# Patient Record
Sex: FEMALE | Race: WHITE | Employment: FULL TIME | URBAN - METROPOLITAN AREA
[De-identification: names, ages, dates, MRNs, and addresses within clinical notes are randomized per-mention and may not be internally consistent; named-entity substitution may affect disease eponyms.]

---

## 2019-06-30 ENCOUNTER — APPOINTMENT (OUTPATIENT)
Dept: GENERAL RADIOLOGY | Age: 39
End: 2019-06-30
Payer: COMMERCIAL

## 2019-06-30 ENCOUNTER — HOSPITAL ENCOUNTER (EMERGENCY)
Age: 39
Discharge: HOME | End: 2019-06-30
Attending: EMERGENCY MEDICINE
Payer: COMMERCIAL

## 2019-06-30 VITALS
BODY MASS INDEX: 34.23 KG/M2 | SYSTOLIC BLOOD PRESSURE: 144 MMHG | OXYGEN SATURATION: 100 % | HEIGHT: 66 IN | RESPIRATION RATE: 19 BRPM | DIASTOLIC BLOOD PRESSURE: 71 MMHG | TEMPERATURE: 98.1 F | WEIGHT: 213 LBS | HEART RATE: 87 BPM

## 2019-06-30 DIAGNOSIS — R07.9 CHEST PAIN, UNSPECIFIED TYPE: Primary | ICD-10-CM

## 2019-06-30 DIAGNOSIS — R42 LIGHTHEADED: ICD-10-CM

## 2019-06-30 LAB
ANION GAP SERPL CALCULATED.3IONS-SCNC: 11 MMOL/L (ref 3–16)
BASOPHILS ABSOLUTE: 0.1 K/UL (ref 0–0.2)
BASOPHILS RELATIVE PERCENT: 0.9 %
BUN BLDV-MCNC: 12 MG/DL (ref 7–20)
CALCIUM SERPL-MCNC: 9.1 MG/DL (ref 8.3–10.6)
CHLORIDE BLD-SCNC: 102 MMOL/L (ref 99–110)
CO2: 23 MMOL/L (ref 21–32)
CREAT SERPL-MCNC: 0.6 MG/DL (ref 0.6–1.1)
EOSINOPHILS ABSOLUTE: 0.1 K/UL (ref 0–0.6)
EOSINOPHILS RELATIVE PERCENT: 0.7 %
GFR AFRICAN AMERICAN: >60
GFR NON-AFRICAN AMERICAN: >60
GLUCOSE BLD-MCNC: 95 MG/DL (ref 70–99)
HCT VFR BLD CALC: 41.1 % (ref 36–48)
HEMOGLOBIN: 13.8 G/DL (ref 12–16)
LYMPHOCYTES ABSOLUTE: 3.8 K/UL (ref 1–5.1)
LYMPHOCYTES RELATIVE PERCENT: 41.1 %
MCH RBC QN AUTO: 30.6 PG (ref 26–34)
MCHC RBC AUTO-ENTMCNC: 33.4 G/DL (ref 31–36)
MCV RBC AUTO: 91.6 FL (ref 80–100)
MONOCYTES ABSOLUTE: 0.7 K/UL (ref 0–1.3)
MONOCYTES RELATIVE PERCENT: 7.9 %
NEUTROPHILS ABSOLUTE: 4.6 K/UL (ref 1.7–7.7)
NEUTROPHILS RELATIVE PERCENT: 49.4 %
PDW BLD-RTO: 13.7 % (ref 12.4–15.4)
PLATELET # BLD: 322 K/UL (ref 135–450)
PMV BLD AUTO: 7.8 FL (ref 5–10.5)
POTASSIUM SERPL-SCNC: 4.1 MMOL/L (ref 3.5–5.1)
RBC # BLD: 4.49 M/UL (ref 4–5.2)
SODIUM BLD-SCNC: 136 MMOL/L (ref 136–145)
TROPONIN: <0.01 NG/ML
TROPONIN: <0.01 NG/ML
VALPROIC ACID LEVEL: <2.8 UG/ML (ref 50–100)
WBC # BLD: 9.3 K/UL (ref 4–11)

## 2019-06-30 PROCEDURE — 85025 COMPLETE CBC W/AUTO DIFF WBC: CPT

## 2019-06-30 PROCEDURE — 99285 EMERGENCY DEPT VISIT HI MDM: CPT

## 2019-06-30 PROCEDURE — 96374 THER/PROPH/DIAG INJ IV PUSH: CPT

## 2019-06-30 PROCEDURE — 6370000000 HC RX 637 (ALT 250 FOR IP): Performed by: EMERGENCY MEDICINE

## 2019-06-30 PROCEDURE — 6360000002 HC RX W HCPCS: Performed by: EMERGENCY MEDICINE

## 2019-06-30 PROCEDURE — 80164 ASSAY DIPROPYLACETIC ACD TOT: CPT

## 2019-06-30 PROCEDURE — 84484 ASSAY OF TROPONIN QUANT: CPT

## 2019-06-30 PROCEDURE — 80048 BASIC METABOLIC PNL TOTAL CA: CPT

## 2019-06-30 PROCEDURE — 93005 ELECTROCARDIOGRAM TRACING: CPT | Performed by: EMERGENCY MEDICINE

## 2019-06-30 PROCEDURE — 71046 X-RAY EXAM CHEST 2 VIEWS: CPT

## 2019-06-30 RX ORDER — PREGABALIN 75 MG/1
75 CAPSULE ORAL 2 TIMES DAILY
COMMUNITY
End: 2019-08-23 | Stop reason: SDUPTHER

## 2019-06-30 RX ORDER — ROSUVASTATIN CALCIUM 10 MG/1
10 TABLET, COATED ORAL DAILY
COMMUNITY
End: 2019-08-23 | Stop reason: SDUPTHER

## 2019-06-30 RX ORDER — CYCLOBENZAPRINE HCL 10 MG
10 TABLET ORAL 3 TIMES DAILY PRN
COMMUNITY
End: 2019-08-23

## 2019-06-30 RX ORDER — KETOROLAC TROMETHAMINE 30 MG/ML
15 INJECTION, SOLUTION INTRAMUSCULAR; INTRAVENOUS ONCE
Status: COMPLETED | OUTPATIENT
Start: 2019-06-30 | End: 2019-06-30

## 2019-06-30 RX ORDER — ESCITALOPRAM OXALATE 10 MG/1
10 TABLET ORAL DAILY
COMMUNITY
End: 2019-12-23

## 2019-06-30 RX ORDER — HYDROXYZINE HYDROCHLORIDE 25 MG/1
25 TABLET, FILM COATED ORAL 3 TIMES DAILY PRN
COMMUNITY

## 2019-06-30 RX ORDER — METFORMIN HYDROCHLORIDE 500 MG/1
500 TABLET, FILM COATED, EXTENDED RELEASE ORAL 2 TIMES DAILY WITH MEALS
COMMUNITY
End: 2019-08-23 | Stop reason: SDUPTHER

## 2019-06-30 RX ORDER — ASPIRIN 81 MG/1
324 TABLET, CHEWABLE ORAL ONCE
Status: COMPLETED | OUTPATIENT
Start: 2019-06-30 | End: 2019-06-30

## 2019-06-30 RX ORDER — IBUPROFEN 200 MG
400 TABLET ORAL ONCE
Status: DISCONTINUED | OUTPATIENT
Start: 2019-06-30 | End: 2019-06-30

## 2019-06-30 RX ORDER — DIVALPROEX SODIUM 500 MG/1
500 TABLET, EXTENDED RELEASE ORAL DAILY
COMMUNITY

## 2019-06-30 RX ORDER — OMEPRAZOLE 20 MG/1
20 CAPSULE, DELAYED RELEASE ORAL DAILY
COMMUNITY
End: 2019-08-23 | Stop reason: SDUPTHER

## 2019-06-30 RX ADMIN — KETOROLAC TROMETHAMINE 15 MG: 30 INJECTION, SOLUTION INTRAMUSCULAR at 16:36

## 2019-06-30 RX ADMIN — ASPIRIN 81 MG 324 MG: 81 TABLET ORAL at 15:45

## 2019-06-30 ASSESSMENT — PAIN SCALES - GENERAL
PAINLEVEL_OUTOF10: 8
PAINLEVEL_OUTOF10: 7

## 2019-06-30 ASSESSMENT — PAIN DESCRIPTION - LOCATION: LOCATION: CHEST

## 2019-06-30 ASSESSMENT — HEART SCORE: ECG: 0

## 2019-06-30 ASSESSMENT — PAIN DESCRIPTION - PAIN TYPE: TYPE: ACUTE PAIN

## 2019-06-30 NOTE — ED PROVIDER NOTES
unremarkable. No specific ST-T wave changes appreciated. No evidence of acute ischemia. No previous EKGs available for comparison    Radiology  XR CHEST STANDARD (2 VW)   Final Result   No acute process.              Labs  Results for orders placed or performed during the hospital encounter of 06/30/19   CBC Auto Differential   Result Value Ref Range    WBC 9.3 4.0 - 11.0 K/uL    RBC 4.49 4.00 - 5.20 M/uL    Hemoglobin 13.8 12.0 - 16.0 g/dL    Hematocrit 41.1 36.0 - 48.0 %    MCV 91.6 80.0 - 100.0 fL    MCH 30.6 26.0 - 34.0 pg    MCHC 33.4 31.0 - 36.0 g/dL    RDW 13.7 12.4 - 15.4 %    Platelets 398 366 - 138 K/uL    MPV 7.8 5.0 - 10.5 fL    Neutrophils % 49.4 %    Lymphocytes % 41.1 %    Monocytes % 7.9 %    Eosinophils % 0.7 %    Basophils % 0.9 %    Neutrophils # 4.6 1.7 - 7.7 K/uL    Lymphocytes # 3.8 1.0 - 5.1 K/uL    Monocytes # 0.7 0.0 - 1.3 K/uL    Eosinophils # 0.1 0.0 - 0.6 K/uL    Basophils # 0.1 0.0 - 0.2 K/uL   Basic Metabolic Panel   Result Value Ref Range    Sodium 136 136 - 145 mmol/L    Potassium 4.1 3.5 - 5.1 mmol/L    Chloride 102 99 - 110 mmol/L    CO2 23 21 - 32 mmol/L    Anion Gap 11 3 - 16    Glucose 95 70 - 99 mg/dL    BUN 12 7 - 20 mg/dL    CREATININE 0.6 0.6 - 1.1 mg/dL    GFR Non-African American >60 >60    GFR African American >60 >60    Calcium 9.1 8.3 - 10.6 mg/dL   Troponin   Result Value Ref Range    Troponin <0.01 <0.01 ng/mL   Troponin   Result Value Ref Range    Troponin <0.01 <0.01 ng/mL   Valproic acid level, total   Result Value Ref Range    Valproic Acid Lvl <2.8 (L) 50.0 - 100.0 ug/mL       Screenings     Heart Score for chest pain patients  History: Highly Suspicious  ECG: Normal  Patient Age: < 45 years  *Risk factors for Atherosclerotic disease: Obesity, Hypertension, Hypercholesterolemia, Cigarette smoking  Risk Factors: > 3 Risk factors or history of atherosclerotic disease*  Troponin: < 1X normal limit  Heart Score Total: 4     MDM and ED Course  The patient would contain dictation errors given the limitations of this technology.  ]      Andreas Garcia MD  07/25/19 2948

## 2019-07-01 LAB
EKG ATRIAL RATE: 84 BPM
EKG DIAGNOSIS: NORMAL
EKG P AXIS: 21 DEGREES
EKG P-R INTERVAL: 130 MS
EKG Q-T INTERVAL: 400 MS
EKG QRS DURATION: 90 MS
EKG QTC CALCULATION (BAZETT): 472 MS
EKG R AXIS: -4 DEGREES
EKG T AXIS: 25 DEGREES
EKG VENTRICULAR RATE: 84 BPM

## 2019-07-01 PROCEDURE — 93010 ELECTROCARDIOGRAM REPORT: CPT | Performed by: INTERNAL MEDICINE

## 2019-07-03 ENCOUNTER — HOSPITAL ENCOUNTER (EMERGENCY)
Age: 39
Discharge: HOME | End: 2019-07-03
Payer: COMMERCIAL

## 2019-07-03 ENCOUNTER — HOSPITAL ENCOUNTER (OUTPATIENT)
Age: 39
Discharge: HOME | End: 2019-07-03
Payer: COMMERCIAL

## 2019-07-03 ENCOUNTER — APPOINTMENT (OUTPATIENT)
Dept: GENERAL RADIOLOGY | Age: 39
End: 2019-07-03
Payer: COMMERCIAL

## 2019-07-03 ENCOUNTER — HOSPITAL ENCOUNTER (OUTPATIENT)
Dept: NON INVASIVE DIAGNOSTICS | Age: 39
Discharge: HOME | End: 2019-07-03
Payer: COMMERCIAL

## 2019-07-03 VITALS
HEART RATE: 96 BPM | RESPIRATION RATE: 16 BRPM | SYSTOLIC BLOOD PRESSURE: 123 MMHG | OXYGEN SATURATION: 96 % | DIASTOLIC BLOOD PRESSURE: 86 MMHG | TEMPERATURE: 98.4 F

## 2019-07-03 DIAGNOSIS — S93.602A FOOT SPRAIN, LEFT, INITIAL ENCOUNTER: Primary | ICD-10-CM

## 2019-07-03 DIAGNOSIS — R07.9 CHEST PAIN, UNSPECIFIED TYPE: ICD-10-CM

## 2019-07-03 LAB
A/G RATIO: 1.3 (ref 1.1–2.2)
ALBUMIN SERPL-MCNC: 4.5 G/DL (ref 3.4–5)
ALP BLD-CCNC: 62 U/L (ref 40–129)
ALT SERPL-CCNC: 25 U/L (ref 10–40)
ANION GAP SERPL CALCULATED.3IONS-SCNC: 14 MMOL/L (ref 3–16)
AST SERPL-CCNC: 25 U/L (ref 15–37)
BASOPHILS ABSOLUTE: 0.1 K/UL (ref 0–0.2)
BASOPHILS RELATIVE PERCENT: 0.7 %
BILIRUB SERPL-MCNC: 0.3 MG/DL (ref 0–1)
BUN BLDV-MCNC: 14 MG/DL (ref 7–20)
CALCIUM SERPL-MCNC: 9.9 MG/DL (ref 8.3–10.6)
CHLORIDE BLD-SCNC: 100 MMOL/L (ref 99–110)
CHOLESTEROL, TOTAL: 307 MG/DL (ref 0–199)
CO2: 25 MMOL/L (ref 21–32)
CREAT SERPL-MCNC: 0.7 MG/DL (ref 0.6–1.1)
EOSINOPHILS ABSOLUTE: 0.1 K/UL (ref 0–0.6)
EOSINOPHILS RELATIVE PERCENT: 0.8 %
ESTIMATED AVERAGE GLUCOSE: 142.7 MG/DL
GFR AFRICAN AMERICAN: >60
GFR NON-AFRICAN AMERICAN: >60
GLOBULIN: 3.4 G/DL
GLUCOSE BLD-MCNC: 116 MG/DL (ref 70–99)
HBA1C MFR BLD: 6.6 %
HCT VFR BLD CALC: 43.2 % (ref 36–48)
HDLC SERPL-MCNC: 35 MG/DL (ref 40–60)
HEMOGLOBIN: 14.6 G/DL (ref 12–16)
LDL CHOLESTEROL CALCULATED: 245 MG/DL
LV EF: 59 %
LVEF MODALITY: NORMAL
LYMPHOCYTES ABSOLUTE: 3 K/UL (ref 1–5.1)
LYMPHOCYTES RELATIVE PERCENT: 30.7 %
MCH RBC QN AUTO: 30.9 PG (ref 26–34)
MCHC RBC AUTO-ENTMCNC: 33.7 G/DL (ref 31–36)
MCV RBC AUTO: 91.8 FL (ref 80–100)
MONOCYTES ABSOLUTE: 0.7 K/UL (ref 0–1.3)
MONOCYTES RELATIVE PERCENT: 7.6 %
NEUTROPHILS ABSOLUTE: 5.8 K/UL (ref 1.7–7.7)
NEUTROPHILS RELATIVE PERCENT: 60.2 %
PDW BLD-RTO: 13.7 % (ref 12.4–15.4)
PLATELET # BLD: 344 K/UL (ref 135–450)
PMV BLD AUTO: 7.8 FL (ref 5–10.5)
POTASSIUM SERPL-SCNC: 4.3 MMOL/L (ref 3.5–5.1)
RBC # BLD: 4.7 M/UL (ref 4–5.2)
SODIUM BLD-SCNC: 139 MMOL/L (ref 136–145)
TOTAL PROTEIN: 7.9 G/DL (ref 6.4–8.2)
TOTAL SYPHILLIS IGG/IGM: NORMAL
TRIGL SERPL-MCNC: 134 MG/DL (ref 0–150)
TSH SERPL DL<=0.05 MIU/L-ACNC: 2.79 UIU/ML (ref 0.27–4.2)
VALPROIC ACID LEVEL: <2.8 UG/ML (ref 50–100)
VLDLC SERPL CALC-MCNC: 27 MG/DL
WBC # BLD: 9.7 K/UL (ref 4–11)

## 2019-07-03 PROCEDURE — 6360000002 HC RX W HCPCS: Performed by: EMERGENCY MEDICINE

## 2019-07-03 PROCEDURE — 73620 X-RAY EXAM OF FOOT: CPT

## 2019-07-03 PROCEDURE — 83036 HEMOGLOBIN GLYCOSYLATED A1C: CPT

## 2019-07-03 PROCEDURE — 80053 COMPREHEN METABOLIC PANEL: CPT

## 2019-07-03 PROCEDURE — 3430000000 HC RX DIAGNOSTIC RADIOPHARMACEUTICAL: Performed by: EMERGENCY MEDICINE

## 2019-07-03 PROCEDURE — 80164 ASSAY DIPROPYLACETIC ACD TOT: CPT

## 2019-07-03 PROCEDURE — 99283 EMERGENCY DEPT VISIT LOW MDM: CPT

## 2019-07-03 PROCEDURE — 80061 LIPID PANEL: CPT

## 2019-07-03 PROCEDURE — 85025 COMPLETE CBC W/AUTO DIFF WBC: CPT

## 2019-07-03 PROCEDURE — 78452 HT MUSCLE IMAGE SPECT MULT: CPT

## 2019-07-03 PROCEDURE — A9502 TC99M TETROFOSMIN: HCPCS | Performed by: EMERGENCY MEDICINE

## 2019-07-03 PROCEDURE — 93017 CV STRESS TEST TRACING ONLY: CPT | Performed by: INTERNAL MEDICINE

## 2019-07-03 PROCEDURE — 36415 COLL VENOUS BLD VENIPUNCTURE: CPT

## 2019-07-03 PROCEDURE — 84443 ASSAY THYROID STIM HORMONE: CPT

## 2019-07-03 PROCEDURE — 86780 TREPONEMA PALLIDUM: CPT

## 2019-07-03 PROCEDURE — 73600 X-RAY EXAM OF ANKLE: CPT

## 2019-07-03 RX ADMIN — TETROFOSMIN 10 MILLICURIE: 1.38 INJECTION, POWDER, LYOPHILIZED, FOR SOLUTION INTRAVENOUS at 08:05

## 2019-07-03 RX ADMIN — TETROFOSMIN 30 MILLICURIE: 1.38 INJECTION, POWDER, LYOPHILIZED, FOR SOLUTION INTRAVENOUS at 09:08

## 2019-07-03 RX ADMIN — REGADENOSON 0.4 MG: 0.08 INJECTION, SOLUTION INTRAVENOUS at 09:08

## 2019-07-03 NOTE — PROGRESS NOTES
Instructed on Lexiscan Stress Test Procedure including possible side effects/ adverse reactions. Patient verbalizes  understanding and denies having any questions. See [address] [place] [address] Cardiology.

## 2019-07-04 ASSESSMENT — ENCOUNTER SYMPTOMS
SHORTNESS OF BREATH: 0
ABDOMINAL PAIN: 0
VOMITING: 0
CHEST TIGHTNESS: 0
NAUSEA: 0
DIARRHEA: 0

## 2019-08-22 ASSESSMENT — ENCOUNTER SYMPTOMS
CONSTIPATION: 0
WHEEZING: 0
TROUBLE SWALLOWING: 0
NAUSEA: 0
CHEST TIGHTNESS: 0
VOMITING: 0
SHORTNESS OF BREATH: 0
COUGH: 0
ABDOMINAL PAIN: 0
DIARRHEA: 0

## 2019-08-23 ENCOUNTER — OFFICE VISIT (OUTPATIENT)
Dept: FAMILY MEDICINE CLINIC | Age: 39
End: 2019-08-23
Payer: COMMERCIAL

## 2019-08-23 VITALS
TEMPERATURE: 98.1 F | BODY MASS INDEX: 33.76 KG/M2 | RESPIRATION RATE: 16 BRPM | SYSTOLIC BLOOD PRESSURE: 126 MMHG | DIASTOLIC BLOOD PRESSURE: 80 MMHG | OXYGEN SATURATION: 98 % | WEIGHT: 202.6 LBS | HEIGHT: 65 IN | HEART RATE: 70 BPM

## 2019-08-23 DIAGNOSIS — E55.9 VITAMIN D DEFICIENCY: ICD-10-CM

## 2019-08-23 DIAGNOSIS — E11.9 TYPE 2 DIABETES MELLITUS WITHOUT COMPLICATION, WITHOUT LONG-TERM CURRENT USE OF INSULIN (HCC): ICD-10-CM

## 2019-08-23 DIAGNOSIS — K21.9 GASTROESOPHAGEAL REFLUX DISEASE, ESOPHAGITIS PRESENCE NOT SPECIFIED: ICD-10-CM

## 2019-08-23 DIAGNOSIS — Z72.0 CURRENT TOBACCO USE: ICD-10-CM

## 2019-08-23 DIAGNOSIS — M54.12 CERVICAL RADICULAR PAIN: ICD-10-CM

## 2019-08-23 DIAGNOSIS — J44.9 CHRONIC OBSTRUCTIVE PULMONARY DISEASE, UNSPECIFIED COPD TYPE (HCC): ICD-10-CM

## 2019-08-23 DIAGNOSIS — M79.7 FIBROMYALGIA: ICD-10-CM

## 2019-08-23 DIAGNOSIS — G47.33 OSA (OBSTRUCTIVE SLEEP APNEA): ICD-10-CM

## 2019-08-23 DIAGNOSIS — Z98.1 HX OF ANKLE FUSION: ICD-10-CM

## 2019-08-23 DIAGNOSIS — I10 ESSENTIAL HYPERTENSION: Primary | ICD-10-CM

## 2019-08-23 DIAGNOSIS — G89.29 OTHER CHRONIC PAIN: ICD-10-CM

## 2019-08-23 DIAGNOSIS — E78.2 MIXED HYPERLIPIDEMIA: ICD-10-CM

## 2019-08-23 DIAGNOSIS — R91.1 LUNG NODULE: ICD-10-CM

## 2019-08-23 DIAGNOSIS — R00.0 TACHYCARDIA: ICD-10-CM

## 2019-08-23 PROCEDURE — G8926 SPIRO NO PERF OR DOC: HCPCS | Performed by: CLINICAL NURSE SPECIALIST

## 2019-08-23 PROCEDURE — 3023F SPIROM DOC REV: CPT | Performed by: CLINICAL NURSE SPECIALIST

## 2019-08-23 PROCEDURE — 4004F PT TOBACCO SCREEN RCVD TLK: CPT | Performed by: CLINICAL NURSE SPECIALIST

## 2019-08-23 PROCEDURE — G8417 CALC BMI ABV UP PARAM F/U: HCPCS | Performed by: CLINICAL NURSE SPECIALIST

## 2019-08-23 PROCEDURE — G8427 DOCREV CUR MEDS BY ELIG CLIN: HCPCS | Performed by: CLINICAL NURSE SPECIALIST

## 2019-08-23 PROCEDURE — 3044F HG A1C LEVEL LT 7.0%: CPT | Performed by: CLINICAL NURSE SPECIALIST

## 2019-08-23 PROCEDURE — 2022F DILAT RTA XM EVC RTNOPTHY: CPT | Performed by: CLINICAL NURSE SPECIALIST

## 2019-08-23 PROCEDURE — 99203 OFFICE O/P NEW LOW 30 MIN: CPT | Performed by: CLINICAL NURSE SPECIALIST

## 2019-08-23 RX ORDER — METOPROLOL TARTRATE 50 MG/1
50 TABLET, FILM COATED ORAL 2 TIMES DAILY
Qty: 60 TABLET | Refills: 2 | Status: SHIPPED | OUTPATIENT
Start: 2019-08-23 | End: 2019-12-03

## 2019-08-23 RX ORDER — AMMONIUM LACTATE 12 G/100G
LOTION TOPICAL DAILY
COMMUNITY
End: 2021-02-04 | Stop reason: SDUPTHER

## 2019-08-23 RX ORDER — CITALOPRAM 20 MG/1
20 TABLET ORAL DAILY
COMMUNITY
End: 2022-06-08

## 2019-08-23 RX ORDER — OMEPRAZOLE 20 MG/1
20 CAPSULE, DELAYED RELEASE ORAL DAILY
Qty: 90 CAPSULE | Refills: 0 | Status: SHIPPED | OUTPATIENT
Start: 2019-08-23 | End: 2019-12-23 | Stop reason: SDUPTHER

## 2019-08-23 RX ORDER — ALBUTEROL SULFATE 90 UG/1
AEROSOL, METERED RESPIRATORY (INHALATION) PRN
COMMUNITY
Start: 2017-08-17 | End: 2019-12-23 | Stop reason: SDUPTHER

## 2019-08-23 RX ORDER — METFORMIN HYDROCHLORIDE 500 MG/1
500 TABLET, FILM COATED, EXTENDED RELEASE ORAL 2 TIMES DAILY WITH MEALS
Qty: 60 TABLET | Refills: 2 | Status: SHIPPED | OUTPATIENT
Start: 2019-08-23 | End: 2019-08-26 | Stop reason: CLARIF

## 2019-08-23 RX ORDER — ROSUVASTATIN CALCIUM 20 MG/1
10 TABLET, COATED ORAL DAILY
Qty: 30 TABLET | Refills: 2 | Status: SHIPPED | OUTPATIENT
Start: 2019-08-23 | End: 2019-12-23 | Stop reason: SDUPTHER

## 2019-08-23 RX ORDER — NITROGLYCERIN 0.4 MG/1
0.4 TABLET SUBLINGUAL EVERY 5 MIN PRN
Qty: 25 TABLET | Refills: 0 | Status: SHIPPED | OUTPATIENT
Start: 2019-08-23 | End: 2019-12-03

## 2019-08-23 RX ORDER — NITROGLYCERIN 0.4 MG/1
1 TABLET SUBLINGUAL
COMMUNITY
Start: 2017-06-19 | End: 2019-08-23 | Stop reason: SDUPTHER

## 2019-08-23 RX ORDER — METOPROLOL TARTRATE 50 MG/1
50 TABLET, FILM COATED ORAL
COMMUNITY
Start: 2018-12-26 | End: 2019-08-23 | Stop reason: SDUPTHER

## 2019-08-23 RX ORDER — PREGABALIN 75 MG/1
75 CAPSULE ORAL 2 TIMES DAILY
Qty: 60 CAPSULE | Refills: 2 | Status: SHIPPED | OUTPATIENT
Start: 2019-08-23 | End: 2019-12-03

## 2019-08-23 NOTE — PROGRESS NOTES
controlled. Recent lipid tests were reviewed and are normal. Exacerbating diseases include diabetes and obesity. Pertinent negatives include no chest pain, focal sensory loss, focal weakness, leg pain, myalgias or shortness of breath. Current antihyperlipidemic treatment includes statins. The current treatment provides significant improvement of lipids. Risk factors for coronary artery disease include hypertension, diabetes mellitus, dyslipidemia, a sedentary lifestyle and obesity. Diabetes   She presents for her follow-up diabetic visit. She has type 2 diabetes mellitus. Her disease course has been stable. Pertinent negatives for hypoglycemia include no headaches or nervousness/anxiousness. Pertinent negatives for diabetes include no blurred vision, no chest pain, no polydipsia, no polyphagia and no polyuria. Symptoms are stable. Risk factors for coronary artery disease include diabetes mellitus, dyslipidemia, hypertension, sedentary lifestyle, tobacco exposure and stress. Current diabetic treatment includes oral agent (monotherapy). Her weight is stable. She is following a generally healthy diet. Current Outpatient Medications on File Prior to Visit   Medication Sig Dispense Refill    albuterol sulfate HFA (VENTOLIN HFA) 108 (90 Base) MCG/ACT inhaler Ventolin HFA 90 mcg/actuation aerosol inhaler      Coenzyme Q10 (CO Q-10 PO) Take by mouth      diclofenac (VOLTAREN) 50 MG EC tablet Take 100 mg by mouth daily      divalproex (DEPAKOTE ER) 500 MG extended release tablet Take 500 mg by mouth daily      escitalopram (LEXAPRO) 10 MG tablet Take 10 mg by mouth daily      hydrOXYzine (ATARAX) 25 MG tablet Take 25 mg by mouth 3 times daily as needed for Itching      citalopram (CELEXA) 20 MG tablet citalopram 20 mg tablet      ammonium lactate (LAC-HYDRIN) 12 % lotion Apply topically       No current facility-administered medications on file prior to visit.        Past Medical History:   Diagnosis Date    Depression     Diabetes mellitus ([facility] [address]     Hyperlipidemia     Hypertension      Past Surgical History:   Procedure Laterality Date    ANKLE FUSION      ELBOW SURGERY      HERNIA REPAIR      HIP ARTHROSCOPY      TONSILLECTOMY AND ADENOIDECTOMY      WRIST GANGLION EXCISION       No family history on file. Social History     Socioeconomic History    Marital status:      Spouse name: Not on file    Number of children: Not on file    Years of education: Not on file    Highest education level: Not on file   Occupational History    Not on file   Social Needs    Financial resource strain: Not on file    Food insecurity:     Worry: Not on file     Inability: Not on file    Transportation needs:     Medical: Not on file     Non-medical: Not on file   Tobacco Use    Smoking status: Current Every Day Smoker    Smokeless tobacco: Never Used   Substance and Sexual Activity    Alcohol use: Not on file    Drug use: Not on file    Sexual activity: Not on file   Lifestyle    Physical activity:     Days per week: Not on file     Minutes per session: Not on file    Stress: Not on file   Relationships    Social connections:     Talks on phone: Not on file     Gets together: Not on file     Attends Jew service: Not on file     Active member of club or organization: Not on file     Attends meetings of clubs or organizations: Not on file     Relationship status: Not on file    Intimate partner violence:     Fear of current or ex partner: Not on file     Emotionally abused: Not on file     Physically abused: Not on file     Forced sexual activity: Not on file   Other Topics Concern    Not on file   Social History Narrative    Not on file       Review of Systems   Constitutional: Negative for chills and fever. HENT: Negative for trouble swallowing. Eyes: Negative for blurred vision and visual disturbance. Respiratory: Negative for cough, chest tightness, shortness of breath and wheezing.

## 2019-08-23 NOTE — PATIENT INSTRUCTIONS
Signed appropriate paperwork to have records sent to the office    Discussed DASH and low sodium diet     Discussed low-cholesterol diet, information given    Discussed diabetic diet, information given    Discussed GERD precautions    Encourage smoking cessation     Referral given for cardiology, pulmonology, orthopedic, podiatry       Patient Education        Learning About Diabetes Food Guidelines  Your Care Instructions    Meal planning is important to manage diabetes. It helps keep your blood sugar at a target level (which you set with your doctor). You don't have to eat special foods. You can eat what your family eats, including sweets once in a while. But you do have to pay attention to how often you eat and how much you eat of certain foods. You may want to work with a dietitian or a certified diabetes educator (CDE) to help you plan meals and snacks. A dietitian or CDE can also help you lose weight if that is one of your goals. What should you know about eating carbs? Managing the amount of carbohydrate (carbs) you eat is an important part of healthy meals when you have diabetes. Carbohydrate is found in many foods. · Learn which foods have carbs. And learn the amounts of carbs in different foods. ? Bread, cereal, pasta, and rice have about 15 grams of carbs in a serving. A serving is 1 slice of bread (1 ounce), ½ cup of cooked cereal, or 1/3 cup of cooked pasta or rice. ? Fruits have 15 grams of carbs in a serving. A serving is 1 small fresh fruit, such as an apple or orange; ½ of a banana; ½ cup of cooked or canned fruit; ½ cup of fruit juice; 1 cup of melon or raspberries; or 2 tablespoons of dried fruit. ? Milk and no-sugar-added yogurt have 15 grams of carbs in a serving. A serving is 1 cup of milk or 2/3 cup of no-sugar-added yogurt. ? Starchy vegetables have 15 grams of carbs in a serving.  A serving is ½ cup of mashed potatoes or sweet potato; 1 cup winter squash; ½ of a small baked potato; ½ cup of cooked beans; or ½ cup cooked corn or green peas. · Learn how much carbs to eat each day and at each meal. A dietitian or CDE can teach you how to keep track of the amount of carbs you eat. This is called carbohydrate counting. · If you are not sure how to count carbohydrate grams, use the Plate Method to plan meals. It is a good, quick way to make sure that you have a balanced meal. It also helps you spread carbs throughout the day. ? Divide your plate by types of foods. Put non-starchy vegetables on half the plate, meat or other protein food on one-quarter of the plate, and a grain or starchy vegetable in the final quarter of the plate. To this you can add a small piece of fruit and 1 cup of milk or yogurt, depending on how many carbs you are supposed to eat at a meal.  · Try to eat about the same amount of carbs at each meal. Do not \"save up\" your daily allowance of carbs to eat at one meal.  · Proteins have very little or no carbs per serving. Examples of proteins are beef, chicken, turkey, fish, eggs, tofu, cheese, cottage cheese, and peanut butter. A serving size of meat is 3 ounces, which is about the size of a deck of cards. Examples of meat substitute serving sizes (equal to 1 ounce of meat) are 1/4 cup of cottage cheese, 1 egg, 1 tablespoon of peanut butter, and ½ cup of tofu. How can you eat out and still eat healthy? · Learn to estimate the serving sizes of foods that have carbohydrate. If you measure food at home, it will be easier to estimate the amount in a serving of restaurant food. · If the meal you order has too much carbohydrate (such as potatoes, corn, or baked beans), ask to have a low-carbohydrate food instead. Ask for a salad or green vegetables. · If you use insulin, check your blood sugar before and after eating out to help you plan how much to eat in the future. · If you eat more carbohydrate at a meal than you had planned, take a walk or do other exercise.  This will help such as potatoes and corn, grains such as rice and pasta, and milk and yogurt. Spreading carbohydrate throughout the day helps keep your blood sugar levels within your target range. Your daily amount depends on several things, including your weight, how active you are, which diabetes medicines you take, and what your goals are for your blood sugar levels. A registered dietitian or diabetes educator can help you plan how much carbohydrate to include in each meal and snack. A guideline for your daily amount of carbohydrate is:  · 45 to 60 grams at each meal. That's about the same as 3 to 4 carbohydrate servings. · 15 to 20 grams at each snack. That's about the same as 1 carbohydrate serving. The Nutrition Facts label on packaged foods tells you how much carbohydrate is in a serving of the food. First, look at the serving size on the food label. Is that the amount you eat in a serving? All of the nutrition information on a food label is based on that serving size. So if you eat more or less than that, you'll need to adjust the other numbers. Total carbohydrate is the next thing you need to look for on the label. If you count carbohydrate servings, one serving of carbohydrate is 15 grams. For foods that don't come with labels, such as fresh fruits and vegetables, you'll need a guide that lists carbohydrate in these foods. Ask your doctor, dietitian, or diabetes educator about books or other nutrition guides you can use. If you take insulin, you need to know how many grams of carbohydrate are in a meal. This lets you know how much rapid-acting insulin to take before you eat. If you use an insulin pump, you get a constant rate of insulin during the day. So the pump must be programmed at meals to give you extra insulin to cover the rise in blood sugar after meals. When you know how much carbohydrate you will eat, you can take the right amount of insulin.  Or, if you always use the same amount of insulin, you need to you are taking certain diabetes medicines. Where can you learn more? Go to https://chpepiceweb.PerfectHitch. org and sign in to your LifeGuard Games account. Enter U218 in the Mamaherbhire box to learn more about \"Counting Carbohydrates: Care Instructions. \"     If you do not have an account, please click on the \"Sign Up Now\" link. Current as of: July 25, 2018  Content Version: 12.1  © 1956-4957 [employer]. Care instructions adapted under license by [facility] ([facility]). If you have questions about a medical condition or this instruction, always ask your healthcare professional. [address] any warranty or liability for your use of this information. Patient Education        Learning About Type 2 Diabetes  What is type 2 diabetes? Insulin is a hormone that helps your body use sugar from your food as energy. Type 2 diabetes happens when your body can't use insulin the right way. Over time, the pancreas can't make enough insulin. If you don't have enough insulin, too much sugar stays in your blood. If you are overweight, get little or no exercise, or have type 2 diabetes in your family, you are more likely to have problems with the way insulin works in your body.  Americans, Hispanics, Native Americans,  Americans, and Pacific Islanders have a higher risk for type 2 diabetes. Type 2 diabetes can be prevented or delayed with a healthy lifestyle, which includes staying at a healthy weight, making smart food choices, and getting regular exercise. What can you expect with type 2 diabetes? Jerilyn Minor keep hearing about how important it is to keep your blood sugar within a target range. That's because over time, high blood sugar can lead to serious problems. It can:  · Harm your eyes, nerves, and kidneys. · Damage your blood vessels, leading to heart disease and stroke. · Reduce blood flow and cause nerve damage to parts of your body, especially your feet.  This can have questions about a medical condition or this instruction, always ask your healthcare professional. [address] any warranty or liability for your use of this information. Patient Education        Low Sodium Diet (2,000 Milligram): Care Instructions  Your Care Instructions    Too much sodium causes your body to hold on to extra water. This can raise your blood pressure and force your heart and kidneys to work harder. In very serious cases, this could cause you to be put in the hospital. It might even be life-threatening. By limiting sodium, you will feel better and lower your risk of serious problems. The most common source of sodium is salt. People get most of the salt in their diet from canned, prepared, and packaged foods. Fast food and restaurant meals also are very high in sodium. Your doctor will probably limit your sodium to less than 2,000 milligrams (mg) a day. This limit counts all the sodium in prepared and packaged foods and any salt you add to your food. Follow-up care is a key part of your treatment and safety. Be sure to make and go to all appointments, and call your doctor if you are having problems. It's also a good idea to know your test results and keep a list of the medicines you take. How can you care for yourself at home? Read food labels  · Read labels on cans and food packages. The labels tell you how much sodium is in each serving. Make sure that you look at the serving size. If you eat more than the serving size, you have eaten more sodium. · Food labels also tell you the Percent Daily Value for sodium. Choose products with low Percent Daily Values for sodium. · Be aware that sodium can come in forms other than salt, including monosodium glutamate (MSG), sodium citrate, and sodium bicarbonate (baking soda). MSG is often added to Asian food. When you eat out, you can sometimes ask for food without MSG or added salt.   Buy low-sodium foods  · Buy foods low-sodium. ? Canned and dried soups, broths, and bouillon, unless labeled sodium-free or low-sodium. ? Canned vegetables, unless labeled sodium-free or low-sodium. ? [place] fries, pizza, tacos, and other fast foods. ? Pickles, olives, ketchup, and other condiments, especially soy sauce, unless labeled sodium-free or low-sodium. Where can you learn more? Go to https://CellCap TechnologiespeSecure Command.United Mobile. org and sign in to your Espresso Logic account. Enter G899 in the [facility] box to learn more about \"Low Sodium Diet (2,000 Milligram): Care Instructions. \"     If you do not have an account, please click on the \"Sign Up Now\" link. Current as of: November 7, 2018  Content Version: 12.1  © 9732-7872 [employer]. Care instructions adapted under license by [facility] ([facility]). If you have questions about a medical condition or this instruction, always ask your healthcare professional. [address] any warranty or liability for your use of this information. Patient Education        Learning About High Blood Pressure  What is high blood pressure? Blood pressure is a measure of how hard the blood pushes against the walls of your arteries. It's normal for blood pressure to go up and down throughout the day, but if it stays up, you have high blood pressure. Another name for high blood pressure is hypertension. Two numbers tell you your blood pressure. The first number is the systolic pressure. It shows how hard the blood pushes when your heart is pumping. The second number is the diastolic pressure. It shows how hard the blood pushes between heartbeats, when your heart is relaxed and filling with blood. Your doctor will give you a goal for your blood pressure. Your goal will be based on your health and your age. High blood pressure (hypertension) means that the top number stays high, or the bottom number stays high, or both.   High blood pressure increases the risk of stroke, changes to help your heart. For example, your doctor may ask you to eat healthy foods, quit smoking, lose extra weight, and be more active. · If lifestyle changes don't help enough, your doctor may recommend that you take medicine. · When blood pressure is very high, medicines are needed to lower it. Follow-up care is a key part of your treatment and safety. Be sure to make and go to all appointments, and call your doctor if you are having problems. It's also a good idea to know your test results and keep a list of the medicines you take. Where can you learn more? Go to https://chpepiceweb.PlayCafe. org and sign in to your Lightbox account. Enter P501 in the NuScriptRx box to learn more about \"Learning About High Blood Pressure. \"     If you do not have an account, please click on the \"Sign Up Now\" link. Current as of: July 22, 2018  Content Version: 12.1  © 4003-4946 [employer]. Care instructions adapted under license by [facility] ([facility]). If you have questions about a medical condition or this instruction, always ask your healthcare professional. [place] [address] any warranty or liability for your use of this information. Patient Education        High Cholesterol: Care Instructions  Your Care Instructions    Cholesterol is a type of fat in your blood. It is needed for many body functions, such as making new cells. Cholesterol is made by your body. It also comes from food you eat. High cholesterol means that you have too much of the fat in your blood. This raises your risk of a heart attack and stroke. LDL and HDL are part of your total cholesterol. LDL is the \"bad\" cholesterol. High LDL can raise your risk for heart disease, heart attack, and stroke. HDL is the \"good\" cholesterol. It helps clear bad cholesterol from the body. High HDL is linked with a lower risk of heart disease, heart attack, and stroke.   Your cholesterol levels help your doctor find without trans fat is fine.)  ? Replace red meat with fish, poultry, and soy protein (like tofu). ? Limit processed and packaged foods like chips, crackers, and cookies. · Be active. Exercise can improve your cholesterol level. Get at least 30 minutes of exercise on most days of the week. Walking is a good choice. You also may want to do other activities, such as running, swimming, cycling, or playing tennis or team sports. · Stay at a healthy weight. Lose weight if you need to. · Don't smoke. If you need help quitting, talk to your doctor about stop-smoking programs and medicines. These can increase your chances of quitting for good. How is high cholesterol treated? The goal of treatment is to reduce your chances of having a heart attack or stroke. The goal is not to lower your cholesterol numbers only. · You may make lifestyle changes, such as eating healthy foods, not smoking, losing weight, and being more active. · You may have to take medicine. Follow-up care is a key part of your treatment and safety. Be sure to make and go to all appointments, and call your doctor if you are having problems. It's also a good idea to know your test results and keep a list of the medicines you take. Where can you learn more? Go to https://Explore.To Yellow Pages.Iamba Networks. org and sign in to your [email or web address] account. Enter M387 in the [facility] box to learn more about \"Learning About High Cholesterol. \"     If you do not have an account, please click on the \"Sign Up Now\" link. Current as of: July 22, 2018  Content Version: 12.1  © 6240-7814 [employer]wise[employer]orporated. Care instructions adapted under license by [facility] ([facility]). If you have questions about a medical condition or this instruction, always ask your healthcare professional. [place] [address] any warranty or liability for your use of this information.          Patient Education        Gastroesophageal Reflux Disease (GERD): Care

## 2019-08-26 ENCOUNTER — TELEPHONE (OUTPATIENT)
Dept: FAMILY MEDICINE CLINIC | Age: 39
End: 2019-08-26

## 2019-08-26 RX ORDER — METFORMIN HYDROCHLORIDE 500 MG/1
1000 TABLET, EXTENDED RELEASE ORAL
Qty: 60 TABLET | Refills: 2 | Status: SHIPPED | OUTPATIENT
Start: 2019-08-26 | End: 2019-12-23 | Stop reason: SDUPTHER

## 2019-08-26 RX ORDER — METFORMIN HYDROCHLORIDE 500 MG/1
1000 TABLET, EXTENDED RELEASE ORAL
Qty: 60 TABLET | Refills: 5 | Status: SHIPPED | OUTPATIENT
Start: 2019-08-26 | End: 2019-08-26 | Stop reason: DRUGHIGH

## 2019-08-26 NOTE — TELEPHONE ENCOUNTER
[facility] pharmacy sent over a drug change request for metformin 500mg 1 tab 2 times daily with meals. Ins plan does not cover  Medication prescribed. Hailee Ritter does approve metformin tab 500mg er.

## 2019-08-28 ASSESSMENT — ENCOUNTER SYMPTOMS
BLURRED VISION: 0
ORTHOPNEA: 0

## 2019-09-08 ENCOUNTER — HOSPITAL ENCOUNTER (EMERGENCY)
Age: 39
Discharge: HOME | End: 2019-09-08
Attending: EMERGENCY MEDICINE
Payer: COMMERCIAL

## 2019-09-08 ENCOUNTER — APPOINTMENT (OUTPATIENT)
Dept: GENERAL RADIOLOGY | Age: 39
End: 2019-09-08
Payer: COMMERCIAL

## 2019-09-08 VITALS
TEMPERATURE: 98.1 F | HEART RATE: 91 BPM | OXYGEN SATURATION: 97 % | WEIGHT: 202.6 LBS | DIASTOLIC BLOOD PRESSURE: 65 MMHG | SYSTOLIC BLOOD PRESSURE: 119 MMHG | RESPIRATION RATE: 13 BRPM | HEIGHT: 65 IN | BODY MASS INDEX: 33.76 KG/M2

## 2019-09-08 DIAGNOSIS — R07.9 CHEST PAIN, UNSPECIFIED TYPE: Primary | ICD-10-CM

## 2019-09-08 LAB
A/G RATIO: 1.2 (ref 1.1–2.2)
ALBUMIN SERPL-MCNC: 4 G/DL (ref 3.4–5)
ALP BLD-CCNC: 71 U/L (ref 40–129)
ALT SERPL-CCNC: 12 U/L (ref 10–40)
ANION GAP SERPL CALCULATED.3IONS-SCNC: 8 MMOL/L (ref 3–16)
AST SERPL-CCNC: 16 U/L (ref 15–37)
BASOPHILS ABSOLUTE: 0.1 K/UL (ref 0–0.2)
BASOPHILS RELATIVE PERCENT: 1.1 %
BILIRUB SERPL-MCNC: <0.2 MG/DL (ref 0–1)
BUN BLDV-MCNC: 13 MG/DL (ref 7–20)
CALCIUM SERPL-MCNC: 9 MG/DL (ref 8.3–10.6)
CHLORIDE BLD-SCNC: 103 MMOL/L (ref 99–110)
CO2: 25 MMOL/L (ref 21–32)
CREAT SERPL-MCNC: 0.8 MG/DL (ref 0.6–1.1)
D DIMER: <200 NG/ML DDU (ref 0–229)
EOSINOPHILS ABSOLUTE: 0.1 K/UL (ref 0–0.6)
EOSINOPHILS RELATIVE PERCENT: 0.9 %
GFR AFRICAN AMERICAN: >60
GFR NON-AFRICAN AMERICAN: >60
GLOBULIN: 3.3 G/DL
GLUCOSE BLD-MCNC: 129 MG/DL (ref 70–99)
GONADOTROPIN, CHORIONIC (HCG) QUANT: <5 MIU/ML
HCT VFR BLD CALC: 34.7 % (ref 36–48)
HEMOGLOBIN: 11.7 G/DL (ref 12–16)
INR BLD: 1.09 (ref 0.86–1.14)
LIPASE: 19 U/L (ref 13–60)
LYMPHOCYTES ABSOLUTE: 3.7 K/UL (ref 1–5.1)
LYMPHOCYTES RELATIVE PERCENT: 32.1 %
MCH RBC QN AUTO: 29.3 PG (ref 26–34)
MCHC RBC AUTO-ENTMCNC: 33.6 G/DL (ref 31–36)
MCV RBC AUTO: 87 FL (ref 80–100)
MONOCYTES ABSOLUTE: 0.7 K/UL (ref 0–1.3)
MONOCYTES RELATIVE PERCENT: 6.3 %
NEUTROPHILS ABSOLUTE: 6.8 K/UL (ref 1.7–7.7)
NEUTROPHILS RELATIVE PERCENT: 59.6 %
PDW BLD-RTO: 13.7 % (ref 12.4–15.4)
PLATELET # BLD: 372 K/UL (ref 135–450)
PMV BLD AUTO: 6.9 FL (ref 5–10.5)
POTASSIUM SERPL-SCNC: 3.9 MMOL/L (ref 3.5–5.1)
PRO-BNP: <5 PG/ML (ref 0–124)
PROTHROMBIN TIME: 12.4 SEC (ref 9.8–13)
RBC # BLD: 3.99 M/UL (ref 4–5.2)
SODIUM BLD-SCNC: 136 MMOL/L (ref 136–145)
TOTAL PROTEIN: 7.3 G/DL (ref 6.4–8.2)
TROPONIN: <0.01 NG/ML
TROPONIN: <0.01 NG/ML
WBC # BLD: 11.4 K/UL (ref 4–11)

## 2019-09-08 PROCEDURE — 71046 X-RAY EXAM CHEST 2 VIEWS: CPT

## 2019-09-08 PROCEDURE — 6370000000 HC RX 637 (ALT 250 FOR IP): Performed by: EMERGENCY MEDICINE

## 2019-09-08 PROCEDURE — 99285 EMERGENCY DEPT VISIT HI MDM: CPT

## 2019-09-08 PROCEDURE — 85610 PROTHROMBIN TIME: CPT

## 2019-09-08 PROCEDURE — 6360000002 HC RX W HCPCS: Performed by: PHYSICIAN ASSISTANT

## 2019-09-08 PROCEDURE — 85379 FIBRIN DEGRADATION QUANT: CPT

## 2019-09-08 PROCEDURE — 84484 ASSAY OF TROPONIN QUANT: CPT

## 2019-09-08 PROCEDURE — 80053 COMPREHEN METABOLIC PANEL: CPT

## 2019-09-08 PROCEDURE — 84702 CHORIONIC GONADOTROPIN TEST: CPT

## 2019-09-08 PROCEDURE — 83690 ASSAY OF LIPASE: CPT

## 2019-09-08 PROCEDURE — 83880 ASSAY OF NATRIURETIC PEPTIDE: CPT

## 2019-09-08 PROCEDURE — 96375 TX/PRO/DX INJ NEW DRUG ADDON: CPT

## 2019-09-08 PROCEDURE — 6370000000 HC RX 637 (ALT 250 FOR IP): Performed by: PHYSICIAN ASSISTANT

## 2019-09-08 PROCEDURE — 96374 THER/PROPH/DIAG INJ IV PUSH: CPT

## 2019-09-08 PROCEDURE — 93005 ELECTROCARDIOGRAM TRACING: CPT | Performed by: EMERGENCY MEDICINE

## 2019-09-08 PROCEDURE — 85025 COMPLETE CBC W/AUTO DIFF WBC: CPT

## 2019-09-08 RX ORDER — SUCRALFATE 1 G/1
1 TABLET ORAL 4 TIMES DAILY
Qty: 60 TABLET | Refills: 0 | Status: SHIPPED | OUTPATIENT
Start: 2019-09-08 | End: 2019-12-03

## 2019-09-08 RX ORDER — ONDANSETRON 2 MG/ML
4 INJECTION INTRAMUSCULAR; INTRAVENOUS ONCE
Status: COMPLETED | OUTPATIENT
Start: 2019-09-08 | End: 2019-09-08

## 2019-09-08 RX ORDER — MORPHINE SULFATE 4 MG/ML
4 INJECTION, SOLUTION INTRAMUSCULAR; INTRAVENOUS ONCE
Status: COMPLETED | OUTPATIENT
Start: 2019-09-08 | End: 2019-09-08

## 2019-09-08 RX ORDER — SUCRALFATE 1 G/1
1 TABLET ORAL 4 TIMES DAILY
Qty: 60 TABLET | Refills: 0 | Status: SHIPPED | OUTPATIENT
Start: 2019-09-08 | End: 2019-09-08

## 2019-09-08 RX ORDER — SUCRALFATE 1 G/1
1 TABLET ORAL ONCE
Status: COMPLETED | OUTPATIENT
Start: 2019-09-08 | End: 2019-09-08

## 2019-09-08 RX ADMIN — SUCRALFATE 1 G: 1 TABLET ORAL at 22:50

## 2019-09-08 RX ADMIN — ONDANSETRON 4 MG: 2 INJECTION INTRAMUSCULAR; INTRAVENOUS at 20:50

## 2019-09-08 RX ADMIN — MORPHINE SULFATE 4 MG: 4 INJECTION INTRAVENOUS at 20:50

## 2019-09-08 RX ADMIN — LIDOCAINE HYDROCHLORIDE: 20 SOLUTION ORAL; TOPICAL at 20:18

## 2019-09-08 ASSESSMENT — PAIN SCALES - GENERAL
PAINLEVEL_OUTOF10: 9
PAINLEVEL_OUTOF10: 8
PAINLEVEL_OUTOF10: 9

## 2019-09-08 ASSESSMENT — ENCOUNTER SYMPTOMS
COUGH: 0
CONSTIPATION: 0
COLOR CHANGE: 0
DIARRHEA: 0
NAUSEA: 0
CHEST TIGHTNESS: 0
VOMITING: 0
SHORTNESS OF BREATH: 1
ABDOMINAL PAIN: 0

## 2019-09-08 ASSESSMENT — PAIN DESCRIPTION - LOCATION: LOCATION: CHEST

## 2019-09-08 ASSESSMENT — PAIN DESCRIPTION - PAIN TYPE: TYPE: ACUTE PAIN

## 2019-09-08 NOTE — ED PROVIDER NOTES
[address] [facility]        Pt Name: Elizabeth Anders  MRN: [id number]  [place] 1980  Date of evaluation: 9/8/2019  Provider: DENNISE Gonzalez  PCP: JUAN RAMON Felix CNP    This patient was seen and evaluated by the attending physician Solis Bernstein, *. CHIEF COMPLAINT       Chief Complaint   Patient presents with    Chest Pain     chest pain for a couple of day and taking nitro at home with not much relief, sharp stabbing pain around right side of chest       HISTORY OF PRESENT ILLNESS   (Location/Symptom, Timing/Onset, Context/Setting, Quality, Duration, Modifying Factors, Severity)  Note limiting factors. Elizabeth Anders is a 45 y.o. female with past medical history depression, diabetes, hyperlipidemia and hypertension who presents to the ED with complaint of chest pain. Patient states she has had intermittent chest pain for the past couple days. States gradually increasing in intensity and duration. Patient states pain is been present since dinner this afternoon. Patient states sharp pain rated 9/10 to her right sided chest.  States radiates into her back. Patient states she has taken sublingual nitro at home which has helped with her pain but not completely take it away. Patient states she has a nitro that was prescribed by her previous cardiologist in [facility]. Patient states she has had numerous stress test in the past with most recent back in July. Patient states they were unremarkable. Patient states she did have cardiac catheterization she believes about a year ago in [facility] which was unremarkable. Patient states she was given the nitroglycerin to take for her pain. Patient states she never had any stents or bypass. States she does have a history of heart disease in family. Patient states she has history of hypertension, hyperlipidemia and prediabetes. Patient states she is a smoker.   Patient

## 2019-09-09 LAB
EKG ATRIAL RATE: 101 BPM
EKG DIAGNOSIS: NORMAL
EKG P AXIS: 29 DEGREES
EKG P-R INTERVAL: 136 MS
EKG Q-T INTERVAL: 374 MS
EKG QRS DURATION: 94 MS
EKG QTC CALCULATION (BAZETT): 484 MS
EKG R AXIS: 6 DEGREES
EKG T AXIS: 36 DEGREES
EKG VENTRICULAR RATE: 101 BPM

## 2019-09-09 PROCEDURE — 93010 ELECTROCARDIOGRAM REPORT: CPT | Performed by: INTERNAL MEDICINE

## 2019-09-09 ASSESSMENT — HEART SCORE: ECG: 0

## 2019-09-09 NOTE — ED PROVIDER NOTES
[facility]      Pt Name: Delia Dubon  MRN: [id number]  [place] 1980  Date of evaluation: 9/8/2019  Provider: Petra Smith MD  I independently performed a history and physical on Delia Dubon. All diagnostic, treatment, and disposition decisions were made by myself in conjunction with the advanced practice provider. HPI: Delia Dubon presented with   Chief Complaint   Patient presents with    Chest Pain     chest pain for a couple of day and taking nitro at home with not much relief, sharp stabbing pain around right side of chest     Delia Dubon has a past medical history of Depression, Diabetes mellitus ([address] Hyperlipidemia, and Hypertension. She has a past surgical history that includes Ankle Fusion; Hip arthroscopy; hernia repair; Tonsillectomy and adenoidectomy; Elbow surgery; and Wrist ganglion excision. No current facility-administered medications on file prior to encounter. Current Outpatient Medications on File Prior to Encounter   Medication Sig Dispense Refill    metFORMIN (GLUCOPHAGE-XR) 500 MG extended release tablet Take 2 tablets by mouth daily (with breakfast) 60 tablet 2    albuterol sulfate HFA (VENTOLIN HFA) 108 (90 Base) MCG/ACT inhaler Ventolin HFA 90 mcg/actuation aerosol inhaler      citalopram (CELEXA) 20 MG tablet citalopram 20 mg tablet      ammonium lactate (LAC-HYDRIN) 12 % lotion Apply topically      omeprazole (PRILOSEC) 20 MG delayed release capsule Take 1 capsule by mouth daily 90 capsule 0    vitamin D (CHOLECALCIFEROL) 1000 UNIT TABS tablet Take 1 tablet by mouth daily 30 tablet 2    pregabalin (LYRICA) 75 MG capsule Take 1 capsule by mouth 2 times daily for 90 days.  60 capsule 2    rosuvastatin (CRESTOR) 20 MG tablet Take 0.5 tablets by mouth daily 30 tablet 2    metoprolol tartrate (LOPRESSOR) 50 MG tablet Take 1 tablet by mouth 2 times daily 60 tablet 2    nitroGLYCERIN (NITROSTAT) 0.4 MG SL Result Value    WBC 11.4 (*)     RBC 3.99 (*)     Hemoglobin 11.7 (*)     Hematocrit 34.7 (*)     All other components within normal limits    Narrative:     Performed at:  [facility] [address] [facility], [address]   Phone [phone]   COMPREHENSIVE METABOLIC PANEL - Abnormal; Notable for the following components:    Glucose 129 (*)     All other components within normal limits    Narrative:     Performed at:  [facility] [address], [address]   Phone [phone]    Narrative:     Performed at:  [facility] [address]  [place], [address]   Phone [phone]   HCG, QUANTITATIVE, PREGNANCY    Narrative:     Performed at:  [facility] [address], [address]   Phone [phone]   TROPONIN    Narrative:     Performed at:  [facility] [address], [address] [facility]   Phone [phone]   PROTIME-INR    Narrative:     Performed at:  [facility]  [address], [address]   Phone [phone]   D-DIMER, QUANTITATIVE    Narrative:     Performed at:  [facility] [address], [address]   Phone [phone]   LIPASE    Narrative:     Performed at:  [facility] [address], [email or web address]   Phone [phone]   TROPONIN    Narrative:     Performed at:  [facility] [address]   Phone [phone]     RADIOLOGY:     Plain x-rays were viewed by me:   XR CHEST STANDARD (2 VW)   Final Result   No acute cardiopulmonary disease.            EKG:  Read by me in the absence of a cardiologist shows:  ST, rate 101, no ectopy, intervals normal, axis normal, no st elevation, poor R wave progression, no major change from prior study Jun 2019    Medications administered:  Medications   aluminum & magnesium hydroxide-simethicone (MAALOX) 30 mL, lidocaine viscous hcl (XYLOCAINE) 5 mL (GI COCKTAIL) ( Oral Given 9/8/19 2018)   morphine injection 4 mg (4 mg Intravenous Given 9/8/19 2050)   ondansetron (ZOFRAN) injection 4 mg (4 mg Intravenous Given 9/8/19 2050)   sucralfate (CARAFATE) tablet 1 g (1 g Oral Given 9/8/19 2250)     Discharge Medication List as of 9/8/2019 10:50 PM      START taking these medications    Details   sucralfate (CARAFATE) 1 GM tablet Take 1 tablet by mouth 4 times daily for 15 days, Disp-60 tablet, R-0Print           FOLLOW UP:    JUAN RAMON Benitez - CNP  [address] [id number] [id number] [facility] [address]  [phone]    Schedule an appointment as soon as possible for a visit       [facility] Emergency Department  [facility]  [address]  Go to   If symptoms worsen    FINAL IMPRESSION:    1.  Chest pain, unspecified type          Christopher Wang MD  09/09/19 0222

## 2019-09-12 ENCOUNTER — OFFICE VISIT (OUTPATIENT)
Dept: ORTHOPEDIC SURGERY | Age: 39
End: 2019-09-12
Payer: COMMERCIAL

## 2019-09-12 VITALS
HEART RATE: 78 BPM | SYSTOLIC BLOOD PRESSURE: 122 MMHG | DIASTOLIC BLOOD PRESSURE: 74 MMHG | BODY MASS INDEX: 33.76 KG/M2 | HEIGHT: 65 IN | WEIGHT: 202.6 LBS

## 2019-09-12 DIAGNOSIS — M65.872 OTHER SYNOVITIS AND TENOSYNOVITIS, LEFT ANKLE AND FOOT: ICD-10-CM

## 2019-09-12 DIAGNOSIS — M25.571 ACUTE RIGHT ANKLE PAIN: Primary | ICD-10-CM

## 2019-09-12 DIAGNOSIS — M19.072 OSTEOARTHRITIS OF LEFT FOOT, UNSPECIFIED OSTEOARTHRITIS TYPE: ICD-10-CM

## 2019-09-12 PROCEDURE — L1902 AFO ANKLE GAUNTLET PRE OTS: HCPCS | Performed by: PODIATRIST

## 2019-09-12 PROCEDURE — G8417 CALC BMI ABV UP PARAM F/U: HCPCS | Performed by: PODIATRIST

## 2019-09-12 PROCEDURE — G8427 DOCREV CUR MEDS BY ELIG CLIN: HCPCS | Performed by: PODIATRIST

## 2019-09-12 PROCEDURE — 99203 OFFICE O/P NEW LOW 30 MIN: CPT | Performed by: PODIATRIST

## 2019-09-12 PROCEDURE — 4004F PT TOBACCO SCREEN RCVD TLK: CPT | Performed by: PODIATRIST

## 2019-09-20 ENCOUNTER — OFFICE VISIT (OUTPATIENT)
Dept: PULMONOLOGY | Age: 39
End: 2019-09-20
Payer: COMMERCIAL

## 2019-09-20 VITALS
SYSTOLIC BLOOD PRESSURE: 124 MMHG | WEIGHT: 196 LBS | OXYGEN SATURATION: 98 % | DIASTOLIC BLOOD PRESSURE: 86 MMHG | RESPIRATION RATE: 18 BRPM | HEART RATE: 100 BPM | BODY MASS INDEX: 32.62 KG/M2

## 2019-09-20 DIAGNOSIS — J44.9 COPD WITH ASTHMA (HCC): Primary | ICD-10-CM

## 2019-09-20 DIAGNOSIS — Z23 NEED FOR IMMUNIZATION AGAINST INFLUENZA: ICD-10-CM

## 2019-09-20 DIAGNOSIS — R91.8 LUNG NODULE, MULTIPLE: ICD-10-CM

## 2019-09-20 PROCEDURE — G8926 SPIRO NO PERF OR DOC: HCPCS | Performed by: INTERNAL MEDICINE

## 2019-09-20 PROCEDURE — 90686 IIV4 VACC NO PRSV 0.5 ML IM: CPT | Performed by: INTERNAL MEDICINE

## 2019-09-20 PROCEDURE — G8427 DOCREV CUR MEDS BY ELIG CLIN: HCPCS | Performed by: INTERNAL MEDICINE

## 2019-09-20 PROCEDURE — G8417 CALC BMI ABV UP PARAM F/U: HCPCS | Performed by: INTERNAL MEDICINE

## 2019-09-20 PROCEDURE — 3023F SPIROM DOC REV: CPT | Performed by: INTERNAL MEDICINE

## 2019-09-20 PROCEDURE — 90471 IMMUNIZATION ADMIN: CPT | Performed by: INTERNAL MEDICINE

## 2019-09-20 PROCEDURE — 99244 OFF/OP CNSLTJ NEW/EST MOD 40: CPT | Performed by: INTERNAL MEDICINE

## 2019-09-20 ASSESSMENT — ENCOUNTER SYMPTOMS
DIARRHEA: 0
CONSTIPATION: 0
SORE THROAT: 0
CHOKING: 0
VOICE CHANGE: 0
STRIDOR: 0
APNEA: 0
ABDOMINAL DISTENTION: 0
BLOOD IN STOOL: 0
COUGH: 1
BACK PAIN: 0
SHORTNESS OF BREATH: 1
RHINORRHEA: 0
SINUS PRESSURE: 0
CHEST TIGHTNESS: 0
ANAL BLEEDING: 0
WHEEZING: 0
ABDOMINAL PAIN: 0

## 2019-09-20 NOTE — PROGRESS NOTES
Chuy Code    YOB: 1980     Date of Service:  9/20/2019     Chief Complaint   Patient presents with    COPD     NPV referred by Aj Rao NP    Shortness of Breath     Referred for consultation by Aj Rao for evaluation of COPD and lung nodules    HPI patient gives a long history of dyspnea with exertion, recently even with short distances-for example walking from the car park to our office. This is associated with cough with clear to brown phlegm. She has a history of childhood asthma since age 15 and has been a smoker since then. Only uses albuterol inhaler as needed. Also noted to have 9 mm left upper lobe lung nodule-previously PET negative in 2017, recent CT chest from March 2019 showed stability. Patient was being followed up at [facility] seeing Dr. Johnny Phelps, as now moved to [facility] and therefore wants to establish with a new pulmonary physician. Smoker since age 15, up to 1-1/2 packs/day-now cut down to 3 to 4 cigarettes/day. History of tachycardia and hyperlipidemia. Has a strong family history of lung cancer.     Allergies   Allergen Reactions    Cinnamon Itching    Codeine Hives and Itching     Outpatient Medications Marked as Taking for the 9/20/19 encounter (Office Visit) with Vania Palacio MD   Medication Sig Dispense Refill    mometasone-formoterol (DULERA) 100-5 MCG/ACT inhaler Inhale 2 puffs into the lungs 2 times daily 1 Inhaler 3       Immunization History   Administered Date(s) Administered    Pneumococcal Polysaccharide ([id number]) 06/21/2015    Td, unspecified formulation 06/21/2010       Past Medical History:   Diagnosis Date    Depression     Diabetes mellitus ([address]     Hyperlipidemia     Hypertension      Past Surgical History:   Procedure Laterality Date    ANKLE FUSION      ELBOW SURGERY      HERNIA REPAIR      HIP ARTHROSCOPY      TONSILLECTOMY AND ADENOIDECTOMY      WRIST GANGLION EXCISION in about 6 weeks (around 11/1/2019).

## 2019-09-20 NOTE — PATIENT INSTRUCTIONS
Vaccine Information Sheet, \"Influenza - Inactivated\"  given to Aleksandar Portal, or parent/legal guardian of  Aleksandar Portal and verbalized understanding. Patient responses:    Have you ever had a reaction to a flu vaccine? No  Are you able to eat eggs without adverse effects? Yes  Do you have any current illness? No  Have you ever had Guillian [place] Syndrome? No    Flu vaccine given per order. Please see immunization tab.

## 2019-09-24 ENCOUNTER — HOSPITAL ENCOUNTER (OUTPATIENT)
Dept: CT IMAGING | Age: 39
Discharge: HOME | End: 2019-09-24
Payer: COMMERCIAL

## 2019-09-24 PROCEDURE — 73700 CT LOWER EXTREMITY W/O DYE: CPT

## 2019-10-03 ENCOUNTER — HOSPITAL ENCOUNTER (OUTPATIENT)
Dept: PULMONOLOGY | Age: 39
Discharge: HOME | End: 2019-10-03
Payer: COMMERCIAL

## 2019-10-03 ENCOUNTER — HOSPITAL ENCOUNTER (OUTPATIENT)
Dept: CT IMAGING | Age: 39
Discharge: HOME | End: 2019-10-03
Payer: COMMERCIAL

## 2019-10-03 ENCOUNTER — OFFICE VISIT (OUTPATIENT)
Dept: CARDIOLOGY CLINIC | Age: 39
End: 2019-10-03
Payer: COMMERCIAL

## 2019-10-03 VITALS — OXYGEN SATURATION: 98 % | HEART RATE: 72 BPM | RESPIRATION RATE: 16 BRPM

## 2019-10-03 VITALS
HEIGHT: 65 IN | SYSTOLIC BLOOD PRESSURE: 99 MMHG | WEIGHT: 199 LBS | DIASTOLIC BLOOD PRESSURE: 66 MMHG | BODY MASS INDEX: 33.15 KG/M2 | HEART RATE: 88 BPM

## 2019-10-03 DIAGNOSIS — R00.0 TACHYCARDIA: Primary | ICD-10-CM

## 2019-10-03 DIAGNOSIS — E66.9 OBESITY (BMI 30-39.9): ICD-10-CM

## 2019-10-03 DIAGNOSIS — E78.2 MIXED HYPERLIPIDEMIA: ICD-10-CM

## 2019-10-03 DIAGNOSIS — J44.9 CHRONIC OBSTRUCTIVE PULMONARY DISEASE, UNSPECIFIED COPD TYPE (HCC): ICD-10-CM

## 2019-10-03 DIAGNOSIS — Z72.0 TOBACCO ABUSE: ICD-10-CM

## 2019-10-03 DIAGNOSIS — I10 BENIGN ESSENTIAL HTN: ICD-10-CM

## 2019-10-03 DIAGNOSIS — J44.9 COPD WITH ASTHMA (HCC): ICD-10-CM

## 2019-10-03 DIAGNOSIS — R91.1 LUNG NODULE: ICD-10-CM

## 2019-10-03 LAB
DLCO %PRED: 60 %
DLCO PRED: NORMAL ML/MIN/MMHG
DLCO/VA %PRED: NORMAL %
DLCO/VA PRED: NORMAL ML/MIN/MMHG
DLCO/VA: NORMAL ML/MIN/MMHG
DLCO: NORMAL ML/MIN/MMHG
EXPIRATORY TIME-POST: NORMAL SEC
EXPIRATORY TIME: NORMAL SEC
FEF 25-75% %CHNG: NORMAL
FEF 25-75% %PRED-POST: NORMAL %
FEF 25-75% %PRED-PRE: NORMAL L/SEC
FEF 25-75% PRED: NORMAL L/SEC
FEF 25-75%-POST: NORMAL L/SEC
FEF 25-75%-PRE: NORMAL L/SEC
FEV1 %PRED-POST: NORMAL %
FEV1 %PRED-PRE: 96 %
FEV1 PRED: NORMAL L
FEV1-POST: NORMAL L
FEV1-PRE: NORMAL L
FEV1/FVC %PRED-POST: NORMAL %
FEV1/FVC %PRED-PRE: NORMAL %
FEV1/FVC PRED: NORMAL %
FEV1/FVC-POST: NORMAL %
FEV1/FVC-PRE: 94 %
FVC %PRED-POST: NORMAL L
FVC %PRED-PRE: NORMAL %
FVC PRED: NORMAL L
FVC-POST: NORMAL L
FVC-PRE: NORMAL L
GAW %PRED: NORMAL %
GAW PRED: NORMAL L/S/CMH2O
GAW: NORMAL L/S/CMH2O
IC %PRED: NORMAL %
IC PRED: NORMAL L
IC: NORMAL L
MEP: NORMAL
MIP: NORMAL
MVV %PRED-PRE: NORMAL %
MVV PRED: NORMAL L/MIN
MVV-PRE: NORMAL L/MIN
PEF %PRED-POST: NORMAL %
PEF %PRED-PRE: NORMAL L/SEC
PEF PRED: NORMAL L/SEC
PEF%CHNG: NORMAL
PEF-POST: NORMAL L/SEC
PEF-PRE: NORMAL L/SEC
RAW %PRED: NORMAL %
RAW PRED: NORMAL CMH2O/L/S
RAW: NORMAL CMH2O/L/S
RV %PRED: NORMAL %
RV PRED: NORMAL L
RV: NORMAL L
SVC %PRED: NORMAL %
SVC PRED: NORMAL L
SVC: NORMAL L
TLC %PRED: 91 %
TLC PRED: NORMAL L
TLC: NORMAL L
VA %PRED: NORMAL %
VA PRED: NORMAL L
VA: NORMAL L
VTG %PRED: NORMAL %
VTG PRED: NORMAL L
VTG: NORMAL L

## 2019-10-03 PROCEDURE — G8926 SPIRO NO PERF OR DOC: HCPCS | Performed by: INTERNAL MEDICINE

## 2019-10-03 PROCEDURE — 4004F PT TOBACCO SCREEN RCVD TLK: CPT | Performed by: INTERNAL MEDICINE

## 2019-10-03 PROCEDURE — 94726 PLETHYSMOGRAPHY LUNG VOLUMES: CPT

## 2019-10-03 PROCEDURE — 94761 N-INVAS EAR/PLS OXIMETRY MLT: CPT

## 2019-10-03 PROCEDURE — 3023F SPIROM DOC REV: CPT | Performed by: INTERNAL MEDICINE

## 2019-10-03 PROCEDURE — 93000 ELECTROCARDIOGRAM COMPLETE: CPT | Performed by: INTERNAL MEDICINE

## 2019-10-03 PROCEDURE — G8427 DOCREV CUR MEDS BY ELIG CLIN: HCPCS | Performed by: INTERNAL MEDICINE

## 2019-10-03 PROCEDURE — 99204 OFFICE O/P NEW MOD 45 MIN: CPT | Performed by: INTERNAL MEDICINE

## 2019-10-03 PROCEDURE — G8417 CALC BMI ABV UP PARAM F/U: HCPCS | Performed by: INTERNAL MEDICINE

## 2019-10-03 PROCEDURE — 94728 AIRWY RESIST BY OSCILLOMETRY: CPT

## 2019-10-03 PROCEDURE — 71250 CT THORAX DX C-: CPT

## 2019-10-03 PROCEDURE — 94729 DIFFUSING CAPACITY: CPT

## 2019-10-03 PROCEDURE — 94200 LUNG FUNCTION TEST (MBC/MVV): CPT

## 2019-10-03 PROCEDURE — G8482 FLU IMMUNIZE ORDER/ADMIN: HCPCS | Performed by: INTERNAL MEDICINE

## 2019-10-03 RX ORDER — ALBUTEROL SULFATE 90 UG/1
4 AEROSOL, METERED RESPIRATORY (INHALATION) ONCE
Status: CANCELLED | OUTPATIENT
Start: 2019-10-03

## 2019-10-03 SDOH — HEALTH STABILITY: MENTAL HEALTH: HOW OFTEN DO YOU HAVE A DRINK CONTAINING ALCOHOL?: NEVER

## 2019-10-03 ASSESSMENT — PULMONARY FUNCTION TESTS
FEV1_PERCENT_PREDICTED_PRE: 96
FEV1/FVC_PRE: 94

## 2019-10-07 LAB
BASOPHILS ABSOLUTE: 0 K/UL (ref 0–0.2)
BASOPHILS RELATIVE PERCENT: 0.5 %
EOSINOPHILS ABSOLUTE: 0.1 K/UL (ref 0–0.6)
EOSINOPHILS RELATIVE PERCENT: 0.7 %
HCT VFR BLD CALC: 38.1 % (ref 36–48)
HEMOGLOBIN: 12.5 G/DL (ref 12–16)
LYMPHOCYTES ABSOLUTE: 3 K/UL (ref 1–5.1)
LYMPHOCYTES RELATIVE PERCENT: 32.2 %
MCH RBC QN AUTO: 29 PG (ref 26–34)
MCHC RBC AUTO-ENTMCNC: 32.8 G/DL (ref 31–36)
MCV RBC AUTO: 88.3 FL (ref 80–100)
MONOCYTES ABSOLUTE: 0.5 K/UL (ref 0–1.3)
MONOCYTES RELATIVE PERCENT: 5.8 %
NEUTROPHILS ABSOLUTE: 5.6 K/UL (ref 1.7–7.7)
NEUTROPHILS RELATIVE PERCENT: 60.8 %
PDW BLD-RTO: 13.8 % (ref 12.4–15.4)
PLATELET # BLD: 358 K/UL (ref 135–450)
PMV BLD AUTO: 7.5 FL (ref 5–10.5)
RBC # BLD: 4.31 M/UL (ref 4–5.2)
TSH SERPL DL<=0.05 MIU/L-ACNC: 1.24 UIU/ML (ref 0.27–4.2)
WBC # BLD: 9.3 K/UL (ref 4–11)

## 2019-10-09 LAB
HPV COMMENT: NORMAL
HPV TYPE 16: NOT DETECTED
HPV TYPE 18: NOT DETECTED
HPVOH (OTHER TYPES): NOT DETECTED

## 2019-10-16 ENCOUNTER — HOSPITAL ENCOUNTER (EMERGENCY)
Age: 39
Discharge: HOME | End: 2019-10-16
Payer: COMMERCIAL

## 2019-10-16 VITALS
DIASTOLIC BLOOD PRESSURE: 70 MMHG | SYSTOLIC BLOOD PRESSURE: 111 MMHG | WEIGHT: 156 LBS | BODY MASS INDEX: 25.99 KG/M2 | OXYGEN SATURATION: 99 % | RESPIRATION RATE: 16 BRPM | TEMPERATURE: 98 F | HEIGHT: 65 IN | HEART RATE: 87 BPM

## 2019-10-16 DIAGNOSIS — S61.210A LACERATION OF RIGHT INDEX FINGER WITHOUT FOREIGN BODY WITHOUT DAMAGE TO NAIL, INITIAL ENCOUNTER: Primary | ICD-10-CM

## 2019-10-16 DIAGNOSIS — Z23 NEED FOR TETANUS BOOSTER: ICD-10-CM

## 2019-10-16 PROCEDURE — 6360000002 HC RX W HCPCS: Performed by: PHYSICIAN ASSISTANT

## 2019-10-16 PROCEDURE — 90715 TDAP VACCINE 7 YRS/> IM: CPT | Performed by: PHYSICIAN ASSISTANT

## 2019-10-16 PROCEDURE — 4500000022 HC ED LEVEL 2 PROCEDURE

## 2019-10-16 PROCEDURE — 90471 IMMUNIZATION ADMIN: CPT | Performed by: PHYSICIAN ASSISTANT

## 2019-10-16 PROCEDURE — 99282 EMERGENCY DEPT VISIT SF MDM: CPT

## 2019-10-16 RX ADMIN — TETANUS TOXOID, REDUCED DIPHTHERIA TOXOID AND ACELLULAR PERTUSSIS VACCINE, ADSORBED 0.5 ML: 5; 2.5; 8; 8; 2.5 SUSPENSION INTRAMUSCULAR at 21:21

## 2019-10-16 ASSESSMENT — ENCOUNTER SYMPTOMS
ABDOMINAL PAIN: 0
SHORTNESS OF BREATH: 0
DIARRHEA: 0
VOMITING: 0
WHEEZING: 0
NAUSEA: 0

## 2019-10-16 ASSESSMENT — PAIN SCALES - GENERAL: PAINLEVEL_OUTOF10: 8

## 2019-10-16 ASSESSMENT — PAIN DESCRIPTION - PAIN TYPE: TYPE: ACUTE PAIN

## 2019-10-22 ENCOUNTER — OFFICE VISIT (OUTPATIENT)
Dept: ORTHOPEDIC SURGERY | Age: 39
End: 2019-10-22
Payer: COMMERCIAL

## 2019-10-22 DIAGNOSIS — M48.02 SPINAL STENOSIS, CERVICAL REGION: ICD-10-CM

## 2019-10-22 DIAGNOSIS — M54.2 NECK PAIN: Primary | ICD-10-CM

## 2019-10-22 DIAGNOSIS — Z86.69 HISTORY OF CARPAL TUNNEL SYNDROME: ICD-10-CM

## 2019-10-22 DIAGNOSIS — R20.2 PARESTHESIA OF RIGHT UPPER EXTREMITY: ICD-10-CM

## 2019-10-22 DIAGNOSIS — R20.2 PARESTHESIA OF LEFT UPPER EXTREMITY: ICD-10-CM

## 2019-10-22 DIAGNOSIS — S16.1XXS CERVICAL STRAIN, SEQUELA: ICD-10-CM

## 2019-10-22 DIAGNOSIS — G56.20 ULNAR NERVE ENTRAPMENT AT ELBOW, UNSPECIFIED LATERALITY: ICD-10-CM

## 2019-10-22 PROCEDURE — 4004F PT TOBACCO SCREEN RCVD TLK: CPT | Performed by: INTERNAL MEDICINE

## 2019-10-22 PROCEDURE — 99204 OFFICE O/P NEW MOD 45 MIN: CPT | Performed by: INTERNAL MEDICINE

## 2019-10-22 PROCEDURE — G8417 CALC BMI ABV UP PARAM F/U: HCPCS | Performed by: INTERNAL MEDICINE

## 2019-10-22 PROCEDURE — G8427 DOCREV CUR MEDS BY ELIG CLIN: HCPCS | Performed by: INTERNAL MEDICINE

## 2019-10-22 PROCEDURE — G8482 FLU IMMUNIZE ORDER/ADMIN: HCPCS | Performed by: INTERNAL MEDICINE

## 2019-10-31 ENCOUNTER — OFFICE VISIT (OUTPATIENT)
Dept: ORTHOPEDIC SURGERY | Age: 39
End: 2019-10-31
Payer: COMMERCIAL

## 2019-10-31 VITALS
HEIGHT: 65 IN | HEART RATE: 78 BPM | BODY MASS INDEX: 26.01 KG/M2 | DIASTOLIC BLOOD PRESSURE: 74 MMHG | WEIGHT: 156.09 LBS | SYSTOLIC BLOOD PRESSURE: 124 MMHG

## 2019-10-31 DIAGNOSIS — M19.072 OSTEOARTHRITIS OF LEFT FOOT, UNSPECIFIED OSTEOARTHRITIS TYPE: Primary | ICD-10-CM

## 2019-10-31 PROCEDURE — G8427 DOCREV CUR MEDS BY ELIG CLIN: HCPCS | Performed by: PODIATRIST

## 2019-10-31 PROCEDURE — G8417 CALC BMI ABV UP PARAM F/U: HCPCS | Performed by: PODIATRIST

## 2019-10-31 PROCEDURE — G8482 FLU IMMUNIZE ORDER/ADMIN: HCPCS | Performed by: PODIATRIST

## 2019-10-31 PROCEDURE — 4004F PT TOBACCO SCREEN RCVD TLK: CPT | Performed by: PODIATRIST

## 2019-10-31 PROCEDURE — 99213 OFFICE O/P EST LOW 20 MIN: CPT | Performed by: PODIATRIST

## 2019-11-01 ENCOUNTER — OFFICE VISIT (OUTPATIENT)
Dept: PULMONOLOGY | Age: 39
End: 2019-11-01
Payer: COMMERCIAL

## 2019-11-01 VITALS
SYSTOLIC BLOOD PRESSURE: 102 MMHG | DIASTOLIC BLOOD PRESSURE: 78 MMHG | OXYGEN SATURATION: 99 % | HEIGHT: 65 IN | WEIGHT: 197 LBS | BODY MASS INDEX: 32.82 KG/M2 | HEART RATE: 101 BPM | RESPIRATION RATE: 18 BRPM

## 2019-11-01 DIAGNOSIS — R91.1 LUNG NODULE: Primary | ICD-10-CM

## 2019-11-01 PROCEDURE — 99213 OFFICE O/P EST LOW 20 MIN: CPT | Performed by: INTERNAL MEDICINE

## 2019-11-01 PROCEDURE — G8417 CALC BMI ABV UP PARAM F/U: HCPCS | Performed by: INTERNAL MEDICINE

## 2019-11-01 PROCEDURE — G8482 FLU IMMUNIZE ORDER/ADMIN: HCPCS | Performed by: INTERNAL MEDICINE

## 2019-11-01 PROCEDURE — 4004F PT TOBACCO SCREEN RCVD TLK: CPT | Performed by: INTERNAL MEDICINE

## 2019-11-01 PROCEDURE — G8428 CUR MEDS NOT DOCUMENT: HCPCS | Performed by: INTERNAL MEDICINE

## 2019-11-01 RX ORDER — VARENICLINE TARTRATE 1 MG/1
1 TABLET, FILM COATED ORAL 2 TIMES DAILY
Qty: 60 TABLET | Refills: 2 | Status: SHIPPED | OUTPATIENT
Start: 2019-11-01 | End: 2020-02-11

## 2019-11-01 ASSESSMENT — ENCOUNTER SYMPTOMS
WHEEZING: 0
SHORTNESS OF BREATH: 1
DIARRHEA: 0
APNEA: 0
STRIDOR: 0
SORE THROAT: 0
VOICE CHANGE: 0
SINUS PRESSURE: 0
CHOKING: 0
CHEST TIGHTNESS: 0
BACK PAIN: 1
RHINORRHEA: 0
BLOOD IN STOOL: 0
ABDOMINAL PAIN: 0
ABDOMINAL DISTENTION: 0
CONSTIPATION: 0
COUGH: 0
ANAL BLEEDING: 0

## 2019-11-05 ENCOUNTER — TELEPHONE (OUTPATIENT)
Dept: ORTHOPEDIC SURGERY | Age: 39
End: 2019-11-05

## 2019-11-11 DIAGNOSIS — Z72.0 CURRENT TOBACCO USE: Primary | ICD-10-CM

## 2019-11-11 RX ORDER — VARENICLINE TARTRATE 25 MG
KIT ORAL
Qty: 1 BOX | Refills: 0 | Status: SHIPPED | OUTPATIENT
Start: 2019-11-11 | End: 2020-06-02

## 2019-11-14 ENCOUNTER — OFFICE VISIT (OUTPATIENT)
Dept: ORTHOPEDIC SURGERY | Age: 39
End: 2019-11-14
Payer: COMMERCIAL

## 2019-11-14 DIAGNOSIS — M79.601 PAIN IN BOTH UPPER EXTREMITIES: Primary | ICD-10-CM

## 2019-11-14 DIAGNOSIS — M79.602 PAIN IN BOTH UPPER EXTREMITIES: Primary | ICD-10-CM

## 2019-11-14 PROCEDURE — 95886 MUSC TEST DONE W/N TEST COMP: CPT | Performed by: PHYSICAL MEDICINE & REHABILITATION

## 2019-11-14 PROCEDURE — 95911 NRV CNDJ TEST 9-10 STUDIES: CPT | Performed by: PHYSICAL MEDICINE & REHABILITATION

## 2019-11-15 ENCOUNTER — TELEPHONE (OUTPATIENT)
Dept: CARDIOLOGY CLINIC | Age: 39
End: 2019-11-15

## 2019-11-15 ENCOUNTER — OFFICE VISIT (OUTPATIENT)
Dept: FAMILY MEDICINE CLINIC | Age: 39
End: 2019-11-15
Payer: COMMERCIAL

## 2019-11-15 DIAGNOSIS — G89.29 CHRONIC PAIN OF LEFT ANKLE: ICD-10-CM

## 2019-11-15 DIAGNOSIS — Z01.818 PREOP EXAMINATION: Primary | ICD-10-CM

## 2019-11-15 DIAGNOSIS — M25.572 CHRONIC PAIN OF LEFT ANKLE: ICD-10-CM

## 2019-11-15 DIAGNOSIS — R00.0 TACHYCARDIA, UNSPECIFIED: ICD-10-CM

## 2019-11-15 DIAGNOSIS — E11.9 TYPE 2 DIABETES MELLITUS WITHOUT COMPLICATION, WITHOUT LONG-TERM CURRENT USE OF INSULIN (HCC): ICD-10-CM

## 2019-11-15 DIAGNOSIS — G47.33 OSA (OBSTRUCTIVE SLEEP APNEA): ICD-10-CM

## 2019-11-15 DIAGNOSIS — E78.5 HYPERLIPIDEMIA, UNSPECIFIED HYPERLIPIDEMIA TYPE: ICD-10-CM

## 2019-11-15 DIAGNOSIS — I10 ESSENTIAL HYPERTENSION: ICD-10-CM

## 2019-11-15 PROCEDURE — G8427 DOCREV CUR MEDS BY ELIG CLIN: HCPCS | Performed by: FAMILY MEDICINE

## 2019-11-15 PROCEDURE — 2022F DILAT RTA XM EVC RTNOPTHY: CPT | Performed by: FAMILY MEDICINE

## 2019-11-15 PROCEDURE — 99244 OFF/OP CNSLTJ NEW/EST MOD 40: CPT | Performed by: FAMILY MEDICINE

## 2019-11-15 PROCEDURE — 93000 ELECTROCARDIOGRAM COMPLETE: CPT | Performed by: FAMILY MEDICINE

## 2019-11-15 PROCEDURE — G8417 CALC BMI ABV UP PARAM F/U: HCPCS | Performed by: FAMILY MEDICINE

## 2019-11-15 PROCEDURE — G8482 FLU IMMUNIZE ORDER/ADMIN: HCPCS | Performed by: FAMILY MEDICINE

## 2019-11-16 PROBLEM — I10 ESSENTIAL HYPERTENSION: Status: ACTIVE | Noted: 2019-11-16

## 2019-11-16 PROBLEM — G47.33 OSA (OBSTRUCTIVE SLEEP APNEA): Status: ACTIVE | Noted: 2019-11-16

## 2019-11-16 PROBLEM — E11.9 TYPE 2 DIABETES MELLITUS WITHOUT COMPLICATION, WITHOUT LONG-TERM CURRENT USE OF INSULIN (HCC): Status: ACTIVE | Noted: 2019-11-16

## 2019-11-16 PROBLEM — E78.5 HYPERLIPIDEMIA: Status: ACTIVE | Noted: 2019-11-16

## 2019-11-19 ENCOUNTER — OFFICE VISIT (OUTPATIENT)
Dept: ORTHOPEDIC SURGERY | Age: 39
End: 2019-11-19
Payer: COMMERCIAL

## 2019-11-19 ENCOUNTER — TELEPHONE (OUTPATIENT)
Dept: ORTHOPEDIC SURGERY | Age: 39
End: 2019-11-19

## 2019-11-19 VITALS — WEIGHT: 197.09 LBS | HEIGHT: 65 IN | BODY MASS INDEX: 32.84 KG/M2

## 2019-11-19 DIAGNOSIS — R20.2 PARESTHESIA OF RIGHT UPPER EXTREMITY: ICD-10-CM

## 2019-11-19 DIAGNOSIS — S16.1XXS CERVICAL STRAIN, SEQUELA: Primary | ICD-10-CM

## 2019-11-19 DIAGNOSIS — G56.21 NEURITIS OF RIGHT ULNAR NERVE: ICD-10-CM

## 2019-11-19 DIAGNOSIS — R20.2 PARESTHESIA OF LEFT UPPER EXTREMITY: ICD-10-CM

## 2019-11-19 DIAGNOSIS — R51.9 HEADACHE DISORDER: ICD-10-CM

## 2019-11-19 DIAGNOSIS — G56.22 NEURITIS OF LEFT ULNAR NERVE: ICD-10-CM

## 2019-11-19 DIAGNOSIS — G56.11 NEURITIS OF RIGHT MEDIAN NERVE: ICD-10-CM

## 2019-11-19 DIAGNOSIS — G56.12 MEDIAN NERVE NEURITIS, LEFT: ICD-10-CM

## 2019-11-19 PROCEDURE — G8417 CALC BMI ABV UP PARAM F/U: HCPCS | Performed by: INTERNAL MEDICINE

## 2019-11-19 PROCEDURE — G8482 FLU IMMUNIZE ORDER/ADMIN: HCPCS | Performed by: INTERNAL MEDICINE

## 2019-11-19 PROCEDURE — 4004F PT TOBACCO SCREEN RCVD TLK: CPT | Performed by: INTERNAL MEDICINE

## 2019-11-19 PROCEDURE — G8427 DOCREV CUR MEDS BY ELIG CLIN: HCPCS | Performed by: INTERNAL MEDICINE

## 2019-11-19 PROCEDURE — 99214 OFFICE O/P EST MOD 30 MIN: CPT | Performed by: INTERNAL MEDICINE

## 2019-11-20 ENCOUNTER — OFFICE VISIT (OUTPATIENT)
Dept: ORTHOPEDIC SURGERY | Age: 39
End: 2019-11-20
Payer: COMMERCIAL

## 2019-11-20 VITALS — BODY MASS INDEX: 32.84 KG/M2 | HEIGHT: 65 IN | RESPIRATION RATE: 17 BRPM | WEIGHT: 197.09 LBS

## 2019-11-20 DIAGNOSIS — R20.2 PARESTHESIA OF RIGHT UPPER EXTREMITY: ICD-10-CM

## 2019-11-20 DIAGNOSIS — G56.11 NEURITIS OF RIGHT MEDIAN NERVE: Primary | ICD-10-CM

## 2019-11-20 PROCEDURE — 4004F PT TOBACCO SCREEN RCVD TLK: CPT | Performed by: INTERNAL MEDICINE

## 2019-11-20 PROCEDURE — G8427 DOCREV CUR MEDS BY ELIG CLIN: HCPCS | Performed by: INTERNAL MEDICINE

## 2019-11-20 PROCEDURE — G8482 FLU IMMUNIZE ORDER/ADMIN: HCPCS | Performed by: INTERNAL MEDICINE

## 2019-11-20 PROCEDURE — 99212 OFFICE O/P EST SF 10 MIN: CPT | Performed by: INTERNAL MEDICINE

## 2019-11-20 PROCEDURE — G8417 CALC BMI ABV UP PARAM F/U: HCPCS | Performed by: INTERNAL MEDICINE

## 2019-11-27 DIAGNOSIS — E11.9 TYPE 2 DIABETES MELLITUS WITHOUT COMPLICATION, WITHOUT LONG-TERM CURRENT USE OF INSULIN (HCC): ICD-10-CM

## 2019-11-27 DIAGNOSIS — I10 ESSENTIAL HYPERTENSION: ICD-10-CM

## 2019-11-27 DIAGNOSIS — E55.9 VITAMIN D DEFICIENCY: ICD-10-CM

## 2019-11-27 DIAGNOSIS — E78.2 MIXED HYPERLIPIDEMIA: ICD-10-CM

## 2019-11-27 LAB
A/G RATIO: 1.6 (ref 1.1–2.2)
ALBUMIN SERPL-MCNC: 3.5 G/DL (ref 3.4–5)
ALP BLD-CCNC: 50 U/L (ref 40–129)
ALT SERPL-CCNC: 9 U/L (ref 10–40)
ANION GAP SERPL CALCULATED.3IONS-SCNC: 13 MMOL/L (ref 3–16)
AST SERPL-CCNC: 14 U/L (ref 15–37)
BASOPHILS ABSOLUTE: 0 K/UL (ref 0–0.2)
BASOPHILS RELATIVE PERCENT: 0.3 %
BILIRUB SERPL-MCNC: <0.2 MG/DL (ref 0–1)
BUN BLDV-MCNC: 9 MG/DL (ref 7–20)
CALCIUM SERPL-MCNC: 8.6 MG/DL (ref 8.3–10.6)
CHLORIDE BLD-SCNC: 98 MMOL/L (ref 99–110)
CHOLESTEROL, TOTAL: 163 MG/DL (ref 0–199)
CO2: 25 MMOL/L (ref 21–32)
CREAT SERPL-MCNC: 0.6 MG/DL (ref 0.6–1.1)
EOSINOPHILS ABSOLUTE: 0.1 K/UL (ref 0–0.6)
EOSINOPHILS RELATIVE PERCENT: 0.9 %
GFR AFRICAN AMERICAN: >60
GFR NON-AFRICAN AMERICAN: >60
GLOBULIN: 2.2 G/DL
GLUCOSE BLD-MCNC: 96 MG/DL (ref 70–99)
HCT VFR BLD CALC: 41.3 % (ref 36–48)
HDLC SERPL-MCNC: 35 MG/DL (ref 40–60)
HEMOGLOBIN: 13.4 G/DL (ref 12–16)
LDL CHOLESTEROL CALCULATED: 109 MG/DL
LYMPHOCYTES ABSOLUTE: 3.5 K/UL (ref 1–5.1)
LYMPHOCYTES RELATIVE PERCENT: 29 %
MCH RBC QN AUTO: 28.6 PG (ref 26–34)
MCHC RBC AUTO-ENTMCNC: 32.4 G/DL (ref 31–36)
MCV RBC AUTO: 88.1 FL (ref 80–100)
MONOCYTES ABSOLUTE: 0.8 K/UL (ref 0–1.3)
MONOCYTES RELATIVE PERCENT: 6.9 %
NEUTROPHILS ABSOLUTE: 7.7 K/UL (ref 1.7–7.7)
NEUTROPHILS RELATIVE PERCENT: 62.9 %
PDW BLD-RTO: 14 % (ref 12.4–15.4)
PLATELET # BLD: 342 K/UL (ref 135–450)
PMV BLD AUTO: 7.6 FL (ref 5–10.5)
POTASSIUM SERPL-SCNC: 4.5 MMOL/L (ref 3.5–5.1)
RBC # BLD: 4.69 M/UL (ref 4–5.2)
SODIUM BLD-SCNC: 136 MMOL/L (ref 136–145)
TOTAL CK: 113 U/L (ref 26–192)
TOTAL PROTEIN: 5.7 G/DL (ref 6.4–8.2)
TRIGL SERPL-MCNC: 96 MG/DL (ref 0–150)
TSH REFLEX: 1.76 UIU/ML (ref 0.27–4.2)
VITAMIN D 25-HYDROXY: 16 NG/ML
VLDLC SERPL CALC-MCNC: 19 MG/DL
WBC # BLD: 12.2 K/UL (ref 4–11)

## 2019-11-28 LAB
ESTIMATED AVERAGE GLUCOSE: 116.9 MG/DL
HBA1C MFR BLD: 5.7 %

## 2019-12-02 ENCOUNTER — TELEPHONE (OUTPATIENT)
Dept: ORTHOPEDIC SURGERY | Age: 39
End: 2019-12-02

## 2019-12-03 DIAGNOSIS — D72.829 LEUKOCYTOSIS, UNSPECIFIED TYPE: Primary | ICD-10-CM

## 2019-12-03 RX ORDER — PREGABALIN 75 MG/1
75 CAPSULE ORAL 2 TIMES DAILY
COMMUNITY
End: 2020-01-24

## 2019-12-03 RX ORDER — NITROGLYCERIN 0.4 MG/1
0.4 TABLET SUBLINGUAL EVERY 5 MIN PRN
COMMUNITY
End: 2021-04-05 | Stop reason: SDUPTHER

## 2019-12-03 RX ORDER — METOPROLOL TARTRATE 50 MG/1
50 TABLET, FILM COATED ORAL 2 TIMES DAILY
COMMUNITY
End: 2019-12-23 | Stop reason: SDUPTHER

## 2019-12-04 ENCOUNTER — OFFICE VISIT (OUTPATIENT)
Dept: ORTHOPEDIC SURGERY | Age: 39
End: 2019-12-04
Payer: COMMERCIAL

## 2019-12-04 VITALS — BODY MASS INDEX: 32.84 KG/M2 | HEIGHT: 65 IN | WEIGHT: 197.09 LBS

## 2019-12-04 DIAGNOSIS — G56.12 MEDIAN NERVE NEURITIS, LEFT: Primary | ICD-10-CM

## 2019-12-04 DIAGNOSIS — G56.11 NEURITIS OF RIGHT MEDIAN NERVE: ICD-10-CM

## 2019-12-04 PROCEDURE — G8417 CALC BMI ABV UP PARAM F/U: HCPCS | Performed by: INTERNAL MEDICINE

## 2019-12-04 PROCEDURE — G8482 FLU IMMUNIZE ORDER/ADMIN: HCPCS | Performed by: INTERNAL MEDICINE

## 2019-12-04 PROCEDURE — 64450 NJX AA&/STRD OTHER PN/BRANCH: CPT | Performed by: INTERNAL MEDICINE

## 2019-12-04 PROCEDURE — L3908 WHO COCK-UP NONMOLDE PRE OTS: HCPCS | Performed by: INTERNAL MEDICINE

## 2019-12-04 PROCEDURE — G8427 DOCREV CUR MEDS BY ELIG CLIN: HCPCS | Performed by: INTERNAL MEDICINE

## 2019-12-04 PROCEDURE — 99212 OFFICE O/P EST SF 10 MIN: CPT | Performed by: INTERNAL MEDICINE

## 2019-12-05 ENCOUNTER — ANESTHESIA EVENT (OUTPATIENT)
Dept: OPERATING ROOM | Age: 39
End: 2019-12-05
Payer: COMMERCIAL

## 2019-12-06 ENCOUNTER — HOSPITAL ENCOUNTER (OUTPATIENT)
Age: 39
Setting detail: OUTPATIENT SURGERY
Discharge: HOME | End: 2019-12-06
Attending: PODIATRIST | Admitting: PODIATRIST
Payer: COMMERCIAL

## 2019-12-06 ENCOUNTER — ANESTHESIA (OUTPATIENT)
Dept: OPERATING ROOM | Age: 39
End: 2019-12-06
Payer: COMMERCIAL

## 2019-12-06 VITALS
HEART RATE: 84 BPM | DIASTOLIC BLOOD PRESSURE: 67 MMHG | RESPIRATION RATE: 16 BRPM | WEIGHT: 197 LBS | BODY MASS INDEX: 32.82 KG/M2 | OXYGEN SATURATION: 95 % | HEIGHT: 65 IN | TEMPERATURE: 97.2 F | SYSTOLIC BLOOD PRESSURE: 120 MMHG

## 2019-12-06 VITALS
RESPIRATION RATE: 25 BRPM | OXYGEN SATURATION: 93 % | DIASTOLIC BLOOD PRESSURE: 52 MMHG | SYSTOLIC BLOOD PRESSURE: 87 MMHG

## 2019-12-06 DIAGNOSIS — M19.272 OTHER SECONDARY OSTEOARTHRITIS OF LEFT FOOT: Primary | ICD-10-CM

## 2019-12-06 LAB
GLUCOSE BLD-MCNC: 108 MG/DL (ref 70–99)
GLUCOSE BLD-MCNC: 108 MG/DL (ref 70–99)
PERFORMED ON: ABNORMAL
PERFORMED ON: ABNORMAL

## 2019-12-06 PROCEDURE — 3700000000 HC ANESTHESIA ATTENDED CARE: Performed by: PODIATRIST

## 2019-12-06 PROCEDURE — 2580000003 HC RX 258: Performed by: ANESTHESIOLOGY

## 2019-12-06 PROCEDURE — 7100000001 HC PACU RECOVERY - ADDTL 15 MIN: Performed by: PODIATRIST

## 2019-12-06 PROCEDURE — 7100000011 HC PHASE II RECOVERY - ADDTL 15 MIN: Performed by: PODIATRIST

## 2019-12-06 PROCEDURE — 2720000010 HC SURG SUPPLY STERILE: Performed by: PODIATRIST

## 2019-12-06 PROCEDURE — 3700000001 HC ADD 15 MINUTES (ANESTHESIA): Performed by: PODIATRIST

## 2019-12-06 PROCEDURE — 6360000002 HC RX W HCPCS: Performed by: NURSE ANESTHETIST, CERTIFIED REGISTERED

## 2019-12-06 PROCEDURE — 6360000002 HC RX W HCPCS: Performed by: PODIATRIST

## 2019-12-06 PROCEDURE — 64445 NJX AA&/STRD SCIATIC NRV IMG: CPT | Performed by: ANESTHESIOLOGY

## 2019-12-06 PROCEDURE — 2500000003 HC RX 250 WO HCPCS: Performed by: PODIATRIST

## 2019-12-06 PROCEDURE — 2500000003 HC RX 250 WO HCPCS: Performed by: NURSE ANESTHETIST, CERTIFIED REGISTERED

## 2019-12-06 PROCEDURE — 7100000010 HC PHASE II RECOVERY - FIRST 15 MIN: Performed by: PODIATRIST

## 2019-12-06 PROCEDURE — 3600000013 HC SURGERY LEVEL 3 ADDTL 15MIN: Performed by: PODIATRIST

## 2019-12-06 PROCEDURE — 2709999900 HC NON-CHARGEABLE SUPPLY: Performed by: PODIATRIST

## 2019-12-06 PROCEDURE — 7100000000 HC PACU RECOVERY - FIRST 15 MIN: Performed by: PODIATRIST

## 2019-12-06 PROCEDURE — 3600000003 HC SURGERY LEVEL 3 BASE: Performed by: PODIATRIST

## 2019-12-06 PROCEDURE — C1713 ANCHOR/SCREW BN/BN,TIS/BN: HCPCS | Performed by: PODIATRIST

## 2019-12-06 DEVICE — 3.5 X 14 MM R3CON NON-LOCKING PLATE SCREW
Type: IMPLANTABLE DEVICE | Site: FOOT | Status: FUNCTIONAL
Brand: GORILLA PLATING SYSTEM

## 2019-12-06 DEVICE — 20 MM DOGBONE PLATE W/ COMPRESSION
Type: IMPLANTABLE DEVICE | Site: FOOT | Status: FUNCTIONAL
Brand: GORILLA PLATING SYSTEM

## 2019-12-06 DEVICE — 3.5 X 20 MM R3CON LOCKING PLATE SCREW
Type: IMPLANTABLE DEVICE | Site: FOOT | Status: FUNCTIONAL
Brand: GORILLA PLATING SYSTEM

## 2019-12-06 DEVICE — 3.5 X 14 MM R3CON LOCKING PLATE SCREW
Type: IMPLANTABLE DEVICE | Site: FOOT | Status: FUNCTIONAL
Brand: GORILLA PLATING SYSTEM

## 2019-12-06 DEVICE — SLANTED STRAIGHT PLATE: 3-HOLE SLANT, LEFT
Type: IMPLANTABLE DEVICE | Site: FOOT | Status: FUNCTIONAL
Brand: GORILLA PLATING SYSTEM

## 2019-12-06 DEVICE — GRAFT BONE TIB INJ 1.5CC ALLOGRFT AUGMENT: Type: IMPLANTABLE DEVICE | Site: FOOT | Status: FUNCTIONAL

## 2019-12-06 RX ORDER — MEPERIDINE HYDROCHLORIDE 50 MG/ML
12.5 INJECTION INTRAMUSCULAR; INTRAVENOUS; SUBCUTANEOUS EVERY 5 MIN PRN
Status: DISCONTINUED | OUTPATIENT
Start: 2019-12-06 | End: 2019-12-06 | Stop reason: HOSPADM

## 2019-12-06 RX ORDER — SODIUM CHLORIDE, SODIUM LACTATE, POTASSIUM CHLORIDE, CALCIUM CHLORIDE 600; 310; 30; 20 MG/100ML; MG/100ML; MG/100ML; MG/100ML
INJECTION, SOLUTION INTRAVENOUS CONTINUOUS
Status: DISCONTINUED | OUTPATIENT
Start: 2019-12-06 | End: 2019-12-06 | Stop reason: HOSPADM

## 2019-12-06 RX ORDER — FENTANYL CITRATE 50 UG/ML
INJECTION, SOLUTION INTRAMUSCULAR; INTRAVENOUS PRN
Status: DISCONTINUED | OUTPATIENT
Start: 2019-12-06 | End: 2019-12-06 | Stop reason: SDUPTHER

## 2019-12-06 RX ORDER — ONDANSETRON 2 MG/ML
INJECTION INTRAMUSCULAR; INTRAVENOUS PRN
Status: DISCONTINUED | OUTPATIENT
Start: 2019-12-06 | End: 2019-12-06 | Stop reason: SDUPTHER

## 2019-12-06 RX ORDER — PROMETHAZINE HYDROCHLORIDE 25 MG/1
25 TABLET ORAL EVERY 6 HOURS PRN
Qty: 30 TABLET | Refills: 0 | Status: SHIPPED | OUTPATIENT
Start: 2019-12-06 | End: 2019-12-13

## 2019-12-06 RX ORDER — DIPHENHYDRAMINE HYDROCHLORIDE 50 MG/ML
6.25 INJECTION INTRAMUSCULAR; INTRAVENOUS
Status: DISCONTINUED | OUTPATIENT
Start: 2019-12-06 | End: 2019-12-06 | Stop reason: HOSPADM

## 2019-12-06 RX ORDER — ROCURONIUM BROMIDE 10 MG/ML
INJECTION, SOLUTION INTRAVENOUS PRN
Status: DISCONTINUED | OUTPATIENT
Start: 2019-12-06 | End: 2019-12-06 | Stop reason: SDUPTHER

## 2019-12-06 RX ORDER — LABETALOL HYDROCHLORIDE 5 MG/ML
5 INJECTION, SOLUTION INTRAVENOUS
Status: DISCONTINUED | OUTPATIENT
Start: 2019-12-06 | End: 2019-12-06 | Stop reason: HOSPADM

## 2019-12-06 RX ORDER — OXYCODONE HYDROCHLORIDE AND ACETAMINOPHEN 5; 325 MG/1; MG/1
1 TABLET ORAL EVERY 6 HOURS PRN
Qty: 28 TABLET | Refills: 0 | Status: SHIPPED | OUTPATIENT
Start: 2019-12-06 | End: 2019-12-13

## 2019-12-06 RX ORDER — DEXAMETHASONE SODIUM PHOSPHATE 4 MG/ML
INJECTION, SOLUTION INTRA-ARTICULAR; INTRALESIONAL; INTRAMUSCULAR; INTRAVENOUS; SOFT TISSUE PRN
Status: DISCONTINUED | OUTPATIENT
Start: 2019-12-06 | End: 2019-12-06 | Stop reason: SDUPTHER

## 2019-12-06 RX ORDER — BUPIVACAINE HYDROCHLORIDE 5 MG/ML
INJECTION, SOLUTION EPIDURAL; INTRACAUDAL PRN
Status: DISCONTINUED | OUTPATIENT
Start: 2019-12-06 | End: 2019-12-06 | Stop reason: ALTCHOICE

## 2019-12-06 RX ORDER — PROPOFOL 10 MG/ML
INJECTION, EMULSION INTRAVENOUS PRN
Status: DISCONTINUED | OUTPATIENT
Start: 2019-12-06 | End: 2019-12-06 | Stop reason: SDUPTHER

## 2019-12-06 RX ORDER — SODIUM CHLORIDE 0.9 % (FLUSH) 0.9 %
10 SYRINGE (ML) INJECTION PRN
Status: DISCONTINUED | OUTPATIENT
Start: 2019-12-06 | End: 2019-12-06 | Stop reason: HOSPADM

## 2019-12-06 RX ORDER — MIDAZOLAM HYDROCHLORIDE 1 MG/ML
INJECTION INTRAMUSCULAR; INTRAVENOUS PRN
Status: DISCONTINUED | OUTPATIENT
Start: 2019-12-06 | End: 2019-12-06 | Stop reason: SDUPTHER

## 2019-12-06 RX ORDER — OXYCODONE HYDROCHLORIDE AND ACETAMINOPHEN 5; 325 MG/1; MG/1
1 TABLET ORAL PRN
Status: DISCONTINUED | OUTPATIENT
Start: 2019-12-06 | End: 2019-12-06 | Stop reason: HOSPADM

## 2019-12-06 RX ORDER — OXYCODONE HYDROCHLORIDE AND ACETAMINOPHEN 5; 325 MG/1; MG/1
2 TABLET ORAL PRN
Status: DISCONTINUED | OUTPATIENT
Start: 2019-12-06 | End: 2019-12-06 | Stop reason: HOSPADM

## 2019-12-06 RX ORDER — ONDANSETRON 2 MG/ML
4 INJECTION INTRAMUSCULAR; INTRAVENOUS EVERY 30 MIN PRN
Status: DISCONTINUED | OUTPATIENT
Start: 2019-12-06 | End: 2019-12-06 | Stop reason: HOSPADM

## 2019-12-06 RX ORDER — HYDRALAZINE HYDROCHLORIDE 20 MG/ML
5 INJECTION INTRAMUSCULAR; INTRAVENOUS EVERY 30 MIN PRN
Status: DISCONTINUED | OUTPATIENT
Start: 2019-12-06 | End: 2019-12-06 | Stop reason: HOSPADM

## 2019-12-06 RX ORDER — SODIUM CHLORIDE 0.9 % (FLUSH) 0.9 %
10 SYRINGE (ML) INJECTION EVERY 12 HOURS SCHEDULED
Status: DISCONTINUED | OUTPATIENT
Start: 2019-12-06 | End: 2019-12-06 | Stop reason: HOSPADM

## 2019-12-06 RX ORDER — LIDOCAINE HYDROCHLORIDE 20 MG/ML
INJECTION, SOLUTION INFILTRATION; PERINEURAL PRN
Status: DISCONTINUED | OUTPATIENT
Start: 2019-12-06 | End: 2019-12-06 | Stop reason: SDUPTHER

## 2019-12-06 RX ORDER — LIDOCAINE HYDROCHLORIDE 10 MG/ML
1 INJECTION, SOLUTION EPIDURAL; INFILTRATION; INTRACAUDAL; PERINEURAL
Status: DISCONTINUED | OUTPATIENT
Start: 2019-12-06 | End: 2019-12-06 | Stop reason: HOSPADM

## 2019-12-06 RX ADMIN — SODIUM CHLORIDE, POTASSIUM CHLORIDE, SODIUM LACTATE AND CALCIUM CHLORIDE: 600; 310; 30; 20 INJECTION, SOLUTION INTRAVENOUS at 09:50

## 2019-12-06 RX ADMIN — SUGAMMADEX 200 MG: 100 INJECTION, SOLUTION INTRAVENOUS at 11:14

## 2019-12-06 RX ADMIN — MIDAZOLAM HYDROCHLORIDE 2 MG: 2 INJECTION, SOLUTION INTRAMUSCULAR; INTRAVENOUS at 09:32

## 2019-12-06 RX ADMIN — PROPOFOL 200 MG: 10 INJECTION, EMULSION INTRAVENOUS at 09:35

## 2019-12-06 RX ADMIN — Medication 2 G: at 09:32

## 2019-12-06 RX ADMIN — DEXAMETHASONE SODIUM PHOSPHATE 6 MG: 4 INJECTION, SOLUTION INTRAMUSCULAR; INTRAVENOUS at 09:45

## 2019-12-06 RX ADMIN — PROPOFOL 100 MG: 10 INJECTION, EMULSION INTRAVENOUS at 11:17

## 2019-12-06 RX ADMIN — FENTANYL CITRATE 50 MCG: 50 INJECTION INTRAMUSCULAR; INTRAVENOUS at 09:35

## 2019-12-06 RX ADMIN — ROCURONIUM BROMIDE 50 MG: 10 SOLUTION INTRAVENOUS at 09:35

## 2019-12-06 RX ADMIN — SODIUM CHLORIDE, POTASSIUM CHLORIDE, SODIUM LACTATE AND CALCIUM CHLORIDE: 600; 310; 30; 20 INJECTION, SOLUTION INTRAVENOUS at 08:05

## 2019-12-06 RX ADMIN — LIDOCAINE HYDROCHLORIDE 60 MG: 20 INJECTION, SOLUTION INFILTRATION; PERINEURAL at 09:35

## 2019-12-06 RX ADMIN — ONDANSETRON 4 MG: 2 INJECTION INTRAMUSCULAR; INTRAVENOUS at 09:45

## 2019-12-06 ASSESSMENT — PULMONARY FUNCTION TESTS
PIF_VALUE: 21
PIF_VALUE: 22
PIF_VALUE: 23
PIF_VALUE: 24
PIF_VALUE: 22
PIF_VALUE: 20
PIF_VALUE: 28
PIF_VALUE: 2
PIF_VALUE: 24
PIF_VALUE: 22
PIF_VALUE: 23
PIF_VALUE: 24
PIF_VALUE: 9
PIF_VALUE: 23
PIF_VALUE: 23
PIF_VALUE: 21
PIF_VALUE: 19
PIF_VALUE: 21
PIF_VALUE: 23
PIF_VALUE: 2
PIF_VALUE: 23
PIF_VALUE: 24
PIF_VALUE: 22
PIF_VALUE: 21
PIF_VALUE: 23
PIF_VALUE: 21
PIF_VALUE: 22
PIF_VALUE: 22
PIF_VALUE: 24
PIF_VALUE: 13
PIF_VALUE: 22
PIF_VALUE: 22
PIF_VALUE: 24
PIF_VALUE: 23
PIF_VALUE: 20
PIF_VALUE: 24
PIF_VALUE: 0
PIF_VALUE: 22
PIF_VALUE: 42
PIF_VALUE: 14
PIF_VALUE: 24
PIF_VALUE: 22
PIF_VALUE: 23
PIF_VALUE: 22
PIF_VALUE: 12
PIF_VALUE: 22
PIF_VALUE: 23
PIF_VALUE: 23
PIF_VALUE: 39
PIF_VALUE: 21
PIF_VALUE: 0
PIF_VALUE: 24
PIF_VALUE: 22
PIF_VALUE: 23
PIF_VALUE: 1
PIF_VALUE: 0
PIF_VALUE: 27
PIF_VALUE: 24
PIF_VALUE: 1
PIF_VALUE: 22
PIF_VALUE: 22
PIF_VALUE: 11
PIF_VALUE: 23
PIF_VALUE: 24
PIF_VALUE: 24
PIF_VALUE: 22
PIF_VALUE: 22
PIF_VALUE: 24
PIF_VALUE: 20
PIF_VALUE: 23
PIF_VALUE: 22
PIF_VALUE: 23
PIF_VALUE: 23
PIF_VALUE: 22
PIF_VALUE: 23
PIF_VALUE: 22
PIF_VALUE: 22
PIF_VALUE: 20
PIF_VALUE: 23
PIF_VALUE: 23
PIF_VALUE: 21
PIF_VALUE: 11
PIF_VALUE: 23
PIF_VALUE: 22
PIF_VALUE: 23
PIF_VALUE: 14
PIF_VALUE: 21
PIF_VALUE: 4
PIF_VALUE: 23
PIF_VALUE: 21
PIF_VALUE: 24
PIF_VALUE: 22
PIF_VALUE: 23
PIF_VALUE: 22
PIF_VALUE: 23
PIF_VALUE: 24
PIF_VALUE: 23
PIF_VALUE: 23
PIF_VALUE: 24
PIF_VALUE: 21
PIF_VALUE: 23
PIF_VALUE: 11
PIF_VALUE: 22
PIF_VALUE: 21
PIF_VALUE: 22
PIF_VALUE: 24
PIF_VALUE: 22
PIF_VALUE: 23
PIF_VALUE: 21

## 2019-12-06 ASSESSMENT — PAIN - FUNCTIONAL ASSESSMENT: PAIN_FUNCTIONAL_ASSESSMENT: 0-10

## 2019-12-12 ENCOUNTER — OFFICE VISIT (OUTPATIENT)
Dept: ORTHOPEDIC SURGERY | Age: 39
End: 2019-12-12
Payer: COMMERCIAL

## 2019-12-12 ENCOUNTER — TELEPHONE (OUTPATIENT)
Dept: ORTHOPEDIC SURGERY | Age: 39
End: 2019-12-12

## 2019-12-12 VITALS — BODY MASS INDEX: 30.32 KG/M2 | WEIGHT: 182 LBS | HEIGHT: 65 IN

## 2019-12-12 DIAGNOSIS — M19.272 OTHER SECONDARY OSTEOARTHRITIS OF LEFT FOOT: Primary | ICD-10-CM

## 2019-12-12 DIAGNOSIS — Z09 POSTOP CHECK: ICD-10-CM

## 2019-12-12 DIAGNOSIS — M19.072 OSTEOARTHRITIS OF LEFT FOOT, UNSPECIFIED OSTEOARTHRITIS TYPE: ICD-10-CM

## 2019-12-12 PROCEDURE — L4360 PNEUMAT WALKING BOOT PRE CST: HCPCS | Performed by: PODIATRIST

## 2019-12-12 PROCEDURE — 99024 POSTOP FOLLOW-UP VISIT: CPT | Performed by: PODIATRIST

## 2019-12-12 RX ORDER — OXYCODONE HYDROCHLORIDE AND ACETAMINOPHEN 5; 325 MG/1; MG/1
1 TABLET ORAL EVERY 6 HOURS PRN
Qty: 28 TABLET | Refills: 0 | Status: SHIPPED | OUTPATIENT
Start: 2019-12-12 | End: 2019-12-19

## 2019-12-17 DIAGNOSIS — D72.829 LEUKOCYTOSIS, UNSPECIFIED TYPE: Primary | ICD-10-CM

## 2019-12-17 LAB
BASOPHILS ABSOLUTE: 0.1 K/UL (ref 0–0.2)
BASOPHILS RELATIVE PERCENT: 0.5 %
EOSINOPHILS ABSOLUTE: 0.1 K/UL (ref 0–0.6)
EOSINOPHILS RELATIVE PERCENT: 1 %
HCT VFR BLD CALC: 40 % (ref 36–48)
HEMOGLOBIN: 13.1 G/DL (ref 12–16)
LYMPHOCYTES ABSOLUTE: 3.5 K/UL (ref 1–5.1)
LYMPHOCYTES RELATIVE PERCENT: 37.4 %
MCH RBC QN AUTO: 28.6 PG (ref 26–34)
MCHC RBC AUTO-ENTMCNC: 32.8 G/DL (ref 31–36)
MCV RBC AUTO: 87.3 FL (ref 80–100)
MONOCYTES ABSOLUTE: 0.8 K/UL (ref 0–1.3)
MONOCYTES RELATIVE PERCENT: 8.4 %
NEUTROPHILS ABSOLUTE: 5 K/UL (ref 1.7–7.7)
NEUTROPHILS RELATIVE PERCENT: 52.7 %
PDW BLD-RTO: 13.9 % (ref 12.4–15.4)
PLATELET # BLD: 361 K/UL (ref 135–450)
PMV BLD AUTO: 7.7 FL (ref 5–10.5)
RBC # BLD: 4.58 M/UL (ref 4–5.2)
WBC # BLD: 9.5 K/UL (ref 4–11)

## 2019-12-18 ENCOUNTER — OFFICE VISIT (OUTPATIENT)
Dept: ORTHOPEDIC SURGERY | Age: 39
End: 2019-12-18
Payer: COMMERCIAL

## 2019-12-18 VITALS — BODY MASS INDEX: 30.34 KG/M2 | WEIGHT: 182.1 LBS | HEIGHT: 65 IN

## 2019-12-18 DIAGNOSIS — G56.22 CUBITAL TUNNEL SYNDROME, LEFT: ICD-10-CM

## 2019-12-18 DIAGNOSIS — G56.22 NEURITIS OF LEFT ULNAR NERVE: Primary | ICD-10-CM

## 2019-12-18 PROCEDURE — 1036F TOBACCO NON-USER: CPT | Performed by: INTERNAL MEDICINE

## 2019-12-18 PROCEDURE — 64450 NJX AA&/STRD OTHER PN/BRANCH: CPT | Performed by: INTERNAL MEDICINE

## 2019-12-18 PROCEDURE — G8417 CALC BMI ABV UP PARAM F/U: HCPCS | Performed by: INTERNAL MEDICINE

## 2019-12-18 PROCEDURE — 99212 OFFICE O/P EST SF 10 MIN: CPT | Performed by: INTERNAL MEDICINE

## 2019-12-18 PROCEDURE — G8482 FLU IMMUNIZE ORDER/ADMIN: HCPCS | Performed by: INTERNAL MEDICINE

## 2019-12-18 PROCEDURE — G8427 DOCREV CUR MEDS BY ELIG CLIN: HCPCS | Performed by: INTERNAL MEDICINE

## 2019-12-19 ENCOUNTER — OFFICE VISIT (OUTPATIENT)
Dept: ORTHOPEDIC SURGERY | Age: 39
End: 2019-12-19
Payer: COMMERCIAL

## 2019-12-19 VITALS — HEIGHT: 65 IN | BODY MASS INDEX: 29.66 KG/M2 | WEIGHT: 178 LBS

## 2019-12-19 DIAGNOSIS — Z09 POSTOP CHECK: ICD-10-CM

## 2019-12-19 DIAGNOSIS — M19.272 OTHER SECONDARY OSTEOARTHRITIS OF LEFT FOOT: Primary | ICD-10-CM

## 2019-12-19 PROCEDURE — 99024 POSTOP FOLLOW-UP VISIT: CPT | Performed by: PODIATRIST

## 2019-12-19 PROCEDURE — 29405 APPL SHORT LEG CAST: CPT | Performed by: PODIATRIST

## 2019-12-22 ASSESSMENT — ENCOUNTER SYMPTOMS
NAUSEA: 0
CONSTIPATION: 0
BLURRED VISION: 0
CHEST TIGHTNESS: 0
COUGH: 0
ABDOMINAL PAIN: 0
VOMITING: 0
ORTHOPNEA: 0
SHORTNESS OF BREATH: 0
DIARRHEA: 0
WHEEZING: 0

## 2019-12-23 ENCOUNTER — OFFICE VISIT (OUTPATIENT)
Dept: FAMILY MEDICINE CLINIC | Age: 39
End: 2019-12-23
Payer: COMMERCIAL

## 2019-12-23 VITALS
OXYGEN SATURATION: 96 % | HEIGHT: 66 IN | WEIGHT: 204.4 LBS | DIASTOLIC BLOOD PRESSURE: 70 MMHG | BODY MASS INDEX: 32.85 KG/M2 | HEART RATE: 69 BPM | SYSTOLIC BLOOD PRESSURE: 104 MMHG | TEMPERATURE: 97.7 F

## 2019-12-23 DIAGNOSIS — Z72.0 CURRENT TOBACCO USE: ICD-10-CM

## 2019-12-23 DIAGNOSIS — E78.2 MIXED HYPERLIPIDEMIA: ICD-10-CM

## 2019-12-23 DIAGNOSIS — I10 ESSENTIAL HYPERTENSION: Primary | ICD-10-CM

## 2019-12-23 DIAGNOSIS — E55.9 VITAMIN D DEFICIENCY: ICD-10-CM

## 2019-12-23 DIAGNOSIS — K21.9 GASTROESOPHAGEAL REFLUX DISEASE, ESOPHAGITIS PRESENCE NOT SPECIFIED: ICD-10-CM

## 2019-12-23 DIAGNOSIS — M79.7 FIBROMYALGIA: ICD-10-CM

## 2019-12-23 DIAGNOSIS — G47.33 OSA (OBSTRUCTIVE SLEEP APNEA): ICD-10-CM

## 2019-12-23 DIAGNOSIS — E11.9 TYPE 2 DIABETES MELLITUS WITHOUT COMPLICATION, WITHOUT LONG-TERM CURRENT USE OF INSULIN (HCC): ICD-10-CM

## 2019-12-23 DIAGNOSIS — J44.9 CHRONIC OBSTRUCTIVE PULMONARY DISEASE, UNSPECIFIED COPD TYPE (HCC): ICD-10-CM

## 2019-12-23 DIAGNOSIS — G89.29 OTHER CHRONIC PAIN: ICD-10-CM

## 2019-12-23 DIAGNOSIS — Z98.1 HX OF ANKLE FUSION: ICD-10-CM

## 2019-12-23 PROCEDURE — G8482 FLU IMMUNIZE ORDER/ADMIN: HCPCS | Performed by: CLINICAL NURSE SPECIALIST

## 2019-12-23 PROCEDURE — 2022F DILAT RTA XM EVC RTNOPTHY: CPT | Performed by: CLINICAL NURSE SPECIALIST

## 2019-12-23 PROCEDURE — G8417 CALC BMI ABV UP PARAM F/U: HCPCS | Performed by: CLINICAL NURSE SPECIALIST

## 2019-12-23 PROCEDURE — 99214 OFFICE O/P EST MOD 30 MIN: CPT | Performed by: CLINICAL NURSE SPECIALIST

## 2019-12-23 PROCEDURE — G8926 SPIRO NO PERF OR DOC: HCPCS | Performed by: CLINICAL NURSE SPECIALIST

## 2019-12-23 PROCEDURE — G8427 DOCREV CUR MEDS BY ELIG CLIN: HCPCS | Performed by: CLINICAL NURSE SPECIALIST

## 2019-12-23 PROCEDURE — 3023F SPIROM DOC REV: CPT | Performed by: CLINICAL NURSE SPECIALIST

## 2019-12-23 PROCEDURE — 3044F HG A1C LEVEL LT 7.0%: CPT | Performed by: CLINICAL NURSE SPECIALIST

## 2019-12-23 PROCEDURE — 1036F TOBACCO NON-USER: CPT | Performed by: CLINICAL NURSE SPECIALIST

## 2019-12-23 RX ORDER — OMEPRAZOLE 20 MG/1
20 CAPSULE, DELAYED RELEASE ORAL DAILY
Qty: 90 CAPSULE | Refills: 0 | Status: SHIPPED | OUTPATIENT
Start: 2019-12-23 | End: 2020-03-24 | Stop reason: SDUPTHER

## 2019-12-23 RX ORDER — ALBUTEROL SULFATE 90 UG/1
2 AEROSOL, METERED RESPIRATORY (INHALATION) 4 TIMES DAILY
Qty: 1 INHALER | Refills: 2 | Status: SHIPPED | OUTPATIENT
Start: 2019-12-23 | End: 2021-10-29

## 2019-12-23 RX ORDER — ROSUVASTATIN CALCIUM 20 MG/1
10 TABLET, COATED ORAL DAILY
Qty: 30 TABLET | Refills: 2 | Status: SHIPPED | OUTPATIENT
Start: 2019-12-23 | End: 2020-03-24 | Stop reason: SDUPTHER

## 2019-12-23 RX ORDER — CHOLECALCIFEROL (VITAMIN D3) 125 MCG
2000 CAPSULE ORAL DAILY
Qty: 30 TABLET | Refills: 2 | Status: SHIPPED | OUTPATIENT
Start: 2019-12-23 | End: 2020-03-24 | Stop reason: SDUPTHER

## 2019-12-23 RX ORDER — METFORMIN HYDROCHLORIDE 500 MG/1
1000 TABLET, EXTENDED RELEASE ORAL
Qty: 60 TABLET | Refills: 2 | Status: SHIPPED | OUTPATIENT
Start: 2019-12-23 | End: 2020-03-24 | Stop reason: SDUPTHER

## 2019-12-23 RX ORDER — METOPROLOL TARTRATE 50 MG/1
50 TABLET, FILM COATED ORAL 2 TIMES DAILY
Qty: 60 TABLET | Refills: 2 | Status: SHIPPED | OUTPATIENT
Start: 2019-12-23 | End: 2020-03-24 | Stop reason: SDUPTHER

## 2019-12-23 ASSESSMENT — PATIENT HEALTH QUESTIONNAIRE - PHQ9
SUM OF ALL RESPONSES TO PHQ9 QUESTIONS 1 & 2: 2
1. LITTLE INTEREST OR PLEASURE IN DOING THINGS: 1
SUM OF ALL RESPONSES TO PHQ QUESTIONS 1-9: 2
SUM OF ALL RESPONSES TO PHQ QUESTIONS 1-9: 2
2. FEELING DOWN, DEPRESSED OR HOPELESS: 1

## 2019-12-30 ENCOUNTER — HOSPITAL ENCOUNTER (OUTPATIENT)
Dept: PHYSICAL THERAPY | Age: 39
Setting detail: THERAPIES SERIES
Discharge: HOME | End: 2019-12-30
Payer: COMMERCIAL

## 2019-12-30 PROCEDURE — 97530 THERAPEUTIC ACTIVITIES: CPT

## 2019-12-30 PROCEDURE — 97163 PT EVAL HIGH COMPLEX 45 MIN: CPT

## 2020-01-02 ENCOUNTER — OFFICE VISIT (OUTPATIENT)
Dept: ORTHOPEDIC SURGERY | Age: 40
End: 2020-01-02
Payer: COMMERCIAL

## 2020-01-02 VITALS — HEIGHT: 66 IN | BODY MASS INDEX: 32.84 KG/M2 | WEIGHT: 204.37 LBS

## 2020-01-02 PROCEDURE — G8482 FLU IMMUNIZE ORDER/ADMIN: HCPCS | Performed by: INTERNAL MEDICINE

## 2020-01-02 PROCEDURE — 99212 OFFICE O/P EST SF 10 MIN: CPT | Performed by: INTERNAL MEDICINE

## 2020-01-02 PROCEDURE — 1036F TOBACCO NON-USER: CPT | Performed by: INTERNAL MEDICINE

## 2020-01-02 PROCEDURE — 64450 NJX AA&/STRD OTHER PN/BRANCH: CPT | Performed by: INTERNAL MEDICINE

## 2020-01-02 PROCEDURE — G8417 CALC BMI ABV UP PARAM F/U: HCPCS | Performed by: INTERNAL MEDICINE

## 2020-01-02 PROCEDURE — G8427 DOCREV CUR MEDS BY ELIG CLIN: HCPCS | Performed by: INTERNAL MEDICINE

## 2020-01-02 NOTE — PROGRESS NOTES
Chief Complaint:   Chief Complaint   Patient presents with    Elbow Pain     left, pain 8/10, N/T down into hands, sharp pains with bending elbow           History of Present Illness:       Patient is a 44 y.o. female returns follow up for the above complaint. The patient was last seen approximately 2 weeksago. The symptoms are improving since the last visit with respect to the neuritic symptoms involving the ulnar nerve distribution ulnar nerve at the right elbow. . The patient has had no further testing for the problem. She would like to proceed with hydrodissection of the ulnar nerve about the left elbow. No new injuries no new complaints    She denies any constitutional symptoms     Past Medical History:        Past Medical History:   Diagnosis Date    Arthritis     Depression     Diabetes mellitus ([facility] [address] [facility]     Hypertension     Tachycardia         Present Medications:         Current Outpatient Medications   Medication Sig Dispense Refill    metFORMIN (GLUCOPHAGE-XR) 500 MG extended release tablet Take 2 tablets by mouth daily (with breakfast) 60 tablet 2    rosuvastatin (CRESTOR) 20 MG tablet Take 0.5 tablets by mouth daily 30 tablet 2    omeprazole (PRILOSEC) 20 MG delayed release capsule Take 1 capsule by mouth daily 90 capsule 0    metoprolol tartrate (LOPRESSOR) 50 MG tablet Take 1 tablet by mouth 2 times daily 60 tablet 2    albuterol sulfate HFA (VENTOLIN HFA) 108 (90 Base) MCG/ACT inhaler Inhale 2 puffs into the lungs 4 times daily 1 Inhaler 2    Cholecalciferol (VITAMIN D3) 50 MCG (2000 UT) TABS Take 2,000 Units by mouth daily 30 tablet 2    nitroGLYCERIN (NITROSTAT) 0.4 MG SL tablet Place 0.4 mg under the tongue every 5 minutes as needed for Chest pain up to max of 3 total doses. If no relief after 1 dose, call 911.  pregabalin (LYRICA) 75 MG capsule Take 75 mg by mouth 2 times daily.  varenicline (CHANTIX STARTING MONTH PAK) 0.5 MG X 11 & 1 MG X 42 tablet Take by mouth.  1 box 0    varenicline (CHANTIX) 1 MG tablet Take 1 tablet by mouth 2 times daily 60 tablet 2    citalopram (CELEXA) 20 MG tablet Take 20 mg by mouth daily       ammonium lactate (LAC-HYDRIN) 12 % lotion Apply topically daily       Coenzyme Q10 (CO Q-10 PO) Take by mouth daily       diclofenac (VOLTAREN) 50 MG EC tablet Take 100 mg by mouth daily      divalproex (DEPAKOTE ER) 500 MG extended release tablet Take 500 mg by mouth daily      hydrOXYzine (ATARAX) 25 MG tablet Take 25 mg by mouth 3 times daily as needed for Itching       No current facility-administered medications for this visit. Allergies: Allergies   Allergen Reactions    Cinnamon Itching    Codeine Hives and Itching           Review of Systems:    Pertinent items are noted in HPI      Vital Signs: There were no vitals filed for this visit. General Exam:     Constitutional: Patient is adequately groomed with no evidence of malnutrition    Physical Exam: left elbow      Primary Exam:    Inspection: Deformity atrophy appreciable effusion      Palpation: There is tenderness over the cubital tunnel region/surgical incision over the cubital tunnel distribution of the ulnar nerve      Range of Motion: Full range symmetric at the elbow      Strength: Normal ulnar nerve distribution      Special Tests: Tinel's positive at the elbow      Skin: There are no rashes, ulcerations or lesions. Neurovascular - non focal and intact       Additional Comments:        Additional Examinations:                Office Imaging Results/Procedures PerformedToday:      Ultrasound-guided hydrodissection of the ulnar nerve-left elbow  [facility] ultrasound  12 MHz    The patient was positioned supine on examination table with the arms position overhead. A diagnostic ultrasound was performed initially and the ulnar nerve was evaluated extensively using lift technique with the linear transducer position and short axis across the ulnar nerve.     The

## 2020-01-15 ENCOUNTER — HOSPITAL ENCOUNTER (OUTPATIENT)
Dept: PHYSICAL THERAPY | Age: 40
Setting detail: THERAPIES SERIES
Discharge: HOME | End: 2020-01-15
Payer: COMMERCIAL

## 2020-01-15 NOTE — PROGRESS NOTES
Physical Therapy  Cancellation/No-show Note  Patient Name:  Jonathan Pulliam  :  1980   Date:  1/15/2020  Cancelled visits to date: 1  No-shows to date: 0    Patient status for today's appointment patient:  [x]  Cancelled 1/15/20  []  Rescheduled appointment  []  No-show     Reason given by patient:  []  Patient ill  [x]  Conflicting appointment  []  No transportation    []  Conflict with work  []  No reason given  []  Other:     Comments:      Phone call information:   []  Phone call made today to patient at _ time at number provided:      []  Patient answered, conversation as follows:    []  Patient did not answer, message left as follows:  [x]  Phone call not made today    Electronically signed by:  Dedra Chauhan PT

## 2020-01-16 ENCOUNTER — OFFICE VISIT (OUTPATIENT)
Dept: ORTHOPEDIC SURGERY | Age: 40
End: 2020-01-16

## 2020-01-16 VITALS — HEIGHT: 66 IN | WEIGHT: 204.37 LBS | BODY MASS INDEX: 32.84 KG/M2 | RESPIRATION RATE: 17 BRPM

## 2020-01-16 PROCEDURE — 99024 POSTOP FOLLOW-UP VISIT: CPT | Performed by: PODIATRIST

## 2020-01-16 NOTE — PROGRESS NOTES
6-week postop check. She states that she did fall a couple times but seems to avoid full weight on her foot. She states that she is not having any pain. She has minimal edema to the left foot. There are no signs of infection are present. 3 nonweightbearing x-ray views of the left foot were taken. These demonstrate good bone callus formation at the talonavicular site with no evidence of fixation loosening. Status post talonavicular joint arthrodesis revision left x6 weeks    She can stay in the boot but still be nonweightbearing with using the scooter. I instructed her on range of motion exercises. I will see her back in 3 weeks and please take new x-rays.

## 2020-01-20 ENCOUNTER — HOSPITAL ENCOUNTER (OUTPATIENT)
Dept: PHYSICAL THERAPY | Age: 40
Setting detail: THERAPIES SERIES
Discharge: HOME | End: 2020-01-20
Payer: COMMERCIAL

## 2020-01-20 NOTE — PROGRESS NOTES
Physical Therapy  Cancellation/No-show Note  Patient Name:  Corwin Matta  :  1980   Date:  2020  Cancelled visits to date: 2  No-shows to date: 1    Patient status for today's appointment patient:  [x]  Cancelled 1/15/20, 2020  []  Rescheduled appointment  [x]  No-show 2020     Reason given by patient:  []  Patient ill  []  Conflicting appointment  []  No transportation    []  Conflict with work  []  No reason given  [x]  Other:     Comments:  Out of town family emergency    Phone call information:   []  Phone call made today to patient at _ time at number provided:      []  Patient answered, conversation as follows:    []  Patient did not answer, message left as follows:  [x]  Phone call not made today    Electronically signed by:  Susana Schmitt PT DPT

## 2020-01-22 ENCOUNTER — HOSPITAL ENCOUNTER (OUTPATIENT)
Dept: PHYSICAL THERAPY | Age: 40
Setting detail: THERAPIES SERIES
Discharge: HOME | End: 2020-01-22
Payer: COMMERCIAL

## 2020-01-22 NOTE — PROGRESS NOTES
Physical Therapy  Cancellation/No-show Note  Patient Name:  Augustin Hutchinson  :  1980   Date:  2020  Cancelled visits to date: 3  No-shows to date: 1    Patient status for today's appointment patient:  [x]  Cancelled 1/15/20, 2020  []  Rescheduled appointment  [x]  No-show 2020     Reason given by patient:  []  Patient ill  []  Conflicting appointment  []  No transportation    []  Conflict with work  []  No reason given  [x]  Other:     Comments:  Cancelled remaining appointments due to family situation   Phone call information:   []  Phone call made today to patient at _ time at number provided:      []  Patient answered, conversation as follows:    []  Patient did not answer, message left as follows:  [x]  Phone call not made today    Electronically signed by:  Sophie Gonzalez PT DPT

## 2020-01-23 ENCOUNTER — OFFICE VISIT (OUTPATIENT)
Dept: ORTHOPEDIC SURGERY | Age: 40
End: 2020-01-23
Payer: COMMERCIAL

## 2020-01-23 VITALS — RESPIRATION RATE: 16 BRPM | WEIGHT: 204.37 LBS | HEIGHT: 66 IN | BODY MASS INDEX: 32.84 KG/M2

## 2020-01-23 PROCEDURE — G8482 FLU IMMUNIZE ORDER/ADMIN: HCPCS | Performed by: INTERNAL MEDICINE

## 2020-01-23 PROCEDURE — E0191 PROTECTOR HEEL OR ELBOW: HCPCS | Performed by: INTERNAL MEDICINE

## 2020-01-23 PROCEDURE — 1036F TOBACCO NON-USER: CPT | Performed by: INTERNAL MEDICINE

## 2020-01-23 PROCEDURE — G8428 CUR MEDS NOT DOCUMENT: HCPCS | Performed by: INTERNAL MEDICINE

## 2020-01-23 PROCEDURE — G8417 CALC BMI ABV UP PARAM F/U: HCPCS | Performed by: INTERNAL MEDICINE

## 2020-01-23 PROCEDURE — 99213 OFFICE O/P EST LOW 20 MIN: CPT | Performed by: INTERNAL MEDICINE

## 2020-01-23 NOTE — PROGRESS NOTES
release capsule Take 1 capsule by mouth daily 90 capsule 0    metoprolol tartrate (LOPRESSOR) 50 MG tablet Take 1 tablet by mouth 2 times daily 60 tablet 2    albuterol sulfate HFA (VENTOLIN HFA) 108 (90 Base) MCG/ACT inhaler Inhale 2 puffs into the lungs 4 times daily 1 Inhaler 2    Cholecalciferol (VITAMIN D3) 50 MCG (2000 UT) TABS Take 2,000 Units by mouth daily 30 tablet 2    nitroGLYCERIN (NITROSTAT) 0.4 MG SL tablet Place 0.4 mg under the tongue every 5 minutes as needed for Chest pain up to max of 3 total doses. If no relief after 1 dose, call 911.  pregabalin (LYRICA) 75 MG capsule Take 75 mg by mouth 2 times daily.  varenicline (CHANTIX STARTING MONTH PAK) 0.5 MG X 11 & 1 MG X 42 tablet Take by mouth. 1 box 0    varenicline (CHANTIX) 1 MG tablet Take 1 tablet by mouth 2 times daily 60 tablet 2    citalopram (CELEXA) 20 MG tablet Take 20 mg by mouth daily       ammonium lactate (LAC-HYDRIN) 12 % lotion Apply topically daily       Coenzyme Q10 (CO Q-10 PO) Take by mouth daily       diclofenac (VOLTAREN) 50 MG EC tablet Take 100 mg by mouth daily      divalproex (DEPAKOTE ER) 500 MG extended release tablet Take 500 mg by mouth daily      hydrOXYzine (ATARAX) 25 MG tablet Take 25 mg by mouth 3 times daily as needed for Itching       No current facility-administered medications for this visit. Allergies:         Allergies   Allergen Reactions    Cinnamon Itching    Codeine Hives and Itching        Social History:         Social History     Socioeconomic History    Marital status:      Spouse name: Not on file    Number of children: Not on file    Years of education: Not on file    Highest education level: Not on file   Occupational History    Not on file   Social Needs    Financial resource strain: Not on file    Food insecurity:     Worry: Not on file     Inability: Not on file    Transportation needs:     Medical: Not on file     Non-medical: Not on file   Tobacco Use    Smoking status: Former Smoker     Packs/day: 1.00     Years: 27.00     Pack years: 27.00     Start date:      Last attempt to quit: 2019     Years since quittin.1    Smokeless tobacco: Never Used    Tobacco comment: started to smoke at 15 / smoked up to 1 ppd / now smoking 3 to 4 cigarettes a day   Substance and Sexual Activity    Alcohol use: Never     Frequency: Never    Drug use: Never    Sexual activity: Not on file   Lifestyle    Physical activity:     Days per week: Not on file     Minutes per session: Not on file    Stress: Not on file   Relationships    Social connections:     Talks on phone: Not on file     Gets together: Not on file     Attends [employer] service: Not on file     Active member of club or organization: Not on file     Attends meetings of clubs or organizations: Not on file     Relationship status: Not on file    Intimate partner violence:     Fear of current or ex partner: Not on file     Emotionally abused: Not on file     Physically abused: Not on file     Forced sexual activity: Not on file   Other Topics Concern    Not on file   Social History Narrative    Not on file        Review of Symptoms:    Pertinent items are noted in HPI       Vital Signs:      Vitals:    20 1541   Resp: 16        General Exam:     Constitutional: Patient is adequately groomed with no evidence of malnutrition        Physical Exam: bilateral elbow and bilateral wrist      Primary Exam:    Inspection: No deformity atrophy appreciable effusion      Palpation: Tinel's tenderness left elbow Bernal and bilateral carpal tunnel regions      Range of Motion: Full range at the elbows and wrist without pain      Strength: Normal APB bilateral and normal first dorsal interosseous bilateral      Special Tests: Negative      Skin: There are no rashes, ulcerations or lesions.       Gait: Nonantalgic      Neurovascular - non focal and intact       Additional Comments:        Additional Examinations:                  Office Imaging Results/Procedures PerformedToday:            Office Procedures:     Orders Placed This Encounter   Procedures    Bird and Harry Heel and Elbow Protector     Patient was prescribed a Bird and Harry Elbow Protector. The bilateral elbows will require protection from this device to improve their function. The orthosis will assist in protecting the affected area, provide functional support and facilitate healing. The patient was educated and fit by a healthcare professional with expert knowledge and specialization in brace application while under the direct supervision of the treating physician. Verbal and written instructions for the use of and application of this item were provided. They were instructed to contact the office immediately should the brace result in increased pain, decreased sensation, increased swelling or worsening of the condition. Other Outside Imaging and Testing Personally Reviewed:    No results found. Assessment   Impression: . Encounter Diagnoses   Name Primary?  Neuritis of left ulnar nerve Yes    Neuritis of right median nerve     Neuritis of right ulnar nerve     Median nerve neuritis, left               Plan:         The nature of the finding, probable diagnosis and likely treatment was thoroughly discussed with the patient. The options, risks, complications, alternative treatment as well as some of the differential diagnosis was discussed. The patient was thoroughly informed and all questions were answered. the patient indicated understanding and satisfaction with the discussion. Orders:        Orders Placed This Encounter   Procedures    Bird and Harry Heel and Elbow Protector     Patient was prescribed a Bird and Harry Elbow Protector. The bilateral elbows will require protection from this device to improve their function.   The orthosis will assist in protecting the affected area, provide service: Not on file     Active member of club or organization: Not on file     Attends meetings of clubs or organizations: Not on file     Relationship status: Not on file    Intimate partner violence:     Fear of current or ex partner: Not on file     Emotionally abused: Not on file     Physically abused: Not on file     Forced sexual activity: Not on file   Other Topics Concern    Not on file   Social History Narrative    Not on file           Vital Signs:      Vitals:    01/23/20 1541   Resp: 16                     Assessment   Impression: . Encounter Diagnoses   Name Primary?  Neuritis of left ulnar nerve Yes    Neuritis of right median nerve     Neuritis of right ulnar nerve     Median nerve neuritis, left               Plan:     Heelbo bilateral for ulnar neuritis at the elbow  Nocturnal wrist splinting as per previous  Consider repeat nerve hydrodissection at the elbow PRN and at the wrist PRN on follow-up  Consider PRP adjunct treatment to [id number]     The nature of the finding, probable diagnosis and likely treatment was thoroughly discussed with the patient. The options, risks, complications, alternative treatment as well as some of the differential diagnosis was discussed. The patient was thoroughly informed and all questions were answered. the patient indicated understanding and satisfaction with the discussion. Orders:        Orders Placed This Encounter   Procedures    Bird and Harry Heel and Elbow Protector     Patient was prescribed a Bird and Harry Elbow Protector. The bilateral elbows will require protection from this device to improve their function. The orthosis will assist in protecting the affected area, provide functional support and facilitate healing. The patient was educated and fit by a healthcare professional with expert knowledge and specialization in brace application while under the direct supervision of the treating physician.   Verbal and written instructions for the use of and application of this item were provided. They were instructed to contact the office immediately should the brace result in increased pain, decreased sensation, increased swelling or worsening of the condition. Disclaimer: \"This note was dictated with voice recognition software. Though review and correction are routine, we apologize for any errors. \"

## 2020-01-24 RX ORDER — PREGABALIN 75 MG/1
75 CAPSULE ORAL 2 TIMES DAILY
Qty: 60 CAPSULE | Refills: 0 | Status: SHIPPED | OUTPATIENT
Start: 2020-01-24 | End: 2020-02-27 | Stop reason: SDUPTHER

## 2020-02-10 ENCOUNTER — OFFICE VISIT (OUTPATIENT)
Dept: ORTHOPEDIC SURGERY | Age: 40
End: 2020-02-10

## 2020-02-10 VITALS — WEIGHT: 204.37 LBS | HEIGHT: 66 IN | RESPIRATION RATE: 17 BRPM | BODY MASS INDEX: 32.84 KG/M2

## 2020-02-10 PROCEDURE — 99024 POSTOP FOLLOW-UP VISIT: CPT | Performed by: PODIATRIST

## 2020-02-10 NOTE — PROGRESS NOTES
9-week postop check for the talonavicular joint arthrodesis revision. She states that she fell twice within the last 3 weeks. She does not have much lingering pain and denies any other injuries. She has minimal edema to her left foot. No signs of infection are present. She currently has just mild soreness over the area of the plate. 3 nonweightbearing x-ray views of the left foot were taken. These do demonstrate good position of the revision site with plate and screw fixation intact. That does appear to be plenty of cortical bone over the arthrodesis site however it does not appear to be fully consolidated in some areas. Status post talonavicular joint arthrodesis revision x9 weeks    I would like her to be nonweightbearing again for the next couple weeks. I will then see her in 2 weeks and please take new x-rays.

## 2020-02-11 RX ORDER — VARENICLINE TARTRATE 1 MG/1
1 TABLET, FILM COATED ORAL 2 TIMES DAILY
Qty: 60 TABLET | Refills: 2 | Status: SHIPPED | OUTPATIENT
Start: 2020-02-11 | End: 2020-06-02

## 2020-02-19 ENCOUNTER — OFFICE VISIT (OUTPATIENT)
Dept: ORTHOPEDIC SURGERY | Age: 40
End: 2020-02-19
Payer: COMMERCIAL

## 2020-02-19 VITALS — WEIGHT: 204.37 LBS | BODY MASS INDEX: 32.84 KG/M2 | HEIGHT: 66 IN

## 2020-02-19 PROCEDURE — 1036F TOBACCO NON-USER: CPT | Performed by: INTERNAL MEDICINE

## 2020-02-19 PROCEDURE — 64450 NJX AA&/STRD OTHER PN/BRANCH: CPT | Performed by: INTERNAL MEDICINE

## 2020-02-19 PROCEDURE — G8482 FLU IMMUNIZE ORDER/ADMIN: HCPCS | Performed by: INTERNAL MEDICINE

## 2020-02-19 PROCEDURE — G8417 CALC BMI ABV UP PARAM F/U: HCPCS | Performed by: INTERNAL MEDICINE

## 2020-02-19 PROCEDURE — 99212 OFFICE O/P EST SF 10 MIN: CPT | Performed by: INTERNAL MEDICINE

## 2020-02-19 PROCEDURE — G8427 DOCREV CUR MEDS BY ELIG CLIN: HCPCS | Performed by: INTERNAL MEDICINE

## 2020-02-19 NOTE — PROGRESS NOTES
Chief Complaint:   Chief Complaint   Patient presents with    Elbow Pain     right, overall better, pain 8/10 w/ full elbow extension, pain along ulnar n. with lifting groceries          History of Present Illness:       Patient is a 44 y.o. female returns follow up for the above complaint. The patient was last seen approximately 3 weeksago. The symptoms are improving since the last visit. The patient has had no further testing for the problem. Overall the patient has noted definite therapeutic benefit from the peripheral nerve Hydro dissections however only partial benefit from the [facility] dissection performed on the right at the elbow ulnar nerve distribution. This is consistent with the ultrasound findings at the time of her initial Jayess dissection with evidence of soft tissue entrapment of the ulnar nerve. She denies any new onset or progressive weakness in the hand    No new injuries no new events     Past Medical History:        Past Medical History:   Diagnosis Date    Arthritis     Depression     Diabetes mellitus (HCC)     Hypertension     Tachycardia         Present Medications:         Current Outpatient Medications   Medication Sig Dispense Refill    varenicline (CHANTIX CONTINUING MONTH CATARINA) 1 MG tablet Take 1 tablet by mouth 2 times daily 60 tablet 2    pregabalin (LYRICA) 75 MG capsule Take 1 capsule by mouth 2 times daily for 30 days.  60 capsule 0    metFORMIN (GLUCOPHAGE-XR) 500 MG extended release tablet Take 2 tablets by mouth daily (with breakfast) 60 tablet 2    rosuvastatin (CRESTOR) 20 MG tablet Take 0.5 tablets by mouth daily 30 tablet 2    omeprazole (PRILOSEC) 20 MG delayed release capsule Take 1 capsule by mouth daily 90 capsule 0    metoprolol tartrate (LOPRESSOR) 50 MG tablet Take 1 tablet by mouth 2 times daily 60 tablet 2    albuterol sulfate HFA (VENTOLIN HFA) 108 (90 Base) MCG/ACT inhaler Inhale 2 puffs into the lungs 4 times daily 1 Inhaler 2    Cholecalciferol (VITAMIN D3) 50 MCG (2000 UT) TABS Take 2,000 Units by mouth daily 30 tablet 2    nitroGLYCERIN (NITROSTAT) 0.4 MG SL tablet Place 0.4 mg under the tongue every 5 minutes as needed for Chest pain up to max of 3 total doses. If no relief after 1 dose, call 911.  varenicline (CHANTIX STARTING MONTH PAK) 0.5 MG X 11 & 1 MG X 42 tablet Take by mouth. 1 box 0    citalopram (CELEXA) 20 MG tablet Take 20 mg by mouth daily       ammonium lactate (LAC-HYDRIN) 12 % lotion Apply topically daily       Coenzyme Q10 (CO Q-10 PO) Take by mouth daily       diclofenac (VOLTAREN) 50 MG EC tablet Take 100 mg by mouth daily      divalproex (DEPAKOTE ER) 500 MG extended release tablet Take 500 mg by mouth daily      hydrOXYzine (ATARAX) 25 MG tablet Take 25 mg by mouth 3 times daily as needed for Itching       No current facility-administered medications for this visit. Allergies: Allergies   Allergen Reactions    Cinnamon Itching    Codeine Hives and Itching           Review of Systems:    Pertinent items are noted in HPI        Vital Signs: There were no vitals filed for this visit. General Exam:     Constitutional: Patient is adequately groomed with no evidence of malnutrition    Physical Exam: right elbow      Primary Exam:    Inspection: No deformity atrophy appreciable effusion      Palpation: Minimal tenderness over the ulnar nerve distribution over the skin incision      Range of Motion: Full range symmetric      Strength: Normal first dorsal interosseous      Special Tests: Negative      Skin: There are no rashes, ulcerations or lesions.     Neurovascular -light touch sensation symmetric small fingers bilaterally: Non focal and intact       Additional Comments:        Additional Examinations:                    Office Imaging Results/Procedures PerformedToday:     Ultrasound-guided hydrodissection ulnar nerve-left elbow  Logic ultrasound  12 MHz    Patient was positioned supine examination

## 2020-02-26 NOTE — TELEPHONE ENCOUNTER
Pt calling I for a refill of pregabalin 75mg 1 capsule 2 times daily. Uses [facility] on [address] in [address] [place] [address]. Next ov 03/24/20.

## 2020-02-27 ENCOUNTER — OFFICE VISIT (OUTPATIENT)
Dept: ORTHOPEDIC SURGERY | Age: 40
End: 2020-02-27

## 2020-02-27 VITALS — HEIGHT: 65 IN | BODY MASS INDEX: 30.32 KG/M2 | WEIGHT: 182 LBS

## 2020-02-27 PROCEDURE — 99024 POSTOP FOLLOW-UP VISIT: CPT | Performed by: PODIATRIST

## 2020-02-27 RX ORDER — PREGABALIN 75 MG/1
75 CAPSULE ORAL 2 TIMES DAILY
Qty: 60 CAPSULE | Refills: 0 | Status: SHIPPED | OUTPATIENT
Start: 2020-02-27 | End: 2020-03-24 | Stop reason: SDUPTHER

## 2020-02-27 NOTE — PROGRESS NOTES
This is a 12-week postop check for the talonavicular joint revision. She states that she only has pain when she is actually off her foot and that is intermittent. There is minimal edema to the left midfoot. There is no erythema or ecchymosis present. She has no palpable tenderness over the arthrodesis site. 3 nonweightbearing x-ray views of the left foot were taken. These demonstrate good consolidation across the talonavicular joint arthrodesis site. Status post talonavicular joint arthrodesis revision x12 weeks    She can start bearing weight as tolerated in the boot over the next 2 weeks. If that goes well then she can try progressing into a regular shoe. I will see her back in 4 weeks and please take new x-rays.

## 2020-03-04 ENCOUNTER — OFFICE VISIT (OUTPATIENT)
Dept: ORTHOPEDIC SURGERY | Age: 40
End: 2020-03-04
Payer: COMMERCIAL

## 2020-03-04 VITALS — WEIGHT: 182.1 LBS | HEIGHT: 65 IN | BODY MASS INDEX: 30.34 KG/M2

## 2020-03-04 PROCEDURE — 1036F TOBACCO NON-USER: CPT | Performed by: INTERNAL MEDICINE

## 2020-03-04 PROCEDURE — G8427 DOCREV CUR MEDS BY ELIG CLIN: HCPCS | Performed by: INTERNAL MEDICINE

## 2020-03-04 PROCEDURE — 99212 OFFICE O/P EST SF 10 MIN: CPT | Performed by: INTERNAL MEDICINE

## 2020-03-04 PROCEDURE — 64450 NJX AA&/STRD OTHER PN/BRANCH: CPT | Performed by: INTERNAL MEDICINE

## 2020-03-04 PROCEDURE — G8417 CALC BMI ABV UP PARAM F/U: HCPCS | Performed by: INTERNAL MEDICINE

## 2020-03-04 PROCEDURE — G8482 FLU IMMUNIZE ORDER/ADMIN: HCPCS | Performed by: INTERNAL MEDICINE

## 2020-03-04 NOTE — PROGRESS NOTES
varenicline (CHANTIX STARTING MONTH CATARINA) 0.5 MG X 11 & 1 MG X 42 tablet Take by mouth. 1 box 0    citalopram (CELEXA) 20 MG tablet Take 20 mg by mouth daily       ammonium lactate (LAC-HYDRIN) 12 % lotion Apply topically daily       Coenzyme Q10 (CO Q-10 PO) Take by mouth daily       diclofenac (VOLTAREN) 50 MG EC tablet Take 100 mg by mouth daily      divalproex (DEPAKOTE ER) 500 MG extended release tablet Take 500 mg by mouth daily      hydrOXYzine (ATARAX) 25 MG tablet Take 25 mg by mouth 3 times daily as needed for Itching       No current facility-administered medications for this visit. Allergies: Allergies   Allergen Reactions    Cinnamon Itching    Codeine Hives and Itching           Review of Systems:    Pertinent items are noted in HPI         Vital Signs: There were no vitals filed for this visit. General Exam:     Constitutional: Patient is adequately groomed with no evidence of malnutrition    Physical Exam: bilateral elbow/wrist      Primary Exam:    Inspection: No deformity atrophy appreciable effusion      Palpation: There is mild Tinel's at the elbow      Range of Motion: Full range symmetric at the elbow without pain      Strength: Normal first dorsal interosseous and abductor digiti minimi quinti      Special Tests: Mild Tinel's left elbow      Skin: There are no rashes, ulcerations or lesions. Gait: Nonantalgic     Neurovascular - non focal and intact       Additional Comments:        Additional Examinations:                   Office Imaging Results/Procedures PerformedToday:   Limited ultrasound evaluation-left elbow  Ultrasound-guided nerve Hydro dissection-ulnar nerve at the left elbow  Logic E ultrasound  12 MHz    Patient was position supine examination table with the arm positioned overhead. Sterile prep was performed over the medial aspect of the ankle covering a wide surface area.     A limited ultrasound evaluation was performed using lift technique was dictated with voice recognition software. Though review and correction are routine, we apologize for any errors. \"

## 2020-03-23 ASSESSMENT — ENCOUNTER SYMPTOMS
NAUSEA: 0
SHORTNESS OF BREATH: 0
VOMITING: 0
ABDOMINAL PAIN: 0
DIARRHEA: 0
CHEST TIGHTNESS: 0
COUGH: 0
CONSTIPATION: 0
ORTHOPNEA: 0
WHEEZING: 0
BLURRED VISION: 0

## 2020-03-24 ENCOUNTER — OFFICE VISIT (OUTPATIENT)
Dept: FAMILY MEDICINE CLINIC | Age: 40
End: 2020-03-24
Payer: COMMERCIAL

## 2020-03-24 VITALS
SYSTOLIC BLOOD PRESSURE: 118 MMHG | DIASTOLIC BLOOD PRESSURE: 60 MMHG | HEART RATE: 64 BPM | TEMPERATURE: 97.7 F | OXYGEN SATURATION: 96 % | BODY MASS INDEX: 36.34 KG/M2 | WEIGHT: 218.4 LBS

## 2020-03-24 DIAGNOSIS — K21.9 GASTROESOPHAGEAL REFLUX DISEASE, ESOPHAGITIS PRESENCE NOT SPECIFIED: ICD-10-CM

## 2020-03-24 DIAGNOSIS — E11.9 TYPE 2 DIABETES MELLITUS WITHOUT COMPLICATION, WITHOUT LONG-TERM CURRENT USE OF INSULIN (HCC): ICD-10-CM

## 2020-03-24 DIAGNOSIS — E78.2 MIXED HYPERLIPIDEMIA: ICD-10-CM

## 2020-03-24 DIAGNOSIS — I10 ESSENTIAL HYPERTENSION: ICD-10-CM

## 2020-03-24 LAB
A/G RATIO: 1.5 (ref 1.1–2.2)
ALBUMIN SERPL-MCNC: 4.4 G/DL (ref 3.4–5)
ALP BLD-CCNC: 54 U/L (ref 40–129)
ALT SERPL-CCNC: 11 U/L (ref 10–40)
ANION GAP SERPL CALCULATED.3IONS-SCNC: 12 MMOL/L (ref 3–16)
AST SERPL-CCNC: 14 U/L (ref 15–37)
BASOPHILS ABSOLUTE: 0 K/UL (ref 0–0.2)
BASOPHILS RELATIVE PERCENT: 0.6 %
BILIRUB SERPL-MCNC: 0.3 MG/DL (ref 0–1)
BUN BLDV-MCNC: 13 MG/DL (ref 7–20)
CALCIUM SERPL-MCNC: 9.6 MG/DL (ref 8.3–10.6)
CHLORIDE BLD-SCNC: 94 MMOL/L (ref 99–110)
CHOLESTEROL, TOTAL: 326 MG/DL (ref 0–199)
CO2: 27 MMOL/L (ref 21–32)
CREAT SERPL-MCNC: 0.6 MG/DL (ref 0.6–1.1)
EOSINOPHILS ABSOLUTE: 0.1 K/UL (ref 0–0.6)
EOSINOPHILS RELATIVE PERCENT: 1.2 %
GFR AFRICAN AMERICAN: >60
GFR NON-AFRICAN AMERICAN: >60
GLOBULIN: 3 G/DL
GLUCOSE BLD-MCNC: 102 MG/DL (ref 70–99)
HCT VFR BLD CALC: 39.8 % (ref 36–48)
HDLC SERPL-MCNC: 39 MG/DL (ref 40–60)
HEMOGLOBIN: 13 G/DL (ref 12–16)
LDL CHOLESTEROL CALCULATED: 261 MG/DL
LYMPHOCYTES ABSOLUTE: 3.3 K/UL (ref 1–5.1)
LYMPHOCYTES RELATIVE PERCENT: 42.9 %
MCH RBC QN AUTO: 28.6 PG (ref 26–34)
MCHC RBC AUTO-ENTMCNC: 32.7 G/DL (ref 31–36)
MCV RBC AUTO: 87.6 FL (ref 80–100)
MONOCYTES ABSOLUTE: 0.7 K/UL (ref 0–1.3)
MONOCYTES RELATIVE PERCENT: 9.4 %
NEUTROPHILS ABSOLUTE: 3.6 K/UL (ref 1.7–7.7)
NEUTROPHILS RELATIVE PERCENT: 45.9 %
PDW BLD-RTO: 14.3 % (ref 12.4–15.4)
PLATELET # BLD: 311 K/UL (ref 135–450)
PMV BLD AUTO: 7.8 FL (ref 5–10.5)
POTASSIUM SERPL-SCNC: 4.4 MMOL/L (ref 3.5–5.1)
RBC # BLD: 4.55 M/UL (ref 4–5.2)
SODIUM BLD-SCNC: 133 MMOL/L (ref 136–145)
TOTAL CK: 107 U/L (ref 26–192)
TOTAL PROTEIN: 7.4 G/DL (ref 6.4–8.2)
TRIGL SERPL-MCNC: 129 MG/DL (ref 0–150)
VLDLC SERPL CALC-MCNC: 26 MG/DL
WBC # BLD: 7.8 K/UL (ref 4–11)

## 2020-03-24 PROCEDURE — G8926 SPIRO NO PERF OR DOC: HCPCS | Performed by: CLINICAL NURSE SPECIALIST

## 2020-03-24 PROCEDURE — G8427 DOCREV CUR MEDS BY ELIG CLIN: HCPCS | Performed by: CLINICAL NURSE SPECIALIST

## 2020-03-24 PROCEDURE — 3023F SPIROM DOC REV: CPT | Performed by: CLINICAL NURSE SPECIALIST

## 2020-03-24 PROCEDURE — G8417 CALC BMI ABV UP PARAM F/U: HCPCS | Performed by: CLINICAL NURSE SPECIALIST

## 2020-03-24 PROCEDURE — G8482 FLU IMMUNIZE ORDER/ADMIN: HCPCS | Performed by: CLINICAL NURSE SPECIALIST

## 2020-03-24 PROCEDURE — 1036F TOBACCO NON-USER: CPT | Performed by: CLINICAL NURSE SPECIALIST

## 2020-03-24 PROCEDURE — 2022F DILAT RTA XM EVC RTNOPTHY: CPT | Performed by: CLINICAL NURSE SPECIALIST

## 2020-03-24 PROCEDURE — 99214 OFFICE O/P EST MOD 30 MIN: CPT | Performed by: CLINICAL NURSE SPECIALIST

## 2020-03-24 PROCEDURE — 3046F HEMOGLOBIN A1C LEVEL >9.0%: CPT | Performed by: CLINICAL NURSE SPECIALIST

## 2020-03-24 RX ORDER — CHOLECALCIFEROL (VITAMIN D3) 125 MCG
2000 CAPSULE ORAL DAILY
Qty: 90 TABLET | Refills: 0 | Status: SHIPPED | OUTPATIENT
Start: 2020-03-24 | End: 2020-06-25 | Stop reason: SDUPTHER

## 2020-03-24 RX ORDER — ROSUVASTATIN CALCIUM 20 MG/1
10 TABLET, COATED ORAL DAILY
Qty: 90 TABLET | Refills: 0 | Status: SHIPPED | OUTPATIENT
Start: 2020-03-24 | End: 2020-06-25 | Stop reason: SDUPTHER

## 2020-03-24 RX ORDER — OMEPRAZOLE 20 MG/1
20 CAPSULE, DELAYED RELEASE ORAL DAILY
Qty: 90 CAPSULE | Refills: 0 | Status: SHIPPED | OUTPATIENT
Start: 2020-03-24 | End: 2020-06-25 | Stop reason: SDUPTHER

## 2020-03-24 RX ORDER — PREGABALIN 75 MG/1
75 CAPSULE ORAL 2 TIMES DAILY
Qty: 60 CAPSULE | Refills: 2 | Status: SHIPPED | OUTPATIENT
Start: 2020-03-24 | End: 2020-06-25 | Stop reason: SDUPTHER

## 2020-03-24 RX ORDER — METOPROLOL TARTRATE 50 MG/1
50 TABLET, FILM COATED ORAL 2 TIMES DAILY
Qty: 180 TABLET | Refills: 0 | Status: SHIPPED | OUTPATIENT
Start: 2020-03-24 | End: 2020-06-25 | Stop reason: SDUPTHER

## 2020-03-24 RX ORDER — METFORMIN HYDROCHLORIDE 500 MG/1
1000 TABLET, EXTENDED RELEASE ORAL
Qty: 180 TABLET | Refills: 0 | Status: SHIPPED | OUTPATIENT
Start: 2020-03-24 | End: 2020-06-25 | Stop reason: SDUPTHER

## 2020-03-24 NOTE — PROGRESS NOTES
significant improvement. There are no compliance problems. Hyperlipidemia   This is a chronic problem. The current episode started more than 1 year ago. The problem is controlled. Recent lipid tests were reviewed and are low. Exacerbating diseases include diabetes and obesity. Pertinent negatives include no chest pain, focal sensory loss, focal weakness, leg pain, myalgias or shortness of breath. Current antihyperlipidemic treatment includes statins. The current treatment provides significant improvement of lipids. Risk factors for coronary artery disease include dyslipidemia, diabetes mellitus, hypertension and a sedentary lifestyle. Diabetes   She presents for her follow-up diabetic visit. She has type 2 diabetes mellitus. Pertinent negatives for hypoglycemia include no headaches. Pertinent negatives for diabetes include no blurred vision, no chest pain, no polydipsia, no polyphagia and no polyuria. Risk factors for coronary artery disease include dyslipidemia, diabetes mellitus, sedentary lifestyle and hypertension. Her weight is stable. She is following a generally healthy diet. Current Outpatient Medications on File Prior to Visit   Medication Sig Dispense Refill    varenicline (CHANTIX CONTINUING MONTH CATARINA) 1 MG tablet Take 1 tablet by mouth 2 times daily 60 tablet 2    nitroGLYCERIN (NITROSTAT) 0.4 MG SL tablet Place 0.4 mg under the tongue every 5 minutes as needed for Chest pain up to max of 3 total doses. If no relief after 1 dose, call 911.       citalopram (CELEXA) 20 MG tablet Take 20 mg by mouth daily       ammonium lactate (LAC-HYDRIN) 12 % lotion Apply topically daily       Coenzyme Q10 (CO Q-10 PO) Take by mouth daily       diclofenac (VOLTAREN) 50 MG EC tablet Take 100 mg by mouth daily      divalproex (DEPAKOTE ER) 500 MG extended release tablet Take 500 mg by mouth daily      hydrOXYzine (ATARAX) 25 MG tablet Take 25 mg by mouth 3 times daily as needed for Itching      albuterol sulfate HFA (VENTOLIN HFA) 108 (90 Base) MCG/ACT inhaler Inhale 2 puffs into the lungs 4 times daily 1 Inhaler 2    varenicline (CHANTIX STARTING MONTH PAK) 0.5 MG X 11 & 1 MG X 42 tablet Take by mouth. 1 box 0     No current facility-administered medications on file prior to visit. Past Medical History:   Diagnosis Date    Arthritis     Depression     Diabetes mellitus ([facility] [address]     Hypertension     Tachycardia      Past Surgical History:   Procedure Laterality Date    ANKLE FUSION Bilateral     ARTHRODESIS Left 12/6/2019    REVISION OF TALONAVICULAR JOINT ARTHRODESIS LEFT FOOT  -SLEEP APNEA- performed by Mary Teran DPM at [facility] Bilateral     ELBOW SURGERY Bilateral     ulnar nerve release    HERNIA REPAIR      HIP ARTHROSCOPY Bilateral     TONSILLECTOMY AND ADENOIDECTOMY      WRIST GANGLION EXCISION Bilateral      Family History   Problem Relation Age of Onset    High Blood Pressure Mother     Arthritis Mother     Other Father         hypothyroidism    High Blood Pressure Father     Arthritis Father     Asthma Father     Heart Failure Maternal Aunt         22 stents.      Heart Failure Maternal Grandmother     Pacemaker Maternal Grandfather     Heart Surgery Paternal Grandmother     Pacemaker Paternal Grandfather      Social History     Socioeconomic History    Marital status:      Spouse name: Not on file    Number of children: Not on file    Years of education: Not on file    Highest education level: Not on file   Occupational History    Not on file   Social Needs    Financial resource strain: Not on file    Food insecurity     Worry: Not on file     Inability: Not on file   [employer] needs     Medical: Not on file     Non-medical: Not on file   Tobacco Use    Smoking status: Former Smoker     Packs/day: 1.00     Years: 27.00     Pack years: 27.00     Start date: 1992     Last attempt to quit: 11/19/2019     Years since Pupils: Pupils are equal, round, and reactive to light. Neck:      Musculoskeletal: Normal range of motion and neck supple. Trachea: No tracheal deviation. Cardiovascular:      Rate and Rhythm: Normal rate and regular rhythm. Heart sounds: Normal heart sounds. Pulmonary:      Effort: Pulmonary effort is normal. No respiratory distress. Breath sounds: Normal breath sounds. No wheezing or rales. Chest:      Chest wall: No tenderness. Abdominal:      General: Bowel sounds are normal. There is no distension. Palpations: Abdomen is soft. There is no mass. Tenderness: There is no abdominal tenderness. Hernia: No hernia is present. Musculoskeletal: Normal range of motion. Skin:     General: Skin is warm and dry. Findings: No rash. Neurological:      Mental Status: She is alert and oriented to person, place, and time. Psychiatric:         Behavior: Behavior normal.       /60 (Site: Left Upper Arm, Position: Sitting, Cuff Size: Medium Adult)   Pulse 64   Temp 97.7 °F (36.5 °C)   Wt 218 lb 6.4 oz (99.1 kg)   LMP 03/12/2020   SpO2 96%   BMI 36.34 kg/m²    BP Readings from Last 3 Encounters:   03/24/20 118/60   12/23/19 104/70   12/06/19 (!) 87/52      Wt Readings from Last 3 Encounters:   03/24/20 218 lb 6.4 oz (99.1 kg)   03/04/20 182 lb 1.6 oz (82.6 kg)   02/27/20 182 lb (82.6 kg)       ASSESSMENT & PLAN:    1. Essential hypertension  - Stable, continue current regimen  - CBC Auto Differential; Future  - Comprehensive Metabolic Panel; Future  - metoprolol tartrate (LOPRESSOR) 50 MG tablet; Take 1 tablet by mouth 2 times daily  Dispense: 180 tablet; Refill: 0  - Reviewed DASH and low sodium diet    2. Mixed hyperlipidemia  - Stable, continue current regimen  - CBC Auto Differential; Future  - Comprehensive Metabolic Panel; Future  - Lipid Panel; Future  - CK; Future  - rosuvastatin (CRESTOR) 20 MG tablet;  Take 0.5 tablets by mouth daily  Dispense: 90 tablet; Refill: 0  - Reviewed low-cholesterol diet    3. Type 2 diabetes mellitus without complication, without long-term current use of insulin (HCC)  - Stable, continue current regimen  - CBC Auto Differential; Future  - Comprehensive Metabolic Panel; Future  - Hemoglobin A1C; Future  - metFORMIN (GLUCOPHAGE-XR) 500 MG extended release tablet; Take 2 tablets by mouth daily (with breakfast)  Dispense: 180 tablet; Refill: 0  - Reviewed diabetic diet    4. Gastroesophageal reflux disease, esophagitis presence not specified  - Stable, continue current regimen  - CBC Auto Differential; Future  - omeprazole (PRILOSEC) 20 MG delayed release capsule; Take 1 capsule by mouth daily  Dispense: 90 capsule; Refill: 0  - Reviewed GERD precautions    5. AMOS (obstructive sleep apnea)  - Stable, continue nightly CPAP use    6. Chronic obstructive pulmonary disease, unspecified COPD type (HCC)  - Stable, continue nightly CPAP usage    7. Vitamin D deficiency  - Stable, continue vitamin D3 supplementation  - Cholecalciferol (VITAMIN D3) 50 MCG (2000 UT) TABS; Take 2,000 Units by mouth daily  Dispense: 90 tablet; Refill: 0    8. Fibromyalgia  - Stable, continue current regimen  - pregabalin (LYRICA) 75 MG capsule; Take 1 capsule by mouth 2 times daily for 90 days. Dispense: 60 capsule; Refill: 0    9. Other chronic pain  - Stable, continue current regimen  - pregabalin (LYRICA) 75 MG capsule; Take 1 capsule by mouth 2 times daily for 90 days. Dispense: 60 capsule; Refill: 0      Continue current treatment plan. Current Outpatient Medications   Medication Sig Dispense Refill    rosuvastatin (CRESTOR) 20 MG tablet Take 0.5 tablets by mouth daily 90 tablet 0    pregabalin (LYRICA) 75 MG capsule Take 1 capsule by mouth 2 times daily for 90 days.  60 capsule 2    omeprazole (PRILOSEC) 20 MG delayed release capsule Take 1 capsule by mouth daily 90 capsule 0    metoprolol tartrate (LOPRESSOR) 50 MG tablet Take 1 tablet by mouth 2 times daily 180 tablet 0    metFORMIN (GLUCOPHAGE-XR) 500 MG extended release tablet Take 2 tablets by mouth daily (with breakfast) 180 tablet 0    Cholecalciferol (VITAMIN D3) 50 MCG (2000 UT) TABS Take 2,000 Units by mouth daily 90 tablet 0    varenicline (CHANTIX CONTINUING MONTH PAK) 1 MG tablet Take 1 tablet by mouth 2 times daily 60 tablet 2    nitroGLYCERIN (NITROSTAT) 0.4 MG SL tablet Place 0.4 mg under the tongue every 5 minutes as needed for Chest pain up to max of 3 total doses. If no relief after 1 dose, call 911.  citalopram (CELEXA) 20 MG tablet Take 20 mg by mouth daily       ammonium lactate (LAC-HYDRIN) 12 % lotion Apply topically daily       Coenzyme Q10 (CO Q-10 PO) Take by mouth daily       diclofenac (VOLTAREN) 50 MG EC tablet Take 100 mg by mouth daily      divalproex (DEPAKOTE ER) 500 MG extended release tablet Take 500 mg by mouth daily      hydrOXYzine (ATARAX) 25 MG tablet Take 25 mg by mouth 3 times daily as needed for Itching      albuterol sulfate HFA (VENTOLIN HFA) 108 (90 Base) MCG/ACT inhaler Inhale 2 puffs into the lungs 4 times daily 1 Inhaler 2    varenicline (CHANTIX STARTING MONTH CATARINA) 0.5 MG X 11 & 1 MG X 42 tablet Take by mouth. 1 box 0     No current facility-administered medications for this visit. Return in about 3 months (around 6/24/2020), or if symptoms worsen or fail to improve, for HTN, lipidemia, diabetes, GERD, AMOS, fibro, chronic pain. Nithya Gurrola received counseling on the following healthy behaviors: nutrition, exercise and medication adherence    Patient given educational materials on Diabetes, Hyperlipidemia, Nutrition and Hypertension    Discussed use, benefit, and side effects of prescribed medications. Barriers to medication compliance addressed. All patient questions answered. Pt voiced understanding. Call office if experience side effects from medications.       Please note that some or all of this record was generated using voice

## 2020-03-24 NOTE — PATIENT INSTRUCTIONS
Fasting labs ordered     Medications refilled     Reviewed DASH and low sodium diets     Reviewed low-cholesterol diet     Reviewed diabetic diet     Follow-up with orthopedics as directed     Follow-up with podiatry as directed.     Encourage lifestyle modifications (better food choices, portion control and increasing activity)    [email or web address] ([facility])     Patient Education        Asthma in Adults: Care Instructions  Your Care Instructions    During an asthma attack, your airways swell and narrow as a reaction to certain things (triggers). This makes it hard to breathe. You may be able to prevent asthma attacks if you avoid the things that set off your asthma symptoms. Keeping your asthma under control and treating symptoms before they get bad can help you avoid severe attacks. If you can control your asthma, you may be able to do all of your normal daily activities. You may also avoid asthma attacks and trips to the hospital.  Follow-up care is a key part of your treatment and safety. Be sure to make and go to all appointments, and call your doctor if you are having problems. It's also a good idea to know your test results and keep a list of the medicines you take. How can you care for yourself at home? · Follow your asthma action plan so you can manage your symptoms at home. An asthma action plan will help you prevent and control airway reactions and will tell you what to do during an asthma attack. If you do not have an asthma action plan, work with your doctor to build one. · Take your asthma medicine exactly as prescribed. Medicine plays an important role in controlling asthma. Talk to your doctor right away if you have any questions about what to take and how to take it. ? Use your quick-relief medicine when you have symptoms of an attack. Quick-relief medicine often is an albuterol inhaler. Some people need to use quick-relief medicine before they exercise. ?  Take your controller medicine every day, not just when you have symptoms. Controller medicine is usually an inhaled corticosteroid. The goal is to prevent problems before they occur. Do not use your controller medicine to try to treat an attack that has already started. It does not work fast enough to help. ? If your doctor prescribed corticosteroid pills to use during an attack, take them as directed. They may take hours to work, but they may shorten the attack and help you breathe better. ? Keep your quick-relief medicine with you at all times. · Talk to your doctor before using other medicines. Some medicines, such as aspirin, can cause asthma attacks in some people. · Check yourself for asthma symptoms to know which step to follow in your action plan. Watch for things like being short of breath, having chest tightness, coughing, and wheezing. Also notice if symptoms wake you up at night or if you get tired quickly when you exercise. · If you have a peak flow meter, use it to check how well you are breathing. This can help you predict when an asthma attack is going to occur. Then you can take medicine to prevent the asthma attack or make it less severe. · See your doctor regularly. These visits will help you learn more about asthma and what you can do to control it. Your doctor will monitor your treatment to make sure the medicine is helping you. · Keep track of your asthma attacks and your treatment. After you have had an attack, write down what triggered it, what helped end it, and any concerns you have about your asthma action plan. Take your diary when you see your doctor. You can then review your asthma action plan and decide if it is working. · Do not smoke or allow others to smoke around you. Avoid smoky places. Smoking makes asthma worse. If you need help quitting, talk to your doctor about stop-smoking programs and medicines. These can increase your chances of quitting for good.   · Learn what triggers an asthma affect your pain level. Do things that you enjoy to distract yourself when you have pain instead of focusing on the pain. See a movie, read a book, listen to music, or spend time with a friend. · If you think you are depressed, talk to your doctor about treatment. · Keep a daily pain diary. Record how your moods, thoughts, sleep patterns, activities, and medicine affect your pain. You may find that your pain is worse during or after certain activities or when you are feeling a certain emotion. Having a record of your pain can help you and your doctor find the best ways to treat your pain. · Take pain medicines exactly as directed. ? If the doctor gave you a prescription medicine for pain, take it as prescribed. ? If you are not taking a prescription pain medicine, ask your doctor if you can take an over-the-counter medicine. Reducing constipation caused by pain medicine  · Include fruits, vegetables, beans, and whole grains in your diet each day. These foods are high in fiber. · Drink plenty of fluids, enough so that your urine is light yellow or clear like water. If you have kidney, heart, or liver disease and have to limit fluids, talk with your doctor before you increase the amount of fluids you drink. · If your doctor recommends it, get more exercise. Walking is a good choice. Bit by bit, increase the amount you walk every day. Try for at least 30 minutes on most days of the week. · Schedule time each day for a bowel movement. A daily routine may help. Take your time and do not strain when having a bowel movement. When should you call for help? Call your doctor now or seek immediate medical care if:    · Your pain gets worse or is out of control.     · You feel down or blue, or you do not enjoy things like you once did. You may be depressed, which is common in people with chronic pain.  Depression can be treated.     · You have vomiting or cramps for more than 2 hours.    Watch closely for changes in exercise within 1 hour after a meal, your body may need less insulin for that meal than it would if you exercised 3 hours after the meal. Test your blood sugar to find out how exercise affects your need for insulin. If you do or don't take insulin:  · Look at labels on packaged foods. This can tell you how many carbs are in a serving. You can also use guides from the American Diabetes Association. · Be aware of portions, or serving sizes. If a package has two servings and you eat the whole package, you need to double the number of grams of carbohydrate listed for one serving. · Protein, fat, and fiber do not raise blood sugar as much as carbs do. If you eat a lot of these nutrients in a meal, your blood sugar will rise more slowly than it would otherwise. Eat from all food groups  · Eat at least three meals a day. · Plan meals to include food from all the food groups. The food groups include grains, fruits, dairy, proteins, and vegetables. · Talk to your dietitian or diabetes educator about ways to add limited amounts of sweets into your meal plan. · If you drink alcohol, talk to your doctor. It may not be recommended when you are taking certain diabetes medicines. Where can you learn more? Go to https://Mixers.Nuggeta. org and sign in to your Adtrade account. Enter U863 in the [employer] box to learn more about \"Counting Carbohydrates: Care Instructions. \"     If you do not have an account, please click on the \"Sign Up Now\" link. Current as of: December 19, 2019Content Version: 12.4  © 1278-7200 Healthwise, Incorporated. Care instructions adapted under license by [facility] ([facility]). If you have questions about a medical condition or this instruction, always ask your healthcare professional. [place] [address] any warranty or liability for your use of this information.          Patient Education        Learning About Meal Planning for Diabetes  Why plan your meals? Meal planning can be a key part of managing diabetes. Planning meals and snacks with the right balance of carbohydrate, protein, and fat can help you keep your blood sugar at the target level you set with your doctor. You don't have to eat special foods. You can eat what your family eats, including sweets once in a while. But you do have to pay attention to how often you eat and how much you eat of certain foods. You may want to work with a dietitian or a certified diabetes educator. He or she can give you tips and meal ideas and can answer your questions about meal planning. This health professional can also help you reach a healthy weight if that is one of your goals. What plan is right for you? Your dietitian or diabetes educator may suggest that you start with the plate format or carbohydrate counting. The plate format  The plate format is a simple way to help you manage how you eat. You plan meals by learning how much space each food should take on a plate. Using the plate format helps you spread carbohydrate throughout the day. It can make it easier to keep your blood sugar level within your target range. It also helps you see if you're eating healthy portion sizes. To use the plate format, you put non-starchy vegetables on half your plate. Add meat or meat substitutes on one-quarter of the plate. Put a grain or starchy vegetable (such as brown rice or a potato) on the final quarter of the plate. You can add a small piece of fruit and some low-fat or fat-free milk or yogurt, depending on your carbohydrate goal for each meal.  Here are some tips for using the plate format:  · Make sure that you are not using an oversized plate. A 9-inch plate is best. Many restaurants use larger plates. · Get used to using the plate format at home. Then you can use it when you eat out. · Write down your questions about using the plate format.  Talk to your doctor, a dietitian, or a diabetes educator about your concerns. Carbohydrate counting  With carbohydrate counting, you plan meals based on the amount of carbohydrate in each food. Carbohydrate raises blood sugar higher and more quickly than any other nutrient. It is found in desserts, breads and cereals, and fruit. It's also found in starchy vegetables such as potatoes and corn, grains such as rice and pasta, and milk and yogurt. Spreading carbohydrate throughout the day helps keep your blood sugar levels within your target range. Your daily amount depends on several things, including your weight, how active you are, which diabetes medicines you take, and what your goals are for your blood sugar levels. A registered dietitian or diabetes educator can help you plan how much carbohydrate to include in each meal and snack. A guideline for your daily amount of carbohydrate is:  · 45 to 60 grams at each meal. That's about the same as 3 to 4 carbohydrate servings. · 15 to 20 grams at each snack. That's about the same as 1 carbohydrate serving. The Nutrition Facts label on packaged foods tells you how much carbohydrate is in a serving of the food. First, look at the serving size on the food label. Is that the amount you eat in a serving? All of the nutrition information on a food label is based on that serving size. So if you eat more or less than that, you'll need to adjust the other numbers. Total carbohydrate is the next thing you need to look for on the label. If you count carbohydrate servings, one serving of carbohydrate is 15 grams. For foods that don't come with labels, such as fresh fruits and vegetables, you'll need a guide that lists carbohydrate in these foods. Ask your doctor, dietitian, or diabetes educator about books or other nutrition guides you can use. If you take insulin, you need to know how many grams of carbohydrate are in a meal. This lets you know how much rapid-acting insulin to take before you eat.  If you use an insulin pump, you get a constant rate of insulin during the day. So the pump must be programmed at meals to give you extra insulin to cover the rise in blood sugar after meals. When you know how much carbohydrate you will eat, you can take the right amount of insulin. Or, if you always use the same amount of insulin, you need to make sure that you eat the same amount of carbohydrate at meals. If you need more help to understand carbohydrate counting and food labels, ask your doctor, dietitian, or diabetes educator. How do you get started with meal planning? Here are some tips to get started:  · Plan your meals a week at a time. Don't forget to include snacks too. · Use cookbooks or online recipes to plan several main meals. Plan some quick meals for busy nights. You also can double some recipes that freeze well. Then you can save half for other busy nights when you don't have time to cook. · Make sure you have the ingredients you need for your recipes. If you're running low on basic items, put these items on your shopping list too. · List foods that you use to make breakfasts, lunches, and snacks. List plenty of fruits and vegetables. · Post this list on the refrigerator. Add to it as you think of more things you need. · Take the list to the store to do your weekly shopping. Follow-up care is a key part of your treatment and safety. Be sure to make and go to all appointments, and call your doctor if you are having problems. It's also a good idea to know your test results and keep a list of the medicines you take. Where can you learn more? Go to https://taylor.WAFU. org and sign in to your QRuso account. Enter Z339 in the [facility] box to learn more about \"Learning About Meal Planning for Diabetes. \"     If you do not have an account, please click on the \"Sign Up Now\" link. Current as of: December 19, 2019Content Version: 12.4  © 9998-7529 Healthwise, Incorporated.   Care instructions adapted day and at each meal. A dietitian or CDE can teach you how to keep track of the amount of carbs you eat. This is called carbohydrate counting. · If you are not sure how to count carbohydrate grams, use the Plate Method to plan meals. It is a good, quick way to make sure that you have a balanced meal. It also helps you spread carbs throughout the day. ? Divide your plate by types of foods. Put non-starchy vegetables on half the plate, meat or other protein food on one-quarter of the plate, and a grain or starchy vegetable in the final quarter of the plate. To this you can add a small piece of fruit and 1 cup of milk or yogurt, depending on how many carbs you are supposed to eat at a meal.  · Try to eat about the same amount of carbs at each meal. Do not \"save up\" your daily allowance of carbs to eat at one meal.  · Proteins have very little or no carbs per serving. Examples of proteins are beef, chicken, turkey, fish, eggs, tofu, cheese, cottage cheese, and peanut butter. A serving size of meat is 3 ounces, which is about the size of a deck of cards. Examples of meat substitute serving sizes (equal to 1 ounce of meat) are 1/4 cup of cottage cheese, 1 egg, 1 tablespoon of peanut butter, and ½ cup of tofu. How can you eat out and still eat healthy? · Learn to estimate the serving sizes of foods that have carbohydrate. If you measure food at home, it will be easier to estimate the amount in a serving of restaurant food. · If the meal you order has too much carbohydrate (such as potatoes, corn, or baked beans), ask to have a low-carbohydrate food instead. Ask for a salad or green vegetables. · If you use insulin, check your blood sugar before and after eating out to help you plan how much to eat in the future. · If you eat more carbohydrate at a meal than you had planned, take a walk or do other exercise. This will help lower your blood sugar. What else should you know?   · Limit saturated fat, such as the fat from meat and dairy products. This is a healthy choice because people who have diabetes are at higher risk of heart disease. So choose lean cuts of meat and nonfat or low-fat dairy products. Use olive or canola oil instead of butter or shortening when cooking. · Don't skip meals. Your blood sugar may drop too low if you skip meals and take insulin or certain medicines for diabetes. · Check with your doctor before you drink alcohol. Alcohol can cause your blood sugar to drop too low. Alcohol can also cause a bad reaction if you take certain diabetes medicines. Follow-up care is a key part of your treatment and safety. Be sure to make and go to all appointments, and call your doctor if you are having problems. It's also a good idea to know your test results and keep a list of the medicines you take. Where can you learn more? Go to https://iRx Reminderpetashaeweb.[email or web address] Designs. org and sign in to your IDEV Technologies account. Enter S880 in the [employer] box to learn more about \"Learning About Diabetes Food Guidelines. \"     If you do not have an account, please click on the \"Sign Up Now\" link. Current as of: December 19, 2019Content Version: 12.4  © 1502-7384 Healthwise, Incorporated. Care instructions adapted under license by [facility] ([facility]). If you have questions about a medical condition or this instruction, always ask your healthcare professional. [place] [address] any warranty or liability for your use of this information. Patient Education        Gastroesophageal Reflux Disease (GERD): Care Instructions  Your Care Instructions    Gastroesophageal reflux disease (GERD) is the backward flow of stomach acid into the esophagus. The esophagus is the tube that leads from your throat to your stomach. A one-way valve prevents the stomach acid from moving up into this tube. When you have GERD, this valve does not close tightly enough.   If you have mild GERD symptoms including heartburn, you may be Healthwise, Incorporated. Care instructions adapted under license by [facility] ([facility]). If you have questions about a medical condition or this instruction, always ask your healthcare professional. [address] any warranty or liability for your use of this information. Patient Education        Low Sodium Diet (2,000 Milligram): Care Instructions  Your Care Instructions    Too much sodium causes your body to hold on to extra water. This can raise your blood pressure and force your heart and kidneys to work harder. In very serious cases, this could cause you to be put in the hospital. It might even be life-threatening. By limiting sodium, you will feel better and lower your risk of serious problems. The most common source of sodium is salt. People get most of the salt in their diet from canned, prepared, and packaged foods. Fast food and restaurant meals also are very high in sodium. Your doctor will probably limit your sodium to less than 2,000 milligrams (mg) a day. This limit counts all the sodium in prepared and packaged foods and any salt you add to your food. Follow-up care is a key part of your treatment and safety. Be sure to make and go to all appointments, and call your doctor if you are having problems. It's also a good idea to know your test results and keep a list of the medicines you take. How can you care for yourself at home? Read food labels  · Read labels on cans and food packages. The labels tell you how much sodium is in each serving. Make sure that you look at the serving size. If you eat more than the serving size, you have eaten more sodium. · Food labels also tell you the Percent Daily Value for sodium. Choose products with low Percent Daily Values for sodium. · Be aware that sodium can come in forms other than salt, including monosodium glutamate (MSG), sodium citrate, and sodium bicarbonate (baking soda). MSG is often added to Asian food.  When you eat out, you can sometimes ask for food without MSG or added salt. Buy low-sodium foods  · Buy foods that are labeled \"unsalted\" (no salt added), \"sodium-free\" (less than 5 mg of sodium per serving), or \"low-sodium\" (less than 140 mg of sodium per serving). Foods labeled \"reduced-sodium\" and \"light sodium\" may still have too much sodium. Be sure to read the label to see how much sodium you are getting. · Buy fresh vegetables, or frozen vegetables without added sauces. Buy low-sodium versions of canned vegetables, soups, and other canned goods. Prepare low-sodium meals  · Cut back on the amount of salt you use in cooking. This will help you adjust to the taste. Do not add salt after cooking. One teaspoon of salt has about 2,300 mg of sodium. · Take the salt shaker off the table. · Flavor your food with garlic, lemon juice, onion, vinegar, herbs, and spices. Do not use soy sauce, lite soy sauce, steak sauce, onion salt, garlic salt, celery salt, mustard, or ketchup on your food. · Use low-sodium salad dressings, sauces, and ketchup. Or make your own salad dressings and sauces without adding salt. · Use less salt (or none) when recipes call for it. You can often use half the salt a recipe calls for without losing flavor. Other foods such as rice, pasta, and grains do not need added salt. · Rinse canned vegetables, and cook them in fresh water. This removes some--but not all--of the salt. · Avoid water that is naturally high in sodium or that has been treated with water softeners, which add sodium. Call your local water company to find out the sodium content of your water supply. If you buy bottled water, read the label and choose a sodium-free brand. Avoid high-sodium foods  · Avoid eating:  ? Smoked, cured, salted, and canned meat, fish, and poultry. ? Ham, palacios, hot dogs, and luncheon meats. ? Regular, hard, and processed cheese and regular peanut butter.   ? Crackers with salted tops, and other salted snack foods changes to help your heart. For example, your doctor may ask you to eat healthy foods, quit smoking, lose extra weight, and be more active. · If lifestyle changes don't help enough, your doctor may recommend that you take medicine. · When blood pressure is very high, medicines are needed to lower it. Follow-up care is a key part of your treatment and safety. Be sure to make and go to all appointments, and call your doctor if you are having problems. It's also a good idea to know your test results and keep a list of the medicines you take. Where can you learn more? Go to https://chpepiceweb.M2G. org and sign in to your Meizu account. Enter P501 in the [employer] box to learn more about \"Learning About High Blood Pressure. \"     If you do not have an account, please click on the \"Sign Up Now\" link. Current as of: December 15, 2019Content Version: 12.4  © 7222-5659 Voices. Care instructions adapted under license by [facility] ([facility]). If you have questions about a medical condition or this instruction, always ask your healthcare professional. [place] [address] any warranty or liability for your use of this information. Patient Education        High Cholesterol: Care Instructions  Your Care Instructions    Cholesterol is a type of fat in your blood. It is needed for many body functions, such as making new cells. Cholesterol is made by your body. It also comes from food you eat. High cholesterol means that you have too much of the fat in your blood. This raises your risk of a heart attack and stroke. LDL and HDL are part of your total cholesterol. LDL is the \"bad\" cholesterol. High LDL can raise your risk for heart disease, heart attack, and stroke. HDL is the \"good\" cholesterol. It helps clear bad cholesterol from the body. High HDL is linked with a lower risk of heart disease, heart attack, and stroke.   Your cholesterol levels help your doctor find out your risk for having a heart attack or stroke. You and your doctor can talk about whether you need to lower your risk and what treatment is best for you. A heart-healthy lifestyle along with medicines can help lower your cholesterol and your risk. The way you choose to lower your risk will depend on how high your risk is for heart attack and stroke. It will also depend on how you feel about taking medicines. Follow-up care is a key part of your treatment and safety. Be sure to make and go to all appointments, and call your doctor if you are having problems. It's also a good idea to know your test results and keep a list of the medicines you take. How can you care for yourself at home? · Eat a variety of foods every day. Good choices include fruits, vegetables, whole grains (like oatmeal), dried beans and peas, nuts and seeds, soy products (like tofu), and fat-free or low-fat dairy products. · Replace butter, margarine, and hydrogenated or partially hydrogenated oils with olive and canola oils. (Canola oil margarine without trans fat is fine.)  · Replace red meat with fish, poultry, and soy protein (like tofu). · Limit processed and packaged foods like chips, crackers, and cookies. · Bake, broil, or steam foods. Don't fine them. · Be physically active. Get at least 30 minutes of exercise on most days of the week. Walking is a good choice. You also may want to do other activities, such as running, swimming, cycling, or playing tennis or team sports. · Stay at a healthy weight or lose weight by making the changes in eating and physical activity listed above. Losing just a small amount of weight, even 5 to 10 pounds, can reduce your risk for having a heart attack or stroke. · Do not smoke. When should you call for help? Watch closely for changes in your health, and be sure to contact your doctor if:    · You need help making lifestyle changes.     · You have questions about your medicine.    Where can you learn more? Go to https://chpepiceweb.healthNexi. org and sign in to your arGEN-X account. Enter X842 in the [employer] box to learn more about \"High Cholesterol: Care Instructions. \"     If you do not have an account, please click on the \"Sign Up Now\" link. Current as of: December 15, 2019Content Version: 12.4  © 7965-6862 [employer] - Dein Lernprogramm im Leben. Care instructions adapted under license by [facility] ([facility]). If you have questions about a medical condition or this instruction, always ask your healthcare professional. [address] any warranty or liability for your use of this information. Patient Education        Learning About High Cholesterol  What is high cholesterol? High cholesterol means that you have too much cholesterol in your blood. Cholesterol is a type of fat. It's needed for many body functions, such as making new cells. Cholesterol is made by your body. It also comes from food you eat. Having high cholesterol can lead to the buildup of plaque in artery walls. This can increase your risk of heart disease and stroke. When your doctor talks about high cholesterol levels, he or she is talking about your total cholesterol and LDL cholesterol (the \"bad\" cholesterol) levels. Your doctor may also speak about HDL (the \"good\" cholesterol) levels. High HDL is linked with a lower risk for heart disease, heart attack, and stroke. Your cholesterol levels help your doctor find out your risk for having a heart attack or stroke. How can you prevent high cholesterol? A heart-healthy lifestyle can help you prevent high cholesterol. This lifestyle helps lower your risk for a heart attack and stroke. · Eat heart-healthy foods. ? Eat fruits, vegetables, whole grains (like oatmeal), dried beans and peas, nuts and seeds, soy products (like tofu), and fat-free or low-fat dairy products.   ? Replace butter, margarine, and hydrogenated or partially hydrogenated oils with olive and canola oils. (Canola oil margarine without trans fat is fine.)  ? Replace red meat with fish, poultry, and soy protein (like tofu). ? Limit processed and packaged foods like chips, crackers, and cookies. · Be active. Exercise can improve your cholesterol level. Get at least 30 minutes of exercise on most days of the week. Walking is a good choice. You also may want to do other activities, such as running, swimming, cycling, or playing tennis or team sports. · Stay at a healthy weight. Lose weight if you need to. · Don't smoke. If you need help quitting, talk to your doctor about stop-smoking programs and medicines. These can increase your chances of quitting for good. How is high cholesterol treated? The goal of treatment is to reduce your chances of having a heart attack or stroke. The goal is not to lower your cholesterol numbers only. · You may make lifestyle changes, such as eating healthy foods, not smoking, losing weight, and being more active. · You may have to take medicine. Follow-up care is a key part of your treatment and safety. Be sure to make and go to all appointments, and call your doctor if you are having problems. It's also a good idea to know your test results and keep a list of the medicines you take. Where can you learn more? Go to https://Crushpath.iWOPI. org and sign in to your iOmando account. Enter E677 in the [facility] box to learn more about \"Learning About High Cholesterol. \"     If you do not have an account, please click on the \"Sign Up Now\" link. Current as of: December 15, 2019Content Version: 12.4  © 2295-0861 Healthwise, Incorporated. Care instructions adapted under license by [facility] ([facility]). If you have questions about a medical condition or this instruction, always ask your healthcare professional. [address] any warranty or liability for your use of this information.          Patient Education        DASH Diet: of dry cereal, or ½ cup of cooked rice, pasta, or cooked cereal. Try to choose whole-grain products as much as possible. · Limit lean meat, poultry, and fish to 2 servings each day. A serving is 3 ounces, about the size of a deck of cards. · Eat 4 to 5 servings of nuts, seeds, and legumes (cooked dried beans, lentils, and split peas) each week. A serving is 1/3 cup of nuts, 2 tablespoons of seeds, or ½ cup of cooked beans or peas. · Limit fats and oils to 2 to 3 servings each day. A serving is 1 teaspoon of vegetable oil or 2 tablespoons of salad dressing. · Limit sweets and added sugars to 5 servings or less a week. A serving is 1 tablespoon jelly or jam, ½ cup sorbet, or 1 cup of lemonade. · Eat less than 2,300 milligrams (mg) of sodium a day. If you limit your sodium to 1,500 mg a day, you can lower your blood pressure even more. Tips for success  · Start small. Do not try to make dramatic changes to your diet all at once. You might feel that you are missing out on your favorite foods and then be more likely to not follow the plan. Make small changes, and stick with them. Once those changes become habit, add a few more changes. · Try some of the following:  ? Make it a goal to eat a fruit or vegetable at every meal and at snacks. This will make it easy to get the recommended amount of fruits and vegetables each day. ? Try yogurt topped with fruit and nuts for a snack or healthy dessert. ? Add lettuce, tomato, cucumber, and onion to sandwiches. ? Combine a ready-made pizza crust with low-fat mozzarella cheese and lots of vegetable toppings. Try using tomatoes, squash, spinach, broccoli, carrots, cauliflower, and onions. ? Have a variety of cut-up vegetables with a low-fat dip as an appetizer instead of chips and dip. ? Sprinkle sunflower seeds or chopped almonds over salads. Or try adding chopped walnuts or almonds to cooked vegetables. ? Try some vegetarian meals using beans and peas.  Add garbanzo or kidney beans to salads. Make burritos and tacos with mashed schwarz beans or black beans. Where can you learn more? Go to https://Cedip Infrared Systemspepiceweb.Volta. org and sign in to your 123people account. Enter M199 in the [facility] box to learn more about \"DASH Diet: Care Instructions. \"     If you do not have an account, please click on the \"Sign Up Now\" link. Current as of: December 15, 2019Content Version: 12.4  © 7361-2340 Healthwise, Incorporated. Care instructions adapted under license by [facility] ([facility]). If you have questions about a medical condition or this instruction, always ask your healthcare professional. [address] any warranty or liability for your use of this information.

## 2020-03-25 LAB
ESTIMATED AVERAGE GLUCOSE: 116.9 MG/DL
HBA1C MFR BLD: 5.7 %

## 2020-04-01 ENCOUNTER — OFFICE VISIT (OUTPATIENT)
Dept: ORTHOPEDIC SURGERY | Age: 40
End: 2020-04-01
Payer: COMMERCIAL

## 2020-04-01 VITALS — BODY MASS INDEX: 36.4 KG/M2 | HEIGHT: 65 IN | WEIGHT: 218.48 LBS

## 2020-04-01 PROCEDURE — 1036F TOBACCO NON-USER: CPT | Performed by: INTERNAL MEDICINE

## 2020-04-01 PROCEDURE — 64450 NJX AA&/STRD OTHER PN/BRANCH: CPT | Performed by: INTERNAL MEDICINE

## 2020-04-01 PROCEDURE — 99212 OFFICE O/P EST SF 10 MIN: CPT | Performed by: INTERNAL MEDICINE

## 2020-04-01 PROCEDURE — G8427 DOCREV CUR MEDS BY ELIG CLIN: HCPCS | Performed by: INTERNAL MEDICINE

## 2020-04-01 PROCEDURE — G8417 CALC BMI ABV UP PARAM F/U: HCPCS | Performed by: INTERNAL MEDICINE

## 2020-04-01 RX ORDER — BUPIVACAINE HYDROCHLORIDE 2.5 MG/ML
2 INJECTION, SOLUTION INFILTRATION; PERINEURAL ONCE
Status: COMPLETED | OUTPATIENT
Start: 2020-04-01 | End: 2020-04-01

## 2020-04-01 RX ORDER — LIDOCAINE HYDROCHLORIDE 10 MG/ML
3 INJECTION, SOLUTION INFILTRATION; PERINEURAL ONCE
Status: COMPLETED | OUTPATIENT
Start: 2020-04-01 | End: 2020-04-01

## 2020-04-01 RX ADMIN — BUPIVACAINE HYDROCHLORIDE 5 MG: 2.5 INJECTION, SOLUTION INFILTRATION; PERINEURAL at 10:52

## 2020-04-01 RX ADMIN — LIDOCAINE HYDROCHLORIDE 3 ML: 10 INJECTION, SOLUTION INFILTRATION; PERINEURAL at 10:51

## 2020-04-01 NOTE — PROGRESS NOTES
Chief Complaint:   Chief Complaint   Patient presents with    Elbow Pain     right, pain 5/10, pain w/elbow extension, numb with elbow flexion          History of Present Illness:       Patient is a 44 y.o. female returns follow up for the above complaint. The patient was last seen approximately 3 weeksago. The symptoms are improving since the last visit with respect to the right elbow, however the symptoms on the right  are gradually worsening. Most recent nerve hydrodissection on the right was performed on 2/19/2020. The patient has had no further testing for the problem. She tends to note worsening pattern of pain with the elbow in a flexed position and when extended and the symptoms are neuritic in character following a ulnar nerve distribution into the hand she does not perceive any new onset of progressive weakness of the hand    She denies any constitutional symptoms     Past Medical History:        Past Medical History:   Diagnosis Date    Arthritis     Depression     Diabetes mellitus ([facility] [address]     Hypertension     Tachycardia         Present Medications:         Current Outpatient Medications   Medication Sig Dispense Refill    rosuvastatin (CRESTOR) 20 MG tablet Take 0.5 tablets by mouth daily 90 tablet 0    pregabalin (LYRICA) 75 MG capsule Take 1 capsule by mouth 2 times daily for 90 days.  60 capsule 2    omeprazole (PRILOSEC) 20 MG delayed release capsule Take 1 capsule by mouth daily 90 capsule 0    metoprolol tartrate (LOPRESSOR) 50 MG tablet Take 1 tablet by mouth 2 times daily 180 tablet 0    metFORMIN (GLUCOPHAGE-XR) 500 MG extended release tablet Take 2 tablets by mouth daily (with breakfast) 180 tablet 0    Cholecalciferol (VITAMIN D3) 50 MCG (2000 UT) TABS Take 2,000 Units by mouth daily 90 tablet 0    varenicline (CHANTIX CONTINUING MONTH CATARINA) 1 MG tablet Take 1 tablet by mouth 2 times daily 60 tablet 2    albuterol sulfate HFA (VENTOLIN HFA) 108 (90 Base) MCG/ACT inhaler Inhale 2 puffs into the lungs 4 times daily 1 Inhaler 2    nitroGLYCERIN (NITROSTAT) 0.4 MG SL tablet Place 0.4 mg under the tongue every 5 minutes as needed for Chest pain up to max of 3 total doses. If no relief after 1 dose, call 911.  varenicline (CHANTIX STARTING MONTH CATARINA) 0.5 MG X 11 & 1 MG X 42 tablet Take by mouth. 1 box 0    citalopram (CELEXA) 20 MG tablet Take 20 mg by mouth daily       ammonium lactate (LAC-HYDRIN) 12 % lotion Apply topically daily       Coenzyme Q10 (CO Q-10 PO) Take by mouth daily       diclofenac (VOLTAREN) 50 MG EC tablet Take 100 mg by mouth daily      divalproex (DEPAKOTE ER) 500 MG extended release tablet Take 500 mg by mouth daily      hydrOXYzine (ATARAX) 25 MG tablet Take 25 mg by mouth 3 times daily as needed for Itching       No current facility-administered medications for this visit. Allergies: Allergies   Allergen Reactions    Cinnamon Itching    Codeine Hives and Itching           Review of Systems:    Pertinent items are noted in HPI      Vital Signs: There were no vitals filed for this visit. General Exam:     Constitutional: Patient is adequately groomed with no evidence of malnutrition    Physical Exam: left elbow      Primary Exam:    Inspection: No deformity atrophy appreciable curvature      Palpation: There is percussion tenderness over the ulnar nerve with radiation into the small and ring fingers      Range of Motion: Full range and symmetric at the elbow      Strength: Normal first dorsal interossei      Special Tests: Tinel's positive at the elbow      Skin: There are no rashes, ulcerations or lesions.       Gait: Nonantalgic     Neurovascular - non focal and intact       Additional Comments:        Additional Examinations:                  Office Imaging Results/Procedures PerformedToday:          Office Procedures:     Orders Placed This Encounter   Procedures    Ultrasound guided needle placement     Standing Status:

## 2020-05-14 ENCOUNTER — OFFICE VISIT (OUTPATIENT)
Dept: ORTHOPEDIC SURGERY | Age: 40
End: 2020-05-14
Payer: COMMERCIAL

## 2020-05-14 VITALS — BODY MASS INDEX: 36.4 KG/M2 | HEIGHT: 65 IN | WEIGHT: 218.48 LBS | TEMPERATURE: 98.6 F

## 2020-05-14 PROCEDURE — G8417 CALC BMI ABV UP PARAM F/U: HCPCS | Performed by: INTERNAL MEDICINE

## 2020-05-14 PROCEDURE — G8427 DOCREV CUR MEDS BY ELIG CLIN: HCPCS | Performed by: INTERNAL MEDICINE

## 2020-05-14 PROCEDURE — 1036F TOBACCO NON-USER: CPT | Performed by: INTERNAL MEDICINE

## 2020-05-14 PROCEDURE — 99213 OFFICE O/P EST LOW 20 MIN: CPT | Performed by: INTERNAL MEDICINE

## 2020-05-14 RX ORDER — MELOXICAM 15 MG/1
15 TABLET ORAL DAILY
Qty: 30 TABLET | Refills: 2 | Status: SHIPPED | OUTPATIENT
Start: 2020-05-14 | End: 2020-06-25 | Stop reason: SDUPTHER

## 2020-05-14 NOTE — PROGRESS NOTES
Chief Complaint:   Chief Complaint   Patient presents with    Elbow Pain     bilat, doing better since last hydrodissection 4/1/2020, pain 2/10    Shoulder Pain     bilat, may be related to neck pain          History of Present Illness:       Patient is a 44 y.o. female returns follow up for the above complaint. The patient was last seen approximately 6 weeksago. The symptoms are improving since the last visit. The patient has had no further testing for the problem. She has noted significant therapeutic benefit from the nerve Hydro dissections at the bilateral elbows and bilateral wrists-ulnar nerve and median nerves respectively. She estimates at least 70% therapeutic benefit    Her neck has become more of a concern for her as late    She localizes soreness and discomfort and stiffness diffusely about the neck and upper trap regions    She denies any new onset or progressive weakness of the upper extremities    She would like to resume and restart physical therapy for her neck    She has tried multiple muscle relaxant formulations without therapeutic benefit    Discomfort and stiffness most pronounced by morning denies any lower limb symptoms or new onset progressive weakness of the lower extremities     Past Medical History:        Past Medical History:   Diagnosis Date    Arthritis     Depression     Diabetes mellitus ([facility] [address]     Hypertension     Tachycardia         Present Medications:         Current Outpatient Medications   Medication Sig Dispense Refill    meloxicam (MOBIC) 15 MG tablet Take 1 tablet by mouth daily 30 tablet 2    rosuvastatin (CRESTOR) 20 MG tablet Take 0.5 tablets by mouth daily 90 tablet 0    pregabalin (LYRICA) 75 MG capsule Take 1 capsule by mouth 2 times daily for 90 days.  60 capsule 2    omeprazole (PRILOSEC) 20 MG delayed release capsule Take 1 capsule by mouth daily 90 capsule 0    metoprolol tartrate (LOPRESSOR) 50 MG tablet Take 1 tablet by mouth 2 times daily 180 tablet 0    metFORMIN (GLUCOPHAGE-XR) 500 MG extended release tablet Take 2 tablets by mouth daily (with breakfast) 180 tablet 0    Cholecalciferol (VITAMIN D3) 50 MCG (2000 UT) TABS Take 2,000 Units by mouth daily 90 tablet 0    varenicline (CHANTIX CONTINUING MONTH PAK) 1 MG tablet Take 1 tablet by mouth 2 times daily 60 tablet 2    albuterol sulfate HFA (VENTOLIN HFA) 108 (90 Base) MCG/ACT inhaler Inhale 2 puffs into the lungs 4 times daily 1 Inhaler 2    nitroGLYCERIN (NITROSTAT) 0.4 MG SL tablet Place 0.4 mg under the tongue every 5 minutes as needed for Chest pain up to max of 3 total doses. If no relief after 1 dose, call 911.  varenicline (CHANTIX STARTING MONTH CATARINA) 0.5 MG X 11 & 1 MG X 42 tablet Take by mouth. 1 box 0    citalopram (CELEXA) 20 MG tablet Take 20 mg by mouth daily       ammonium lactate (LAC-HYDRIN) 12 % lotion Apply topically daily       Coenzyme Q10 (CO Q-10 PO) Take by mouth daily       diclofenac (VOLTAREN) 50 MG EC tablet Take 100 mg by mouth daily      divalproex (DEPAKOTE ER) 500 MG extended release tablet Take 500 mg by mouth daily      hydrOXYzine (ATARAX) 25 MG tablet Take 25 mg by mouth 3 times daily as needed for Itching       No current facility-administered medications for this visit. Allergies:         Allergies   Allergen Reactions    Cinnamon Itching    Codeine Hives and Itching           Review of Systems:    Pertinent items are noted in HPI         Vital Signs:      Vitals:    05/14/20 1547   Temp: 98.6 °F (37 °C)        General Exam:     Constitutional: Patient is adequately groomed with no evidence of malnutrition    Physical Exam: Cervical spine neck      Primary Exam:    Inspection: No deformity atrophy appreciable curvature      Palpation: Mild diffuse tenderness      Range of Motion: Mild restriction in extension and with rotation, full range with flexion low-grade discomfort in all ranges      Strength: Normal upper extremity: First dorsal interossei and abductor digiti quinti minimi normal, APB normal bilaterally    Special Tests: Negative [place]'s      Skin: There are no rashes, ulcerations or lesions. Gait: Nonantalgic      Reflex intact upper     Additional Comments:        Additional Examinations:         Neurologic -Light touch sensation and manual muscle testing is normal C5-C8 . Biceps and triceps reflexes are symmetric and +2. Tinel's minimally positive left elbow otherwise normal right elbow and bilateral wrists. Office Imaging Results/Procedures PerformedToday:           Office Procedures:     Orders Placed This Encounter   Procedures   [facility]     Referral Priority:   Routine     Referral Type:   Eval and Treat     Referral Reason:   Specialty Services Required     Requested Specialty:   Physical Therapy     Number of Visits Requested:   1           Other Outside Imaging and Testing Personally Reviewed:    No results found. Assessment   Impression: . Encounter Diagnoses   Name Primary?     Cervical strain, sequela Yes    Neuritis of right ulnar nerve     Neuritis of left ulnar nerve     Neuritis of right median nerve     Median nerve neuritis, left               Plan:     Resume/restart physical therapy cervical spine stabilization program and myofascial techniques inclusive of dry needling treatments  MRI evaluation cervical spine if symptoms show no appreciable change or improvement  Consider ultrasound-guided hydrodissection of the median nerves at the wrist and ulnar nerves at the elbow with [id number] PRN as adjunct to previous Hydro dissection treatments  Trial of meloxicam daily with GI precaution       Orders:        Orders Placed This Encounter   Procedures   [address]     Referral Priority:   Routine     Referral Type:   Eval and Treat     Referral Reason:   Specialty Services Required     Requested Specialty:   Physical Therapy Number of Visits Requested:   1         Marysol Chambers MD.      Disclaimer: \"This note was dictated with voice recognition software. Though review and correction are routine, we apologize for any errors. \"

## 2020-05-18 ENCOUNTER — HOSPITAL ENCOUNTER (OUTPATIENT)
Dept: PHYSICAL THERAPY | Age: 40
Setting detail: THERAPIES SERIES
Discharge: HOME | End: 2020-05-18
Payer: COMMERCIAL

## 2020-05-18 PROCEDURE — 97162 PT EVAL MOD COMPLEX 30 MIN: CPT

## 2020-05-18 PROCEDURE — 97110 THERAPEUTIC EXERCISES: CPT

## 2020-05-18 NOTE — PROGRESS NOTES
to hold head up   [x]Decreased RC/scapular/core strength and neuromuscular control    [x]Decreased UE functional strength   []other:      Functional Activity Limitations (from functional questionnaire and intake)   [x]Reduced ability to tolerate prolonged functional positions   [x]Reduced ability or difficulty with changes of positions or transfers between positions   [x]Reduced ability to maintain good posture and demonstrate good body mechanics with sitting, bending, and lifting   [x] Reduced ability or tolerance with driving and/or computer work   [x]Reduced ability to perform lifting, reaching, carrying tasks   []Reduced ability to concentrate   [x]Reduced ability to sleep    []Reduced ability to tolerate any impact through UE or spine   [x]Reduced ability to ambulate prolonged functional periods/distances   []other:    Participation Restrictions   [x]Reduced participation in self care activities   [x]Reduced participation in home management activities   []Reduced participation in work activities   [x]Reduced participation in social activities. []Reduced participation in sport/recreational activities.     Classification/Subgrouping:   []signs/symptoms consistent with neck pain with mobility deficits     [x]signs/symptoms consistent with neck pain with movement coordinated impairments    []signs/symptoms consistent with neck pain with radiating pain    [x]signs/symptoms consistent with neck pain with headaches (cervicogenic)    []Signs/symptoms consistent with nerve root involvement including myotome & dermatome dysfunction   []sign/symptoms consistent with facet dysfunction of cervical and thoracic spine    []signs/symptoms consistent suggesting central cord compression/UMN syndromes   []signs/symptoms consistent with discogenic cervical pain   []signs/symptoms consistent with rib dysfunction   []signs/symptoms consistent with postural dysfunction   [x]signs/symptoms consistent with shoulder pathology    []signs/symptoms consistent with post-surgical status including decreased ROM, strength and function. []signs/symptoms consistent with pathology which may benefit from Dry Needling   []signs/symptoms which may limit the use of advanced manual therapy techniques: (Hypertension, recent trauma, intolerance to end range positions, prior TIA, visual issues, UE myotomes loss )     Prognosis/Rehab Potential:      []Excellent   [x]Good    []Fair   []Poor    Tolerance of evaluation/treatment:    []Excellent   [x]Good    []Fair   []Poor    Physical Therapy Evaluation Complexity Justification  [x] A history of present problem with:  [] no personal factors and/or comorbidities that impact the plan of care;  []1-2 personal factors and/or comorbidities that impact the plan of care  [x]3 personal factors and/or comorbidities that impact the plan of care  [x] An examination of body systems using standardized tests and measures addressing any of the following: body structures and functions (impairments), activity limitations, and/or participation restrictions;:  [] a total of 1-2 or more elements   [x] a total of 3 or more elements   [] a total of 4 or more elements   [x] A clinical presentation with:  [] stable and/or uncomplicated characteristics   [x] evolving clinical presentation with changing characteristics  [] unstable and unpredictable characteristics;   [x] Clinical decision making of [] low, [x] moderate, [] high complexity using standardized patient assessment instrument and/or measurable assessment of functional outcome.     [] EVAL (LOW) 13895 (typically 20 minutes face-to-face)  [x] EVAL (MOD) 99581 (typically 30 minutes face-to-face)  [] EVAL (HIGH) 98630 (typically 45 minutes face-to-face)  [] RE-EVAL     PLAN:   Frequency/Duration:  2 days per week for 6 Weeks:  Interventions:  [x]  Therapeutic exercise including: strength training, ROM, for cervical spine,scapula, core and Upper extremity, including postural re-education. [x]  NMR activation and proprioception for Deep cervical flexors, periscapular and RC muscles and Core, including postural re-education. [x]  Manual therapy as indicated for C/T spine, ribs, Soft tissue to include: Dry Needling/IASTM, STM, PROM, Gr I-IV mobilizations, manipulation. [x] Modalities as needed that may include: thermal agents, E-stim, Biofeedback, US, iontophoresis as indicated  [x] Patient education on joint protection, postural re-education, activity modification, progression of HEP. HEP instruction: Written HEP instructions provided and reviewed   Access Code: [email or web address] Contrerase: [email or web address]   Date: 05/18/2020   Prepared by: Jasson Cassidy     Exercises   Neck Rotation - 5 reps - 2 sets - 2x daily - 7x weekly   Seated Cervical Retraction - 5 reps - 2 sets - 2x daily - 7x weekly   Seated Cervical Retraction and Extension - 5 reps - 2 sets - 2x daily - 7x weekly   Seated Cervical Sidebending Stretch - 5 reps - 2 sets - 2x daily - 7x weekly   Hooklying Shoulder I - 5 reps - 2 sets - 2x daily - 7x weekly     GOALS:  Patient stated goal: less pain, more mobility  [] Progressing: [] Met: [] Not Met: [] Adjusted    Therapist goals for Patient:   Short Term Goals: To be achieved in: 2 weeks  1. Independent in HEP and progression per patient tolerance, in order to prevent re-injury. [] Progressing: [] Met: [] Not Met: [] Adjusted  2. Patient will have a decrease in pain to facilitate improvement in movement, function, and ADLs as indicated by Functional Deficits. [] Progressing: [] Met: [] Not Met: [] Adjusted    Long Term Goals: To be achieved in: 6 weeks  1. Disability index score of 68% or less for the NDI to assist with reaching prior level of function. [] Progressing: [] Met: [] Not Met: [] Adjusted  2.  Patient will demonstrate increased AROM to [facility] of cervical/thoracic spine to allow for proper joint functioning as indicated by patients Functional

## 2020-05-18 NOTE — FLOWSHEET NOTE
Exercises   · Neck Rotation - 5 reps - 2 sets - 2x daily - 7x weekly   · Seated Cervical Retraction - 5 reps - 2 sets - 2x daily - 7x weekly   · Seated Cervical Retraction and Extension - 5 reps - 2 sets - 2x daily - 7x weekly   · Seated Cervical Sidebending Stretch - 5 reps - 2 sets - 2x daily - 7x weekly   · Hooklying Shoulder I - 5 reps - 2 sets - 2x daily - 7x weekly       Therapeutic Exercise and NMR EXR  [x] (69449) Provided verbal/tactile cueing for activities related to strengthening, flexibility, endurance, ROM for improvements in  [] LE / Lumbar: LE, proximal hip, and core control with self care, mobility, lifting, ambulation. [] UE / Cervical: cervical, postural, scapular, scapulothoracic and UE control with self care, reaching, carrying, lifting, house/yardwork, driving, computer work.  [] (35714) Provided verbal/tactile cueing for activities related to improving balance, coordination, kinesthetic sense, posture, motor skill, proprioception to assist with   [] LE / lumbar: LE, proximal hip, and core control in self care, mobility, lifting, ambulation and eccentric single leg control. [] UE / cervical: cervical, scapular, scapulothoracic and UE control with self care, reaching, carrying, lifting, house/yardwork, driving, computer work.   [] (55118) Therapist is in constant attendance of 2 or more patients providing skilled therapy interventions, but not providing any significant amount of measurable one-on-one time to either patient, for improvements in  [] LE / lumbar: LE, proximal hip, and core control in self care, mobility, lifting, ambulation and eccentric single leg control. [] UE / cervical: cervical, scapular, scapulothoracic and UE control with self care, reaching, carrying, lifting, house/yardwork, driving, computer work.      NMR and Therapeutic Activities:    [] (15160 or 55207) Provided verbal/tactile cueing for activities related to improving balance, coordination, kinesthetic sense,

## 2020-06-02 ENCOUNTER — HOSPITAL ENCOUNTER (OUTPATIENT)
Dept: PHYSICAL THERAPY | Age: 40
Setting detail: THERAPIES SERIES
Discharge: HOME | End: 2020-06-02
Payer: COMMERCIAL

## 2020-06-02 ENCOUNTER — TELEPHONE (OUTPATIENT)
Dept: FAMILY MEDICINE CLINIC | Age: 40
End: 2020-06-02

## 2020-06-02 ENCOUNTER — VIRTUAL VISIT (OUTPATIENT)
Dept: PULMONOLOGY | Age: 40
End: 2020-06-02
Payer: COMMERCIAL

## 2020-06-02 VITALS — HEIGHT: 66 IN | WEIGHT: 217 LBS | BODY MASS INDEX: 34.87 KG/M2 | RESPIRATION RATE: 18 BRPM

## 2020-06-02 PROCEDURE — 97110 THERAPEUTIC EXERCISES: CPT

## 2020-06-02 PROCEDURE — 99213 OFFICE O/P EST LOW 20 MIN: CPT | Performed by: INTERNAL MEDICINE

## 2020-06-02 PROCEDURE — 97140 MANUAL THERAPY 1/> REGIONS: CPT

## 2020-06-02 PROCEDURE — G8428 CUR MEDS NOT DOCUMENT: HCPCS | Performed by: INTERNAL MEDICINE

## 2020-06-02 ASSESSMENT — ENCOUNTER SYMPTOMS
SINUS PRESSURE: 0
APNEA: 0
CONSTIPATION: 0
DIARRHEA: 0
WHEEZING: 0
CHOKING: 0
RHINORRHEA: 0
ANAL BLEEDING: 0
STRIDOR: 0
ABDOMINAL DISTENTION: 0
SHORTNESS OF BREATH: 0
SORE THROAT: 0
ABDOMINAL PAIN: 0
BACK PAIN: 0
CHEST TIGHTNESS: 0
COUGH: 0
BLOOD IN STOOL: 0
VOICE CHANGE: 0

## 2020-06-02 NOTE — PROGRESS NOTES
AMOS on CPAP. Patient feels that her CPAP machine requires \"calibrating\". Will refer to Dr. Eva Monroy to establish care. Follow-up in 6 months.

## 2020-06-02 NOTE — FLOWSHEET NOTE
eccentric single leg control. [] UE / cervical: cervical, scapular, scapulothoracic and UE control with self care, reaching, carrying, lifting, house/yardwork, driving, computer work. NMR and Therapeutic Activities:    [x] (49095 or 79992) Provided verbal/tactile cueing for activities related to improving balance, coordination, kinesthetic sense, posture, motor skill, proprioception and motor activation to allow for proper function of   [] LE: / Lumbar core, proximal hip and LE with self care and ADLs  [] UE / Cervical: cervical, postural, scapular, scapulothoracic and UE control with self care, carrying, lifting, driving, computer work.   [] (73960) Gait Re-education- Provided training and instruction to the patient for proper LE, core and proximal hip recruitment and positioning and eccentric body weight control with ambulation re-education including up and down stairs     Home Management Training / Self Care:  [] (65230) Provided self-care/home management training related to activities of daily living and compensatory training, and/or use of adaptive equipment for improvement with: ADLs and compensatory training, meal preparation, safety procedures and instruction in use of adaptive equipment, including bathing, grooming, dressing, personal hygiene, basic household cleaning and chores.      Home Exercise Program:    [x] (20117) Reviewed/Progressed HEP activities related to strengthening, flexibility, endurance, ROM of   [] LE / Lumbar: core, proximal hip and LE for functional self-care, mobility, lifting and ambulation/stair navigation   [] UE / Cervical: cervical, postural, scapular, scapulothoracic and UE control with self care, reaching, carrying, lifting, house/yardwork, driving, computer work  [] (28809)Reviewed/Progressed HEP activities related to improving balance, coordination, kinesthetic sense, posture, motor skill, proprioception of   [] LE: core, proximal hip and LE for self care, mobility, in order to prevent re-injury. []? Progressing: []? Met: []? Not Met: []? Adjusted  2. Patient will have a decrease in pain to facilitate improvement in movement, function, and ADLs as indicated by Functional Deficits. []? Progressing: []? Met: []? Not Met: []? Adjusted     Long Term Goals: To be achieved in: 6 weeks  1. Disability index score of 68% or less for the NDI to assist with reaching prior level of function. []? Progressing: []? Met: []? Not Met: []? Adjusted  2. Patient will demonstrate increased AROM to [facility] of cervical/thoracic spine to allow for proper joint functioning as indicated by patients Functional Deficits. []? Progressing: []? Met: []? Not Met: []? Adjusted  3. Patient will demonstrate an increase in postural awareness and control and activation of  Deep cervical stabilizers to allow for proper functional mobility as indicated by patients Functional Deficits. []? Progressing: []? Met: []? Not Met: []? Adjusted  4. Patient will return to functional activities including walking without increased symptoms or restriction. []? Progressing: []? Met: []? Not Met: []? Adjusted    Overall Progression Towards Functional goals/ Treatment Progress Update:  [] Patient is progressing as expected towards functional goals listed. [] Progression is slowed due to complexities/Impairments listed. [] Progression has been slowed due to co-morbidities.   [x] Plan just implemented, too soon to assess goals progression <30days   [] Goals require adjustment due to lack of progress  [] Patient is not progressing as expected and requires additional follow up with physician  [] Other    Persisting Functional Limitations/Impairments:  [x]Sleeping []Sitting               []Standing []Transfers        []Walking []Kneeling               [x]Stairs []Squatting / bending   [x]ADLs [x]Reaching  []Lifting  [x]Housework  []Driving []Job related tasks  []Sports/Recreation []Other:        ASSESSMENT:  See

## 2020-06-04 ENCOUNTER — HOSPITAL ENCOUNTER (OUTPATIENT)
Dept: PHYSICAL THERAPY | Age: 40
Setting detail: THERAPIES SERIES
Discharge: HOME | End: 2020-06-04
Payer: COMMERCIAL

## 2020-06-04 PROCEDURE — 97140 MANUAL THERAPY 1/> REGIONS: CPT

## 2020-06-04 PROCEDURE — 97110 THERAPEUTIC EXERCISES: CPT

## 2020-06-07 ASSESSMENT — ENCOUNTER SYMPTOMS
CONSTIPATION: 0
NAUSEA: 0
ABDOMINAL PAIN: 0
SHORTNESS OF BREATH: 0
VOMITING: 0
DIARRHEA: 0
COUGH: 0
WHEEZING: 0
CHEST TIGHTNESS: 0

## 2020-06-08 NOTE — PROGRESS NOTES
Attends meetings of clubs or organizations: Not on file     Relationship status: Not on file    Intimate partner violence     Fear of current or ex partner: Not on file     Emotionally abused: Not on file     Physically abused: Not on file     Forced sexual activity: Not on file   Other Topics Concern    Not on file   Social History Narrative    Not on file       Review of Systems   Constitutional: Negative for chills, fever and weight loss. Eyes: Negative for visual disturbance. Respiratory: Negative for cough, chest tightness, shortness of breath and wheezing. Cardiovascular: Negative for chest pain and palpitations. Gastrointestinal: Negative for abdominal pain, bowel incontinence, constipation, diarrhea, nausea and vomiting. Genitourinary: Negative for bladder incontinence, dysuria and pelvic pain. Musculoskeletal: Positive for arthralgias, back pain (chronic) and myalgias. Skin: Negative for rash. Neurological: Positive for tingling (intermittent BUE and BLE) and numbness (intermittent BUE and BLE). Negative for headaches. OBJECTIVE:    Physical Exam  Vitals signs and nursing note reviewed. Constitutional:       General: She is not in acute distress. Appearance: She is well-developed. HENT:      Head: Normocephalic and atraumatic. Right Ear: External ear normal.      Left Ear: External ear normal.      Nose: Nose normal.   Eyes:      Conjunctiva/sclera: Conjunctivae normal.      Pupils: Pupils are equal, round, and reactive to light. Neck:      Musculoskeletal: Normal range of motion and neck supple. Trachea: No tracheal deviation. Cardiovascular:      Rate and Rhythm: Normal rate and regular rhythm. Heart sounds: Normal heart sounds. Pulmonary:      Effort: Pulmonary effort is normal. No respiratory distress. Breath sounds: Normal breath sounds. No wheezing or rales. Chest:      Chest wall: No tenderness.    Abdominal:      General: Bowel sounds are

## 2020-06-09 ENCOUNTER — APPOINTMENT (OUTPATIENT)
Dept: PHYSICAL THERAPY | Age: 40
End: 2020-06-09
Payer: COMMERCIAL

## 2020-06-09 ENCOUNTER — TELEPHONE (OUTPATIENT)
Dept: PAIN MANAGEMENT | Age: 40
End: 2020-06-09

## 2020-06-09 ENCOUNTER — OFFICE VISIT (OUTPATIENT)
Dept: FAMILY MEDICINE CLINIC | Age: 40
End: 2020-06-09
Payer: COMMERCIAL

## 2020-06-09 VITALS
DIASTOLIC BLOOD PRESSURE: 86 MMHG | BODY MASS INDEX: 35.95 KG/M2 | OXYGEN SATURATION: 98 % | WEIGHT: 219.4 LBS | HEART RATE: 94 BPM | SYSTOLIC BLOOD PRESSURE: 128 MMHG | TEMPERATURE: 98.8 F

## 2020-06-09 PROCEDURE — 99214 OFFICE O/P EST MOD 30 MIN: CPT | Performed by: CLINICAL NURSE SPECIALIST

## 2020-06-09 PROCEDURE — 1036F TOBACCO NON-USER: CPT | Performed by: CLINICAL NURSE SPECIALIST

## 2020-06-09 PROCEDURE — G8427 DOCREV CUR MEDS BY ELIG CLIN: HCPCS | Performed by: CLINICAL NURSE SPECIALIST

## 2020-06-09 PROCEDURE — G8417 CALC BMI ABV UP PARAM F/U: HCPCS | Performed by: CLINICAL NURSE SPECIALIST

## 2020-06-09 ASSESSMENT — ENCOUNTER SYMPTOMS
BACK PAIN: 1
BOWEL INCONTINENCE: 0

## 2020-06-09 ASSESSMENT — PATIENT HEALTH QUESTIONNAIRE - PHQ9
SUM OF ALL RESPONSES TO PHQ QUESTIONS 1-9: 0
SUM OF ALL RESPONSES TO PHQ9 QUESTIONS 1 & 2: 0
SUM OF ALL RESPONSES TO PHQ QUESTIONS 1-9: 0
2. FEELING DOWN, DEPRESSED OR HOPELESS: 0
1. LITTLE INTEREST OR PLEASURE IN DOING THINGS: 0

## 2020-06-09 NOTE — PATIENT INSTRUCTIONS
Labs ordered today    Referral to pain management     Referral to sleep clinic     Discussed vaccines

## 2020-06-10 ENCOUNTER — HOSPITAL ENCOUNTER (OUTPATIENT)
Dept: PHYSICAL THERAPY | Age: 40
Setting detail: THERAPIES SERIES
Discharge: HOME | End: 2020-06-10
Payer: COMMERCIAL

## 2020-06-10 PROCEDURE — 97140 MANUAL THERAPY 1/> REGIONS: CPT

## 2020-06-10 PROCEDURE — 97110 THERAPEUTIC EXERCISES: CPT

## 2020-06-10 NOTE — FLOWSHEET NOTE
[address] [facility]  Phone: [phone]   Fax: [phone]    Physical Therapy Daily Treatment Note  Date:  6/10/2020    Patient Name:  Ruth Ramos    :  1980  MRN: [id number]  Medical/Treatment Diagnosis Information:  · Diagnosis: cervical strain  · Treatment Diagnosis: muscle pain, hyperflexibilty, weakness  Insurance/Certification information:  PT Insurance Information: [facility]  Physician Information:  Referring Practitioner: Dr. Noam Menendez of care signed (Y/N): []? Yes   [x]? No        Progress Report: []  Yes  [x]  No     Date Range for reporting period:  Beginnin2020  Ending: *    Progress report due (10 Rx/or 30 days whichever is less): visit #52 or     Recertification due (POC duration/ or 90 days whichever is less):     Visit # Insurance Allowable Auth required? Date Range    30 []  Yes  []  No      Latex Allergy:  [x]NO      []YES  Preferred Language for Healthcare:   [x]English       []other:    Functional Scale:        Date assessed:  LEFS:  dysfunction = 68%  2020    Pain level: R shoulder 9/10 L shoulder 5/10     SUBJECTIVE: .pt is now working at a computer all day and going to school and is really tired.    OBJECTIVE: See eval      RESTRICTIONS/PRECAUTIONS:    Exercises/Interventions:     Therapeutic Exercises (77372) Resistance / level Sets/sec Reps Notes   UBE   4 minutes    Cables LPD/mid/high row 30# 2 X 10    TG  2  X 10 With PN hold   t band sh ext /add orange 2  X 10    Supine bridges  pilates: knee to chest/SLR  2  2  X 10  X 10    Counter push ups  1  X 10                         Therapeutic Activities (43997)                                          Neuromuscular Re-ed (81769)       Ball on wall: chin tuck  Rot B   ER with orange band   X 10  X 10  X 10    [place] and arrow orange band B  2  X 10    Quadriped UE/LE lift    X 10                         Manual Intervention (70520)       In supine R c3-5, in self care, mobility, lifting, ambulation and eccentric single leg control. [] UE / cervical: cervical, scapular, scapulothoracic and UE control with self care, reaching, carrying, lifting, house/yardwork, driving, computer work. NMR and Therapeutic Activities:    [x] (32137 or 76249) Provided verbal/tactile cueing for activities related to improving balance, coordination, kinesthetic sense, posture, motor skill, proprioception and motor activation to allow for proper function of   [] LE: / Lumbar core, proximal hip and LE with self care and ADLs  [] UE / Cervical: cervical, postural, scapular, scapulothoracic and UE control with self care, carrying, lifting, driving, computer work.   [] (03020) Gait Re-education- Provided training and instruction to the patient for proper LE, core and proximal hip recruitment and positioning and eccentric body weight control with ambulation re-education including up and down stairs     Home Management Training / Self Care:  [] (37814) Provided self-care/home management training related to activities of daily living and compensatory training, and/or use of adaptive equipment for improvement with: ADLs and compensatory training, meal preparation, safety procedures and instruction in use of adaptive equipment, including bathing, grooming, dressing, personal hygiene, basic household cleaning and chores.      Home Exercise Program:    [x] (43459) Reviewed/Progressed HEP activities related to strengthening, flexibility, endurance, ROM of   [] LE / Lumbar: core, proximal hip and LE for functional self-care, mobility, lifting and ambulation/stair navigation   [] UE / Cervical: cervical, postural, scapular, scapulothoracic and UE control with self care, reaching, carrying, lifting, house/yardwork, driving, computer work  [] (24860)Reviewed/Progressed HEP activities related to improving balance, coordination, kinesthetic sense, posture, motor skill, proprioception of   [] LE: core,

## 2020-06-11 ENCOUNTER — APPOINTMENT (OUTPATIENT)
Dept: PHYSICAL THERAPY | Age: 40
End: 2020-06-11
Payer: COMMERCIAL

## 2020-06-14 ENCOUNTER — HOSPITAL ENCOUNTER (OUTPATIENT)
Dept: CT IMAGING | Age: 40
Discharge: HOME | End: 2020-06-14
Payer: COMMERCIAL

## 2020-06-14 PROCEDURE — 71250 CT THORAX DX C-: CPT

## 2020-06-15 ENCOUNTER — HOSPITAL ENCOUNTER (OUTPATIENT)
Age: 40
Discharge: HOME | End: 2020-06-15
Payer: COMMERCIAL

## 2020-06-15 LAB
A/G RATIO: 1.4 (ref 1.1–2.2)
ALBUMIN SERPL-MCNC: 4.3 G/DL (ref 3.4–5)
ALP BLD-CCNC: 58 U/L (ref 40–129)
ALT SERPL-CCNC: 11 U/L (ref 10–40)
ANION GAP SERPL CALCULATED.3IONS-SCNC: 10 MMOL/L (ref 3–16)
AST SERPL-CCNC: 14 U/L (ref 15–37)
BASOPHILS ABSOLUTE: 0 K/UL (ref 0–0.2)
BASOPHILS RELATIVE PERCENT: 0.6 %
BILIRUB SERPL-MCNC: 0.3 MG/DL (ref 0–1)
BUN BLDV-MCNC: 9 MG/DL (ref 7–20)
CALCIUM SERPL-MCNC: 9 MG/DL (ref 8.3–10.6)
CHLORIDE BLD-SCNC: 102 MMOL/L (ref 99–110)
CHOLESTEROL, TOTAL: 292 MG/DL (ref 0–199)
CO2: 26 MMOL/L (ref 21–32)
CREAT SERPL-MCNC: 0.6 MG/DL (ref 0.6–1.1)
EOSINOPHILS ABSOLUTE: 0 K/UL (ref 0–0.6)
EOSINOPHILS RELATIVE PERCENT: 0.6 %
ESTIMATED AVERAGE GLUCOSE: 125.5 MG/DL
GFR AFRICAN AMERICAN: >60
GFR NON-AFRICAN AMERICAN: >60
GLOBULIN: 3.1 G/DL
GLUCOSE BLD-MCNC: 112 MG/DL (ref 70–99)
HBA1C MFR BLD: 6 %
HBV SURFACE AB TITR SER: <3.5 MIU/ML
HCT VFR BLD CALC: 39.8 % (ref 36–48)
HDLC SERPL-MCNC: 30 MG/DL (ref 40–60)
HEMOGLOBIN: 13 G/DL (ref 12–16)
LDL CHOLESTEROL CALCULATED: 226 MG/DL
LYMPHOCYTES ABSOLUTE: 2.8 K/UL (ref 1–5.1)
LYMPHOCYTES RELATIVE PERCENT: 43.5 %
MCH RBC QN AUTO: 28.6 PG (ref 26–34)
MCHC RBC AUTO-ENTMCNC: 32.6 G/DL (ref 31–36)
MCV RBC AUTO: 87.9 FL (ref 80–100)
MONOCYTES ABSOLUTE: 0.5 K/UL (ref 0–1.3)
MONOCYTES RELATIVE PERCENT: 8.5 %
NEUTROPHILS ABSOLUTE: 3 K/UL (ref 1.7–7.7)
NEUTROPHILS RELATIVE PERCENT: 46.8 %
PDW BLD-RTO: 13.8 % (ref 12.4–15.4)
PLATELET # BLD: 300 K/UL (ref 135–450)
PMV BLD AUTO: 7.4 FL (ref 5–10.5)
POTASSIUM SERPL-SCNC: 4.2 MMOL/L (ref 3.5–5.1)
RBC # BLD: 4.52 M/UL (ref 4–5.2)
RHEUMATOID FACTOR: <10 IU/ML
SEDIMENTATION RATE, ERYTHROCYTE: 20 MM/HR (ref 0–20)
SODIUM BLD-SCNC: 138 MMOL/L (ref 136–145)
TOTAL CK: 143 U/L (ref 26–192)
TOTAL PROTEIN: 7.4 G/DL (ref 6.4–8.2)
TRIGL SERPL-MCNC: 178 MG/DL (ref 0–150)
VLDLC SERPL CALC-MCNC: 36 MG/DL
WBC # BLD: 6.5 K/UL (ref 4–11)

## 2020-06-15 PROCEDURE — 86431 RHEUMATOID FACTOR QUANT: CPT

## 2020-06-15 PROCEDURE — 83036 HEMOGLOBIN GLYCOSYLATED A1C: CPT

## 2020-06-15 PROCEDURE — 82550 ASSAY OF CK (CPK): CPT

## 2020-06-15 PROCEDURE — 86706 HEP B SURFACE ANTIBODY: CPT

## 2020-06-15 PROCEDURE — 85652 RBC SED RATE AUTOMATED: CPT

## 2020-06-15 PROCEDURE — 80061 LIPID PANEL: CPT

## 2020-06-15 PROCEDURE — 36415 COLL VENOUS BLD VENIPUNCTURE: CPT

## 2020-06-15 PROCEDURE — 86039 ANTINUCLEAR ANTIBODIES (ANA): CPT

## 2020-06-15 PROCEDURE — 80053 COMPREHEN METABOLIC PANEL: CPT

## 2020-06-15 PROCEDURE — 86038 ANTINUCLEAR ANTIBODIES: CPT

## 2020-06-15 PROCEDURE — 85025 COMPLETE CBC W/AUTO DIFF WBC: CPT

## 2020-06-16 ENCOUNTER — HOSPITAL ENCOUNTER (OUTPATIENT)
Dept: PHYSICAL THERAPY | Age: 40
Setting detail: THERAPIES SERIES
Discharge: HOME | End: 2020-06-16
Payer: COMMERCIAL

## 2020-06-16 LAB
ANA INTERPRETATION: ABNORMAL
ANA TITER: ABNORMAL
ANTI-NUCLEAR ANTIBODY (ANA): POSITIVE
CANDIDA SPECIES, DNA PROBE: ABNORMAL
GARDNERELLA VAGINALIS, DNA PROBE: ABNORMAL
TRICHOMONAS VAGINALIS DNA: ABNORMAL

## 2020-06-16 PROCEDURE — 97110 THERAPEUTIC EXERCISES: CPT

## 2020-06-16 PROCEDURE — 97140 MANUAL THERAPY 1/> REGIONS: CPT

## 2020-06-16 NOTE — FLOWSHEET NOTE
([phone])       In supine R c3-5, lat joint mobs, STM, PA   X 11 minutes                                           Pt. Education:  -patient educated on diagnosis, prognosis and expectations for rehab  -all patient questions were answered    Home Exercise Program:  HEP instruction: Written HEP instructions provided and reviewed   Access Code: Staci Bronson: [email or web address]/   Date: 05/18/2020   Prepared by: Dorota Tom     Exercises   · Neck Rotation - 5 reps - 2 sets - 2x daily - 7x weekly   · Seated Cervical Retraction - 5 reps - 2 sets - 2x daily - 7x weekly   · Seated Cervical Retraction and Extension - 5 reps - 2 sets - 2x daily - 7x weekly   · Seated Cervical Sidebending Stretch - 5 reps - 2 sets - 2x daily - 7x weekly   · Hooklying Shoulder I - 5 reps - 2 sets - 2x daily - 7x weekly       Therapeutic Exercise and NMR EXR  [x] (85792) Provided verbal/tactile cueing for activities related to strengthening, flexibility, endurance, ROM for improvements in  [] LE / Lumbar: LE, proximal hip, and core control with self care, mobility, lifting, ambulation. [] UE / Cervical: cervical, postural, scapular, scapulothoracic and UE control with self care, reaching, carrying, lifting, house/yardwork, driving, computer work.  [] (84897) Provided verbal/tactile cueing for activities related to improving balance, coordination, kinesthetic sense, posture, motor skill, proprioception to assist with   [] LE / lumbar: LE, proximal hip, and core control in self care, mobility, lifting, ambulation and eccentric single leg control.    [] UE / cervical: cervical, scapular, scapulothoracic and UE control with self care, reaching, carrying, lifting, house/yardwork, driving, computer work.   [] (09309) Therapist is in constant attendance of 2 or more patients providing skilled therapy interventions, but not providing any significant amount of measurable one-on-one time to either patient, for improvements in  [] LE / lumbar: LE, achieved in: 2 weeks  1. Independent in HEP and progression per patient tolerance, in order to prevent re-injury. []? Progressing: []? Met: []? Not Met: []? Adjusted  2. Patient will have a decrease in pain to facilitate improvement in movement, function, and ADLs as indicated by Functional Deficits. []? Progressing: []? Met: []? Not Met: []? Adjusted     Long Term Goals: To be achieved in: 6 weeks  1. Disability index score of 68% or less for the NDI to assist with reaching prior level of function. []? Progressing: []? Met: []? Not Met: []? Adjusted  2. Patient will demonstrate increased AROM to [facility] of cervical/thoracic spine to allow for proper joint functioning as indicated by patients Functional Deficits. []? Progressing: []? Met: []? Not Met: []? Adjusted  3. Patient will demonstrate an increase in postural awareness and control and activation of  Deep cervical stabilizers to allow for proper functional mobility as indicated by patients Functional Deficits. []? Progressing: []? Met: []? Not Met: []? Adjusted  4. Patient will return to functional activities including walking without increased symptoms or restriction. []? Progressing: []? Met: []? Not Met: []? Adjusted    Overall Progression Towards Functional goals/ Treatment Progress Update:  [] Patient is progressing as expected towards functional goals listed. [] Progression is slowed due to complexities/Impairments listed. [] Progression has been slowed due to co-morbidities.   [x] Plan just implemented, too soon to assess goals progression <30days   [] Goals require adjustment due to lack of progress  [] Patient is not progressing as expected and requires additional follow up with physician  [] Other    Persisting Functional Limitations/Impairments:  [x]Sleeping []Sitting               []Standing []Transfers        []Walking []Kneeling               [x]Stairs []Squatting / bending   [x]ADLs [x]Reaching  []Lifting  [x]Housework  []Driving []Job related tasks  []Sports/Recreation []Other:        ASSESSMENT:  Pt able to tolerate all neck and shoulder exercises this date without any increase in pain. Pt requiring cues for posture and form during exercises. Treatment/Activity Tolerance:  [x] Patient able to complete tx [] Patient limited by fatigue  [] Patient limited by pain  [] Patient limited by other medical complications  [] Other:     Prognosis: [] Good [x] Fair  [] Poor    Patient Requires Follow-up: [x] Yes  [] No    Plan for next treatment session:    PLAN: See eval. PT 2x / week for 6 weeks. [x] Continue per plan of care [] Alter current plan (see comments)  [] Plan of care initiated [] Hold pending MD visit [] Discharge    Electronically signed by: Emily Brock PT, DPT    Note: If patient does not return for scheduled/ recommended follow up visits, this note will serve as a discharge from care along with most recent update on progress.

## 2020-06-18 ENCOUNTER — APPOINTMENT (OUTPATIENT)
Dept: PHYSICAL THERAPY | Age: 40
End: 2020-06-18
Payer: COMMERCIAL

## 2020-06-23 ENCOUNTER — APPOINTMENT (OUTPATIENT)
Dept: PHYSICAL THERAPY | Age: 40
End: 2020-06-23
Payer: COMMERCIAL

## 2020-06-24 ASSESSMENT — ENCOUNTER SYMPTOMS
WHEEZING: 0
ABDOMINAL PAIN: 0
ORTHOPNEA: 0
COUGH: 0
VOMITING: 0
BLURRED VISION: 0
CHEST TIGHTNESS: 0
CONSTIPATION: 0
DIARRHEA: 0
SHORTNESS OF BREATH: 0
NAUSEA: 0

## 2020-06-24 NOTE — PROGRESS NOTES
SUBJECTIVE:    Patient ID:  Danay Alvarez is a 44 y.o. female      Patient is here for medication check for hypertension, hyperlipidemia, diabetes, GERD, AMOS and fibromyalgia. She is doing well on current regimen and has no further concerns. GERD symptoms are managed with omeprazole. Denies indigestion/heartburn, difficulty swallowing, epigastric pain, nausea, vomiting, diarrhea, constipation or blood in stool. She has a history of anxiety, depression, bipolar and PTSD. She is followed a psychiatrist every 3 months and sees a therapist every 2 weeks. She is currently taking Celexa, Depakote, thiothixene and hydroxyzine as needed. Currently denies thoughts of self-harm, suicidal or homicidal ideations. States she has been evaluated by cardiology and will be followed annually. She is also been evaluated by pulmonology and instructed to stop [facility] and continue with albuterol as needed. She is followed by pulmonology annually for COPD and AMOS. She surgery on her left ankle and had new hardware placed. She also has a history or alcoholism for 24 years, she has been sober for 4 years. States at her heaviest she was drinking half a gallon of liquor and a 30 pack of beer most days.       Labs reviewed from 6/15/2020, CBC was unremarkable CMP was essentially normal, fasting glucose 112, A1c 6.0, total cholesterol 292, triglycerides 178, HDL 30, , , rheumatoid factor, less than 10, sedimentation rate 20, positive MATTHEW, hepatitis B surface antigen less than 3.50 (not immune require hepatitis B series). Hypertension   This is a chronic problem. The current episode started more than 1 year ago. The problem is unchanged. The problem is controlled. Pertinent negatives include no anxiety, blurred vision, chest pain, headaches, orthopnea, palpitations, peripheral edema or shortness of breath. Agents associated with hypertension include NSAIDs.  Risk factors for coronary artery disease include diabetes mellitus, dyslipidemia and obesity. Past treatments include beta blockers. The current treatment provides significant improvement. Hyperlipidemia   This is a chronic problem. The current episode started more than 1 year ago. The problem is controlled. Recent lipid tests were reviewed and are low. Exacerbating diseases include obesity. Pertinent negatives include no chest pain, focal sensory loss, focal weakness, leg pain, myalgias or shortness of breath. Current antihyperlipidemic treatment includes statins. The current treatment provides significant improvement of lipids. Risk factors for coronary artery disease include hypertension, diabetes mellitus, dyslipidemia and a sedentary lifestyle. Diabetes   She presents for her follow-up diabetic visit. She has type 2 diabetes mellitus. Pertinent negatives for hypoglycemia include no headaches or nervousness/anxiousness. Pertinent negatives for diabetes include no blurred vision, no chest pain, no foot paresthesias, no polydipsia, no polyphagia and no polyuria. Risk factors for coronary artery disease include dyslipidemia, diabetes mellitus, hypertension, obesity and sedentary lifestyle. Current diabetic treatment includes oral agent (monotherapy). Her weight is stable. She is following a generally healthy diet. Current Outpatient Medications on File Prior to Visit   Medication Sig Dispense Refill    D3 SUPER STRENGTH 50 MCG (2000 UT) CAPS TAKE 1 CAPSULE BY MOUTH EVERY DAY      metroNIDAZOLE (FLAGYL) 500 MG tablet TAKE 1 TABLET BY MOUTH TWICE A DAY FOR 7 DAYS      thiothixene (NAVANE) 2 MG capsule TAKE 1 CAPSULE BY MOUTH EVERY EVENING AT BEDTIME      albuterol sulfate HFA (VENTOLIN HFA) 108 (90 Base) MCG/ACT inhaler Inhale 2 puffs into the lungs 4 times daily 1 Inhaler 2    nitroGLYCERIN (NITROSTAT) 0.4 MG SL tablet Place 0.4 mg under the tongue every 5 minutes as needed for Chest pain up to max of 3 total doses.  If no relief after 1 dose, call on the following healthy behaviors: nutrition, exercise and medication adherence    Patient given educational materials on hepatitis A and B vaccines    Discussed use, benefit, and side effects of prescribed medications. Barriers to medication compliance addressed. All patient questions answered. Pt voiced understanding. Call office if experience side effects from medications. Please note that some or all of this record was generated using voice recognition software. If there are any questions about the content of this document, please contact the author as some errors in transcription may have occurred.

## 2020-06-24 NOTE — PATIENT INSTRUCTIONS
inactivated (killed) vaccine. You will need 2 doses for long-lasting protection. These doses should be given at least 6 months apart. Children are routinely vaccinated between their first and second birthdays (15 through 22 months of age). Older children and adolescents can get the vaccine after 23 months. Adults who have not been vaccinated previously and want to be protected against hepatitis A can also get the vaccine. You should get hepatitis A vaccine if you:  · Are traveling to countries where hepatitis A is common. · Are a man who has sex with other men. · Use illegal drugs. · Have a chronic liver disease such as hepatitis B or hepatitis C.  · Are being treated with clotting-factor concentrates. · Work with hepatitis A-infected animals or in a hepatitis A research laboratory. · Expect to have close personal contact with an international adoptee from a country where hepatitis A is common. Ask your healthcare provider if you want more information about any of these groups. There are no known risks to getting hepatitis A vaccine at the same time as other vaccines. Some people should not get this vaccine  Tell the person who is giving you the vaccine:  · If you have any severe, life-threatening allergies. If you ever had a life-threatening allergic reaction after a dose of hepatitis A vaccine, or have a severe allergy to any part of this vaccine, you may be advised not to get vaccinated. Ask your health care provider if you want information about vaccine components. · If you are not feeling well. If you have a mild illness, such as a cold, you can probably get the vaccine today. If you are moderately or severely ill, you should probably wait until you recover. Your doctor can advise you. Risks of a vaccine reaction  With any medicine, including vaccines, there is a chance of side effects. These are usually mild and go away on their own, but serious reactions are also possible.   Most people who get hepatitis A vaccine do not have any problems with it. Minor problems following hepatitis A vaccine include:  · Soreness or redness where the shot was given  · Low-grade fever  · Headache  · Tiredness  If these problems occur, they usually begin soon after the shot and last 1 or 2 days. Your doctor can tell you more about these reactions. Other problems that could happen after this vaccine:  · People sometimes faint after a medical procedure, including vaccination. Sitting or lying down for about 15 minutes can help prevent fainting, and injuries caused by a fall. Tell your provider if you feel dizzy, or have vision changes or ringing in the ears. · Some people get shoulder pain that can be more severe and longer lasting than the more routine soreness that can follow injections. This happens very rarely. · Any medication can cause a severe allergic reaction. Such reactions from a vaccine are very rare, estimated at about 1 in a million doses, and would happen within a few minutes to a few hours after the vaccination. As with any medicine, there is a very remote chance of a vaccine causing a serious injury or death. The safety of vaccines is always being monitored. For more information, visit: www.cdc.gov/vaccinesafety. What if there is a serious problem? What should I look for? · Look for anything that concerns you, such as signs of a severe allergic reaction, very high fever, or unusual behavior. Signs of a severe allergic reaction can include hives, swelling of the face and throat, difficulty breathing, a fast heartbeat, dizziness, and weakness. These would usually start a few minutes to a few hours after the vaccination. What should I do? · If you think it is a severe allergic reaction or other emergency that can't wait, call call 911 and get to the nearest hospital. Otherwise, call your clinic. · Afterward, the reaction should be reported to the Vaccine Adverse Event Reporting System (VAERS).  Your doctor should file this report, or you can do it yourself through the VAERS web site at www.vaers. [facility].gov, or by calling 4-556.789.7426. [facility] does not give medical advice. The National Vaccine Injury Compensation Program  The National Vaccine Injury Compensation Program (VICP) is a federal program that was created to compensate people who may have been injured by certain vaccines. Persons who believe they may have been injured by a vaccine can learn about the program and about filing a claim by calling 4-213.958.9703 or visiting the [employer] website at www.[facility].gov/vaccinecompensation. There is a time limit to file a claim for compensation. How can I learn more? · Ask your healthcare provider. He or she can give you the vaccine package insert or suggest other sources of information. · Call your local or state health department. · Contact the Centers for Disease Control and Prevention (CDC):  ? Call 7-949.778.3268 (1-800-CDC-INFO). ? Visit CDC's website at www.cdc.gov/vaccines. Vaccine Information Statement  Hepatitis A Vaccine  7/20/2016  42 U. S.C. § 300aa-26  U. S. Department of Health and Human Services  Centers for Disease Control and Prevention  Many Vaccine Information Statements are available in [place] and other languages. See www.immunize.org/vis. Hojas de información sobre vacunas están disponibles en español y en otros idiomas. Visite www.immunize.org/vis. Care instructions adapted under license by [facility] ([facility]). If you have questions about a medical condition or this instruction, always ask your healthcare professional. [place] [address] any warranty or liability for your use of this information. Patient Education        Hepatitis B Vaccine: What You Need to Know  Why get vaccinated? Hepatitis B vaccine can prevent hepatitis B. Hepatitis B is a liver disease that can cause mild illness lasting a few weeks, or it can lead to a serious, lifelong illness.   · Acute hepatitis B

## 2020-06-25 ENCOUNTER — HOSPITAL ENCOUNTER (OUTPATIENT)
Dept: PHYSICAL THERAPY | Age: 40
Setting detail: THERAPIES SERIES
Discharge: HOME | End: 2020-06-25
Payer: COMMERCIAL

## 2020-06-25 ENCOUNTER — OFFICE VISIT (OUTPATIENT)
Dept: FAMILY MEDICINE CLINIC | Age: 40
End: 2020-06-25
Payer: COMMERCIAL

## 2020-06-25 VITALS
WEIGHT: 218.2 LBS | OXYGEN SATURATION: 98 % | DIASTOLIC BLOOD PRESSURE: 76 MMHG | BODY MASS INDEX: 35.76 KG/M2 | HEART RATE: 96 BPM | TEMPERATURE: 98.2 F | SYSTOLIC BLOOD PRESSURE: 118 MMHG

## 2020-06-25 PROCEDURE — 90632 HEPA VACCINE ADULT IM: CPT | Performed by: CLINICAL NURSE SPECIALIST

## 2020-06-25 PROCEDURE — 2022F DILAT RTA XM EVC RTNOPTHY: CPT | Performed by: CLINICAL NURSE SPECIALIST

## 2020-06-25 PROCEDURE — 97110 THERAPEUTIC EXERCISES: CPT

## 2020-06-25 PROCEDURE — 90471 IMMUNIZATION ADMIN: CPT | Performed by: CLINICAL NURSE SPECIALIST

## 2020-06-25 PROCEDURE — G8417 CALC BMI ABV UP PARAM F/U: HCPCS | Performed by: CLINICAL NURSE SPECIALIST

## 2020-06-25 PROCEDURE — 99214 OFFICE O/P EST MOD 30 MIN: CPT | Performed by: CLINICAL NURSE SPECIALIST

## 2020-06-25 PROCEDURE — 97140 MANUAL THERAPY 1/> REGIONS: CPT

## 2020-06-25 PROCEDURE — 3044F HG A1C LEVEL LT 7.0%: CPT | Performed by: CLINICAL NURSE SPECIALIST

## 2020-06-25 PROCEDURE — 1036F TOBACCO NON-USER: CPT | Performed by: CLINICAL NURSE SPECIALIST

## 2020-06-25 PROCEDURE — G8427 DOCREV CUR MEDS BY ELIG CLIN: HCPCS | Performed by: CLINICAL NURSE SPECIALIST

## 2020-06-25 PROCEDURE — 90746 HEPB VACCINE 3 DOSE ADULT IM: CPT | Performed by: CLINICAL NURSE SPECIALIST

## 2020-06-25 PROCEDURE — 90472 IMMUNIZATION ADMIN EACH ADD: CPT | Performed by: CLINICAL NURSE SPECIALIST

## 2020-06-25 RX ORDER — MELOXICAM 15 MG/1
15 TABLET ORAL DAILY
Qty: 90 TABLET | Refills: 0 | Status: SHIPPED | OUTPATIENT
Start: 2020-06-25 | End: 2020-10-06

## 2020-06-25 RX ORDER — THIOTHIXENE 2 MG/1
CAPSULE ORAL
COMMUNITY
Start: 2020-06-11

## 2020-06-25 RX ORDER — METRONIDAZOLE 500 MG/1
TABLET ORAL
COMMUNITY
Start: 2020-06-16 | End: 2021-02-04 | Stop reason: ALTCHOICE

## 2020-06-25 RX ORDER — OMEPRAZOLE 20 MG/1
20 CAPSULE, DELAYED RELEASE ORAL DAILY
Qty: 90 CAPSULE | Refills: 0 | Status: SHIPPED | OUTPATIENT
Start: 2020-06-25 | End: 2020-09-14 | Stop reason: SDUPTHER

## 2020-06-25 RX ORDER — PREGABALIN 75 MG/1
75 CAPSULE ORAL 2 TIMES DAILY
Qty: 60 CAPSULE | Refills: 2 | Status: SHIPPED | OUTPATIENT
Start: 2020-06-25 | End: 2022-07-19

## 2020-06-25 RX ORDER — METFORMIN HYDROCHLORIDE 500 MG/1
1000 TABLET, EXTENDED RELEASE ORAL
Qty: 180 TABLET | Refills: 0 | Status: SHIPPED | OUTPATIENT
Start: 2020-06-25 | End: 2020-10-09 | Stop reason: SDUPTHER

## 2020-06-25 RX ORDER — METOPROLOL TARTRATE 50 MG/1
50 TABLET, FILM COATED ORAL 2 TIMES DAILY
Qty: 180 TABLET | Refills: 0 | Status: SHIPPED | OUTPATIENT
Start: 2020-06-25 | End: 2020-10-09 | Stop reason: SDUPTHER

## 2020-06-25 RX ORDER — CHOLECALCIFEROL (VITAMIN D3) 125 MCG
2000 CAPSULE ORAL DAILY
Qty: 90 TABLET | Refills: 0 | Status: SHIPPED | OUTPATIENT
Start: 2020-06-25 | End: 2021-04-05 | Stop reason: SDUPTHER

## 2020-06-25 RX ORDER — CHOLECALCIFEROL (VITAMIN D3) 50 MCG
CAPSULE ORAL
COMMUNITY
Start: 2020-06-05 | End: 2020-07-13 | Stop reason: SDUPTHER

## 2020-06-25 RX ORDER — ROSUVASTATIN CALCIUM 40 MG/1
40 TABLET, COATED ORAL DAILY
Qty: 90 TABLET | Refills: 0 | Status: SHIPPED | OUTPATIENT
Start: 2020-06-25 | End: 2020-10-09 | Stop reason: SDUPTHER

## 2020-06-25 NOTE — FLOWSHEET NOTE
[facility] Physical Therapy  Phone: [phone]   Fax: [phone]    Physical Therapy Daily Treatment Note  Date:  2020    Patient Name:  Jeremiah Winter    :  1980  MRN: [id number]  Medical/Treatment Diagnosis Information:  · Diagnosis: cervical strain  · Treatment Diagnosis: muscle pain, hyperflexibilty, weakness  Insurance/Certification information:  [facility] [place]  Physician Information:  Referring Practitioner: Dr. Kendrick Rabago [facility] signed (Y/N): []? Yes   [x]? No        Progress Report: []  Yes  [x]  No     Date Range for reporting period:  Beginnin2020  Ending: *    Progress report due (10 Rx/or 30 days whichever is less): visit #05 or     Recertification due (POC duration/ or 90 days whichever is less):     Visit # Insurance Allowable Auth required? Date Range    30 []  Yes  []  No      Latex Allergy:  [x]NO      []YES  Preferred Language for Healthcare:   [x]English       []other:    Functional Scale:        Date assessed:  NDI:  dysfunction = 68%  2020    Pain level: R shoulder 9.5/10 L shoulder 9.5/10     SUBJECTIVE: . Pt states she got shots in both of her arms today (HEp A and B) so her arms are very sore. Was referred to the rheumatoid specialist today. Has an apt with a pain MD the first week of July. Thought about cancelling today but knows she needs to get some exercise in. Would like to keep this session short.         OBJECTIVE:       RESTRICTIONS/PRECAUTIONS:    Exercises/Interventions:     Therapeutic Exercises (44137) Resistance / level Sets/sec Reps Notes   UBE   2 min forward, 2 min retro    Cables LPD/mid/high row     TG  With PN hold   t band sh ext /add, ER, IR, rows  TB B ER  TB horizontal abd      Lime  Lime  1  1 x10  x10    Supine bridges  pilates: knee to chest/SLR     Counter push ups     Cervical iso flex, ext, SB  Standing ball on wall, 5 sec holds   Shoulder shrugs    Banded wall walks    1/2 foam roll on wall with scap retract and GJH raises  1/2 foam roll on wall with scap retract and GHJ abd    1    1 10    10    UT stretch   30 sec x4 B                                       Therapeutic Activities (17555)                                          Neuromuscular Re-ed (71792)       Ball on wall: chin tuck  Rot B   ER with orange band       [place] and arrow orange band B     Quadriped UE/LE lift        Thread the needle   1 x5 B    seated thor ext over bolster  10 sec x10           Manual Intervention (33138)       In supine R c3-5, lat joint mobs, STM, PA       PA mobs through cerv spine grade 2-3, PROM cerv spine all directions, side glides grade 2 B, SOR , manual cerv traction in neutral    X 15 min                                    Pt. Education:  -patient educated on diagnosis, prognosis and expectations for rehab  -all patient questions were answered    Home Exercise Program:  HEP instruction: Written HEP instructions provided and reviewed   Access Code: Caitie Villalobos   URL: [email or web address]   Date: 05/18/2020   Prepared by: Blondie Daquan     Exercises   · Neck Rotation - 5 reps - 2 sets - 2x daily - 7x weekly   · Seated Cervical Retraction - 5 reps - 2 sets - 2x daily - 7x weekly   · Seated Cervical Retraction and Extension - 5 reps - 2 sets - 2x daily - 7x weekly   · Seated Cervical Sidebending Stretch - 5 reps - 2 sets - 2x daily - 7x weekly   · Hooklying Shoulder I - 5 reps - 2 sets - 2x daily - 7x weekly       Therapeutic Exercise and NMR EXR  [x] (93594) Provided verbal/tactile cueing for activities related to strengthening, flexibility, endurance, ROM for improvements in  [x] LE / Lumbar: LE, proximal hip, and core control with self care, mobility, lifting, ambulation.   [x] UE / Cervical: cervical, postural, scapular, scapulothoracic and UE control with self care, reaching, carrying, lifting, house/yardwork, driving, computer work.  [] (76309) Provided verbal/tactile Minutes: 30   Total Treatment Minutes: 30     [] EVAL - LOW (82161)   [] EVAL - MOD (28710)  [] EVAL - HIGH (00282)  [] RE-EVAL (23308)  [x] OT(05503) x  1     [] Ionto  [] NMR (85502) x       [] Vaso  [x] Manual (70652) x  1     [] Ultrasound  [] TA x        [] Mech Traction (27727)  [] Aquatic Therapy x     [] ES (un) (23280):   [] Home Management Training x  [] ES(attended) (11796)   [] Dry Needling 1-2 muscles (00303):  [] Dry Needling 3+ muscles (537763)  [] Group:      [] Other:     GOALS:   Patient stated goal: less pain, more mobility  []? Progressing: []? Met: []? Not Met: []? Adjusted     Therapist goals for Patient:   Short Term Goals: To be achieved in: 2 weeks  1. Independent in HEP and progression per patient tolerance, in order to prevent re-injury. []? Progressing: []? Met: []? Not Met: []? Adjusted  2. Patient will have a decrease in pain to facilitate improvement in movement, function, and ADLs as indicated by Functional Deficits. []? Progressing: []? Met: []? Not Met: []? Adjusted     Long Term Goals: To be achieved in: 6 weeks  1. Disability index score of 68% or less for the NDI to assist with reaching prior level of function. []? Progressing: []? Met: []? Not Met: []? Adjusted  2. Patient will demonstrate increased AROM to [facility] of cervical/thoracic spine to allow for proper joint functioning as indicated by patients Functional Deficits. []? Progressing: []? Met: []? Not Met: []? Adjusted  3. Patient will demonstrate an increase in postural awareness and control and activation of  Deep cervical stabilizers to allow for proper functional mobility as indicated by patients Functional Deficits. []? Progressing: []? Met: []? Not Met: []? Adjusted  4. Patient will return to functional activities including walking without increased symptoms or restriction. []? Progressing: []? Met: []? Not Met: []?  Adjusted    Overall Progression Towards Functional goals/ Treatment Progress Update:  []

## 2020-07-01 ENCOUNTER — OFFICE VISIT (OUTPATIENT)
Dept: ORTHOPEDIC SURGERY | Age: 40
End: 2020-07-01
Payer: COMMERCIAL

## 2020-07-01 VITALS — WEIGHT: 218.26 LBS | BODY MASS INDEX: 35.08 KG/M2 | HEIGHT: 66 IN | TEMPERATURE: 99.7 F

## 2020-07-01 PROCEDURE — 1036F TOBACCO NON-USER: CPT | Performed by: INTERNAL MEDICINE

## 2020-07-01 PROCEDURE — 99213 OFFICE O/P EST LOW 20 MIN: CPT | Performed by: INTERNAL MEDICINE

## 2020-07-01 PROCEDURE — G8417 CALC BMI ABV UP PARAM F/U: HCPCS | Performed by: INTERNAL MEDICINE

## 2020-07-01 PROCEDURE — G8427 DOCREV CUR MEDS BY ELIG CLIN: HCPCS | Performed by: INTERNAL MEDICINE

## 2020-07-01 NOTE — PROGRESS NOTES
Chief Complaint:   Chief Complaint   Patient presents with    Neck Pain     no change, PT not helping, pain 10/10          History of Present Illness:       Patient is a 44 y.o. female returns follow up for the above complaint. The patient was last seen approximately 1 monthsago. The symptoms show no change since the last visit. The patient has had further testing for the problem. Recent serologic work-up was performed results of those serologic tests are not available at the time of this dictation. Unfortunately she continues to have neck pain that has been unresponsive to structured physical therapy and she has now completed 6 weeks of supervised physical therapy. There are no predictable alleviating maneuvers or techniques unfortunately. The majority of her neck pain is posterior and lateral.  She is also experiencing diffuse joint aches of the upper and lower extremities and in the hips and is undergone serologic work-up for inflammatory neuropathy. She was previously valuated by a neurosurgeon in [place] and documentation related to that visit from February 2018 was referenced. The patient was informed that she has some element of \"stenosis\" involving her neck. She denies any new onset gait disturbance she denies any new onset progressive weakness of the upper and/or lower extremities.     The neuritic quality of pain involving her upper extremities has responded significantly to nerve Hydro dissections at the cubital tunnel and carpal tunnel locations and she is very pleased with this    She denies any new onset or progressive weakness of the upper extremities or lower extremities she denies any new onset bowel or bladder dysfunction        Past Medical History:        Past Medical History:   Diagnosis Date    Arthritis     Depression     Diabetes mellitus ([facility] [address]     Hypertension     Tachycardia         Present Medications:         Current Outpatient Medications   Medication Sig Dispense Refill    D3 SUPER STRENGTH 50 MCG (2000 UT) CAPS TAKE 1 CAPSULE BY MOUTH EVERY DAY      metroNIDAZOLE (FLAGYL) 500 MG tablet TAKE 1 TABLET BY MOUTH TWICE A DAY FOR 7 DAYS      thiothixene (NAVANE) 2 MG capsule TAKE 1 CAPSULE BY MOUTH EVERY EVENING AT BEDTIME      meloxicam (MOBIC) 15 MG tablet Take 1 tablet by mouth daily 90 tablet 0    metFORMIN (GLUCOPHAGE-XR) 500 MG extended release tablet Take 2 tablets by mouth daily (with breakfast) 180 tablet 0    metoprolol tartrate (LOPRESSOR) 50 MG tablet Take 1 tablet by mouth 2 times daily 180 tablet 0    omeprazole (PRILOSEC) 20 MG delayed release capsule Take 1 capsule by mouth daily 90 capsule 0    rosuvastatin (CRESTOR) 40 MG tablet Take 1 tablet by mouth daily 90 tablet 0    pregabalin (LYRICA) 75 MG capsule Take 1 capsule by mouth 2 times daily for 90 days. 60 capsule 2    Cholecalciferol (VITAMIN D3) 50 MCG (2000 UT) TABS Take 2,000 Units by mouth daily 90 tablet 0    albuterol sulfate HFA (VENTOLIN HFA) 108 (90 Base) MCG/ACT inhaler Inhale 2 puffs into the lungs 4 times daily 1 Inhaler 2    nitroGLYCERIN (NITROSTAT) 0.4 MG SL tablet Place 0.4 mg under the tongue every 5 minutes as needed for Chest pain up to max of 3 total doses. If no relief after 1 dose, call 911.  citalopram (CELEXA) 20 MG tablet Take 20 mg by mouth daily       ammonium lactate (LAC-HYDRIN) 12 % lotion Apply topically daily       Coenzyme Q10 (CO Q-10 PO) Take by mouth daily       diclofenac (VOLTAREN) 50 MG EC tablet Take 100 mg by mouth daily      divalproex (DEPAKOTE ER) 500 MG extended release tablet Take 500 mg by mouth daily      hydrOXYzine (ATARAX) 25 MG tablet Take 25 mg by mouth 3 times daily as needed for Itching       No current facility-administered medications for this visit. Allergies:         Allergies   Allergen Reactions    Cinnamon Itching    Codeine Hives and Itching           Review of Systems:    Pertinent items are noted in Given the chronicity of her neck pain and lack of response to aggressive conservative management there is clinical need for more objective evaluation MRI to assess for any structural pathology. Proceed as outlined above              Orders:        Orders Placed This Encounter   Procedures    MRI CERVICAL SPINE WO CONTRAST     [address], please call pt at [phone] to schedule     Standing Status:   Future     Standing Expiration Date:   7/1/2021     Order Specific Question:   Reason for exam:     Answer:   r/o stenosis, HNP         Lucille Mckeon MD.      Disclaimer: \"This note was dictated with voice recognition software. Though review and correction are routine, we apologize for any errors. \"

## 2020-07-02 ENCOUNTER — HOSPITAL ENCOUNTER (OUTPATIENT)
Dept: PHYSICAL THERAPY | Age: 40
Setting detail: THERAPIES SERIES
Discharge: HOME | End: 2020-07-02
Payer: COMMERCIAL

## 2020-07-02 NOTE — FLOWSHEET NOTE
Physical Therapy  Cancellation/No-show Note  Patient Name:  Joyce Spann  :  1980   Date:  2020  Cancelled visits to date: 2  No-shows to date: 1    Patient status for today's appointment patient:  [x]  Cancelled ,   []  Rescheduled appointment  []  No-show     Reason given by patient:  []  Patient ill  []  Conflicting appointment  []  No transportation    []  Conflict with work  [x]  No reason given  []  Other:     Comments:  stuck in [employer]    Phone call information:   []  Phone call made today to patient at _9:18 time at number provided: [phone]     []  Patient answered, conversation as follows:    []  Patient did not answer, message left as follows: You missed your last 2 appointments so called to check on you. Next visit is  8:00 . Lease call if can't make it . If you don't come, we will discharge you.    [x]  Phone call not made today - patient left VM    Electronically signed by:  Von Quiroz, PT, DPT

## 2020-07-07 ENCOUNTER — HOSPITAL ENCOUNTER (OUTPATIENT)
Dept: PHYSICAL THERAPY | Age: 40
Setting detail: THERAPIES SERIES
Discharge: HOME | End: 2020-07-07
Payer: COMMERCIAL

## 2020-07-09 ENCOUNTER — OFFICE VISIT (OUTPATIENT)
Dept: PAIN MANAGEMENT | Age: 40
End: 2020-07-09
Payer: COMMERCIAL

## 2020-07-09 VITALS
WEIGHT: 216 LBS | TEMPERATURE: 97.3 F | SYSTOLIC BLOOD PRESSURE: 119 MMHG | HEIGHT: 65 IN | DIASTOLIC BLOOD PRESSURE: 82 MMHG | OXYGEN SATURATION: 98 % | HEART RATE: 77 BPM | BODY MASS INDEX: 35.99 KG/M2

## 2020-07-09 PROCEDURE — G8417 CALC BMI ABV UP PARAM F/U: HCPCS | Performed by: NURSE PRACTITIONER

## 2020-07-09 PROCEDURE — 99244 OFF/OP CNSLTJ NEW/EST MOD 40: CPT | Performed by: NURSE PRACTITIONER

## 2020-07-09 PROCEDURE — G8427 DOCREV CUR MEDS BY ELIG CLIN: HCPCS | Performed by: NURSE PRACTITIONER
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
## 2020-07-09 NOTE — PATIENT INSTRUCTIONS
Patient Education        Neck Arthritis: Exercises  Introduction  Here are some examples of exercises for you to try. The exercises may be suggested for a condition or for rehabilitation. Start each exercise slowly. Ease off the exercises if you start to have pain. You will be told when to start these exercises and which ones will work best for you. How to do the exercises  Neck stretches to the side   1. This stretch works best if you keep your shoulder down as you lean away from it. To help you remember to do this, start by relaxing your shoulders and lightly holding on to your thighs or your chair. 2. Tilt your head toward your shoulder and hold for 15 to 30 seconds. Let the weight of your head stretch your muscles. 3. Repeat 2 to 4 times toward each shoulder. Chin tuck   1. Lie on the floor with a rolled-up towel under your neck. Your head should be touching the floor. 2. Slowly bring your chin toward your chest.  3. Hold for a count of 6, and then relax for up to 10 seconds. 4. Repeat 8 to 12 times. Active cervical rotation   1. Sit in a firm chair, or stand up straight. 2. Keeping your chin level, turn your head to the right, and hold for 15 to 30 seconds. 3. Turn your head to the left and hold for 15 to 30 seconds. 4. Repeat 2 to 4 times to each side. Shoulder blade squeeze   1. While standing, squeeze your shoulder blades together. 2. Do not raise your shoulders up as you are squeezing. 3. Hold for 6 seconds. 4. Repeat 8 to 12 times. Shoulder rolls   1. Sit comfortably with your feet shoulder-width apart. You can also do this exercise standing up. 2. Roll your shoulders up, then back, and then down in a smooth, circular motion. 3. Repeat 2 to 4 times. Follow-up care is a key part of your treatment and safety. Be sure to make and go to all appointments, and call your doctor if you are having problems.  It's also a good idea to know your test results and keep a list of the the floor. 3. Hold for 15 to 30 seconds, then relax. 4. Repeat 2 to 4 times. Relax and rest   1. Lie on your back with a rolled towel under your neck and a pillow under your knees. Extend your arms comfortably to your sides. 2. Relax and breathe normally. 3. Remain in this position for about 10 minutes. 4. If you can, do this 2 or 3 times each day. Follow-up care is a key part of your treatment and safety. Be sure to make and go to all appointments, and call your doctor if you are having problems. It's also a good idea to know your test results and keep a list of the medicines you take. Where can you learn more? Go to https://Myandb.Snapjoy. org and sign in to your [employer] account. Enter G587 in the BusyFlow box to learn more about \"Back Stretches: Exercises. \"     If you do not have an account, please click on the \"Sign Up Now\" link. Current as of: March 2, 2020               Content Version: 12.5  © 6835-1037 Healthwise, Incorporated. Care instructions adapted under license by [facility] ([facility]). If you have questions about a medical condition or this instruction, always ask your healthcare professional. [place] [address] any warranty or liability for your use of this information. Patient Education        Achilles Tendon: Exercises  Introduction  Here are some examples of exercises for you to try. The exercises may be suggested for a condition or for rehabilitation. Start each exercise slowly. Ease off the exercises if you start to have pain. You will be told when to start these exercises and which ones will work best for you. Toe stretch  Toe stretch   1. Sit in a chair, and extend your affected leg so that your heel is on the floor. 2. With your hand, reach down and pull your big toe up and back. Pull toward your ankle and away from the floor. 3. Hold the position for at least 15 to 30 seconds.   4. Repeat 2 to 4 times a session, several times a day.    Calf-plantar fascia stretch   1. Sit with your legs extended and knees straight. 2. Place a towel around your foot just under the toes. 3. Hold each end of the towel in each hand, with your hands above your knees. 4. Pull back with the towel so that your foot stretches toward you. 5. Hold the position for at least 15 to 30 seconds. 6. Repeat 2 to 4 times a session, up to 5 sessions a day. Floor stretch   1. Stand about 2 feet from a wall, and place your hands on the wall at about shoulder height. Or you can stand behind a chair, placing your hands on the back of it for balance. 2. Step back with the leg you want to stretch. Keep the leg straight, and press your heel into the floor with your toe turned slightly in.  3. Lean forward, and bend your other leg slightly. Feel the stretch in the Achilles tendon of your back leg. Hold for at least 15 to 30 seconds. 4. Repeat 2 to 4 times a session, up to 5 sessions a day. Stair stretch   1. Stand with the balls of both feet on the edge of a step or curb (or a medium-sized phone book). With at least one hand, hold onto something solid for balance, such as a banister or handrail. 2. Keeping your affected leg straight, slowly let that heel hang down off of the step or curb until you feel a stretch in the back of your calf and/or Achilles area. Some of your weight should still be on the other leg. 3. Hold this position for at least 15 to 30 seconds. 4. Repeat 2 to 4 times a session, up to 5 times a day or whenever your Achilles tendon starts to feel tight. This stretch can also be done with your knee slightly bent. Strength exercise   1. This exercise will get you started on building strength after an Achilles tendon injury. Your doctor or physical therapist can help you move on to more challenging exercises as you heal and get stronger. 2. Stand on a step with your heel off the edge of the step. Hold on to a handrail or wall for balance.   3. Push up on your toes, then slowly count to 10 as you lower yourself back down until your heel is below the step. If it hurts to push up on your toes, try putting most of your weight on your other foot as you push up, or try using your arms to help you. If you can't do this exercise without causing pain, stop the exercise and talk to your doctor. 4. Repeat the exercise 8 to 12 times, half with the knee straight and half with the knee bent. Follow-up care is a key part of your treatment and safety. Be sure to make and go to all appointments, and call your doctor if you are having problems. It's also a good idea to know your test results and keep a list of the medicines you take. Where can you learn more? Go to https://[employer]. org and sign in to your [employer] account. Enter R503 in the [employer] box to learn more about \"Achilles Tendon: Exercises. \"     If you do not have an account, please click on the \"Sign Up Now\" link. Current as of: March 2, 2020               Content Version: 12.5  © 6783-7583 Healthwise, Incorporated. Care instructions adapted under license by [facility] ([facility]). If you have questions about a medical condition or this instruction, always ask your healthcare professional. [address] any warranty or liability for your use of this information.

## 2020-07-12 NOTE — PROGRESS NOTES
2020  Patient Name: Elieser Michaels  : 1980  Medical Record: [id number]    MEDICATIONS  Current Outpatient Medications   Medication Sig Dispense Refill    metroNIDAZOLE (FLAGYL) 500 MG tablet TAKE 1 TABLET BY MOUTH TWICE A DAY FOR 7 DAYS      thiothixene (NAVANE) 2 MG capsule TAKE 1 CAPSULE BY MOUTH EVERY EVENING AT BEDTIME      meloxicam (MOBIC) 15 MG tablet Take 1 tablet by mouth daily 90 tablet 0    metFORMIN (GLUCOPHAGE-XR) 500 MG extended release tablet Take 2 tablets by mouth daily (with breakfast) 180 tablet 0    metoprolol tartrate (LOPRESSOR) 50 MG tablet Take 1 tablet by mouth 2 times daily 180 tablet 0    omeprazole (PRILOSEC) 20 MG delayed release capsule Take 1 capsule by mouth daily 90 capsule 0    rosuvastatin (CRESTOR) 40 MG tablet Take 1 tablet by mouth daily 90 tablet 0    pregabalin (LYRICA) 75 MG capsule Take 1 capsule by mouth 2 times daily for 90 days. 60 capsule 2    Cholecalciferol (VITAMIN D3) 50 MCG (2000 UT) TABS Take 2,000 Units by mouth daily 90 tablet 0    albuterol sulfate HFA (VENTOLIN HFA) 108 (90 Base) MCG/ACT inhaler Inhale 2 puffs into the lungs 4 times daily 1 Inhaler 2    nitroGLYCERIN (NITROSTAT) 0.4 MG SL tablet Place 0.4 mg under the tongue every 5 minutes as needed for Chest pain up to max of 3 total doses. If no relief after 1 dose, call 911.  citalopram (CELEXA) 20 MG tablet Take 20 mg by mouth daily       ammonium lactate (LAC-HYDRIN) 12 % lotion Apply topically daily       Coenzyme Q10 (CO Q-10 PO) Take by mouth daily       divalproex (DEPAKOTE ER) 500 MG extended release tablet Take 500 mg by mouth daily      hydrOXYzine (ATARAX) 25 MG tablet Take 25 mg by mouth 3 times daily as needed for Itching      Buprenorphine HCl 75 MCG FILM Place 1 Film inside cheek every 12 hours for 28 days. (Patient not taking: Reported on 2020) 56 each 0     No current facility-administered medications for this visit.         ALLERGIES  Allergies Allergen Reactions    Cinnamon Itching    Codeine Hives and Itching         Comments  No specialty comments available. REFERRING PHYSICIAN:Kassandra Wren, JUAN RAMON - KRISTEN      HISTORY OF PRESENT ILLNESS  Kurt Ramírez is a 44 y.o. female with past medical history of fibromyalgia, chronic pain, bipolar disorder, anxiety, depression, diabetes, hypertension, chronic low back pain who is being seen for follow up evaluation of  positive MATTHEW and joint pain. Her symptoms started 6 years ago and are progressively getting worse. She is complaining of pain in all the joints. Symptoms are worse in her shoulders, neck, lower back, ankles. She has pain on daily basis, 10 out of 10, without any significant living or aggravating factors. She has intermittent swelling in knuckles and ankles. She has morning stiffness lasting for 1 to 2 hours. She also has chronic widespread musculoskeletal pain involving upper and lower body on both sides of the midline. She wakes up frequently at night due to pain. Sleep is not refreshing. She has fatigue. She also has short-term memory changes. She has anxiety, depression, headaches. She is on Lyrica 75 mg twice a day and meloxicam 15 mg daily without any significant benefit. She has tried gabapentin, Flexeril, Zanaflex in the past without any improvement. She was recently seen by pain specialist and prescribed lidocaine patches which she could not afford. She was also given buprenorphine, but has not filled it yet. She has tried physical therapy for neck and shoulder pain recently without any improvement  She also has a chronic low back pain. Back pain gets worse with activity/sitting/standing and prolonged resting. Pain travels down the legs. She denies tingling or numbness, bowel or bladder dysfunction or weakness. She has seen a spine surgeon in the past.  She has received epidural spinal injections, physical therapy and chiropractic manipulation without improvement. She is seeing an orthopedic surgeon. MRI of the cervical spine was ordered but has not been obtained yet  She had blood work by PCP which showed low titer positive MATTHEW 1: 80. She denies malar rash, photosensitivity, oral/nasal ulcers, dry eyes/dry mouth, Raynaud's, pleurisy or pericarditis, kidney diseases. She has a history of one miscarriage. She denies psoriasis, inflammatory bowel disease, inflammatory back pain, dactylitis, enthesitis, tenosynovitis or uveitis. HPI  Review of Systems    REVIEW OF SYSTEMS: Positive for fatigue, sleep disturbance, anxiety, depression, memory changes, headaches  Constitutional: No unanticipated weight loss or fevers. Integumentary: No rash, photosensitivity, malar rash, livedo reticularis, alopecia and Raynaud's symptoms, sclerodactyly, skin tightening  Eyes: negative for visual disturbance and persistent redness, discharge from eyes   ENT: - No tinnitus, loss of hearing, vertigo, or recurrent ear infections.  - No history of nasal/oral ulcers. - No history of dry eyes/dry mouth  Cardiovascular: No history of pericarditis, chest pain or murmur  Respiratory: No shortness of breath, cough or history of interstitial lung disease. No history of pleurisy. No history of tuberculosis or atypical infections. Gastrointestinal: No history of heart burn, dysphagia or esophageal dysmotility. No change in bowel habits or any inflammatory bowel disease. Genitourinary: No history renal disease  Hematologic/Lymphatic: No abnormal bruising or bleeding, blood clots or swollen lymph nodes. Neurological: No history of  seizure or focal weakness. No history of neuropathies, paresthesias or hyperesthesias, facial droop, diplopia  Endocrine: Denies any polyuria, polydipsia and osteoporosis  Allergic/Immunologic: No nasal congestion or hives.         I have reviewed patients Past medical History, Social History and Family History as mentioned in her chart and this remains unchanged fromprevious.     Past Medical History:   Diagnosis Date    Arthritis     Depression     Diabetes mellitus ([facility] [address]     Hypertension     Tachycardia      Past Surgical History:   Procedure Laterality Date    ANKLE FUSION Bilateral     ARTHRODESIS Left 2019    REVISION OF TALONAVICULAR JOINT ARTHRODESIS LEFT FOOT  -SLEEP APNEA- performed by Katelynn Bernard DPM at [facility] Bilateral     ELBOW SURGERY Bilateral     ulnar nerve release    HERNIA REPAIR      HIP ARTHROSCOPY Bilateral     TONSILLECTOMY AND ADENOIDECTOMY      WRIST GANGLION EXCISION Bilateral      Social History     Socioeconomic History    Marital status:      Spouse name: Not on file    Number of children: Not on file    Years of education: Not on file    Highest education level: Not on file   Occupational History    Not on file   Social Needs    Financial resource strain: Not on file    Food insecurity     Worry: Not on file     Inability: Not on file    Transportation needs     Medical: Not on file     Non-medical: Not on file   Tobacco Use    Smoking status: Former Smoker     Packs/day: 1.00     Years: 27.00     Pack years: 27.00     Start date:      Last attempt to quit: 2019     Years since quittin.6    Smokeless tobacco: Never Used    Tobacco comment: started to smoke at 15 / smoked up to 1 ppd / now smoking 3 to 4 cigarettes a day   Substance and Sexual Activity    Alcohol use: Never     Frequency: Never    Drug use: Never    Sexual activity: Not on file   Lifestyle    Physical activity     Days per week: Not on file     Minutes per session: Not on file    Stress: Not on file   Relationships    Social connections     Talks on phone: Not on file     Gets together: Not on file     Attends [employer] service: Not on file     Active member of club or organization: Not on file     Attends meetings of clubs or organizations: Not on file     Relationship status: Not on file   Johanna Mckay rebound, no guarding   :  No costovertebral angle tenderness   Integument:  Well hydrated, no rash or telangiectasias  Lymphatic:  No lymphadenopathy noted   Neurologic:   Alert & oriented x 3, CN 2-12 normal, no focal deficits noted. Sensations Intact. Muscle strength 5/5 proximallyand distally in upper and lower extremities.    Psychiatric:  Speech and behavior appropriate           LABS AND IMAGING  Outside data reviewed and in HPI    Lab Results   Component Value Date    WBC 6.5 06/15/2020    RBC 4.52 06/15/2020    HGB 13.0 06/15/2020    HCT 39.8 06/15/2020     06/15/2020    MCV 87.9 06/15/2020    MCH 28.6 06/15/2020    MCHC 32.6 06/15/2020    RDW 13.8 06/15/2020    LYMPHOPCT 43.5 06/15/2020    MONOPCT 8.5 06/15/2020    BASOPCT 0.6 06/15/2020    MONOSABS 0.5 06/15/2020    LYMPHSABS 2.8 06/15/2020    EOSABS 0.0 06/15/2020    BASOSABS 0.0 06/15/2020       Chemistry        Component Value Date/Time     06/15/2020 0844    K 4.2 06/15/2020 0844     06/15/2020 0844    CO2 26 06/15/2020 0844    BUN 9 06/15/2020 0844    CREATININE 0.6 06/15/2020 0844        Component Value Date/Time    CALCIUM 9.0 06/15/2020 0844    ALKPHOS 58 06/15/2020 0844    AST 14 (L) 06/15/2020 0844    ALT 11 06/15/2020 0844    BILITOT 0.3 06/15/2020 0844          Lab Results   Component Value Date    SEDRATE 20 06/15/2020     No results found for: CRP  Lab Results   Component Value Date    MATTHEW POSITIVE 06/15/2020     Lab Results   Component Value Date    RF <10.0 06/15/2020     Lab Results   Component Value Date    MATTHEW POSITIVE 06/15/2020    ANATITER 1:80 06/15/2020    ANAINT see below 06/15/2020     No results found for: DSDNAG, DSDNAIGGIFA  No results found for: SSAROAB, SSALAAB  No results found for: SMAB, RNPAB  No results found for: CENTABIGG  No results found for: C3, C4, ACE  Lab Results   Component Value Date    VITD25 16.0 11/27/2019     No results found for: Nanda Quinteros  No results found for: Jenna Kennedy Results   Component Value Date    TSH 1.24 10/07/2019     Lab Results   Component Value Date    VITD25 16.0 (L) 11/27/2019       ASSESSMENT AND PLAN      Assessment/Plan:      ASSESSMENT:    1. Polyarthralgia    2. Fibromyalgia    3. Chronic bilateral low back pain with bilateral sciatica    4. Positive MATTHEW (antinuclear antibody)        PLAN:     BODØ was seen today for establish care. Diagnoses and all orders for this visit:    Gerardo Ormond do not appreciate any synovitis on joint exam  Joint symptoms most likely secondary to early osteoarthritis/fibromyalgia  RF, ESR normal  I will do work-up to completely rule out rheumatoid arthritis/systemic rheumatic disease  Continue meloxicam 15 mg daily  Advised not to combine meloxicam with diclofenac due to increased risk of gastric ulcer/kidney disease  -     C-Reactive Protein; Future  -     Cyclic Citrul Peptide Antibody, IgG; Future  -     Hepatitis B Core Antibody, IgM; Future  -     Hepatitis B Surface Antigen; Future  -     Hepatitis C Antibody; Future    Fibromyalgia-18/18 tender points, cognitive impairment, fatigue, poor sleep  Most likely primary fibromyalgia  I will do work-up to rule out metabolic, myopathy, autoimmune etiology  Recommend continued follow-up with PCP/pain management for further management of chronic pain  -     CK; Future  -     TSH WITH REFLEX TO FT4; Future  -     Vitamin B12; Future  -     Vitamin D 25 Hydroxy; Future    Chronic bilateral low back pain with bilateral sciatica-etiology multifactorial.  Most likely due to degenerative disc disease/fibromyalgia/chronic pain syndrome  Failed PT, KYLE, chiropractor  Recommend continued follow-up with pain specialist and/or referral to spine surgeon for further management    Positive MATTHEW (antinuclear antibody)-Implications of low titer positive MATTHEW were discussed with patient.  About 15-20% of normal healthy individuals at her age may have low titer positive MATTHEW of unclear clinical significance. Low titer positive any 1: 80  No other signs and symptoms concerning for lupus or systemic rheumatic disease  I will check additional serologies to completely rule it out  -     AntiSheppard Gunderson; Future  -     Anti SSA; Future  -     Anti SSB; Future  -     Anti-DNA Antibody, Double-Stranded; Future  -     RIBONUCLEIC PROTEIN ANTIBODY; Future    Depending on the results I will arrange a follow-up appointment. If work-up is unremarkable I will see her on as-needed basis. The patient indicates understanding of these issues and agrees with the plan. Return if symptoms worsen or fail to improve. The risks and benefits of my recommendations, as well as other treatment options, benefits and side effects werediscussed with the patient. All questions were answered. I reviewed patient's history, referral documents and electronic medical records  Copy of consult note is being routedelectronically/faxed to referring physician         ######################################################################    I thank you for giving me theopportunity to participate in [facility]. If you have any questions or concerns please feel free to contact me. I look forward to following  Nasim Cooney along with you. Electronically signed by: Chadwick Pretty MD, 7/13/2020 9:59 AM    Documentation was done using voice recognition dragon software. Every effort was made to ensure accuracy;however, inadvertent unintentional computerized transcription errors may be present.

## 2020-07-13 ENCOUNTER — OFFICE VISIT (OUTPATIENT)
Dept: RHEUMATOLOGY | Age: 40
End: 2020-07-13
Payer: COMMERCIAL

## 2020-07-13 VITALS
HEART RATE: 74 BPM | DIASTOLIC BLOOD PRESSURE: 68 MMHG | TEMPERATURE: 97.8 F | HEIGHT: 65 IN | BODY MASS INDEX: 35.65 KG/M2 | WEIGHT: 214 LBS | SYSTOLIC BLOOD PRESSURE: 98 MMHG

## 2020-07-13 PROCEDURE — G8417 CALC BMI ABV UP PARAM F/U: HCPCS | Performed by: INTERNAL MEDICINE

## 2020-07-13 PROCEDURE — 99244 OFF/OP CNSLTJ NEW/EST MOD 40: CPT | Performed by: INTERNAL MEDICINE

## 2020-07-13 PROCEDURE — G8428 CUR MEDS NOT DOCUMENT: HCPCS | Performed by: INTERNAL MEDICINE

## 2020-07-13 NOTE — LETTER
[facility]  [address] [address]  Phone: [phone]  Fax: [phone]    Chelsea Bowen MD        July 13, 2020     JUAN RAMON Abbott - [address] [address] [facility]  [address] [facility]. [address]  Romy Mariscal APRN - CNP  [address] [facility] [address] [place] [address]. [address]    Patient: Ilan Wallace  MR Number: [id number]  YOB: 1980  Date of Visit: 7/13/2020    Dear Dr. Romy Mariscal:    Thank you for your referral. Progress note attached in visit summary. If you have questions, please do not hesitate to call me. I look forward to following Jen Johnson along with you.     Sincerely,        Chelsea Bowen MD

## 2020-07-24 ENCOUNTER — NURSE ONLY (OUTPATIENT)
Dept: FAMILY MEDICINE CLINIC | Age: 40
End: 2020-07-24
Payer: COMMERCIAL

## 2020-07-24 PROCEDURE — 90746 HEPB VACCINE 3 DOSE ADULT IM: CPT | Performed by: FAMILY MEDICINE

## 2020-07-24 PROCEDURE — 90471 IMMUNIZATION ADMIN: CPT | Performed by: FAMILY MEDICINE

## 2020-08-04 ENCOUNTER — OFFICE VISIT (OUTPATIENT)
Dept: ORTHOPEDIC SURGERY | Age: 40
End: 2020-08-04
Payer: COMMERCIAL

## 2020-08-04 VITALS — TEMPERATURE: 98.1 F | HEIGHT: 64 IN | WEIGHT: 214.07 LBS | BODY MASS INDEX: 36.55 KG/M2

## 2020-08-04 PROBLEM — R76.8 POSITIVE ANA (ANTINUCLEAR ANTIBODY): Status: ACTIVE | Noted: 2020-08-04

## 2020-08-04 PROBLEM — M50.20 HERNIATED CERVICAL DISC WITHOUT MYELOPATHY: Status: ACTIVE | Noted: 2020-08-04

## 2020-08-04 PROBLEM — Z87.39 HISTORY OF FIBROMYALGIA: Status: ACTIVE | Noted: 2020-08-04

## 2020-08-04 PROBLEM — M47.812 CS (CERVICAL SPONDYLOSIS): Status: ACTIVE | Noted: 2020-08-04

## 2020-08-04 PROBLEM — M50.30 DDD (DEGENERATIVE DISC DISEASE), CERVICAL: Status: ACTIVE | Noted: 2020-08-04

## 2020-08-04 PROBLEM — M50.20 CERVICAL DISCOGENIC PAIN SYNDROME: Status: ACTIVE | Noted: 2020-08-04

## 2020-08-04 PROBLEM — M50.30 CERVICAL DISCOGENIC PAIN SYNDROME: Status: ACTIVE | Noted: 2020-08-04

## 2020-08-04 PROCEDURE — 99214 OFFICE O/P EST MOD 30 MIN: CPT | Performed by: INTERNAL MEDICINE

## 2020-08-04 PROCEDURE — 1036F TOBACCO NON-USER: CPT | Performed by: INTERNAL MEDICINE

## 2020-08-04 PROCEDURE — G8427 DOCREV CUR MEDS BY ELIG CLIN: HCPCS | Performed by: INTERNAL MEDICINE

## 2020-08-04 PROCEDURE — G8417 CALC BMI ABV UP PARAM F/U: HCPCS | Performed by: INTERNAL MEDICINE

## 2020-08-04 NOTE — PROGRESS NOTES
Myesha Petesron    Chief Complaint:   Chief Complaint   Patient presents with    Neck Pain     pain about the same, mostly L sided UT, 9/10          History of Present Illness:       Patient is a 44 y.o. female returns follow up for the above complaint. The patient was last seen approximately 1 monthsago. The symptoms show no change since the last visit. The patient has had further testing for the problem. In the interim MRI scan was completed which is outlined below    Neck pain remains problematic      Results of MRI cervical spine as referenced from medical record February 2018-official report is not available for review    \"Pt had recent MRI cervical at the [facility], which was reviewed by Dr. Marquez Plata, imaging was read as, \"reversal of cervical lordosis suggesting some muscle spasm. Multilevel disc disease with anterior cord effacement. Mild bilateral foraminal stenosis. \"      In the interim she was evaluated by pain management and that note was referenced. She was advised to start buprenorphine and topical lidocaine which were apparently both denied by her insurance provider. She continues on meloxicam as per previous    Recent trial of massage therapy was cause for increased pain especially in the left upper trapezius region. No new injuries no new events    Previous work-up is included EMG bilateral upper extremities no evidence of radiculopathy. Pain levels 9/10 severity pain more pronounced at the cervical thoracic region burning stabbing aching and pins-and-needles quality.     She denies any new onset of progressive weakness of the upper or lower extremities she denies any myelopathic symptoms of discoordination or new onset gait disturbance     Past Medical History:        Past Medical History:   Diagnosis Date    Arthritis     Depression     Diabetes mellitus ([facility] [address]     Hypertension     Tachycardia         Present Medications:         Current Outpatient Medications   Medication Sig Dispense Refill    Buprenorphine HCl 75 MCG FILM Place 1 Film inside cheek every 12 hours for 28 days. (Patient not taking: Reported on 7/13/2020) 56 each 0    metroNIDAZOLE (FLAGYL) 500 MG tablet TAKE 1 TABLET BY MOUTH TWICE A DAY FOR 7 DAYS      thiothixene (NAVANE) 2 MG capsule TAKE 1 CAPSULE BY MOUTH EVERY EVENING AT BEDTIME      meloxicam (MOBIC) 15 MG tablet Take 1 tablet by mouth daily 90 tablet 0    metFORMIN (GLUCOPHAGE-XR) 500 MG extended release tablet Take 2 tablets by mouth daily (with breakfast) 180 tablet 0    metoprolol tartrate (LOPRESSOR) 50 MG tablet Take 1 tablet by mouth 2 times daily 180 tablet 0    omeprazole (PRILOSEC) 20 MG delayed release capsule Take 1 capsule by mouth daily 90 capsule 0    rosuvastatin (CRESTOR) 40 MG tablet Take 1 tablet by mouth daily 90 tablet 0    pregabalin (LYRICA) 75 MG capsule Take 1 capsule by mouth 2 times daily for 90 days. 60 capsule 2    Cholecalciferol (VITAMIN D3) 50 MCG (2000 UT) TABS Take 2,000 Units by mouth daily 90 tablet 0    albuterol sulfate HFA (VENTOLIN HFA) 108 (90 Base) MCG/ACT inhaler Inhale 2 puffs into the lungs 4 times daily 1 Inhaler 2    nitroGLYCERIN (NITROSTAT) 0.4 MG SL tablet Place 0.4 mg under the tongue every 5 minutes as needed for Chest pain up to max of 3 total doses. If no relief after 1 dose, call 911.  citalopram (CELEXA) 20 MG tablet Take 20 mg by mouth daily       ammonium lactate (LAC-HYDRIN) 12 % lotion Apply topically daily       Coenzyme Q10 (CO Q-10 PO) Take by mouth daily       divalproex (DEPAKOTE ER) 500 MG extended release tablet Take 500 mg by mouth daily      hydrOXYzine (ATARAX) 25 MG tablet Take 25 mg by mouth 3 times daily as needed for Itching       No current facility-administered medications for this visit. Allergies:         Allergies   Allergen Reactions    Cinnamon Itching    Codeine Hives and Itching           Review of Systems:    Pertinent items are noted in HPI       Vital Signs:      Vitals:    20 1530   Temp: 98.1 °F (36.7 °C)        General Exam:     Constitutional: Patient is adequately groomed with no evidence of malnutrition    Physical Exam: Cervical neck      Primary Exam:    Inspection: No deformity atrophy appreciable curvature      Palpation: No focal trigger point      Range of Motion: Mild global decreased pain in all ranges      Strength: Normal upper extremity      Special Tests: Spurling sign negative bilaterally      Skin: There are no rashes, ulcerations or lesions. Gait: Nonantalgic      Neurologic -Light touch sensation and manual muscle testing is normal C5-C8 . Biceps and triceps reflexes are symmetric and +2. Spurlling sign is negative        Additional Comments:        Additional Examinations:                    Office Imaging Results/Procedures PerformedToday:           Office Procedures:   No orders of the defined types were placed in this encounter. Other Outside Imaging and Testing Personally Reviewed:        Site: [facility] #: [id number] #: [id number] Procedure: MR Cervical Spine w/o Contrast ; Reason for Exam: Dx: cerical strain, spinal stenosis, r/o stenosis, HNP per script    This document is confidential medical information.  Unauthorized disclosure or use of this information is prohibited by law. If you are not the intended recipient of this document, please advise us by calling immediately [phone].         [facility], [address] [place] [address]              Patient Name: Carissa Spain    Case ID: [id number]    Patient : 1980    Referring Physician: Anton Park MD    Exam Date: 2020    Exam Description: MR Cervical Spine w/o Contrast              HISTORY:  Neck pain since  with pain worse with time and activity. Berenda Hemet goes into both    arms with associated numbness.         TECHNICAL FACTORS:  Long- and short-axis fat- and water-weighted images were performed.         COMPARISON:  None.         FINDINGS:  The vertebral body heights are well maintained without dominant anterior wedging or    compression.  Craniocervical junction is unremarkable.  C1-2 articulation is normal in    appearance.  No altered bone marrow signal is appreciated.         C2-3: Desiccation of the disc, a central protrusion and facet hypertrophy without compressive    discopathy.         C3-4: Desiccation of the disc, retrolisthesis, a central/left paracentral protrusion resulting    in flattening of the ventral cord.  There is also an underlying shallow disc displacement and    facet hypertrophy contributing to mild to moderate right and moderate left-sided exiting neural     foraminal stenosis with abutment of bilateral exiting C4 nerves with possible compression on    the left.         C4-5: Desiccation of the disc, a central protrusion contribute to contouring of the ventral    cord.  There is also facet hypertrophy contributing to mild left and mild to moderate    right-sided exiting neural foraminal stenosis and abutment of the exiting right C5 nerve.         C5-6: Desiccation of the disc, a left paracentral protrusion results in flattening of the    ventral cord.  There is also facet hypertrophy contributing to mild right-sided exiting neural    foraminal stenosis.         C6-7: Desiccation of the disc, a central protrusion with contouring of the ventral cord.  There     is also facet hypertrophy at this level.         C7-T1: There is facet hypertrophy contributing to mild exiting neural foraminal stenosis.         T1-2: A shallow bilobed broad-based disc displacement and associated facet hypertrophy    contribute to mild exiting neural foraminal stenosis.                          CONCLUSION:    1. A central/left paracentral protrusion at the C3-4 level as well as a left paracentral    protrusion at the C5-6 level resulting in flattening of the cord at each of these levels.  There  is also facet hypertrophy at these levels with abutment of bilateral exiting C4 nerves with    possible compression on the left. 2. Central protrusions at the C4-5 and C6-7 levels contribute to contouring of the cord at each     of these levels.         Thank you for the opportunity to provide your interpretation.                   eHidi Mccallum MD         A: AF/ct 07/19/2020 6:17 PM    T: CT 07/18/2020 6:45 PM           11/14/2019 EMG bilateral upper extremities-Dr. Clara Tracey     Impression:   1. There is no electrodiagnostic evidence of a left median or ulnar neuropathy, peripheral neuropathy or cervical motor radiculopathy in the left upper extremity.    2. There is no electrodiagnostic evidence of a right ulnar or median neuropathy, peripheral neuropathy or cervical motor radiculopathy in the right upper extremity.      Results for Nella Silva (MRN [id number]) as of 8/4/2020 16:40   Ref. Range 6/15/2020 08:44   MATTHEW Latest Ref Range: Negative  POSITIVE (A)   MATTHEW Interpretation Unknown see below   MATTHEW TITER Unknown 1:80   Rheumatoid Factor Latest Ref Range: <14 IU/mL <10.0       MATTHEW Interpretation  see below   Final  06/15/2020  8:44 AM   - [facility]    This patient has a borderline positive MATTHEW test at a titer of   1:80. It should be noted that this is a low titer and that   this result does not prove the presence of a connective tissue   disease or rheumatic disease. In addition to autoimmune   diseases, ANAs are also associated with other conditions   (see table). Furthermore, up to 12% of healthy individuals,   especially in older populations may have an MATTHEW titer of 1:80       Assessment   Impression: . Encounter Diagnoses   Name Primary?  Herniated cervical disc without myelopathy Yes    DDD (degenerative disc disease), cervical     Cervical discogenic pain syndrome     CS (cervical spondylosis)     Positive MATTHEW (antinuclear antibody)               Plan:        Active

## 2020-08-05 ENCOUNTER — HOSPITAL ENCOUNTER (OUTPATIENT)
Age: 40
Discharge: HOME | End: 2020-08-05
Payer: COMMERCIAL

## 2020-08-05 LAB
C-REACTIVE PROTEIN: 0.9 MG/L (ref 0–5.1)
HEPATITIS B CORE IGM ANTIBODY: NORMAL
HEPATITIS B SURFACE ANTIGEN INTERPRETATION: NORMAL
HEPATITIS C ANTIBODY INTERPRETATION: NORMAL
TOTAL CK: 108 U/L (ref 26–192)
TSH REFLEX FT4: 1.73 UIU/ML (ref 0.27–4.2)
VITAMIN B-12: 469 PG/ML (ref 211–911)
VITAMIN D 25-HYDROXY: 17.5 NG/ML

## 2020-08-05 PROCEDURE — 86705 HEP B CORE ANTIBODY IGM: CPT

## 2020-08-05 PROCEDURE — 86235 NUCLEAR ANTIGEN ANTIBODY: CPT

## 2020-08-05 PROCEDURE — 36415 COLL VENOUS BLD VENIPUNCTURE: CPT

## 2020-08-05 PROCEDURE — 84443 ASSAY THYROID STIM HORMONE: CPT

## 2020-08-05 PROCEDURE — 82306 VITAMIN D 25 HYDROXY: CPT

## 2020-08-05 PROCEDURE — 82607 VITAMIN B-12: CPT

## 2020-08-05 PROCEDURE — 86225 DNA ANTIBODY NATIVE: CPT

## 2020-08-05 PROCEDURE — 86803 HEPATITIS C AB TEST: CPT

## 2020-08-05 PROCEDURE — 86200 CCP ANTIBODY: CPT

## 2020-08-05 PROCEDURE — 82550 ASSAY OF CK (CPK): CPT

## 2020-08-05 PROCEDURE — 86140 C-REACTIVE PROTEIN: CPT

## 2020-08-05 PROCEDURE — 87340 HEPATITIS B SURFACE AG IA: CPT

## 2020-08-06 ENCOUNTER — TELEPHONE (OUTPATIENT)
Dept: PAIN MANAGEMENT | Age: 40
End: 2020-08-06

## 2020-08-06 ENCOUNTER — VIRTUAL VISIT (OUTPATIENT)
Dept: PAIN MANAGEMENT | Age: 40
End: 2020-08-06
Payer: COMMERCIAL

## 2020-08-06 ENCOUNTER — HOSPITAL ENCOUNTER (OUTPATIENT)
Age: 40
Discharge: HOME | End: 2020-08-06
Payer: COMMERCIAL

## 2020-08-06 ENCOUNTER — HOSPITAL ENCOUNTER (OUTPATIENT)
Dept: GENERAL RADIOLOGY | Age: 40
Discharge: HOME | End: 2020-08-06
Payer: COMMERCIAL

## 2020-08-06 LAB
ANTI-DSDNA IGG: <1 IU/ML (ref 0–9)
ANTI-RNP IGG: 1.4 AI (ref 0–0.9)
ANTI-SMITH IGG: <0.2 AI (ref 0–0.9)
ANTI-SS-A IGG: 3.1 AI (ref 0–0.9)
ANTI-SS-B IGG: <0.2 AI (ref 0–0.9)
C3 COMPLEMENT: 140.8 MG/DL (ref 90–180)
C4 COMPLEMENT: 25.7 MG/DL (ref 10–40)
CYCLIC CITRULLINATED PEPTIDE ANTIBODY IGG: <0.5 U/ML (ref 0–2.9)

## 2020-08-06 PROCEDURE — 99213 OFFICE O/P EST LOW 20 MIN: CPT | Performed by: NURSE PRACTITIONER

## 2020-08-06 PROCEDURE — 86235 NUCLEAR ANTIGEN ANTIBODY: CPT

## 2020-08-06 PROCEDURE — 85730 THROMBOPLASTIN TIME PARTIAL: CPT

## 2020-08-06 PROCEDURE — 85610 PROTHROMBIN TIME: CPT

## 2020-08-06 PROCEDURE — 86146 BETA-2 GLYCOPROTEIN ANTIBODY: CPT

## 2020-08-06 PROCEDURE — G8427 DOCREV CUR MEDS BY ELIG CLIN: HCPCS | Performed by: NURSE PRACTITIONER

## 2020-08-06 PROCEDURE — 86160 COMPLEMENT ANTIGEN: CPT

## 2020-08-06 PROCEDURE — 36415 COLL VENOUS BLD VENIPUNCTURE: CPT

## 2020-08-06 PROCEDURE — 86147 CARDIOLIPIN ANTIBODY EA IG: CPT

## 2020-08-06 PROCEDURE — 85613 RUSSELL VIPER VENOM DILUTED: CPT

## 2020-08-06 PROCEDURE — 72100 X-RAY EXAM L-S SPINE 2/3 VWS: CPT

## 2020-08-06 RX ORDER — CHOLECALCIFEROL (VITAMIN D3) 50 MCG
2000 TABLET ORAL DAILY
Qty: 30 TABLET | Refills: 11 | Status: SHIPPED | OUTPATIENT
Start: 2020-08-06 | End: 2020-10-09 | Stop reason: SDUPTHER

## 2020-08-06 RX ORDER — TRAMADOL HYDROCHLORIDE 50 MG/1
50 TABLET ORAL 2 TIMES DAILY
Qty: 56 TABLET | Refills: 0 | Status: SHIPPED | OUTPATIENT
Start: 2020-08-06 | End: 2020-08-06 | Stop reason: CLARIF

## 2020-08-06 RX ORDER — TRAMADOL HYDROCHLORIDE 50 MG/1
50 TABLET ORAL 2 TIMES DAILY
Qty: 56 TABLET | Refills: 0 | Status: SHIPPED | OUTPATIENT
Start: 2020-08-06 | End: 2020-08-17 | Stop reason: SDUPTHER

## 2020-08-06 RX ORDER — ERGOCALCIFEROL 1.25 MG/1
50000 CAPSULE ORAL WEEKLY
Qty: 4 CAPSULE | Refills: 2 | Status: SHIPPED | OUTPATIENT
Start: 2020-08-06 | End: 2021-02-04

## 2020-08-06 NOTE — PATIENT INSTRUCTIONS
Patient Education        Neck Arthritis: Exercises  Introduction  Here are some examples of exercises for you to try. The exercises may be suggested for a condition or for rehabilitation. Start each exercise slowly. Ease off the exercises if you start to have pain. You will be told when to start these exercises and which ones will work best for you. How to do the exercises  Neck stretches to the side   1. This stretch works best if you keep your shoulder down as you lean away from it. To help you remember to do this, start by relaxing your shoulders and lightly holding on to your thighs or your chair. 2. Tilt your head toward your shoulder and hold for 15 to 30 seconds. Let the weight of your head stretch your muscles. 3. Repeat 2 to 4 times toward each shoulder. Chin tuck   1. Lie on the floor with a rolled-up towel under your neck. Your head should be touching the floor. 2. Slowly bring your chin toward your chest.  3. Hold for a count of 6, and then relax for up to 10 seconds. 4. Repeat 8 to 12 times. Active cervical rotation   1. Sit in a firm chair, or stand up straight. 2. Keeping your chin level, turn your head to the right, and hold for 15 to 30 seconds. 3. Turn your head to the left and hold for 15 to 30 seconds. 4. Repeat 2 to 4 times to each side. Shoulder blade squeeze   1. While standing, squeeze your shoulder blades together. 2. Do not raise your shoulders up as you are squeezing. 3. Hold for 6 seconds. 4. Repeat 8 to 12 times. Shoulder rolls   1. Sit comfortably with your feet shoulder-width apart. You can also do this exercise standing up. 2. Roll your shoulders up, then back, and then down in a smooth, circular motion. 3. Repeat 2 to 4 times. Follow-up care is a key part of your treatment and safety. Be sure to make and go to all appointments, and call your doctor if you are having problems.  It's also a good idea to know your test results and keep a list of the the floor. 3. Hold for 15 to 30 seconds, then relax. 4. Repeat 2 to 4 times. Relax and rest   1. Lie on your back with a rolled towel under your neck and a pillow under your knees. Extend your arms comfortably to your sides. 2. Relax and breathe normally. 3. Remain in this position for about 10 minutes. 4. If you can, do this 2 or 3 times each day. Follow-up care is a key part of your treatment and safety. Be sure to make and go to all appointments, and call your doctor if you are having problems. It's also a good idea to know your test results and keep a list of the medicines you take. Where can you learn more? Go to https://POS on CLOUD.Spockly. org and sign in to your [employer] account. Enter V285 in the AMGas box to learn more about \"Back Stretches: Exercises. \"     If you do not have an account, please click on the \"Sign Up Now\" link. Current as of: March 2, 2020               Content Version: 12.5  © 0988-0064 Healthwise, Incorporated. Care instructions adapted under license by [facility] ([facility]). If you have questions about a medical condition or this instruction, always ask your healthcare professional. [address] any warranty or liability for your use of this information.

## 2020-08-06 NOTE — RESULT ENCOUNTER NOTE
Please call the patient and let her know that she has vitamin D deficiency. I will send prescription to the pharmacy for vitamin D replacement. Her blood work also showed positive RNP and SSA antibody that is seen in patients with 1 of the connective tissue diseases. I am going to repeat these antibodies just to be sure that these are not false positive.   I we will also check some additional blood work to evaluate further

## 2020-08-06 NOTE — PROGRESS NOTES
adult    15. AMOS (obstructive sleep apnea)    14. Chronic pain of both ankles    15. H/O alcohol abuse      On the Patients Pain Assessment form reviewed with the Medical Assistant:  She complains of pain in the all joints  with radiation to the neck, upper back, mid back and lower back . She rates the pain 10/10 and describes it as sharp, aching, stabbing. Current treatment regimen has helped relieve about 0% of the pain. She denies any side effects from the current pain regimen. Patient reports that since the last follow up visit the physical functioning is unchanged, family/social relationships are unchanged, mood is unchanged sleep patterns are worse, and that the overall functioning is unchanged. Patient denies misusing/abusing her narcotic pain medications or using any illegal drugs. Upon obtaining medical history from Ms. Ashley Pacheco states that pain is not manageable on current pain therapy. She was not able to receive her pain medications Belbuca or Lidocaine due to lack of coverage. She was evaluated by Dr. Cloyce Schaumann who per note plans for \[facility] [place] [facility]\"" & HE[facility] SERVICES of the cervical spine. Patient was also evalauted by rheumatology under the care of Dr. Morgan López for polyarthralgia who ordered blood work. Mood is stable, sleep is fair with an average of 5-6 hours. Denies to having issues of constipation. Tolerating activities/house chores with moderate tenderness to the lower back/neck. ALLERGIES: Patients list of allergies were reviewed     MEDICATIONS: Ms. Ashley Pacheco list of medications were reviewed. Her current medications are   Outpatient Medications Prior to Visit   Medication Sig Dispense Refill    metroNIDAZOLE (FLAGYL) 500 MG tablet TAKE 1 TABLET BY MOUTH TWICE A DAY FOR 7 DAYS      thiothixene (NAVANE) 2 MG capsule TAKE 1 CAPSULE BY MOUTH EVERY EVENING AT BEDTIME      meloxicam (MOBIC) 15 MG tablet Take 1 tablet by mouth daily 90 tablet 0    metFORMIN (GLUCOPHAGE-XR) 500 MG extended release tablet Take 2 tablets by mouth daily (with breakfast) 180 tablet 0    metoprolol tartrate (LOPRESSOR) 50 MG tablet Take 1 tablet by mouth 2 times daily 180 tablet 0    omeprazole (PRILOSEC) 20 MG delayed release capsule Take 1 capsule by mouth daily 90 capsule 0    rosuvastatin (CRESTOR) 40 MG tablet Take 1 tablet by mouth daily 90 tablet 0    pregabalin (LYRICA) 75 MG capsule Take 1 capsule by mouth 2 times daily for 90 days. 60 capsule 2    Cholecalciferol (VITAMIN D3) 50 MCG (2000 UT) TABS Take 2,000 Units by mouth daily 90 tablet 0    nitroGLYCERIN (NITROSTAT) 0.4 MG SL tablet Place 0.4 mg under the tongue every 5 minutes as needed for Chest pain up to max of 3 total doses. If no relief after 1 dose, call 911.  citalopram (CELEXA) 20 MG tablet Take 20 mg by mouth daily       ammonium lactate (LAC-HYDRIN) 12 % lotion Apply topically daily       Coenzyme Q10 (CO Q-10 PO) Take by mouth daily       divalproex (DEPAKOTE ER) 500 MG extended release tablet Take 500 mg by mouth daily      hydrOXYzine (ATARAX) 25 MG tablet Take 25 mg by mouth 3 times daily as needed for Itching      Buprenorphine HCl 75 MCG FILM Place 1 Film inside cheek every 12 hours for 28 days. 56 each 0    albuterol sulfate HFA (VENTOLIN HFA) 108 (90 Base) MCG/ACT inhaler Inhale 2 puffs into the lungs 4 times daily 1 Inhaler 2     No facility-administered medications prior to visit. SOCIAL/FAMILY/PAST MEDICAL HISTORY: Ms. Lux Galeana, family and past medical history was reviewed. REVIEW OF SYSTEMS:    Respiratory: Negative for apnea, chest tightness and shortness of breath or change in baseline breathing. Gastrointestinal: Negative for nausea, vomiting, abdominal pain, diarrhea, constipation, blood in stool and abdominal distention. PHYSICAL EXAM:   Nursing note and vitals reviewed. There were no vitals taken for this visit. as per patient  Constitutional: She appears well-developed and well-nourished. No acute distress.    Skin: Skin cognitive restructuring, problem solving discussed with patient  -She was advised weight reduction, diet changes- 800-1200 oleg diet, diet diary, exercising, nutritional  consult increased physical activity as tolerated  -Reviewed Covid-19 safety regulations which were discussed in detail at prior o.v, patient maintains compliance with the safety guidelines regarding Covid-19  -Last UDS 7/9/20 Consistent  -Return in about 4 weeks (around 9/3/2020). Current Outpatient Medications   Medication Sig Dispense Refill    traMADol (ULTRAM) 50 MG tablet Take 1 tablet by mouth 2 times daily for 28 days. 56 tablet 0    metroNIDAZOLE (FLAGYL) 500 MG tablet TAKE 1 TABLET BY MOUTH TWICE A DAY FOR 7 DAYS      thiothixene (NAVANE) 2 MG capsule TAKE 1 CAPSULE BY MOUTH EVERY EVENING AT BEDTIME      meloxicam (MOBIC) 15 MG tablet Take 1 tablet by mouth daily 90 tablet 0    metFORMIN (GLUCOPHAGE-XR) 500 MG extended release tablet Take 2 tablets by mouth daily (with breakfast) 180 tablet 0    metoprolol tartrate (LOPRESSOR) 50 MG tablet Take 1 tablet by mouth 2 times daily 180 tablet 0    omeprazole (PRILOSEC) 20 MG delayed release capsule Take 1 capsule by mouth daily 90 capsule 0    rosuvastatin (CRESTOR) 40 MG tablet Take 1 tablet by mouth daily 90 tablet 0    pregabalin (LYRICA) 75 MG capsule Take 1 capsule by mouth 2 times daily for 90 days. 60 capsule 2    Cholecalciferol (VITAMIN D3) 50 MCG (2000 UT) TABS Take 2,000 Units by mouth daily 90 tablet 0    nitroGLYCERIN (NITROSTAT) 0.4 MG SL tablet Place 0.4 mg under the tongue every 5 minutes as needed for Chest pain up to max of 3 total doses. If no relief after 1 dose, call 911.       citalopram (CELEXA) 20 MG tablet Take 20 mg by mouth daily       ammonium lactate (LAC-HYDRIN) 12 % lotion Apply topically daily       Coenzyme Q10 (CO Q-10 PO) Take by mouth daily       divalproex (DEPAKOTE ER) 500 MG extended release tablet Take 500 mg by mouth daily      hydrOXYzine (ATARAX) 25 MG tablet Take 25 mg by mouth 3 times daily as needed for Itching      vitamin D (CHOLECALCIFEROL) 50 MCG (2000 UT) TABS tablet Take 1 tablet by mouth daily After finishing 12 week couse 30 tablet 11    vitamin D (ERGOCALCIFEROL) 1.25 MG (44260 UT) CAPS capsule Take 1 capsule by mouth once a week 4 capsule 2    albuterol sulfate HFA (VENTOLIN HFA) 108 (90 Base) MCG/ACT inhaler Inhale 2 puffs into the lungs 4 times daily 1 Inhaler 2     No current facility-administered medications for this visit. I will continue her current medication regimen  which is part of the above treatment schedule. It has been helping with Ms. Holli Tinajero chronic  medical problems which for this visit include:   Diagnoses of Chronic pain syndrome, Fibromyalgia, Cervicalgia, HNP (herniated nucleus pulposus), cervical, Spinal stenosis in cervical region, Chronic bilateral low back pain, unspecified whether sciatica present, Bilateral carpal tunnel syndrome, Pain of both hip joints, H/O carpal tunnel release bilateral, H/O ankle fusions bilateral, Depression, unspecified depression type, Class 2 obesity due to excess calories without serious comorbidity with body mass index (BMI) of 35.0 to 35.9 in adult, AMOS (obstructive sleep apnea), Chronic pain of both ankles, and H/O alcohol abuse were pertinent to this visit. Risks and benefits of the medications and other alternative treatments  including no treatment were discussed with the patient. The common side effects of these medications were also explained to the patient. Informed verbal consent was obtained. Goals of current treatment regimen include improvement in pain, restoration of functioning- with focus on improvement in physical performance, general activity, work or disability,emotional distress, health care utilization and  decreased medication consumption.  Will continue to monitor progress towards achieving/maintaining therapeutic goals with special emphasis on  1. Improvement in perceived interfernce  of pain with ADL's. Ability to do home exercises independently. Ability to do household chores indoor and/or outdoor work and social and leisure activities. Improve psychosocial and physical functioning. - she is not showing any significant progress/or showing regression  towards this goal and reassessment and adjustment of goals/treatment have been made. She was advised against drinking alcohol with the narcotic pain medicines, advised against driving or handling machinery while adjusting the dose of medicines or if having cognitive  issues related to the current medications. Risk of overdose and death, if medicines not taken as prescribed, were also discussed. If the patient develops new symptoms or if the symptoms worsen, the patient should call the office. While transcribing every attempt was made to maintain the accuracy of the note in terms of it's contents,there may have been some errors made inadvertently. Thank you for allowing me to participate in the care of this patient. Vinod Cali.  Jerrald Soulier APRN-CNP     Cc: JUAN RAMON Lamb - CNP

## 2020-08-08 LAB
ANTICARDIOLIPIN IGA ANTIBODY: 1 APL (ref 0–11)
ANTICARDIOLIPIN IGG ANTIBODY: 4 GPL (ref 0–14)
BETA-2 GLYCOPROTEIN 1 IGG ANTIBODY: 0 SGU (ref 0–20)
BETA-2 GLYCOPROTEIN 1 IGM ANTIBODY: 2 SMU (ref 0–20)
CARDIOLIPIN AB IGM: 0 MPL (ref 0–12)
MISCELLANEOUS LAB TEST ORDER: NORMAL

## 2020-08-10 LAB
DRVVT CONFIRMATION TEST: NORMAL RATIO
DRVVT SCREEN: 39 SEC (ref 33–44)
DRVVT,DIL: NORMAL SEC (ref 33–44)
HEXAGONAL PHOSPHOLIPID NEUTRALIZAT TEST: NORMAL
LUPUS ANTICOAG INTERP: NORMAL
PLT NEUTA: NORMAL
PT D: 12.9 SEC (ref 12–15.5)
PTT D: 43 SEC (ref 32–48)
PTT-D CORR REFLEX: NORMAL SEC (ref 32–48)
PTT-HEPARIN NEUTRALIZED: NORMAL SEC (ref 32–48)
REPTILASE TIME: NORMAL SEC
THROMBIN TIME: NORMAL SEC (ref 14.7–19.5)

## 2020-08-10 NOTE — RESULT ENCOUNTER NOTE
Please call the patient and let her know that all her additional autoimmune work-up came back negative

## 2020-08-13 ENCOUNTER — VIRTUAL VISIT (OUTPATIENT)
Dept: PULMONOLOGY | Age: 40
End: 2020-08-13
Payer: COMMERCIAL

## 2020-08-13 PROBLEM — E78.5 HYPERLIPIDEMIA: Chronic | Status: ACTIVE | Noted: 2019-11-16

## 2020-08-13 PROBLEM — I10 ESSENTIAL HYPERTENSION: Chronic | Status: ACTIVE | Noted: 2019-11-16

## 2020-08-13 PROBLEM — F31.9 BIPOLAR DISORDER (HCC): Chronic | Status: ACTIVE | Noted: 2017-03-28

## 2020-08-13 PROBLEM — M79.7 FIBROMYALGIA: Status: ACTIVE | Noted: 2017-03-28

## 2020-08-13 PROBLEM — G47.33 OSA (OBSTRUCTIVE SLEEP APNEA): Chronic | Status: ACTIVE | Noted: 2019-11-16

## 2020-08-13 PROBLEM — F31.9 BIPOLAR DISORDER (HCC): Status: ACTIVE | Noted: 2017-03-28

## 2020-08-13 PROBLEM — E11.9 TYPE 2 DIABETES MELLITUS WITHOUT COMPLICATION, WITHOUT LONG-TERM CURRENT USE OF INSULIN (HCC): Chronic | Status: ACTIVE | Noted: 2019-11-16

## 2020-08-13 PROBLEM — F41.8 MIXED ANXIETY AND DEPRESSIVE DISORDER: Status: ACTIVE | Noted: 2017-03-28

## 2020-08-13 PROCEDURE — 99244 OFF/OP CNSLTJ NEW/EST MOD 40: CPT | Performed by: INTERNAL MEDICINE

## 2020-08-13 PROCEDURE — G8427 DOCREV CUR MEDS BY ELIG CLIN: HCPCS | Performed by: INTERNAL MEDICINE

## 2020-08-13 PROCEDURE — 2022F DILAT RTA XM EVC RTNOPTHY: CPT | Performed by: INTERNAL MEDICINE

## 2020-08-13 RX ORDER — ALBUTEROL SULFATE 90 UG/1
2 AEROSOL, METERED RESPIRATORY (INHALATION) EVERY 6 HOURS PRN
COMMUNITY
End: 2021-07-30 | Stop reason: SDUPTHER

## 2020-08-13 ASSESSMENT — SLEEP AND FATIGUE QUESTIONNAIRES
HOW LIKELY ARE YOU TO NOD OFF OR FALL ASLEEP WHILE SITTING AND TALKING TO SOMEONE: 2
HOW LIKELY ARE YOU TO NOD OFF OR FALL ASLEEP WHEN YOU ARE A PASSENGER IN A CAR FOR AN HOUR WITHOUT A BREAK: 3
HOW LIKELY ARE YOU TO NOD OFF OR FALL ASLEEP WHILE SITTING INACTIVE IN A PUBLIC PLACE: 2
HOW LIKELY ARE YOU TO NOD OFF OR FALL ASLEEP IN A CAR, WHILE STOPPED FOR A FEW MINUTES IN TRAFFIC: 2
ESS TOTAL SCORE: 20
HOW LIKELY ARE YOU TO NOD OFF OR FALL ASLEEP WHILE SITTING AND READING: 3
HOW LIKELY ARE YOU TO NOD OFF OR FALL ASLEEP WHILE LYING DOWN TO REST IN THE AFTERNOON WHEN CIRCUMSTANCES PERMIT: 3
HOW LIKELY ARE YOU TO NOD OFF OR FALL ASLEEP WHILE WATCHING TV: 2
HOW LIKELY ARE YOU TO NOD OFF OR FALL ASLEEP WHILE SITTING QUIETLY AFTER LUNCH WITHOUT ALCOHOL: 3

## 2020-08-13 NOTE — LETTER
[facility]  [place] [address] [place] [address]  Phone: [phone]  Fax: [phone]      August 13, 2020       Patient: Ama Youngblood   MR Number: [id number]   YOB: 1980   Date of Visit: 8/13/2020     Thank you for allowing me to participate in the care of Keya Sousa. Here is my assessment and plan. Also attached is a copy of her consult note:    ASSESSMENT:  Visit Diagnoses and Associated Orders     Obstructive sleep apnea (adult) (pediatric)   (New Problem)  -  Primary    needs work-up    POLYSOMNOGRAPHY (PSG) - Diagnostic Testing [42609 Custom]   - Future Order    Sleep Study with PAP Titration [[id number] Custom]   - Future Order         Essential hypertension   (Stable)           Type 2 diabetes mellitus without complication, without long-term current use of insulin (HCC)   (Stable)           GERD without esophagitis   (Stable)           Bipolar affective disorder, remission status unspecified (HCC)   (Stable)           Non morbid obesity, unspecified obesity type   (Stable)           ORDERS WITHOUT AN ASSOCIATED DIAGNOSIS    albuterol sulfate HFA (VENTOLIN HFA) 108 (90 Base) MCG/ACT inhaler [[id number]]            Plan:  Reviewed AMOS: pathophysiology, diagnosis, complications and treatment. Instructed her not to drive if drowsy. Continue medications per her PCP and other physicians. Limit caffeine use after 3pm. Will do PSG to rule-out AMOS and other sleep disorders. Given severity of his Sx and medical Hx as well has very high probability for AMOS will do CPAP titration after PSG to expedite therapy for his AMOS. 8 wk f/u after titration. The chronic medical conditions listed are directly related to the primary diagnosis listed above. The management of the primary diagnosis affects the secondary diagnosis and vice versa. Continue meds for: HTN, DM, GERD, and bipolar. Pt would medically benefit from wt loss for AMOS (diet, exercise, surgical). Orders Placed This Encounter   Procedures    POLYSOMNOGRAPHY (PSG) - Diagnostic Testing    Sleep Study with PAP Titration         If you have questions or concerns, please do not hesitate to call me. I look forward to following Arian Cosme along with you.     Sincerely,      Rashida Biswas MD    CC providers:  Jian Fox, APRN - CNP  [address] [facility]  [address] [facility] [place] [address] [place] [address] [facility], [place]  [address] [place] [address]

## 2020-08-13 NOTE — PROGRESS NOTES
Anant Hodge MD, [facility], [facility]  Tiffanie Kehrt [address]  3[address] Floor,  [address] [facility], [address] [phone]   [facility] [address]  [place]. [address] [facility]. [address] [phone]     Video Visit- Consult    Pursuant to the emergency declaration under the [facility], Critical access hospital waiver authority and the [employer] Act, this Virtual  Visit was conducted, with patient's consent, to reduce the patient's risk of exposure to COVID-19. Services were provided through a video synchronous discussion virtually to substitute for in-person clinic visit. Patient was located in their home. Assessment:      Visit Diagnoses and Associated Orders     Obstructive sleep apnea (adult) (pediatric)   (New Problem)  -  Primary    needs work-up    POLYSOMNOGRAPHY (PSG) - Diagnostic Testing [[id number] Custom]   - Future Order    Sleep Study with PAP Titration [[id number] Custom]   - Future Order         Essential hypertension   (Stable)           Type 2 diabetes mellitus without complication, without long-term current use of insulin (HCC)   (Stable)           GERD without esophagitis   (Stable)           Bipolar affective disorder, remission status unspecified (HCC)   (Stable)           Non morbid obesity, unspecified obesity type   (Stable)           ORDERS WITHOUT AN ASSOCIATED DIAGNOSIS    albuterol sulfate HFA (VENTOLIN HFA) 108 (90 Base) MCG/ACT inhaler [19703]             Plan:      Reviewed AMOS: pathophysiology, diagnosis, complications and treatment. Instructed her not to drive if drowsy. Continue medications per her PCP and other physicians. Limit caffeine use after 3pm. Will do PSG to rule-out AMOS and other sleep disorders. Given severity of his Sx and medical Hx as well has very high probability for AMOS will do CPAP titration after PSG to expedite therapy for his AMOS. 8 wk f/u after titration.  The chronic medical conditions listed are directly related to the primary diagnosis listed above. The management of the primary diagnosis affects the secondary diagnosis and vice versa. Continue meds for: HTN, DM, GERD, and bipolar. Pt would medically benefit from wt loss for AMOS (diet, exercise, surgical). Orders Placed This Encounter   Procedures    POLYSOMNOGRAPHY (PSG) - Diagnostic Testing    Sleep Study with PAP Titration          Subjective:     Patient ID: Bo Wallace is a 44 y.o. female. Chief Complaint   Patient presents with    Sleep Apnea       HPI:      Bo Wallace is a 44 y.o. female referred by JUAN RAMON Underwood CNP for a sleep evaluation. She complains of: snoring, witnessed apneas, excessive daytime sleepiness , non-restorative sleep, nocturia, napping and tossing and turning at night. She denies: cataplexy and hypnagogic hallucinations. Hypertension, diabetes mellitus, gastroesophageal reflux disease, bipolar, and obesity: stable on meds and followed by pt's PCP and other physicians. Previous evaluation and treatment has included- Studies over 5 years ago, no old records at time of visit. DOT/CDL - No  FAA/'s license -No    Previous Report(s) Reviewed: historical medical records, office notes, andreferral letter(s). Pertinent data has been documented. Corona - Total score: 20    Caffeine Intake - None.     Social History     Socioeconomic History    Marital status:      Spouse name: Not on file    Number of children: Not on file    Years of education: Not on file    Highest education level: Not on file   Occupational History    Not on file   Social Needs    Financial resource strain: Not on file    Food insecurity     Worry: Not on file     Inability: Not on file    Transportation needs     Medical: Not on file     Non-medical: Not on file   Tobacco Use    Smoking status: Former Smoker     Packs/day: 1.00     Years: 27.00     Pack years: 27.00 mouth 2 times daily 180 tablet 0    omeprazole (PRILOSEC) 20 MG delayed release capsule Take 1 capsule by mouth daily 90 capsule 0    rosuvastatin (CRESTOR) 40 MG tablet Take 1 tablet by mouth daily 90 tablet 0    pregabalin (LYRICA) 75 MG capsule Take 1 capsule by mouth 2 times daily for 90 days. 60 capsule 2    Cholecalciferol (VITAMIN D3) 50 MCG (2000 UT) TABS Take 2,000 Units by mouth daily 90 tablet 0    nitroGLYCERIN (NITROSTAT) 0.4 MG SL tablet Place 0.4 mg under the tongue every 5 minutes as needed for Chest pain up to max of 3 total doses. If no relief after 1 dose, call 911.  citalopram (CELEXA) 20 MG tablet Take 20 mg by mouth daily       ammonium lactate (LAC-HYDRIN) 12 % lotion Apply topically daily       Coenzyme Q10 (CO Q-10 PO) Take by mouth daily       divalproex (DEPAKOTE ER) 500 MG extended release tablet Take 500 mg by mouth daily      hydrOXYzine (ATARAX) 25 MG tablet Take 25 mg by mouth 3 times daily as needed for Itching      metroNIDAZOLE (FLAGYL) 500 MG tablet TAKE 1 TABLET BY MOUTH TWICE A DAY FOR 7 DAYS      albuterol sulfate HFA (VENTOLIN HFA) 108 (90 Base) MCG/ACT inhaler Inhale 2 puffs into the lungs 4 times daily 1 Inhaler 2     No current facility-administered medications for this visit.         Allergies as of 08/13/2020 - Review Complete 08/13/2020   Allergen Reaction Noted    Cinnamon Itching 02/26/2014    Codeine Hives and Itching 03/26/2012       Patient Active Problem List   Diagnosis    AMOS (obstructive sleep apnea)    Type 2 diabetes mellitus without complication, without long-term current use of insulin ([facility])    Essential hypertension    Hyperlipidemia    History of fibromyalgia    Positive MATTHEW (antinuclear antibody)    CS (cervical spondylosis)    Cervical discogenic pain syndrome    DDD (degenerative disc disease), cervical    Herniated cervical disc without myelopathy    Bipolar disorder ([facility])    Fibromyalgia    Mixed anxiety and depressive disorder       Past Medical History:   Diagnosis Date    Arthritis     Depression     Diabetes mellitus ([facility] [address]     Hypertension     Tachycardia        Past Surgical History:   Procedure Laterality Date    ANKLE FUSION Bilateral     ARTHRODESIS Left 12/6/2019    REVISION OF TALONAVICULAR JOINT ARTHRODESIS LEFT FOOT  -SLEEP APNEA- performed by Cassius Oropeza DPM at [facility] Bilateral     ELBOW SURGERY Bilateral     ulnar nerve release    HERNIA REPAIR      HIP ARTHROSCOPY Bilateral     TONSILLECTOMY AND ADENOIDECTOMY      WRIST GANGLION EXCISION Bilateral        Family History   Problem Relation Age of Onset    High Blood Pressure Mother     Arthritis Mother     Other Father         hypothyroidism    High Blood Pressure Father     Arthritis Father     Asthma Father     Heart Failure Maternal Aunt         22 stents.  Heart Failure Maternal Grandmother     Pacemaker Maternal Grandfather     Heart Surgery Paternal Grandmother     Pacemaker Paternal Grandfather        Objective:     Vitals:  Patient reported Height and Weight Calculated BMI   Patient-Reported Vitals 8/13/2020   Patient-Reported Weight 216lb   Patient-Reported Height 5 5       35.9     Due to COVID-19 this was a virtual visit and physical exam was deferred.     Electronically signed by Kelli Perez MD on8/13/2020 at 1:48 PM

## 2020-08-17 RX ORDER — TRAMADOL HYDROCHLORIDE 50 MG/1
50 TABLET ORAL 2 TIMES DAILY
Qty: 14 TABLET | Refills: 0
Start: 2020-08-17 | End: 2020-08-24

## 2020-08-17 NOTE — TELEPHONE ENCOUNTER
I spoke with pharmacy and they said the last one they got was from 8/6 but due to her insurance, they will only fill 1 week at a time. I gave the ok to do another weeks worth of refill for patient.

## 2020-08-21 ENCOUNTER — TELEPHONE (OUTPATIENT)
Dept: PAIN MANAGEMENT | Age: 40
End: 2020-08-21

## 2020-08-21 NOTE — TELEPHONE ENCOUNTER
Per PKA pt has a little bit of arthritis but nothing major is going on in her spine. Pt was offered injection for the pain. Patient states that she's gotten injections in the past and haven't really done anything for her. Pt states that previous C-spine MRI showed degenerative disc disease and was told that it would get worse. Pt is wondering if it is still showing or has it gone away? XR of Lumbar Spine was ordered before, not a Cervial Spine XR.

## 2020-08-21 NOTE — TELEPHONE ENCOUNTER
Patient called requesting her results from her xray of c-spine.     Please call her back @ [phone]

## 2020-08-24 NOTE — TELEPHONE ENCOUNTER
Pt declined injections, stating that she has done that before and it didn't do anything for her. Pt also states that Tramadol is not helping at all.

## 2020-08-28 ENCOUNTER — OFFICE VISIT (OUTPATIENT)
Dept: PRIMARY CARE CLINIC | Age: 40
End: 2020-08-28
Payer: COMMERCIAL

## 2020-08-28 PROCEDURE — 99211 OFF/OP EST MAY X REQ PHY/QHP: CPT | Performed by: NURSE PRACTITIONER

## 2020-08-28 PROCEDURE — G8428 CUR MEDS NOT DOCUMENT: HCPCS | Performed by: NURSE PRACTITIONER

## 2020-08-28 PROCEDURE — G8417 CALC BMI ABV UP PARAM F/U: HCPCS | Performed by: NURSE PRACTITIONER

## 2020-08-28 NOTE — PROGRESS NOTES
Raheem Odonnell received a viral test for COVID-19. They were educated on isolation and quarantine as appropriate. For any symptoms, they were directed to seek care from their PCP, given contact information to establish with a doctor, directed to an urgent care or the emergency room.

## 2020-08-29 LAB — SARS-COV-2, NAA: NOT DETECTED

## 2020-09-01 ENCOUNTER — HOSPITAL ENCOUNTER (OUTPATIENT)
Dept: INTERVENTIONAL RADIOLOGY/VASCULAR | Age: 40
Discharge: HOME | End: 2020-09-01
Payer: COMMERCIAL

## 2020-09-01 ENCOUNTER — HOSPITAL ENCOUNTER (EMERGENCY)
Age: 40
Discharge: HOME | End: 2020-09-02
Payer: COMMERCIAL

## 2020-09-01 VITALS
RESPIRATION RATE: 14 BRPM | DIASTOLIC BLOOD PRESSURE: 68 MMHG | HEART RATE: 71 BPM | SYSTOLIC BLOOD PRESSURE: 145 MMHG | OXYGEN SATURATION: 100 % | WEIGHT: 214 LBS | BODY MASS INDEX: 35.65 KG/M2 | TEMPERATURE: 97.4 F | HEIGHT: 65 IN

## 2020-09-01 PROCEDURE — 2500000003 HC RX 250 WO HCPCS

## 2020-09-01 PROCEDURE — 6360000002 HC RX W HCPCS: Performed by: PHYSICIAN ASSISTANT

## 2020-09-01 PROCEDURE — 6360000004 HC RX CONTRAST MEDICATION: Performed by: RADIOLOGY

## 2020-09-01 PROCEDURE — 62321 NJX INTERLAMINAR CRV/THRC: CPT

## 2020-09-01 PROCEDURE — 6370000000 HC RX 637 (ALT 250 FOR IP): Performed by: PHYSICIAN ASSISTANT

## 2020-09-01 PROCEDURE — 6360000002 HC RX W HCPCS

## 2020-09-01 PROCEDURE — 99283 EMERGENCY DEPT VISIT LOW MDM: CPT

## 2020-09-01 PROCEDURE — 96375 TX/PRO/DX INJ NEW DRUG ADDON: CPT

## 2020-09-01 PROCEDURE — 96374 THER/PROPH/DIAG INJ IV PUSH: CPT

## 2020-09-01 PROCEDURE — 2580000003 HC RX 258: Performed by: PHYSICIAN ASSISTANT

## 2020-09-01 RX ORDER — OXYCODONE HYDROCHLORIDE AND ACETAMINOPHEN 5; 325 MG/1; MG/1
1 TABLET ORAL EVERY 6 HOURS PRN
Qty: 12 TABLET | Refills: 0 | Status: SHIPPED | OUTPATIENT
Start: 2020-09-01 | End: 2020-09-04

## 2020-09-01 RX ORDER — MORPHINE SULFATE 4 MG/ML
4 INJECTION, SOLUTION INTRAMUSCULAR; INTRAVENOUS ONCE
Status: COMPLETED | OUTPATIENT
Start: 2020-09-01 | End: 2020-09-01

## 2020-09-01 RX ORDER — ONDANSETRON 2 MG/ML
4 INJECTION INTRAMUSCULAR; INTRAVENOUS ONCE
Status: COMPLETED | OUTPATIENT
Start: 2020-09-01 | End: 2020-09-01

## 2020-09-01 RX ORDER — ONDANSETRON 4 MG/1
4 TABLET, ORALLY DISINTEGRATING ORAL EVERY 8 HOURS PRN
Qty: 20 TABLET | Refills: 0 | Status: SHIPPED | OUTPATIENT
Start: 2020-09-01 | End: 2021-02-04

## 2020-09-01 RX ORDER — BUTALBITAL, ACETAMINOPHEN AND CAFFEINE 300; 40; 50 MG/1; MG/1; MG/1
1 CAPSULE ORAL EVERY 4 HOURS PRN
Qty: 21 CAPSULE | Refills: 0 | Status: SHIPPED | OUTPATIENT
Start: 2020-09-01 | End: 2020-10-06 | Stop reason: ALTCHOICE

## 2020-09-01 RX ORDER — OXYCODONE HYDROCHLORIDE AND ACETAMINOPHEN 5; 325 MG/1; MG/1
1 TABLET ORAL ONCE
Status: COMPLETED | OUTPATIENT
Start: 2020-09-01 | End: 2020-09-01

## 2020-09-01 RX ORDER — 0.9 % SODIUM CHLORIDE 0.9 %
1000 INTRAVENOUS SOLUTION INTRAVENOUS ONCE
Status: COMPLETED | OUTPATIENT
Start: 2020-09-01 | End: 2020-09-01

## 2020-09-01 RX ORDER — BUTALBITAL, ACETAMINOPHEN AND CAFFEINE 50; 325; 40 MG/1; MG/1; MG/1
2 TABLET ORAL ONCE
Status: COMPLETED | OUTPATIENT
Start: 2020-09-01 | End: 2020-09-01

## 2020-09-01 RX ADMIN — BUTALBITAL, ACETAMINOPHEN, AND CAFFEINE 2 TABLET: 50; 325; 40 TABLET ORAL at 22:07

## 2020-09-01 RX ADMIN — SODIUM CHLORIDE 1000 ML: 9 INJECTION, SOLUTION INTRAVENOUS at 22:22

## 2020-09-01 RX ADMIN — OXYCODONE HYDROCHLORIDE AND ACETAMINOPHEN 1 TABLET: 5; 325 TABLET ORAL at 23:48

## 2020-09-01 RX ADMIN — IOHEXOL 10 ML: 180 INJECTION INTRAVENOUS at 09:13

## 2020-09-01 RX ADMIN — MORPHINE SULFATE 4 MG: 4 INJECTION INTRAVENOUS at 22:21

## 2020-09-01 RX ADMIN — ONDANSETRON 4 MG: 2 INJECTION INTRAMUSCULAR; INTRAVENOUS at 22:21

## 2020-09-01 ASSESSMENT — PAIN SCALES - GENERAL
PAINLEVEL_OUTOF10: 9
PAINLEVEL_OUTOF10: 8
PAINLEVEL_OUTOF10: 3
PAINLEVEL_OUTOF10: 0
PAINLEVEL_OUTOF10: 6
PAINLEVEL_OUTOF10: 8

## 2020-09-02 NOTE — ED NOTES
Pt to bathroom via wheelchair,   Reports pain 6/10 when walking and sitting up, laying flat h/a 0/10     Aicha Salinas RN  09/01/20 0931

## 2020-09-02 NOTE — ED NOTES
Pt here after a epidural procedure today, + headache, increase headache when sitting up, pain 10/10, when laying flat pain 0/10, pt instructed to come here from on call anesthesiologist at [facility] lock, side rails upx2, call light near, mom at [address] [place] [address]  09/01/20 0172

## 2020-09-02 NOTE — ED NOTES
Pt laid flat to help with pain relief when flat pt pain 0/10 no changes in vitals,     Aicha Salinas RN  09/01/20 8316

## 2020-09-02 NOTE — ED NOTES
Bed: 11  Expected date:   Expected time:   Means of arrival: Walk In  Comments:     Romana Conway RN  09/01/20 2043

## 2020-09-02 NOTE — ED NOTES
Meaghan into assess pt, plan discuss to be d/c after bolus complete,      Sandra John, RN  09/01/20 1178

## 2020-09-02 NOTE — ED NOTES
Rx need sign and bolus almost complete, then pt can be d/c     Brandie Mcpherson, MARGOTH  09/01/20 7143

## 2020-09-02 NOTE — ED PROVIDER NOTES
[facility]        Pt Name: Kurt Ramírez  MRN: [id number]  [place] 1980  Date of evaluation: 9/1/2020  Provider: Wilson Acharya PA-C  PCP: JUAN RAMON Raphael - CNP    LEV. I have evaluated this patient. My supervising physician was available for consultation. CHIEF COMPLAINT       Chief Complaint   Patient presents with    Post-op Problem     pt states had epidural done today at [facility]- states having headache afterwards- sent by doc to r/o spinal leaking       HISTORY OF PRESENT ILLNESS   (Location, Timing/Onset, Context/Setting, Quality, Duration, Modifying Factors, Severity, Associated Signs and Symptoms)  Note limiting factors. Kurt Ramírez is a 44 y.o. female presents to the emergency department at the request of [facility]. for difficulties as a pertains to headache pain. Patient was seen and evaluated by interventional radiology today at [facility]. and had a C5-C6 translaminar epidural steroid injection. Patient states that she was feeling well thereafter. She states when she got home she was started experiencing headache pain. She states it was a diffuse headache that was primarily frontal.  She states that she had been warned to the possibility of this and contacted the IR suite for the above-mentioned. They suggested that she lay flat which she did. She states shortly thereafter she was told that if she had increasing symptoms with sitting up that she should consider calling them back. She reports to me that she did make that telephone call. She states that her pain and discomfort after lying flat had gone down to a 7 but as soon as she sat back up her pain went back up to a 9 which is where her level is now. Patient tells me that she had been told not to take Tylenol.   She states she does not take ibuprofen because she been told about the potential for bleeding associated with other medications. Patient tells me she does not have any narcotic pain medications nor she done anything for her associated headache pain. Patient states she is not having fevers and or chills. She states she has her regular and usual chronic neck pain for which she received the epidural steroid injection. She denies chest pain, palpitations lightheadedness or shortness of breath. She has no additional GI or  complaints at present. Nursing Notes were all reviewed and agreed with or any disagreements were addressed in the HPI. REVIEW OF SYSTEMS    (2-9 systems for level 4, 10 or more for level 5)     Review of Systems    Positives and Pertinent negatives as per HPI. Except as noted above in the ROS, all other systems were reviewed and negative.        PAST MEDICAL HISTORY     Past Medical History:   Diagnosis Date    Arthritis     Depression     Diabetes mellitus ([address]     Hypertension     Tachycardia          SURGICAL HISTORY     Past Surgical History:   Procedure Laterality Date    ANKLE FUSION Bilateral     ARTHRODESIS Left 12/6/2019    REVISION OF TALONAVICULAR JOINT ARTHRODESIS LEFT FOOT  -SLEEP APNEA- performed by Delvis Brandt DPM at [address] [facility] Bilateral     ELBOW SURGERY Bilateral     ulnar nerve release    HERNIA REPAIR      HIP ARTHROSCOPY Bilateral     TONSILLECTOMY AND ADENOIDECTOMY      WRIST GANGLION EXCISION Bilateral          CURRENTMEDICATIONS       Previous Medications    ALBUTEROL SULFATE HFA (VENTOLIN HFA) 108 (90 BASE) MCG/ACT INHALER    Inhale 2 puffs into the lungs 4 times daily    ALBUTEROL SULFATE HFA (VENTOLIN HFA) 108 (90 BASE) MCG/ACT INHALER    Inhale 2 puffs into the lungs every 6 hours as needed for Wheezing    AMMONIUM LACTATE (LAC-HYDRIN) 12 % LOTION    Apply topically daily     CHOLECALCIFEROL (VITAMIN D3) 50 MCG (2000 UT) TABS    Take 2,000 Units by mouth daily    CITALOPRAM (CELEXA) 20 MG TABLET    Take 20 mg by mouth daily GCS: GCS eye subscore is 4. GCS verbal subscore is 5. GCS motor subscore is 6. Cranial Nerves: Cranial nerves are intact. No cranial nerve deficit. Sensory: Sensation is intact. No sensory deficit. Motor: Motor function is intact. Coordination: Coordination is intact. Coordination normal.      Gait: Gait is intact. Gait normal.   Psychiatric:         Behavior: Behavior normal.         DIAGNOSTIC RESULTS   LABS:    Labs Reviewed - No data to display    All other labs were within normal range or not returned as of this dictation. EKG: All EKG's are interpreted by the Emergency Department Physician in the absence of a cardiologist.  Please see their note for interpretation of EKG. RADIOLOGY:   Non-plain film images such as CT, Ultrasound and MRI are read by the radiologist. Plain radiographic images are visualized and preliminarily interpreted by the ED Provider with the below findings:        Interpretation per the Radiologist below, if available at the time of this note:    No orders to display     Ir Inj Interlaminar Epi/subarachnoid Cerv/thor    Result Date: 9/1/2020  Epidural steroid injection, Epidurography History : neck pain COMMENTS : The posterior neck was prepped and draped in sterile fashion and lidocaine used for local anesthesia. Under fluoroscopic guidance, a 22 gauge Tuohy needle was advanced into the epidural space at the C5-6 level from a right interlaminar approach and needle position was documented with contrast injection. 12 mg Celestone and 2 cc sterile saline were injected. The patient tolerated the procedure without complication. DIAGNOSIS : C5-6 translaminar epidural injection of Celestone.  Fluoroscopy time : 0.7 minutes Number of exposures obtained : 1 Blood loss : minimal (less than 5 cc)          PROCEDURES   Unless otherwise noted below, none     Procedures    CRITICAL CARE TIME   N/A    CONSULTS:  None      EMERGENCY DEPARTMENT COURSE and DIFFERENTIAL DIAGNOSIS/MDM:   Vitals:    Vitals:    09/01/20 2210 09/01/20 2228 09/01/20 2330 09/01/20 2344   BP: 118/63  (!) 145/68    Pulse:  67 80 71   Resp:  17 16 14   Temp:       TempSrc:       SpO2: 98% 99% 100% 100%   Weight:       Height:           Patient was given the following medications:  Medications   0.9 % sodium chloride bolus (0 mLs Intravenous Stopped 9/1/20 2345)   morphine injection 4 mg (4 mg Intravenous Given 9/1/20 2221)   ondansetron (ZOFRAN) injection 4 mg (4 mg Intravenous Given 9/1/20 2221)   butalbital-acetaminophen-caffeine (FIORICET, ESGIC) per tablet 2 tablet (2 tablets Oral Given 9/1/20 2207)   oxyCODONE-acetaminophen (PERCOCET) 5-325 MG per tablet 1 tablet (1 tablet Oral Given 9/1/20 2348)           The patient's detailed history of present illness is documented as above. Upon arrival to the emergency department the patient's vital signs are as documented. The patient is noted to be hemodynamically stable and afebrile. Physical examination findings are as above. Patient looks quite comfortable in her headache pain is not appearing severe at the present time. I have removed the Band-Aid which shows just a very trace amount of blood that is at the C5-C6 injection site. No evidence of any kind of fluid leakage other than just postprocedural blood. Lengthy discussion was had with the patient. She is literally tried nothing yet for this headache. I did place a telephone call to anesthesia at [facility] as she reports this is who had sent her over. I spoke directly with Dr. Kelleen Phalen who is anesthesia on-call. She states that she has been on call all day. She states she has not spoken with this patient and believes that it probably was somebody from interventional radiology before they closed for the day. Her symptomatology was discussed.   She agrees that she probably can be treated in the emergency department setting for treatment of her pain and discomfort have medications for the home Migraine    ONDANSETRON (ZOFRAN ODT) 4 MG DISINTEGRATING TABLET    Take 1 tablet by mouth every 8 hours as needed for Nausea    OXYCODONE-ACETAMINOPHEN (PERCOCET) 5-325 MG PER TABLET    Take 1 tablet by mouth every 6 hours as needed for Pain for up to 3 days. DISCONTINUED MEDICATIONS:  Discontinued Medications    No medications on file              (Please note that portions of this note were completed with a voice recognition program.  Efforts were made to edit the dictations but occasionally words are mis-transcribed.)    Zacarias Primrose, PA-C (electronically signed)           Severiano Siskin, PA-C  09/02/20 0024      This dictation was addended as it was missing information.        Severiano Siskin, PA-C  09/21/20 3570

## 2020-09-03 ENCOUNTER — HOSPITAL ENCOUNTER (OUTPATIENT)
Dept: SLEEP CENTER | Age: 40
Discharge: HOME | End: 2020-09-03
Payer: COMMERCIAL

## 2020-09-03 ENCOUNTER — TELEPHONE (OUTPATIENT)
Dept: PAIN MANAGEMENT | Age: 40
End: 2020-09-03

## 2020-09-03 ENCOUNTER — VIRTUAL VISIT (OUTPATIENT)
Dept: PAIN MANAGEMENT | Age: 40
End: 2020-09-03
Payer: COMMERCIAL

## 2020-09-03 PROCEDURE — 95811 POLYSOM 6/>YRS CPAP 4/> PARM: CPT

## 2020-09-03 PROCEDURE — G8427 DOCREV CUR MEDS BY ELIG CLIN: HCPCS | Performed by: NURSE PRACTITIONER

## 2020-09-03 PROCEDURE — 95811 POLYSOM 6/>YRS CPAP 4/> PARM: CPT | Performed by: INTERNAL MEDICINE

## 2020-09-03 PROCEDURE — 99213 OFFICE O/P EST LOW 20 MIN: CPT | Performed by: NURSE PRACTITIONER

## 2020-09-03 NOTE — PROGRESS NOTES
TELE HEALTH VISIT (AUDIO-VISUAL)    Pursuant to the emergency declaration under the [facility], [facility] waiver authority and the [employer] Act, this Virtual  Visit was conducted, with patient's consent, to reduce the patient's risk of exposure to COVID-19 and provide continuity of care for an established patient. Service is  provided through a video synchronous discussion virtually to substitute for in-person clinic visit. Due to this being a TeleHealth encounter (During SJO-84 public health emergency), evaluation of the following organ systems was limited: Vitals/Constitutional/EENT/Resp/CV/GI//MS/Neuro/Skin/Vdlm-Zrpb-Zsc. Laci Guerra  1980  [id number]    Ms. Victoria is being seen virtually for a follow up visit using [employer]. me/[employer] Video visit/[employer] or face time  Informed verbal consent to the virtual visit was obtained from Ms. Victoria. Risks associated with HIPPA compliance with the virtual visit was explained to the patient. Ms. Victoria is at the store and Stephanie SMALLWOOD is in her office. HISTORY OF PRESENT ILLNESS:  Ms. Victoria is a 44 y.o. female  being assessed for a follow up visit for pain management for evaluation of ongoing care regarding her symptoms and monitoring of compliance with long term use high risk medications. She has a diagnosis of   1. Chronic pain syndrome    2. Fibromyalgia    3. Cervicalgia    4. HNP (herniated nucleus pulposus), cervical    5. Spinal stenosis in cervical region    6. Chronic bilateral low back pain, unspecified whether sciatica present    7. Bilateral carpal tunnel syndrome    8. Pain of both hip joints    9. H/O carpal tunnel release bilateral    10. H/O ankle fusions bilateral    11. Depression, unspecified depression type    12.  Class 2 obesity due to excess calories without serious comorbidity with body mass index (BMI) of 35.0 to 35.9 in adult    15. AMOS (obstructive sleep apnea)    14. Chronic pain of both ankles    15. H/O alcohol abuse      On the Patients Pain Assessment form reviewed with the Medical Assistant:  She complains of pain in the bilateral lower back, bilateral mid-back, bilateral upper back and All joints  with radiation to the NA . She rates the pain 9/10 and describes it as aching, shooting, pins and needles. Current treatment regimen has helped relieve about 0% of the pain. She denies any side effects from the current pain regimen. Patient reports that since the last follow up visit the physical functioning is unchanged, family/social relationships are unchanged, mood is unchanged sleep patterns are unchanged, and that the overall functioning is unchanged. Patient denies misusing/abusing her narcotic pain medications or using any illegal drugs. Upon obtaining medical history from Ms. Katy Guerin states that pain is manageable on current pain therapy. Reports Tramadol provides no relief of pain, she had cervical spine KYLE on 9/1/20 with Dr. Jonathan Ureña, she had to return to the ER for headache post procedure the same day, reports symptoms are currently stable. Continues to experience pain. Mood is stable without anxiety. Sleep is fair with an average of 5-6 hours. Denies to having issues of constipation. Tolerating activities/house chores with moderate tenderness to the neck. ALLERGIES: Patients list of allergies were reviewed     MEDICATIONS: Ms. Katy Guerin list of medications were reviewed. Her current medications are   Outpatient Medications Prior to Visit   Medication Sig Dispense Refill    ondansetron (ZOFRAN ODT) 4 MG disintegrating tablet Take 1 tablet by mouth every 8 hours as needed for Nausea 20 tablet 0    butalbital-APAP-caffeine (FIORICET) -40 MG CAPS per capsule Take 1 capsule by mouth every 4 hours as needed for Headaches or Migraine 21 capsule 0    oxyCODONE-acetaminophen (PERCOCET) 5-325 MG per tablet Take 1 tablet by mouth every 6 hours as needed for Pain for up to 3 days. 12 tablet 0    albuterol sulfate HFA (VENTOLIN HFA) 108 (90 Base) MCG/ACT inhaler Inhale 2 puffs into the lungs every 6 hours as needed for Wheezing      vitamin D (CHOLECALCIFEROL) 50 MCG (2000 UT) TABS tablet Take 1 tablet by mouth daily After finishing 12 week couse 30 tablet 11    vitamin D (ERGOCALCIFEROL) 1.25 MG (30591 UT) CAPS capsule Take 1 capsule by mouth once a week 4 capsule 2    metroNIDAZOLE (FLAGYL) 500 MG tablet TAKE 1 TABLET BY MOUTH TWICE A DAY FOR 7 DAYS      thiothixene (NAVANE) 2 MG capsule TAKE 1 CAPSULE BY MOUTH EVERY EVENING AT BEDTIME      meloxicam (MOBIC) 15 MG tablet Take 1 tablet by mouth daily 90 tablet 0    metFORMIN (GLUCOPHAGE-XR) 500 MG extended release tablet Take 2 tablets by mouth daily (with breakfast) 180 tablet 0    metoprolol tartrate (LOPRESSOR) 50 MG tablet Take 1 tablet by mouth 2 times daily 180 tablet 0    omeprazole (PRILOSEC) 20 MG delayed release capsule Take 1 capsule by mouth daily 90 capsule 0    rosuvastatin (CRESTOR) 40 MG tablet Take 1 tablet by mouth daily 90 tablet 0    pregabalin (LYRICA) 75 MG capsule Take 1 capsule by mouth 2 times daily for 90 days. 60 capsule 2    Cholecalciferol (VITAMIN D3) 50 MCG (2000 UT) TABS Take 2,000 Units by mouth daily 90 tablet 0    nitroGLYCERIN (NITROSTAT) 0.4 MG SL tablet Place 0.4 mg under the tongue every 5 minutes as needed for Chest pain up to max of 3 total doses. If no relief after 1 dose, call 911.       citalopram (CELEXA) 20 MG tablet Take 20 mg by mouth daily       ammonium lactate (LAC-HYDRIN) 12 % lotion Apply topically daily       Coenzyme Q10 (CO Q-10 PO) Take by mouth daily       divalproex (DEPAKOTE ER) 500 MG extended release tablet Take 500 mg by mouth daily      hydrOXYzine (ATARAX) 25 MG tablet Take 25 mg by mouth 3 times daily as needed for Itching      albuterol sulfate HFA (VENTOLIN HFA) 108 (90 Base) MCG/ACT inhaler Inhale 2 puffs into the lungs 4 times daily 1 Inhaler 2     No facility-administered medications prior to visit. SOCIAL/FAMILY/PAST MEDICAL HISTORY: Ms. Rosie Campos, family and past medical history was reviewed. REVIEW OF SYSTEMS:    Respiratory: Negative for apnea, chest tightness and shortness of breath or change in baseline breathing. Gastrointestinal: Negative for nausea, vomiting, abdominal pain, diarrhea, constipation, blood in stool and abdominal distention. PHYSICAL EXAM:   Nursing note and vitals reviewed. LMP 08/11/2020  as per patient  Constitutional: She appears well-developed and well-nourished. No acute distress. Skin: Skin appears to be warm and dry. No rashes or any other marks noted. She is not diaphoretic. Neurological/Psychiatric:She is alert and oriented to person, place, and time. Coordination is  normal.  Her mood isAppropriate and affect is Neutral/Euthymic(normal). Her behavior is normal.   thought content normal.   Musculoskeletal / Extremities: not assessed    IMPRESSION:   1. Chronic pain syndrome    2. Fibromyalgia    3. Cervicalgia    4. HNP (herniated nucleus pulposus), cervical    5. Spinal stenosis in cervical region    6. Chronic bilateral low back pain, unspecified whether sciatica present    7. Bilateral carpal tunnel syndrome    8. Pain of both hip joints    9. H/O carpal tunnel release bilateral    10. H/O ankle fusions bilateral    11. Depression, unspecified depression type    12. Class 2 obesity due to excess calories without serious comorbidity with body mass index (BMI) of 35.0 to 35.9 in adult    13. AMOS (obstructive sleep apnea)    14. Chronic pain of both ankles    15.  H/O alcohol abuse        PLAN:  Informed verbal consent regarding treatment was obtained  -Take Zohydro ER 10 mg bid, D/c Tramadol  -Salonpas OTC  -Patient completed PT last month, Katlyn exercises  -Coaches patient on filling Percocet 5-325 mg tablets from the ER, patient admits to collecting the medications but did not take them. Advised patient to return percocet to the clinic before another prescription of opioids. She verbalized understanding, reports she will return the Percocet next week  -Maintain f/u with Dr. Luciana Ontiveros for injections  -CBT techniques- relaxation therapies such as biofeedback, mindfulness based stress reduction, imagery, cognitive restructuring, problem solving discussed with patient  -Reviewed Covid-19 safety regulations which were discussed, patient maintains compliance with the safety guidelines regarding Covid-19  -Last UDS 7/9/20 Consistent  -Return in about 4 weeks (around 10/1/2020). Current Outpatient Medications   Medication Sig Dispense Refill    HYDROcodone Bitartrate ER (ZOHYDRO ER) 10 MG C12A Take 1 tablet by mouth 2 times daily for 28 days.  56 capsule 0    ondansetron (ZOFRAN ODT) 4 MG disintegrating tablet Take 1 tablet by mouth every 8 hours as needed for Nausea 20 tablet 0    butalbital-APAP-caffeine (FIORICET) -40 MG CAPS per capsule Take 1 capsule by mouth every 4 hours as needed for Headaches or Migraine 21 capsule 0    albuterol sulfate HFA (VENTOLIN HFA) 108 (90 Base) MCG/ACT inhaler Inhale 2 puffs into the lungs every 6 hours as needed for Wheezing      vitamin D (CHOLECALCIFEROL) 50 MCG (2000 UT) TABS tablet Take 1 tablet by mouth daily After finishing 12 week couse 30 tablet 11    vitamin D (ERGOCALCIFEROL) 1.25 MG (50009 UT) CAPS capsule Take 1 capsule by mouth once a week 4 capsule 2    metroNIDAZOLE (FLAGYL) 500 MG tablet TAKE 1 TABLET BY MOUTH TWICE A DAY FOR 7 DAYS      thiothixene (NAVANE) 2 MG capsule TAKE 1 CAPSULE BY MOUTH EVERY EVENING AT BEDTIME      meloxicam (MOBIC) 15 MG tablet Take 1 tablet by mouth daily 90 tablet 0    metFORMIN (GLUCOPHAGE-XR) 500 MG extended release tablet Take 2 tablets by mouth daily (with breakfast) 180 tablet 0    metoprolol tartrate (LOPRESSOR) 50 MG tablet Take 1 abuse were pertinent to this visit. Risks and benefits of the medications and other alternative treatments  including no treatment were discussed with the patient. The common side effects of these medications were also explained to the patient. Informed verbal consent was obtained. Goals of current treatment regimen include improvement in pain, restoration of functioning- with focus on improvement in physical performance, general activity, work or disability,emotional distress, health care utilization and  decreased medication consumption. Will continue to monitor progress towards achieving/maintaining therapeutic goals with special emphasis on  1. Improvement in perceived interfernce  of pain with ADL's. Ability to do home exercises independently. Ability to do household chores indoor and/or outdoor work and social and leisure activities. Improve psychosocial and physical functioning. - she is showing progression towards this treatment goal with the current regimen. She was advised against drinking alcohol with the narcotic pain medicines, advised against driving or handling machinery while adjusting the dose of medicines or if having cognitive  issues related to the current medications. Risk of overdose and death, if medicines not taken as prescribed, were also discussed. If the patient develops new symptoms or if the symptoms worsen, the patient should call the office. While transcribing every attempt was made to maintain the accuracy of the note in terms of it's contents,there may have been some errors made inadvertently. Thank you for allowing me to participate in the care of this patient. Mordecai Dandy.  Pavithra SMALLWOOD     Cc: JUAN RAMON Guajardo CNP

## 2020-09-04 ENCOUNTER — CARE COORDINATION (OUTPATIENT)
Dept: FAMILY MEDICINE CLINIC | Age: 40
End: 2020-09-04

## 2020-09-04 NOTE — TELEPHONE ENCOUNTER
Submitted PA for HYDROcodone Bitartrate ER 10MG er capsules    Via CMM Key: [id number]    STATUS: PENDING

## 2020-09-04 NOTE — CARE COORDINATION
Patient contacted regarding recent discharge and COVID-19 risk. Discussed COVID-19 related testing which was available at this time. Test results were negative. Patient informed of results, if available? Previously done     Care Transition Nurse/ Ambulatory Care Manager contacted the patient by telephone to perform post discharge assessment. Verified name and  with patient as identifiers. Patient has following risk factors of: diabetes. CTN/ACM reviewed discharge instructions, medical action plan and red flags related to discharge diagnosis. Reviewed and educated them on any new and changed medications related to discharge diagnosis. Advised obtaining a 90-day supply of all daily and as-needed medications. Education provided regarding infection prevention, and signs and symptoms of COVID-19 and when to seek medical attention with patient who verbalized understanding. Discussed exposure protocols and quarantine from [address] you at higher risk for severe illness  and given an opportunity for questions and concerns. The patient agrees to contact the COVID-19 hotline [phone] or PCP office for questions related to their healthcare. CTN/ACM provided contact information for future reference. From CDC: Are you at higher risk for severe illness?  Wash your hands often.  Avoid close contact (6 feet, which is about two arm lengths) with people who are sick.  Put distance between yourself and other people if COVID-19 is spreading in your community.  Clean and disinfect frequently touched surfaces.  Avoid all cruise travel and non-essential air travel.  Call your healthcare professional if you have concerns about COVID-19 and your underlying condition or if you are sick.     For more information on steps you can take to protect yourself, see CDC's How to Protect Yourself    Patient continues to have a headache, but reports it is improving  Expressed her concern about the ED treatment plan  Recommended she f/u with the Physician who performed her injections  Patient reports being very busy training and new job  RN provided education on Covid spread prevention    Plan for follow-up call in 7-14 days based on severity of symptoms and risk factors.     Tobias Nicolas RN, MSN  Ambulatory Care Manager  [phone]

## 2020-09-09 ENCOUNTER — TELEPHONE (OUTPATIENT)
Dept: PULMONOLOGY | Age: 40
End: 2020-09-09

## 2020-09-09 NOTE — TELEPHONE ENCOUNTER
Pt returning call to review Emergency Split Night Study. Order to be sent to [facility] due to location. .  F/U scheduled.

## 2020-09-09 NOTE — PROGRESS NOTES
Deborah Alvarez         : 1980  A-1    Diagnosis: [x] AMOS (G47.33) [] CSA (G47.31) [] Apnea (G47.30)   Length of Need: [x] 12 Months [] 99 Months [] Other:    Machine (PRAKASH!): [x] Respironics Dream Station      Auto [] ResMed AirSense     Auto [] Other:     []  CPAP () [x] Bilevel ()   Mode: [] Auto [] Spontaneous    Mode: [x] Auto [] Spontaneous             IPAP max 25 cmH2O  EPAP min 17 cmH2O  PS min 4 cmH2O  PS max 8 cmH2O             Comfort Settings:   - Ramp Pressure: 8 cmH2O                                        - Ramp time: 15 min                                     -  Flex/EPR - 3 full time                                    - For ResMed Bilevel (TiMax-4 sec   TiMin- 0.2 sec)     Humidifier: [x] Heated ()        [x] Water chamber replacement ()/ 1 per 6 months        Mask:   [x] Nasal () /1 per 3 months [] Full Face () /1 per 3 months   [x] Patient choice -Size and fit mask [] Patient Choice - Size and fit mask   [] Dispense:  [] Dispense:    [x] Headgear () / 1 per 3 months [] Headgear () / 1 per 3 months   [x] Replacement Nasal Cushion ()/2 per month [] Interface Replacement ()/1 per month   [] Replacement Nasal Pillows ()/2 per month         Tubing: [] Heated ()/1 per 3 months    [x] Standard ()/1 per 3 months [] Other:           Filters: [x] Non-disposable ()/1 per 6 months     [x] Ultra-Fine, Disposable ()/2 per month        Miscellaneous: [] Chin Strap ()/ 1 per 6 months [] O2 bleed-in:       LPM   [] Oximetry on CPAP/Bilevel []  Other:    [x] Modem: ()         Start Order Date: 20    MEDICAL JUSTIFICATION:  I, the undersigned, certify that the above prescribed supplies are medically necessary for this patients wellbeing. In my opinion, the supplies are both reasonable and necessary in reference to accepted standards of medicalpractice in treatment of this patients condition.     Ben Stockton, MD      NPI: [id number]       Order Signed Date: 09/09/20    Electronically signed by Lambert Browne MD on 9/9/2020 at 4:27 PM

## 2020-09-18 ENCOUNTER — CARE COORDINATION (OUTPATIENT)
Dept: FAMILY MEDICINE CLINIC | Age: 40
End: 2020-09-18

## 2020-09-18 NOTE — CARE COORDINATION
Call for 14 day CT follow up r/t recent ED visit. HIPPA compliant message left with RN contact information and request for call back.     Shell Jones RN, MSN  Ambulatory Care Manager  [phone]

## 2020-09-22 ENCOUNTER — CARE COORDINATION (OUTPATIENT)
Dept: FAMILY MEDICINE CLINIC | Age: 40
End: 2020-09-22

## 2020-09-22 NOTE — CARE COORDINATION
Patient left message on RN's phone over the weekend re: f/u phone call. Reports no new or worsening symptoms, but continues to have a headache. Reports the anesthesiologist has determined it is too long since her procedure to consider a blood patch. She has contacted the surgeon to discuss as well.     Latonya Landeros RN, MSN  Ambulatory Care Manager  [phone]

## 2020-09-23 NOTE — CARE COORDINATION
Second f/u call placed. HIPPA compliant  with return contact number and recommendation patient schedule f/u with her PCP.     hSell Jones RN, MSN  Ambulatory Care Manager  [phone]

## 2020-09-28 RX ORDER — VARENICLINE TARTRATE 1 MG/1
TABLET, FILM COATED ORAL
Qty: 60 TABLET | Refills: 2 | Status: SHIPPED | OUTPATIENT
Start: 2020-09-28 | End: 2021-02-04

## 2020-10-01 ENCOUNTER — TELEPHONE (OUTPATIENT)
Dept: FAMILY MEDICINE CLINIC | Age: 40
End: 2020-10-01

## 2020-10-01 ENCOUNTER — VIRTUAL VISIT (OUTPATIENT)
Dept: PAIN MANAGEMENT | Age: 40
End: 2020-10-01
Payer: COMMERCIAL

## 2020-10-01 PROCEDURE — 99213 OFFICE O/P EST LOW 20 MIN: CPT | Performed by: NURSE PRACTITIONER

## 2020-10-01 PROCEDURE — G8427 DOCREV CUR MEDS BY ELIG CLIN: HCPCS | Performed by: NURSE PRACTITIONER

## 2020-10-01 NOTE — PROGRESS NOTES
TELE HEALTH VISIT (AUDIO-VISUAL)    Pursuant to the emergency declaration under the [facility], [facility] waiver authority and the [employer] Act, this Virtual  Visit was conducted, with patient's consent, to reduce the patient's risk of exposure to COVID-19 and provide continuity of care for an established patient. Service is  provided through a video synchronous discussion virtually to substitute for in-person clinic visit. Due to this being a TeleHealth encounter (During BLUXO-26 [facility] emergency), evaluation of the following organ systems was limited: Vitals/Constitutional/EENT/Resp/CV/GI//MS/Neuro/Skin/Wbgs-Tkia-Kxl. Rebecca Samano  1980  [id number]    Ms. Ally Walker is being seen virtually for a follow up visit using [employer] Video visit/TabTaleo or face time  Informed verbal consent to the virtual visit was obtained from Ms. Ally Walker. Risks associated with HIPPA compliance with the virtual visit was explained to the patient. Ms. Ally Walker is at her home and Raheem SMALLWOOD is in her office. HISTORY OF PRESENT ILLNESS:  Ms. Ally Walker is a 44 y.o. female  being assessed for a follow up visit for pain management for evaluation of ongoing care regarding her symptoms and monitoring of compliance with long term use high risk medications. She has a diagnosis of   1. Chronic pain syndrome    2. Fibromyalgia    3. Cervicalgia    4. HNP (herniated nucleus pulposus), cervical    5. Spinal stenosis in cervical region    6. Chronic bilateral low back pain, unspecified whether sciatica present    7. Bilateral carpal tunnel syndrome    8. Pain of both hip joints    9. H/O carpal tunnel release bilateral    10. H/O ankle fusions bilateral    11. Depression, unspecified depression type    12.  Class 2 obesity due to excess calories without serious comorbidity with body mass index (BMI) of 35.0 to 35.9 in sulfate HFA (VENTOLIN HFA) 108 (90 Base) MCG/ACT inhaler Inhale 2 puffs into the lungs 4 times daily 1 Inhaler 2     No facility-administered medications prior to visit. SOCIAL/FAMILY/PAST MEDICAL HISTORY: Ms. Cordova, family and past medical history was reviewed. REVIEW OF SYSTEMS:    Respiratory: Negative for apnea, chest tightness and shortness of breath or change in baseline breathing. Gastrointestinal: Negative for nausea, vomiting, abdominal pain, diarrhea, constipation, blood in stool and abdominal distention. PHYSICAL EXAM:   Nursing note and vitals reviewed. There were no vitals taken for this visit. as per patient  Constitutional: She appears well-developed and well-nourished. No acute distress. Skin: Skin appears to be warm and dry. No rashes or any other marks noted. She is not diaphoretic. Neurological/Psychiatric:She is alert and oriented to person, place, and time. Coordination is  normal.  Her mood isAppropriate and affect is Neutral/Euthymic(normal). Her behavior is normal.   thought content normal.   Musculoskeletal / Extremities: Gait is normal, assistive devices use: none. IMPRESSION:   1. Chronic pain syndrome    2. Fibromyalgia    3. Cervicalgia    4. HNP (herniated nucleus pulposus), cervical    5. Spinal stenosis in cervical region    6. Chronic bilateral low back pain, unspecified whether sciatica present    7. Bilateral carpal tunnel syndrome    8. Pain of both hip joints    9. H/O carpal tunnel release bilateral    10. H/O ankle fusions bilateral    11. Depression, unspecified depression type    12. Class 2 obesity due to excess calories without serious comorbidity with body mass index (BMI) of 35.0 to 35.9 in adult    13. AMOS (obstructive sleep apnea)    14. Chronic pain of both ankles    15.  H/O alcohol abuse        PLAN:  Informed verbal consent regarding treatment was obtained  -Continue with Zohdro ER, Salonpas  -Home cervical exercises/Katlyn exercises  -Maintain f/u with her Dr. Reji Worley for injections to the spine, currently has headaches, being monitored post cervical KYLE advised to go to the ER if symptoms worsen  -She is yet to bring the Percocet that was collected from the pharmacymlast month, was advised to bring them to the office, she will collect her prescription from the office once she returns the Percocet collected on 9/2/20 qty of 12  -CBT techniques- relaxation therapies such as biofeedback, mindfulness based stress reduction, imagery, cognitive restructuring, problem solving discussed with patient  -She was advised weight reduction, diet changes- 800-1200 oleg diet, diet diary, exercising, nutritional  consult increased physical activity as tolerated  -Reviewed Covid-19 safety regulations which were discussed, patient maintains compliance with the safety guidelines regarding Covid-19  -Last UDS 7/9/20 Consistent  -Return in about 4 weeks (around 10/29/2020).      Current Outpatient Medications   Medication Sig Dispense Refill    CHANTIX 1 MG tablet TAKE 1 TABLET BY MOUTH TWICE A DAY 60 tablet 2    omeprazole (PRILOSEC) 20 MG delayed release capsule Take 1 capsule by mouth daily 30 capsule 0    ondansetron (ZOFRAN ODT) 4 MG disintegrating tablet Take 1 tablet by mouth every 8 hours as needed for Nausea 20 tablet 0    albuterol sulfate HFA (VENTOLIN HFA) 108 (90 Base) MCG/ACT inhaler Inhale 2 puffs into the lungs every 6 hours as needed for Wheezing      vitamin D (CHOLECALCIFEROL) 50 MCG (2000 UT) TABS tablet Take 1 tablet by mouth daily After finishing 12 week couse 30 tablet 11    vitamin D (ERGOCALCIFEROL) 1.25 MG (41597 UT) CAPS capsule Take 1 capsule by mouth once a week 4 capsule 2    metroNIDAZOLE (FLAGYL) 500 MG tablet TAKE 1 TABLET BY MOUTH TWICE A DAY FOR 7 DAYS      thiothixene (NAVANE) 2 MG capsule TAKE 1 CAPSULE BY MOUTH EVERY EVENING AT BEDTIME      meloxicam (MOBIC) 15 MG tablet Take 1 tablet by mouth daily 90 tablet 0    metFORMIN (GLUCOPHAGE-XR) 500 MG extended release tablet Take 2 tablets by mouth daily (with breakfast) 180 tablet 0    metoprolol tartrate (LOPRESSOR) 50 MG tablet Take 1 tablet by mouth 2 times daily 180 tablet 0    rosuvastatin (CRESTOR) 40 MG tablet Take 1 tablet by mouth daily 90 tablet 0    Cholecalciferol (VITAMIN D3) 50 MCG (2000 UT) TABS Take 2,000 Units by mouth daily 90 tablet 0    nitroGLYCERIN (NITROSTAT) 0.4 MG SL tablet Place 0.4 mg under the tongue every 5 minutes as needed for Chest pain up to max of 3 total doses. If no relief after 1 dose, call 911.  citalopram (CELEXA) 20 MG tablet Take 20 mg by mouth daily       ammonium lactate (LAC-HYDRIN) 12 % lotion Apply topically daily       Coenzyme Q10 (CO Q-10 PO) Take by mouth daily       divalproex (DEPAKOTE ER) 500 MG extended release tablet Take 500 mg by mouth daily      hydrOXYzine (ATARAX) 25 MG tablet Take 25 mg by mouth 3 times daily as needed for Itching      butalbital-APAP-caffeine (FIORICET) -40 MG CAPS per capsule Take 1 capsule by mouth every 4 hours as needed for Headaches or Migraine 21 capsule 0    pregabalin (LYRICA) 75 MG capsule Take 1 capsule by mouth 2 times daily for 90 days. 60 capsule 2    albuterol sulfate HFA (VENTOLIN HFA) 108 (90 Base) MCG/ACT inhaler Inhale 2 puffs into the lungs 4 times daily 1 Inhaler 2     No current facility-administered medications for this visit. I will continue her current medication regimen  which is part of the above treatment schedule. It has been helping with Ms. Libia Pendleton chronic  medical problems which for this visit include: The primary encounter diagnosis was Chronic pain syndrome.  Diagnoses of Fibromyalgia, Cervicalgia, HNP (herniated nucleus pulposus), cervical, Spinal stenosis in cervical region, Chronic bilateral low back pain, unspecified whether sciatica present, Bilateral carpal tunnel syndrome, Pain of both hip joints, H/O carpal tunnel JUAN RAMON-KRISTEN     Cc: JUAN RAMON Lopez - KRISTEN

## 2020-10-01 NOTE — PATIENT INSTRUCTIONS
and sign in to your [employer] account. Enter J352 in the nuvoTV box to learn more about \"Back Stretches: Exercises. \"     If you do not have an account, please click on the \"Sign Up Now\" link. Current as of: March 2, 2020               Content Version: 12.5  © 2137-9186 Healthwise, Incorporated. Care instructions adapted under license by [facility] ([facility]). If you have questions about a medical condition or this instruction, always ask your healthcare professional. [address] any warranty or liability for your use of this information.

## 2020-10-02 NOTE — TELEPHONE ENCOUNTER
Spoke with Yin Solis @ [phone]. Pt stated that she doesn't feel like old pain doc is a good fit with her and would like a referral to a new one.

## 2020-10-05 NOTE — TELEPHONE ENCOUNTER
Spoke with Patient about [facility], state that she is unable to switch within the practice.  Gave her a few names to other Pain Medicine Doctors

## 2020-10-06 ENCOUNTER — OFFICE VISIT (OUTPATIENT)
Dept: ORTHOPEDIC SURGERY | Age: 40
End: 2020-10-06
Payer: COMMERCIAL

## 2020-10-06 VITALS — WEIGHT: 214.07 LBS | TEMPERATURE: 98.4 F | BODY MASS INDEX: 35.67 KG/M2 | HEIGHT: 65 IN

## 2020-10-06 PROCEDURE — G8484 FLU IMMUNIZE NO ADMIN: HCPCS | Performed by: INTERNAL MEDICINE

## 2020-10-06 PROCEDURE — G8428 CUR MEDS NOT DOCUMENT: HCPCS | Performed by: INTERNAL MEDICINE

## 2020-10-06 PROCEDURE — 99214 OFFICE O/P EST MOD 30 MIN: CPT | Performed by: INTERNAL MEDICINE

## 2020-10-06 PROCEDURE — G8417 CALC BMI ABV UP PARAM F/U: HCPCS | Performed by: INTERNAL MEDICINE

## 2020-10-06 PROCEDURE — 1036F TOBACCO NON-USER: CPT | Performed by: INTERNAL MEDICINE

## 2020-10-06 RX ORDER — METHYLPREDNISOLONE 4 MG/1
TABLET ORAL
Qty: 1 KIT | Refills: 0 | Status: SHIPPED | OUTPATIENT
Start: 2020-10-06 | End: 2021-04-12 | Stop reason: ALTCHOICE

## 2020-10-06 RX ORDER — MELOXICAM 15 MG/1
15 TABLET ORAL DAILY PRN
Qty: 90 TABLET | Refills: 0
Start: 2020-10-06 | End: 2021-01-15 | Stop reason: SDUPTHER

## 2020-10-06 NOTE — PROGRESS NOTES
Chief Complaint:   Chief Complaint   Patient presents with    Neck Pain     headaches after KATHRYN 9/1/2020, injection temp helped neck pain, pain has returned and HAs persist 8/10 pain          History of Present Illness:       Patient is a 44 y.o. female returns follow up for the above complaint. The patient was last seen approximately 2 monthsago. The symptoms show no change since the last visit. The patient has had no further testing for the problem. In the interim CEDI was completed as outlined below. She experienced alleviation of her neck pain for approximately 7 days, however, the procedure was complicated by new onset headache that was suggestive of a spinal headache. She presented to the ER for evaluation and was discharged to home and was instructed to follow up with the IR group. She was essentially lost to follow up and no new intervention was recommend by IR. She was prescribed 21 tabs of Fioricet. She has not been using any other analgesics. Unfortunately she continues to note headache different in character and severity form the spinal headache and less sensitive to positional change but lessened with lying supine. Headache is frontal , described as tightness/pressure mild - mod,severity, near constant, not affected by physical exertion. No autonomic features. She has no prior history of headache syndome. She denies any constitutional symptoms. Past Medical History:        Past Medical History:   Diagnosis Date    Arthritis     Depression     Diabetes mellitus ([facility] [address]     Hypertension     Tachycardia         Present Medications:         Current Outpatient Medications   Medication Sig Dispense Refill    methylPREDNISolone (MEDROL, CATARINA,) 4 MG tablet By mouth.  1 kit 0    CHANTIX 1 MG tablet TAKE 1 TABLET BY MOUTH TWICE A DAY 60 tablet 2    omeprazole (PRILOSEC) 20 MG delayed release capsule Take 1 capsule by mouth daily 30 capsule 0    ondansetron (ZOFRAN ODT) 4 MG disintegrating tablet Take 1 tablet by mouth every 8 hours as needed for Nausea 20 tablet 0    albuterol sulfate HFA (VENTOLIN HFA) 108 (90 Base) MCG/ACT inhaler Inhale 2 puffs into the lungs every 6 hours as needed for Wheezing      vitamin D (CHOLECALCIFEROL) 50 MCG (2000 UT) TABS tablet Take 1 tablet by mouth daily After finishing 12 week couse 30 tablet 11    vitamin D (ERGOCALCIFEROL) 1.25 MG (76136 UT) CAPS capsule Take 1 capsule by mouth once a week 4 capsule 2    metroNIDAZOLE (FLAGYL) 500 MG tablet TAKE 1 TABLET BY MOUTH TWICE A DAY FOR 7 DAYS      thiothixene (NAVANE) 2 MG capsule TAKE 1 CAPSULE BY MOUTH EVERY EVENING AT BEDTIME      meloxicam (MOBIC) 15 MG tablet Take 1 tablet by mouth daily 90 tablet 0    metFORMIN (GLUCOPHAGE-XR) 500 MG extended release tablet Take 2 tablets by mouth daily (with breakfast) 180 tablet 0    metoprolol tartrate (LOPRESSOR) 50 MG tablet Take 1 tablet by mouth 2 times daily 180 tablet 0    rosuvastatin (CRESTOR) 40 MG tablet Take 1 tablet by mouth daily 90 tablet 0    pregabalin (LYRICA) 75 MG capsule Take 1 capsule by mouth 2 times daily for 90 days. 60 capsule 2    Cholecalciferol (VITAMIN D3) 50 MCG (2000 UT) TABS Take 2,000 Units by mouth daily 90 tablet 0    albuterol sulfate HFA (VENTOLIN HFA) 108 (90 Base) MCG/ACT inhaler Inhale 2 puffs into the lungs 4 times daily 1 Inhaler 2    nitroGLYCERIN (NITROSTAT) 0.4 MG SL tablet Place 0.4 mg under the tongue every 5 minutes as needed for Chest pain up to max of 3 total doses. If no relief after 1 dose, call 911.       citalopram (CELEXA) 20 MG tablet Take 20 mg by mouth daily       ammonium lactate (LAC-HYDRIN) 12 % lotion Apply topically daily       Coenzyme Q10 (CO Q-10 PO) Take by mouth daily       divalproex (DEPAKOTE ER) 500 MG extended release tablet Take 500 mg by mouth daily      hydrOXYzine (ATARAX) 25 MG tablet Take 25 mg by mouth 3 times daily as needed for Itching       No current and 2 cc sterile saline were injected.  The patient tolerated the procedure without complication.           DIAGNOSIS :         C5-6 translaminar epidural injection of Celestone.              Fluoroscopy time : 0.7 minutes    Number of exposures obtained : 1    Blood loss : minimal (less than 5 cc)                Assessment   Impression: . Encounter Diagnoses   Name Primary?  Cervical discogenic pain syndrome     Spinal headache     Herniated cervical disc without myelopathy     DDD (degenerative disc disease), cervical     CS (cervical spondylosis)     Chronic daily headache Yes    Positive MATTHEW (antinuclear antibody)               Plan:       Trial of steroids - Medrol dose amanda to abort headache and oral fluid hydration  Discontinue Fiorcet and HA diary doubt medication overuse headache  CT/MRI imaging brain prn  Hold any consideration for  further cervical spine intervention injections  Consider repeat MRI evaluation cervical spine PRN  Activity modification - cervical disc protocol           Orders:      No orders of the defined types were placed in this encounter. Sravanthi More MD.      Disclaimer: \"This note was dictated with voice recognition software. Though review and correction are routine, we apologize for any errors. \"

## 2020-10-09 ENCOUNTER — VIRTUAL VISIT (OUTPATIENT)
Dept: FAMILY MEDICINE CLINIC | Age: 40
End: 2020-10-09
Payer: COMMERCIAL

## 2020-10-09 PROBLEM — J44.9 COPD WITH ASTHMA (HCC): Status: ACTIVE | Noted: 2020-10-09

## 2020-10-09 PROBLEM — E66.01 MORBIDLY OBESE (HCC): Status: ACTIVE | Noted: 2020-10-09

## 2020-10-09 PROBLEM — J44.89 COPD WITH ASTHMA: Status: ACTIVE | Noted: 2020-10-09

## 2020-10-09 PROCEDURE — 3023F SPIROM DOC REV: CPT | Performed by: CLINICAL NURSE SPECIALIST

## 2020-10-09 PROCEDURE — G8926 SPIRO NO PERF OR DOC: HCPCS | Performed by: CLINICAL NURSE SPECIALIST

## 2020-10-09 PROCEDURE — G8417 CALC BMI ABV UP PARAM F/U: HCPCS | Performed by: CLINICAL NURSE SPECIALIST

## 2020-10-09 PROCEDURE — 2022F DILAT RTA XM EVC RTNOPTHY: CPT | Performed by: CLINICAL NURSE SPECIALIST

## 2020-10-09 PROCEDURE — 3044F HG A1C LEVEL LT 7.0%: CPT | Performed by: CLINICAL NURSE SPECIALIST

## 2020-10-09 PROCEDURE — G8484 FLU IMMUNIZE NO ADMIN: HCPCS | Performed by: CLINICAL NURSE SPECIALIST

## 2020-10-09 PROCEDURE — 1036F TOBACCO NON-USER: CPT | Performed by: CLINICAL NURSE SPECIALIST

## 2020-10-09 PROCEDURE — 99214 OFFICE O/P EST MOD 30 MIN: CPT | Performed by: CLINICAL NURSE SPECIALIST

## 2020-10-09 PROCEDURE — G8427 DOCREV CUR MEDS BY ELIG CLIN: HCPCS | Performed by: CLINICAL NURSE SPECIALIST

## 2020-10-09 RX ORDER — ERGOCALCIFEROL 1.25 MG/1
50000 CAPSULE ORAL WEEKLY
Qty: 4 CAPSULE | Refills: 2 | Status: CANCELLED | OUTPATIENT
Start: 2020-10-09

## 2020-10-09 RX ORDER — OMEPRAZOLE 20 MG/1
20 CAPSULE, DELAYED RELEASE ORAL DAILY
Qty: 90 CAPSULE | Refills: 0 | Status: SHIPPED | OUTPATIENT
Start: 2020-10-09 | End: 2021-01-07

## 2020-10-09 RX ORDER — METFORMIN HYDROCHLORIDE 500 MG/1
1000 TABLET, EXTENDED RELEASE ORAL
Qty: 180 TABLET | Refills: 0 | Status: SHIPPED | OUTPATIENT
Start: 2020-10-09 | End: 2021-01-15 | Stop reason: SDUPTHER

## 2020-10-09 RX ORDER — METOPROLOL TARTRATE 50 MG/1
50 TABLET, FILM COATED ORAL 2 TIMES DAILY
Qty: 180 TABLET | Refills: 0 | Status: SHIPPED | OUTPATIENT
Start: 2020-10-09 | End: 2021-01-15 | Stop reason: SDUPTHER

## 2020-10-09 RX ORDER — CHOLECALCIFEROL (VITAMIN D3) 50 MCG
2000 TABLET ORAL DAILY
Qty: 90 TABLET | Refills: 0 | Status: SHIPPED | OUTPATIENT
Start: 2020-10-09 | End: 2021-01-15 | Stop reason: SDUPTHER

## 2020-10-09 RX ORDER — ROSUVASTATIN CALCIUM 40 MG/1
40 TABLET, COATED ORAL DAILY
Qty: 90 TABLET | Refills: 0 | Status: SHIPPED | OUTPATIENT
Start: 2020-10-09 | End: 2021-01-04

## 2020-10-09 RX ORDER — METHYLPREDNISOLONE 4 MG/1
TABLET ORAL
Qty: 1 KIT | Refills: 0 | Status: CANCELLED | OUTPATIENT
Start: 2020-10-09

## 2020-10-09 ASSESSMENT — ENCOUNTER SYMPTOMS
CHEST TIGHTNESS: 0
DIARRHEA: 0
WHEEZING: 0
BLURRED VISION: 0
SHORTNESS OF BREATH: 0
COUGH: 0
ABDOMINAL PAIN: 0
ORTHOPNEA: 0
CONSTIPATION: 0
VOMITING: 0
NAUSEA: 0

## 2020-10-09 NOTE — PROGRESS NOTES
SUBJECTIVE:    Patient ID:  Deysi Dickens is a 44 y.o. female      This visit was conducted virtually for a medication check for hypertension, hyperlipidemia, diabetes, GERD, COPD, AMOS, positive MATTHEW, fibromyalgia, chronic pain, anxiety, depression, bipolar, and PTSD. She is doing well on current regimen and has no further concerns. GERD symptoms are managed with omeprazole. Denies indigestion/heartburn, difficulty swallowing, epigastric pain, nausea, vomiting, diarrhea, constipation or blood in stool. States she has been evaluated by cardiology and will be followed annually. She is also been evaluated by pulmonology and instructed to stop Melo Gunnels and continue with albuterol as needed. She is followed by pulmonology annually for COPD and AMOS. She has established with rheumatology for positive MATTHEW and fibro. She is also is establishing with a new pain management specialist.  She has a history of anxiety, depression, bipolar and PTSD. She is followed a psychiatrist every 3 months and sees a therapist every 2-4 weeks. She is currently taking Celexa, Depakote, thiothixene and hydroxyzine as needed. Currently denies thoughts of self-harm, suicidal or homicidal ideations. She also has a history or alcoholism for 24 years, she has been sober for 4 years. States at her heaviest she was drinking half a gallon of liquor and a 30 pack of beer most days. States she stopped smoking approximately 2 months ago and has been walking more. Hypertension   This is a chronic problem. The current episode started more than 1 year ago. The problem is unchanged. The problem is controlled. Pertinent negatives include no anxiety, blurred vision, chest pain, headaches, orthopnea, palpitations, peripheral edema or shortness of breath. Risk factors for coronary artery disease include dyslipidemia, diabetes mellitus, obesity, sedentary lifestyle and smoking/tobacco exposure. Past treatments include beta blockers.  The current treatment provides significant improvement. Hyperlipidemia   This is a chronic problem. The current episode started more than 1 year ago. The problem is uncontrolled. Recent lipid tests were reviewed and are low. Exacerbating diseases include diabetes and obesity. Pertinent negatives include no chest pain, focal sensory loss, focal weakness, leg pain, myalgias or shortness of breath. Current antihyperlipidemic treatment includes statins. The current treatment provides significant improvement of lipids. Risk factors for coronary artery disease include hypertension, diabetes mellitus, dyslipidemia, a sedentary lifestyle and obesity. Diabetes   She presents for her follow-up diabetic visit. She has type 2 diabetes mellitus. Pertinent negatives for hypoglycemia include no headaches or nervousness/anxiousness. Pertinent negatives for diabetes include no blurred vision, no chest pain, no foot paresthesias, no polydipsia, no polyphagia and no polyuria. Risk factors for coronary artery disease include diabetes mellitus, dyslipidemia, hypertension, sedentary lifestyle, tobacco exposure and obesity. Current diabetic treatment includes oral agent (monotherapy). Her weight is stable. There is no change in her home blood glucose trend. Current Outpatient Medications on File Prior to Visit   Medication Sig Dispense Refill    methylPREDNISolone (MEDROL, CATARINA,) 4 MG tablet By mouth.  1 kit 0    meloxicam (MOBIC) 15 MG tablet Take 1 tablet by mouth daily as needed for Pain 90 tablet 0    ondansetron (ZOFRAN ODT) 4 MG disintegrating tablet Take 1 tablet by mouth every 8 hours as needed for Nausea 20 tablet 0    albuterol sulfate HFA (VENTOLIN HFA) 108 (90 Base) MCG/ACT inhaler Inhale 2 puffs into the lungs every 6 hours as needed for Wheezing      vitamin D (ERGOCALCIFEROL) 1.25 MG (48987 UT) CAPS capsule Take 1 capsule by mouth once a week 4 capsule 2    thiothixene (NAVANE) 2 MG capsule TAKE 1 CAPSULE BY MOUTH EVERY EVENING AT BEDTIME      nitroGLYCERIN (NITROSTAT) 0.4 MG SL tablet Place 0.4 mg under the tongue every 5 minutes as needed for Chest pain up to max of 3 total doses. If no relief after 1 dose, call 911.  citalopram (CELEXA) 20 MG tablet Take 20 mg by mouth daily       ammonium lactate (LAC-HYDRIN) 12 % lotion Apply topically daily       Coenzyme Q10 (CO Q-10 PO) Take by mouth daily       divalproex (DEPAKOTE ER) 500 MG extended release tablet Take 500 mg by mouth daily      hydrOXYzine (ATARAX) 25 MG tablet Take 25 mg by mouth 3 times daily as needed for Itching      CHANTIX 1 MG tablet TAKE 1 TABLET BY MOUTH TWICE A DAY (Patient not taking: Reported on 10/9/2020) 60 tablet 2    metroNIDAZOLE (FLAGYL) 500 MG tablet TAKE 1 TABLET BY MOUTH TWICE A DAY FOR 7 DAYS      pregabalin (LYRICA) 75 MG capsule Take 1 capsule by mouth 2 times daily for 90 days. 60 capsule 2    Cholecalciferol (VITAMIN D3) 50 MCG (2000 UT) TABS Take 2,000 Units by mouth daily (Patient not taking: Reported on 10/9/2020) 90 tablet 0    albuterol sulfate HFA (VENTOLIN HFA) 108 (90 Base) MCG/ACT inhaler Inhale 2 puffs into the lungs 4 times daily 1 Inhaler 2     No current facility-administered medications on file prior to visit.        Past Medical History:   Diagnosis Date    Arthritis     Depression     Diabetes mellitus ([facility] [address]     Hypertension     Tachycardia      Past Surgical History:   Procedure Laterality Date    ANKLE FUSION Bilateral     ARTHRODESIS Left 12/6/2019    REVISION OF TALONAVICULAR JOINT ARTHRODESIS LEFT FOOT  -SLEEP APNEA- performed by Lauren Jones DPM at [facility] Bilateral     ELBOW SURGERY Bilateral     ulnar nerve release    HERNIA REPAIR      HIP ARTHROSCOPY Bilateral     TONSILLECTOMY AND ADENOIDECTOMY      WRIST GANGLION EXCISION Bilateral      Family History   Problem Relation Age of Onset    High Blood Pressure Mother     Arthritis Mother    [employer] Other Father hypothyroidism    High Blood Pressure Father     Arthritis Father     Asthma Father     Heart Failure Maternal Aunt         22 stents.      Heart Failure Maternal Grandmother     Pacemaker Maternal Grandfather     Heart Surgery Paternal Grandmother     Pacemaker Paternal Grandfather      Social History     Socioeconomic History    Marital status:      Spouse name: Not on file    Number of children: Not on file    Years of education: Not on file    Highest education level: Not on file   Occupational History    Not on file   Social Needs    Financial resource strain: Not on file    Food insecurity     Worry: Not on file     Inability: Not on file    Transportation needs     Medical: Not on file     Non-medical: Not on file   Tobacco Use    Smoking status: Former Smoker     Packs/day: 1.00     Years: 27.00     Pack years: 27.00     Start date:      Last attempt to quit: 2019     Years since quittin.8    Smokeless tobacco: Never Used    Tobacco comment: started to smoke at 15 / smoked up to 1 ppd / now smoking 3 to 4 cigarettes a day   Substance and Sexual Activity    Alcohol use: Never     Frequency: Never    Drug use: Never    Sexual activity: Not on file   Lifestyle    Physical activity     Days per week: Not on file     Minutes per session: Not on file    Stress: Not on file   Relationships    Social connections     Talks on phone: Not on file     Gets together: Not on file     Attends Taoism service: Not on file     Active member of club or organization: Not on file     Attends meetings of clubs or organizations: Not on file     Relationship status: Not on file    Intimate partner violence     Fear of current or ex partner: Not on file     Emotionally abused: Not on file     Physically abused: Not on file     Forced sexual activity: Not on file   Other Topics Concern    Not on file   Social History Narrative    Not on file       Review of Systems   Constitutional: Negative for chills and fever. Eyes: Negative for blurred vision and visual disturbance. Respiratory: Negative for cough, chest tightness, shortness of breath and wheezing. Cardiovascular: Negative for chest pain, palpitations, orthopnea and leg swelling. Gastrointestinal: Negative for abdominal pain, constipation, diarrhea, nausea and vomiting. Endocrine: Negative for polydipsia, polyphagia and polyuria. Musculoskeletal: Negative for arthralgias and myalgias. Back pain: unchanged. Skin: Negative for rash. Neurological: Negative for focal weakness and headaches. Psychiatric/Behavioral: Negative for dysphoric mood, self-injury, sleep disturbance and suicidal ideas. The patient is not nervous/anxious. OBJECTIVE:    Physical Exam  Vitals signs and nursing note reviewed. Constitutional:       General: She is not in acute distress. Appearance: Normal appearance. She is not ill-appearing, toxic-appearing or diaphoretic. HENT:      Head: Normocephalic and atraumatic. Right Ear: External ear normal.      Left Ear: External ear normal.      Nose: Nose normal.      Mouth/Throat:      Mouth: Mucous membranes are moist.   Eyes:      Extraocular Movements: Extraocular movements intact. Conjunctiva/sclera: Conjunctivae normal.      Pupils: Pupils are equal, round, and reactive to light. Neck:      Musculoskeletal: Normal range of motion. Pulmonary:      Effort: Pulmonary effort is normal. No respiratory distress. Musculoskeletal: Normal range of motion. Skin:     General: Skin is dry. Coloration: Skin is not pale. Findings: No rash. Neurological:      Mental Status: She is alert and oriented to person, place, and time. Psychiatric:         Mood and Affect: Mood normal.         Behavior: Behavior normal.       There were no vitals taken for this visit.    BP Readings from Last 3 Encounters:   09/01/20 (!) 145/68   07/13/20 98/68   07/09/20 119/82      Wt Readings from Last 3 Encounters:   10/06/20 214 lb 1.1 oz (97.1 kg)   09/01/20 214 lb (97.1 kg)   08/04/20 214 lb 1.1 oz (97.1 kg)       ASSESSMENT & PLAN:    1. Essential hypertension  - Stable, continue current regimen  - metoprolol tartrate (LOPRESSOR) 50 MG tablet; Take 1 tablet by mouth 2 times daily  Dispense: 180 tablet; Refill: 0  - CBC Auto Differential; Future  - Comprehensive Metabolic Panel; Future  - TSH with Reflex; Future    2. Mixed hyperlipidemia  - Stable, continue current treatment   - rosuvastatin (CRESTOR) 40 MG tablet; Take 1 tablet by mouth daily  Dispense: 90 tablet; Refill: 0  - CBC Auto Differential; Future  - Comprehensive Metabolic Panel; Future  - Lipid Panel; Future  - CK; Future  - Reviewed cholesterol, diet    3. Type 2 diabetes mellitus without complication, without long-term current use of insulin (HCC)  - Stable, continue current regimen   - metFORMIN (GLUCOPHAGE-XR) 500 MG extended release tablet; Take 2 tablets by mouth daily (with breakfast)  Dispense: 180 tablet; Refill: 0  - CBC Auto Differential; Future  - Comprehensive Metabolic Panel; Future  - Hemoglobin A1C; Future  - Reviewed diabetic diet     4. Gastroesophageal reflux disease, unspecified whether esophagitis present  - Stable, continue current regimen   - omeprazole (PRILOSEC) 20 MG delayed release capsule; Take 1 capsule by mouth daily  Dispense: 90 capsule; Refill: 0  - CBC Auto Differential; Future  - Reviewed GERD precautions    5. AMOS (obstructive sleep apnea)  - Stable, continue nightly cpap usage  - Follow up with pulmonology as directed     6. 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[facility] [address]  - Stable, continue current regimen   - Follow up with pulmonology as directed     7. Positive MATTHEW (antinuclear antibody)  - Stable, continue current regimen  - Follow-up with rheumatology as directed    8. Fibromyalgia  - Stable, continue current regimen  - Follow-up with rheumatology as directed    9.  Other chronic pain  - Stable, continue current regimen - Follow up with pain management as scheduled    10. Vitamin D deficiency  - Stable, continue D3 supplementation  - vitamin D (CHOLECALCIFEROL) 50 MCG (2000 UT) TABS tablet; Take 1 tablet by mouth daily  Dispense: 90 tablet; Refill: 0    11. Current tobacco use  - Stopped 2 months ago  - Encouraged continued smoking cessation efforts    12. Anxiety  - Stable, continue current regimen  - Follow-up with therapist and psychiatrist as directed    13. Other depression  - Stable, continue current regimen  - Follow-up with therapist and psychiatrist as directed    14. Bipolar 1 disorder ([facility] [address]  - Stable, continue current regimen  - Follow-up with therapist and psychiatrist as directed    15. PTSD (post-traumatic stress disorder)  - Stable, continue current regimen  - Follow-up with therapist and psychiatrist as directed      Continue current treatment plan. Current Outpatient Medications   Medication Sig Dispense Refill    metFORMIN (GLUCOPHAGE-XR) 500 MG extended release tablet Take 2 tablets by mouth daily (with breakfast) 180 tablet 0    omeprazole (PRILOSEC) 20 MG delayed release capsule Take 1 capsule by mouth daily 90 capsule 0    metoprolol tartrate (LOPRESSOR) 50 MG tablet Take 1 tablet by mouth 2 times daily 180 tablet 0    rosuvastatin (CRESTOR) 40 MG tablet Take 1 tablet by mouth daily 90 tablet 0    vitamin D (CHOLECALCIFEROL) 50 MCG (2000 UT) TABS tablet Take 1 tablet by mouth daily 90 tablet 0    methylPREDNISolone (MEDROL, CATARINA,) 4 MG tablet By mouth.  1 kit 0    meloxicam (MOBIC) 15 MG tablet Take 1 tablet by mouth daily as needed for Pain 90 tablet 0    ondansetron (ZOFRAN ODT) 4 MG disintegrating tablet Take 1 tablet by mouth every 8 hours as needed for Nausea 20 tablet 0    albuterol sulfate HFA (VENTOLIN HFA) 108 (90 Base) MCG/ACT inhaler Inhale 2 puffs into the lungs every 6 hours as needed for Wheezing      vitamin D (ERGOCALCIFEROL) 1.25 MG (53258 UT) CAPS capsule Take 1 capsule by mouth once a week 4 capsule 2    thiothixene (NAVANE) 2 MG capsule TAKE 1 CAPSULE BY MOUTH EVERY EVENING AT BEDTIME      nitroGLYCERIN (NITROSTAT) 0.4 MG SL tablet Place 0.4 mg under the tongue every 5 minutes as needed for Chest pain up to max of 3 total doses. If no relief after 1 dose, call 911.  citalopram (CELEXA) 20 MG tablet Take 20 mg by mouth daily       ammonium lactate (LAC-HYDRIN) 12 % lotion Apply topically daily       Coenzyme Q10 (CO Q-10 PO) Take by mouth daily       divalproex (DEPAKOTE ER) 500 MG extended release tablet Take 500 mg by mouth daily      hydrOXYzine (ATARAX) 25 MG tablet Take 25 mg by mouth 3 times daily as needed for Itching      CHANTIX 1 MG tablet TAKE 1 TABLET BY MOUTH TWICE A DAY (Patient not taking: Reported on 10/9/2020) 60 tablet 2    metroNIDAZOLE (FLAGYL) 500 MG tablet TAKE 1 TABLET BY MOUTH TWICE A DAY FOR 7 DAYS      pregabalin (LYRICA) 75 MG capsule Take 1 capsule by mouth 2 times daily for 90 days. 60 capsule 2    Cholecalciferol (VITAMIN D3) 50 MCG (2000 UT) TABS Take 2,000 Units by mouth daily (Patient not taking: Reported on 10/9/2020) 90 tablet 0    albuterol sulfate HFA (VENTOLIN HFA) 108 (90 Base) MCG/ACT inhaler Inhale 2 puffs into the lungs 4 times daily 1 Inhaler 2     No current facility-administered medications for this visit. Return in about 3 months (around 1/9/2021), or if symptoms worsen or fail to improve, for HTN, lipidemia, diabetes, GERD,AMOS, positive MATTHEW, fibro, chronic pain, anxiety, depression, bipolar, PTSD. Min Qureshi received counseling on the following healthy behaviors: nutrition, exercise, medication adherence and tobacco cessation    Discussed use, benefit, and side effects of prescribed medications. Barriers to medication compliance addressed. All patient questions answered. Pt voiced understanding. Call office if experience side effects from medications.       Please note that some or all of this record was generated using voice recognition software. If there are any questions about the content of this document, please contact the author as some errors in transcription may have occurred. Armando Zaman is a 44 y.o. female being evaluated by a Virtual Visit (video visit) encounter to address concerns as mentioned above. A caregiver was present when appropriate. Due to this being a TeleHealth encounter (During [facility] public health emergency), evaluation of the following organ systems was limited: Vitals/Constitutional/EENT/Resp/CV/GI//MS/Neuro/Skin/Heme-Lymph-Imm. Pursuant to the emergency declaration under the [address] [place] [address], [address] [place] [address] authority and the [employer] and [employer] Act, this Virtual Visit was conducted with patient's (and/or legal guardian's) consent, to reduce the patient's risk of exposure to COVID-19 and provide necessary medical care. The patient (and/or legal guardian) has also been advised to contact this office for worsening conditions or problems, and seek emergency medical treatment and/or call 911 if deemed necessary. Patient identification was verified at the start of the visit: Yes    Total time spent for this encounter: Not billed by time    Services were provided through a video synchronous discussion virtually to substitute for in-person clinic visit. Patient and provider were located at their individual homes. --JUAN RAMON Trevino CNP on 10/9/2020 at 9:44 AM    An electronic signature was used to authenticate this note.

## 2020-10-12 ENCOUNTER — NURSE ONLY (OUTPATIENT)
Dept: FAMILY MEDICINE CLINIC | Age: 40
End: 2020-10-12
Payer: COMMERCIAL

## 2020-10-12 PROCEDURE — 90686 IIV4 VACC NO PRSV 0.5 ML IM: CPT | Performed by: CLINICAL NURSE SPECIALIST

## 2020-10-12 PROCEDURE — 90471 IMMUNIZATION ADMIN: CPT | Performed by: CLINICAL NURSE SPECIALIST

## 2020-10-12 NOTE — PROGRESS NOTES
Vaccine Information Sheet, \"Influenza - Inactivated\"  given to Stan Bloom, or parent/legal guardian of  Stan Bloom and verbalized understanding. Patient responses:    Have you ever had a reaction to a flu vaccine? NO  Do you have any current illness? NO  Have you ever had Guillian [place] Syndrome? NO  Do you have a serious allergy to any of the follow: Neomycin, Polymyxin, Thimerosal, eggs or egg products? NO  Flu vaccine given per order. Please see immunization tab. Risks and benefits explained. Current VIS given.

## 2020-10-27 ENCOUNTER — OFFICE VISIT (OUTPATIENT)
Dept: ORTHOPEDIC SURGERY | Age: 40
End: 2020-10-27
Payer: COMMERCIAL

## 2020-10-27 VITALS — TEMPERATURE: 98.7 F | WEIGHT: 214.07 LBS | HEIGHT: 65 IN | BODY MASS INDEX: 35.67 KG/M2

## 2020-10-27 PROCEDURE — 1036F TOBACCO NON-USER: CPT | Performed by: INTERNAL MEDICINE

## 2020-10-27 PROCEDURE — G8482 FLU IMMUNIZE ORDER/ADMIN: HCPCS | Performed by: INTERNAL MEDICINE

## 2020-10-27 PROCEDURE — G8417 CALC BMI ABV UP PARAM F/U: HCPCS | Performed by: INTERNAL MEDICINE

## 2020-10-27 PROCEDURE — 99213 OFFICE O/P EST LOW 20 MIN: CPT | Performed by: INTERNAL MEDICINE

## 2020-10-27 PROCEDURE — G8427 DOCREV CUR MEDS BY ELIG CLIN: HCPCS | Performed by: INTERNAL MEDICINE

## 2020-10-27 NOTE — PROGRESS NOTES
Chief Complaint:   Chief Complaint   Patient presents with    Neck Pain     headaches alleviated w/steroid pack, now only occ, neck/arm pain returned to 9/10, new pain jjt           History of Present Illness:       Patient is a 36 y.o. female returns follow up for the above complaint. The patient was last seen approximately 3 weeksago. The symptoms are improving since the last visit. The patient has had no further testing for the problem. Her headache has significantly improved with a course of steroids. The chronic daily headache has resolved. Her neck pain remains problematic stable. Stabbing and burning quality of pain posterior neck upper trapezius bilaterally with continuous fleeting radiation into the upper extremities bilaterally at times. She denies any new onset gait disturbance. She denies any new onset lower limb symptoms    She denies any constitutional symptoms of fever or chills. There is no symptoms of meningismus that are active at this time    She rates her neck pain is 8/10    Dry needling treatment has never been attempted as a treatment option in physical therapy but she does recall having therapeutic benefit from electrical stimulation.          Past Medical History:        Past Medical History:   Diagnosis Date    Arthritis     Depression     Diabetes mellitus (HCC)     Hypertension     Tachycardia         Present Medications:         Current Outpatient Medications   Medication Sig Dispense Refill    metFORMIN (GLUCOPHAGE-XR) 500 MG extended release tablet Take 2 tablets by mouth daily (with breakfast) 180 tablet 0    omeprazole (PRILOSEC) 20 MG delayed release capsule Take 1 capsule by mouth daily 90 capsule 0    metoprolol tartrate (LOPRESSOR) 50 MG tablet Take 1 tablet by mouth 2 times daily 180 tablet 0    rosuvastatin (CRESTOR) 40 MG tablet Take 1 tablet by mouth daily 90 tablet 0    vitamin D (CHOLECALCIFEROL) 50 MCG (2000 UT) TABS tablet Take 1 tablet by mouth daily 90 tablet 0    methylPREDNISolone (MEDROL, CATARINA,) 4 MG tablet By mouth. 1 kit 0    meloxicam (MOBIC) 15 MG tablet Take 1 tablet by mouth daily as needed for Pain 90 tablet 0    CHANTIX 1 MG tablet TAKE 1 TABLET BY MOUTH TWICE A DAY (Patient not taking: Reported on 10/9/2020) 60 tablet 2    ondansetron (ZOFRAN ODT) 4 MG disintegrating tablet Take 1 tablet by mouth every 8 hours as needed for Nausea 20 tablet 0    albuterol sulfate HFA (VENTOLIN HFA) 108 (90 Base) MCG/ACT inhaler Inhale 2 puffs into the lungs every 6 hours as needed for Wheezing      vitamin D (ERGOCALCIFEROL) 1.25 MG (43057 UT) CAPS capsule Take 1 capsule by mouth once a week 4 capsule 2    metroNIDAZOLE (FLAGYL) 500 MG tablet TAKE 1 TABLET BY MOUTH TWICE A DAY FOR 7 DAYS      thiothixene (NAVANE) 2 MG capsule TAKE 1 CAPSULE BY MOUTH EVERY EVENING AT BEDTIME      pregabalin (LYRICA) 75 MG capsule Take 1 capsule by mouth 2 times daily for 90 days. 60 capsule 2    Cholecalciferol (VITAMIN D3) 50 MCG (2000 UT) TABS Take 2,000 Units by mouth daily (Patient not taking: Reported on 10/9/2020) 90 tablet 0    albuterol sulfate HFA (VENTOLIN HFA) 108 (90 Base) MCG/ACT inhaler Inhale 2 puffs into the lungs 4 times daily 1 Inhaler 2    nitroGLYCERIN (NITROSTAT) 0.4 MG SL tablet Place 0.4 mg under the tongue every 5 minutes as needed for Chest pain up to max of 3 total doses. If no relief after 1 dose, call 911.  citalopram (CELEXA) 20 MG tablet Take 20 mg by mouth daily       ammonium lactate (LAC-HYDRIN) 12 % lotion Apply topically daily       Coenzyme Q10 (CO Q-10 PO) Take by mouth daily       divalproex (DEPAKOTE ER) 500 MG extended release tablet Take 500 mg by mouth daily      hydrOXYzine (ATARAX) 25 MG tablet Take 25 mg by mouth 3 times daily as needed for Itching       No current facility-administered medications for this visit. Allergies:         Allergies   Allergen Reactions    Cinnamon Itching    Codeine Hives and Itching           Review of Systems:    Pertinent items are noted in HPI        Vital Signs:      Vitals:    10/27/20 1651   Temp: 98.7 °F (37.1 °C)        General Exam:     Constitutional: Patient is adequately groomed with no evidence of malnutrition    Physical Exam: Cervical neck      Primary Exam:    Inspection: No deformity atrophy appreciable curvature      Palpation: Mild tenderness posterior neck and upper trapezius regions without focal trigger points      Range of Motion: Mildly restricted in extension and rotation and near full range in flexion low-grade discomfort globally      Strength: Normal upper extremity      Special Tests: Negative [place]'s; no nuchal rigidity      Skin: There are no rashes, ulcerations or lesions. Gait: Nonantalgic    Neurovascular - non focal and intact       Additional Comments:        Additional Examinations:                Office Imaging Results/Procedures PerformedToday:             Office Procedures:     Orders Placed This Encounter   Procedures    OT electric stimulation     home TENS unit     Standing Status:   Future     Standing Expiration Date:   10/27/2021           Other Outside Imaging and Testing Personally Reviewed:      Site: [facility] #: [id number] #: [id number] Procedure: MR Cervical Spine w/o Contrast ; Reason for Exam: Dx: cerical strain, spinal stenosis, r/o stenosis, HNP per script    This document is confidential medical information.  Unauthorized disclosure or use of this information is prohibited by law. If you are not the intended recipient of this document, please advise us by calling immediately [phone].         [facility] [address], [address] [place] [address] Road              Patient Name: Raheem Perez    Case ID: [id number]    Patient : 1980    Referring Physician: Sea Hollis MD    Exam Date: 2020    Exam Description: MR Cervical Spine w/o Contrast              HISTORY:  Neck pain since 2010 with pain worse with time and activity. Clifton Porras goes into both    arms with associated numbness.         TECHNICAL FACTORS:  Long- and short-axis fat- and water-weighted images were performed.         COMPARISON:  None.         FINDINGS:  The vertebral body heights are well maintained without dominant anterior wedging or    compression.  Craniocervical junction is unremarkable.  C1-2 articulation is normal in    appearance.  No altered bone marrow signal is appreciated.         C2-3: Desiccation of the disc, a central protrusion and facet hypertrophy without compressive    discopathy.         C3-4: Desiccation of the disc, retrolisthesis, a central/left paracentral protrusion resulting    in flattening of the ventral cord.  There is also an underlying shallow disc displacement and    facet hypertrophy contributing to mild to moderate right and moderate left-sided exiting neural     foraminal stenosis with abutment of bilateral exiting C4 nerves with possible compression on    the left.         C4-5: Desiccation of the disc, a central protrusion contribute to contouring of the ventral    cord.  There is also facet hypertrophy contributing to mild left and mild to moderate    right-sided exiting neural foraminal stenosis and abutment of the exiting right C5 nerve.         C5-6: Desiccation of the disc, a left paracentral protrusion results in flattening of the    ventral cord.  There is also facet hypertrophy contributing to mild right-sided exiting neural    foraminal stenosis.         C6-7: Desiccation of the disc, a central protrusion with contouring of the ventral cord.  There     is also facet hypertrophy at this level.         C7-T1: There is facet hypertrophy contributing to mild exiting neural foraminal stenosis.         T1-2: A shallow bilobed broad-based disc displacement and associated facet hypertrophy    contribute to mild exiting neural foraminal stenosis.                           CONCLUSION:    1. A central/left paracentral protrusion at the C3-4 level as well as a left paracentral    protrusion at the C5-6 level resulting in flattening of the cord at each of these levels. There     is also facet hypertrophy at these levels with abutment of bilateral exiting C4 nerves with    possible compression on the left. 2. Central protrusions at the C4-5 and C6-7 levels contribute to contouring of the cord at each     of these levels.         Thank you for the opportunity to provide your interpretation.                   Wilmer Calderon MD         A: AF/ct 07/19/2020 6:17 PM    T: CT 07/18/2020 6:45 PM              Assessment   Impression: . Encounter Diagnoses   Name Primary?  Chronic daily headache     Spinal headache     Disc displacement, cervical     CS (cervical spondylosis)     DDD (degenerative disc disease), cervical     Positive MATTHEW (antinuclear antibody)               Plan:       Continue IHEP and local measures for symptom control-reengage in physical therapy and consider trial dry needling treatments as needed  Trial of electrical stimulation-TENS unit as this was therapeutic for her in the past  Hold any further cervical spine intervention at this time and consider repeat MRI cervical spine as needed prior to any further repeat cervical intervention  Active modification cervical disc protocol      Her headache was multifactorial more than likely related to resolving spinal headache and medication overuse headache and component of cervicogenic headache proceed as outlined above       Orders:        Orders Placed This Encounter   Procedures    OT electric stimulation     home TENS unit     Standing Status:   Future     Standing Expiration Date:   10/27/2021         Shabana Hall MD.      Disclaimer: \"This note was dictated with voice recognition software. Though review and correction are routine, we apologize for any errors. \"

## 2020-10-29 ENCOUNTER — TELEPHONE (OUTPATIENT)
Dept: PAIN MANAGEMENT | Age: 40
End: 2020-10-29

## 2020-11-18 ENCOUNTER — VIRTUAL VISIT (OUTPATIENT)
Dept: PULMONOLOGY | Age: 40
End: 2020-11-18
Payer: COMMERCIAL

## 2020-11-18 PROCEDURE — 99442 PR PHYS/QHP TELEPHONE EVALUATION 11-20 MIN: CPT | Performed by: NURSE PRACTITIONER

## 2020-11-18 ASSESSMENT — SLEEP AND FATIGUE QUESTIONNAIRES
ESS TOTAL SCORE: 16
HOW LIKELY ARE YOU TO NOD OFF OR FALL ASLEEP WHILE SITTING QUIETLY AFTER LUNCH WITHOUT ALCOHOL: 2
HOW LIKELY ARE YOU TO NOD OFF OR FALL ASLEEP IN A CAR, WHILE STOPPED FOR A FEW MINUTES IN TRAFFIC: 1
HOW LIKELY ARE YOU TO NOD OFF OR FALL ASLEEP WHEN YOU ARE A PASSENGER IN A CAR FOR AN HOUR WITHOUT A BREAK: 3
HOW LIKELY ARE YOU TO NOD OFF OR FALL ASLEEP WHILE LYING DOWN TO REST IN THE AFTERNOON WHEN CIRCUMSTANCES PERMIT: 2
HOW LIKELY ARE YOU TO NOD OFF OR FALL ASLEEP WHILE WATCHING TV: 2
HOW LIKELY ARE YOU TO NOD OFF OR FALL ASLEEP WHILE SITTING AND TALKING TO SOMEONE: 1
HOW LIKELY ARE YOU TO NOD OFF OR FALL ASLEEP WHILE SITTING INACTIVE IN A PUBLIC PLACE: 3
HOW LIKELY ARE YOU TO NOD OFF OR FALL ASLEEP WHILE SITTING AND READING: 2

## 2020-11-18 NOTE — ASSESSMENT & PLAN NOTE
Reviewed compliance download with pt. Supplies and parts as needed for her machine. These are medically necessary. Continue medications per her PCP and other physicians. Limit caffeine use after 3pm.  Encouraged her to work on weight loss through diet and exercise. Diagnoses of Type 2 diabetes mellitus without complication, without long-term current use of insulin ([address] Morbidly obese ([address] Essential hypertension, and 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[facility] were pertinent to this visit. The chronic medical conditions listed are directly related to the primary diagnosis listed above. The management of the primary diagnosis affects the secondary diagnosis and vice versa.

## 2020-11-18 NOTE — PROGRESS NOTES
Ashley Paytiffanie         : 1980    Diagnosis: [x] AMOS (G47.33) [] CSA (G47.31) [] Apnea (G47.30)   Length of Need: [x] 12 Months [] 99 Months [] Other:    Machine (PRAKASH!): [x] Respironics Dream Station      Auto [] ResMed AirSense     Auto [] Other:     []  CPAP () [x] Bilevel ()   Mode: [] Auto [] Spontaneous    Mode: [x] Auto [] Spontaneous               Pressure change only    EPAP 11   PSmin 2             Comfort Settings:   - Ramp Pressure:  cmH2O                                        - Ramp time: 15 min                                     -  Flex/EPR - 3 full time                                    - For ResMed Bilevel (TiMax-4 sec   TiMin- 0.2 sec)     Humidifier: [x] Heated ()        [x] Water chamber replacement ()/ 1 per 6 months        Mask:   [] Nasal () /1 per 3 months [] Full Face () /1 per 3 months   [] Patient choice -Size and fit mask [] Patient Choice - Size and fit mask   [] Dispense:  [] Dispense:    [] Headgear () / 1 per 3 months [] Headgear () / 1 per 3 months   [] Replacement Nasal Cushion ()/2 per month [] Interface Replacement ()/1 per month   [] Replacement Nasal Pillows ()/2 per month         Tubing: [x] Heated ()/1 per 3 months    [] Standard ()/1 per 3 months [] Other:           Filters: [x] Non-disposable ()/1 per 6 months     [x] Ultra-Fine, Disposable ()/2 per month        Miscellaneous: [] Chin Strap ()/ 1 per 6 months [] O2 bleed-in:       LPM   [] Oximetry on CPAP/Bilevel []  Other:    [x] Modem: ()         Start Order Date: 20    MEDICAL JUSTIFICATION:  I, the undersigned, certify that the above prescribed supplies are medically necessary for this patients wellbeing. In my opinion, the supplies are both reasonable and necessary in reference to accepted standards of medicalpractice in treatment of this patients condition.     JUAN RAMON Meeks - KRISTEN      NPI: [id number]

## 2020-11-18 NOTE — PROGRESS NOTES
Breana Turpin MD, FAASM, [facility]  Geremias Spain, MSN, RN, CNP     [address] [facility]  [place] [address] [address] [facility] [address]  Dept: [phone]  Dept Fax: [phone]: [facility]  [address] [place] [id number] [phone]    Subjective:     Patient ID: Stan Bloom is a 36 y.o. female. Chief Complaint   Patient presents with    Sleep Apnea       HPI:      Sleep Medicine Telephone Visit    Pursuant to the emergency declaration under the [facility] waiver authority and the [employer] Act this Telephone Visit was insisted, with patient's consent, to reduce the patient's risk of exposure to COVID-19 and provide continuity of care for an established patient. Services were provided through a synchronous discussion over a telephone and/or Video chat to substitute for in-person clinic visit, and coded as such. While patient is at home. Machine Modem/Download Info:  Compliance (hours/night): 3.25 hrs/night  Download AHI (/hour): 3.1 /HR     Average IPAP Pressure: 22.7 cmH2O  Average EPAP Pressure: 17.2 cmH2O         AUTO BIPAP - Settings (Ezio)  IPAP Max: 25 cmH2O  EPAP Min: 17 cmH2O  Pressure Support Min: 4  Pressure Support Max: 8             Comfort Settings  Humidity Level (0-8): 5  Flex/EPR (0-3): 3 PAP Mask  Mask Type: Full Face mask     [place] - Total score: 16    Follow-up :     Last Visit : August 2020    Split night study done on 9/30/2020 with AHI 81 low O2 62% time less then 90% 88.9 min consistent with severe obstructive sleep apnea      Patient reports the listed chronic Co-morbidities: DM, HTN, COPD, Obesity    are well controlled and stable at this time.      Subjective Health Changes: None      Over Night Oximetry: [] Yes  [] No  [x] NA [] WNL Using O2: [] Yes  [] No  [x] NA   Patient is compliant with the machine  [] Yes  [x] No   Feeling rested when using the machine   [] Yes  [x] No     Pressure is comfortable with inspiration and expiration  [] Yes  [x] No     Noticed changes in pressure   [] Yes  [] No  [x] NA   Mask is fitting well  [x] Yes  [] No   Noting Mask Air Leak  [x] Yes  [] No   Having painful Aerophagia  [x] Yes  [] No   Nocturia   1-2  per night. Having  HA upon waking  [x] Yes  [] No   Dry mouth upon waking   Dry Nose  Dry Eyes  [x] Yes  [] No   Congestion upon waking   [] Yes  [x] No    Nose Bleeds  [] Yes  [x] No   Using Sleep Aides    [x] NA   Understands how to change humidification and/or tubing temperature for comfort while at home  [x] Yes  [] No     Difficulties falling asleep  [] Yes  [x] No   Difficulties staying asleep  [x] Yes  [] No   Approximate time to bed  9-10pm   Approximate wake time  5:30am   Taking Naps  no   If taking naps usual length    [x] NA   If taking naps using the machine  [] Yes  [] No  [x] NA [] With and With out    Drowsy when driving  [] Yes  [x] No     Does patient carry a DOT/CDL  [] Yes  [x] No     Does patient carry FAA/[employer] License   [] Yes  [x] No      Any concerns noted with the machine at this time  [] Yes  [x] No        Diagnosis Orders   1. AMOS (obstructive sleep apnea)     2. Type 2 diabetes mellitus without complication, without long-term current use of insulin ([facility]     3. Morbidly obese ([facility]     4. Essential hypertension     5. 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[facility]         The chronic medical conditions listed are directly related to the primary diagnosis listed above. The management of the primary diagnosis affects the secondary diagnosis and vice versa. Assessment/Plan:     Type 2 diabetes mellitus without complication, without long-term current use of insulin (HCC)  Chronic- Stable. Cont meds per PCP and other physicians. Morbidly obese (HCC)  Chronic-Stable.   Encouraged her to work on weight loss through diet and exercise. Essential hypertension  Chronic- Stable. Cont meds per PCP and other physicians. 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[address]  Chronic- Stable. Cont meds per PCP and other physicians. AMOS (obstructive sleep apnea)  Reviewed compliance download with pt. Supplies and parts as needed for her machine. These are medically necessary. Continue medications per her PCP and other physicians. Limit caffeine use after 3pm.  Encouraged her to work on weight loss through diet and exercise. Diagnoses of Type 2 diabetes mellitus without complication, without long-term current use of insulin ([address] Morbidly obese ([address] Essential hypertension, and 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[address] were pertinent to this visit. The chronic medical conditions listed are directly related to the primary diagnosis listed above. The management of the primary diagnosis affects the secondary diagnosis and vice versa. The primary encounter diagnosis was AMOS (obstructive sleep apnea). Diagnoses of Type 2 diabetes mellitus without complication, without long-term current use of insulin ([address] Morbidly obese ([address] Essential hypertension, and 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[address] were also pertinent to this visit. The chronic medical conditions listed are directly related to the primary diagnosis listed above. The management of the primary diagnosis affects the secondary diagnosis and vice versa. - Educated patient and reviewed down load from modem with the patient   - Continue medications per her PCP and other physicians.   - she instructed not to drive unless had 4 hrs of effective therapy for her AMOS the night before. - Did review the risks of under or untreated AMOS including, but not limited to, higher risks of motor vehicle accidents, stroke, heart attacks, and death. - she understands and accepts all these risks.     - The patient is advised to use the machine every night.   - Patient using  [place] for supplies  - Patient not able to access video feed. Visit completed via telephone communications. 25 min spent with patient.   - Educated on sending orders for pressure change, down EPAP 11 Psmin 2  - Will need over night oximetry once acclimated to the machine   -F/U: 3 months      No orders of the defined types were placed in this encounter. No orders of the defined types were placed in this encounter. No orders of the defined types were placed in this encounter.       Rodney Negro, MSN, RN, CNP

## 2020-12-02 ENCOUNTER — OFFICE VISIT (OUTPATIENT)
Dept: PRIMARY CARE CLINIC | Age: 40
End: 2020-12-02
Payer: COMMERCIAL

## 2020-12-02 PROCEDURE — G8428 CUR MEDS NOT DOCUMENT: HCPCS | Performed by: NURSE PRACTITIONER

## 2020-12-02 PROCEDURE — 99211 OFF/OP EST MAY X REQ PHY/QHP: CPT | Performed by: NURSE PRACTITIONER

## 2020-12-02 PROCEDURE — G8417 CALC BMI ABV UP PARAM F/U: HCPCS | Performed by: NURSE PRACTITIONER

## 2020-12-02 NOTE — PATIENT INSTRUCTIONS

## 2020-12-02 NOTE — PROGRESS NOTES
Gracie Gonzales received a viral test for COVID-19. They were educated on isolation and quarantine as appropriate. For any symptoms, they were directed to seek care from their PCP, given contact information to establish with a doctor, directed to an urgent care or the emergency room.

## 2020-12-04 LAB — SARS-COV-2, NAA: DETECTED

## 2020-12-04 NOTE — RESULT ENCOUNTER NOTE
LV and results number to call [phone]      Your test came back detected/positive for Covid-19.       What happens if I have a positive test?       If you have symptoms:       Isolate until all three of these things are true: 1) your symptoms are better, 2) it has been 10 days since you first felt sick, and 3) you have had no fever for at least 24 hours without using medicine that lowers fever.       Drink plenty of fluids and eat when you can. You may take medicine for pain or fever if you need to. Rest as much as you can.               If you do not have symptoms:       Stay home for 10 days after the date you were tested.       If you develop symptoms during those 10 days, stay home until all three of these things are true: 1) your symptoms are better, 2) it has been 10 days since you first felt sick, and 3) you have had no fever for at least 24 hours without using medicine that lowers fever.                 Follow care instructions from your doctor or other healthcare provider.       Seek emergency medical care immediately if you have trouble breathing, persistent pain or pressure in the chest, new confusion, inability to wake or stay awake, or bluish lips or face.               Someone from the health department (case investigator or contact tracer) may reach out to you to check on your health and ask about other people you have been around or where you've spent time while you may have been able to spread COVID-19 to others. This person's role is strictly to map the virus to help identify people who may have been exposed to the virus and prevent its spread. The local health department will also provide guidance on how to stay safely at home to avoid spreading illness.               Detected results can happen for months and you are not considered contagious.       No retest is necessary.

## 2020-12-09 ENCOUNTER — CARE COORDINATION (OUTPATIENT)
Dept: CARE COORDINATION | Age: 40
End: 2020-12-09

## 2020-12-09 NOTE — CARE COORDINATION
Question alert noted in [employer] remote symptom monitoring program. Messaged patient to notify Flo Mcconnell if symptoms have worsened since yesterday. Patient comment  My job wants me to return to work even though I'm still showing symptoms what do I 6    RN response   The Centers for Disease Control says you can return to work after 10 days since symptoms first appeared and  24 hours with no fever without the use of fever-reducing medications and other symptoms of COVID-19 are improving. Please refer your supervisor to the CDC guidelines. You may also need to get a note from your primary care provider.

## 2020-12-10 ENCOUNTER — VIRTUAL VISIT (OUTPATIENT)
Dept: PULMONOLOGY | Age: 40
End: 2020-12-10
Payer: COMMERCIAL

## 2020-12-10 PROCEDURE — G8482 FLU IMMUNIZE ORDER/ADMIN: HCPCS | Performed by: INTERNAL MEDICINE

## 2020-12-10 PROCEDURE — G8428 CUR MEDS NOT DOCUMENT: HCPCS | Performed by: INTERNAL MEDICINE

## 2020-12-10 PROCEDURE — 1036F TOBACCO NON-USER: CPT | Performed by: INTERNAL MEDICINE

## 2020-12-10 PROCEDURE — G8417 CALC BMI ABV UP PARAM F/U: HCPCS | Performed by: INTERNAL MEDICINE

## 2020-12-10 PROCEDURE — 99213 OFFICE O/P EST LOW 20 MIN: CPT | Performed by: INTERNAL MEDICINE

## 2020-12-10 ASSESSMENT — ENCOUNTER SYMPTOMS
SHORTNESS OF BREATH: 0
CHEST TIGHTNESS: 0
SINUS PRESSURE: 0
SORE THROAT: 1
DIARRHEA: 0
ABDOMINAL DISTENTION: 0
ABDOMINAL PAIN: 0
COUGH: 1
VOICE CHANGE: 0
WHEEZING: 0
BACK PAIN: 0
CHOKING: 0
RHINORRHEA: 1
ANAL BLEEDING: 0
CONSTIPATION: 0
APNEA: 0
STRIDOR: 0
BLOOD IN STOOL: 0

## 2020-12-10 NOTE — PROGRESS NOTES
Rebecca Samano     Pulmonology Video Visit    Pursuant to the emergency declaration under the [facility], [address] waiver authority and the [employer] Supplemental Appropriations Act this Video Visit was insisted, with patient's consent, to reduce the patient's risk of exposure to COVID-19 and provide continuity of care for an established patient. The patient was at home, while the provider was at the clinic. Services were provided through a synchronous discussion through a Video Visit to substitute for in-person clinic visit, and coded as such. YOB: 1980     Date of Service:  12/10/2020     Chief Complaint   Patient presents with    Positive For Covid-19     follow up - pt states that her breathing is good at this time    Fatigue     very tired    Cough     sore throat / green sinus drainage / coughing up greenish-brown         HPI doxy visit. Patient recently diagnosed with COVID-19-in fact her family has contracted the virus. Mild symptoms of nasal congestion and a dry cough. Fatigue and myalgia. 1 episode of low-grade fever. No significant shortness of breath and recent O2 saturations was acceptable, 96%. Denies any chest pain.     Allergies   Allergen Reactions    Cinnamon Itching    Codeine Hives and Itching     No outpatient medications have been marked as taking for the 12/10/20 encounter (Virtual Visit) with John De Los Santos MD.       Immunization History   Administered Date(s) Administered    Hepatitis A Adult (Havrix, Vaqta) 06/25/2020    Hepatitis B Adult (Engerix-B) 06/25/2020, 07/24/2020    Hepatitis B Adult (Recombivax HB) 06/25/2020    Influenza, Quadv, IM, PF (6 mo and older Fluzone, Flulaval, Fluarix, and 3 yrs and older Afluria) 09/21/2018, 09/20/2019, 10/12/2020    Pneumococcal Polysaccharide ([id number]) 06/21/2015    Td, unspecified formulation 06/21/2010    Tdap (Boostrix, Adacel) 10/16/2019       Past Medical History:   Diagnosis Date    Arthritis     Depression     Diabetes mellitus ([facility] [address]     Hypertension     Tachycardia      Past Surgical History:   Procedure Laterality Date    ANKLE FUSION Bilateral     ARTHRODESIS Left 12/6/2019    REVISION OF TALONAVICULAR JOINT ARTHRODESIS LEFT FOOT  -SLEEP APNEA- performed by Romero Glasgow DPM at [facility] Bilateral     ELBOW SURGERY Bilateral     ulnar nerve release    HERNIA REPAIR      HIP ARTHROSCOPY Bilateral     TONSILLECTOMY AND ADENOIDECTOMY      WRIST GANGLION EXCISION Bilateral      Family History   Problem Relation Age of Onset    High Blood Pressure Mother     Arthritis Mother     Other Father         hypothyroidism    High Blood Pressure Father     Arthritis Father     Asthma Father     Heart Failure Maternal Aunt         22 stents.  Heart Failure Maternal Grandmother     Pacemaker Maternal Grandfather     Heart Surgery Paternal Grandmother     Pacemaker Paternal Grandfather        Review of Systems:  Review of Systems   Constitutional: Positive for fatigue and fever. Negative for activity change and appetite change. HENT: Positive for congestion, rhinorrhea and sore throat. Negative for ear discharge, ear pain, postnasal drip, sinus pressure, sneezing, tinnitus and voice change. Respiratory: Positive for cough. Negative for apnea, choking, chest tightness, shortness of breath, wheezing and stridor. Cardiovascular: Negative for chest pain, palpitations and leg swelling. Gastrointestinal: Negative for abdominal distention, abdominal pain, anal bleeding, blood in stool, constipation and diarrhea. Musculoskeletal: Negative for arthralgias, back pain and gait problem. Skin: Negative for pallor and rash. Allergic/Immunologic: Negative for environmental allergies.    Neurological: Negative for dizziness, tremors, seizures, syncope, speech difficulty, weakness, light-headedness, numbness and headaches. Hematological: Negative for adenopathy. Does not bruise/bleed easily. Psychiatric/Behavioral: Negative for sleep disturbance. There were no vitals filed for this visit. Patient-Reported Vitals 12/10/2020   Patient-Reported Weight 216lb   Patient-Reported Height 5' 5\"      There is no height or weight on file to calculate BMI. Wt Readings from Last 3 Encounters:   10/27/20 214 lb 1.1 oz (97.1 kg)   10/06/20 214 lb 1.1 oz (97.1 kg)   09/01/20 214 lb (97.1 kg)     BP Readings from Last 3 Encounters:   09/01/20 (!) 145/68   07/13/20 98/68   07/09/20 119/82         Physical Exam    Due to the current efforts to prevent transmission of COVID-19 and also the need to preserve PPE for other caregivers, a face-to-face encounter with the patient was not performed. That being said, all relevant records and diagnostic tests were reviewed, including laboratory results and imaging. Please reference any relevant documentation elsewhere. Care will be coordinated with the primary service. Health Maintenance   Topic Date Due    Varicella vaccine (1 of 2 - 2-dose childhood series) 10/16/1981    Diabetic foot exam  10/16/1990    Diabetic retinal exam  10/16/1990    HIV screen  10/16/1995    Hepatitis B vaccine (3 of 3 - Risk 3-dose series) 11/24/2020    A1C test (Diabetic or Prediabetic)  06/15/2021    Lipid screen  06/15/2021    Potassium monitoring  06/15/2021    Creatinine monitoring  06/15/2021    Diabetic microalbuminuria test  09/03/2021    Cervical cancer screen  10/07/2024    DTaP/Tdap/Td vaccine (2 - Td) 10/16/2029    Flu vaccine  Completed    Hepatitis A vaccine  Aged Out    Hib vaccine  Aged Out    Meningococcal (ACWY) vaccine  Aged Out    Pneumococcal 0-64 years Vaccine  Aged Out          Assessment/Plan:    Patient tested positive for COVID-19 on 12/2-mild symptoms only. Symptomatic treatment recommended.   Advised patient to come to ER if she

## 2020-12-11 ENCOUNTER — VIRTUAL VISIT (OUTPATIENT)
Dept: FAMILY MEDICINE CLINIC | Age: 40
End: 2020-12-11
Payer: COMMERCIAL

## 2020-12-11 PROCEDURE — G8482 FLU IMMUNIZE ORDER/ADMIN: HCPCS | Performed by: CLINICAL NURSE SPECIALIST

## 2020-12-11 PROCEDURE — 1036F TOBACCO NON-USER: CPT | Performed by: CLINICAL NURSE SPECIALIST

## 2020-12-11 PROCEDURE — G8427 DOCREV CUR MEDS BY ELIG CLIN: HCPCS | Performed by: CLINICAL NURSE SPECIALIST

## 2020-12-11 PROCEDURE — 99214 OFFICE O/P EST MOD 30 MIN: CPT | Performed by: CLINICAL NURSE SPECIALIST

## 2020-12-11 PROCEDURE — G8417 CALC BMI ABV UP PARAM F/U: HCPCS | Performed by: CLINICAL NURSE SPECIALIST

## 2020-12-11 RX ORDER — METHYLPREDNISOLONE 4 MG/1
TABLET ORAL
Qty: 1 KIT | Refills: 0 | Status: SHIPPED | OUTPATIENT
Start: 2020-12-11 | End: 2020-12-17

## 2020-12-11 RX ORDER — AZITHROMYCIN 250 MG/1
250 TABLET, FILM COATED ORAL SEE ADMIN INSTRUCTIONS
Qty: 6 TABLET | Refills: 0 | Status: SHIPPED | OUTPATIENT
Start: 2020-12-11 | End: 2020-12-16

## 2020-12-11 RX ORDER — BENZONATATE 100 MG/1
100 CAPSULE ORAL 3 TIMES DAILY PRN
Qty: 30 CAPSULE | Refills: 0 | Status: SHIPPED | OUTPATIENT
Start: 2020-12-11 | End: 2020-12-18

## 2020-12-11 ASSESSMENT — ENCOUNTER SYMPTOMS
VOMITING: 0
CHEST TIGHTNESS: 1
RHINORRHEA: 0
COUGH: 1
SHORTNESS OF BREATH: 1
NAUSEA: 0
DIARRHEA: 0
ABDOMINAL PAIN: 0
SORE THROAT: 1
WHEEZING: 1
CONSTIPATION: 0

## 2020-12-11 NOTE — PATIENT INSTRUCTIONS
Educated about COVID-19 and self isolation/quarantine, information given    Antibiotic as directed. Flonase 1-2 puffs to each nostril daily. Albuterol 2 puffs 4 times a day for 1-2 days then as needed. Steroids as directed, take with food. Prescription cough medication 2-3 times a day as needed for cough, may cause drowsiness. Cepacol Lozenges as needed for sore throat. Tylenol and or Motrin as needed/directed for pain and fever. Encourage rest and fluids. To the ED for worsening shortness of breath    Follow-up if symptoms worsen or persist      Patient Education        Learning About Coronavirus [phone])  Coronavirus [phone]): Overview  What is coronavirus (COVID-19)? The coronavirus disease (COVID-19) is caused by a virus. It is an illness that was first found in December 2019. It has since spread worldwide. The virus can cause fever, cough, and trouble breathing. In severe cases, it can cause pneumonia and make it hard to breathe without help. It can cause death. This virus spreads person-to-person through droplets from coughing and sneezing. It can also spread when you are close to someone who is infected. And it can spread when you touch something that has the virus on it, such as a doorknob or a tabletop. Coronaviruses are a large group of viruses. They cause the common cold. They also cause more serious illnesses like [place] East respiratory syndrome (MERS) and severe acute respiratory syndrome (SARS). COVID-19 is caused by a novel coronavirus. That means it's a new type that has not been seen in people before. How is COVID-19 treated? Mild illness can be treated at home, but more serious illness needs to be treated in the hospital. Treatment may include medicines to reduce symptoms, plus breathing support such as oxygen therapy or a ventilator. Other treatments, such as antiviral medicines, may help people who have COVID-19.   What can you do to protect yourself from COVID-19? The best way to protect yourself from getting sick is to:  · Avoid areas where there is an outbreak. · Avoid contact with people who may be infected. · Avoid crowds and try to stay at least 6 feet away from other people. · Wash your hands often, especially after you cough or sneeze. Use soap and water, and scrub for at least 20 seconds. If soap and water aren't available, use an alcohol-based hand . · Avoid touching your mouth, nose, and eyes. What can you do to avoid spreading the virus to others? To help avoid spreading the virus to others:  · Wash your hands often with soap or alcohol-based hand sanitizers. · Cover your mouth with a tissue when you cough or sneeze. Then throw the tissue in the trash. · Use a disinfectant to clean things that you touch often. These include doorknobs, remote controls, phones, and handles on your refrigerator and microwave. And don't forget countertops, tabletops, bathrooms, and computer keyboards. · Wear a cloth face cover if you have to go to public areas. If you know or suspect that you have COVID-19:  · Stay home. Don't go to school, work, or public areas. And don't use public transportation, ride-shares, or taxis unless you have no choice. · Leave your home only if you need to get medical care or testing. But call the doctor's office first so they know you're coming. And wear a face cover. · Limit contact with people in your home. If possible, stay in a separate bedroom and use a separate bathroom. · Wear a face cover whenever you're around other people. It can help stop the spread of the virus when you cough or sneeze. · Clean and disinfect your home every day. Use household  and disinfectant wipes or sprays. Take special care to clean things that you grab with your hands. · Self-isolate until it's safe to be around others again.   ? If you have symptoms, it's safe when you haven't had a fever for 3 days and your symptoms have improved and it's been at least 10 days since your symptoms started. ? If you were exposed to the virus but don't have symptoms, it's safe to be around others 14 days after exposure. ? Talk to your doctor about whether you also need testing, especially if you have a weakened immune system. When to call for help  Call 911 anytime you think you may need emergency care. For example, call if:  · You have severe trouble breathing. (You can't talk at all.)  · You have constant chest pain or pressure. · You are severely dizzy or lightheaded. · You are confused or can't think clearly. · Your face and lips have a blue color. · You passed out (lost consciousness) or are very hard to wake up. Call your doctor now if you develop symptoms such as:  · Shortness of breath. · Fever. · Cough. If you need to get care, call ahead to the doctor's office for instructions before you go. Make sure you wear a face cover to prevent exposing other people to the virus. Where can you get the latest information? The following health organizations are tracking and studying this virus. Their websites contain the most up-to-date information. [place] also learn what to do if you think you may have been exposed to the virus. · U.S. Centers for Disease Control and Prevention (CDC): The CDC provides updated news about the disease and travel advice. The website also tells you how to prevent the spread of infection. www.cdc.gov  · World Health Organization [facility]): WHO offers information about the virus outbreaks. WHO also has travel advice. www.who.int  Current as of: July 10, 2020               Content Version: 12.6  © 2006-2020 [employer]. Care instructions adapted under license by [facility] ([facility]). If you have questions about a medical condition or this instruction, always ask your healthcare professional. [address] any warranty or liability for your use of this information.          Patient Education sentence.)     · You are coughing up blood (more than about 1 teaspoon).     · You have signs of low blood pressure. These include feeling lightheaded; being too weak to stand; and having cold, pale, clammy skin. Watch closely for changes in your health, and be sure to contact your doctor if:    · Your symptoms get worse.     · You are not getting better as expected. Call before you go to the doctor's office. Follow their instructions. And wear a cloth face cover. Current as of: July 10, 2020               Content Version: 12.6  © 2006-2020 [employer]. Care instructions adapted under license by [facility] ([facility]). If you have questions about a medical condition or this instruction, always ask your healthcare professional. [address] any warranty or liability for your use of this information. Patient Education        Learning About COVID-19 and Social Distancing  What is it? Social distancing means putting space between yourself and other people. The recommended distance is 6 feet, or about 2 meters. This also means staying away from any place where people may gather, such as [place] or other public gathering places. Why is it important? Social distancing is the best way to reduce the spread of COVID-19. This virus seems to spread from person to person through droplets from coughing and sneezing. So if you keep your distance from others, you're less likely to get it or spread it. And social distancing is important for everyone, not just those who are at high risk of infection, like older people. You might have the virus but not have symptoms. You could then give the infection to someone you come into contact with. How is it done? Putting 6 feet, or about 2 meters, between you and other people is the recommended distance. Also stay away from any place where people may gather, such as [place] or other public gathering places.  So if possible:  · Work from home, and keep

## 2020-12-11 NOTE — PROGRESS NOTES
by mouth every 8 hours as needed for Nausea 20 tablet 0    albuterol sulfate HFA (VENTOLIN HFA) 108 (90 Base) MCG/ACT inhaler Inhale 2 puffs into the lungs every 6 hours as needed for Wheezing      vitamin D (ERGOCALCIFEROL) 1.25 MG (52258 UT) CAPS capsule Take 1 capsule by mouth once a week 4 capsule 2    metroNIDAZOLE (FLAGYL) 500 MG tablet TAKE 1 TABLET BY MOUTH TWICE A DAY FOR 7 DAYS      thiothixene (NAVANE) 2 MG capsule TAKE 1 CAPSULE BY MOUTH EVERY EVENING AT BEDTIME      Cholecalciferol (VITAMIN D3) 50 MCG (2000 UT) TABS Take 2,000 Units by mouth daily 90 tablet 0    nitroGLYCERIN (NITROSTAT) 0.4 MG SL tablet Place 0.4 mg under the tongue every 5 minutes as needed for Chest pain up to max of 3 total doses. If no relief after 1 dose, call 911.  citalopram (CELEXA) 20 MG tablet Take 20 mg by mouth daily       ammonium lactate (LAC-HYDRIN) 12 % lotion Apply topically daily       Coenzyme Q10 (CO Q-10 PO) Take by mouth daily       divalproex (DEPAKOTE ER) 500 MG extended release tablet Take 500 mg by mouth daily      hydrOXYzine (ATARAX) 25 MG tablet Take 25 mg by mouth 3 times daily as needed for Itching      CHANTIX 1 MG tablet TAKE 1 TABLET BY MOUTH TWICE A DAY (Patient not taking: Reported on 12/11/2020) 60 tablet 2    pregabalin (LYRICA) 75 MG capsule Take 1 capsule by mouth 2 times daily for 90 days. 60 capsule 2    albuterol sulfate HFA (VENTOLIN HFA) 108 (90 Base) MCG/ACT inhaler Inhale 2 puffs into the lungs 4 times daily 1 Inhaler 2     No current facility-administered medications on file prior to visit.        Past Medical History:   Diagnosis Date    Arthritis     Depression     Diabetes mellitus ([address]     Hypertension     Tachycardia      Past Surgical History:   Procedure Laterality Date    ANKLE FUSION Bilateral     ARTHRODESIS Left 12/6/2019    REVISION OF TALONAVICULAR JOINT ARTHRODESIS LEFT FOOT  -SLEEP APNEA- performed by Alan Guajardo DPM at [address] TUNNEL RELEASE Bilateral     ELBOW SURGERY Bilateral     ulnar nerve release    HERNIA REPAIR      HIP ARTHROSCOPY Bilateral     TONSILLECTOMY AND ADENOIDECTOMY      WRIST GANGLION EXCISION Bilateral      Family History   Problem Relation Age of Onset    High Blood Pressure Mother     Arthritis Mother     Other Father         hypothyroidism    High Blood Pressure Father     Arthritis Father     Asthma Father     Heart Failure Maternal Aunt         22 stents.      Heart Failure Maternal Grandmother     Pacemaker Maternal Grandfather     Heart Surgery Paternal Grandmother     Pacemaker Paternal Grandfather      Social History     Socioeconomic History    Marital status:      Spouse name: Not on file    Number of children: Not on file    Years of education: Not on file    Highest education level: Not on file   Occupational History    Not on file   Social Needs    Financial resource strain: Not on file    Food insecurity     Worry: Not on file     Inability: Not on file   [employer] needs     Medical: Not on file     Non-medical: Not on file   Tobacco Use    Smoking status: Former Smoker     Packs/day: 1.00     Years: 27.00     Pack years: 27.00     Types: Cigarettes     Start date:      Last attempt to quit: 2019     Years since quittin.0    Smokeless tobacco: Never Used    Tobacco comment: started to smoke at 15 / smoked up to 1 ppd / now smoking 3 to 4 cigarettes a day   Substance and Sexual Activity    Alcohol use: Never     Frequency: Never    Drug use: Never    Sexual activity: Not on file   Lifestyle    Physical activity     Days per week: Not on file     Minutes per session: Not on file    Stress: Not on file   Relationships    Social connections     Talks on phone: Not on file     Gets together: Not on file     Attends Shinto service: Not on file     Active member of club or organization: Not on file     Attends meetings of clubs or organizations: Not on file     Relationship status: Not on file    Intimate partner violence     Fear of current or ex partner: Not on file     Emotionally abused: Not on file     Physically abused: Not on file     Forced sexual activity: Not on file   Other Topics Concern    Not on file   Social History Narrative    Not on file       Review of Systems   Constitutional: Positive for fever (resolved). Negative for chills. HENT: Positive for congestion and sore throat. Negative for rhinorrhea. Eyes: Negative for visual disturbance. Respiratory: Positive for cough, chest tightness (sore from coughing), shortness of breath and wheezing. Cardiovascular: Negative for chest pain and palpitations. Gastrointestinal: Negative for abdominal pain, constipation, diarrhea, nausea and vomiting. Musculoskeletal: Negative for arthralgias and myalgias. Skin: Negative for rash. Neurological: Negative for headaches. OBJECTIVE:    Physical Exam  Vitals signs and nursing note reviewed. Constitutional:       General: She is not in acute distress. Appearance: Normal appearance. She is not ill-appearing, toxic-appearing or diaphoretic. HENT:      Head: Normocephalic and atraumatic. Right Ear: External ear normal.      Left Ear: External ear normal.      Nose: Nose normal.      Mouth/Throat:      Mouth: Mucous membranes are moist.   Eyes:      Extraocular Movements: Extraocular movements intact. Conjunctiva/sclera: Conjunctivae normal.      Pupils: Pupils are equal, round, and reactive to light. Neck:      Musculoskeletal: Normal range of motion. Pulmonary:      Effort: Pulmonary effort is normal. No respiratory distress. Musculoskeletal: Normal range of motion. Skin:     General: Skin is dry. Coloration: Skin is not pale. Findings: No rash. Neurological:      Mental Status: She is alert and oriented to person, place, and time.    Psychiatric:         Mood and Affect: Mood normal. Behavior: Behavior normal.       There were no vitals taken for this visit. BP Readings from Last 3 Encounters:   09/01/20 (!) 145/68   07/13/20 98/68   07/09/20 119/82      Wt Readings from Last 3 Encounters:   10/27/20 214 lb 1.1 oz (97.1 kg)   10/06/20 214 lb 1.1 oz (97.1 kg)   09/01/20 214 lb (97.1 kg)       ASSESSMENT & PLAN:    1. COVID-19  - Discussed COVID-19 and self isolation/quarantine, information given    2. Educated about COVID-19 virus infection  - Educated about COVID-19 and self isolation/quarantine, information given    3. Bronchitis  - azithromycin (ZITHROMAX) 250 MG tablet; Take 1 tablet by mouth See Admin Instructions for 5 days 500mg on day 1 followed by 250mg on days 2 - 5  Dispense: 6 tablet; Refill: 0  - methylPREDNISolone (MEDROL DOSEPACK) 4 MG tablet; Take by mouth. Dispense: 1 kit; Refill: 0  - Antibiotic as directed. - Flonase 1-2 puffs to each nostril daily. - Albuterol 2 puffs 4 times a day for 1-2 days then as needed. - Steroids as directed, take with food. - Prescription cough medication 2-3 times a day as needed for cough, may cause drowsiness.  - Cepacol Lozenges as needed for sore throat. - Tylenol and or Motrin as needed/directed for pain and fever.    - Encourage rest and fluids. - To the ED for worsening shortness of breath  - Follow-up if symptoms worsen or persist    4. Cough  - benzonatate (TESSALON) 100 MG capsule; Take 1 capsule by mouth 3 times daily as needed for Cough  Dispense: 30 capsule; Refill: 0      Continue current treatment plan. Current Outpatient Medications   Medication Sig Dispense Refill    azithromycin (ZITHROMAX) 250 MG tablet Take 1 tablet by mouth See Admin Instructions for 5 days 500mg on day 1 followed by 250mg on days 2 - 5 6 tablet 0    methylPREDNISolone (MEDROL DOSEPACK) 4 MG tablet Take by mouth.  1 kit 0    benzonatate (TESSALON) 100 MG capsule Take 1 capsule by mouth 3 times daily as needed for Cough 30 capsule 0    metFORMIN (GLUCOPHAGE-XR) 500 MG extended release tablet Take 2 tablets by mouth daily (with breakfast) 180 tablet 0    omeprazole (PRILOSEC) 20 MG delayed release capsule Take 1 capsule by mouth daily 90 capsule 0    metoprolol tartrate (LOPRESSOR) 50 MG tablet Take 1 tablet by mouth 2 times daily 180 tablet 0    rosuvastatin (CRESTOR) 40 MG tablet Take 1 tablet by mouth daily 90 tablet 0    vitamin D (CHOLECALCIFEROL) 50 MCG (2000 UT) TABS tablet Take 1 tablet by mouth daily 90 tablet 0    methylPREDNISolone (MEDROL, CATARINA,) 4 MG tablet By mouth. 1 kit 0    meloxicam (MOBIC) 15 MG tablet Take 1 tablet by mouth daily as needed for Pain 90 tablet 0    ondansetron (ZOFRAN ODT) 4 MG disintegrating tablet Take 1 tablet by mouth every 8 hours as needed for Nausea 20 tablet 0    albuterol sulfate HFA (VENTOLIN HFA) 108 (90 Base) MCG/ACT inhaler Inhale 2 puffs into the lungs every 6 hours as needed for Wheezing      vitamin D (ERGOCALCIFEROL) 1.25 MG (00077 UT) CAPS capsule Take 1 capsule by mouth once a week 4 capsule 2    metroNIDAZOLE (FLAGYL) 500 MG tablet TAKE 1 TABLET BY MOUTH TWICE A DAY FOR 7 DAYS      thiothixene (NAVANE) 2 MG capsule TAKE 1 CAPSULE BY MOUTH EVERY EVENING AT BEDTIME      Cholecalciferol (VITAMIN D3) 50 MCG (2000 UT) TABS Take 2,000 Units by mouth daily 90 tablet 0    nitroGLYCERIN (NITROSTAT) 0.4 MG SL tablet Place 0.4 mg under the tongue every 5 minutes as needed for Chest pain up to max of 3 total doses. If no relief after 1 dose, call 911.       citalopram (CELEXA) 20 MG tablet Take 20 mg by mouth daily       ammonium lactate (LAC-HYDRIN) 12 % lotion Apply topically daily       Coenzyme Q10 (CO Q-10 PO) Take by mouth daily       divalproex (DEPAKOTE ER) 500 MG extended release tablet Take 500 mg by mouth daily      hydrOXYzine (ATARAX) 25 MG tablet Take 25 mg by mouth 3 times daily as needed for Itching      CHANTIX 1 MG tablet TAKE 1 TABLET BY MOUTH TWICE A DAY (Patient not taking: Reported on 12/11/2020) 60 tablet 2    pregabalin (LYRICA) 75 MG capsule Take 1 capsule by mouth 2 times daily for 90 days. 60 capsule 2    albuterol sulfate HFA (VENTOLIN HFA) 108 (90 Base) MCG/ACT inhaler Inhale 2 puffs into the lungs 4 times daily 1 Inhaler 2     No current facility-administered medications for this visit. Return if symptoms worsen or fail to improve, for COVID-19, educated about COVID-19, bronchitis, cough, HTN. Bk Gloss received counseling on the following healthy behaviors: nutrition, exercise and medication adherence    Patient given educational materials on COVID-19    Discussed use, benefit, and side effects of prescribed medications. Barriers to medication compliance addressed. All patient questions answered. Pt voiced understanding. Call office if experience side effects from medications. Please note that some or all of this record was generated using voice recognition software. If there are any questions about the content of this document, please contact the author as some errors in transcription may have occurred. Deysi Dickens is a 36 y.o. female being evaluated by a Virtual Visit (video visit) encounter to address concerns as mentioned above. A caregiver was present when appropriate. Due to this being a TeleHealth encounter (During [facility]- public health emergency), evaluation of the following organ systems was limited: Vitals/Constitutional/EENT/Resp/CV/GI//MS/Neuro/Skin/Heme-Lymph-Imm. Pursuant to the emergency declaration under the [facility], [address] [place] [address] authority and the [employer] Act, this Virtual Visit was conducted with patient's (and/or legal guardian's) consent, to reduce the patient's risk of exposure to COVID-19 and provide necessary medical care.   The patient (and/or legal guardian) has also been advised to contact this office for worsening conditions or problems, and seek emergency medical treatment and/or call 911 if deemed necessary. Patient identification was verified at the start of the visit: Yes    Total time spent for this encounter: Not billed by time    Services were provided through a video synchronous discussion virtually to substitute for in-person clinic visit. Patient and provider were located at their individual homes. --JUAN RAMON Christopher CNP on 12/11/2020 at 9:59 AM    An electronic signature was used to authenticate this note.

## 2020-12-15 ENCOUNTER — TELEPHONE (OUTPATIENT)
Dept: FAMILY MEDICINE CLINIC | Age: 40
End: 2020-12-15

## 2020-12-15 NOTE — TELEPHONE ENCOUNTER
Mailed Letter to patients employeer to:    [employer] of [address] [place] [address]. 1  [place], [address] [place] [address]

## 2020-12-28 ENCOUNTER — NURSE ONLY (OUTPATIENT)
Dept: FAMILY MEDICINE CLINIC | Age: 40
End: 2020-12-28
Payer: COMMERCIAL

## 2020-12-28 VITALS — TEMPERATURE: 98.3 F

## 2020-12-28 PROCEDURE — 90632 HEPA VACCINE ADULT IM: CPT | Performed by: CLINICAL NURSE SPECIALIST

## 2020-12-28 PROCEDURE — 90746 HEPB VACCINE 3 DOSE ADULT IM: CPT | Performed by: CLINICAL NURSE SPECIALIST

## 2020-12-28 PROCEDURE — 90472 IMMUNIZATION ADMIN EACH ADD: CPT | Performed by: CLINICAL NURSE SPECIALIST

## 2020-12-28 PROCEDURE — 90471 IMMUNIZATION ADMIN: CPT | Performed by: CLINICAL NURSE SPECIALIST

## 2020-12-31 ENCOUNTER — OFFICE VISIT (OUTPATIENT)
Dept: PRIMARY CARE CLINIC | Age: 40
End: 2020-12-31
Payer: COMMERCIAL

## 2020-12-31 PROCEDURE — G8428 CUR MEDS NOT DOCUMENT: HCPCS | Performed by: NURSE PRACTITIONER

## 2020-12-31 PROCEDURE — G8417 CALC BMI ABV UP PARAM F/U: HCPCS | Performed by: NURSE PRACTITIONER

## 2020-12-31 PROCEDURE — 99211 OFF/OP EST MAY X REQ PHY/QHP: CPT | Performed by: NURSE PRACTITIONER

## 2021-01-02 LAB — SARS-COV-2, NAA: NOT DETECTED

## 2021-01-09 ENCOUNTER — HOSPITAL ENCOUNTER (OUTPATIENT)
Age: 41
Discharge: HOME | End: 2021-01-09
Payer: COMMERCIAL

## 2021-01-09 DIAGNOSIS — E11.9 TYPE 2 DIABETES MELLITUS WITHOUT COMPLICATION, WITHOUT LONG-TERM CURRENT USE OF INSULIN (HCC): ICD-10-CM

## 2021-01-09 DIAGNOSIS — I10 ESSENTIAL HYPERTENSION: ICD-10-CM

## 2021-01-09 DIAGNOSIS — E78.2 MIXED HYPERLIPIDEMIA: ICD-10-CM

## 2021-01-09 DIAGNOSIS — E55.9 VITAMIN D DEFICIENCY: ICD-10-CM

## 2021-01-09 LAB
A/G RATIO: 1.2 (ref 1.1–2.2)
ALBUMIN SERPL-MCNC: 4.1 G/DL (ref 3.4–5)
ALP BLD-CCNC: 65 U/L (ref 40–129)
ALT SERPL-CCNC: 13 U/L (ref 10–40)
ANION GAP SERPL CALCULATED.3IONS-SCNC: 8 MMOL/L (ref 3–16)
AST SERPL-CCNC: 24 U/L (ref 15–37)
BASOPHILS ABSOLUTE: 0 K/UL (ref 0–0.2)
BASOPHILS RELATIVE PERCENT: 0.6 %
BILIRUB SERPL-MCNC: <0.2 MG/DL (ref 0–1)
BUN BLDV-MCNC: 9 MG/DL (ref 7–20)
CALCIUM SERPL-MCNC: 9.4 MG/DL (ref 8.3–10.6)
CANDIDA SPECIES, DNA PROBE: ABNORMAL
CHLORIDE BLD-SCNC: 99 MMOL/L (ref 99–110)
CHOLESTEROL, TOTAL: 310 MG/DL (ref 0–199)
CO2: 29 MMOL/L (ref 21–32)
CREAT SERPL-MCNC: 0.7 MG/DL (ref 0.6–1.1)
EOSINOPHILS ABSOLUTE: 0.1 K/UL (ref 0–0.6)
EOSINOPHILS RELATIVE PERCENT: 1 %
GARDNERELLA VAGINALIS, DNA PROBE: ABNORMAL
GFR AFRICAN AMERICAN: >60
GFR NON-AFRICAN AMERICAN: >60
GLOBULIN: 3.3 G/DL
GLUCOSE BLD-MCNC: 88 MG/DL (ref 70–99)
HCT VFR BLD CALC: 38.5 % (ref 36–48)
HDLC SERPL-MCNC: 35 MG/DL (ref 40–60)
HEMOGLOBIN: 12.7 G/DL (ref 12–16)
LDL CHOLESTEROL CALCULATED: 246 MG/DL
LYMPHOCYTES ABSOLUTE: 2.2 K/UL (ref 1–5.1)
LYMPHOCYTES RELATIVE PERCENT: 32.8 %
MCH RBC QN AUTO: 28.6 PG (ref 26–34)
MCHC RBC AUTO-ENTMCNC: 33 G/DL (ref 31–36)
MCV RBC AUTO: 86.7 FL (ref 80–100)
MONOCYTES ABSOLUTE: 0.5 K/UL (ref 0–1.3)
MONOCYTES RELATIVE PERCENT: 7.7 %
NEUTROPHILS ABSOLUTE: 4 K/UL (ref 1.7–7.7)
NEUTROPHILS RELATIVE PERCENT: 57.9 %
PDW BLD-RTO: 13.6 % (ref 12.4–15.4)
PLATELET # BLD: 411 K/UL (ref 135–450)
PMV BLD AUTO: 6.9 FL (ref 5–10.5)
POTASSIUM SERPL-SCNC: 3.9 MMOL/L (ref 3.5–5.1)
RBC # BLD: 4.45 M/UL (ref 4–5.2)
SODIUM BLD-SCNC: 136 MMOL/L (ref 136–145)
TOTAL CK: 100 U/L (ref 26–192)
TOTAL PROTEIN: 7.4 G/DL (ref 6.4–8.2)
TRICHOMONAS VAGINALIS DNA: ABNORMAL
TRIGL SERPL-MCNC: 146 MG/DL (ref 0–150)
VITAMIN D 25-HYDROXY: 26.6 NG/ML
VLDLC SERPL CALC-MCNC: 29 MG/DL
WBC # BLD: 6.8 K/UL (ref 4–11)

## 2021-01-09 PROCEDURE — 80061 LIPID PANEL: CPT

## 2021-01-09 PROCEDURE — 85025 COMPLETE CBC W/AUTO DIFF WBC: CPT

## 2021-01-09 PROCEDURE — 82550 ASSAY OF CK (CPK): CPT

## 2021-01-09 PROCEDURE — 80053 COMPREHEN METABOLIC PANEL: CPT

## 2021-01-09 PROCEDURE — 83036 HEMOGLOBIN GLYCOSYLATED A1C: CPT

## 2021-01-09 PROCEDURE — 82306 VITAMIN D 25 HYDROXY: CPT

## 2021-01-09 PROCEDURE — 36415 COLL VENOUS BLD VENIPUNCTURE: CPT

## 2021-01-10 LAB
ESTIMATED AVERAGE GLUCOSE: 125.5 MG/DL
HBA1C MFR BLD: 6 %

## 2021-01-15 ENCOUNTER — VIRTUAL VISIT (OUTPATIENT)
Dept: FAMILY MEDICINE CLINIC | Age: 41
End: 2021-01-15
Payer: COMMERCIAL

## 2021-01-15 DIAGNOSIS — E55.9 VITAMIN D DEFICIENCY: ICD-10-CM

## 2021-01-15 DIAGNOSIS — J44.9 COPD WITH ASTHMA (HCC): ICD-10-CM

## 2021-01-15 DIAGNOSIS — F10.21 HISTORY OF ALCOHOLISM (HCC): ICD-10-CM

## 2021-01-15 DIAGNOSIS — F41.9 ANXIETY: ICD-10-CM

## 2021-01-15 DIAGNOSIS — G43.909 MIGRAINE WITHOUT STATUS MIGRAINOSUS, NOT INTRACTABLE, UNSPECIFIED MIGRAINE TYPE: ICD-10-CM

## 2021-01-15 DIAGNOSIS — F31.9 BIPOLAR 1 DISORDER (HCC): ICD-10-CM

## 2021-01-15 DIAGNOSIS — F32.89 OTHER DEPRESSION: ICD-10-CM

## 2021-01-15 DIAGNOSIS — G89.29 OTHER CHRONIC PAIN: ICD-10-CM

## 2021-01-15 DIAGNOSIS — E11.9 TYPE 2 DIABETES MELLITUS WITHOUT COMPLICATION, WITHOUT LONG-TERM CURRENT USE OF INSULIN (HCC): Primary | ICD-10-CM

## 2021-01-15 DIAGNOSIS — G47.33 OSA (OBSTRUCTIVE SLEEP APNEA): ICD-10-CM

## 2021-01-15 DIAGNOSIS — K21.9 GASTROESOPHAGEAL REFLUX DISEASE, UNSPECIFIED WHETHER ESOPHAGITIS PRESENT: ICD-10-CM

## 2021-01-15 DIAGNOSIS — F43.10 PTSD (POST-TRAUMATIC STRESS DISORDER): ICD-10-CM

## 2021-01-15 DIAGNOSIS — M79.7 FIBROMYALGIA: ICD-10-CM

## 2021-01-15 DIAGNOSIS — E78.2 MIXED HYPERLIPIDEMIA: ICD-10-CM

## 2021-01-15 PROCEDURE — G8427 DOCREV CUR MEDS BY ELIG CLIN: HCPCS | Performed by: CLINICAL NURSE SPECIALIST

## 2021-01-15 PROCEDURE — 99214 OFFICE O/P EST MOD 30 MIN: CPT | Performed by: CLINICAL NURSE SPECIALIST

## 2021-01-15 PROCEDURE — 3044F HG A1C LEVEL LT 7.0%: CPT | Performed by: CLINICAL NURSE SPECIALIST

## 2021-01-15 PROCEDURE — 2022F DILAT RTA XM EVC RTNOPTHY: CPT | Performed by: CLINICAL NURSE SPECIALIST

## 2021-01-15 RX ORDER — METFORMIN HYDROCHLORIDE 500 MG/1
1000 TABLET, EXTENDED RELEASE ORAL
Qty: 180 TABLET | Refills: 0 | Status: SHIPPED | OUTPATIENT
Start: 2021-01-15 | End: 2021-04-05 | Stop reason: SDUPTHER

## 2021-01-15 RX ORDER — MELOXICAM 15 MG/1
15 TABLET ORAL DAILY PRN
Qty: 90 TABLET | Refills: 0 | Status: SHIPPED | OUTPATIENT
Start: 2021-01-15 | End: 2021-04-05 | Stop reason: SDUPTHER

## 2021-01-15 RX ORDER — CHOLECALCIFEROL (VITAMIN D3) 50 MCG
2000 TABLET ORAL DAILY
Qty: 90 TABLET | Refills: 0 | Status: SHIPPED | OUTPATIENT
Start: 2021-01-15 | End: 2021-04-05

## 2021-01-15 RX ORDER — METOPROLOL TARTRATE 50 MG/1
50 TABLET, FILM COATED ORAL 2 TIMES DAILY
Qty: 180 TABLET | Refills: 0 | Status: SHIPPED | OUTPATIENT
Start: 2021-01-15 | End: 2021-04-05 | Stop reason: SDUPTHER

## 2021-01-15 RX ORDER — EZETIMIBE 10 MG/1
10 TABLET ORAL DAILY
Qty: 90 TABLET | Refills: 0 | Status: SHIPPED | OUTPATIENT
Start: 2021-01-15 | End: 2021-04-05 | Stop reason: SDUPTHER

## 2021-01-15 RX ORDER — ROSUVASTATIN CALCIUM 40 MG/1
TABLET, COATED ORAL
Qty: 90 TABLET | Refills: 0 | Status: SHIPPED | OUTPATIENT
Start: 2021-01-15 | End: 2021-04-05 | Stop reason: SDUPTHER

## 2021-01-15 RX ORDER — OMEPRAZOLE 20 MG/1
CAPSULE, DELAYED RELEASE ORAL
Qty: 90 CAPSULE | Refills: 0 | Status: SHIPPED | OUTPATIENT
Start: 2021-01-15 | End: 2021-04-05 | Stop reason: SDUPTHER

## 2021-01-15 ASSESSMENT — ENCOUNTER SYMPTOMS
CONSTIPATION: 0
COUGH: 0
SHORTNESS OF BREATH: 0
CHEST TIGHTNESS: 0
VOMITING: 0
DIARRHEA: 0
ABDOMINAL PAIN: 0
NAUSEA: 0
WHEEZING: 0

## 2021-01-15 NOTE — PROGRESS NOTES
SUBJECTIVE:    Patient ID:  Vanesa Yang is a 36 y.o. female      This visit was conduced virtually for a medication check for hyperlipidemia, diabetes, GERD, migraine headaches, AMOS, vitamin D deficiency, fibromyalgia and chronic pain. She is doing well on current regimen and has no further concerns. GERD symptoms are managed with omeprazole. Denies indigestion/heartburn, difficulty swallowing, epigastric pain, nausea, vomiting, diarrhea, constipation or blood in stool. Migraines are managed with metoprolol twice daily. Denies any change in headache duration, frequency, intensity or pattern with no new neurological symptoms. She has a history of anxiety, depression, bipolar and PTSD. She is followed a psychiatrist every 2 to 3 months and sees a counselor twice a week. She is currently taking Celexa, Depakote and hydroxyzine as needed. Currently denies thoughts of self-harm, suicidal or homicidal ideations. States she has been evaluated by cardiology and will be followed annually. She is also been evaluated by pulmonology and instructed to stop [facility] and continue with albuterol as needed. She was told to follow-up with pulmonology annually for COPD and AMOS. AMOS is managed with nightly CPAP usage. She recently had surgery on her left ankle and had new hardware placed. She also has a history or alcoholism for 24 years, she has been sober for almost 4 years. Diabetes  She presents for her follow-up diabetic visit. She has type 2 diabetes mellitus. Her disease course has been stable. Pertinent negatives for hypoglycemia include no headaches or nervousness/anxiousness. Pertinent negatives for diabetes include no chest pain, no foot paresthesias, no polydipsia, no polyphagia and no polyuria. Risk factors for coronary artery disease include dyslipidemia, diabetes mellitus and obesity. Current diabetic treatment includes oral agent (monotherapy). Her weight is stable.  She is following a diabetic and generally healthy diet. Hyperlipidemia  This is a chronic problem. The current episode started more than 1 year ago. The problem is controlled. Recent lipid tests were reviewed and are low. Exacerbating diseases include diabetes and obesity. Pertinent negatives include no chest pain, focal sensory loss, focal weakness, leg pain, myalgias or shortness of breath. Current antihyperlipidemic treatment includes statins. The current treatment provides significant improvement of lipids. Risk factors for coronary artery disease include dyslipidemia, diabetes mellitus and obesity. Current Outpatient Medications on File Prior to Visit   Medication Sig Dispense Refill    methylPREDNISolone (MEDROL, CATARINA,) 4 MG tablet By mouth. 1 kit 0    CHANTIX 1 MG tablet TAKE 1 TABLET BY MOUTH TWICE A DAY (Patient not taking: Reported on 12/11/2020) 60 tablet 2    ondansetron (ZOFRAN ODT) 4 MG disintegrating tablet Take 1 tablet by mouth every 8 hours as needed for Nausea 20 tablet 0    albuterol sulfate HFA (VENTOLIN HFA) 108 (90 Base) MCG/ACT inhaler Inhale 2 puffs into the lungs every 6 hours as needed for Wheezing      vitamin D (ERGOCALCIFEROL) 1.25 MG (65217 UT) CAPS capsule Take 1 capsule by mouth once a week 4 capsule 2    metroNIDAZOLE (FLAGYL) 500 MG tablet TAKE 1 TABLET BY MOUTH TWICE A DAY FOR 7 DAYS      thiothixene (NAVANE) 2 MG capsule TAKE 1 CAPSULE BY MOUTH EVERY EVENING AT BEDTIME      pregabalin (LYRICA) 75 MG capsule Take 1 capsule by mouth 2 times daily for 90 days. 60 capsule 2    Cholecalciferol (VITAMIN D3) 50 MCG (2000 UT) TABS Take 2,000 Units by mouth daily 90 tablet 0    albuterol sulfate HFA (VENTOLIN HFA) 108 (90 Base) MCG/ACT inhaler Inhale 2 puffs into the lungs 4 times daily 1 Inhaler 2    nitroGLYCERIN (NITROSTAT) 0.4 MG SL tablet Place 0.4 mg under the tongue every 5 minutes as needed for Chest pain up to max of 3 total doses. If no relief after 1 dose, call 911.       citalopram Smoker     Packs/day: 1.00     Years: 27.00     Pack years: 27.00     Types: Cigarettes     Start date: 12     Quit date: 2019     Years since quittin.1    Smokeless tobacco: Never Used    Tobacco comment: started to smoke at 15 / smoked up to 1 ppd / now smoking 3 to 4 cigarettes a day   Substance and Sexual Activity    Alcohol use: Never     Frequency: Never    Drug use: Never    Sexual activity: Not on file   Lifestyle    Physical activity     Days per week: Not on file     Minutes per session: Not on file    Stress: Not on file   Relationships    Social connections     Talks on phone: Not on file     Gets together: Not on file     Attends [employer]oration[employer] service: Not on file     Active member of club or organization: Not on file     Attends meetings of clubs or organizations: Not on file     Relationship status: Not on file    Intimate partner violence     Fear of current or ex partner: Not on file     Emotionally abused: Not on file     Physically abused: Not on file     Forced sexual activity: Not on file   Other Topics Concern    Not on file   Social History Narrative    Not on file       Review of Systems   Constitutional: Negative for chills and fever. Eyes: Negative for visual disturbance. Respiratory: Negative for cough, chest tightness, shortness of breath and wheezing. Cardiovascular: Negative for chest pain, palpitations and leg swelling. Gastrointestinal: Negative for abdominal pain, constipation, diarrhea, nausea and vomiting. Endocrine: Negative for polydipsia, polyphagia and polyuria. Musculoskeletal: Negative for arthralgias and myalgias. Skin: Negative for rash. Neurological: Negative for focal weakness and headaches. Psychiatric/Behavioral: Negative for dysphoric mood, self-injury, sleep disturbance and suicidal ideas. The patient is not nervous/anxious. OBJECTIVE:    Physical Exam  Vitals signs and nursing note reviewed.    Constitutional: General: She is not in acute distress. Appearance: Normal appearance. She is not ill-appearing, toxic-appearing or diaphoretic. HENT:      Head: Normocephalic and atraumatic. Right Ear: External ear normal.      Left Ear: External ear normal.      Nose: Nose normal.      Mouth/Throat:      Mouth: Mucous membranes are moist.   Eyes:      Extraocular Movements: Extraocular movements intact. Conjunctiva/sclera: Conjunctivae normal.      Pupils: Pupils are equal, round, and reactive to light. Neck:      Musculoskeletal: Normal range of motion. Pulmonary:      Effort: Pulmonary effort is normal. No respiratory distress. Musculoskeletal: Normal range of motion. Skin:     General: Skin is dry. Coloration: Skin is not pale. Findings: No rash. Neurological:      Mental Status: She is alert and oriented to person, place, and time. Psychiatric:         Mood and Affect: Mood normal.         Behavior: Behavior normal.       There were no vitals taken for this visit. BP Readings from Last 3 Encounters:   09/01/20 (!) 145/68   07/13/20 98/68   07/09/20 119/82      Wt Readings from Last 3 Encounters:   10/27/20 214 lb 1.1 oz (97.1 kg)   10/06/20 214 lb 1.1 oz (97.1 kg)   09/01/20 214 lb (97.1 kg)       ASSESSMENT & PLAN:    1. Type 2 diabetes mellitus without complication, without long-term current use of insulin (HCC)  - Stable, continue current regimen  - metFORMIN (GLUCOPHAGE-XR) 500 MG extended release tablet; Take 2 tablets by mouth daily (with breakfast)  Dispense: 180 tablet; Refill: 0  - CBC Auto Differential; Future  - Comprehensive Metabolic Panel; Future  - Hemoglobin A1C; Future  - Reviewed diabetic diet    2. Mixed hyperlipidemia  - Stable, continue current regimen  - rosuvastatin (CRESTOR) 40 MG tablet; TAKE 1 TABLET BY MOUTH EVERY DAY  Dispense: 90 tablet; Refill: 0  - CBC Auto Differential; Future  - Comprehensive Metabolic Panel; Future  - Lipid Panel; Future  - CK;  Future  - ezetimibe (ZETIA) 10 MG tablet; Take 1 tablet by mouth daily  Dispense: 90 tablet; Refill: 0  - Reviewed low cholesterol diet     3. Gastroesophageal reflux disease, unspecified whether esophagitis present  - Stable, continue current regimen  - omeprazole (PRILOSEC) 20 MG delayed release capsule; TAKE 1 CAPSULE BY MOUTH EVERY DAY  Dispense: 90 capsule; Refill: 0  - CBC Auto Differential; Future  - Reviewed GERD precautions    4. Migraine without status migrainosus, not intractable, unspecified migraine type  - Stable, continue current regimen  - metoprolol tartrate (LOPRESSOR) 50 MG tablet; Take 1 tablet by mouth 2 times daily  Dispense: 180 tablet; Refill: 0    5. AMOS (obstructive sleep apnea)  - Stable, continue nightly CPAP usage    6. 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[facility] [address]  - Stable, continue current regimen    7. Vitamin D deficiency  - Stable, continue vitamin D 3 supplementation  - vitamin D (CHOLECALCIFEROL) 50 MCG (2000 UT) TABS tablet; Take 1 tablet by mouth daily  Dispense: 90 tablet; Refill: 0    8. Fibromyalgia  - Stale, continue current regimen  - meloxicam (MOBIC) 15 MG tablet; Take 1 tablet by mouth daily as needed for Pain  Dispense: 90 tablet; Refill: 0  - Follow up with rheumatology/pain management    9. Other chronic pain  - Stable, continue current regimen  - meloxicam (MOBIC) 15 MG tablet; Take 1 tablet by mouth daily as needed for Pain  Dispense: 90 tablet; Refill: 0    10. Anxiety  - Stable, continue Cymbalta, Depakote and hydroxyzine   - Follow follow-up with treat as needed/directed    11. Other depression  - Stable, continue Cymbalta, Depakote and hydroxyzine   - Follow follow-up with treat as needed/directed    12. Bipolar 1 disorder (HCC)  - Stable, continue Cymbalta, Depakote and hydroxyzine   - Follow follow-up with treat as needed/directed    13. PTSD (post-traumatic stress disorder)  - Stable, continue Cymbalta, Depakote and hydroxyzine   - Follow follow-up with treat as needed/directed    14. History of alcoholism ([facility] [address]  - Stable, continue current current regimen      Continue current treatment plan. Current Outpatient Medications   Medication Sig Dispense Refill    omeprazole (PRILOSEC) 20 MG delayed release capsule TAKE 1 CAPSULE BY MOUTH EVERY DAY 90 capsule 0    rosuvastatin (CRESTOR) 40 MG tablet TAKE 1 TABLET BY MOUTH EVERY DAY 90 tablet 0    metFORMIN (GLUCOPHAGE-XR) 500 MG extended release tablet Take 2 tablets by mouth daily (with breakfast) 180 tablet 0    metoprolol tartrate (LOPRESSOR) 50 MG tablet Take 1 tablet by mouth 2 times daily 180 tablet 0    vitamin D (CHOLECALCIFEROL) 50 MCG (2000 UT) TABS tablet Take 1 tablet by mouth daily 90 tablet 0    meloxicam (MOBIC) 15 MG tablet Take 1 tablet by mouth daily as needed for Pain 90 tablet 0    ezetimibe (ZETIA) 10 MG tablet Take 1 tablet by mouth daily 90 tablet 0    methylPREDNISolone (MEDROL, CATARINA,) 4 MG tablet By mouth. 1 kit 0    CHANTIX 1 MG tablet TAKE 1 TABLET BY MOUTH TWICE A DAY (Patient not taking: Reported on 12/11/2020) 60 tablet 2    ondansetron (ZOFRAN ODT) 4 MG disintegrating tablet Take 1 tablet by mouth every 8 hours as needed for Nausea 20 tablet 0    albuterol sulfate HFA (VENTOLIN HFA) 108 (90 Base) MCG/ACT inhaler Inhale 2 puffs into the lungs every 6 hours as needed for Wheezing      vitamin D (ERGOCALCIFEROL) 1.25 MG (49195 UT) CAPS capsule Take 1 capsule by mouth once a week 4 capsule 2    metroNIDAZOLE (FLAGYL) 500 MG tablet TAKE 1 TABLET BY MOUTH TWICE A DAY FOR 7 DAYS      thiothixene (NAVANE) 2 MG capsule TAKE 1 CAPSULE BY MOUTH EVERY EVENING AT BEDTIME      pregabalin (LYRICA) 75 MG capsule Take 1 capsule by mouth 2 times daily for 90 days.  60 capsule 2    Cholecalciferol (VITAMIN D3) 50 MCG (2000 UT) TABS Take 2,000 Units by mouth daily 90 tablet 0    albuterol sulfate HFA (VENTOLIN HFA) 108 (90 Base) MCG/ACT inhaler Inhale 2 puffs into the lungs 4 times daily 1 Inhaler 2    nitroGLYCERIN (NITROSTAT) 0.4 MG SL tablet Place 0.4 mg under the tongue every 5 minutes as needed for Chest pain up to max of 3 total doses. If no relief after 1 dose, call 911.  citalopram (CELEXA) 20 MG tablet Take 20 mg by mouth daily       ammonium lactate (LAC-HYDRIN) 12 % lotion Apply topically daily       Coenzyme Q10 (CO Q-10 PO) Take by mouth daily       divalproex (DEPAKOTE ER) 500 MG extended release tablet Take 500 mg by mouth daily      hydrOXYzine (ATARAX) 25 MG tablet Take 25 mg by mouth 3 times daily as needed for Itching       No current facility-administered medications for this visit. Return in about 3 months (around 4/15/2021), or if symptoms worsen or fail to improve, for diabetes, lipidemia, GERD, migraines, AMOS, COPD, vit D, fibro, chronic pain, anxiety, depression, bipolar, PTSD, hx of alcoholism. Freda Austindra received counseling on the following healthy behaviors: nutrition, exercise and medication adherence    Discussed use, benefit, and side effects of prescribed medications. Barriers to medication compliance addressed. All patient questions answered. Pt voiced understanding. Call office if experience side effects from medications. Please note that some or all of this record was generated using voice recognition software. If there are any questions about the content of this document, please contact the author as some errors in transcription may have occurred. Amanda Tabares is a 36 y.o. female being evaluated by a Virtual Visit (video visit) encounter to address concerns as mentioned above. A caregiver was present when appropriate. Due to this being a TeleHealth encounter ([facility]), evaluation of the following organ systems was limited: Vitals/Constitutional/EENT/Resp/CV/GI//MS/Neuro/Skin/Heme-Lymph-Imm.   Pursuant to the emergency declaration under the [address] [facility] waiver authority and the Coronavirus Preparedness and Response Supplemental Appropriations Act, this Virtual Visit was conducted with patient's (and/or legal guardian's) consent, to reduce the patient's risk of exposure to COVID-19 and provide necessary medical care. The patient (and/or legal guardian) has also been advised to contact this office for worsening conditions or problems, and seek emergency medical treatment and/or call 911 if deemed necessary. Patient identification was verified at the start of the visit: Yes    Total time spent for this encounter: Not billed by time    Services were provided through a video synchronous discussion virtually to substitute for in-person clinic visit. Patient and provider were located at their individual homes. --JUAN RAMON Morrison CNP on 1/15/2021 at 4:55 PM    An electronic signature was used to authenticate this note.

## 2021-01-15 NOTE — PATIENT INSTRUCTIONS
Fasting labs reviewed      Medications refilled     Reviewed low-cholesterol diet    Add ezetimibe (Zetia) 10 mg daily     Reviewed diabetic diet     Follow-up with specialist as needed/directed (psych, Ortho, podiatry pulmonology, cardiology)      Encourage continue lifestyle modifications (better food choices, portion control and increasing activity)

## 2021-01-27 DIAGNOSIS — E78.2 MIXED HYPERLIPIDEMIA: Primary | ICD-10-CM

## 2021-01-28 NOTE — RESULT ENCOUNTER NOTE
Pt advised of overall stable labs.   ldl is still elevated so encourages to watch diet and continue with the medications

## 2021-02-03 ASSESSMENT — ENCOUNTER SYMPTOMS
CONSTIPATION: 0
COUGH: 0
NAUSEA: 0
VOMITING: 0
COLOR CHANGE: 0
CHEST TIGHTNESS: 0
BACK PAIN: 0
WHEEZING: 0
RHINORRHEA: 0
ABDOMINAL PAIN: 0
DIARRHEA: 0
SHORTNESS OF BREATH: 0

## 2021-02-03 NOTE — PROGRESS NOTES
SUBJECTIVE:    Patient ID:  Amanda Tabares is a 36 y.o. female      Patient is here for a complete physical.  She has no questions or concerns regarding her health. She is inquiring about diabetic eye exam, states she dose have an exam scheduled with eye doctor in the near future. She is also requesting a refill of her ammonium lactate for her eczema, which is managed on current regimen. Medications: See current out patient medications on file  Supplements:Vitamin D 3   Diet: Fairly healthy, working on it  Activity: does not work out on a regular basis  Safety: Uses sunscreen when out for extended periods of time, wears her seatbelt, does not drink and drive, non-smoker      Current Outpatient Medications on File Prior to Visit   Medication Sig Dispense Refill    omeprazole (PRILOSEC) 20 MG delayed release capsule TAKE 1 CAPSULE BY MOUTH EVERY DAY 90 capsule 0    rosuvastatin (CRESTOR) 40 MG tablet TAKE 1 TABLET BY MOUTH EVERY DAY 90 tablet 0    metFORMIN (GLUCOPHAGE-XR) 500 MG extended release tablet Take 2 tablets by mouth daily (with breakfast) 180 tablet 0    metoprolol tartrate (LOPRESSOR) 50 MG tablet Take 1 tablet by mouth 2 times daily 180 tablet 0    vitamin D (CHOLECALCIFEROL) 50 MCG (2000 UT) TABS tablet Take 1 tablet by mouth daily 90 tablet 0    meloxicam (MOBIC) 15 MG tablet Take 1 tablet by mouth daily as needed for Pain 90 tablet 0    ezetimibe (ZETIA) 10 MG tablet Take 1 tablet by mouth daily 90 tablet 0    methylPREDNISolone (MEDROL, CATARINA,) 4 MG tablet By mouth.  1 kit 0    albuterol sulfate HFA (VENTOLIN HFA) 108 (90 Base) MCG/ACT inhaler Inhale 2 puffs into the lungs every 6 hours as needed for Wheezing      thiothixene (NAVANE) 2 MG capsule TAKE 1 CAPSULE BY MOUTH EVERY EVENING AT BEDTIME      Cholecalciferol (VITAMIN D3) 50 MCG (2000 UT) TABS Take 2,000 Units by mouth daily 90 tablet 0    citalopram (CELEXA) 20 MG tablet Take 20 mg by mouth daily       Coenzyme Q10 (CO Q-10 PO) Take by mouth daily       divalproex (DEPAKOTE ER) 500 MG extended release tablet Take 500 mg by mouth daily      hydrOXYzine (ATARAX) 25 MG tablet Take 25 mg by mouth 3 times daily as needed for Itching      pregabalin (LYRICA) 75 MG capsule Take 1 capsule by mouth 2 times daily for 90 days. 60 capsule 2    albuterol sulfate HFA (VENTOLIN HFA) 108 (90 Base) MCG/ACT inhaler Inhale 2 puffs into the lungs 4 times daily 1 Inhaler 2    nitroGLYCERIN (NITROSTAT) 0.4 MG SL tablet Place 0.4 mg under the tongue every 5 minutes as needed for Chest pain up to max of 3 total doses. If no relief after 1 dose, call 911. No current facility-administered medications on file prior to visit. Past Medical History:   Diagnosis Date    Arthritis     Depression     [facility] mellitus ([facility] [address]     Hypertension     Tachycardia      Past Surgical History:   Procedure Laterality Date    ANKLE FUSION Bilateral     ARTHRODESIS Left 12/6/2019    REVISION OF TALONAVICULAR JOINT ARTHRODESIS LEFT FOOT  -SLEEP APNEA- performed by Tom Montes DPM at [facility] Bilateral     ELBOW SURGERY Bilateral     ulnar nerve release    HERNIA REPAIR      HIP ARTHROSCOPY Bilateral     TONSILLECTOMY AND ADENOIDECTOMY      WRIST GANGLION EXCISION Bilateral      Family History   Problem Relation Age of Onset    High Blood Pressure Mother     Arthritis Mother     Other Father         hypothyroidism    High Blood Pressure Father     Arthritis Father     Asthma Father     Heart Failure Maternal Aunt         22 stents.      Heart Failure Maternal Grandmother     Pacemaker Maternal Grandfather     Heart Surgery Paternal Grandmother     Pacemaker Paternal Grandfather      Social History     Socioeconomic History    Marital status:      Spouse name: Not on file    Number of children: Not on file    Years of education: Not on file    Highest education level: Not on file   Occupational History    Not on file   Social Needs    Financial resource strain: Not on file    Food insecurity     Worry: Not on file     Inability: Not on file    Transportation needs     Medical: Not on file     Non-medical: Not on file   Tobacco Use    Smoking status: Former Smoker     Packs/day: 1.00     Years: 27.00     Pack years: 27.00     Types: Cigarettes     Start date:  [facility]     Quit date: 2019     Years since quittin.2    Smokeless tobacco: Never Used    Tobacco comment: started to smoke at 15 / smoked up to 1 ppd / now smoking 3 to 4 cigarettes a day   Substance and Sexual Activity    Alcohol use: Never     Frequency: Never    Drug use: Never    Sexual activity: Not on file   Lifestyle    Physical activity     Days per week: Not on file     Minutes per session: Not on file    Stress: Not on file   Relationships    Social connections     Talks on phone: Not on file     Gets together: Not on file     Attends Yarsani service: Not on file     Active member of club or organization: Not on file     Attends meetings of clubs or organizations: Not on file     Relationship status: Not on file    Intimate partner violence     Fear of current or ex partner: Not on file     Emotionally abused: Not on file     Physically abused: Not on file     Forced sexual activity: Not on file   Other Topics Concern    Not on file   Social History Narrative    Not on file       Review of Systems   Constitutional: Negative for appetite change, chills, fever and unexpected weight change. HENT: Negative for congestion, dental problem, postnasal drip and rhinorrhea. Regular dental exams   Eyes: Negative for visual disturbance. Regular eye exams   Respiratory: Negative for cough, chest tightness, shortness of breath and wheezing. Cardiovascular: Negative for chest pain, palpitations and leg swelling. Gastrointestinal: Negative for abdominal pain, constipation, diarrhea, nausea and vomiting. Endocrine: Negative for cold intolerance, heat intolerance, polydipsia, polyphagia and polyuria. Genitourinary: Negative for dysuria, frequency, menstrual problem and urgency. Followed by GYN   Musculoskeletal: Negative for arthralgias, back pain and myalgias. Skin: Negative for color change and rash. Allergic/Immunologic: Negative for environmental allergies. Neurological: Negative for headaches. Hematological: Does not bruise/bleed easily. Psychiatric/Behavioral: Negative for dysphoric mood and sleep disturbance. The patient is not nervous/anxious. OBJECTIVE:    Physical Exam  Vitals signs and nursing note reviewed. Constitutional:       General: She is not in acute distress. Appearance: She is well-developed. She is not diaphoretic. HENT:      Head: Normocephalic and atraumatic. Right Ear: Tympanic membrane, ear canal and external ear normal.      Left Ear: Tympanic membrane, ear canal and external ear normal.      Nose: Nose normal.      Mouth/Throat:      Pharynx: No oropharyngeal exudate. Eyes:      General:         Right eye: No discharge. Left eye: No discharge. Conjunctiva/sclera: Conjunctivae normal.      Pupils: Pupils are equal, round, and reactive to light. Neck:      Musculoskeletal: Normal range of motion. Thyroid: No thyromegaly. Vascular: No JVD. Trachea: No tracheal deviation. Cardiovascular:      Rate and Rhythm: Normal rate and regular rhythm. Pulses:           Dorsalis pedis pulses are 2+ on the right side. Posterior tibial pulses are 2+ on the right side and 2+ on the left side. Heart sounds: Normal heart sounds. No murmur. No friction rub. No gallop. Pulmonary:      Effort: Pulmonary effort is normal. No respiratory distress. Breath sounds: Normal breath sounds. No wheezing or rales. Chest:      Chest wall: No tenderness.    Abdominal:      General: Bowel sounds are normal. There is no distension. Palpations: Abdomen is soft. There is no mass. Tenderness: There is no abdominal tenderness. There is no guarding or rebound. Genitourinary:     Comments: Followed by GYN  Musculoskeletal: Normal range of motion. General: No tenderness. Feet:      Right foot:      Protective Sensation: 6 sites tested. 6 sites sensed. Skin integrity: Skin integrity normal.      Left foot:      Protective Sensation: 6 sites tested. 6 sites sensed. Skin integrity: Skin integrity normal.   Lymphadenopathy:      Cervical: No cervical adenopathy. Skin:     General: Skin is warm and dry. Coloration: Skin is not pale. Findings: No rash. Neurological:      Mental Status: She is alert and oriented to person, place, and time. Cranial Nerves: No cranial nerve deficit. Motor: No abnormal muscle tone. Coordination: Coordination normal.      Deep Tendon Reflexes: Reflexes are normal and symmetric. Reflexes normal.   Psychiatric:         Speech: Speech normal.         Behavior: Behavior normal.       /80   Pulse 58   Temp 98.4 °F (36.9 °C)   Ht 5' 5\" (1.651 m)   Wt 212 lb 6.4 oz (96.3 kg)   LMP 01/20/2021   SpO2 97%   BMI 35.35 kg/m²    BP Readings from Last 3 Encounters:   02/04/21 100/80   09/01/20 (!) 145/68   07/13/20 98/68      Wt Readings from Last 3 Encounters:   02/04/21 212 lb 6.4 oz (96.3 kg)   10/27/20 214 lb 1.1 oz (97.1 kg)   10/06/20 214 lb 1.1 oz (97.1 kg)       ASSESSMENT & PLAN:    1. Annual physical exam  - POCT Urinalysis no Micro (within normal limits)  - Labs reviewed for 1/9/21    2. Type 2 diabetes mellitus without complication, without long-term current use of insulin ([facility])  -  DIABETES FOOT EXAM    3. Chronic eczema  - Stable, continue current regimen  - ammonium lactate (LAC-HYDRIN) 12 % lotion; Apply topically daily  Dispense: 225 mL; Refill: 2    4. Screening for HIV (human immunodeficiency virus)  - HIV Screen;  Future      Continue current treatment plan. Current Outpatient Medications   Medication Sig Dispense Refill    ammonium lactate (LAC-HYDRIN) 12 % lotion Apply topically daily 225 mL 2    omeprazole (PRILOSEC) 20 MG delayed release capsule TAKE 1 CAPSULE BY MOUTH EVERY DAY 90 capsule 0    rosuvastatin (CRESTOR) 40 MG tablet TAKE 1 TABLET BY MOUTH EVERY DAY 90 tablet 0    metFORMIN (GLUCOPHAGE-XR) 500 MG extended release tablet Take 2 tablets by mouth daily (with breakfast) 180 tablet 0    metoprolol tartrate (LOPRESSOR) 50 MG tablet Take 1 tablet by mouth 2 times daily 180 tablet 0    vitamin D (CHOLECALCIFEROL) 50 MCG (2000 UT) TABS tablet Take 1 tablet by mouth daily 90 tablet 0    meloxicam (MOBIC) 15 MG tablet Take 1 tablet by mouth daily as needed for Pain 90 tablet 0    ezetimibe (ZETIA) 10 MG tablet Take 1 tablet by mouth daily 90 tablet 0    methylPREDNISolone (MEDROL, CATARINA,) 4 MG tablet By mouth. 1 kit 0    albuterol sulfate HFA (VENTOLIN HFA) 108 (90 Base) MCG/ACT inhaler Inhale 2 puffs into the lungs every 6 hours as needed for Wheezing      thiothixene (NAVANE) 2 MG capsule TAKE 1 CAPSULE BY MOUTH EVERY EVENING AT BEDTIME      Cholecalciferol (VITAMIN D3) 50 MCG (2000 UT) TABS Take 2,000 Units by mouth daily 90 tablet 0    citalopram (CELEXA) 20 MG tablet Take 20 mg by mouth daily       Coenzyme Q10 (CO Q-10 PO) Take by mouth daily       divalproex (DEPAKOTE ER) 500 MG extended release tablet Take 500 mg by mouth daily      hydrOXYzine (ATARAX) 25 MG tablet Take 25 mg by mouth 3 times daily as needed for Itching      pregabalin (LYRICA) 75 MG capsule Take 1 capsule by mouth 2 times daily for 90 days. 60 capsule 2    albuterol sulfate HFA (VENTOLIN HFA) 108 (90 Base) MCG/ACT inhaler Inhale 2 puffs into the lungs 4 times daily 1 Inhaler 2    nitroGLYCERIN (NITROSTAT) 0.4 MG SL tablet Place 0.4 mg under the tongue every 5 minutes as needed for Chest pain up to max of 3 total doses.  If no relief after 1 dose, call 911. No current facility-administered medications for this visit. Return in about 1 year (around 2/4/2022), or if symptoms worsen or fail to improve, for annual physical, diabetes, eczema, screening. Antonette Garcia received counseling on the following healthy behaviors: nutrition, exercise and medication adherence    Patient given educational materials on health maintenance    Discussed use, benefit, and side effects of prescribed medications. Barriers to medication compliance addressed. All patient questions answered. Pt voiced understanding. Call office if experience side effects from medications. Please note that some or all of this record was generated using voice recognition software. If there are any questions about the content of this document, please contact the author as some errors in transcription may have occurred.

## 2021-02-03 NOTE — PATIENT INSTRUCTIONS
? Dry your feet well. Pat them dry. Do not rub the skin on your feet too hard. Dry well between your toes. If the skin on your feet stays moist, bacteria or a fungus can grow, which can lead to infection. ? Keep your skin soft. Use moisturizing skin cream to keep the skin on your feet soft and prevent calluses and cracks. But do not put the cream between your toes, and stop using any cream that causes a rash. ? Clean underneath your toenails carefully. Do not use a sharp object to clean underneath your toenails. Use the blunt end of a nail file or other rounded tool. ? Trim and file your toenails straight across to prevent ingrown toenails. Use a nail clipper, not scissors. Use an emery board to smooth the edges. · Change socks daily. Socks without seams are best, because seams often rub the feet. You can find socks for people with diabetes from specialty catalogs. · Look inside your shoes every day for things like gravel or torn linings, which could cause blisters or sores. · Buy shoes that fit well:  ? Look for shoes that have plenty of space around the toes. This helps prevent bunions and blisters. ? Try on shoes while wearing the kind of socks you will usually wear with the shoes. ? Avoid plastic shoes. They may rub your feet and cause blisters. Good shoes should be made of materials that are flexible and breathable, such as leather or cloth. ? Break in new shoes slowly by wearing them for no more than an hour a day for several days. Take extra time to check your feet for red areas, blisters, or other problems after you wear new shoes. · Do not go barefoot. Do not wear sandals, and do not wear shoes with very thin soles. Thin soles are easy to puncture. They also do not protect your feet from hot pavement or cold weather. · Have your doctor check your feet during each visit. If you have a foot problem, see your doctor. Do not try to treat an early foot problem at home. Home remedies or treatments that you can buy without a prescription (such as corn removers) can be harmful. · Always get early treatment for foot problems. A minor irritation can lead to a major problem if not properly cared for early. When should you call for help? Call your doctor now or seek immediate medical care if:    · You have a foot sore, an ulcer or break in the skin that is not healing after 4 days, bleeding corns or calluses, or an ingrown toenail.     · You have blue or black areas, which can mean bruising or blood flow problems.     · You have peeling skin or tiny blisters between your toes or cracking or oozing of the skin.     · You have a fever for more than 24 hours and a foot sore.     · You have new numbness or tingling in your feet that does not go away after you move your feet or change positions.     · You have unexplained or unusual swelling of the foot or ankle. Watch closely for changes in your health, and be sure to contact your doctor if:    · You cannot do proper foot care. Where can you learn more? Go to https://ClubLocal[email or web address].NVELO. org and sign in to your [employer] account. Enter A739 in the Feedo box to learn more about \"Diabetes Foot Health: Care Instructions. \"     If you do not have an account, please click on the \"Sign Up Now\" link. Current as of: December 20, 2019               Content Version: 12.6  © 0512-2013 [employer]. Care instructions adapted under license by [facility] ([facility]). If you have questions about a medical condition or this instruction, always ask your healthcare professional. [place] [address] any warranty or liability for your use of this information.          Patient Education        Well Visit, Ages 25 to 48: Care Instructions  Your Care Instructions Physical exams can help you stay healthy. Your doctor has checked your overall health and may have suggested ways to take good care of yourself. He or she also may have recommended tests. At home, you can help prevent illness with healthy eating, regular exercise, and other steps. Follow-up care is a key part of your treatment and safety. Be sure to make and go to all appointments, and call your doctor if you are having problems. It's also a good idea to know your test results and keep a list of the medicines you take. How can you care for yourself at home? · Reach and stay at a healthy weight. This will lower your risk for many problems, such as obesity, diabetes, heart disease, and high blood pressure. · Get at least 30 minutes of physical activity on most days of the week. Walking is a good choice. You also may want to do other activities, such as running, swimming, cycling, or playing tennis or team sports. Discuss any changes in your exercise program with your doctor. · Do not smoke or allow others to smoke around you. If you need help quitting, talk to your doctor about stop-smoking programs and medicines. These can increase your chances of quitting for good. · Talk to your doctor about whether you have any risk factors for sexually transmitted infections (STIs). Having one sex partner (who does not have STIs and does not have sex with anyone else) is a good way to avoid these infections. · Use birth control if you do not want to have children at this time. Talk with your doctor about the choices available and what might be best for you. · Protect your skin from too much sun. When you're outdoors from 10 a.m. to 4 p.m., stay in the shade or cover up with clothing and a hat with a wide brim. Wear sunglasses that block UV rays. Even when it's cloudy, put broad-spectrum sunscreen (SPF 30 or higher) on any exposed skin. · See a dentist one or two times a year for checkups and to have your teeth cleaned. · Wear a seat belt in the car. Follow your doctor's advice about when to have certain tests. These tests can spot problems early. For everyone  · Cholesterol. Have the fat (cholesterol) in your blood tested after age 21. Your doctor will tell you how often to have this done based on your age, family history, or other things that can increase your risk for heart disease. · Blood pressure. Have your blood pressure checked during a routine doctor visit. Your doctor will tell you how often to check your blood pressure based on your age, your blood pressure results, and other factors. · Vision. Talk with your doctor about how often to have a glaucoma test.  · Diabetes. Ask your doctor whether you should have tests for diabetes. · Colon cancer. Your risk for colorectal cancer gets higher as you get older. Some experts say that adults should start regular screening at age 48 and stop at age 76. Others say to start before age 48 or continue after age 76. Talk with your doctor about your risk and when to start and stop screening. For women  · Breast exam and mammogram. Talk to your doctor about when you should have a clinical breast exam and a mammogram. Medical experts differ on whether and how often women under 50 should have these tests. Your doctor can help you decide what is right for you. · Cervical cancer screening test and pelvic exam. Begin with a Pap test at age 24. The test often is part of a pelvic exam. Starting at age 27, you may choose to have a Pap test, an HPV test, or both tests at the same time (called co-testing). Talk with your doctor about how often to have testing. · Tests for sexually transmitted infections (STIs). Ask whether you should have tests for STIs. You may be at risk if you have sex with more than one person, especially if your partners do not wear condoms.   For men · Tests for sexually transmitted infections (STIs). Ask whether you should have tests for STIs. You may be at risk if you have sex with more than one person, especially if you do not wear a condom. · Testicular cancer exam. Ask your doctor whether you should check your testicles regularly. · Prostate exam. Talk to your doctor about whether you should have a blood test (called a PSA test) for prostate cancer. Experts differ on whether and when men should have this test. Some experts suggest it if you are older than 39 and are -American or have a father or brother who got prostate cancer when he was younger than 72. When should you call for help? Watch closely for changes in your health, and be sure to contact your doctor if you have any problems or symptoms that concern you. Where can you learn more? Go to https://Process RelationspeCassatteweb.healthSparxent. org and sign in to your [employer] account. Enter P072 in the EyeIC box to learn more about \"Well Visit, Ages 25 to 48: Care Instructions. \"     If you do not have an account, please click on the \"Sign Up Now\" link. Current as of: May 27, 2020               Content Version: 12.6  © 1866-9520 [employer]. Care instructions adapted under license by [facility] ([facility]). If you have questions about a medical condition or this instruction, always ask your healthcare professional. [address] any warranty or liability for your use of this information.

## 2021-02-04 ENCOUNTER — OFFICE VISIT (OUTPATIENT)
Dept: FAMILY MEDICINE CLINIC | Age: 41
End: 2021-02-04
Payer: COMMERCIAL

## 2021-02-04 VITALS
HEART RATE: 58 BPM | BODY MASS INDEX: 35.39 KG/M2 | DIASTOLIC BLOOD PRESSURE: 80 MMHG | SYSTOLIC BLOOD PRESSURE: 100 MMHG | WEIGHT: 212.4 LBS | OXYGEN SATURATION: 97 % | HEIGHT: 65 IN | TEMPERATURE: 98.4 F

## 2021-02-04 DIAGNOSIS — E11.9 TYPE 2 DIABETES MELLITUS WITHOUT COMPLICATION, WITHOUT LONG-TERM CURRENT USE OF INSULIN (HCC): ICD-10-CM

## 2021-02-04 DIAGNOSIS — Z00.00 ANNUAL PHYSICAL EXAM: Primary | ICD-10-CM

## 2021-02-04 DIAGNOSIS — Z11.4 SCREENING FOR HIV (HUMAN IMMUNODEFICIENCY VIRUS): ICD-10-CM

## 2021-02-04 DIAGNOSIS — L30.9 CHRONIC ECZEMA: ICD-10-CM

## 2021-02-04 LAB
BILIRUBIN, POC: NORMAL
BLOOD URINE, POC: NORMAL
CLARITY, POC: CLEAR
COLOR, POC: YELLOW
GLUCOSE URINE, POC: NORMAL
KETONES, POC: NORMAL
LEUKOCYTE EST, POC: NORMAL
NITRITE, POC: NORMAL
PH, POC: 6
PROTEIN, POC: NORMAL
SPECIFIC GRAVITY, POC: 1.02
UROBILINOGEN, POC: 0.2

## 2021-02-04 PROCEDURE — 81002 URINALYSIS NONAUTO W/O SCOPE: CPT | Performed by: CLINICAL NURSE SPECIALIST

## 2021-02-04 PROCEDURE — 99396 PREV VISIT EST AGE 40-64: CPT | Performed by: CLINICAL NURSE SPECIALIST

## 2021-02-04 PROCEDURE — G8482 FLU IMMUNIZE ORDER/ADMIN: HCPCS | Performed by: CLINICAL NURSE SPECIALIST

## 2021-02-04 RX ORDER — AMMONIUM LACTATE 12 G/100G
LOTION TOPICAL DAILY
Qty: 225 ML | Refills: 2 | Status: SHIPPED | OUTPATIENT
Start: 2021-02-04 | End: 2021-04-05 | Stop reason: SDUPTHER

## 2021-02-09 LAB
HEPATITIS B SURFACE ANTIGEN INTERPRETATION: NORMAL
HEPATITIS C ANTIBODY INTERPRETATION: NORMAL

## 2021-02-10 LAB
HIV AG/AB: NORMAL
HIV ANTIGEN: NORMAL
HIV-1 ANTIBODY: NORMAL
HIV-2 AB: NORMAL
TOTAL SYPHILLIS IGG/IGM: NORMAL

## 2021-02-16 ENCOUNTER — TELEPHONE (OUTPATIENT)
Dept: ORTHOPEDIC SURGERY | Age: 41
End: 2021-02-16

## 2021-02-24 ENCOUNTER — VIRTUAL VISIT (OUTPATIENT)
Dept: PULMONOLOGY | Age: 41
End: 2021-02-24
Payer: COMMERCIAL

## 2021-02-24 DIAGNOSIS — I10 ESSENTIAL HYPERTENSION: Chronic | ICD-10-CM

## 2021-02-24 DIAGNOSIS — E11.9 TYPE 2 DIABETES MELLITUS WITHOUT COMPLICATION, WITHOUT LONG-TERM CURRENT USE OF INSULIN (HCC): Chronic | ICD-10-CM

## 2021-02-24 DIAGNOSIS — G47.33 OSA (OBSTRUCTIVE SLEEP APNEA): Primary | Chronic | ICD-10-CM

## 2021-02-24 DIAGNOSIS — J44.9 COPD WITH ASTHMA (HCC): ICD-10-CM

## 2021-02-24 PROCEDURE — 99441 PR PHYS/QHP TELEPHONE EVALUATION 5-10 MIN: CPT | Performed by: NURSE PRACTITIONER

## 2021-02-24 ASSESSMENT — SLEEP AND FATIGUE QUESTIONNAIRES
HOW LIKELY ARE YOU TO NOD OFF OR FALL ASLEEP WHILE WATCHING TV: 0
HOW LIKELY ARE YOU TO NOD OFF OR FALL ASLEEP WHILE LYING DOWN TO REST IN THE AFTERNOON WHEN CIRCUMSTANCES PERMIT: 0
HOW LIKELY ARE YOU TO NOD OFF OR FALL ASLEEP WHILE SITTING AND TALKING TO SOMEONE: 0
HOW LIKELY ARE YOU TO NOD OFF OR FALL ASLEEP IN A CAR, WHILE STOPPED FOR A FEW MINUTES IN TRAFFIC: 0
ESS TOTAL SCORE: 0
HOW LIKELY ARE YOU TO NOD OFF OR FALL ASLEEP WHEN YOU ARE A PASSENGER IN A CAR FOR AN HOUR WITHOUT A BREAK: 0

## 2021-02-24 NOTE — PROGRESS NOTES
([x] NA [] Feeling of suffocation [] Feeling like not enough air [] To much pressure)     Noticed changes in pressure   [x] Yes  [] No  [] NA   Mask is fitting well  [] Yes  [x] No   Noting Mask Air Leak  [] Yes  [x] No   Having painful Aerophagia  [] Yes  [x] No   Nocturia   1-2  per night. Having  HA upon waking  [] Yes  [x] No   Dry mouth upon waking   Dry Nose  Dry Eyes  [] Yes  [x] No   Congestion upon waking   [] Yes  [x] No    Nose Bleeds  [] Yes  [x] No   Using Sleep Aides  [] Per our office [] Per another provider  [x] NA   Understands how to change humidification and/or tubing temperature for comfort while at home  [x] Yes  [] No     Difficulties falling asleep  [] Yes  [x] No   Difficulties staying asleep  [] Yes  [x] No   Approximate time to bed  10pm   Approximate wake time  6-8am   Taking Naps  no   If taking naps usual length    [x] NA   If taking naps using the machine  [] Yes  [] No  [x] NA [] With and With out    Drowsy when driving  [] Yes  [x] No     Does patient carry a DOT/CDL  [] Yes  [x] No     Does patient carry FAA/[employer] License   [] Yes  [x] No      Any concerns noted with the machine at this time  [] Yes  [x] No           Assessment/Plan:     Type 2 diabetes mellitus without complication, without long-term current use of insulin (HCC)  Chronic- Stable. Cont meds per PCP and other physicians. Essential hypertension  Chronic- Stable. Cont meds per PCP and other physicians. 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[facility] [address]  Chronic- Stable. Cont meds per PCP and other physicians. AMOS (obstructive sleep apnea)  Reviewed compliance download with pt. Supplies and parts as needed for his machine. These are medically necessary. Continue medications per his PCP and other physicians. Limit caffeine use after 3pm.    The chronic medical conditions listed are directly related to the primary diagnosis listed above.     The management of the primary diagnosis affects the secondary diagnosis and vice versa.        1. AMOS (obstructive sleep apnea)  Assessment & Plan:  Reviewed compliance download with pt. Supplies and parts as needed for his machine. These are medically necessary. Continue medications per his PCP and other physicians. Limit caffeine use after 3pm.    The chronic medical conditions listed are directly related to the primary diagnosis listed above. The management of the primary diagnosis affects the secondary diagnosis and vice versa. 2. Type 2 diabetes mellitus without complication, without long-term current use of insulin ([facility])  Assessment & Plan:  Chronic- Stable. Cont meds per PCP and other physicians. 3. Essential hypertension  Assessment & Plan:  Chronic- Stable. Cont meds per PCP and other physicians. 4. 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[facility] [address]  Assessment & Plan:  Chronic- Stable. Cont meds per PCP and other physicians. The primary encounter diagnosis was AMOS (obstructive sleep apnea). Diagnoses of Type 2 diabetes mellitus without complication, without long-term current use of insulin ([facility] [address] Essential hypertension, and 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[facility] [address] were also pertinent to this visit. The chronic medical conditions listed are directly related to the primary diagnosis listed above. The management of the primary diagnosis affects the secondary diagnosis and vice versa. - Educated patient and reviewed down load from modem with the patient   - Continue medications per her PCP and other physicians.   - she instructed not to drive unless had 4 hrs of effective therapy for her AMOS the night before. - Did review the risks of under or untreated AMOS including, but not limited to, higher risks of motor vehicle accidents, stroke, heart attacks, and death. - she understands and accepts all these risks. - The patient is advised to use the machine every night.   - Patient using  [employer] for supplies  - Patient not able to access video feed. Visit completed via telephone communications.  8 min spent with patient.   - Educated on    Use of the machine with COVID  -F/U: 3 months    Juliet Thornton, MSN, RN, CNP

## 2021-02-26 ENCOUNTER — HOSPITAL ENCOUNTER (OUTPATIENT)
Dept: WOMENS IMAGING | Age: 41
Discharge: HOME | End: 2021-02-26
Payer: COMMERCIAL

## 2021-02-26 DIAGNOSIS — Z12.31 VISIT FOR SCREENING MAMMOGRAM: ICD-10-CM

## 2021-02-26 PROCEDURE — 77067 SCR MAMMO BI INCL CAD: CPT

## 2021-03-26 ENCOUNTER — HOSPITAL ENCOUNTER (OUTPATIENT)
Age: 41
Discharge: HOME | End: 2021-03-26
Payer: COMMERCIAL

## 2021-03-26 DIAGNOSIS — Z11.4 SCREENING FOR HIV (HUMAN IMMUNODEFICIENCY VIRUS): ICD-10-CM

## 2021-03-26 DIAGNOSIS — E11.9 TYPE 2 DIABETES MELLITUS WITHOUT COMPLICATION, WITHOUT LONG-TERM CURRENT USE OF INSULIN (HCC): ICD-10-CM

## 2021-03-26 DIAGNOSIS — E78.2 MIXED HYPERLIPIDEMIA: ICD-10-CM

## 2021-03-26 DIAGNOSIS — K21.9 GASTROESOPHAGEAL REFLUX DISEASE, UNSPECIFIED WHETHER ESOPHAGITIS PRESENT: ICD-10-CM

## 2021-03-26 LAB
A/G RATIO: 1.3 (ref 1.1–2.2)
ALBUMIN SERPL-MCNC: 4.5 G/DL (ref 3.4–5)
ALP BLD-CCNC: 57 U/L (ref 40–129)
ALT SERPL-CCNC: 19 U/L (ref 10–40)
ANION GAP SERPL CALCULATED.3IONS-SCNC: 9 MMOL/L (ref 3–16)
AST SERPL-CCNC: 18 U/L (ref 15–37)
BASOPHILS ABSOLUTE: 0 K/UL (ref 0–0.2)
BASOPHILS RELATIVE PERCENT: 0.5 %
BILIRUB SERPL-MCNC: 0.4 MG/DL (ref 0–1)
BUN BLDV-MCNC: 11 MG/DL (ref 7–20)
CALCIUM SERPL-MCNC: 9.3 MG/DL (ref 8.3–10.6)
CHLORIDE BLD-SCNC: 98 MMOL/L (ref 99–110)
CHOLESTEROL, TOTAL: 141 MG/DL (ref 0–199)
CO2: 27 MMOL/L (ref 21–32)
CREAT SERPL-MCNC: 0.6 MG/DL (ref 0.6–1.1)
EOSINOPHILS ABSOLUTE: 0 K/UL (ref 0–0.6)
EOSINOPHILS RELATIVE PERCENT: 0.7 %
GFR AFRICAN AMERICAN: >60
GFR NON-AFRICAN AMERICAN: >60
GLOBULIN: 3.6 G/DL
GLUCOSE BLD-MCNC: 90 MG/DL (ref 70–99)
HCT VFR BLD CALC: 38.7 % (ref 36–48)
HDLC SERPL-MCNC: 36 MG/DL (ref 40–60)
HEMOGLOBIN: 12.9 G/DL (ref 12–16)
LDL CHOLESTEROL CALCULATED: 93 MG/DL
LYMPHOCYTES ABSOLUTE: 2.6 K/UL (ref 1–5.1)
LYMPHOCYTES RELATIVE PERCENT: 41.9 %
MCH RBC QN AUTO: 29.1 PG (ref 26–34)
MCHC RBC AUTO-ENTMCNC: 33.4 G/DL (ref 31–36)
MCV RBC AUTO: 86.9 FL (ref 80–100)
MONOCYTES ABSOLUTE: 0.6 K/UL (ref 0–1.3)
MONOCYTES RELATIVE PERCENT: 9.4 %
NEUTROPHILS ABSOLUTE: 2.9 K/UL (ref 1.7–7.7)
NEUTROPHILS RELATIVE PERCENT: 47.5 %
PDW BLD-RTO: 13.6 % (ref 12.4–15.4)
PLATELET # BLD: 288 K/UL (ref 135–450)
PMV BLD AUTO: 7.1 FL (ref 5–10.5)
POTASSIUM SERPL-SCNC: 4 MMOL/L (ref 3.5–5.1)
RBC # BLD: 4.45 M/UL (ref 4–5.2)
SODIUM BLD-SCNC: 134 MMOL/L (ref 136–145)
TOTAL CK: 159 U/L (ref 26–192)
TOTAL PROTEIN: 8.1 G/DL (ref 6.4–8.2)
TRIGL SERPL-MCNC: 61 MG/DL (ref 0–150)
VLDLC SERPL CALC-MCNC: 12 MG/DL
WBC # BLD: 6.1 K/UL (ref 4–11)

## 2021-03-26 PROCEDURE — 85025 COMPLETE CBC W/AUTO DIFF WBC: CPT

## 2021-03-26 PROCEDURE — 80061 LIPID PANEL: CPT

## 2021-03-26 PROCEDURE — 36415 COLL VENOUS BLD VENIPUNCTURE: CPT

## 2021-03-26 PROCEDURE — 86702 HIV-2 ANTIBODY: CPT

## 2021-03-26 PROCEDURE — 82550 ASSAY OF CK (CPK): CPT

## 2021-03-26 PROCEDURE — 87390 HIV-1 AG IA: CPT

## 2021-03-26 PROCEDURE — 80053 COMPREHEN METABOLIC PANEL: CPT

## 2021-03-26 PROCEDURE — 86701 HIV-1ANTIBODY: CPT

## 2021-03-26 PROCEDURE — 83036 HEMOGLOBIN GLYCOSYLATED A1C: CPT

## 2021-03-27 LAB
ESTIMATED AVERAGE GLUCOSE: 122.6 MG/DL
HBA1C MFR BLD: 5.9 %
HIV AG/AB: NORMAL
HIV ANTIGEN: NORMAL
HIV-1 ANTIBODY: NORMAL
HIV-2 AB: NORMAL

## 2021-04-04 ASSESSMENT — ENCOUNTER SYMPTOMS
NAUSEA: 0
CHEST TIGHTNESS: 0
CONSTIPATION: 0
VOMITING: 0
ABDOMINAL PAIN: 0
DIARRHEA: 0
SHORTNESS OF BREATH: 0
WHEEZING: 0
COUGH: 0

## 2021-04-04 NOTE — PROGRESS NOTES
SUBJECTIVE:    Patient ID:  Kenia Bah is a 36 y.o. female      Patient is here for a medication check for hyperlipidemia, diabetes, GERD, migraine headaches, AMOS, vitamin D deficiency, fibromyalgia and chronic pain. She is doing well on current regimen and has been experiencing rare chest pain over the last 2 months, which is relieved with nitro tablet and similar to prior episodes. Currently denies chest pain, shortness of no further concerns, chest pain or dyspnea, orthopnea, headache, vision changes or weakness. States things have been stressful lately. Discussed stress management techniques. Advised to follow up with cardiologist.  She is also been experiencing intermittent right elbow pain. GERD symptoms are managed with omeprazole. Denies indigestion/heartburn, difficulty swallowing, epigastric pain, nausea, vomiting, diarrhea, constipation or blood in stool. Migraines are managed with metoprolol twice daily. Denies any change in headache duration, frequency, intensity or pattern with no new neurological symptoms. She has a history of anxiety, depression, bipolar and PTSD. She is followed a psychiatrist every 2 to 3 months and sees a counselor twice a week. She is currently taking Celexa, Depakote and hydroxyzine as needed. Currently denies thoughts of self-harm, suicidal or homicidal ideations. States she has been evaluated by cardiology and will be followed annually. She was told to follow-up with pulmonology annually for COPD and AMOS. AMOS is managed with nightly CPAP usage. She also has a history or alcoholism for 24 years, she has been sober for almost 4 years. Labs reviewed from 3/26/2021 CBC is unremarkable, CMP is essentially normal, A1c is 5.9, total cholesterol 141, triglycerides 61, HDL 36, LDL 93, , negative HIV screening. State she needs additional views of the right breath after her screening mammogram.      Hyperlipidemia  This is a chronic problem.  The current episode started more than 1 year ago. The problem is controlled. Recent lipid tests were reviewed and are low. Exacerbating diseases include obesity. Pertinent negatives include no chest pain, focal sensory loss, focal weakness, leg pain, myalgias or shortness of breath. Current antihyperlipidemic treatment includes ezetimibe and statins. The current treatment provides significant improvement of lipids. Risk factors for coronary artery disease include diabetes mellitus, stress, obesity and dyslipidemia. Diabetes  She presents for her follow-up diabetic visit. She has type 2 diabetes mellitus. Her disease course has been stable. Pertinent negatives for hypoglycemia include no headaches or nervousness/anxiousness. Pertinent negatives for diabetes include no chest pain, no foot paresthesias, no polydipsia, no polyphagia and no polyuria. Risk factors for coronary artery disease include obesity, dyslipidemia and diabetes mellitus. Current diabetic treatment includes oral agent (monotherapy). Her weight is stable. She is following a generally healthy diet. Current Outpatient Medications on File Prior to Visit   Medication Sig Dispense Refill    methylPREDNISolone (MEDROL, CATARINA,) 4 MG tablet By mouth. 1 kit 0    albuterol sulfate HFA (VENTOLIN HFA) 108 (90 Base) MCG/ACT inhaler Inhale 2 puffs into the lungs every 6 hours as needed for Wheezing      thiothixene (NAVANE) 2 MG capsule TAKE 1 CAPSULE BY MOUTH EVERY EVENING AT BEDTIME      citalopram (CELEXA) 20 MG tablet Take 20 mg by mouth daily       Coenzyme Q10 (CO Q-10 PO) Take by mouth daily       divalproex (DEPAKOTE ER) 500 MG extended release tablet Take 500 mg by mouth daily      hydrOXYzine (ATARAX) 25 MG tablet Take 25 mg by mouth 3 times daily as needed for Itching      pregabalin (LYRICA) 75 MG capsule Take 1 capsule by mouth 2 times daily for 90 days.  60 capsule 2    albuterol sulfate HFA (VENTOLIN HFA) 108 (90 Base) MCG/ACT inhaler Inhale 2 puffs into the lungs 4 times daily 1 Inhaler 2     No current facility-administered medications on file prior to visit. Past Medical History:   Diagnosis Date    Arthritis     Depression     Diabetes mellitus ([facility] [address]     Hypertension     Tachycardia      Past Surgical History:   Procedure Laterality Date    ANKLE FUSION Bilateral     ARTHRODESIS Left 2019    REVISION OF TALONAVICULAR JOINT ARTHRODESIS LEFT FOOT  -SLEEP APNEA- performed by Idris Funes DPM at [facility] Bilateral     ELBOW SURGERY Bilateral     ulnar nerve release    HERNIA REPAIR      HIP ARTHROSCOPY Bilateral     TONSILLECTOMY AND ADENOIDECTOMY      WRIST GANGLION EXCISION Bilateral      Family History   Problem Relation Age of Onset    High Blood Pressure Mother     Arthritis Mother     Other Father         hypothyroidism    High Blood Pressure Father     Arthritis Father     Asthma Father     Heart Failure Maternal Aunt         22 stents.      Heart Failure Maternal Grandmother     Pacemaker Maternal Grandfather     Heart Surgery Paternal Grandmother     Pacemaker Paternal Grandfather      Social History     Socioeconomic History    Marital status:      Spouse name: Not on file    Number of children: Not on file    Years of education: Not on file    Highest education level: Not on file   Occupational History    Not on file   Social Needs    Financial resource strain: Not on file    Food insecurity     Worry: Not on file     Inability: Not on file   [employer] needs     Medical: Not on file     Non-medical: Not on file   Tobacco Use    Smoking status: Former Smoker     Packs/day: 1.00     Years: 27.00     Pack years: 27.00     Types: Cigarettes     Start date: 12     Quit date: 2019     Years since quittin.3    Smokeless tobacco: Never Used    Tobacco comment: started to smoke at 15 / smoked up to 1 ppd / now smoking 3 to 4 cigarettes a day Substance and Sexual Activity    Alcohol use: Never     Frequency: Never    Drug use: Never    Sexual activity: Not on file   Lifestyle    Physical activity     Days per week: Not on file     Minutes per session: Not on file    Stress: Not on file   Relationships    Social connections     Talks on phone: Not on file     Gets together: Not on file     Attends Yarsanism service: Not on file     Active member of club or organization: Not on file     Attends meetings of clubs or organizations: Not on file     Relationship status: Not on file    Intimate partner violence     Fear of current or ex partner: Not on file     Emotionally abused: Not on file     Physically abused: Not on file     Forced sexual activity: Not on file   Other Topics Concern    Not on file   Social History Narrative    Not on file       Review of Systems   Constitutional: Negative for chills and fever. Eyes: Negative for visual disturbance. Respiratory: Negative for cough, chest tightness, shortness of breath and wheezing. Cardiovascular: Negative for chest pain, palpitations and leg swelling. Gastrointestinal: Negative for abdominal pain, constipation, diarrhea, nausea and vomiting. Endocrine: Negative for polydipsia, polyphagia and polyuria. Musculoskeletal: Negative for arthralgias and myalgias. Skin: Negative for rash. Neurological: Negative for focal weakness and headaches. Psychiatric/Behavioral: Negative for dysphoric mood, self-injury, sleep disturbance and suicidal ideas. The patient is not nervous/anxious. OBJECTIVE:    Physical Exam  Vitals signs and nursing note reviewed. Constitutional:       General: She is not in acute distress. Appearance: She is well-developed. She is obese. She is not ill-appearing. HENT:      Head: Normocephalic and atraumatic.       Right Ear: External ear normal.      Left Ear: External ear normal.      Nose: Nose normal.   Eyes:      Conjunctiva/sclera: Conjunctivae normal.      Pupils: Pupils are equal, round, and reactive to light. Neck:      Musculoskeletal: Normal range of motion and neck supple. Trachea: No tracheal deviation. Cardiovascular:      Rate and Rhythm: Normal rate and regular rhythm. Heart sounds: Normal heart sounds. Pulmonary:      Effort: Pulmonary effort is normal. No respiratory distress. Breath sounds: Normal breath sounds. No wheezing or rales. Chest:      Chest wall: No tenderness. Abdominal:      General: Bowel sounds are normal. There is no distension. Palpations: Abdomen is soft. There is no mass. Tenderness: There is no abdominal tenderness. Hernia: No hernia is present. Musculoskeletal: Normal range of motion. Skin:     General: Skin is warm and dry. Findings: No rash. Neurological:      Mental Status: She is alert and oriented to person, place, and time. Psychiatric:         Behavior: Behavior normal.       /72   Pulse 74   Temp 97.9 °F (36.6 °C)   Ht 5' 5\" (1.651 m)   Wt 211 lb 9.6 oz (96 kg)   LMP 02/26/2021   SpO2 99%   BMI 35.21 kg/m²    BP Readings from Last 3 Encounters:   04/05/21 110/72   02/04/21 100/80   09/01/20 (!) 145/68      Wt Readings from Last 3 Encounters:   04/05/21 211 lb 9.6 oz (96 kg)   02/04/21 212 lb 6.4 oz (96.3 kg)   10/27/20 214 lb 1.1 oz (97.1 kg)       ASSESSMENT & PLAN:    1. Mixed hyperlipidemia  - Stable, continue current regimen   - rosuvastatin (CRESTOR) 40 MG tablet; TAKE 1 TABLET BY MOUTH EVERY DAY  Dispense: 90 tablet; Refill: 0  - ezetimibe (ZETIA) 10 MG tablet; Take 1 tablet by mouth daily  Dispense: 90 tablet; Refill: 0  - CBC Auto Differential; Future  - Comprehensive Metabolic Panel; Future  - Lipid Panel; Future  - CK; Future  - Reviewed low cholesterol diet    2.  Type 2 diabetes mellitus without complication, without long-term current use of insulin (HCC)  - Stable, continue current regimen  - metFORMIN (GLUCOPHAGE-XR) 500 MG extended release tablet; Take 2 tablets by mouth daily (with breakfast)  Dispense: 180 tablet; Refill: 0  - POCT Glucose  - POCT Urinalysis no Micro  - CBC Auto Differential; Future  - Comprehensive Metabolic Panel; Future  - Hemoglobin A1C; Future  - Reviewed diabetic diet     3. Gastroesophageal reflux disease, unspecified whether esophagitis present  - Stable, continue current regimen  - omeprazole (PRILOSEC) 20 MG delayed release capsule; TAKE 1 CAPSULE BY MOUTH EVERY DAY  Dispense: 90 capsule; Refill: 0  - CBC Auto Differential; Future  - Reviewed GERD precautions    4. Migraine without status migrainosus, not intractable, unspecified migraine type  - Stable, continue current regimen  - metoprolol tartrate (LOPRESSOR) 50 MG tablet; Take 1 tablet by mouth 2 times daily  Dispense: 180 tablet; Refill: 0    5. AMOS (obstructive sleep apnea)  - Stable, continue nightly CPAP usage  - Follow up with pulmonologist as needed/directed    6. 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[facility]  - Stable, continue current regimen  - Follow up with pulmonologist as needed/directed    7. Vitamin D deficiency  - Stable, continue current regimen  - Cholecalciferol (VITAMIN D3) 50 MCG (2000 UT) TABS; Take 2,000 Units by mouth daily  Dispense: 90 tablet; Refill: 0    8. Fibromyalgia   - Stable, continue current regimen  - meloxicam (MOBIC) 15 MG tablet; Take 1 tablet by mouth daily as needed for Pain  Dispense: 90 tablet; Refill: 0    9. Other chronic pain  - Stable, continue current regimen  - meloxicam (MOBIC) 15 MG tablet; Take 1 tablet by mouth daily as needed for Pain  Dispense: 90 tablet; Refill: 0    10. Anxiety  - Stable, continue current regimen  - Follow-up with psychiatrist as needed/directed    11. Other depression  - Stable, continue current regimen  - Follow-up with psychiatrist as needed/directed    12. Bipolar 1 disorder ([facility]  - Stable, continue current regimen  - Follow-up with psychiatrist as needed/directed    13.  PTSD (post-traumatic stress disorder)  - Stable, continue current regimen  - Follow-up with psychiatrist as needed/directed    14. History of alcoholism ([facility] [address]  - Stable, continue current regimen    15. Chronic eczema  - ammonium lactate (LAC-HYDRIN) 12 % lotion; Apply topically daily  Dispense: 225 mL; Refill: 2    16. Medial epicondylitis of elbow, right  - meloxicam (MOBIC) 15 MG tablet; Take 1 tablet by mouth daily as needed for Pain  Dispense: 90 tablet; Refill: 0  - diclofenac sodium (VOLTAREN) 1 % GEL; Apply 2 g topically 2 times daily  Dispense: 150 g; Refill: 1  - Exercises given  - Heat or ice, whichever feels better      Continue current treatment plan. Current Outpatient Medications   Medication Sig Dispense Refill    omeprazole (PRILOSEC) 20 MG delayed release capsule TAKE 1 CAPSULE BY MOUTH EVERY DAY 90 capsule 0    rosuvastatin (CRESTOR) 40 MG tablet TAKE 1 TABLET BY MOUTH EVERY DAY 90 tablet 0    metFORMIN (GLUCOPHAGE-XR) 500 MG extended release tablet Take 2 tablets by mouth daily (with breakfast) 180 tablet 0    metoprolol tartrate (LOPRESSOR) 50 MG tablet Take 1 tablet by mouth 2 times daily 180 tablet 0    meloxicam (MOBIC) 15 MG tablet Take 1 tablet by mouth daily as needed for Pain 90 tablet 0    ezetimibe (ZETIA) 10 MG tablet Take 1 tablet by mouth daily 90 tablet 0    Cholecalciferol (VITAMIN D3) 50 MCG (2000 UT) TABS Take 2,000 Units by mouth daily 90 tablet 0    nitroGLYCERIN (NITROSTAT) 0.4 MG SL tablet Place 1 tablet under the tongue every 5 minutes as needed for Chest pain up to max of 3 total doses. If no relief after 1 dose, call 911. 25 tablet 0    ammonium lactate (LAC-HYDRIN) 12 % lotion Apply topically daily 225 mL 2    diclofenac sodium (VOLTAREN) 1 % GEL Apply 2 g topically 2 times daily 150 g 1    methylPREDNISolone (MEDROL, CATARINA,) 4 MG tablet By mouth.  1 kit 0    albuterol sulfate HFA (VENTOLIN HFA) 108 (90 Base) MCG/ACT inhaler Inhale 2 puffs into the lungs every 6 hours as needed for Wheezing      thiothixene (NAVANE) 2 MG capsule TAKE 1 CAPSULE BY MOUTH EVERY EVENING AT BEDTIME      citalopram (CELEXA) 20 MG tablet Take 20 mg by mouth daily       Coenzyme Q10 (CO Q-10 PO) Take by mouth daily       divalproex (DEPAKOTE ER) 500 MG extended release tablet Take 500 mg by mouth daily      hydrOXYzine (ATARAX) 25 MG tablet Take 25 mg by mouth 3 times daily as needed for Itching      pregabalin (LYRICA) 75 MG capsule Take 1 capsule by mouth 2 times daily for 90 days. 60 capsule 2    albuterol sulfate HFA (VENTOLIN HFA) 108 (90 Base) MCG/ACT inhaler Inhale 2 puffs into the lungs 4 times daily 1 Inhaler 2     No current facility-administered medications for this visit. Return in about 3 months (around 7/5/2021), or if symptoms worsen or fail to improve, for lipidemia, diabetes, GERD, migraines, AMOS, COPD, vit D, fibro, chronic pain, A&D, ETOH, eczema, ME.    Ivanna Moctezuma received counseling on the following healthy behaviors: nutrition, exercise, medication adherence, tobacco cessation and decrease in alcohol consumption    Patient given educational materials on Exercise    Discussed use, benefit, and side effects of prescribed medications. Barriers to medication compliance addressed. All patient questions answered. Pt voiced understanding. Call office if experience side effects from medications. Please note that some or all of this record was generated using voice recognition software. If there are any questions about the content of this document, please contact the author as some errors in transcription may have occurred.

## 2021-04-05 ENCOUNTER — OFFICE VISIT (OUTPATIENT)
Dept: FAMILY MEDICINE CLINIC | Age: 41
End: 2021-04-05
Payer: COMMERCIAL

## 2021-04-05 VITALS
HEIGHT: 65 IN | TEMPERATURE: 97.9 F | SYSTOLIC BLOOD PRESSURE: 110 MMHG | DIASTOLIC BLOOD PRESSURE: 72 MMHG | WEIGHT: 211.6 LBS | HEART RATE: 74 BPM | BODY MASS INDEX: 35.25 KG/M2 | OXYGEN SATURATION: 99 %

## 2021-04-05 DIAGNOSIS — G89.29 OTHER CHRONIC PAIN: ICD-10-CM

## 2021-04-05 DIAGNOSIS — F43.10 PTSD (POST-TRAUMATIC STRESS DISORDER): ICD-10-CM

## 2021-04-05 DIAGNOSIS — E55.9 VITAMIN D DEFICIENCY: ICD-10-CM

## 2021-04-05 DIAGNOSIS — E11.9 TYPE 2 DIABETES MELLITUS WITHOUT COMPLICATION, WITHOUT LONG-TERM CURRENT USE OF INSULIN (HCC): ICD-10-CM

## 2021-04-05 DIAGNOSIS — M79.7 FIBROMYALGIA: ICD-10-CM

## 2021-04-05 DIAGNOSIS — J44.9 COPD WITH ASTHMA (HCC): ICD-10-CM

## 2021-04-05 DIAGNOSIS — G43.909 MIGRAINE WITHOUT STATUS MIGRAINOSUS, NOT INTRACTABLE, UNSPECIFIED MIGRAINE TYPE: ICD-10-CM

## 2021-04-05 DIAGNOSIS — F10.21 HISTORY OF ALCOHOLISM (HCC): ICD-10-CM

## 2021-04-05 DIAGNOSIS — F31.9 BIPOLAR 1 DISORDER (HCC): ICD-10-CM

## 2021-04-05 DIAGNOSIS — E78.2 MIXED HYPERLIPIDEMIA: Primary | ICD-10-CM

## 2021-04-05 DIAGNOSIS — M77.01 MEDIAL EPICONDYLITIS OF ELBOW, RIGHT: ICD-10-CM

## 2021-04-05 DIAGNOSIS — F32.89 OTHER DEPRESSION: ICD-10-CM

## 2021-04-05 DIAGNOSIS — L30.9 CHRONIC ECZEMA: ICD-10-CM

## 2021-04-05 DIAGNOSIS — G47.33 OSA (OBSTRUCTIVE SLEEP APNEA): ICD-10-CM

## 2021-04-05 DIAGNOSIS — F41.9 ANXIETY: ICD-10-CM

## 2021-04-05 DIAGNOSIS — K21.9 GASTROESOPHAGEAL REFLUX DISEASE, UNSPECIFIED WHETHER ESOPHAGITIS PRESENT: ICD-10-CM

## 2021-04-05 LAB
BILIRUBIN, POC: NORMAL
BLOOD URINE, POC: NORMAL
CHP ED QC CHECK: NORMAL
CLARITY, POC: CLEAR
COLOR, POC: YELLOW
GLUCOSE BLD-MCNC: 126 MG/DL
GLUCOSE URINE, POC: NORMAL
KETONES, POC: NORMAL
LEUKOCYTE EST, POC: NORMAL
NITRITE, POC: NORMAL
PH, POC: 5.5
PROTEIN, POC: NORMAL
SPECIFIC GRAVITY, POC: >=1.03
UROBILINOGEN, POC: 0.2

## 2021-04-05 PROCEDURE — 99214 OFFICE O/P EST MOD 30 MIN: CPT | Performed by: CLINICAL NURSE SPECIALIST

## 2021-04-05 PROCEDURE — 81002 URINALYSIS NONAUTO W/O SCOPE: CPT | Performed by: CLINICAL NURSE SPECIALIST

## 2021-04-05 PROCEDURE — 3044F HG A1C LEVEL LT 7.0%: CPT | Performed by: CLINICAL NURSE SPECIALIST

## 2021-04-05 PROCEDURE — G8417 CALC BMI ABV UP PARAM F/U: HCPCS | Performed by: CLINICAL NURSE SPECIALIST

## 2021-04-05 PROCEDURE — 1036F TOBACCO NON-USER: CPT | Performed by: CLINICAL NURSE SPECIALIST

## 2021-04-05 PROCEDURE — 2022F DILAT RTA XM EVC RTNOPTHY: CPT | Performed by: CLINICAL NURSE SPECIALIST

## 2021-04-05 PROCEDURE — 82962 GLUCOSE BLOOD TEST: CPT | Performed by: CLINICAL NURSE SPECIALIST

## 2021-04-05 PROCEDURE — G8427 DOCREV CUR MEDS BY ELIG CLIN: HCPCS | Performed by: CLINICAL NURSE SPECIALIST

## 2021-04-05 PROCEDURE — 3023F SPIROM DOC REV: CPT | Performed by: CLINICAL NURSE SPECIALIST

## 2021-04-05 PROCEDURE — G8926 SPIRO NO PERF OR DOC: HCPCS | Performed by: CLINICAL NURSE SPECIALIST

## 2021-04-05 RX ORDER — METFORMIN HYDROCHLORIDE 500 MG/1
1000 TABLET, EXTENDED RELEASE ORAL
Qty: 180 TABLET | Refills: 0 | Status: SHIPPED | OUTPATIENT
Start: 2021-04-05 | End: 2021-07-30 | Stop reason: SDUPTHER

## 2021-04-05 RX ORDER — METOPROLOL TARTRATE 50 MG/1
50 TABLET, FILM COATED ORAL 2 TIMES DAILY
Qty: 180 TABLET | Refills: 0 | Status: SHIPPED | OUTPATIENT
Start: 2021-04-05 | End: 2021-07-30 | Stop reason: SDUPTHER

## 2021-04-05 RX ORDER — AMMONIUM LACTATE 12 G/100G
LOTION TOPICAL DAILY
Qty: 225 ML | Refills: 2 | Status: SHIPPED | OUTPATIENT
Start: 2021-04-05

## 2021-04-05 RX ORDER — CHOLECALCIFEROL (VITAMIN D3) 125 MCG
2000 CAPSULE ORAL DAILY
Qty: 90 TABLET | Refills: 0 | Status: SHIPPED | OUTPATIENT
Start: 2021-04-05 | End: 2021-07-30 | Stop reason: SDUPTHER

## 2021-04-05 RX ORDER — ROSUVASTATIN CALCIUM 40 MG/1
TABLET, COATED ORAL
Qty: 90 TABLET | Refills: 0 | Status: SHIPPED | OUTPATIENT
Start: 2021-04-05 | End: 2021-07-30 | Stop reason: SDUPTHER

## 2021-04-05 RX ORDER — OMEPRAZOLE 20 MG/1
CAPSULE, DELAYED RELEASE ORAL
Qty: 90 CAPSULE | Refills: 0 | Status: SHIPPED | OUTPATIENT
Start: 2021-04-05 | End: 2021-07-30 | Stop reason: SDUPTHER

## 2021-04-05 RX ORDER — MELOXICAM 15 MG/1
15 TABLET ORAL DAILY PRN
Qty: 90 TABLET | Refills: 0 | Status: SHIPPED | OUTPATIENT
Start: 2021-04-05 | End: 2021-07-30 | Stop reason: SDUPTHER

## 2021-04-05 RX ORDER — EZETIMIBE 10 MG/1
10 TABLET ORAL DAILY
Qty: 90 TABLET | Refills: 0 | Status: SHIPPED | OUTPATIENT
Start: 2021-04-05 | End: 2021-07-30 | Stop reason: SDUPTHER

## 2021-04-05 RX ORDER — NITROGLYCERIN 0.4 MG/1
0.4 TABLET SUBLINGUAL EVERY 5 MIN PRN
Qty: 25 TABLET | Refills: 0 | Status: SHIPPED | OUTPATIENT
Start: 2021-04-05

## 2021-04-05 NOTE — PATIENT INSTRUCTIONS
Fasting labs reviewed      Medications refilled     Reviewed low-cholesterol diet     Add ezetimibe (Zetia) 10 mg daily     Reviewed diabetic diet    Trial of diclofenac twice daily to affected area (right elbow)      Follow-up with specialist as needed/directed (psych, Ortho, podiatry pulmonology, cardiology)      Encourage continue lifestyle modifications (better food choices, portion control and increasing activity)  Patient Education        Golismael's Elbow: Care Instructions  Your Care Instructions  The pain and soreness in the inner part of your elbow is caused by a problem called golfer's elbow. Bending the wrist over and over again has hurt the tendons that attach to your inner elbow. The muscles in your forearm also may hurt. Golismael's elbow usually gets better with treatment at home. Follow-up care is a key part of your treatment and safety. Be sure to make and go to all appointments, and call your doctor if you are having problems. It's also a good idea to know your test results and keep a list of the medicines you take. How can you care for yourself at home? · Rest your elbow and wrist. Try to avoid movements that are painful. You may have to do this for weeks to months. Follow your doctor's directions for how long to rest.  · Put ice or a cold pack on your elbow for 10 to 20 minutes at a time. Try to do this every 1 to 2 hours for the next 3 days (when you are awake) or until the swelling goes down. Put a thin cloth between the ice and your skin. · Prop up the sore arm on a pillow when you ice it or anytime you sit or lie down during the next 3 days. Try to keep it above the level of your heart. This will help reduce swelling. · Take pain medicine exactly as directed. ? If the doctor gave you a prescription medicine for pain, take it as prescribed. ? If you are not taking a prescription pain medicine, ask your doctor if you can take an over-the-counter medicine.   · If your doctor gave you a brace or splint, use it as directed. A \"counterforce\" brace is a strap around the forearm, just below the elbow. It may ease the pressure on the tendon and may spread force throughout the arm. · Follow your doctor's or physical therapist's directions for exercise. To prevent golfer's elbow  · After your elbow has healed, learn the best techniques for your work or sport. A physical or occupational therapist can help you. When should you call for help? Call your doctor now or seek immediate medical care if:    · Your pain gets worse.     · You cannot bend your elbow normally.     · You have tingling, weakness, or numbness in your hand and fingers.     · Your arm or hand is cool or pale or changes color. Watch closely for changes in your health, and be sure to contact your doctor if:    · You have work problems caused by your elbow pain.     · Your pain is not better after 2 weeks. Where can you learn more? Go to https://disco volante."1,2,3 Listo". org and sign in to your [employer] account. Enter F538 in the [employer] box to learn more about \"Golfer's Elbow: Care Instructions. \"     If you do not have an account, please click on the \"Sign Up Now\" link. Current as of: November 16, 2020               Content Version: 12.8  © 2006-2021 Healthwise, Incorporated. Care instructions adapted under license by [facility] ([facility]). If you have questions about a medical condition or this instruction, always ask your healthcare professional. [place] [address] any warranty or liability for your use of this information. Patient Education        Golfer's Elbow: Exercises  Introduction  Here are some examples of exercises for you to try. The exercises may be suggested for a condition or for rehabilitation. Start each exercise slowly. Ease off the exercises if you start to have pain. You will be told when to start these exercises and which ones will work best for you.   How to do the exercises  Wrist extensor stretch   1. Extend your affected arm in front of you and make a fist with your palm facing down. 2. Bend your wrist so that your fist points toward the floor. 3. With your other hand, gently bend your wrist farther until you feel a mild to moderate stretch in your forearm. 4. Hold for at least 15 to 30 seconds. 5. Repeat 2 to 4 times. 6. Repeat steps 1 through 5 with your fingers pointing toward the floor. Forearm extensor stretch   1. Place your affected elbow down at your side, bent at about 90 degrees. Then make a fist with your palm facing down. 2. Keeping your wrist bent, slowly straighten your elbow so your arm is down at your side. Then twist your fist out so your palm is facing out to the side and you feel a stretch. 3. Hold for at least 15 to 30 seconds. 4. Repeat 2 to 4 times. Wrist flexor stretch   1. Extend your affected arm in front of you with your palm facing away from your body. 2. Bend back your wrist, pointing your hand up toward the ceiling. 3. With your other hand, gently bend your wrist farther until you feel a mild to moderate stretch in your forearm. 4. Hold for at least 15 to 30 seconds. 5. Repeat 2 to 4 times. 6. Repeat steps 1 through 5, but this time extend your affected arm in front of you with your palm facing up. Then bend back your wrist, pointing your hand toward the floor. Wrist curls   1. Place your forearm on a table with your hand hanging over the edge of the table, palm up. 2. Place a 1- to 2-pound weight in your hand. This may be a dumbbell, a can of food, or a filled water bottle. 3. Slowly raise and lower the weight while keeping your forearm on the table and your palm facing up. 4. Repeat this motion 8 to 12 times. 5. Switch arms, and do steps 1 through 4.  6. Repeat with your hand facing down toward the floor. Switch arms. Resisted wrist extension   1. Sit leaning forward with your legs slightly spread.  Then place your affected forearm on your thigh with your hand and wrist in front of your knee. 2. Grasp one end of an exercise band with your palm down, and step on the other end.  3. Slowly bend your wrist upward for a count of 2, then lower your wrist slowly to a count of 5.  4. Repeat 8 to 12 times. Resisted wrist flexion   1. Sit leaning forward with your legs slightly spread. Then place your affected forearm on your thigh with your hand and wrist in front of your knee. 2. Grasp one end of an exercise band with your palm up, and step on the other end.  3. Slowly bend your wrist upward for a count of 2, then lower your wrist slowly to a count of 5.  4. Repeat 8 to 12 times. Neck stretch to the side   1. This stretch works best if you keep your shoulder down as you lean away from it. To help you remember to do this, start by relaxing your shoulders and lightly holding on to your thighs or your chair. 2. Tilt your head away from your affected elbow and toward your opposite shoulder. For example, if your right elbow is sore, keep your right shoulder down as you lean your head toward your left shoulder. 3. Hold for 15 to 30 seconds. Let the weight of your head stretch your muscles. 4. If you would like a little added stretch, use your hand to gently and steadily pull your head toward your shoulder. For example, if your right elbow is sore, use your left hand to gently pull your head toward your left shoulder. 5. Repeat 2 to 4 times. Resisted forearm pronation   1. Sit leaning forward with your legs slightly spread. Then place your affected forearm on your thigh with your hand and wrist in front of your knee. 2. Grasp one end of an exercise band with your palm up, and step on the other end. 3. Keeping your wrist straight, roll your palm inward toward your thigh for a count of 2, then slowly move your wrist back to the starting position to a count of 5.  4. Repeat 8 to 12 times. Resisted supination   1.  Sit leaning forward with your legs slightly spread. Then place your affected forearm on your thigh with your hand and wrist in front of your knee. 2. Grasp one end of an exercise band with your palm down, and step on the other end. 3. Keeping your wrist straight, roll your palm outward and away from your thigh for a count of 2, then slowly move your wrist back to the starting position to a count of 5.  4. Repeat 8 to 12 times. Follow-up care is a key part of your treatment and safety. Be sure to make and go to all appointments, and call your doctor if you are having problems. It's also a good idea to know your test results and keep a list of the medicines you take. Where can you learn more? Go to https://[employer]ang [employer]wide. org and sign in to your [employer] account. Enter [phone] in the DriveK box to learn more about \"Golfer's Elbow: Exercises. \"     If you do not have an account, please click on the \"Sign Up Now\" link. Current as of: November 16, 2020               Content Version: 12.8  © 9030-1640 Healthwise, Incorporated. Care instructions adapted under license by [facility] ([facility]). If you have questions about a medical condition or this instruction, always ask your healthcare professional. [address] any warranty or liability for your use of this information.

## 2021-04-12 ENCOUNTER — OFFICE VISIT (OUTPATIENT)
Dept: CARDIOLOGY CLINIC | Age: 41
End: 2021-04-12
Payer: COMMERCIAL

## 2021-04-12 VITALS
SYSTOLIC BLOOD PRESSURE: 100 MMHG | HEART RATE: 63 BPM | OXYGEN SATURATION: 98 % | BODY MASS INDEX: 35.44 KG/M2 | HEIGHT: 65 IN | DIASTOLIC BLOOD PRESSURE: 60 MMHG | WEIGHT: 212.7 LBS

## 2021-04-12 DIAGNOSIS — E78.2 MIXED HYPERLIPIDEMIA: ICD-10-CM

## 2021-04-12 DIAGNOSIS — R07.89 OTHER CHEST PAIN: Primary | ICD-10-CM

## 2021-04-12 DIAGNOSIS — R00.0 TACHYCARDIA: ICD-10-CM

## 2021-04-12 PROCEDURE — G8417 CALC BMI ABV UP PARAM F/U: HCPCS | Performed by: NURSE PRACTITIONER

## 2021-04-12 PROCEDURE — 1036F TOBACCO NON-USER: CPT | Performed by: NURSE PRACTITIONER

## 2021-04-12 PROCEDURE — 93000 ELECTROCARDIOGRAM COMPLETE: CPT | Performed by: NURSE PRACTITIONER

## 2021-04-12 PROCEDURE — G8427 DOCREV CUR MEDS BY ELIG CLIN: HCPCS | Performed by: NURSE PRACTITIONER

## 2021-04-12 PROCEDURE — 99214 OFFICE O/P EST MOD 30 MIN: CPT | Performed by: NURSE PRACTITIONER

## 2021-04-12 NOTE — PROGRESS NOTES
[address]     Outpatient Follow Up Note    Radha Kate is 36 y.o. female who presents today with a history of chest pain, tachycardia/AT and hyperlipidemia. Her other hx includes: COVID-19 , AMOS    CHIEF COMPLAINT / HPI:  Follow Up secondary to chest pain    Subjective:   She was doing fine. She's been having CP that's been happening more often. She's concerned with her FH of heart disease. 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 The other day, she'd been talking about work (anxiety high / toxic environment). She has more stress when she knows her boss will be there. She has associated sweating. A couple of times, her pain went to her arms so she took NTG SL. She has an episode 2-3 x / week. It lasts 5-10 minutes up to 20 minutes. There is SOB attributed to her COPD. She's been trying to walk more. She's trying to walk 2 miles, gets winded, but tolerates. She's SOB on steps. The patient denies orthopnea/PND. She no longer has a CPAP (insurance related). The patient does not have swelling. The patients weight is going down ~ 25# / 3 months. The patient is experiencing palpitations / heart racing at times. She has no dizziness. She has swelling in hear ankles by the end of the day. These symptoms are worsening since the last OV Oct '19. With regard to medication therapy the patient has been compliant with prescribed regimen. They have tolerated therapy to date.      Past Medical History:   Diagnosis Date    Arthritis     Depression     Diabetes mellitus ([address]     Hypertension     Tachycardia      Social History:    Social History     Tobacco Use   Smoking Status Former Smoker    Packs/day: 1.00    Years: 27.00    Pack years: 27.00    Types: Cigarettes    Start date: 12    Quit date: 2019    Years since quittin.3   Smokeless Tobacco Never Used   Tobacco Comment    started to smoke at 15 / smoked up to 1 ppd / visualized. Color flow and spectral Doppler shows  Mitral valve E/A ratio 1.6. Diastolic dysfunction, probably normal.    Summary:  Quality of echo fair  Preserved left ventricular systolic function with ejection fraction calculated 68%                      Last Angiogram: Lt & Rt heart cath: '17  FINDINGS:  Pulmonary capillary wedge pressure 7, PA pressure 32/16, mean  pressure of 21. Right atrial pressure of 9. Right  ventricular pressure of 18. Oxygen saturation throughout the  right heart system was around 68%. Qp:Qs is calculated to be  1. Cardiac output by Farhad method was 5.7 with cardiac index  of 2.84. FINDINGS:  Left main 0% stenosis. LAD large, 0% stenosis. Diagonal is  small, 0% stenosis. Left circumflex is codominant artery, 0% stenosis. Obtuse marginal is small. Right coronary artery codominant artery with 0% stenosis. LV angiogram shows LV ejection fraction 55% with LVEDP of 19 mmHg. Assessment:      Diagnosis Orders   1. Other chest pain   ~recurrent : poss anxiety driven   ~neg for cor disease by cath '17 ; has a hx of DM and hyperlipidemia EKG 12 lead    Stress test, myoview   2. Tachycardia   ~AP regular / controlled  EKG 12 lead   3. Mixed hyperlipidemia   ~trig improved  ~crestor / zetia  ~diabetes managed by PCP        I had the opportunity to review the clinical symptoms and presentation of Raheem Odonnell. Plan:     1. EKG : sinus rhythm 60   Exercise myoview stress : RF include : DM, female with FH  2. F/U in four weeks / test dependent       Overall the patient is stable from CV standpoint    I have addresed the patient's cardiac risk factors and adjusted pharmacologic treatment as needed. In addition, I have reinforced the need for patient directed risk factor modification. Further evaluation will be based upon the patient's clinical course and testing results. All questions and concerns were addressed to the patient.  Alternatives to my treatment

## 2021-04-12 NOTE — PATIENT INSTRUCTIONS
Exercise myoview stress test : assess your heart's squeeze and circulation     appt in four weeks / test dependent

## 2021-04-23 ENCOUNTER — HOSPITAL ENCOUNTER (OUTPATIENT)
Dept: NON INVASIVE DIAGNOSTICS | Age: 41
Discharge: HOME | End: 2021-04-23
Payer: COMMERCIAL

## 2021-04-23 LAB
LV EF: 64 %
LVEF MODALITY: NORMAL

## 2021-04-23 PROCEDURE — 3430000000 HC RX DIAGNOSTIC RADIOPHARMACEUTICAL: Performed by: NURSE PRACTITIONER

## 2021-04-23 PROCEDURE — 93017 CV STRESS TEST TRACING ONLY: CPT | Performed by: INTERNAL MEDICINE

## 2021-04-23 PROCEDURE — 78452 HT MUSCLE IMAGE SPECT MULT: CPT | Performed by: INTERNAL MEDICINE

## 2021-04-23 PROCEDURE — A9502 TC99M TETROFOSMIN: HCPCS | Performed by: NURSE PRACTITIONER

## 2021-04-23 RX ADMIN — TETROFOSMIN 30 MILLICURIE: 1.38 INJECTION, POWDER, LYOPHILIZED, FOR SOLUTION INTRAVENOUS at 10:29

## 2021-04-23 RX ADMIN — TETROFOSMIN 10 MILLICURIE: 1.38 INJECTION, POWDER, LYOPHILIZED, FOR SOLUTION INTRAVENOUS at 07:22

## 2021-05-07 ENCOUNTER — HOSPITAL ENCOUNTER (OUTPATIENT)
Dept: WOMENS IMAGING | Age: 41
Discharge: HOME | End: 2021-05-07
Payer: COMMERCIAL

## 2021-05-07 ENCOUNTER — HOSPITAL ENCOUNTER (OUTPATIENT)
Dept: ULTRASOUND IMAGING | Age: 41
Discharge: HOME | End: 2021-05-07
Payer: COMMERCIAL

## 2021-05-07 DIAGNOSIS — R92.8 ABNORMAL MAMMOGRAM: ICD-10-CM

## 2021-05-07 PROCEDURE — 77065 DX MAMMO INCL CAD UNI: CPT

## 2021-05-07 PROCEDURE — 76642 ULTRASOUND BREAST LIMITED: CPT

## 2021-05-28 ENCOUNTER — VIRTUAL VISIT (OUTPATIENT)
Dept: PULMONOLOGY | Age: 41
End: 2021-05-28
Payer: COMMERCIAL

## 2021-05-28 DIAGNOSIS — E11.9 TYPE 2 DIABETES MELLITUS WITHOUT COMPLICATION, WITHOUT LONG-TERM CURRENT USE OF INSULIN (HCC): Chronic | ICD-10-CM

## 2021-05-28 DIAGNOSIS — G47.33 OSA (OBSTRUCTIVE SLEEP APNEA): Primary | Chronic | ICD-10-CM

## 2021-05-28 DIAGNOSIS — E66.01 MORBIDLY OBESE (HCC): ICD-10-CM

## 2021-05-28 DIAGNOSIS — I10 ESSENTIAL HYPERTENSION: Chronic | ICD-10-CM

## 2021-05-28 DIAGNOSIS — J44.9 COPD WITH ASTHMA (HCC): ICD-10-CM

## 2021-05-28 PROCEDURE — 3044F HG A1C LEVEL LT 7.0%: CPT | Performed by: NURSE PRACTITIONER

## 2021-05-28 PROCEDURE — 99214 OFFICE O/P EST MOD 30 MIN: CPT | Performed by: NURSE PRACTITIONER

## 2021-05-28 PROCEDURE — 2022F DILAT RTA XM EVC RTNOPTHY: CPT | Performed by: NURSE PRACTITIONER

## 2021-05-28 PROCEDURE — G8427 DOCREV CUR MEDS BY ELIG CLIN: HCPCS | Performed by: NURSE PRACTITIONER

## 2021-05-28 ASSESSMENT — SLEEP AND FATIGUE QUESTIONNAIRES
HOW LIKELY ARE YOU TO NOD OFF OR FALL ASLEEP IN A CAR, WHILE STOPPED FOR A FEW MINUTES IN TRAFFIC: 0
ESS TOTAL SCORE: 9
HOW LIKELY ARE YOU TO NOD OFF OR FALL ASLEEP WHILE LYING DOWN TO REST IN THE AFTERNOON WHEN CIRCUMSTANCES PERMIT: 3
HOW LIKELY ARE YOU TO NOD OFF OR FALL ASLEEP WHILE SITTING INACTIVE IN A PUBLIC PLACE: 0
HOW LIKELY ARE YOU TO NOD OFF OR FALL ASLEEP WHILE WATCHING TV: 2
HOW LIKELY ARE YOU TO NOD OFF OR FALL ASLEEP WHILE SITTING AND READING: 1

## 2021-05-28 NOTE — PROGRESS NOTES
Guanakito Moralez MD, FAASM, [facility]  Puneet Palmer, MSN, RN, [id number] [place] [address]  Dept: [phone]  Dept Fax: [phone]  Loc: [phone]    Subjective:     Patient ID: Hua Dacosta is a 36 y.o. female. Chief Complaint   Patient presents with    Sleep Apnea       HPI:      Sleep Medicine Video Visit    Pursuant to the emergency declaration under the [address] [place] [address] waiver authority and the [employer] Act this Telephone Visit was insisted, with patient's consent, to reduce the patient's risk of exposure to COVID-19 and provide continuity of care for an established patient. Services were provided through a synchronous discussion over a telephone and/or Video chat to substitute for in-person clinic visit, and coded as such. While patient is at home. Machine Modem/Download Info:                                              Lane - Total score: 9    Follow-up :     Last Visit : February 2021      Subjective Health Changes: Machine taken back by the insurance      Over Night Oximetry: [] Yes  [] No  [x] NA [] WNL   Using O2: [] Yes  [] No  [x] NA   Patient is compliant with the machine  [x] Yes  [] No [] Per patient   Feeling rested when using the machine   [] Yes  [x] No     Pressure is comfortable with inspiration and expiration  [x] Yes  [x] No   ([x] NA   [] Feeling of suffocation  [] Feeling like not enough air    [] To much pressure)     Noticed changes in pressure  [x] NA  [] Yes    [] No     Mask is fitting well  [x] Yes  [] No   Noting Mask Air Leak  [] Yes  [x] No   Having painful Aerophagia  [] Yes  [x] No   Nocturia   2-3  per night.    Having  HA upon waking  [] Yes  [x] No   Dry mouth upon waking   Dry Nose  Dry Eyes  [x] Yes  [] No   Congestion upon waking   [] Yes  [x] No    Nose Bleeds  [] Yes  [x] No   Using Sleep Aides  [x] NA  [] OTC  [] Per our office   [] Per another provider   Understands how to change humidification and/or tubing temperature for comfort while at home  [x] Yes  [] No     Difficulties falling asleep  [] Yes  [x] No   Difficulties staying asleep  [] Yes  [x] No   Approximate time to bed  9-10pm   Approximate wake time  6am   Taking Naps  frequent   If taking naps usual length  1-2 hours     [] NA   If taking naps using the machine [] NA  [] Yes    [x] No    [] With and With out    Drowsy when driving  [] Yes  [x] No     Does patient carry a DOT/CDL  [] Yes  [x] No     Does patient carry FAA/Pilots License   [] Yes  [x] No      Any concerns noted with the machine at this time  [x] Yes  [] No   Machine removed per insurance       Assessment/Plan:     1. AMOS (obstructive sleep apnea)  Assessment & Plan:  After downloading data and reviewing  Reviewed compliance download with pt. Supplies and parts as needed for his machine. These are medically necessary. Continue medications per his PCP and other physicians. Limit caffeine use after 3pm.    The chronic medical conditions listed are directly related to the primary diagnosis listed above. The management of the primary diagnosis affects the secondary diagnosis and vice versa    Patient is NOT compliant at this. Increasing the risk of heart attacks, strokes, car accidents, and/or death from uncontrolled Obstructive Sleep Apnea     2. Type 2 diabetes mellitus without complication, without long-term current use of insulin (HCC)  Assessment & Plan:  Chronic- stable. After speaking with patient:    Agree with current plan, and would agree to continue this plan per prescribing and managing physician. 3. Morbidly obese ([address]  Assessment & Plan:  Patient encouraged to work on maintaining a healthy weight per height.   Achievable with diet restriction/modifications and exercise (may consult primary care if unsure of any restrictions or concerns). Weight management directly correlates to risk of control and maintenance of Obstructive Sleep Apnea. 4. Essential hypertension  Assessment & Plan:  Chronic- stable. After speaking with patient:    Agree with current plan, and would agree to continue this plan per prescribing and managing physician. 5. 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[facility] [address]  Assessment & Plan:  Chronic- stable. After speaking with patient:    Agree with current plan, and would agree to continue this plan per prescribing and managing physician. The chronic medical conditions listed are directly related to the primary diagnosis listed above. The management of the primary diagnosis affects the secondary diagnosis and vice versa. - After pulling data and reviewing it   - Unable to Educate patient and reviewed down load from modem with the patient   - Continue medications per her PCP and other physicians.   - she instructed not to drive unless had 4 hrs of effective therapy for her AMOS the night before. - Did review the risks of under or untreated AMOS including, but not limited to, higher risks of motor vehicle accidents, stroke, heart attacks, and death. - she understands and accepts all these risks.     - The patient is advised to use the machine every night.   - Patient using  [employer] for supplies  - Patient educated on   Napping with the machine  Therapeutic time versus run time   Titration ordered today in office   Obtaining a new machine   Office locations  Machine function   Compliance  Follow up  The risk of undercontrolling Obstructive sleep apnea   -Will follow up in office in 3 months      Electronically signed by JUAN RAMON Bolton CNP on 5/28/2021 at 8:27 AM

## 2021-05-28 NOTE — ASSESSMENT & PLAN NOTE
After downloading data and reviewing  Reviewed compliance download with pt. Supplies and parts as needed for his machine. These are medically necessary. Continue medications per his PCP and other physicians. Limit caffeine use after 3pm.    The chronic medical conditions listed are directly related to the primary diagnosis listed above. The management of the primary diagnosis affects the secondary diagnosis and vice versa    Patient is NOT compliant at this.       Increasing the risk of heart attacks, strokes, car accidents, and/or death from uncontrolled Obstructive Sleep Apnea

## 2021-07-23 ENCOUNTER — HOSPITAL ENCOUNTER (OUTPATIENT)
Age: 41
Discharge: HOME | End: 2021-07-23
Payer: COMMERCIAL

## 2021-07-23 DIAGNOSIS — E78.2 MIXED HYPERLIPIDEMIA: ICD-10-CM

## 2021-07-23 DIAGNOSIS — E11.9 TYPE 2 DIABETES MELLITUS WITHOUT COMPLICATION, WITHOUT LONG-TERM CURRENT USE OF INSULIN (HCC): ICD-10-CM

## 2021-07-23 DIAGNOSIS — K21.9 GASTROESOPHAGEAL REFLUX DISEASE, UNSPECIFIED WHETHER ESOPHAGITIS PRESENT: ICD-10-CM

## 2021-07-23 LAB
A/G RATIO: 1.5 (ref 1.1–2.2)
ALBUMIN SERPL-MCNC: 4.4 G/DL (ref 3.4–5)
ALP BLD-CCNC: 57 U/L (ref 40–129)
ALT SERPL-CCNC: 16 U/L (ref 10–40)
ANION GAP SERPL CALCULATED.3IONS-SCNC: 14 MMOL/L (ref 3–16)
AST SERPL-CCNC: 14 U/L (ref 15–37)
BASOPHILS ABSOLUTE: 0 K/UL (ref 0–0.2)
BASOPHILS RELATIVE PERCENT: 0.4 %
BILIRUB SERPL-MCNC: 0.4 MG/DL (ref 0–1)
BUN BLDV-MCNC: 10 MG/DL (ref 7–20)
CALCIUM SERPL-MCNC: 9.2 MG/DL (ref 8.3–10.6)
CHLORIDE BLD-SCNC: 100 MMOL/L (ref 99–110)
CHOLESTEROL, TOTAL: 299 MG/DL (ref 0–199)
CO2: 24 MMOL/L (ref 21–32)
CREAT SERPL-MCNC: 0.6 MG/DL (ref 0.6–1.1)
EOSINOPHILS ABSOLUTE: 0 K/UL (ref 0–0.6)
EOSINOPHILS RELATIVE PERCENT: 0.5 %
GFR AFRICAN AMERICAN: >60
GFR NON-AFRICAN AMERICAN: >60
GLOBULIN: 3 G/DL
GLUCOSE BLD-MCNC: 113 MG/DL (ref 70–99)
HCT VFR BLD CALC: 38.2 % (ref 36–48)
HDLC SERPL-MCNC: 38 MG/DL (ref 40–60)
HEMOGLOBIN: 12.8 G/DL (ref 12–16)
LDL CHOLESTEROL CALCULATED: 241 MG/DL
LYMPHOCYTES ABSOLUTE: 3 K/UL (ref 1–5.1)
LYMPHOCYTES RELATIVE PERCENT: 36.8 %
MCH RBC QN AUTO: 28.8 PG (ref 26–34)
MCHC RBC AUTO-ENTMCNC: 33.6 G/DL (ref 31–36)
MCV RBC AUTO: 85.5 FL (ref 80–100)
MONOCYTES ABSOLUTE: 0.6 K/UL (ref 0–1.3)
MONOCYTES RELATIVE PERCENT: 7.2 %
NEUTROPHILS ABSOLUTE: 4.5 K/UL (ref 1.7–7.7)
NEUTROPHILS RELATIVE PERCENT: 55.1 %
PDW BLD-RTO: 14.1 % (ref 12.4–15.4)
PLATELET # BLD: 298 K/UL (ref 135–450)
PMV BLD AUTO: 7 FL (ref 5–10.5)
POTASSIUM SERPL-SCNC: 4.1 MMOL/L (ref 3.5–5.1)
RBC # BLD: 4.47 M/UL (ref 4–5.2)
SODIUM BLD-SCNC: 138 MMOL/L (ref 136–145)
TOTAL CK: 121 U/L (ref 26–192)
TOTAL PROTEIN: 7.4 G/DL (ref 6.4–8.2)
TRIGL SERPL-MCNC: 102 MG/DL (ref 0–150)
VLDLC SERPL CALC-MCNC: 20 MG/DL
WBC # BLD: 8.1 K/UL (ref 4–11)

## 2021-07-23 PROCEDURE — 83036 HEMOGLOBIN GLYCOSYLATED A1C: CPT

## 2021-07-23 PROCEDURE — 82550 ASSAY OF CK (CPK): CPT

## 2021-07-23 PROCEDURE — 80053 COMPREHEN METABOLIC PANEL: CPT

## 2021-07-23 PROCEDURE — 36415 COLL VENOUS BLD VENIPUNCTURE: CPT

## 2021-07-23 PROCEDURE — 85025 COMPLETE CBC W/AUTO DIFF WBC: CPT

## 2021-07-23 PROCEDURE — 80061 LIPID PANEL: CPT

## 2021-07-24 VITALS
HEART RATE: 104 BPM | DIASTOLIC BLOOD PRESSURE: 91 MMHG | WEIGHT: 212 LBS | SYSTOLIC BLOOD PRESSURE: 132 MMHG | TEMPERATURE: 97.3 F | OXYGEN SATURATION: 98 % | BODY MASS INDEX: 35.28 KG/M2 | RESPIRATION RATE: 16 BRPM

## 2021-07-24 LAB
ESTIMATED AVERAGE GLUCOSE: 131.2 MG/DL
HBA1C MFR BLD: 6.2 %

## 2021-07-24 PROCEDURE — 99283 EMERGENCY DEPT VISIT LOW MDM: CPT

## 2021-07-24 ASSESSMENT — PAIN SCALES - GENERAL: PAINLEVEL_OUTOF10: 4

## 2021-07-25 ENCOUNTER — HOSPITAL ENCOUNTER (EMERGENCY)
Age: 41
Discharge: HOME | End: 2021-07-25
Attending: EMERGENCY MEDICINE
Payer: COMMERCIAL

## 2021-07-25 ENCOUNTER — APPOINTMENT (OUTPATIENT)
Dept: CT IMAGING | Age: 41
End: 2021-07-25
Payer: COMMERCIAL

## 2021-07-25 DIAGNOSIS — R10.9 LEFT FLANK PAIN: Primary | ICD-10-CM

## 2021-07-25 LAB
BACTERIA: ABNORMAL /HPF
BILIRUBIN URINE: NEGATIVE
BLOOD, URINE: ABNORMAL
CLARITY: ABNORMAL
COLOR: YELLOW
EPITHELIAL CELLS, UA: 13 /HPF (ref 0–5)
GLUCOSE URINE: NEGATIVE MG/DL
HCG(URINE) PREGNANCY TEST: NEGATIVE
HYALINE CASTS: 1 /LPF (ref 0–8)
KETONES, URINE: NEGATIVE MG/DL
LEUKOCYTE ESTERASE, URINE: NEGATIVE
MICROSCOPIC EXAMINATION: YES
NITRITE, URINE: NEGATIVE
PH UA: 6.5 (ref 5–8)
PROTEIN UA: NEGATIVE MG/DL
RBC UA: 10 /HPF (ref 0–4)
SPECIFIC GRAVITY UA: 1.03 (ref 1–1.03)
URINE TYPE: ABNORMAL
UROBILINOGEN, URINE: 1 E.U./DL
WBC UA: 4 /HPF (ref 0–5)

## 2021-07-25 PROCEDURE — 74176 CT ABD & PELVIS W/O CONTRAST: CPT

## 2021-07-25 PROCEDURE — 84703 CHORIONIC GONADOTROPIN ASSAY: CPT

## 2021-07-25 PROCEDURE — 81001 URINALYSIS AUTO W/SCOPE: CPT

## 2021-07-25 PROCEDURE — 6370000000 HC RX 637 (ALT 250 FOR IP): Performed by: EMERGENCY MEDICINE

## 2021-07-25 RX ORDER — ONDANSETRON 4 MG/1
4 TABLET, ORALLY DISINTEGRATING ORAL ONCE
Status: COMPLETED | OUTPATIENT
Start: 2021-07-25 | End: 2021-07-25

## 2021-07-25 RX ORDER — HYDROCODONE BITARTRATE AND ACETAMINOPHEN 5; 325 MG/1; MG/1
TABLET ORAL
Status: DISCONTINUED
Start: 2021-07-25 | End: 2021-07-25 | Stop reason: HOSPADM

## 2021-07-25 RX ORDER — ONDANSETRON 4 MG/1
TABLET, ORALLY DISINTEGRATING ORAL
Status: DISCONTINUED
Start: 2021-07-25 | End: 2021-07-25 | Stop reason: HOSPADM

## 2021-07-25 RX ORDER — MELOXICAM 7.5 MG/1
7.5 TABLET ORAL DAILY
Qty: 90 TABLET | Refills: 1 | Status: SHIPPED | OUTPATIENT
Start: 2021-07-25 | End: 2021-12-28

## 2021-07-25 RX ORDER — METHOCARBAMOL 500 MG/1
500 TABLET, FILM COATED ORAL 4 TIMES DAILY
Qty: 40 TABLET | Refills: 0 | Status: SHIPPED | OUTPATIENT
Start: 2021-07-25 | End: 2021-08-04

## 2021-07-25 RX ORDER — HYDROCODONE BITARTRATE AND ACETAMINOPHEN 5; 325 MG/1; MG/1
1 TABLET ORAL EVERY 6 HOURS PRN
Status: DISCONTINUED | OUTPATIENT
Start: 2021-07-25 | End: 2021-07-25 | Stop reason: HOSPADM

## 2021-07-25 RX ADMIN — ONDANSETRON 4 MG: 4 TABLET, ORALLY DISINTEGRATING ORAL at 01:32

## 2021-07-25 RX ADMIN — HYDROCODONE BITARTRATE AND ACETAMINOPHEN 1 TABLET: 5; 325 TABLET ORAL at 01:32

## 2021-07-25 ASSESSMENT — PAIN SCALES - GENERAL: PAINLEVEL_OUTOF10: 4

## 2021-07-25 NOTE — ED PROVIDER NOTES
Emergency Department Encounter    Patient: Jen Shannon  MRN: [id number]  : 1980  Date of Evaluation: 2021  ED Provider:  Rafaela Barrow MD    Triage Chief Complaint:   Flank Pain (pt states pain in left flank area that radiates to her abdomen. denies any injury. states sx started suddenly this afternoon. )    [place]:  Jen Shannon is a 36 y.o. female that presents with flank pain with radiation of the left groin, no loss of bowel or bladder function no gross hematuria no active chest pain pressure acute shortness of breath. No home therapy. Exacerbation with movement. History of degenerative disc disease. No gross hematuria. No trauma.     ROS - see HPI, below listed is current ROS at time of my eval:  General:  No fevers, no chills, no weakness  Eyes:  No recent vison changes, no discharge  ENT:  No sore throat, no nasal congestion, no hearing changes  Cardiovascular:  No chest pain, no palpitations  Respiratory:  No shortness of breath, no cough  Gastrointestinal:  No pain, no nausea, no vomiting, no diarrhea  Musculoskeletal:  No muscle pain, no joint pain  Skin:  No rash, no pruritis, no easy bruising  Neurologic:  No speech problems, no headache  Genitourinary:  No dysuria, no hematuria, + flank pain  Endocrine:  No unexpected weight gain, no unexpected weight loss  Extremities:  no edema, no pain    Past Medical History:   Diagnosis Date    Arthritis     Depression     Diabetes mellitus ([address]     Hypertension     Tachycardia      Past Surgical History:   Procedure Laterality Date    ANKLE FUSION Bilateral     ARTHRODESIS Left 2019    REVISION OF TALONAVICULAR JOINT ARTHRODESIS LEFT FOOT  -SLEEP APNEA- performed by Shaggy Phillips DPM at [address] [facility] Bilateral     ELBOW SURGERY Bilateral     ulnar nerve release    HERNIA REPAIR      HIP ARTHROSCOPY Bilateral     TONSILLECTOMY AND ADENOIDECTOMY      WRIST GANGLION EXCISION Bilateral Family History   Problem Relation Age of Onset    High Blood Pressure Mother     Arthritis Mother     Other Father         hypothyroidism    High Blood Pressure Father     Arthritis Father     Asthma Father     Heart Failure Maternal Aunt         22 stents.  Heart Failure Maternal Grandmother     Pacemaker Maternal Grandfather     Heart Surgery Paternal Grandmother     Pacemaker Paternal Grandfather      Social History     Socioeconomic History    Marital status:      Spouse name: Not on file    Number of children: Not on file    Years of education: Not on file    Highest education level: Not on file   Occupational History    Not on file   Tobacco Use    Smoking status: Former Smoker     Packs/day: 1.00     Years: 27.00     Pack years: 27.00     Types: Cigarettes     Start date: 12     Quit date: 2019     Years since quittin.6    Smokeless tobacco: Never Used    Tobacco comment: started to smoke at 15 / smoked up to 1 ppd / now smoking 3 to 4 cigarettes a day   Vaping Use    Vaping Use: Never used   Substance and Sexual Activity    Alcohol use: Never    Drug use: Never    Sexual activity: Not on file   Other Topics Concern    Not on file   Social History Narrative    Not on file     Social Determinants of Health     Financial Resource Strain:     Difficulty of Paying Living Expenses:    Food Insecurity:     Worried About Running Out of Food in the Last Year:     [address] in the Last Year:    Transportation Needs:     Lack of Transportation (Medical):      Lack of Transportation (Non-Medical):    Physical Activity:     Days of Exercise per Week:     Minutes of Exercise per Session:    Stress:     Feeling of Stress :    Social Connections:     Frequency of Communication with Friends and Family:     Frequency of Social Gatherings with Friends and Family:     Attends [employer] Services:     Active Member of Clubs or Organizations:     Attends Club or every 6 hours as needed for Wheezing      thiothixene (NAVANE) 2 MG capsule TAKE 1 CAPSULE BY MOUTH EVERY EVENING AT BEDTIME      citalopram (CELEXA) 20 MG tablet Take 20 mg by mouth daily       Coenzyme Q10 (CO Q-10 PO) Take by mouth daily       divalproex (DEPAKOTE ER) 500 MG extended release tablet Take 500 mg by mouth daily      hydrOXYzine (ATARAX) 25 MG tablet Take 25 mg by mouth 3 times daily as needed for Anxiety       pregabalin (LYRICA) 75 MG capsule Take 1 capsule by mouth 2 times daily for 90 days. 60 capsule 2    albuterol sulfate HFA (VENTOLIN HFA) 108 (90 Base) MCG/ACT inhaler Inhale 2 puffs into the lungs 4 times daily 1 Inhaler 2     Allergies   Allergen Reactions    Cinnamon Itching    Codeine Hives and Itching       Nursing Notes Reviewed    Physical Exam:  Triage VS   ED Triage Vitals [07/24/21 9223]   Enc Vitals Group      BP (!) 132/91      Pulse 104      Resp 16      Temp 97.3 °F (36.3 °C)      Temp Source Infrared      SpO2 98 %      Weight 212 lb (96.2 kg)      Height       Head Circumference       Peak Flow       Pain Score       Pain Loc       Pain Edu? Excl. in [address]? My pulse ox interpretation is - normal    General appearance:  No acute distress. Skin:  Warm. Dry. Eye:  Extraocular movements intact. Ears, nose, mouth and throat:  Oral mucosa moist   Neck:  Trachea midline. Extremity:  No swelling. Normal ROM     Heart:  Regular rate and rhythm, normal S1 & S2, no extra heart sounds. Perfusion:  intact  Respiratory:  Lungs clear to auscultation bilaterally. Respirations nonlabored. Abdominal:  Normal bowel sounds. Soft. Nontender. Non distended. No pulsatile masses  Back:  No CVA tenderness to palpation     Neurological:  Alert and oriented times 3.       I have reviewed and interpreted all of the currently available lab results from this visit (if applicable):  Results for orders placed or performed during the hospital encounter of 07/25/21 Urinalysis, reflex to microscopic   Result Value Ref Range    Color, UA YELLOW Straw/Yellow    Clarity, UA CLOUDY (A) Clear    Glucose, Ur Negative Negative mg/dL    Bilirubin Urine Negative Negative    Ketones, Urine Negative Negative mg/dL    Specific Gravity, UA 1.028 1.005 - 1.030    Blood, Urine SMALL (A) Negative    pH, UA 6.5 5.0 - 8.0    Protein, UA Negative Negative mg/dL    Urobilinogen, Urine 1.0 <2.0 E.U./dL    Nitrite, Urine Negative Negative    Leukocyte Esterase, Urine Negative Negative    Microscopic Examination YES     Urine Type NotGiven    Pregnancy, Urine   Result Value Ref Range    HCG(Urine) Pregnancy Test Negative Detects HCG level >20 MIU/mL   Microscopic Urinalysis   Result Value Ref Range    Bacteria, UA 2+ (A) None Seen /HPF    Hyaline Casts, UA 1 0 - 8 /LPF    WBC, UA 4 0 - 5 /HPF    RBC, UA 10 (H) 0 - 4 /HPF    Epithelial Cells, UA 13 (H) 0 - 5 /HPF      Radiographs (if obtained):  Radiologist's Report Reviewed:  No results found. EKG (if obtained): (All EKG's are interpreted by myself in the absence of a cardiologist)    MDM:  Patient presents with flank pain, based on history and physical exam and presentation most likely etiology is ureteral calculus. In addition, other causes were considered including appendicitis, urinary tract infection, aortic dissection, mesenteric ischemia, as well as other surgical/malignant intra-abdominal processes, there is no evidence for these other possible processes at this time, based on patient's history, presentation, physical, and current state. Workup was initiated, labs, urine, CT ordered. No evidence of obstructing stone no true urinary tract infection she not pregnant, likely muscular etiology analgesia provided ER muscle relaxants outpatient anti-inflammatories return if worse or new symptoms    Clinical Impression:  1.  Left flank pain      Disposition referral (if applicable):  Leanna Loera, APRN - CNP  [address] [place] [address] [address]          Disposition medications (if applicable):  New Prescriptions    MELOXICAM (MOBIC) 7.5 MG TABLET    Take 1 tablet by mouth daily    METHOCARBAMOL (ROBAXIN) 500 MG TABLET    Take 1 tablet by mouth 4 times daily for 10 days       Comment: Please note this report has been produced using speech recognition software and may contain errors related to that system including errors in grammar, punctuation, and spelling, as well as words and phrases that may be inappropriate. Efforts were made to edit the dictations.       Ag Cosme MD  99/[id number]/61 [id number]

## 2021-07-30 ENCOUNTER — OFFICE VISIT (OUTPATIENT)
Dept: FAMILY MEDICINE CLINIC | Age: 41
End: 2021-07-30
Payer: COMMERCIAL

## 2021-07-30 VITALS
BODY MASS INDEX: 36.49 KG/M2 | HEART RATE: 62 BPM | HEIGHT: 65 IN | SYSTOLIC BLOOD PRESSURE: 110 MMHG | WEIGHT: 219 LBS | TEMPERATURE: 97.7 F | DIASTOLIC BLOOD PRESSURE: 62 MMHG | OXYGEN SATURATION: 98 %

## 2021-07-30 DIAGNOSIS — G43.909 MIGRAINE WITHOUT STATUS MIGRAINOSUS, NOT INTRACTABLE, UNSPECIFIED MIGRAINE TYPE: ICD-10-CM

## 2021-07-30 DIAGNOSIS — K21.9 GASTROESOPHAGEAL REFLUX DISEASE, UNSPECIFIED WHETHER ESOPHAGITIS PRESENT: ICD-10-CM

## 2021-07-30 DIAGNOSIS — E78.2 MIXED HYPERLIPIDEMIA: Primary | ICD-10-CM

## 2021-07-30 DIAGNOSIS — R39.9 UTI SYMPTOMS: ICD-10-CM

## 2021-07-30 DIAGNOSIS — G89.29 OTHER CHRONIC PAIN: ICD-10-CM

## 2021-07-30 DIAGNOSIS — M79.7 FIBROMYALGIA: ICD-10-CM

## 2021-07-30 DIAGNOSIS — J44.9 COPD WITH ASTHMA (HCC): ICD-10-CM

## 2021-07-30 DIAGNOSIS — E55.9 VITAMIN D DEFICIENCY: ICD-10-CM

## 2021-07-30 DIAGNOSIS — G47.33 OSA (OBSTRUCTIVE SLEEP APNEA): ICD-10-CM

## 2021-07-30 DIAGNOSIS — Z72.0 CURRENT TOBACCO USE: ICD-10-CM

## 2021-07-30 DIAGNOSIS — E11.9 TYPE 2 DIABETES MELLITUS WITHOUT COMPLICATION, WITHOUT LONG-TERM CURRENT USE OF INSULIN (HCC): ICD-10-CM

## 2021-07-30 LAB
BILIRUBIN, POC: ABNORMAL
BLOOD URINE, POC: ABNORMAL
CLARITY, POC: CLEAR
COLOR, POC: YELLOW
GLUCOSE URINE, POC: ABNORMAL
KETONES, POC: ABNORMAL
LEUKOCYTE EST, POC: ABNORMAL
NITRITE, POC: ABNORMAL
PH, POC: 6.5
PROTEIN, POC: ABNORMAL
SPECIFIC GRAVITY, POC: 1.02
UROBILINOGEN, POC: 0.2

## 2021-07-30 PROCEDURE — 2022F DILAT RTA XM EVC RTNOPTHY: CPT | Performed by: CLINICAL NURSE SPECIALIST

## 2021-07-30 PROCEDURE — 3023F SPIROM DOC REV: CPT | Performed by: CLINICAL NURSE SPECIALIST

## 2021-07-30 PROCEDURE — 3044F HG A1C LEVEL LT 7.0%: CPT | Performed by: CLINICAL NURSE SPECIALIST

## 2021-07-30 PROCEDURE — 81002 URINALYSIS NONAUTO W/O SCOPE: CPT | Performed by: CLINICAL NURSE SPECIALIST

## 2021-07-30 PROCEDURE — G8427 DOCREV CUR MEDS BY ELIG CLIN: HCPCS | Performed by: CLINICAL NURSE SPECIALIST

## 2021-07-30 PROCEDURE — G8417 CALC BMI ABV UP PARAM F/U: HCPCS | Performed by: CLINICAL NURSE SPECIALIST

## 2021-07-30 PROCEDURE — G8926 SPIRO NO PERF OR DOC: HCPCS | Performed by: CLINICAL NURSE SPECIALIST

## 2021-07-30 PROCEDURE — 99214 OFFICE O/P EST MOD 30 MIN: CPT | Performed by: CLINICAL NURSE SPECIALIST

## 2021-07-30 PROCEDURE — 1036F TOBACCO NON-USER: CPT | Performed by: CLINICAL NURSE SPECIALIST

## 2021-07-30 RX ORDER — OMEPRAZOLE 20 MG/1
CAPSULE, DELAYED RELEASE ORAL
Qty: 90 CAPSULE | Refills: 0 | Status: SHIPPED | OUTPATIENT
Start: 2021-07-30 | End: 2021-10-29 | Stop reason: SDUPTHER

## 2021-07-30 RX ORDER — MELOXICAM 15 MG/1
15 TABLET ORAL DAILY PRN
Qty: 90 TABLET | Refills: 0 | Status: SHIPPED | OUTPATIENT
Start: 2021-07-30 | End: 2021-10-29 | Stop reason: SDUPTHER

## 2021-07-30 RX ORDER — ALBUTEROL SULFATE 90 UG/1
2 AEROSOL, METERED RESPIRATORY (INHALATION) EVERY 6 HOURS PRN
Qty: 1 INHALER | Refills: 1 | Status: SHIPPED | OUTPATIENT
Start: 2021-07-30 | End: 2021-09-30

## 2021-07-30 RX ORDER — NITROGLYCERIN 0.4 MG/1
0.4 TABLET SUBLINGUAL EVERY 5 MIN PRN
Qty: 25 TABLET | Refills: 0 | Status: CANCELLED | OUTPATIENT
Start: 2021-07-30

## 2021-07-30 RX ORDER — AZITHROMYCIN 250 MG/1
TABLET, FILM COATED ORAL
COMMUNITY
End: 2021-10-29

## 2021-07-30 RX ORDER — METFORMIN HYDROCHLORIDE 500 MG/1
1000 TABLET, EXTENDED RELEASE ORAL
Qty: 180 TABLET | Refills: 0 | Status: SHIPPED | OUTPATIENT
Start: 2021-07-30 | End: 2021-10-29 | Stop reason: SDUPTHER

## 2021-07-30 RX ORDER — NITROFURANTOIN 25; 75 MG/1; MG/1
100 CAPSULE ORAL 2 TIMES DAILY
Qty: 10 CAPSULE | Refills: 0 | Status: SHIPPED | OUTPATIENT
Start: 2021-07-30 | End: 2021-08-04

## 2021-07-30 RX ORDER — METOPROLOL TARTRATE 50 MG/1
50 TABLET, FILM COATED ORAL 2 TIMES DAILY
Qty: 180 TABLET | Refills: 0 | Status: SHIPPED | OUTPATIENT
Start: 2021-07-30 | End: 2021-10-29 | Stop reason: SDUPTHER

## 2021-07-30 RX ORDER — CHOLECALCIFEROL (VITAMIN D3) 125 MCG
2000 CAPSULE ORAL DAILY
Qty: 90 TABLET | Refills: 0 | Status: SHIPPED | OUTPATIENT
Start: 2021-07-30 | End: 2021-10-29 | Stop reason: SDUPTHER

## 2021-07-30 RX ORDER — EZETIMIBE 10 MG/1
10 TABLET ORAL DAILY
Qty: 90 TABLET | Refills: 0 | Status: SHIPPED | OUTPATIENT
Start: 2021-07-30 | End: 2021-10-29 | Stop reason: SDUPTHER

## 2021-07-30 RX ORDER — ROSUVASTATIN CALCIUM 40 MG/1
TABLET, COATED ORAL
Qty: 90 TABLET | Refills: 0 | Status: SHIPPED | OUTPATIENT
Start: 2021-07-30 | End: 2021-10-29 | Stop reason: SDUPTHER

## 2021-07-30 RX ORDER — BENZONATATE 100 MG/1
CAPSULE ORAL
COMMUNITY
End: 2021-10-29

## 2021-07-30 ASSESSMENT — ENCOUNTER SYMPTOMS
ABDOMINAL PAIN: 0
DIARRHEA: 0
NAUSEA: 0
CONSTIPATION: 0
WHEEZING: 0
CHEST TIGHTNESS: 0
VOMITING: 0
COUGH: 0
SHORTNESS OF BREATH: 0

## 2021-07-30 NOTE — PROGRESS NOTES
SUBJECTIVE:    Patient ID:  Malena Meek is a 36 y.o. female      Patient is here for a medication check for hyperlipidemia, diabetes, GERD, migraine headaches, AMOS, vitamin D deficiency, fibromyalgia and chronic pain. She is doing well on current regimen and was also recently seen in the ED on 7/25/21. Currently denies chest pain, shortness of no further concerns, chest pain or dyspnea, orthopnea, headache, vision changes or weakness. States things have been stressful lately. Discussed stress management techniques. Advised to follow up with cardiologist.  GERD symptoms are managed with omeprazole. Denies indigestion/heartburn, difficulty swallowing, epigastric pain, nausea, vomiting, diarrhea, constipation or blood in stool. Migraines are managed with metoprolol twice daily. Denies any change in headache duration, frequency, intensity or pattern with no new neurological symptoms. She has a history of anxiety, depression, bipolar and PTSD. She is followed a psychiatrist every 2 to 3 months and sees a counselor twice a week. She is currently taking Celexa, Depakote and hydroxyzine as needed. Currently denies thoughts of self-harm, suicidal or homicidal ideations. States she has been evaluated by cardiology and will be followed annually. She was told to follow-up with pulmonology annually for COPD and AMOS. AMOS is managed with nightly CPAP usage. She also has a history or alcoholism for 24 years, she has been sober for almost 4 years. Labs reviewed from 7/23/2021 CBC unremarkable, CMP essentially normal, glucose 113, A1c 6.2, total cholesterol 299, triglycerides 102, HDL 38, , . Patient reports adherence to treatment plan (Crestor and Zetia). Prior lipid panel on 3/27/2021 showed total cholesterol 141, triglycerides 61, HDL 36, LDL 93. Will continue to monitor and repeat in 3 months.      State she needs additional views of the right breath after her screening mammogram. Reviewed ER notes, labs and imaging from 7/25/21 for left flank pain. CT was negative for stones. Most likely musculoskeletal in nature. Flank Pain  This is a new problem. The current episode started 1 to 4 weeks ago. The problem occurs rarely. The problem has been gradually improving since onset. The pain is present in the lumbar spine. The quality of the pain is described as aching. The pain does not radiate. The pain is moderate. Pertinent negatives include no abdominal pain, bladder incontinence, bowel incontinence, chest pain, fever, headaches, leg pain, numbness, paresthesias, pelvic pain, perianal numbness, tingling, weakness or weight loss. Dysuria: slight. She has tried NSAIDs, ice and heat for the symptoms. The treatment provided moderate relief. Hyperlipidemia  This is a chronic problem. The current episode started more than 1 year ago. The problem is controlled. Recent lipid tests were reviewed and are variable. Exacerbating diseases include diabetes. Pertinent negatives include no chest pain, focal sensory loss, focal weakness, leg pain, myalgias or shortness of breath. Current antihyperlipidemic treatment includes statins. The current treatment provides moderate improvement of lipids. Risk factors for coronary artery disease include dyslipidemia, diabetes mellitus and obesity. Diabetes  Pertinent negatives for hypoglycemia include no headaches. Pertinent negatives for diabetes include no chest pain, no weakness and no weight loss.        Current Outpatient Medications on File Prior to Visit   Medication Sig Dispense Refill    benzonatate (TESSALON) 100 MG capsule benzonatate 100 mg capsule   TAKE 1 CAPSULE BY MOUTH THREE TIMES A DAY AS NEEDED FOR COUGH      azithromycin (ZITHROMAX) 250 MG tablet azithromycin 250 mg tablet   TAKE 2 TABLETS BY MOUTH TODAY, THEN TAKE 1 TABLET DAILY FOR 4 DAYS      methocarbamol (ROBAXIN) 500 MG tablet Take 1 tablet by mouth 4 times daily for 10 days 40 tablet 0    meloxicam (MOBIC) 7.5 MG tablet Take 1 tablet by mouth daily 90 tablet 1    nitroGLYCERIN (NITROSTAT) 0.4 MG SL tablet Place 1 tablet under the tongue every 5 minutes as needed for Chest pain up to max of 3 total doses. If no relief after 1 dose, call 911. 25 tablet 0    ammonium lactate (LAC-HYDRIN) 12 % lotion Apply topically daily 225 mL 2    diclofenac sodium (VOLTAREN) 1 % GEL Apply 2 g topically 2 times daily 150 g 1    thiothixene (NAVANE) 2 MG capsule TAKE 1 CAPSULE BY MOUTH EVERY EVENING AT BEDTIME      pregabalin (LYRICA) 75 MG capsule Take 1 capsule by mouth 2 times daily for 90 days. 60 capsule 2    albuterol sulfate HFA (VENTOLIN HFA) 108 (90 Base) MCG/ACT inhaler Inhale 2 puffs into the lungs 4 times daily 1 Inhaler 2    citalopram (CELEXA) 20 MG tablet Take 20 mg by mouth daily       Coenzyme Q10 (CO Q-10 PO) Take by mouth daily       divalproex (DEPAKOTE ER) 500 MG extended release tablet Take 500 mg by mouth daily      hydrOXYzine (ATARAX) 25 MG tablet Take 25 mg by mouth 3 times daily as needed for Anxiety        No current facility-administered medications on file prior to visit.       Past Medical History:   Diagnosis Date    Arthritis     Depression     Diabetes mellitus ([address]     Hypertension     Tachycardia      Past Surgical History:   Procedure Laterality Date    ANKLE FUSION Bilateral     ARTHRODESIS Left 12/6/2019    REVISION OF TALONAVICULAR JOINT ARTHRODESIS LEFT FOOT  -SLEEP APNEA- performed by Rose Marie Juarez DPM at [facility] Bilateral     ELBOW SURGERY Bilateral     ulnar nerve release    HERNIA REPAIR      HIP ARTHROSCOPY Bilateral     TONSILLECTOMY AND ADENOIDECTOMY      WRIST GANGLION EXCISION Bilateral      Family History   Problem Relation Age of Onset    High Blood Pressure Mother     Arthritis Mother     Other Father         hypothyroidism    High Blood Pressure Father     Arthritis Father     Asthma Father    Nicko Self Heart Failure Maternal Aunt         22 stents.  Heart Failure Maternal Grandmother     Pacemaker Maternal Grandfather     Heart Surgery Paternal Grandmother     Pacemaker Paternal Grandfather      Social History     Socioeconomic History    Marital status:      Spouse name: Not on file    Number of children: Not on file    Years of education: Not on file    Highest education level: Not on file   Occupational History    Not on file   Tobacco Use    Smoking status: Former Smoker     Packs/day: 1.00     Years: 27.00     Pack years: 27.00     Types: Cigarettes     Start date:      Quit date: 2019     Years since quittin.7    Smokeless tobacco: Never Used    Tobacco comment: started to smoke at 15 / smoked up to 1 ppd / now smoking 3 to 4 cigarettes a day   Vaping Use    Vaping Use: Never used   Substance and Sexual Activity    Alcohol use: Never    Drug use: Never    Sexual activity: Not on file   Other Topics Concern    Not on file   Social History Narrative    Not on file     Social Determinants of Health     Financial Resource Strain:     Difficulty of Paying Living Expenses:    Food Insecurity:     Worried About Running Out of Food in the Last Year:     [address] in the Last Year:    Transportation Needs:     Lack of Transportation (Medical):      Lack of Transportation (Non-Medical):    Physical Activity:     Days of Exercise per Week:     Minutes of Exercise per Session:    Stress:     Feeling of Stress :    Social Connections:     Frequency of Communication with Friends and Family:     Frequency of Social Gatherings with Friends and Family:     Attends Rastafarian Services:     Active Member of Clubs or Organizations:     Attends Club or Organization Meetings:     Marital Status:    Intimate Partner Violence:     Fear of Current or Ex-Partner:     Emotionally Abused:     Physically Abused:     Sexually Abused:        Review of Systems Constitutional: Negative for chills, fever and weight loss. Eyes: Negative for visual disturbance. Respiratory: Negative for cough, chest tightness, shortness of breath and wheezing. Cardiovascular: Negative for chest pain and palpitations. Gastrointestinal: Negative for abdominal pain, bowel incontinence, constipation, diarrhea, nausea and vomiting. Genitourinary: Positive for flank pain. Negative for bladder incontinence and pelvic pain. Dysuria: slight. Frequency: at times. Hematuria: per dip. Urgency: at times. Musculoskeletal: Negative for arthralgias and myalgias. Skin: Negative for rash. Neurological: Negative for tingling, focal weakness, weakness, numbness, headaches and paresthesias. OBJECTIVE:    Physical Exam  Vitals and nursing note reviewed. Constitutional:       General: She is not in acute distress. Appearance: She is well-developed. HENT:      Head: Normocephalic and atraumatic. Right Ear: External ear normal.      Left Ear: External ear normal.      Nose: Nose normal.   Eyes:      Conjunctiva/sclera: Conjunctivae normal.      Pupils: Pupils are equal, round, and reactive to light. Neck:      Vascular: No carotid bruit. Trachea: No tracheal deviation. Cardiovascular:      Rate and Rhythm: Normal rate and regular rhythm. Heart sounds: Normal heart sounds. Pulmonary:      Effort: Pulmonary effort is normal. No respiratory distress. Breath sounds: Normal breath sounds. No wheezing or rales. Chest:      Chest wall: No tenderness. Abdominal:      General: Bowel sounds are normal. There is no distension. Palpations: Abdomen is soft. There is no mass. Tenderness: There is no abdominal tenderness. There is no right CVA tenderness or left CVA tenderness. Hernia: No hernia is present. Musculoskeletal:         General: Normal range of motion. Cervical back: Normal range of motion and neck supple.    Skin:     General: Skin is warm and dry. Findings: No rash. Neurological:      Mental Status: She is alert and oriented to person, place, and time. Psychiatric:         Behavior: Behavior normal.       /62   Pulse 62   Temp 97.7 °F (36.5 °C)   Ht 5' 5\" (1.651 m)   Wt 219 lb (99.3 kg)   SpO2 98%   BMI 36.44 kg/m²    BP Readings from Last 3 Encounters:   07/30/21 110/62   07/24/21 (!) 132/91   04/12/21 100/60      Wt Readings from Last 3 Encounters:   07/30/21 219 lb (99.3 kg)   07/24/21 212 lb (96.2 kg)   04/12/21 212 lb 11.2 oz (96.5 kg)       ASSESSMENT & PLAN:    1. Mixed hyperlipidemia  - Stable, continue current regimen  - rosuvastatin (CRESTOR) 40 MG tablet; TAKE 1 TABLET BY MOUTH EVERY DAY  Dispense: 90 tablet; Refill: 0  - ezetimibe (ZETIA) 10 MG tablet; Take 1 tablet by mouth daily  Dispense: 90 tablet; Refill: 0  - CBC Auto Differential; Future  - Comprehensive Metabolic Panel; Future  - Lipid Panel; Future  - CK; Future  - Reviewed low-cholesterol diet    2. Type 2 diabetes mellitus without complication, without long-term current use of insulin (HCC)  - Stable, continue current regimen  - metFORMIN (GLUCOPHAGE-XR) 500 MG extended release tablet; Take 2 tablets by mouth daily (with breakfast)  Dispense: 180 tablet; Refill: 0  - CBC Auto Differential; Future  - Comprehensive Metabolic Panel; Future  - Hemoglobin A1C; Future  -Reviewed diabetic diet    3. Gastroesophageal reflux disease, unspecified whether esophagitis present  - Stable, continue current regimen   - omeprazole (PRILOSEC) 20 MG delayed release capsule; TAKE 1 CAPSULE BY MOUTH EVERY DAY  Dispense: 90 capsule; Refill: 0  - CBC Auto Differential; Future  - Comprehensive Metabolic Panel; Future  - Reviewed GERD precautions    4. 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[facility] [address]  - Stable, continue current regimen (albuteorl as needed/directed)  - albuterol sulfate HFA (VENTOLIN HFA) 108 (90 Base) MCG/ACT inhaler;  Inhale 2 puffs into the lungs every 6 hours as needed for Wheezing Dispense: 1 Inhaler; Refill: 1    5. AMOS (obstructive sleep apnea)  - Stable, continue nightly CPAP usage    6. Migraine without status migrainosus, not intractable, unspecified migraine type  - Stable,continue current regimen  - metoprolol tartrate (LOPRESSOR) 50 MG tablet; Take 1 tablet by mouth 2 times daily  Dispense: 180 tablet; Refill: 0    7. Fibromyalgia  - Stable,continue current regimen  - meloxicam (MOBIC) 15 MG tablet; Take 1 tablet by mouth daily as needed for Pain  Dispense: 90 tablet; Refill: 0    8. Other chronic pain  - Stable,continue current regimen  - meloxicam (MOBIC) 15 MG tablet; Take 1 tablet by mouth daily as needed for Pain  Dispense: 90 tablet; Refill: 0    9. Vitamin D deficiency  - Stable, continue vitamin D3 supplementation  - Cholecalciferol (VITAMIN D3) 50 MCG (2000 UT) TABS; Take 2,000 Units by mouth daily  Dispense: 90 tablet; Refill: 0    10. UTI symptoms  - Treat empirically  - POCT Urinalysis no Micro (trace of blood)   - nitrofurantoin, macrocrystal-monohydrate, (MACROBID) 100 MG capsule; Take 1 capsule by mouth 2 times daily for 5 days  Dispense: 10 capsule; Refill: 0    11. Current tobacco use  - Encourage continued smoking cessation efforts      Continue current treatment plan.     Current Outpatient Medications   Medication Sig Dispense Refill    omeprazole (PRILOSEC) 20 MG delayed release capsule TAKE 1 CAPSULE BY MOUTH EVERY DAY 90 capsule 0    rosuvastatin (CRESTOR) 40 MG tablet TAKE 1 TABLET BY MOUTH EVERY DAY 90 tablet 0    metFORMIN (GLUCOPHAGE-XR) 500 MG extended release tablet Take 2 tablets by mouth daily (with breakfast) 180 tablet 0    metoprolol tartrate (LOPRESSOR) 50 MG tablet Take 1 tablet by mouth 2 times daily 180 tablet 0    meloxicam (MOBIC) 15 MG tablet Take 1 tablet by mouth daily as needed for Pain 90 tablet 0    ezetimibe (ZETIA) 10 MG tablet Take 1 tablet by mouth daily 90 tablet 0    Cholecalciferol (VITAMIN D3) 50 MCG (2000 UT) TABS Take 2,000 Units by mouth daily 90 tablet 0    albuterol sulfate HFA (VENTOLIN HFA) 108 (90 Base) MCG/ACT inhaler Inhale 2 puffs into the lungs every 6 hours as needed for Wheezing 1 Inhaler 1    nitrofurantoin, macrocrystal-monohydrate, (MACROBID) 100 MG capsule Take 1 capsule by mouth 2 times daily for 5 days 10 capsule 0    benzonatate (TESSALON) 100 MG capsule benzonatate 100 mg capsule   TAKE 1 CAPSULE BY MOUTH THREE TIMES A DAY AS NEEDED FOR COUGH      azithromycin (ZITHROMAX) 250 MG tablet azithromycin 250 mg tablet   TAKE 2 TABLETS BY MOUTH TODAY, THEN TAKE 1 TABLET DAILY FOR 4 DAYS      methocarbamol (ROBAXIN) 500 MG tablet Take 1 tablet by mouth 4 times daily for 10 days 40 tablet 0    meloxicam (MOBIC) 7.5 MG tablet Take 1 tablet by mouth daily 90 tablet 1    nitroGLYCERIN (NITROSTAT) 0.4 MG SL tablet Place 1 tablet under the tongue every 5 minutes as needed for Chest pain up to max of 3 total doses. If no relief after 1 dose, call 911. 25 tablet 0    ammonium lactate (LAC-HYDRIN) 12 % lotion Apply topically daily 225 mL 2    diclofenac sodium (VOLTAREN) 1 % GEL Apply 2 g topically 2 times daily 150 g 1    thiothixene (NAVANE) 2 MG capsule TAKE 1 CAPSULE BY MOUTH EVERY EVENING AT BEDTIME      pregabalin (LYRICA) 75 MG capsule Take 1 capsule by mouth 2 times daily for 90 days. 60 capsule 2    albuterol sulfate HFA (VENTOLIN HFA) 108 (90 Base) MCG/ACT inhaler Inhale 2 puffs into the lungs 4 times daily 1 Inhaler 2    citalopram (CELEXA) 20 MG tablet Take 20 mg by mouth daily       Coenzyme Q10 (CO Q-10 PO) Take by mouth daily       divalproex (DEPAKOTE ER) 500 MG extended release tablet Take 500 mg by mouth daily      hydrOXYzine (ATARAX) 25 MG tablet Take 25 mg by mouth 3 times daily as needed for Anxiety        No current facility-administered medications for this visit.       Return in about 3 months (around 10/30/2021), or if symptoms worsen or fail to improve, for diabetes, lipidemia, GERD, migraine, fibro, chronic pain, vit D, UTI . Mauricio Mccann received counseling on the following healthy behaviors: nutrition, exercise, medication adherence and tobacco cessation    Patient given educational materials on UTI    Discussed use, benefit, and side effects of prescribed medications. Barriers to medication compliance addressed. All patient questions answered. Pt voiced understanding. Call office if experience side effects from medications. Please note that some or all of this record was generated using voice recognition software. If there are any questions about the content of this document, please contact the author as some errors in transcription may have occurred.

## 2021-07-30 NOTE — PATIENT INSTRUCTIONS
Continue muscle relaxers per ED    Fasting labs reviewed      Medications refilled     Reviewed low-cholesterol diet     Continue ezetimibe (Zetia) 10 mg daily and Crestor     Reviewed diabetic diet     Trial of diclofenac twice daily to affected area (right elbow)      Follow-up with specialist as needed/directed (psych, Ortho, podiatry pulmonology, cardiology)      Encourage continue lifestyle modifications (better food choices, portion control and increasing activity)  Patient Education        Urinary Tract Infection (UTI) in Women: Care Instructions  Overview     A urinary tract infection, or UTI, is a general term for an infection anywhere between the kidneys and the urethra (where urine comes out). Most UTIs are bladder infections. They often cause pain or burning when you urinate. UTIs are caused by bacteria and can be cured with antibiotics. Be sure to complete your treatment so that the infection does not get worse. Follow-up care is a key part of your treatment and safety. Be sure to make and go to all appointments, and call your doctor if you are having problems. It's also a good idea to know your test results and keep a list of the medicines you take. How can you care for yourself at home? · Take your antibiotics as directed. Do not stop taking them just because you feel better. You need to take the full course of antibiotics. · Drink extra water and other fluids for the next day or two. This will help make the urine less concentrated and help wash out the bacteria that are causing the infection. (If you have kidney, heart, or liver disease and have to limit fluids, talk with your doctor before you increase the amount of fluids you drink.)  · Avoid drinks that are carbonated or have caffeine. They can irritate the bladder. · Urinate often. Try to empty your bladder each time. · To relieve pain, take a hot bath or lay a heating pad set on low over your lower belly or genital area.  Never go to sleep with a heating pad in place. To prevent UTIs  · Drink plenty of water each day. This helps you urinate often, which clears bacteria from your system. (If you have kidney, heart, or liver disease and have to limit fluids, talk with your doctor before you increase the amount of fluids you drink.)  · Urinate when you need to. · If you are sexually active, urinate right after you have sex. · Change sanitary pads often. · Avoid douches, bubble baths, feminine hygiene sprays, and other feminine hygiene products that have deodorants. · After going to the bathroom, wipe from front to back. When should you call for help? Call your doctor now or seek immediate medical care if:    · Symptoms such as fever, chills, nausea, or vomiting get worse or appear for the first time.     · You have new pain in your back just below your rib cage. This is called flank pain.     · There is new blood or pus in your urine.     · You have any problems with your antibiotic medicine. Watch closely for changes in your health, and be sure to contact your doctor if:    · You are not getting better after taking an antibiotic for 2 days.     · Your symptoms go away but then come back. Where can you learn more? Go to [email or web address] and sign in to your [facility] account. Enter K670 in the [facility] box to learn more about \"Urinary Tract Infection (UTI) in Women: Care Instructions. \"     If you do not have an account, please click on the \"Sign Up Now\" link. Current as of: February 10, 2021               Content Version: 12.9  © 9563-3285 [employer]. Care instructions adapted under license by [facility] ([facility]). If you have questions about a medical condition or this instruction, always ask your healthcare professional. [place] [address] any warranty or liability for your use of this information.

## 2021-08-04 ASSESSMENT — ENCOUNTER SYMPTOMS: BOWEL INCONTINENCE: 0

## 2021-09-30 DIAGNOSIS — J44.9 COPD WITH ASTHMA (HCC): ICD-10-CM

## 2021-09-30 RX ORDER — ALBUTEROL SULFATE 90 UG/1
AEROSOL, METERED RESPIRATORY (INHALATION)
Qty: 6.7 EACH | Refills: 1 | Status: SHIPPED | OUTPATIENT
Start: 2021-09-30 | End: 2022-09-07

## 2021-09-30 NOTE — TELEPHONE ENCOUNTER
ALBUTEROL HFA (PROVENTIL) INH          Will file in chart as: albuterol sulfate  (90 Base) MCG/ACT inhaler     Patient request for medication.    LOV: 7/30/21  NOV: 10/29/21

## 2021-10-26 ENCOUNTER — HOSPITAL ENCOUNTER (OUTPATIENT)
Age: 41
Discharge: HOME | End: 2021-10-26
Payer: COMMERCIAL

## 2021-10-26 DIAGNOSIS — E78.2 MIXED HYPERLIPIDEMIA: ICD-10-CM

## 2021-10-26 DIAGNOSIS — K21.9 GASTROESOPHAGEAL REFLUX DISEASE, UNSPECIFIED WHETHER ESOPHAGITIS PRESENT: ICD-10-CM

## 2021-10-26 DIAGNOSIS — E11.9 TYPE 2 DIABETES MELLITUS WITHOUT COMPLICATION, WITHOUT LONG-TERM CURRENT USE OF INSULIN (HCC): ICD-10-CM

## 2021-10-26 LAB
A/G RATIO: 1.5 (ref 1.1–2.2)
ALBUMIN SERPL-MCNC: 4.5 G/DL (ref 3.4–5)
ALP BLD-CCNC: 61 U/L (ref 40–129)
ALT SERPL-CCNC: 16 U/L (ref 10–40)
ANION GAP SERPL CALCULATED.3IONS-SCNC: 13 MMOL/L (ref 3–16)
AST SERPL-CCNC: 14 U/L (ref 15–37)
BASOPHILS ABSOLUTE: 0 K/UL (ref 0–0.2)
BASOPHILS RELATIVE PERCENT: 0.6 %
BILIRUB SERPL-MCNC: 0.4 MG/DL (ref 0–1)
BUN BLDV-MCNC: 10 MG/DL (ref 7–20)
CALCIUM SERPL-MCNC: 9.3 MG/DL (ref 8.3–10.6)
CHLORIDE BLD-SCNC: 100 MMOL/L (ref 99–110)
CHOLESTEROL, TOTAL: 157 MG/DL (ref 0–199)
CO2: 25 MMOL/L (ref 21–32)
CREAT SERPL-MCNC: 0.7 MG/DL (ref 0.6–1.1)
EOSINOPHILS ABSOLUTE: 0 K/UL (ref 0–0.6)
EOSINOPHILS RELATIVE PERCENT: 0.7 %
GFR AFRICAN AMERICAN: >60
GFR NON-AFRICAN AMERICAN: >60
GLOBULIN: 3 G/DL
GLUCOSE BLD-MCNC: 118 MG/DL (ref 70–99)
HCT VFR BLD CALC: 40.5 % (ref 36–48)
HDLC SERPL-MCNC: 39 MG/DL (ref 40–60)
HEMOGLOBIN: 13.1 G/DL (ref 12–16)
LDL CHOLESTEROL CALCULATED: 104 MG/DL
LYMPHOCYTES ABSOLUTE: 2.6 K/UL (ref 1–5.1)
LYMPHOCYTES RELATIVE PERCENT: 37.7 %
MCH RBC QN AUTO: 28.2 PG (ref 26–34)
MCHC RBC AUTO-ENTMCNC: 32.4 G/DL (ref 31–36)
MCV RBC AUTO: 87 FL (ref 80–100)
MONOCYTES ABSOLUTE: 0.4 K/UL (ref 0–1.3)
MONOCYTES RELATIVE PERCENT: 6.2 %
NEUTROPHILS ABSOLUTE: 3.8 K/UL (ref 1.7–7.7)
NEUTROPHILS RELATIVE PERCENT: 54.8 %
PDW BLD-RTO: 13.4 % (ref 12.4–15.4)
PLATELET # BLD: 286 K/UL (ref 135–450)
PMV BLD AUTO: 7.1 FL (ref 5–10.5)
POTASSIUM SERPL-SCNC: 4.6 MMOL/L (ref 3.5–5.1)
RBC # BLD: 4.66 M/UL (ref 4–5.2)
SODIUM BLD-SCNC: 138 MMOL/L (ref 136–145)
TOTAL CK: 163 U/L (ref 26–192)
TOTAL PROTEIN: 7.5 G/DL (ref 6.4–8.2)
TRIGL SERPL-MCNC: 68 MG/DL (ref 0–150)
VLDLC SERPL CALC-MCNC: 14 MG/DL
WBC # BLD: 6.9 K/UL (ref 4–11)

## 2021-10-26 PROCEDURE — 82550 ASSAY OF CK (CPK): CPT

## 2021-10-26 PROCEDURE — 85025 COMPLETE CBC W/AUTO DIFF WBC: CPT

## 2021-10-26 PROCEDURE — 36415 COLL VENOUS BLD VENIPUNCTURE: CPT

## 2021-10-26 PROCEDURE — 80053 COMPREHEN METABOLIC PANEL: CPT

## 2021-10-26 PROCEDURE — 83036 HEMOGLOBIN GLYCOSYLATED A1C: CPT

## 2021-10-26 PROCEDURE — 80061 LIPID PANEL: CPT

## 2021-10-27 LAB
ESTIMATED AVERAGE GLUCOSE: 137 MG/DL
HBA1C MFR BLD: 6.4 %

## 2021-10-28 PROBLEM — K21.9 GASTROESOPHAGEAL REFLUX DISEASE: Status: ACTIVE | Noted: 2021-10-28

## 2021-10-28 ASSESSMENT — ENCOUNTER SYMPTOMS
NAUSEA: 0
CHEST TIGHTNESS: 0
CONSTIPATION: 0
ABDOMINAL PAIN: 0
WHEEZING: 0
DIARRHEA: 0
VOMITING: 0
COUGH: 0

## 2021-10-29 ENCOUNTER — TELEPHONE (OUTPATIENT)
Dept: FAMILY MEDICINE CLINIC | Age: 41
End: 2021-10-29

## 2021-10-29 ENCOUNTER — HOSPITAL ENCOUNTER (OUTPATIENT)
Dept: CT IMAGING | Age: 41
Discharge: HOME | End: 2021-10-29
Payer: COMMERCIAL

## 2021-10-29 ENCOUNTER — OFFICE VISIT (OUTPATIENT)
Dept: FAMILY MEDICINE CLINIC | Age: 41
End: 2021-10-29
Payer: COMMERCIAL

## 2021-10-29 VITALS
DIASTOLIC BLOOD PRESSURE: 84 MMHG | OXYGEN SATURATION: 99 % | SYSTOLIC BLOOD PRESSURE: 138 MMHG | TEMPERATURE: 97.5 F | BODY MASS INDEX: 37.65 KG/M2 | HEART RATE: 72 BPM | WEIGHT: 226 LBS | HEIGHT: 65 IN

## 2021-10-29 DIAGNOSIS — R51.9 WORST HEADACHE OF LIFE: ICD-10-CM

## 2021-10-29 DIAGNOSIS — R51.9 NEW ONSET HEADACHE: ICD-10-CM

## 2021-10-29 DIAGNOSIS — E78.2 MIXED HYPERLIPIDEMIA: Primary | ICD-10-CM

## 2021-10-29 DIAGNOSIS — H61.22 IMPACTED CERUMEN OF LEFT EAR: ICD-10-CM

## 2021-10-29 DIAGNOSIS — M79.7 FIBROMYALGIA: ICD-10-CM

## 2021-10-29 DIAGNOSIS — F31.9 BIPOLAR 1 DISORDER (HCC): ICD-10-CM

## 2021-10-29 DIAGNOSIS — G47.33 OSA (OBSTRUCTIVE SLEEP APNEA): ICD-10-CM

## 2021-10-29 DIAGNOSIS — H53.8 BLURRED VISION: ICD-10-CM

## 2021-10-29 DIAGNOSIS — R00.0 CHRONIC TACHYCARDIA: ICD-10-CM

## 2021-10-29 DIAGNOSIS — E11.9 TYPE 2 DIABETES MELLITUS WITHOUT COMPLICATION, WITHOUT LONG-TERM CURRENT USE OF INSULIN (HCC): ICD-10-CM

## 2021-10-29 DIAGNOSIS — F41.9 ANXIETY: ICD-10-CM

## 2021-10-29 DIAGNOSIS — F32.89 OTHER DEPRESSION: ICD-10-CM

## 2021-10-29 DIAGNOSIS — J44.9 COPD WITH ASTHMA (HCC): ICD-10-CM

## 2021-10-29 DIAGNOSIS — E55.9 VITAMIN D DEFICIENCY: ICD-10-CM

## 2021-10-29 DIAGNOSIS — F43.10 PTSD (POST-TRAUMATIC STRESS DISORDER): ICD-10-CM

## 2021-10-29 DIAGNOSIS — G89.29 OTHER CHRONIC PAIN: ICD-10-CM

## 2021-10-29 DIAGNOSIS — K21.9 GASTROESOPHAGEAL REFLUX DISEASE, UNSPECIFIED WHETHER ESOPHAGITIS PRESENT: ICD-10-CM

## 2021-10-29 DIAGNOSIS — Z72.0 CURRENT TOBACCO USE: ICD-10-CM

## 2021-10-29 PROCEDURE — 3023F SPIROM DOC REV: CPT | Performed by: CLINICAL NURSE SPECIALIST

## 2021-10-29 PROCEDURE — G8926 SPIRO NO PERF OR DOC: HCPCS | Performed by: CLINICAL NURSE SPECIALIST

## 2021-10-29 PROCEDURE — 2022F DILAT RTA XM EVC RTNOPTHY: CPT | Performed by: CLINICAL NURSE SPECIALIST

## 2021-10-29 PROCEDURE — G8417 CALC BMI ABV UP PARAM F/U: HCPCS | Performed by: CLINICAL NURSE SPECIALIST

## 2021-10-29 PROCEDURE — 1036F TOBACCO NON-USER: CPT | Performed by: CLINICAL NURSE SPECIALIST

## 2021-10-29 PROCEDURE — 70450 CT HEAD/BRAIN W/O DYE: CPT

## 2021-10-29 PROCEDURE — G8427 DOCREV CUR MEDS BY ELIG CLIN: HCPCS | Performed by: CLINICAL NURSE SPECIALIST

## 2021-10-29 PROCEDURE — 3044F HG A1C LEVEL LT 7.0%: CPT | Performed by: CLINICAL NURSE SPECIALIST

## 2021-10-29 PROCEDURE — 99214 OFFICE O/P EST MOD 30 MIN: CPT | Performed by: CLINICAL NURSE SPECIALIST

## 2021-10-29 PROCEDURE — G8484 FLU IMMUNIZE NO ADMIN: HCPCS | Performed by: CLINICAL NURSE SPECIALIST

## 2021-10-29 RX ORDER — EZETIMIBE 10 MG/1
10 TABLET ORAL DAILY
Qty: 90 TABLET | Refills: 0 | Status: SHIPPED | OUTPATIENT
Start: 2021-10-29 | End: 2022-01-28 | Stop reason: SDUPTHER

## 2021-10-29 RX ORDER — METFORMIN HYDROCHLORIDE 500 MG/1
1000 TABLET, EXTENDED RELEASE ORAL
Qty: 180 TABLET | Refills: 0 | Status: SHIPPED | OUTPATIENT
Start: 2021-10-29 | End: 2022-01-28 | Stop reason: SDUPTHER

## 2021-10-29 RX ORDER — OMEPRAZOLE 20 MG/1
CAPSULE, DELAYED RELEASE ORAL
Qty: 90 CAPSULE | Refills: 0 | Status: SHIPPED | OUTPATIENT
Start: 2021-10-29 | End: 2022-01-28 | Stop reason: SDUPTHER

## 2021-10-29 RX ORDER — CHOLECALCIFEROL (VITAMIN D3) 125 MCG
2000 CAPSULE ORAL DAILY
Qty: 90 TABLET | Refills: 0 | Status: SHIPPED | OUTPATIENT
Start: 2021-10-29 | End: 2022-01-28 | Stop reason: SDUPTHER

## 2021-10-29 RX ORDER — MELOXICAM 15 MG/1
15 TABLET ORAL DAILY PRN
Qty: 90 TABLET | Refills: 0 | Status: SHIPPED | OUTPATIENT
Start: 2021-10-29 | End: 2022-01-28 | Stop reason: SDUPTHER

## 2021-10-29 RX ORDER — METOPROLOL TARTRATE 50 MG/1
50 TABLET, FILM COATED ORAL 2 TIMES DAILY
Qty: 180 TABLET | Refills: 0 | Status: SHIPPED | OUTPATIENT
Start: 2021-10-29 | End: 2022-01-28 | Stop reason: SDUPTHER

## 2021-10-29 RX ORDER — ROSUVASTATIN CALCIUM 40 MG/1
TABLET, COATED ORAL
Qty: 90 TABLET | Refills: 0 | Status: SHIPPED | OUTPATIENT
Start: 2021-10-29 | End: 2022-01-28 | Stop reason: SDUPTHER

## 2021-10-29 SDOH — ECONOMIC STABILITY: FOOD INSECURITY: WITHIN THE PAST 12 MONTHS, THE FOOD YOU BOUGHT JUST DIDN'T LAST AND YOU DIDN'T HAVE MONEY TO GET MORE.: NEVER TRUE

## 2021-10-29 SDOH — ECONOMIC STABILITY: FOOD INSECURITY: WITHIN THE PAST 12 MONTHS, YOU WORRIED THAT YOUR FOOD WOULD RUN OUT BEFORE YOU GOT MONEY TO BUY MORE.: NEVER TRUE

## 2021-10-29 ASSESSMENT — ENCOUNTER SYMPTOMS
EYE PAIN: 0
VISUAL CHANGE: 1
EYE WATERING: 0
SINUS PRESSURE: 0
EYE REDNESS: 0
SCALP TENDERNESS: 0
PHOTOPHOBIA: 0
SORE THROAT: 0
BLURRED VISION: 1

## 2021-10-29 ASSESSMENT — SOCIAL DETERMINANTS OF HEALTH (SDOH): HOW HARD IS IT FOR YOU TO PAY FOR THE VERY BASICS LIKE FOOD, HOUSING, MEDICAL CARE, AND HEATING?: NOT HARD AT ALL

## 2021-10-29 NOTE — PATIENT INSTRUCTIONS
Continue muscle relaxers per ED     Fasting labs reviewed      Medications refilled     Reviewed low-cholesterol diet     Continue ezetimibe (Zetia) 10 mg daily and Crestor     Reviewed diabetic diet     Trial of diclofenac twice daily to affected area (right elbow)      Follow-up with specialist as needed/directed (psych, Ortho, podiatry pulmonology, cardiology)      Encourage continue lifestyle modifications (better food choices, portion control and increasing activity)    Debrox ear wax removal drop as directed     Stat CAT of the head    Keep a headache journal including timing, associated symptoms, treatment and response to treatment

## 2021-10-29 NOTE — PROGRESS NOTES
SUBJECTIVE:    Patient ID:  Regis Hall is a 39 y.o. female      Patient is here for a medication check for hypertension, hyperlipidemia, diabetes, GERD, migraine headaches, AMOS, vitamin D deficiency, fibromyalgia and chronic pain. She is doing well on current regimen and is concerned about a new onset head. States it is the worst headache she has ever had. Frequency has been three times in the last 2 weeks, duration of a minute or two, intensity or 8 on 0-10 scale, pattern is occipital with radiation to the top of the head, associated symptoms includes blurred vision. Aggravating or alleviating factors. Denies chest pain, shortness, chest pain or dyspnea, orthopnea, headache, dizziness, syncope/near syncope or weakness. GERD symptoms are managed with omeprazole. Denies indigestion/heartburn, difficulty swallowing, epigastric pain, nausea, vomiting, diarrhea, constipation or blood in stool. Tachycardia managed with metoprolol twice daily. She has a history of anxiety, depression, bipolar and PTSD. She is followed a psychiatrist every 2 to 3 months and sees a counselor twice a week. She is currently taking Celexa, Depakote and hydroxyzine as needed. Currently denies thoughts of self-harm, suicidal or homicidal ideations. States she has been evaluated by cardiology and will be followed annually. She was told to follow-up with pulmonology annually for COPD and AMOS. AMOS is managed with nightly CPAP usage. She also has a history or alcoholism for 24 years, she has been sober for almost 4 years.       Labs reviewed from 10/26/2021 CBC is unremarkable, CMP essentially normal, glucose 118, A1c 6.4, total cholesterol 157, triglycerides 68, HDL 39, , . Hyperlipidemia  This is a chronic problem. The current episode started more than 1 year ago. The problem is controlled. Recent lipid tests were reviewed and are low. Exacerbating diseases include obesity. She has no history of diabetes. Pertinent negatives include no chest pain, focal sensory loss, focal weakness, leg pain or myalgias. Current antihyperlipidemic treatment includes statins and ezetimibe. The current treatment provides significant improvement of lipids. Risk factors for coronary artery disease include hypertension, diabetes mellitus and dyslipidemia. Diabetes  She presents for her follow-up diabetic visit. She has type 2 diabetes mellitus. Hypoglycemia symptoms include headaches. Pertinent negatives for hypoglycemia include no nervousness/anxiousness. Associated symptoms include blurred vision and visual change. Pertinent negatives for diabetes include no chest pain, no foot paresthesias, no polydipsia, no polyphagia, no polyuria and no weakness. Symptoms are stable. Risk factors for coronary artery disease include dyslipidemia, diabetes mellitus, hypertension and obesity. Current diabetic treatment includes oral agent (monotherapy). She is following a generally healthy diet. Headache   This is a new problem. Episode onset: over a week. The problem occurs intermittently. The problem has been unchanged. The pain is located in the occipital region. Radiates to: whole head. The pain quality is not similar to prior headaches. The quality of the pain is described as stabbing. The pain is at a severity of 8/10. Associated symptoms include blurred vision and a visual change. Pertinent negatives include no abdominal pain, abnormal behavior, anorexia, coughing, eye pain, eye redness, eye watering, fever, nausea, phonophobia, photophobia, scalp tenderness, sinus pressure, sore throat, vomiting or weakness. Ear pain: started last night. Nothing aggravates the symptoms. She has tried nothing for the symptoms. Her past medical history is significant for obesity. There is no history of cancer, cluster headaches, hypertension, migraine headaches, migraines in the family or recent head traumas.        Current Outpatient Medications on File Prior to Visit   Medication Sig Dispense Refill    albuterol sulfate  (90 Base) MCG/ACT inhaler TAKE 2 PUFFS BY MOUTH EVERY 6 HOURS AS NEEDED FOR WHEEZE 6.7 each 1    meloxicam (MOBIC) 7.5 MG tablet Take 1 tablet by mouth daily 90 tablet 1    nitroGLYCERIN (NITROSTAT) 0.4 MG SL tablet Place 1 tablet under the tongue every 5 minutes as needed for Chest pain up to max of 3 total doses. If no relief after 1 dose, call 911. 25 tablet 0    ammonium lactate (LAC-HYDRIN) 12 % lotion Apply topically daily 225 mL 2    diclofenac sodium (VOLTAREN) 1 % GEL Apply 2 g topically 2 times daily 150 g 1    thiothixene (NAVANE) 2 MG capsule TAKE 1 CAPSULE BY MOUTH EVERY EVENING AT BEDTIME      citalopram (CELEXA) 20 MG tablet Take 20 mg by mouth daily       divalproex (DEPAKOTE ER) 500 MG extended release tablet Take 500 mg by mouth daily      hydrOXYzine (ATARAX) 25 MG tablet Take 25 mg by mouth 3 times daily as needed for Anxiety       pregabalin (LYRICA) 75 MG capsule Take 1 capsule by mouth 2 times daily for 90 days. 60 capsule 2     No current facility-administered medications on file prior to visit.       Past Medical History:   Diagnosis Date    Arthritis     Depression     Diabetes mellitus ([address]     Hypertension     Tachycardia      Past Surgical History:   Procedure Laterality Date    ANKLE FUSION Bilateral     ARTHRODESIS Left 12/6/2019    REVISION OF TALONAVICULAR JOINT ARTHRODESIS LEFT FOOT  -SLEEP APNEA- performed by Charo Patrick DPM at [facility] Bilateral     ELBOW SURGERY Bilateral     ulnar nerve release    HERNIA REPAIR      HIP ARTHROSCOPY Bilateral     TONSILLECTOMY AND ADENOIDECTOMY      WRIST GANGLION EXCISION Bilateral      Family History   Problem Relation Age of Onset    High Blood Pressure Mother     Arthritis Mother     Other Father         hypothyroidism    High Blood Pressure Father     Arthritis Father     Asthma Father     Heart Failure Maternal Aunt         22 stents.  Heart Failure Maternal Grandmother     Pacemaker Maternal Grandfather     Heart Surgery Paternal Grandmother     Pacemaker Paternal Grandfather      Social History     Socioeconomic History    Marital status:      Spouse name: Not on file    Number of children: Not on file    Years of education: Not on file    Highest education level: Not on file   Occupational History    Not on file   Tobacco Use    Smoking status: Former Smoker     Packs/day: 1.00     Years: 27.00     Pack years: 27.00     Types: Cigarettes     Start date: 12     Quit date: 2019     Years since quittin.9    Smokeless tobacco: Never Used    Tobacco comment: started to smoke at 15 / smoked up to 1 ppd / now smoking 3 to 4 cigarettes a day   Vaping Use    Vaping Use: Never used   Substance and Sexual Activity    Alcohol use: Never    Drug use: Never    Sexual activity: Not on file   Other Topics Concern    Not on file   Social History Narrative    Not on file     Social Determinants of Health     Financial Resource Strain: Low Risk     Difficulty of Paying Living Expenses: Not hard at all   Food Insecurity: No Food Insecurity    Worried About Running Out of Food in the Last Year: Never true    Parish of Food in the Last Year: Never true   Transportation Needs:     Lack of Transportation (Medical):      Lack of Transportation (Non-Medical):    Physical Activity:     Days of Exercise per Week:     Minutes of Exercise per Session:    Stress:     Feeling of Stress :    Social Connections:     Frequency of Communication with Friends and Family:     Frequency of Social Gatherings with Friends and Family:     Attends Alevism Services:     Active Member of Clubs or Organizations:     Attends Club or Organization Meetings:     Marital Status:    Intimate Partner Violence:     Fear of Current or Ex-Partner:     Emotionally Abused:     Physically Abused:     Sexually Abused: Review of Systems   Constitutional: Negative for chills and fever. HENT: Negative for sinus pressure and sore throat. Ear pain: started last night. Eyes: Positive for blurred vision and visual disturbance. Negative for photophobia, pain and redness. Respiratory: Negative for cough, chest tightness and wheezing. Cardiovascular: Negative for chest pain, palpitations and leg swelling. Gastrointestinal: Negative for abdominal pain, anorexia, constipation, diarrhea, nausea and vomiting. Endocrine: Negative for polydipsia, polyphagia and polyuria. Musculoskeletal: Negative for arthralgias and myalgias. Skin: Negative for rash. Neurological: Positive for headaches. Negative for focal weakness and weakness. Psychiatric/Behavioral: Negative for dysphoric mood, self-injury, sleep disturbance and suicidal ideas. The patient is not nervous/anxious. OBJECTIVE:    Physical Exam  Vitals and nursing note reviewed. Constitutional:       General: She is not in acute distress. Appearance: She is well-developed. HENT:      Head: Normocephalic and atraumatic. Right Ear: External ear normal.      Left Ear: External ear normal.      Nose: Nose normal.      Mouth/Throat:      Mouth: Mucous membranes are moist.   Eyes:      Extraocular Movements: Extraocular movements intact. Conjunctiva/sclera: Conjunctivae normal.      Pupils: Pupils are equal, round, and reactive to light. Neck:      Trachea: No tracheal deviation. Cardiovascular:      Rate and Rhythm: Normal rate and regular rhythm. Heart sounds: Normal heart sounds. Pulmonary:      Effort: Pulmonary effort is normal. No respiratory distress. Breath sounds: Normal breath sounds. No wheezing or rales. Chest:      Chest wall: No tenderness. Abdominal:      General: Bowel sounds are normal. There is no distension. Palpations: Abdomen is soft. There is no mass. Tenderness: There is no abdominal tenderness. Hernia: No hernia is present. Musculoskeletal:         General: Normal range of motion. Cervical back: Normal range of motion and neck supple. Right lower leg: No edema. Left lower leg: No edema. Skin:     General: Skin is warm and dry. Findings: No rash. Neurological:      General: No focal deficit present. Mental Status: She is alert and oriented to person, place, and time. Cranial Nerves: No cranial nerve deficit. Sensory: No sensory deficit. Psychiatric:         Behavior: Behavior normal.       /84   Pulse 72   Temp 97.5 °F (36.4 °C) (Temporal)   Ht 5' 5\" (1.651 m)   Wt 226 lb (102.5 kg)   SpO2 99%   BMI 37.61 kg/m²    BP Readings from Last 3 Encounters:   10/29/21 138/84   07/30/21 110/62   07/24/21 (!) 132/91      Wt Readings from Last 3 Encounters:   10/29/21 226 lb (102.5 kg)   07/30/21 219 lb (99.3 kg)   07/24/21 212 lb (96.2 kg)       ASSESSMENT & PLAN:    1. Mixed hyperlipidemia  - Stable, continue current regimen   - Reviewed DASH and low sodium diets   - rosuvastatin (CRESTOR) 40 MG tablet; TAKE 1 TABLET BY MOUTH EVERY DAY  Dispense: 90 tablet; Refill: 0  - ezetimibe (ZETIA) 10 MG tablet; Take 1 tablet by mouth daily  Dispense: 90 tablet; Refill: 0  - CBC Auto Differential; Future  - Comprehensive Metabolic Panel; Future  - Lipid Panel; Future  - CK; Future    2. Type 2 diabetes mellitus without complication, without long-term current use of insulin (HCC)  - Stable, continue current regimen  - Reviewed diabetic diet   - metFORMIN (GLUCOPHAGE-XR) 500 MG extended release tablet; Take 2 tablets by mouth daily (with breakfast)  Dispense: 180 tablet; Refill: 0  - CBC Auto Differential; Future  - Comprehensive Metabolic Panel; Future  - Hemoglobin A1C; Future    3.  Gastroesophageal reflux disease, unspecified whether esophagitis present  - Stable, continue current regimen  - Reviewed GERD precautions  - omeprazole (PRILOSEC) 20 MG delayed release if needed      Continue current treatment plan. Current Outpatient Medications   Medication Sig Dispense Refill    omeprazole (PRILOSEC) 20 MG delayed release capsule TAKE 1 CAPSULE BY MOUTH EVERY DAY 90 capsule 0    rosuvastatin (CRESTOR) 40 MG tablet TAKE 1 TABLET BY MOUTH EVERY DAY 90 tablet 0    metFORMIN (GLUCOPHAGE-XR) 500 MG extended release tablet Take 2 tablets by mouth daily (with breakfast) 180 tablet 0    metoprolol tartrate (LOPRESSOR) 50 MG tablet Take 1 tablet by mouth 2 times daily 180 tablet 0    meloxicam (MOBIC) 15 MG tablet Take 1 tablet by mouth daily as needed for Pain 90 tablet 0    ezetimibe (ZETIA) 10 MG tablet Take 1 tablet by mouth daily 90 tablet 0    Cholecalciferol (VITAMIN D3) 50 MCG (2000 UT) TABS Take 1 tablet by mouth daily 90 tablet 0    albuterol sulfate  (90 Base) MCG/ACT inhaler TAKE 2 PUFFS BY MOUTH EVERY 6 HOURS AS NEEDED FOR WHEEZE 6.7 each 1    meloxicam (MOBIC) 7.5 MG tablet Take 1 tablet by mouth daily 90 tablet 1    nitroGLYCERIN (NITROSTAT) 0.4 MG SL tablet Place 1 tablet under the tongue every 5 minutes as needed for Chest pain up to max of 3 total doses. If no relief after 1 dose, call 911. 25 tablet 0    ammonium lactate (LAC-HYDRIN) 12 % lotion Apply topically daily 225 mL 2    diclofenac sodium (VOLTAREN) 1 % GEL Apply 2 g topically 2 times daily 150 g 1    thiothixene (NAVANE) 2 MG capsule TAKE 1 CAPSULE BY MOUTH EVERY EVENING AT BEDTIME      citalopram (CELEXA) 20 MG tablet Take 20 mg by mouth daily       divalproex (DEPAKOTE ER) 500 MG extended release tablet Take 500 mg by mouth daily      hydrOXYzine (ATARAX) 25 MG tablet Take 25 mg by mouth 3 times daily as needed for Anxiety       pregabalin (LYRICA) 75 MG capsule Take 1 capsule by mouth 2 times daily for 90 days. 60 capsule 2     No current facility-administered medications for this visit.       Return in about 3 months (around 1/29/2022), or if symptoms worsen or fail to improve, for lipidemia, diabetes, GERD, migraines, fibro, CP, vit D, COPD, AMOS, tobacco, A&D, bipolar, [place]. Denny Retana received counseling on the following healthy behaviors: nutrition, exercise and medication adherence    Patient given educational materials on headache    Discussed use, benefit, and side effects of prescribed medications. Barriers to medication compliance addressed. All patient questions answered. Pt voiced understanding. Call office if experience side effects from medications. Please note that some or all of this record was generated using voice recognition software. If there are any questions about the content of this document, please contact the author as some errors in transcription may have occurred.

## 2021-10-29 NOTE — TELEPHONE ENCOUNTER
----- Message from Jeimy Castroignacio sent at 10/29/2021  1:05 PM EDT -----  Subject: Message to Provider    QUESTIONS  Information for Provider? Call with auth approval for CT of the head   /brain, Auth approval is [id number]> Any questions please use   [facility] or call [phone]  ---------------------------------------------------------------------------  --------------  CALL BACK INFO  What is the best way for the office to contact you? OK to leave message on   voicemail  Preferred Call Back Phone Number? [phone]  ---------------------------------------------------------------------------  --------------  SCRIPT ANSWERS  Relationship to Patient? Third Party  Representative Name?  Leanne Xiao

## 2021-12-28 ENCOUNTER — HOSPITAL ENCOUNTER (EMERGENCY)
Age: 41
Discharge: HOME | End: 2021-12-28
Payer: COMMERCIAL

## 2021-12-28 ENCOUNTER — APPOINTMENT (OUTPATIENT)
Dept: GENERAL RADIOLOGY | Age: 41
End: 2021-12-28
Payer: COMMERCIAL

## 2021-12-28 ENCOUNTER — NURSE TRIAGE (OUTPATIENT)
Dept: OTHER | Facility: CLINIC | Age: 41
End: 2021-12-28

## 2021-12-28 VITALS
TEMPERATURE: 97.6 F | HEART RATE: 77 BPM | OXYGEN SATURATION: 98 % | RESPIRATION RATE: 18 BRPM | BODY MASS INDEX: 37.61 KG/M2 | SYSTOLIC BLOOD PRESSURE: 126 MMHG | WEIGHT: 226 LBS | DIASTOLIC BLOOD PRESSURE: 77 MMHG

## 2021-12-28 DIAGNOSIS — Z20.822 PERSON UNDER INVESTIGATION FOR COVID-19: ICD-10-CM

## 2021-12-28 DIAGNOSIS — J44.1 COPD EXACERBATION (HCC): Primary | ICD-10-CM

## 2021-12-28 LAB
RAPID INFLUENZA  B AGN: NEGATIVE
RAPID INFLUENZA A AGN: NEGATIVE

## 2021-12-28 PROCEDURE — 71046 X-RAY EXAM CHEST 2 VIEWS: CPT

## 2021-12-28 PROCEDURE — 6370000000 HC RX 637 (ALT 250 FOR IP): Performed by: PHYSICIAN ASSISTANT

## 2021-12-28 PROCEDURE — 94640 AIRWAY INHALATION TREATMENT: CPT

## 2021-12-28 PROCEDURE — 87804 INFLUENZA ASSAY W/OPTIC: CPT

## 2021-12-28 PROCEDURE — U0003 INFECTIOUS AGENT DETECTION BY NUCLEIC ACID (DNA OR RNA); SEVERE ACUTE RESPIRATORY SYNDROME CORONAVIRUS 2 (SARS-COV-2) (CORONAVIRUS DISEASE [COVID-19]), AMPLIFIED PROBE TECHNIQUE, MAKING USE OF HIGH THROUGHPUT TECHNOLOGIES AS DESCRIBED BY CMS-2020-01-R: HCPCS

## 2021-12-28 PROCEDURE — 99283 EMERGENCY DEPT VISIT LOW MDM: CPT

## 2021-12-28 PROCEDURE — U0005 INFEC AGEN DETEC AMPLI PROBE: HCPCS

## 2021-12-28 PROCEDURE — 6360000002 HC RX W HCPCS: Performed by: PHYSICIAN ASSISTANT

## 2021-12-28 RX ORDER — PREDNISONE 20 MG/1
60 TABLET ORAL ONCE
Status: COMPLETED | OUTPATIENT
Start: 2021-12-28 | End: 2021-12-28

## 2021-12-28 RX ORDER — IPRATROPIUM BROMIDE AND ALBUTEROL SULFATE 2.5; .5 MG/3ML; MG/3ML
1 SOLUTION RESPIRATORY (INHALATION) ONCE
Status: COMPLETED | OUTPATIENT
Start: 2021-12-28 | End: 2021-12-28

## 2021-12-28 RX ORDER — ALBUTEROL SULFATE 2.5 MG/3ML
5 SOLUTION RESPIRATORY (INHALATION) ONCE
Status: COMPLETED | OUTPATIENT
Start: 2021-12-28 | End: 2021-12-28

## 2021-12-28 RX ORDER — DOXYCYCLINE 100 MG/1
100 TABLET ORAL 2 TIMES DAILY
Qty: 14 TABLET | Refills: 0 | Status: SHIPPED | OUTPATIENT
Start: 2021-12-28 | End: 2022-01-04

## 2021-12-28 RX ORDER — PREDNISONE 10 MG/1
TABLET ORAL
Qty: 30 TABLET | Refills: 0 | Status: SHIPPED | OUTPATIENT
Start: 2021-12-28 | End: 2022-01-07

## 2021-12-28 RX ORDER — ALBUTEROL SULFATE 90 UG/1
2 AEROSOL, METERED RESPIRATORY (INHALATION) EVERY 4 HOURS PRN
Qty: 18 G | Refills: 0 | Status: SHIPPED | OUTPATIENT
Start: 2021-12-28 | End: 2022-04-29

## 2021-12-28 RX ADMIN — PREDNISONE 60 MG: 20 TABLET ORAL at 08:33

## 2021-12-28 RX ADMIN — IPRATROPIUM BROMIDE AND ALBUTEROL SULFATE 1 AMPULE: .5; 3 SOLUTION RESPIRATORY (INHALATION) at 08:53

## 2021-12-28 RX ADMIN — ALBUTEROL SULFATE 5 MG: 2.5 SOLUTION RESPIRATORY (INHALATION) at 08:53

## 2021-12-28 ASSESSMENT — ENCOUNTER SYMPTOMS
DIARRHEA: 0
CHEST TIGHTNESS: 0
COUGH: 1
VOMITING: 0
SHORTNESS OF BREATH: 1
WHEEZING: 1
ABDOMINAL PAIN: 0
NAUSEA: 0

## 2021-12-28 ASSESSMENT — PAIN SCALES - GENERAL: PAINLEVEL_OUTOF10: 7

## 2021-12-28 NOTE — ED PROVIDER NOTES
[address] [facility]        Pt Name: Froylan Olivo  MRN: [id number]  [place] 1980  Date of evaluation: 12/28/2021  Provider: David Tay PA-C  PCP: JUAN RAMON Galaviz CNP  Note Started: 8:30 AM EST       LEV. I have evaluated this patient. My supervising physician was available for consultation. CHIEF COMPLAINT       Chief Complaint   Patient presents with    Shortness of Breath     PT presents with SOB, fatigue, muscle pain       HISTORY OF PRESENT ILLNESS   (Location, Timing/Onset, Context/Setting, Quality, Duration, Modifying Factors, Severity, Associated Signs and Symptoms)  Note limiting factors. Chief Complaint: Shortness of breath fatigue malaise and body aches    Froylan Olivo is a 39 y.o. female who presents to the emergency department with difficulties with fatigue malaise body aches shortness of breath as well as cough that is been ongoing since Friday. Patient tells me that she has had Covid 19 infection in November 2020 she has also received amiodarone vaccination series and even been boosted. She states that she is having upper respiratory symptoms the above-mentioned. She states the primary reason coming in today she is short of breath. She does have a history of COPD and used her inhaler this morning. She states despite that she still having some wheezing and shortness of breath. She also has received her influenza vaccination this year. She tells me she is not experiencing any significant chest pain but does have some mild feelings of congestion. She is unsure if she is had a documented fever or true chills. She denies that she is experiencing any additional GI or  complaints. Her body aches at present rate to be 7 out of 10 and with this presents the ED for evaluation and treatment. Nursing Notes were all reviewed and agreed with or any disagreements were addressed in the HPI.     REVIEW HYDROXYZINE (ATARAX) 25 MG TABLET    Take 25 mg by mouth 3 times daily as needed for Anxiety     MELOXICAM (MOBIC) 15 MG TABLET    Take 1 tablet by mouth daily as needed for Pain    METFORMIN (GLUCOPHAGE-XR) 500 MG EXTENDED RELEASE TABLET    Take 2 tablets by mouth daily (with breakfast)    METOPROLOL TARTRATE (LOPRESSOR) 50 MG TABLET    Take 1 tablet by mouth 2 times daily    NITROGLYCERIN (NITROSTAT) 0.4 MG SL TABLET    Place 1 tablet under the tongue every 5 minutes as needed for Chest pain up to max of 3 total doses. If no relief after 1 dose, call 911. OMEPRAZOLE (PRILOSEC) 20 MG DELAYED RELEASE CAPSULE    TAKE 1 CAPSULE BY MOUTH EVERY DAY    PREGABALIN (LYRICA) 75 MG CAPSULE    Take 1 capsule by mouth 2 times daily for 90 days. ROSUVASTATIN (CRESTOR) 40 MG TABLET    TAKE 1 TABLET BY MOUTH EVERY DAY    THIOTHIXENE (NAVANE) 2 MG CAPSULE    TAKE 1 CAPSULE BY MOUTH EVERY EVENING AT BEDTIME         ALLERGIES     Cinnamon and Codeine    FAMILYHISTORY       Family History   Problem Relation Age of Onset    High Blood Pressure Mother     Arthritis Mother     Other Father         hypothyroidism    High Blood Pressure Father     Arthritis Father     Asthma Father     Heart Failure Maternal Aunt         22 stents.      Heart Failure Maternal Grandmother     Pacemaker Maternal Grandfather     Heart Surgery Paternal Grandmother     Pacemaker Paternal Grandfather           SOCIAL HISTORY       Social History     Tobacco Use    Smoking status: Former Smoker     Packs/day: 1.00     Years: 27.00     Pack years: 27.00     Types: Cigarettes     Start date:      Quit date: 2019     Years since quittin.1    Smokeless tobacco: Never Used    Tobacco comment: started to smoke at 15 / smoked up to 1 ppd / now smoking 3 to 4 cigarettes a day   Vaping Use    Vaping Use: Never used   Substance Use Topics    Alcohol use: Never    Drug use: Never       SCREENINGS             PHYSICAL EXAM    (up to 7 for level 4, 8 or more for level 5)     ED Triage Vitals   BP Temp Temp Source Pulse Resp SpO2 Height Weight   12/28/21 0815 12/28/21 0811 12/28/21 0811 12/28/21 0811 12/28/21 0811 12/28/21 0811 -- 12/28/21 0811   126/77 97.6 °F (36.4 °C) Temporal 77 18 98 %  226 lb (102.5 kg)       Physical Exam  Vitals and nursing note reviewed. Constitutional:       General: She is awake. She is not in acute distress. Appearance: Normal appearance. She is well-developed. She is not ill-appearing or diaphoretic. HENT:      Head: Normocephalic and atraumatic. No raccoon eyes, Reddy's sign or laceration. Right Ear: External ear normal.      Left Ear: External ear normal.   Eyes:      General: No scleral icterus. Right eye: No discharge. Left eye: No discharge. Conjunctiva/sclera: Conjunctivae normal.   Neck:      Vascular: No JVD. Cardiovascular:      Rate and Rhythm: Normal rate and regular rhythm. Heart sounds: No murmur heard. No friction rub. No gallop. Pulmonary:      Effort: Pulmonary effort is normal. No accessory muscle usage or respiratory distress. Breath sounds: Normal air entry. Wheezing present. No rhonchi or rales. Comments: Able to speak in full sentences without difficulty. No evidence of conversational dyspnea. Bilateral end expiratory wheezing mild in nature noted. Abdominal:      General: There is no distension. Palpations: Abdomen is soft. Abdomen is not rigid. There is no mass. Tenderness: There is no abdominal tenderness. There is no guarding or rebound. Musculoskeletal:      Cervical back: Normal range of motion. Skin:     General: Skin is warm and dry. Neurological:      Mental Status: She is alert and oriented to person, place, and time. GCS: GCS eye subscore is 4. GCS verbal subscore is 5. GCS motor subscore is 6. Cranial Nerves: No cranial nerve deficit. Sensory: No sensory deficit.       Coordination: Coordination normal. Psychiatric:         Behavior: Behavior normal. Behavior is cooperative. DIAGNOSTIC RESULTS   LABS:    Labs Reviewed   RAPID INFLUENZA A/B ANTIGENS    Narrative:     Performed at:  [facility] [address] [facility], [address]   Phone [phone]       When ordered only abnormal lab results are displayed. All other labs were within normal range or not returned as of this dictation. EKG: When ordered, EKG's are interpreted by the Emergency Department Physician in the absence of a cardiologist.  Please see their note for interpretation of EKG. RADIOLOGY:   Non-plain film images such as CT, Ultrasound and MRI are read by the radiologist. Plain radiographic images are visualized and preliminarily interpreted by the ED Provider with the below findings:      Interpretation per the Radiologist below, if available at the time of this note:    XR CHEST (2 VW)   Final Result   No acute cardiopulmonary disease. PROCEDURES   Unless otherwise noted below, none     Procedures    CRITICAL CARE TIME   N/A    CONSULTS:  None      EMERGENCY DEPARTMENT COURSE and DIFFERENTIAL DIAGNOSIS/MDM:   Vitals:    Vitals:    12/28/21 0811 12/28/21 0815 12/28/21 0854   BP:  126/77    Pulse: 77     Resp: 18  18   Temp: 97.6 °F (36.4 °C)     TempSrc: Temporal     SpO2: 98%  98%   Weight: 226 lb (102.5 kg)         Patient was given the following medications:  Medications   ipratropium-albuterol (DUONEB) nebulizer solution 1 ampule (1 ampule Inhalation Given 12/28/21 0853)   predniSONE (DELTASONE) tablet 60 mg (60 mg Oral Given 12/28/21 0833)   albuterol (PROVENTIL) nebulizer solution 5 mg (5 mg Nebulization Given 12/28/21 0853)           The patient's detailed history of present illness is documented as above. Upon arrival to the emergency department the patient's vital signs are as documented. The patient is noted to be hemodynamically stable and afebrile.  Physical examination findings are as above. Symptomatology was treated as above. I have a very low suspicion that this patient would have Covid especially in light of being vaccinated and boosted but she will still need to be ruled out. Radiographs here in the emergency department of her chest demonstrate no evidence of acute cardiopulmonary process specifically no infiltrate. She sounds much better after breathing treatments as documented above. Medications as below for home. Strict potential instructions for return. The patient has been made aware of the signs and symptoms which would necessitate an immediate return to the emergency department and verbalizes an understanding of these signs and symptoms. I estimate there is low risk for impending respiratory failure/ARDS, acute coronary syndrome, chronic obstructive pulmonary disease, congestive heart failure, pericardial tamponade, pneumonia, pneumothorax, pulmonary embolism, pulmonary edema and/or great vessel dissection and thus I consider the discharge disposition reasonable. I have discussed the diagnosis and risks with this patient and we agree with discharging home to follow-up with their primary doctor. We also discussed returning to the Emergency Department immediately if new or worsening symptoms occur. We have discussed the symptoms which are most concerning (e.g., bloody sputum, fever, worsening pain or shortness of breath, vomiting) that necessitate immediate return.          FINAL IMPRESSION      1. COPD exacerbation ([address]    2. Person under investigation for COVID-19          DISPOSITION/PLAN   DISPOSITION Decision To Discharge 12/28/2021 10:01:38 AM      PATIENT REFERRED TO:  Bailey Pemberton, JUAN RAMON - CNP  [address] [facility]  [address] [place] [address] [address]          [facility] Emergency Department  [address] [facility]  [phone]    If symptoms worsen      DISCHARGE MEDICATIONS:  New Prescriptions    ALBUTEROL SULFATE HFA (PROVENTIL HFA) 108 (90 BASE) MCG/ACT INHALER    Inhale 2 puffs into the lungs every 4 hours as needed for Wheezing or Shortness of Breath With spacer (and mask if indicated). Thanks. DOXYCYCLINE MONOHYDRATE (ADOXA) 100 MG TABLET    Take 1 tablet by mouth 2 times daily for 7 days May substitute another form of Doxycycline if insurance requires.     PREDNISONE (DELTASONE) 10 MG TABLET    5 tabs po qam for 2 days then 4,3,2,1 tabs qam for 2 days each total of 10 days       DISCONTINUED MEDICATIONS:  Discontinued Medications    MELOXICAM (MOBIC) 7.5 MG TABLET    Take 1 tablet by mouth daily              (Please note that portions of this note were completed with a voice recognition program.  Efforts were made to edit the dictations but occasionally words are mis-transcribed.)    Tom Cesar PA-C (electronically signed)           Bere Mccann PA-C  12/28/21 3776

## 2021-12-28 NOTE — LETTER
[facility] Emergency Department  [address] [place] [address], [address]             December 28, 2021    Patient: Hammad Chang   YOB: 1980   Date of Visit: 12/28/2021       To Whom It May Concern:    Soham Infante was seen and treated in our emergency department on 12/28/2021. She may return to work on 12/30/2021.       Sincerely,         [facility] ED

## 2021-12-28 NOTE — TELEPHONE ENCOUNTER
Received call from Shannan Prasad at [facility] with [employer]. Subjective: Caller states \"On Thursday I stated to not feel well and slowly has progressed every day. I am having chest pain and other symptoms that started 2 days ago. I was also exposed to possible COVID from a co-worker\"     Current Symptoms: Chest Pain, nausea, vomiting, diarrhea. Hx: Covid in 11/2020. Blood sugar levels ok, weak and achy in joints, some night sweats    Onset: 4 days ago; gradual    Associated Symptoms: reduced appetite, fatigue, some decreased fluid intake    Pain Severity: 7/10; crushing; constant    Temperature: N/A Pt states she has not run a fever    What has been tried: Pepto Bismol, Warm Tea, Tylenol    LMP: NA , has  IUD, LMP 1 year ago Pregnant: No    Recommended disposition: Go to ER now    Care advice provided, patient verbalizes understanding; denies any other questions or concerns; instructed to call back for any new or worsening symptoms. Patient/caller proceeding to [facility] Emergency Department     Attention Provider: Thank you for allowing me to participate in the care of your patient. The patient was connected to triage in response to information provided to the ECC/PSC. Please do not respond through this encounter as the response is not directed to a shared pool.       Reason for Disposition   SEVERE chest pain    Protocols used: CHEST PAIN-ADULT-AH

## 2021-12-29 LAB — SARS-COV-2: NOT DETECTED

## 2022-01-22 ENCOUNTER — HOSPITAL ENCOUNTER (OUTPATIENT)
Age: 42
Discharge: HOME | End: 2022-01-22
Payer: COMMERCIAL

## 2022-01-22 DIAGNOSIS — E11.9 TYPE 2 DIABETES MELLITUS WITHOUT COMPLICATION, WITHOUT LONG-TERM CURRENT USE OF INSULIN (HCC): ICD-10-CM

## 2022-01-22 DIAGNOSIS — K21.9 GASTROESOPHAGEAL REFLUX DISEASE, UNSPECIFIED WHETHER ESOPHAGITIS PRESENT: ICD-10-CM

## 2022-01-22 DIAGNOSIS — E78.2 MIXED HYPERLIPIDEMIA: ICD-10-CM

## 2022-01-22 PROCEDURE — 83036 HEMOGLOBIN GLYCOSYLATED A1C: CPT

## 2022-01-22 PROCEDURE — 85025 COMPLETE CBC W/AUTO DIFF WBC: CPT

## 2022-01-22 PROCEDURE — 82550 ASSAY OF CK (CPK): CPT

## 2022-01-22 PROCEDURE — 80053 COMPREHEN METABOLIC PANEL: CPT

## 2022-01-22 PROCEDURE — 36415 COLL VENOUS BLD VENIPUNCTURE: CPT

## 2022-01-22 PROCEDURE — 80061 LIPID PANEL: CPT

## 2022-01-23 LAB
A/G RATIO: 1.6 (ref 1.1–2.2)
ALBUMIN SERPL-MCNC: 4.4 G/DL (ref 3.4–5)
ALP BLD-CCNC: 60 U/L (ref 40–129)
ALT SERPL-CCNC: 23 U/L (ref 10–40)
ANION GAP SERPL CALCULATED.3IONS-SCNC: 10 MMOL/L (ref 3–16)
AST SERPL-CCNC: 19 U/L (ref 15–37)
BASOPHILS ABSOLUTE: 0.1 K/UL (ref 0–0.2)
BASOPHILS RELATIVE PERCENT: 1.6 %
BILIRUB SERPL-MCNC: 0.4 MG/DL (ref 0–1)
BUN BLDV-MCNC: 12 MG/DL (ref 7–20)
CALCIUM SERPL-MCNC: 9.4 MG/DL (ref 8.3–10.6)
CHLORIDE BLD-SCNC: 100 MMOL/L (ref 99–110)
CHOLESTEROL, TOTAL: 138 MG/DL (ref 0–199)
CO2: 27 MMOL/L (ref 21–32)
CREAT SERPL-MCNC: 0.6 MG/DL (ref 0.6–1.1)
EOSINOPHILS ABSOLUTE: 0 K/UL (ref 0–0.6)
EOSINOPHILS RELATIVE PERCENT: 0.8 %
ESTIMATED AVERAGE GLUCOSE: 137 MG/DL
GFR AFRICAN AMERICAN: >60
GFR NON-AFRICAN AMERICAN: >60
GLUCOSE BLD-MCNC: 120 MG/DL (ref 70–99)
HBA1C MFR BLD: 6.4 %
HCT VFR BLD CALC: 38.9 % (ref 36–48)
HDLC SERPL-MCNC: 36 MG/DL (ref 40–60)
HEMOGLOBIN: 12.9 G/DL (ref 12–16)
LDL CHOLESTEROL CALCULATED: 90 MG/DL
LYMPHOCYTES ABSOLUTE: 2 K/UL (ref 1–5.1)
LYMPHOCYTES RELATIVE PERCENT: 42.5 %
MCH RBC QN AUTO: 28.4 PG (ref 26–34)
MCHC RBC AUTO-ENTMCNC: 33.1 G/DL (ref 31–36)
MCV RBC AUTO: 86 FL (ref 80–100)
MONOCYTES ABSOLUTE: 0.3 K/UL (ref 0–1.3)
MONOCYTES RELATIVE PERCENT: 6 %
NEUTROPHILS ABSOLUTE: 2.3 K/UL (ref 1.7–7.7)
NEUTROPHILS RELATIVE PERCENT: 49.1 %
PDW BLD-RTO: 14 % (ref 12.4–15.4)
PLATELET # BLD: 292 K/UL (ref 135–450)
PMV BLD AUTO: 7.2 FL (ref 5–10.5)
POTASSIUM SERPL-SCNC: 4.2 MMOL/L (ref 3.5–5.1)
RBC # BLD: 4.53 M/UL (ref 4–5.2)
SODIUM BLD-SCNC: 137 MMOL/L (ref 136–145)
TOTAL CK: 130 U/L (ref 26–192)
TOTAL PROTEIN: 7.1 G/DL (ref 6.4–8.2)
TRIGL SERPL-MCNC: 62 MG/DL (ref 0–150)
VLDLC SERPL CALC-MCNC: 12 MG/DL
WBC # BLD: 4.7 K/UL (ref 4–11)

## 2022-01-27 ASSESSMENT — ENCOUNTER SYMPTOMS
VOMITING: 0
WHEEZING: 0
ABDOMINAL PAIN: 0
NAUSEA: 0
SHORTNESS OF BREATH: 0
ORTHOPNEA: 0
DIARRHEA: 0
BLURRED VISION: 0
COUGH: 0
CHEST TIGHTNESS: 0
CONSTIPATION: 0

## 2022-01-28 ENCOUNTER — OFFICE VISIT (OUTPATIENT)
Dept: FAMILY MEDICINE CLINIC | Age: 42
End: 2022-01-28
Payer: COMMERCIAL

## 2022-01-28 VITALS
BODY MASS INDEX: 38.45 KG/M2 | SYSTOLIC BLOOD PRESSURE: 98 MMHG | HEIGHT: 65 IN | TEMPERATURE: 97.7 F | OXYGEN SATURATION: 95 % | DIASTOLIC BLOOD PRESSURE: 78 MMHG | HEART RATE: 76 BPM | WEIGHT: 230.8 LBS

## 2022-01-28 DIAGNOSIS — F43.10 PTSD (POST-TRAUMATIC STRESS DISORDER): ICD-10-CM

## 2022-01-28 DIAGNOSIS — R00.0 CHRONIC TACHYCARDIA: ICD-10-CM

## 2022-01-28 DIAGNOSIS — E55.9 VITAMIN D DEFICIENCY: ICD-10-CM

## 2022-01-28 DIAGNOSIS — E11.9 TYPE 2 DIABETES MELLITUS WITHOUT COMPLICATION, WITHOUT LONG-TERM CURRENT USE OF INSULIN (HCC): ICD-10-CM

## 2022-01-28 DIAGNOSIS — M79.7 FIBROMYALGIA: ICD-10-CM

## 2022-01-28 DIAGNOSIS — F31.9 BIPOLAR 1 DISORDER (HCC): ICD-10-CM

## 2022-01-28 DIAGNOSIS — Z72.0 CURRENT TOBACCO USE: ICD-10-CM

## 2022-01-28 DIAGNOSIS — K21.9 GASTROESOPHAGEAL REFLUX DISEASE, UNSPECIFIED WHETHER ESOPHAGITIS PRESENT: ICD-10-CM

## 2022-01-28 DIAGNOSIS — G89.29 OTHER CHRONIC PAIN: ICD-10-CM

## 2022-01-28 DIAGNOSIS — F32.89 OTHER DEPRESSION: ICD-10-CM

## 2022-01-28 DIAGNOSIS — E78.2 MIXED HYPERLIPIDEMIA: Primary | ICD-10-CM

## 2022-01-28 LAB
CHP ED QC CHECK: ABNORMAL
GLUCOSE BLD-MCNC: 206 MG/DL

## 2022-01-28 PROCEDURE — 82962 GLUCOSE BLOOD TEST: CPT | Performed by: CLINICAL NURSE SPECIALIST

## 2022-01-28 PROCEDURE — G8417 CALC BMI ABV UP PARAM F/U: HCPCS | Performed by: CLINICAL NURSE SPECIALIST

## 2022-01-28 PROCEDURE — 3044F HG A1C LEVEL LT 7.0%: CPT | Performed by: CLINICAL NURSE SPECIALIST

## 2022-01-28 PROCEDURE — G8484 FLU IMMUNIZE NO ADMIN: HCPCS | Performed by: CLINICAL NURSE SPECIALIST

## 2022-01-28 PROCEDURE — 1036F TOBACCO NON-USER: CPT | Performed by: CLINICAL NURSE SPECIALIST

## 2022-01-28 PROCEDURE — 99214 OFFICE O/P EST MOD 30 MIN: CPT | Performed by: CLINICAL NURSE SPECIALIST

## 2022-01-28 PROCEDURE — G8427 DOCREV CUR MEDS BY ELIG CLIN: HCPCS | Performed by: CLINICAL NURSE SPECIALIST

## 2022-01-28 PROCEDURE — 2022F DILAT RTA XM EVC RTNOPTHY: CPT | Performed by: CLINICAL NURSE SPECIALIST

## 2022-01-28 RX ORDER — EZETIMIBE 10 MG/1
10 TABLET ORAL DAILY
Qty: 90 TABLET | Refills: 0 | Status: SHIPPED | OUTPATIENT
Start: 2022-01-28 | End: 2022-04-29 | Stop reason: SDUPTHER

## 2022-01-28 RX ORDER — MELOXICAM 15 MG/1
15 TABLET ORAL DAILY PRN
Qty: 90 TABLET | Refills: 0 | Status: SHIPPED | OUTPATIENT
Start: 2022-01-28 | End: 2022-03-08 | Stop reason: ALTCHOICE

## 2022-01-28 RX ORDER — ROSUVASTATIN CALCIUM 40 MG/1
TABLET, COATED ORAL
Qty: 90 TABLET | Refills: 0 | Status: SHIPPED | OUTPATIENT
Start: 2022-01-28 | End: 2022-04-29 | Stop reason: SDUPTHER

## 2022-01-28 RX ORDER — OMEPRAZOLE 20 MG/1
CAPSULE, DELAYED RELEASE ORAL
Qty: 90 CAPSULE | Refills: 0 | Status: SHIPPED | OUTPATIENT
Start: 2022-01-28 | End: 2022-04-29 | Stop reason: SDUPTHER

## 2022-01-28 RX ORDER — METOPROLOL TARTRATE 50 MG/1
50 TABLET, FILM COATED ORAL 2 TIMES DAILY
Qty: 180 TABLET | Refills: 0 | Status: SHIPPED | OUTPATIENT
Start: 2022-01-28 | End: 2022-04-29 | Stop reason: SDUPTHER

## 2022-01-28 RX ORDER — CHOLECALCIFEROL (VITAMIN D3) 125 MCG
2000 CAPSULE ORAL DAILY
Qty: 90 TABLET | Refills: 0 | Status: SHIPPED | OUTPATIENT
Start: 2022-01-28 | End: 2022-04-29

## 2022-01-28 RX ORDER — METFORMIN HYDROCHLORIDE 500 MG/1
1000 TABLET, EXTENDED RELEASE ORAL
Qty: 180 TABLET | Refills: 0 | Status: SHIPPED | OUTPATIENT
Start: 2022-01-28 | End: 2022-04-29 | Stop reason: SDUPTHER

## 2022-01-28 ASSESSMENT — PATIENT HEALTH QUESTIONNAIRE - PHQ9
SUM OF ALL RESPONSES TO PHQ QUESTIONS 1-9: 1
1. LITTLE INTEREST OR PLEASURE IN DOING THINGS: 0
SUM OF ALL RESPONSES TO PHQ9 QUESTIONS 1 & 2: 1
2. FEELING DOWN, DEPRESSED OR HOPELESS: 1

## 2022-01-28 NOTE — PROGRESS NOTES
SUBJECTIVE:    Patient ID:  Gely Arauz is a 39 y.o. female      Patient is here for a medication check for hypertension, hyperlipidemia, diabetes, GERD, migraine headaches, AMOS, vitamin D deficiency, fibromyalgia and chronic pain. She is doing well on current regimen and is concerned about a new onset head. States it is the worst headache she has ever had. Frequency has been three times in the last 2 weeks, duration of a minute or two, intensity or 8 on 0-10 scale, pattern is occipital with radiation to the top of the head, associated symptoms includes blurred vision. Aggravating or alleviating factors. Denies chest pain, shortness, chest pain or dyspnea, orthopnea, headache, dizziness, syncope/near syncope or weakness. GERD symptoms are managed with omeprazole. Denies indigestion/heartburn, difficulty swallowing, epigastric pain, nausea, vomiting, diarrhea, constipation or blood in stool. Tachycardia managed with metoprolol twice daily. She has a history of anxiety, depression, bipolar and PTSD. She is followed a psychiatrist every 2 to 3 months and sees a counselor twice a week. She is currently taking Celexa, Depakote and hydroxyzine as needed. Currently denies thoughts of self-harm, suicidal or homicidal ideations. States she has been evaluated by cardiology and will be followed annually. She was told to follow-up with pulmonology annually for COPD and AMOS. AMOS is managed with nightly CPAP usage. She also has a history or alcoholism for 24 years, she has been sober for almost 4 years. Labs reviewed from 1/22/2022 CBC unremarkable, CMP essentially normal, fasting glucose 120, A1c 6.4, total cholesterol 138, triglycerides 62, HDL 36, LDL 90, . Hypertension  This is a chronic problem. The current episode started today. The problem is unchanged. The problem is controlled.  Pertinent negatives include no anxiety, blurred vision, chest pain, headaches, orthopnea, palpitations, peripheral edema or shortness of breath. Risk factors for coronary artery disease include diabetes mellitus, dyslipidemia and obesity. Past treatments include beta blockers. The current treatment provides significant improvement. Hyperlipidemia  This is a chronic problem. The current episode started more than 1 year ago. The problem is controlled. Recent lipid tests were reviewed and are low. Pertinent negatives include no chest pain, focal sensory loss, focal weakness, leg pain, myalgias or shortness of breath. There are no compliance problems. Risk factors for coronary artery disease include diabetes mellitus, dyslipidemia and hypertension. Current Outpatient Medications on File Prior to Visit   Medication Sig Dispense Refill    albuterol sulfate  (90 Base) MCG/ACT inhaler TAKE 2 PUFFS BY MOUTH EVERY 6 HOURS AS NEEDED FOR WHEEZE 6.7 each 1    nitroGLYCERIN (NITROSTAT) 0.4 MG SL tablet Place 1 tablet under the tongue every 5 minutes as needed for Chest pain up to max of 3 total doses. If no relief after 1 dose, call 911. 25 tablet 0    ammonium lactate (LAC-HYDRIN) 12 % lotion Apply topically daily 225 mL 2    thiothixene (NAVANE) 2 MG capsule TAKE 1 CAPSULE BY MOUTH EVERY EVENING AT BEDTIME      citalopram (CELEXA) 20 MG tablet Take 20 mg by mouth daily       divalproex (DEPAKOTE ER) 500 MG extended release tablet Take 500 mg by mouth daily      hydrOXYzine (ATARAX) 25 MG tablet Take 25 mg by mouth 3 times daily as needed for Anxiety       albuterol sulfate HFA (PROVENTIL HFA) 108 (90 Base) MCG/ACT inhaler Inhale 2 puffs into the lungs every 4 hours as needed for Wheezing or Shortness of Breath With spacer (and mask if indicated). Thanks. 18 g 0    diclofenac sodium (VOLTAREN) 1 % GEL Apply 2 g topically 2 times daily (Patient not taking: Reported on 1/28/2022) 150 g 1    pregabalin (LYRICA) 75 MG capsule Take 1 capsule by mouth 2 times daily for 90 days.  60 capsule 2     No current facility-administered medications on file prior to visit. Past Medical History:   Diagnosis Date    Arthritis     Depression     Diabetes mellitus ([facility] [address]     Hypertension     Tachycardia      Past Surgical History:   Procedure Laterality Date    ANKLE FUSION Bilateral     ARTHRODESIS Left 2019    REVISION OF TALONAVICULAR JOINT ARTHRODESIS LEFT FOOT  -SLEEP APNEA- performed by Desmond Goff DPM at [facility] Bilateral     ELBOW SURGERY Bilateral     ulnar nerve release    HERNIA REPAIR      HIP ARTHROSCOPY Bilateral     TONSILLECTOMY AND ADENOIDECTOMY      WRIST GANGLION EXCISION Bilateral      Family History   Problem Relation Age of Onset    High Blood Pressure Mother     Arthritis Mother     Other Father         hypothyroidism    High Blood Pressure Father     Arthritis Father     Asthma Father     Heart Failure Maternal Aunt         22 stents.      Heart Failure Maternal Grandmother     Pacemaker Maternal Grandfather     Heart Surgery Paternal Grandmother     Pacemaker Paternal Grandfather      Social History     Socioeconomic History    Marital status:      Spouse name: Not on file    Number of children: Not on file    Years of education: Not on file    Highest education level: Not on file   Occupational History    Not on file   Tobacco Use    Smoking status: Former Smoker     Packs/day: 1.00     Years: 27.00     Pack years: 27.00     Types: Cigarettes     Start date:      Quit date: 2019     Years since quittin.1    Smokeless tobacco: Never Used    Tobacco comment: started to smoke at 15 / smoked up to 1 ppd / now smoking 3 to 4 cigarettes a day   Vaping Use    Vaping Use: Never used   Substance and Sexual Activity    Alcohol use: Never    Drug use: Never    Sexual activity: Not on file   Other Topics Concern    Not on file   Social History Narrative    Not on file     Social Determinants of Health     Financial Resource Strain: Low Risk     Difficulty of Paying Living Expenses: Not hard at all   Food Insecurity: No Food Insecurity    Worried About Running Out of Food in the Last Year: Never true    Parish of Food in the Last Year: Never true   Transportation Needs:     Lack of Transportation (Medical): Not on file    Lack of Transportation (Non-Medical): Not on file   Physical Activity:     Days of Exercise per Week: Not on file    Minutes of Exercise per Session: Not on file   Stress:     Feeling of Stress : Not on file   Social Connections:     Frequency of Communication with Friends and Family: Not on file    Frequency of Social Gatherings with Friends and Family: Not on file    Attends Latter-day Services: Not on file    Active Member of [address] [place] [address] [employer] Rental Units or Organizations: Not on file    Attends Club or Organization Meetings: Not on file    Marital Status: Not on file   Intimate Partner Violence:     Fear of Current or Ex-Partner: Not on file    Emotionally Abused: Not on file    Physically Abused: Not on file    Sexually Abused: Not on file   Housing Stability:     Unable to Pay for Housing in the Last Year: Not on file    Number of [place] in the Last Year: Not on file    Unstable Housing in the Last Year: Not on file       Review of Systems   Constitutional: Negative for chills and fever. Eyes: Negative for blurred vision and visual disturbance. Respiratory: Negative for cough, chest tightness, shortness of breath and wheezing. Cardiovascular: Negative for chest pain, palpitations, orthopnea and leg swelling. Gastrointestinal: Negative for abdominal pain, constipation, diarrhea, nausea and vomiting. Musculoskeletal: Negative for arthralgias and myalgias. Skin: Negative for rash. Neurological: Negative for focal weakness and headaches. Psychiatric/Behavioral: Negative for dysphoric mood, self-injury, sleep disturbance and suicidal ideas. The patient is not nervous/anxious. OBJECTIVE:    Physical Exam  Vitals and nursing note reviewed. Constitutional:       General: She is not in acute distress. Appearance: Normal appearance. She is well-developed. She is obese. She is not ill-appearing. HENT:      Head: Normocephalic and atraumatic. Right Ear: External ear normal.      Left Ear: External ear normal.      Nose: Nose normal.      Mouth/Throat:      Mouth: Mucous membranes are moist.   Eyes:      Conjunctiva/sclera: Conjunctivae normal.      Pupils: Pupils are equal, round, and reactive to light. Neck:      Vascular: No carotid bruit. Trachea: No tracheal deviation. Cardiovascular:      Rate and Rhythm: Normal rate and regular rhythm. Pulses: Normal pulses. Heart sounds: Normal heart sounds. No murmur heard. Pulmonary:      Effort: Pulmonary effort is normal. No respiratory distress. Breath sounds: Normal breath sounds. No wheezing or rales. Chest:      Chest wall: No tenderness. Abdominal:      General: Bowel sounds are normal. There is no distension. Palpations: Abdomen is soft. There is no mass. Tenderness: There is no abdominal tenderness. Hernia: No hernia is present. Musculoskeletal:         General: Normal range of motion. Cervical back: Normal range of motion and neck supple. Right lower leg: No edema. Left lower leg: No edema. Skin:     General: Skin is warm and dry. Findings: No rash. Neurological:      Mental Status: She is alert and oriented to person, place, and time.    Psychiatric:         Behavior: Behavior normal.       BP 98/78   Pulse 76   Temp 97.7 °F (36.5 °C)   Ht 5' 5\" (1.651 m)   Wt 230 lb 12.8 oz (104.7 kg)   LMP 01/24/2022   SpO2 95%   BMI 38.41 kg/m²    BP Readings from Last 3 Encounters:   01/28/22 98/78   12/28/21 126/77   10/29/21 138/84      Wt Readings from Last 3 Encounters:   01/28/22 230 lb 12.8 oz (104.7 kg)   12/28/21 226 lb (102.5 kg)   10/29/21 226 lb daily  Dispense: 90 tablet; Refill: 0    8. Current tobacco use  - Encourage tobacco cessation    9. Other depression  - Stable, continue current regimen   - Follow up with psychiatrist     10. Bipolar 1 disorder ([facility] [address]  - Stable, continue current regimen   - Follow up with psychiatrist     11. PTSD (post-traumatic stress disorder)  - Stable, continue current regimen   - Follow up with psychiatrist       Continue current treatment plan. Current Outpatient Medications   Medication Sig Dispense Refill    omeprazole (PRILOSEC) 20 MG delayed release capsule TAKE 1 CAPSULE BY MOUTH EVERY DAY 90 capsule 0    rosuvastatin (CRESTOR) 40 MG tablet TAKE 1 TABLET BY MOUTH EVERY DAY 90 tablet 0    metFORMIN (GLUCOPHAGE-XR) 500 MG extended release tablet Take 2 tablets by mouth daily (with breakfast) 180 tablet 0    metoprolol tartrate (LOPRESSOR) 50 MG tablet Take 1 tablet by mouth 2 times daily 180 tablet 0    meloxicam (MOBIC) 15 MG tablet Take 1 tablet by mouth daily as needed for Pain 90 tablet 0    ezetimibe (ZETIA) 10 MG tablet Take 1 tablet by mouth daily 90 tablet 0    Cholecalciferol (VITAMIN D3) 50 MCG (2000 UT) TABS Take 1 tablet by mouth daily 90 tablet 0    albuterol sulfate  (90 Base) MCG/ACT inhaler TAKE 2 PUFFS BY MOUTH EVERY 6 HOURS AS NEEDED FOR WHEEZE 6.7 each 1    nitroGLYCERIN (NITROSTAT) 0.4 MG SL tablet Place 1 tablet under the tongue every 5 minutes as needed for Chest pain up to max of 3 total doses.  If no relief after 1 dose, call 911. 25 tablet 0    ammonium lactate (LAC-HYDRIN) 12 % lotion Apply topically daily 225 mL 2    thiothixene (NAVANE) 2 MG capsule TAKE 1 CAPSULE BY MOUTH EVERY EVENING AT BEDTIME      citalopram (CELEXA) 20 MG tablet Take 20 mg by mouth daily       divalproex (DEPAKOTE ER) 500 MG extended release tablet Take 500 mg by mouth daily      hydrOXYzine (ATARAX) 25 MG tablet Take 25 mg by mouth 3 times daily as needed for Anxiety       albuterol sulfate HFA (PROVENTIL HFA) 108 (90 Base) MCG/ACT inhaler Inhale 2 puffs into the lungs every 4 hours as needed for Wheezing or Shortness of Breath With spacer (and mask if indicated). Thanks. 18 g 0    diclofenac sodium (VOLTAREN) 1 % GEL Apply 2 g topically 2 times daily (Patient not taking: Reported on 1/28/2022) 150 g 1    pregabalin (LYRICA) 75 MG capsule Take 1 capsule by mouth 2 times daily for 90 days. 60 capsule 2     No current facility-administered medications for this visit. Return in about 3 months (around 4/28/2022), or if symptoms worsen or fail to improve, for lipidemia, diabetes, GERD, chronic tachycardia, fibro, chronic pain, vit D, tobacco use, psych. BODØ received counseling on the following healthy behaviors: nutrition, exercise, medication adherence and tobacco cessation    Discussed use, benefit, and side effects of prescribed medications. Barriers to medication compliance addressed. All patient questions answered. Pt voiced understanding. Call office if experience side effects from medications. Please note that some or all of this record was generated using voice recognition software. If there are any questions about the content of this document, please contact the author as some errors in transcription may have occurred.

## 2022-01-28 NOTE — PATIENT INSTRUCTIONS
Continue muscle relaxers per ED     Fasting labs reviewed      Medications refilled     Reviewed low-cholesterol diet     Continue ezetimibe (Zetia) 10 mg daily and Crestor     Reviewed diabetic diet     Trial of diclofenac twice daily to affected area (right elbow)      Follow-up with specialist as needed/directed (psych, Ortho, podiatry pulmonology, cardiology)      Encourage continue lifestyle modifications (better food choices, portion control and increasing activity)     Debrox ear wax removal drop as directed      Keep a headache journal including timing, associated symptoms, treatment and response to treatment

## 2022-02-21 RX ORDER — PREDNISONE 10 MG/1
TABLET ORAL
Qty: 30 TABLET | OUTPATIENT
Start: 2022-02-21

## 2022-02-21 RX ORDER — DOXYCYCLINE 100 MG/1
TABLET ORAL
Qty: 14 TABLET | OUTPATIENT
Start: 2022-02-21

## 2022-02-21 RX ORDER — ALBUTEROL SULFATE 90 UG/1
AEROSOL, METERED RESPIRATORY (INHALATION)
Qty: 6.7 EACH | OUTPATIENT
Start: 2022-02-21

## 2022-03-07 ASSESSMENT — ENCOUNTER SYMPTOMS
CHEST TIGHTNESS: 0
WHEEZING: 0
VOMITING: 0
CONSTIPATION: 0
COUGH: 0
DIARRHEA: 0
ABDOMINAL PAIN: 0
NAUSEA: 0

## 2022-03-08 ENCOUNTER — OFFICE VISIT (OUTPATIENT)
Dept: ORTHOPEDIC SURGERY | Age: 42
End: 2022-03-08
Payer: COMMERCIAL

## 2022-03-08 ENCOUNTER — OFFICE VISIT (OUTPATIENT)
Dept: FAMILY MEDICINE CLINIC | Age: 42
End: 2022-03-08
Payer: COMMERCIAL

## 2022-03-08 VITALS — WEIGHT: 225.97 LBS | HEIGHT: 65 IN | BODY MASS INDEX: 37.65 KG/M2

## 2022-03-08 VITALS
HEART RATE: 67 BPM | DIASTOLIC BLOOD PRESSURE: 72 MMHG | WEIGHT: 226 LBS | OXYGEN SATURATION: 97 % | TEMPERATURE: 97.7 F | BODY MASS INDEX: 37.61 KG/M2 | SYSTOLIC BLOOD PRESSURE: 100 MMHG

## 2022-03-08 DIAGNOSIS — M79.671 BILATERAL FOOT PAIN: Primary | ICD-10-CM

## 2022-03-08 DIAGNOSIS — G89.29 OTHER CHRONIC PAIN: ICD-10-CM

## 2022-03-08 DIAGNOSIS — E11.9 TYPE 2 DIABETES MELLITUS WITHOUT COMPLICATION, WITHOUT LONG-TERM CURRENT USE OF INSULIN (HCC): ICD-10-CM

## 2022-03-08 DIAGNOSIS — E88.89 HOFFA'S SYNDROME (HCC): ICD-10-CM

## 2022-03-08 DIAGNOSIS — S83.241A ACUTE MEDIAL MENISCUS TEAR, RIGHT, INITIAL ENCOUNTER: ICD-10-CM

## 2022-03-08 DIAGNOSIS — R76.8 POSITIVE ANA (ANTINUCLEAR ANTIBODY): ICD-10-CM

## 2022-03-08 DIAGNOSIS — R52 PAIN: Primary | ICD-10-CM

## 2022-03-08 DIAGNOSIS — M22.41 CHONDROMALACIA OF RIGHT PATELLA: ICD-10-CM

## 2022-03-08 DIAGNOSIS — Z72.0 CURRENT TOBACCO USE: ICD-10-CM

## 2022-03-08 DIAGNOSIS — M79.672 BILATERAL FOOT PAIN: Primary | ICD-10-CM

## 2022-03-08 DIAGNOSIS — M79.7 FIBROMYALGIA: ICD-10-CM

## 2022-03-08 LAB
BILIRUBIN, POC: NORMAL
BLOOD URINE, POC: NORMAL
CHP ED QC CHECK: NORMAL
CLARITY, POC: NORMAL
COLOR, POC: YELLOW
GLUCOSE BLD-MCNC: 92 MG/DL
GLUCOSE URINE, POC: NORMAL
KETONES, POC: NORMAL
LEUKOCYTE EST, POC: NORMAL
NITRITE, POC: NORMAL
PH, POC: 6.5
PROTEIN, POC: NORMAL
SPECIFIC GRAVITY, POC: 1.02
UROBILINOGEN, POC: 0.2

## 2022-03-08 PROCEDURE — 3044F HG A1C LEVEL LT 7.0%: CPT | Performed by: CLINICAL NURSE SPECIALIST

## 2022-03-08 PROCEDURE — 1036F TOBACCO NON-USER: CPT | Performed by: INTERNAL MEDICINE

## 2022-03-08 PROCEDURE — L1812 KO ELASTIC W/JOINTS PRE OTS: HCPCS | Performed by: INTERNAL MEDICINE

## 2022-03-08 PROCEDURE — 1036F TOBACCO NON-USER: CPT | Performed by: CLINICAL NURSE SPECIALIST

## 2022-03-08 PROCEDURE — G8427 DOCREV CUR MEDS BY ELIG CLIN: HCPCS | Performed by: INTERNAL MEDICINE

## 2022-03-08 PROCEDURE — 2022F DILAT RTA XM EVC RTNOPTHY: CPT | Performed by: CLINICAL NURSE SPECIALIST

## 2022-03-08 PROCEDURE — G8482 FLU IMMUNIZE ORDER/ADMIN: HCPCS | Performed by: CLINICAL NURSE SPECIALIST

## 2022-03-08 PROCEDURE — G8417 CALC BMI ABV UP PARAM F/U: HCPCS | Performed by: INTERNAL MEDICINE

## 2022-03-08 PROCEDURE — 81002 URINALYSIS NONAUTO W/O SCOPE: CPT | Performed by: CLINICAL NURSE SPECIALIST

## 2022-03-08 PROCEDURE — G8417 CALC BMI ABV UP PARAM F/U: HCPCS | Performed by: CLINICAL NURSE SPECIALIST

## 2022-03-08 PROCEDURE — 82962 GLUCOSE BLOOD TEST: CPT | Performed by: CLINICAL NURSE SPECIALIST

## 2022-03-08 PROCEDURE — G8482 FLU IMMUNIZE ORDER/ADMIN: HCPCS | Performed by: INTERNAL MEDICINE

## 2022-03-08 PROCEDURE — 99214 OFFICE O/P EST MOD 30 MIN: CPT | Performed by: CLINICAL NURSE SPECIALIST

## 2022-03-08 PROCEDURE — G8427 DOCREV CUR MEDS BY ELIG CLIN: HCPCS | Performed by: CLINICAL NURSE SPECIALIST

## 2022-03-08 PROCEDURE — 99214 OFFICE O/P EST MOD 30 MIN: CPT | Performed by: INTERNAL MEDICINE

## 2022-03-08 RX ORDER — MELOXICAM 15 MG/1
15 TABLET ORAL DAILY PRN
Qty: 90 TABLET | Refills: 0 | Status: SHIPPED | OUTPATIENT
Start: 2022-03-08 | End: 2022-06-27

## 2022-03-08 RX ORDER — ACETAMINOPHEN 160 MG
TABLET,DISINTEGRATING ORAL
COMMUNITY
Start: 2022-02-20 | End: 2022-05-17 | Stop reason: SDUPTHER

## 2022-03-08 NOTE — PROGRESS NOTES
Chief Complaint:   Chief Complaint   Patient presents with    Knee Pain     R knee started having pain about 3 months ago, walking and standing has been progressively getting worse and increase symptoms everytime she is on her feet now, sharp/stabbing pain and R knee will give out. Noticed swelling and pain around jointline. History of Present Illness:       Patient is a 39 y.o. female presents with the above complaint. The symptoms began 3 monthsago and started without an injury. The patient describes a sharp pain that does not radiate. The symptoms are intermittent  and are are worsening since the onset. The episodic pain is associated with subjective giving way of the knee. Pain localizes to the front side and medial side of the knee and  does seem to have a patella femoral provacative component of pain. There are not mechanical symptoms that are active at this time. The patient admits to subjective instability about the knee and admits to new onset weakness of the lower extremity. Pain level 9    The patient admits to a pattern of activity related swelling. Treatment to date: none. There is no prior history of knee trauma. There is no prior history of autoimmune disease, inflammatory arthropathy, or crystal arthropathy.                 Past Medical History:        Past Medical History:   Diagnosis Date    Arthritis     Depression     Diabetes mellitus ([facility] [address]     Hypertension     Tachycardia          Past Surgical History:   Procedure Laterality Date    ANKLE FUSION Bilateral     ARTHRODESIS Left 12/6/2019    REVISION OF TALONAVICULAR JOINT ARTHRODESIS LEFT FOOT  -SLEEP APNEA- performed by Marcelle Guy DPM at [facility] Bilateral     ELBOW SURGERY Bilateral     ulnar nerve release    HERNIA REPAIR      HIP ARTHROSCOPY Bilateral     TONSILLECTOMY AND ADENOIDECTOMY      WRIST GANGLION EXCISION Bilateral          Present Medications: Current Outpatient Medications   Medication Sig Dispense Refill    diclofenac sodium (VOLTAREN) 1 % GEL Apply 2 g topically 2 times daily 150 g 1    meloxicam (MOBIC) 15 MG tablet Take 1 tablet by mouth daily as needed for Pain 90 tablet 0    Cholecalciferol (VITAMIN D3) 50 MCG (2000 UT) CAPS TAKE 1 CAPSULE BY MOUTH EVERY DAY      omeprazole (PRILOSEC) 20 MG delayed release capsule TAKE 1 CAPSULE BY MOUTH EVERY DAY 90 capsule 0    rosuvastatin (CRESTOR) 40 MG tablet TAKE 1 TABLET BY MOUTH EVERY DAY 90 tablet 0    metFORMIN (GLUCOPHAGE-XR) 500 MG extended release tablet Take 2 tablets by mouth daily (with breakfast) 180 tablet 0    metoprolol tartrate (LOPRESSOR) 50 MG tablet Take 1 tablet by mouth 2 times daily 180 tablet 0    ezetimibe (ZETIA) 10 MG tablet Take 1 tablet by mouth daily 90 tablet 0    Cholecalciferol (VITAMIN D3) 50 MCG (2000 UT) TABS Take 1 tablet by mouth daily 90 tablet 0    albuterol sulfate HFA (PROVENTIL HFA) 108 (90 Base) MCG/ACT inhaler Inhale 2 puffs into the lungs every 4 hours as needed for Wheezing or Shortness of Breath With spacer (and mask if indicated). Thanks. 18 g 0    albuterol sulfate  (90 Base) MCG/ACT inhaler TAKE 2 PUFFS BY MOUTH EVERY 6 HOURS AS NEEDED FOR WHEEZE 6.7 each 1    nitroGLYCERIN (NITROSTAT) 0.4 MG SL tablet Place 1 tablet under the tongue every 5 minutes as needed for Chest pain up to max of 3 total doses. If no relief after 1 dose, call 911. 25 tablet 0    ammonium lactate (LAC-HYDRIN) 12 % lotion Apply topically daily 225 mL 2    thiothixene (NAVANE) 2 MG capsule TAKE 1 CAPSULE BY MOUTH EVERY EVENING AT BEDTIME      pregabalin (LYRICA) 75 MG capsule Take 1 capsule by mouth 2 times daily for 90 days.  60 capsule 2    citalopram (CELEXA) 20 MG tablet Take 20 mg by mouth daily       divalproex (DEPAKOTE ER) 500 MG extended release tablet Take 500 mg by mouth daily      hydrOXYzine (ATARAX) 25 MG tablet Take 25 mg by mouth 3 times daily as needed for Anxiety        No current facility-administered medications for this visit. Allergies: Allergies   Allergen Reactions    Cinnamon Itching    Codeine Hives and Itching        Social History:         Social History     Socioeconomic History    Marital status:      Spouse name: Not on file    Number of children: Not on file    Years of education: Not on file    Highest education level: Not on file   Occupational History    Not on file   Tobacco Use    Smoking status: Former Smoker     Packs/day: 1.00     Years: 27.00     Pack years: 27.00     Types: Cigarettes     Start date:      Quit date: 2019     Years since quittin.3    Smokeless tobacco: Never Used    Tobacco comment: started to smoke at 15 / smoked up to 1 ppd / now smoking 3 to 4 cigarettes a day   Vaping Use    Vaping Use: Never used   Substance and Sexual Activity    Alcohol use: Never    Drug use: Never    Sexual activity: Not on file   Other Topics Concern    Not on file   Social History Narrative    Not on file     Social Determinants of Health     Financial Resource Strain: Low Risk     Difficulty of Paying Living Expenses: Not hard at all   Food Insecurity: No Food Insecurity    Worried About Running Out of Food in the Last Year: Never true    [address] in the Last Year: Never true   Transportation Needs:     Lack of Transportation (Medical): Not on file    Lack of Transportation (Non-Medical):  Not on file   Physical Activity:     Days of Exercise per Week: Not on file    Minutes of Exercise per Session: Not on file   Stress:     Feeling of Stress : Not on file   Social Connections:     Frequency of Communication with Friends and Family: Not on file    Frequency of Social Gatherings with Friends and Family: Not on file    Attends Taoist Services: Not on file    Active Member of Clubs or Organizations: Not on file    Attends Club or Organization Meetings: Not on file    Marital Status: Not on file   Intimate Partner Violence:     Fear of Current or Ex-Partner: Not on file    Emotionally Abused: Not on file    Physically Abused: Not on file    Sexually Abused: Not on file   Housing Stability:     Unable to Pay for Housing in the Last Year: Not on file    Number of [place] in the Last Year: Not on file    Unstable Housing in the Last Year: Not on file        Review of Symptoms:    Pertinent items are noted in HPI    Review of systems reviewed from Patient History Form dated on today's date and   available in the patient's chart under the Media tab. Vital Signs: There were no vitals filed for this visit. General Exam:     Constitutional: Patient is adequately groomed with no evidence of malnutrition  Mental Status: The patient is oriented to time, place and person. The patient's mood and affect are appropriate. Vascular: Examination reveals no swelling or calf tenderness. Peripheral pulses are palpable and 2+. Lymphatics: no lymphadenopathy of the inguinal region or lower extremity      Physical Exam: right knee      Primary Exam:    Inspection: No deformity atrophy appreciable effusion      Palpation: Mild parapatellar tenderness and mild over the medial joint line      Range of Motion: 0/125 pain with flexion and greater with passive knee extension localizing to the anterior compartment      Strength: Normal with SLR      Special Tests:   Lachman test negative, collateral ligament stressing stable, anterior/posterior drawer negative, medial and lateral [facility] testing positive for pain, patella femoral provacative Positive      Skin: There are no rashes, ulcerations or lesions. Gait: Nonantalgic      Reflex intact lower     Additional Comments:        Additional Examinations:           Left Lower Extremity: Examination of the left lower extremity does not show any tenderness, deformity or injury. Range of motion is unremarkable.   There is no gross instability. There are no rashes, ulcerations or lesions. Strength and tone are normal.   Neurolgic -Light touch sensation and manual muscle testing normal L2-S1. No fasiculations. Pattella tendon and Achilles tendon reflexes +2 bilaterally. Seated SLR negative        Office Imaging Results/Procedures PerformedToday:      Radiology:      X-rays obtained and reviewed in office:   Views 4 views right knee comparison standing/merchant/weber views left knee   Location right knee   Impression tibiofemoral joints demonstrate grade 1 degenerative change bilaterally lateral projection demonstrates patellofemoral joint is anatomic incidental note of fabella merchant projection reveals mild patella tilting bilaterally       Office Procedures:     Orders Placed This Encounter   Procedures    XR KNEE RIGHT (MIN 4 VIEWS)     Standing Status:   Future     Number of Occurrences:   1     Standing Expiration Date:   3/8/2023     Order Specific Question:   Reason for exam:     Answer:   pain    XR KNEE LEFT (3 VIEWS)     Standing Status:   Future     Number of Occurrences:   1     Standing Expiration Date:   3/8/2023     Order Specific Question:   Reason for exam:     Answer:   pain    MRI KNEE RIGHT WO CONTRAST     Standing Status:   Future     Standing Expiration Date:   3/8/2023     Scheduling Instructions:      [facility], [address] will obtain auth and fwd to your facility. Pt advised to f/u in clinic 2-3 days after MRI for results. Order Specific Question:   Reason for exam:     Answer:   R/O MMT / OCD PATELLA     Order Specific Question:   What is the sedation requirement? Answer:   None    DJO Hinged Lateral J Knee Stabilizer     Patient was prescribed a DJO Hinged Lateral J Knee Stabilizer brace. The right knee will require stabilization / immobilization from this semi-rigid / rigid orthosis to improve their function.   The orthosis will assist in protecting the affected area, provide functional support and facilitate healing. The patient was educated and fit by a healthcare professional with expert knowledge and specialization in brace application while under the direct supervision of the treating physician. Verbal and written instructions for the use of and application of this item were provided. They were instructed to contact the office immediately should the brace result in increased pain, decreased sensation, increased swelling or worsening of the condition. Other Outside Imaging and Testing Personally Reviewed:    XR KNEE LEFT (3 VIEWS)    Result Date: 3/8/2022  Radiology exam is complete. No Radiologist dictation. Please follow up with ordering provider. XR KNEE RIGHT (MIN 4 VIEWS)    Result Date: 3/8/2022  Radiology exam is complete. No Radiologist dictation. Please follow up with ordering provider. Assessment   Impression: . Encounter Diagnoses   Name Primary? 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 The options, risks, complications, alternative treatment as well as some of the differential diagnosis was discussed. The patient was thoroughly informed and all questions were answered. the patient indicated understanding and satisfaction with the discussion.       Orders:        Orders Placed This Encounter   Procedures    XR KNEE RIGHT (MIN 4 VIEWS)     Standing Status:   Future Number of Occurrences:   1     Standing Expiration Date:   3/8/2023     Order Specific Question:   Reason for exam:     Answer:   pain    XR KNEE LEFT (3 VIEWS)     Standing Status:   Future     Number of Occurrences:   1     Standing Expiration Date:   3/8/2023     Order Specific Question:   Reason for exam:     Answer:   pain    MRI KNEE RIGHT WO CONTRAST     Standing Status:   Future     Standing Expiration Date:   3/8/2023     Scheduling Instructions:      [facility], please call pt to schedule, [address] will obtain auth and fwd to your facility. Pt advised to f/u in clinic 2-3 days after MRI for results. Order Specific Question:   Reason for exam:     Answer:   R/O MMT / OCD PATELLA     Order Specific Question:   What is the sedation requirement? Answer:   None    DJO Hinged Lateral J Knee Stabilizer     Patient was prescribed a DJO Hinged Lateral J Knee Stabilizer brace. The right knee will require stabilization / immobilization from this semi-rigid / rigid orthosis to improve their function. The orthosis will assist in protecting the affected area, provide functional support and facilitate healing. The patient was educated and fit by a healthcare professional with expert knowledge and specialization in brace application while under the direct supervision of the treating physician. Verbal and written instructions for the use of and application of this item were provided. They were instructed to contact the office immediately should the brace result in increased pain, decreased sensation, increased swelling or worsening of the condition. Disclaimer: \"This note was dictated with voice recognition software. Though review and correction are routine, we apologize for any errors. \"

## 2022-03-08 NOTE — PROGRESS NOTES
SUBJECTIVE:    Patient ID:  Cleopatra Samaniego is a 39 y.o. female      Patient is here for an acute visit for concerns of bilateral foot pain she has had 2 surgeries on the right foot and 1 on the left foot by a podiatrist in the past.  She is requesting a new referral to a podiatrist.  She is going seen an ortho later today for right knee pain. Discussed and encouraged to follow up with rheumatology in addition to following up with ortho and podiatrist.  Patient verbalizes understanding and is agreeable to treatment plan. She does have a history of chronic pain, fibromyalgia, and a positive MATTHEW. She has seen rheumatology in the past, but has not followed up due to COVID-19. Foot Pain   The pain is present in the left ankle and right ankle. This is a recurrent problem. Episode onset: 3 months. There has been a history of trauma. The problem occurs constantly. The problem has been gradually worsening. The quality of the pain is described as aching. The pain is moderate. Associated symptoms include joint swelling, a limited range of motion and stiffness. Pertinent negatives include no fever, numbness or tingling. The symptoms are aggravated by activity and standing. She has tried NSAIDS for the symptoms. The treatment provided mild relief. Family history includes rheumatoid arthritis (possible). Her past medical history is significant for diabetes and osteoarthritis.        Current Outpatient Medications on File Prior to Visit   Medication Sig Dispense Refill    Cholecalciferol (VITAMIN D3) 50 MCG (2000 UT) CAPS TAKE 1 CAPSULE BY MOUTH EVERY DAY      diclofenac sodium (VOLTAREN) 1 % GEL Apply 2 g topically 2 times daily 150 g 1    meloxicam (MOBIC) 15 MG tablet Take 1 tablet by mouth daily as needed for Pain 90 tablet 0    omeprazole (PRILOSEC) 20 MG delayed release capsule TAKE 1 CAPSULE BY MOUTH EVERY DAY 90 capsule 0    rosuvastatin (CRESTOR) 40 MG tablet TAKE 1 TABLET BY MOUTH EVERY DAY 90 tablet 0  metFORMIN (GLUCOPHAGE-XR) 500 MG extended release tablet Take 2 tablets by mouth daily (with breakfast) 180 tablet 0    metoprolol tartrate (LOPRESSOR) 50 MG tablet Take 1 tablet by mouth 2 times daily 180 tablet 0    ezetimibe (ZETIA) 10 MG tablet Take 1 tablet by mouth daily 90 tablet 0    Cholecalciferol (VITAMIN D3) 50 MCG (2000 UT) TABS Take 1 tablet by mouth daily 90 tablet 0    albuterol sulfate HFA (PROVENTIL HFA) 108 (90 Base) MCG/ACT inhaler Inhale 2 puffs into the lungs every 4 hours as needed for Wheezing or Shortness of Breath With spacer (and mask if indicated). Thanks. 18 g 0    albuterol sulfate  (90 Base) MCG/ACT inhaler TAKE 2 PUFFS BY MOUTH EVERY 6 HOURS AS NEEDED FOR WHEEZE 6.7 each 1    nitroGLYCERIN (NITROSTAT) 0.4 MG SL tablet Place 1 tablet under the tongue every 5 minutes as needed for Chest pain up to max of 3 total doses. If no relief after 1 dose, call 911. 25 tablet 0    ammonium lactate (LAC-HYDRIN) 12 % lotion Apply topically daily 225 mL 2    thiothixene (NAVANE) 2 MG capsule TAKE 1 CAPSULE BY MOUTH EVERY EVENING AT BEDTIME      pregabalin (LYRICA) 75 MG capsule Take 1 capsule by mouth 2 times daily for 90 days. 60 capsule 2    citalopram (CELEXA) 20 MG tablet Take 20 mg by mouth daily       divalproex (DEPAKOTE ER) 500 MG extended release tablet Take 500 mg by mouth daily      hydrOXYzine (ATARAX) 25 MG tablet Take 25 mg by mouth 3 times daily as needed for Anxiety        No current facility-administered medications on file prior to visit.       Past Medical History:   Diagnosis Date    Arthritis     Depression     Diabetes mellitus ([facility] [address]     Hypertension     Tachycardia      Past Surgical History:   Procedure Laterality Date    ANKLE FUSION Bilateral     ARTHRODESIS Left 12/6/2019    REVISION OF TALONAVICULAR JOINT ARTHRODESIS LEFT FOOT  -SLEEP APNEA- performed by Martha Cates DPM at [address] [place] Bilateral    [address] [place] [address] Street Bilateral     ulnar nerve release    HERNIA REPAIR      HIP ARTHROSCOPY Bilateral     TONSILLECTOMY AND ADENOIDECTOMY      WRIST GANGLION EXCISION Bilateral      Family History   Problem Relation Age of Onset    High Blood Pressure Mother     Arthritis Mother     Other Father         hypothyroidism    High Blood Pressure Father     Arthritis Father     Asthma Father     Heart Failure Maternal Aunt         22 stents.  Heart Failure Maternal Grandmother     Pacemaker Maternal Grandfather     Heart Surgery Paternal Grandmother     Pacemaker Paternal Grandfather      Social History     Socioeconomic History    Marital status:      Spouse name: Not on file    Number of children: Not on file    Years of education: Not on file    Highest education level: Not on file   Occupational History    Not on file   Tobacco Use    Smoking status: Former Smoker     Packs/day: 1.00     Years: 27.00     Pack years: 27.00     Types: Cigarettes     Start date: 12     Quit date: 2019     Years since quittin.3    Smokeless tobacco: Never Used    Tobacco comment: started to smoke at 15 / smoked up to 1 ppd / now smoking 3 to 4 cigarettes a day   Vaping Use    Vaping Use: Never used   Substance and Sexual Activity    Alcohol use: Never    Drug use: Never    Sexual activity: Not on file   Other Topics Concern    Not on file   Social History Narrative    Not on file     Social Determinants of Health     Financial Resource Strain: Low Risk     Difficulty of Paying Living Expenses: Not hard at all   Food Insecurity: No Food Insecurity    Worried About Running Out of Food in the Last Year: Never true    Parish of Food in the Last Year: Never true   Transportation Needs:     Lack of Transportation (Medical): Not on file    Lack of Transportation (Non-Medical):  Not on file   Physical Activity:     Days of Exercise per Week: Not on file    Minutes of Exercise per Session: Not on file Stress:     Feeling of Stress : Not on file   Social Connections:     Frequency of Communication with Friends and Family: Not on file    Frequency of Social Gatherings with Friends and Family: Not on file    Attends Gnosticism Services: Not on file    Active Member of Clubs or Organizations: Not on file    Attends Club or Organization Meetings: Not on file    Marital Status: Not on file   Intimate Partner Violence:     Fear of Current or Ex-Partner: Not on file    Emotionally Abused: Not on file    Physically Abused: Not on file    Sexually Abused: Not on file   Housing Stability:     Unable to Pay for Housing in the Last Year: Not on file    Number of [place] in the Last Year: Not on file    Unstable Housing in the Last Year: Not on file       Review of Systems   Constitutional: Negative for chills and fever. Eyes: Negative for visual disturbance. Respiratory: Negative for cough, chest tightness and wheezing. Cardiovascular: Negative for chest pain, palpitations and leg swelling. Gastrointestinal: Negative for abdominal pain, constipation, diarrhea, nausea and vomiting. Musculoskeletal: Positive for arthralgias (bilateral foot pain) and stiffness. Skin: Negative for rash. Neurological: Negative for tingling and numbness. Psychiatric/Behavioral: Negative for dysphoric mood, hallucinations, self-injury, sleep disturbance and suicidal ideas. OBJECTIVE:    Physical Exam  Vitals and nursing note reviewed. Constitutional:       General: She is not in acute distress. Appearance: Normal appearance. She is well-developed. She is not ill-appearing. HENT:      Head: Normocephalic and atraumatic. Right Ear: External ear normal.      Left Ear: External ear normal.      Nose: Nose normal.   Eyes:      Conjunctiva/sclera: Conjunctivae normal.      Pupils: Pupils are equal, round, and reactive to light. Neck:      Trachea: No tracheal deviation.    Cardiovascular:      Rate and Rhythm: Normal rate and regular rhythm. Heart sounds: Normal heart sounds. Pulmonary:      Effort: Pulmonary effort is normal. No respiratory distress. Breath sounds: Normal breath sounds. No wheezing or rales. Chest:      Chest wall: No tenderness. Abdominal:      General: Bowel sounds are normal. There is no distension. Palpations: Abdomen is soft. There is no mass. Tenderness: There is no abdominal tenderness. Hernia: No hernia is present. Musculoskeletal:      Cervical back: Normal range of motion and neck supple. Skin:     General: Skin is warm and dry. Findings: No rash. Neurological:      Mental Status: She is alert and oriented to person, place, and time. Psychiatric:         Behavior: Behavior normal.       /72   Pulse 67   Temp 97.7 °F (36.5 °C)   Wt 226 lb (102.5 kg)   SpO2 97%   BMI 37.61 kg/m²    BP Readings from Last 3 Encounters:   03/08/22 100/72   01/28/22 98/78   12/28/21 126/77      Wt Readings from Last 3 Encounters:   03/12/22 226 lb (102.5 kg)   03/08/22 225 lb 15.5 oz (102.5 kg)   03/08/22 226 lb (102.5 kg)       ASSESSMENT & PLAN:    1. Bilateral foot pain  - External Referral To Podiatry    2. Other chronic pain  - Stable, continue current regimen    3. Fibromyalgia  - Stable, continue current regimen    4. Positive MATTHEW (antinuclear antibody)  - Encourage to re-establish care with rheumatology     5. Type 2 diabetes mellitus without complication, without long-term current use of insulin (HCC)  - Stable, continue current regimen   - Reviewed diabetic diet  - POCT Glucose  - POCT Urinalysis no Micro    6. Current tobacco use  - Encourage smoking cessation      Continue current treatment plan.     Current Outpatient Medications   Medication Sig Dispense Refill    Cholecalciferol (VITAMIN D3) 50 MCG (2000 UT) CAPS TAKE 1 CAPSULE BY MOUTH EVERY DAY      diclofenac sodium (VOLTAREN) 1 % GEL Apply 2 g topically 2 times daily 150 g 1    meloxicam (MOBIC) 15 MG tablet Take 1 tablet by mouth daily as needed for Pain 90 tablet 0    omeprazole (PRILOSEC) 20 MG delayed release capsule TAKE 1 CAPSULE BY MOUTH EVERY DAY 90 capsule 0    rosuvastatin (CRESTOR) 40 MG tablet TAKE 1 TABLET BY MOUTH EVERY DAY 90 tablet 0    metFORMIN (GLUCOPHAGE-XR) 500 MG extended release tablet Take 2 tablets by mouth daily (with breakfast) 180 tablet 0    metoprolol tartrate (LOPRESSOR) 50 MG tablet Take 1 tablet by mouth 2 times daily 180 tablet 0    ezetimibe (ZETIA) 10 MG tablet Take 1 tablet by mouth daily 90 tablet 0    Cholecalciferol (VITAMIN D3) 50 MCG (2000 UT) TABS Take 1 tablet by mouth daily 90 tablet 0    albuterol sulfate HFA (PROVENTIL HFA) 108 (90 Base) MCG/ACT inhaler Inhale 2 puffs into the lungs every 4 hours as needed for Wheezing or Shortness of Breath With spacer (and mask if indicated). Thanks. 18 g 0    albuterol sulfate  (90 Base) MCG/ACT inhaler TAKE 2 PUFFS BY MOUTH EVERY 6 HOURS AS NEEDED FOR WHEEZE 6.7 each 1    nitroGLYCERIN (NITROSTAT) 0.4 MG SL tablet Place 1 tablet under the tongue every 5 minutes as needed for Chest pain up to max of 3 total doses. If no relief after 1 dose, call 911. 25 tablet 0    ammonium lactate (LAC-HYDRIN) 12 % lotion Apply topically daily 225 mL 2    thiothixene (NAVANE) 2 MG capsule TAKE 1 CAPSULE BY MOUTH EVERY EVENING AT BEDTIME      pregabalin (LYRICA) 75 MG capsule Take 1 capsule by mouth 2 times daily for 90 days. 60 capsule 2    citalopram (CELEXA) 20 MG tablet Take 20 mg by mouth daily       divalproex (DEPAKOTE ER) 500 MG extended release tablet Take 500 mg by mouth daily      hydrOXYzine (ATARAX) 25 MG tablet Take 25 mg by mouth 3 times daily as needed for Anxiety        No current facility-administered medications for this visit.       Return in about 7 weeks (around 4/29/2022), or if symptoms worsen or fail to improve, for bilater foot pain, chroinc pain, fibromyalgia, positve MATTHEW, diabetes, tobacco use. Renetta Dimple received counseling on the following healthy behaviors: nutrition, exercise, medication adherence and tobacco cessation    Patient given educational materials on Exercise    Discussed use, benefit, and side effects of prescribed medications. Barriers to medication compliance addressed. All patient questions answered. Pt voiced understanding. Call office if experience side effects from medications. Please note that some or all of this record was generated using voice recognition software. If there are any questions about the content of this document, please contact the author as some errors in transcription may have occurred.

## 2022-03-08 NOTE — PATIENT INSTRUCTIONS
Referral to podiatry     Discussed DASH and low sodium diet, information     Encourage compression stocking    Keep legs elevated when ever possible    Follow up with ortho as needed/directed     Follow up with Ortho and podiatry as needed/directed    Encouraged to reestablish with rheumatology    Follow up as needed/directed  Patient Education        Ankle: Exercises  Introduction  Here are some examples of exercises for you to try. The exercises may be suggested for a condition or for rehabilitation. Start each exercise slowly. Ease off the exercises if you start to have pain. You will be told when to start these exercises and which ones will work best for you. How to do the exercises  'Alphabet' exercise    1. Trace the alphabet with your toe. This helps your ankle move in all directions. Side-to-side knee swing exercise    1. Sit in a chair with your foot flat on the floor. 2. Slowly move your knee from side to side while keeping your foot pressed flat. 3. Continue this exercise for 2 to 3 minutes. Towel curl    1. While sitting, place your foot on a towel on the floor and scrunch the towel toward you with your toes. 2. Then use your toes to push the towel away from you. 3. Make this exercise more challenging by placing a weighted object, such as a soup can, on the other end of the towel. Towel stretch    1. Sit with your legs extended and knees straight. 2. Place a towel around your foot just under the toes. 3. Hold each end of the towel in each hand, with your hands above your knees. 4. Pull back with the towel so that your foot stretches toward you. 5. Hold the position for at least 15 to 30 seconds. 6. Repeat 2 to 4 times a session, up to 5 sessions a day. Ankle eversion exercise    1. Start by sitting with your foot flat on the floor and pushing it outward against an immovable object such as the wall or heavy furniture. Hold for about 6 seconds, then relax. Repeat 8 to 12 times.   2. After you feel comfortable with this, try using rubber tubing looped around the outside of your feet for resistance. Push your foot out to the side against the tubing, and then count to 10 as you slowly bring your foot back to the middle. Repeat 8 to 12 times. Isometric opposition exercises    1. While sitting, put your feet together flat on the floor. 2. Press your injured foot inward against your other foot. Hold for about 6 seconds, and relax. Repeat 8 to 12 times. 3. Then place the heel of your other foot on top of the injured one. Push down with the top heel while trying to push up with your injured foot. Hold for about 6 seconds, and relax. Repeat 8 to 12 times. Follow-up care is a key part of your treatment and safety. Be sure to make and go to all appointments, and call your doctor if you are having problems. It's also a good idea to know your test results and keep a list of the medicines you take. Where can you learn more? Go to [email or web address] At [email or web address] org and sign in to your [employer] account. Enter B726 in the [employer] box to learn more about \"Ankle: Exercises. \"     If you do not have an account, please click on the \"Sign Up Now\" link. Current as of: July 1, 2021               Content Version: 13.1  © 2006-2021 Healthwise, Incorporated. Care instructions adapted under license by [facility] ([facility]). If you have questions about a medical condition or this instruction, always ask your healthcare professional. [place] [address] any warranty or liability for your use of this information. Patient Education        Foot Pain: Care Instructions  Your Care Instructions     Foot injuries that cause pain and swelling are fairly common. Almost all sports or home repair projects can cause a misstep that ends up as foot pain. Normal wear and tear, especially as you get older, also can cause foot pain.   Most minor foot injuries will heal on their own, and home treatment is usually all you need to do. If you have a severe injury, you may need tests and treatment. Follow-up care is a key part of your treatment and safety. Be sure to make and go to all appointments, and call your doctor if you are having problems. It's also a good idea to know your test results and keep a list of the medicines you take. How can you care for yourself at home? · Take pain medicines exactly as directed. ? If the doctor gave you a prescription medicine for pain, take it as prescribed. ? If you are not taking a prescription pain medicine, ask your doctor if you can take an over-the-counter medicine. · Rest and protect your foot. Take a break from any activity that may cause pain. · Put ice or a cold pack on your foot for 10 to 20 minutes at a time. Put a thin cloth between the ice and your skin. · Prop up the sore foot on a pillow when you ice it or anytime you sit or lie down during the next 3 days. Try to keep it above the level of your heart. This will help reduce swelling. · Your doctor may recommend that you wrap your foot with an elastic bandage. Keep your foot wrapped for as long as your doctor advises. · If your doctor recommends crutches, use them as directed. · Wear roomy footwear. · As soon as pain and swelling end, begin gentle exercises of your foot. Your doctor can tell you which exercises will help. When should you call for help? Call 911 anytime you think you may need emergency care. For example, call if:    · Your foot turns pale, white, blue, or cold. Call your doctor now or seek immediate medical care if:    · You cannot move or stand on your foot.     · Your foot looks twisted or out of its normal position.     · Your foot is not stable when you step down.     · You have signs of infection, such as:  ? Increased pain, swelling, warmth, or redness. ? Red streaks leading from the sore area. ? Pus draining from a place on your foot.   ? A fever.     · Your foot is numb or heriberto. Watch closely for changes in your health, and be sure to contact your doctor if:    · You do not get better as expected.     · You have bruises from an injury that last longer than 2 weeks. Where can you learn more? Go to https://chpealmaeb.[employer]. org and sign in to your [employer] account. Enter M533 in the [employer] box to learn more about \"Foot Pain: Care Instructions. \"     If you do not have an account, please click on the \"Sign Up Now\" link. Current as of: July 1, 2021               Content Version: 13.1  © 2006-2021 Healthwise, Incorporated. Care instructions adapted under license by [facility] ([facility]). If you have questions about a medical condition or this instruction, always ask your healthcare professional. [address] any warranty or liability for your use of this information.

## 2022-03-12 ENCOUNTER — HOSPITAL ENCOUNTER (OUTPATIENT)
Dept: WOMENS IMAGING | Age: 42
Discharge: HOME | End: 2022-03-12
Payer: COMMERCIAL

## 2022-03-12 VITALS — HEIGHT: 65 IN | BODY MASS INDEX: 37.65 KG/M2 | WEIGHT: 226 LBS

## 2022-03-12 DIAGNOSIS — Z12.31 ENCOUNTER FOR SCREENING MAMMOGRAM FOR BREAST CANCER: ICD-10-CM

## 2022-03-12 PROCEDURE — 77063 BREAST TOMOSYNTHESIS BI: CPT

## 2022-03-13 PROBLEM — R00.0 CHRONIC TACHYCARDIA: Status: ACTIVE | Noted: 2022-03-13

## 2022-03-13 PROBLEM — G43.909 MIGRAINE WITHOUT STATUS MIGRAINOSUS, NOT INTRACTABLE: Status: ACTIVE | Noted: 2022-03-13

## 2022-03-15 ENCOUNTER — TELEPHONE (OUTPATIENT)
Dept: WOMENS IMAGING | Age: 42
End: 2022-03-15

## 2022-03-28 ENCOUNTER — OFFICE VISIT (OUTPATIENT)
Dept: ORTHOPEDIC SURGERY | Age: 42
End: 2022-03-28
Payer: COMMERCIAL

## 2022-03-28 VITALS — WEIGHT: 225.97 LBS | HEIGHT: 65 IN | BODY MASS INDEX: 37.65 KG/M2

## 2022-03-28 DIAGNOSIS — E88.89 HOFFA'S SYNDROME (HCC): ICD-10-CM

## 2022-03-28 DIAGNOSIS — S86.911S KNEE STRAIN, RIGHT, SEQUELA: ICD-10-CM

## 2022-03-28 DIAGNOSIS — M94.261 CHONDROMALACIA, KNEE, RIGHT: ICD-10-CM

## 2022-03-28 PROCEDURE — 1036F TOBACCO NON-USER: CPT | Performed by: INTERNAL MEDICINE

## 2022-03-28 PROCEDURE — G8417 CALC BMI ABV UP PARAM F/U: HCPCS | Performed by: INTERNAL MEDICINE

## 2022-03-28 PROCEDURE — G8482 FLU IMMUNIZE ORDER/ADMIN: HCPCS | Performed by: INTERNAL MEDICINE

## 2022-03-28 PROCEDURE — G8427 DOCREV CUR MEDS BY ELIG CLIN: HCPCS | Performed by: INTERNAL MEDICINE

## 2022-03-28 PROCEDURE — 99213 OFFICE O/P EST LOW 20 MIN: CPT | Performed by: INTERNAL MEDICINE

## 2022-03-28 RX ORDER — METHYLPREDNISOLONE 4 MG/1
TABLET ORAL
Qty: 1 KIT | Refills: 0 | Status: SHIPPED | OUTPATIENT
Start: 2022-03-28 | End: 2022-06-16

## 2022-03-28 NOTE — LETTER
[address]  [address] [facility] [address]  Phone: [phone]  Fax: [phone]    Colten Astudillo MD        March 28, 2022     Patient: Hope Diaz   YOB: 1980   Date of Visit: 3/28/2022       To Whom It May Concern: It is my medical opinion that Martín Baker may return to work on 3/29/2022 with the following restrictions: avoid excessive walking or standing or stairs. These restrictions are in effect for the next 3 weeks. If you have any questions or concerns, please don't hesitate to call.     Sincerely,      Magnolia Glass MD.      Colten Astudillo MD

## 2022-03-28 NOTE — PROGRESS NOTES
Chief Complaint:   Chief Complaint   Patient presents with    Knee Pain     right, pain feels worse, brace hurts when wearing it, but wearing it anyways, now has L ankle in boot d/t increased ankle pain which is causing gait to be off, TR MRI          History of Present Illness:       Patient is a 39 y.o. female returns follow up for the above complaint. The patient was last seen approximately 2 weeksago. The symptoms show no change since the last visit. The patient has had further testing for the problem. In the interim MRI scan completed which is outlined below in detail    Overall symptoms show no appreciable change    Symptoms typically localizing to the extensor knee    No pattern of active related swelling or new onset mechanical symptoms    She has been evaluated by Dr. Orlando Doan in the interim and has transition to a brace boot immobilization for her left foot and ankle. Pain levels 10/10 today         Past Medical History:        Past Medical History:   Diagnosis Date    Arthritis     Depression     Diabetes mellitus (HCC)     Hypertension     Tachycardia         Present Medications:         Current Outpatient Medications   Medication Sig Dispense Refill    methylPREDNISolone (MEDROL, CATARINA,) 4 MG tablet By mouth.  1 kit 0    Cholecalciferol (VITAMIN D3) 50 MCG (2000 UT) CAPS TAKE 1 CAPSULE BY MOUTH EVERY DAY      diclofenac sodium (VOLTAREN) 1 % GEL Apply 2 g topically 2 times daily 150 g 1    meloxicam (MOBIC) 15 MG tablet Take 1 tablet by mouth daily as needed for Pain 90 tablet 0    omeprazole (PRILOSEC) 20 MG delayed release capsule TAKE 1 CAPSULE BY MOUTH EVERY DAY 90 capsule 0    rosuvastatin (CRESTOR) 40 MG tablet TAKE 1 TABLET BY MOUTH EVERY DAY 90 tablet 0    metFORMIN (GLUCOPHAGE-XR) 500 MG extended release tablet Take 2 tablets by mouth daily (with breakfast) 180 tablet 0    metoprolol tartrate (LOPRESSOR) 50 MG tablet Take 1 tablet by mouth 2 times daily 180 tablet 0    ezetimibe (ZETIA) 10 MG tablet Take 1 tablet by mouth daily 90 tablet 0    Cholecalciferol (VITAMIN D3) 50 MCG (2000 UT) TABS Take 1 tablet by mouth daily 90 tablet 0    albuterol sulfate HFA (PROVENTIL HFA) 108 (90 Base) MCG/ACT inhaler Inhale 2 puffs into the lungs every 4 hours as needed for Wheezing or Shortness of Breath With spacer (and mask if indicated). Thanks. 18 g 0    albuterol sulfate  (90 Base) MCG/ACT inhaler TAKE 2 PUFFS BY MOUTH EVERY 6 HOURS AS NEEDED FOR WHEEZE 6.7 each 1    nitroGLYCERIN (NITROSTAT) 0.4 MG SL tablet Place 1 tablet under the tongue every 5 minutes as needed for Chest pain up to max of 3 total doses. If no relief after 1 dose, call 911. 25 tablet 0    ammonium lactate (LAC-HYDRIN) 12 % lotion Apply topically daily 225 mL 2    thiothixene (NAVANE) 2 MG capsule TAKE 1 CAPSULE BY MOUTH EVERY EVENING AT BEDTIME      pregabalin (LYRICA) 75 MG capsule Take 1 capsule by mouth 2 times daily for 90 days. 60 capsule 2    citalopram (CELEXA) 20 MG tablet Take 20 mg by mouth daily       divalproex (DEPAKOTE ER) 500 MG extended release tablet Take 500 mg by mouth daily      hydrOXYzine (ATARAX) 25 MG tablet Take 25 mg by mouth 3 times daily as needed for Anxiety        No current facility-administered medications for this visit. Allergies: Allergies   Allergen Reactions    Cinnamon Itching    Codeine Hives and Itching           Review of Systems:    Pertinent items are noted in HPI        Vital Signs: There were no vitals filed for this visit.      General Exam:     Constitutional: Patient is adequately groomed with no evidence of malnutrition    Physical Exam: right knee      Primary Exam:    Inspection: No deformity atrophy appreciable effusion      Palpation: No focal tenderness over the medial joint line      Range of Motion: Stable unchanged from previous      Strength: Normal with SLR      Special Tests: Near normal      Skin: There are no rashes, ulcerations or lesions. Gait: Nonantalgic    Neurovascular - non focal and intact       Additional Comments:        Additional Examinations:                Office Imaging Results/Procedures PerformedToday:             Office Procedures:   No orders of the defined types were placed in this encounter. Other Outside Imaging and Testing Personally Reviewed:    MRI LOWER EXTREMITY W JT WO CONTRAST    Result Date: 3/25/2022  Site: [employer] [facility] #: [id number] #: [id number] Procedure: MR Right Knee w/o Contrast ; Reason for Exam: Dx: pain, patellofemoral pain syndrome of right knee, r/o mmt, ocd patella per script This document is confidential medical information. Unauthorized disclosure or use of this information is prohibited by law. If you are not the intended recipient of this document, please advise us by calling immediately [phone]. [employer] [facility], [address] [place] [address] Patient Name: Poonam Jacinto Case ID: [id number] Patient : 1980 Referring Physician: Avelina Hamlin MD Exam Date: 2022 Exam Description: MR Right Knee w/o Contrast HISTORY:  Patellofemoral pain syndrome. Evaluate for medial meniscal tear. Osteochondral defect of the patella. Patient experiencing right knee pain for the past several months. Pain  is all around the knee and worse with time and activity. TECHNICAL FACTORS:  Long- and short-axis fat- and water-weighted images were performed. COMPARISON:  None. FINDINGS:  Menisci: Medial meniscus: Fraying along the free edge undersurface at the posterior horn/posterior body junction (6:60), (7:5) extends into closed signal in the posterior horn periphery. Mild fraying along the free edge of the inner 3rd of the posterior horn (7:9). Mild extrusion of the body extruded into the medial recess. Lateral meniscus: No lateral meniscal tear is identified. Ligaments: The ACL, PCL, MCL, and LCL complex are intact.  The medial and lateral patellar retinacula are intact. Tendons: The extensor mechanism and popliteus tendon are intact. Cartilage/Bone: The bone marrow signal intensity is within normal limits. No fracture line or bone marrow edema. Patellofemoral: The patellar and trochlear cartilage is intact. Normal thickness and signal intensity of the patellofemoral compartment articular cartilage. No osteochondral defect. Medial Tibiofemoral: Mild generalized thinning of the weight-bearing articular cartilage (6:51). Subchondral bone is preserved. Lateral Tibiofemoral: The femoral and tibial cartilage is intact. Miscellaneous: Physiologic joint space fluid. Normal bursae. CONCLUSION: 1. Medial meniscus: Fraying along the free edge undersurface at the posterior horn/posterior body junction extends into closed signal in the posterior horn periphery. Mild fraying along the free edge of the inner 3rd of the posterior horn. Body extruded into the medial recess. 2. Lateral meniscus: Intact. 3. Intact cruciate and collateral ligaments. 4. Medial tibiofemoral compartment: Mild generalized thinning of the weight-bearing articular cartilage. Subchondral bone is preserved. 5. Patellofemoral compartment: Articular cartilage is preserved. No osteochondral lesion as clinically questioned. 6. Physiologic joint space fluid. Normal bursae. Thank you for the opportunity to provide your interpretation. DO SANJAY Peters: Aminata Steven 03/25/2022 9:29 AM              Assessment   Impression: . Encounter Diagnoses   Name Primary? 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 Formal restrictions with respect to minimizing steps and stairs in the workplace       Orders:      No orders of the defined types were placed in this encounter.         Giovanni Salazar Zo Storey MD.      Disclaimer: \"This note was dictated with voice recognition software. Though review and correction are routine, we apologize for any errors. \" No Residual Tumor Seen Histology Text: There were no malignant cells seen in the sections examined.

## 2022-03-30 ENCOUNTER — HOSPITAL ENCOUNTER (OUTPATIENT)
Dept: WOMENS IMAGING | Age: 42
Discharge: HOME | End: 2022-03-30
Payer: COMMERCIAL

## 2022-03-30 ENCOUNTER — HOSPITAL ENCOUNTER (OUTPATIENT)
Dept: ULTRASOUND IMAGING | Age: 42
Discharge: HOME | End: 2022-03-30
Payer: COMMERCIAL

## 2022-03-30 DIAGNOSIS — R92.8 ABNORMAL MAMMOGRAM: ICD-10-CM

## 2022-03-30 PROCEDURE — 76641 ULTRASOUND BREAST COMPLETE: CPT

## 2022-03-30 PROCEDURE — G0279 TOMOSYNTHESIS, MAMMO: HCPCS

## 2022-04-25 ENCOUNTER — HOSPITAL ENCOUNTER (OUTPATIENT)
Age: 42
Discharge: HOME | End: 2022-04-25
Payer: COMMERCIAL

## 2022-04-25 DIAGNOSIS — E11.9 TYPE 2 DIABETES MELLITUS WITHOUT COMPLICATION, WITHOUT LONG-TERM CURRENT USE OF INSULIN (HCC): ICD-10-CM

## 2022-04-25 DIAGNOSIS — E78.2 MIXED HYPERLIPIDEMIA: ICD-10-CM

## 2022-04-25 DIAGNOSIS — K21.9 GASTROESOPHAGEAL REFLUX DISEASE, UNSPECIFIED WHETHER ESOPHAGITIS PRESENT: ICD-10-CM

## 2022-04-25 LAB
A/G RATIO: 1.9 (ref 1.1–2.2)
ALBUMIN SERPL-MCNC: 4.8 G/DL (ref 3.4–5)
ALP BLD-CCNC: 60 U/L (ref 40–129)
ALT SERPL-CCNC: 19 U/L (ref 10–40)
ANION GAP SERPL CALCULATED.3IONS-SCNC: 13 MMOL/L (ref 3–16)
AST SERPL-CCNC: 17 U/L (ref 15–37)
BASOPHILS ABSOLUTE: 0 K/UL (ref 0–0.2)
BASOPHILS RELATIVE PERCENT: 0.6 %
BILIRUB SERPL-MCNC: 0.4 MG/DL (ref 0–1)
BUN BLDV-MCNC: 16 MG/DL (ref 7–20)
CALCIUM SERPL-MCNC: 9.8 MG/DL (ref 8.3–10.6)
CHLORIDE BLD-SCNC: 103 MMOL/L (ref 99–110)
CHOLESTEROL, TOTAL: 135 MG/DL (ref 0–199)
CO2: 25 MMOL/L (ref 21–32)
CREAT SERPL-MCNC: 0.6 MG/DL (ref 0.6–1.1)
EOSINOPHILS ABSOLUTE: 0 K/UL (ref 0–0.6)
EOSINOPHILS RELATIVE PERCENT: 0.6 %
GFR AFRICAN AMERICAN: >60
GFR NON-AFRICAN AMERICAN: >60
GLUCOSE BLD-MCNC: 112 MG/DL (ref 70–99)
HCT VFR BLD CALC: 38.3 % (ref 36–48)
HDLC SERPL-MCNC: 32 MG/DL (ref 40–60)
HEMOGLOBIN: 12.9 G/DL (ref 12–16)
LDL CHOLESTEROL CALCULATED: 87 MG/DL
LYMPHOCYTES ABSOLUTE: 2.7 K/UL (ref 1–5.1)
LYMPHOCYTES RELATIVE PERCENT: 41.9 %
MCH RBC QN AUTO: 28.7 PG (ref 26–34)
MCHC RBC AUTO-ENTMCNC: 33.7 G/DL (ref 31–36)
MCV RBC AUTO: 85.3 FL (ref 80–100)
MONOCYTES ABSOLUTE: 0.5 K/UL (ref 0–1.3)
MONOCYTES RELATIVE PERCENT: 7.8 %
NEUTROPHILS ABSOLUTE: 3.2 K/UL (ref 1.7–7.7)
NEUTROPHILS RELATIVE PERCENT: 49.1 %
PDW BLD-RTO: 13.6 % (ref 12.4–15.4)
PLATELET # BLD: 352 K/UL (ref 135–450)
PMV BLD AUTO: 6.7 FL (ref 5–10.5)
POTASSIUM SERPL-SCNC: 4.6 MMOL/L (ref 3.5–5.1)
RBC # BLD: 4.49 M/UL (ref 4–5.2)
SODIUM BLD-SCNC: 141 MMOL/L (ref 136–145)
TOTAL CK: 152 U/L (ref 26–192)
TOTAL PROTEIN: 7.3 G/DL (ref 6.4–8.2)
TRIGL SERPL-MCNC: 79 MG/DL (ref 0–150)
TSH REFLEX: 1.41 UIU/ML (ref 0.27–4.2)
VLDLC SERPL CALC-MCNC: 16 MG/DL
WBC # BLD: 6.6 K/UL (ref 4–11)

## 2022-04-25 PROCEDURE — 80061 LIPID PANEL: CPT

## 2022-04-25 PROCEDURE — 83036 HEMOGLOBIN GLYCOSYLATED A1C: CPT

## 2022-04-25 PROCEDURE — 36415 COLL VENOUS BLD VENIPUNCTURE: CPT

## 2022-04-25 PROCEDURE — 82550 ASSAY OF CK (CPK): CPT

## 2022-04-25 PROCEDURE — 84443 ASSAY THYROID STIM HORMONE: CPT

## 2022-04-25 PROCEDURE — 85025 COMPLETE CBC W/AUTO DIFF WBC: CPT

## 2022-04-25 PROCEDURE — 80053 COMPREHEN METABOLIC PANEL: CPT

## 2022-04-25 RX ORDER — EZETIMIBE 10 MG/1
TABLET ORAL
Qty: 90 TABLET | Refills: 0 | OUTPATIENT
Start: 2022-04-25

## 2022-04-26 DIAGNOSIS — E78.2 MIXED HYPERLIPIDEMIA: ICD-10-CM

## 2022-04-26 DIAGNOSIS — K21.9 GASTROESOPHAGEAL REFLUX DISEASE, UNSPECIFIED WHETHER ESOPHAGITIS PRESENT: ICD-10-CM

## 2022-04-26 LAB
ESTIMATED AVERAGE GLUCOSE: 137 MG/DL
HBA1C MFR BLD: 6.4 %

## 2022-04-26 RX ORDER — OMEPRAZOLE 20 MG/1
CAPSULE, DELAYED RELEASE ORAL
Qty: 90 CAPSULE | Refills: 0 | OUTPATIENT
Start: 2022-04-26

## 2022-04-26 RX ORDER — ROSUVASTATIN CALCIUM 40 MG/1
TABLET, COATED ORAL
Qty: 90 TABLET | Refills: 0 | OUTPATIENT
Start: 2022-04-26

## 2022-04-27 ENCOUNTER — OFFICE VISIT (OUTPATIENT)
Dept: ORTHOPEDIC SURGERY | Age: 42
End: 2022-04-27
Payer: COMMERCIAL

## 2022-04-27 VITALS — HEIGHT: 65 IN | BODY MASS INDEX: 37.65 KG/M2 | WEIGHT: 225.97 LBS

## 2022-04-27 DIAGNOSIS — M94.261 CHONDROMALACIA, KNEE, RIGHT: ICD-10-CM

## 2022-04-27 DIAGNOSIS — E88.89 HOFFA'S SYNDROME (HCC): ICD-10-CM

## 2022-04-27 DIAGNOSIS — S86.911S KNEE STRAIN, RIGHT, SEQUELA: Primary | ICD-10-CM

## 2022-04-27 PROCEDURE — G8417 CALC BMI ABV UP PARAM F/U: HCPCS | Performed by: INTERNAL MEDICINE

## 2022-04-27 PROCEDURE — G8427 DOCREV CUR MEDS BY ELIG CLIN: HCPCS | Performed by: INTERNAL MEDICINE

## 2022-04-27 PROCEDURE — 1036F TOBACCO NON-USER: CPT | Performed by: INTERNAL MEDICINE

## 2022-04-27 PROCEDURE — 99213 OFFICE O/P EST LOW 20 MIN: CPT | Performed by: INTERNAL MEDICINE

## 2022-04-28 ASSESSMENT — ENCOUNTER SYMPTOMS
COUGH: 0
NAUSEA: 0
SHORTNESS OF BREATH: 0
ABDOMINAL PAIN: 0
VOMITING: 0
CHEST TIGHTNESS: 0
DIARRHEA: 0
WHEEZING: 0
CONSTIPATION: 0

## 2022-04-28 NOTE — PATIENT INSTRUCTIONS
Medications refilled     Reviewed low-cholesterol diet     Continue ezetimibe (Zetia) 10 mg daily and Crestor     Reviewed diabetic diet     Trial of diclofenac twice daily to affected area (right elbow)      Follow-up with specialist as needed/directed (psych, Ortho, podiatry pulmonology, cardiology)      Encourage continue lifestyle modifications (better food choices, portion control and increasing activity)    Follow up with psychiatrist as needed/directed    Follow up in 3-6 months, sooner if symptoms worsen or persist

## 2022-04-28 NOTE — PROGRESS NOTES
SUBJECTIVE:    Patient ID:  Christina Romano is a 39 y.o. female      Patient is here for a medication check for hyperlipidemia, diabetes, chronic tachycardia, GERD, AMOS, vitamin D deficiency, fibromyalgia and chronic pain. Tachycardia managed with metoprolol twice daily. GERD symptoms are managed with omeprazole. Denies indigestion/heartburn, difficulty swallowing, epigastric pain, nausea, vomiting, diarrhea, constipation or blood in stool. She has a history of anxiety, depression, bipolar and PTSD. She is followed a psychiatrist every 2 to 3 months and sees a counselor twice a week. She is currently taking Celexa, Depakote and hydroxyzine as needed. Currently denies thoughts of self-harm, suicidal or homicidal ideations. States she has been evaluated by cardiology and will be followed annually. She was told to follow-up with pulmonology annually for COPD and AMOS. AMOS is managed with nightly CPAP usage. She also has a history or alcoholism for 24 years, she has been sober for almost 4 years. Labs reviewed from 4/25/2022 CBC unremarkable, CMP essentially normal, fasting glucose 112, A1c 6.4, total cholesterol 135, triglycerides 79, HDL 32, LDL 87, CK1 52, TSH 1.41. She does have a history of positive MATTHEW and was initially evaluated by rheumatology 7/13/2020. Apparently was lost to follow-up during the COVID-19 pandemic. States she has been working with Ortho for her hip and knee pain in addition to podiatry for her foot pain. Hyperlipidemia  This is a chronic problem. The current episode started more than 1 year ago. The problem is controlled. Recent lipid tests were reviewed and are low. Exacerbating diseases include obesity. Pertinent negatives include no chest pain, focal sensory loss, focal weakness, leg pain, myalgias or shortness of breath. Current antihyperlipidemic treatment includes ezetimibe and statins. The current treatment provides significant improvement of lipids.  Risk factors times daily as needed for Anxiety        No current facility-administered medications on file prior to visit. Past Medical History:   Diagnosis Date    Arthritis     Depression     Diabetes mellitus ([facility] [address]     Hypertension     Tachycardia      Past Surgical History:   Procedure Laterality Date    ANKLE FUSION Bilateral     ARTHRODESIS Left 2019    REVISION OF TALONAVICULAR JOINT ARTHRODESIS LEFT FOOT  -SLEEP APNEA- performed by Romero Glasgow DPM at [facility] Bilateral     ELBOW SURGERY Bilateral     ulnar nerve release    HERNIA REPAIR      HIP ARTHROSCOPY Bilateral     TONSILLECTOMY AND ADENOIDECTOMY      WRIST GANGLION EXCISION Bilateral      Family History   Problem Relation Age of Onset    High Blood Pressure Mother     Arthritis Mother     Other Father         hypothyroidism    High Blood Pressure Father     Arthritis Father     Asthma Father     Heart Failure Maternal Aunt         22 stents.      Heart Failure Maternal Grandmother     Pacemaker Maternal Grandfather     Heart Surgery Paternal Grandmother     Pacemaker Paternal Grandfather      Social History     Socioeconomic History    Marital status:      Spouse name: Not on file    Number of children: Not on file    Years of education: Not on file    Highest education level: Not on file   Occupational History    Not on file   Tobacco Use    Smoking status: Former Smoker     Packs/day: 1.00     Years: 27.00     Pack years: 27.00     Types: Cigarettes     Start date:      Quit date: 2019     Years since quittin.4    Smokeless tobacco: Never Used    Tobacco comment: started to smoke at 15 / smoked up to 1 ppd / now smoking 3 to 4 cigarettes a day   Vaping Use    Vaping Use: Never used   Substance and Sexual Activity    Alcohol use: Never    Drug use: Never    Sexual activity: Not on file   Other Topics Concern    Not on file   Social History Narrative    Not on file Social Determinants of Health     Financial Resource Strain: Low Risk     Difficulty of Paying Living Expenses: Not hard at all   Food Insecurity: No Food Insecurity    Worried About Running Out of Food in the Last Year: Never true    Parish of Food in the Last Year: Never true   Transportation Needs:     Lack of Transportation (Medical): Not on file    Lack of Transportation (Non-Medical): Not on file   Physical Activity:     Days of Exercise per Week: Not on file    Minutes of Exercise per Session: Not on file   Stress:     Feeling of Stress : Not on file   Social Connections:     Frequency of Communication with Friends and Family: Not on file    Frequency of Social Gatherings with Friends and Family: Not on file    Attends Islam Services: Not on file    Active Member of [address] [place] [address] or Organizations: Not on file    Attends Club or Organization Meetings: Not on file    Marital Status: Not on file   Intimate Partner Violence:     Fear of Current or Ex-Partner: Not on file    Emotionally Abused: Not on file    Physically Abused: Not on file    Sexually Abused: Not on file   Housing Stability:     Unable to Pay for Housing in the Last Year: Not on file    Number of [place] in the Last Year: Not on file    Unstable Housing in the Last Year: Not on file       Review of Systems   Constitutional: Negative for chills and fever. Eyes: Negative for visual disturbance. Respiratory: Negative for cough, chest tightness, shortness of breath and wheezing. Cardiovascular: Negative for chest pain, palpitations and leg swelling. Gastrointestinal: Negative for abdominal pain, constipation, diarrhea, nausea and vomiting. Endocrine: Negative for polydipsia, polyphagia and polyuria. Musculoskeletal: Negative for arthralgias and myalgias. Skin: Negative for rash. Neurological: Negative for focal weakness and headaches. OBJECTIVE:    Physical Exam  Vitals and nursing note reviewed. Constitutional:       General: She is not in acute distress. Appearance: Normal appearance. She is well-developed. She is not ill-appearing. HENT:      Head: Normocephalic and atraumatic. Right Ear: External ear normal.      Left Ear: External ear normal.      Nose: Nose normal.      Mouth/Throat:      Mouth: Mucous membranes are moist.   Eyes:      Extraocular Movements: Extraocular movements intact. Conjunctiva/sclera: Conjunctivae normal.      Pupils: Pupils are equal, round, and reactive to light. Neck:      Trachea: No tracheal deviation. Cardiovascular:      Rate and Rhythm: Normal rate and regular rhythm. Heart sounds: Normal heart sounds. Pulmonary:      Effort: Pulmonary effort is normal. No respiratory distress. Breath sounds: Normal breath sounds. No wheezing or rales. Chest:      Chest wall: No tenderness. Abdominal:      General: Bowel sounds are normal. There is no distension. Palpations: Abdomen is soft. There is no mass. Tenderness: There is no abdominal tenderness. Hernia: No hernia is present. Musculoskeletal:         General: Normal range of motion. Cervical back: Normal range of motion and neck supple. Left lower leg: No edema. Skin:     General: Skin is warm and dry. Findings: No rash. Neurological:      Mental Status: She is alert and oriented to person, place, and time. Psychiatric:         Behavior: Behavior normal.       /80   Pulse 53   Temp 97.2 °F (36.2 °C)   Wt 229 lb (103.9 kg)   SpO2 98%   BMI 38.11 kg/m²    BP Readings from Last 3 Encounters:   04/29/22 118/80   03/08/22 100/72   01/28/22 98/78      Wt Readings from Last 3 Encounters:   04/29/22 229 lb (103.9 kg)   04/27/22 225 lb 15.5 oz (102.5 kg)   03/28/22 225 lb 15.5 oz (102.5 kg)       ASSESSMENT & PLAN:    1.  Mixed hyperlipidemia  - Stable, continue current regimen   - Reviewed low cholesterol diet   - CBC with Auto Differential; Future  - Comprehensive Metabolic Panel; Future  - Lipid Panel; Future  - CK; Future  - rosuvastatin (CRESTOR) 40 MG tablet; TAKE 1 TABLET BY MOUTH EVERY DAY  Dispense: 90 tablet; Refill: 1  - ezetimibe (ZETIA) 10 MG tablet; Take 1 tablet by mouth daily  Dispense: 90 tablet; Refill: 1    2. Type 2 diabetes mellitus without complication, without long-term current use of insulin (HCC)  - Stable, continue current regimen   - Reviewed diabetic diet  - POCT Glucose (130)  - POCT Urinalysis no Micro (small blood)  - CBC with Auto Differential; Future  - Comprehensive Metabolic Panel; Future  - Hemoglobin A1C; Future  - metFORMIN (GLUCOPHAGE-XR) 500 MG extended release tablet; Take 2 tablets by mouth daily (with breakfast)  Dispense: 180 tablet; Refill: 1    3. Chronic tachycardia  - Stable, continue current regimen  - metoprolol tartrate (LOPRESSOR) 50 MG tablet; Take 1 tablet by mouth 2 times daily  Dispense: 180 tablet; Refill: 1  - Follow up with cardiologist as needed/directed    4. Gastroesophageal reflux disease, unspecified whether esophagitis present  - Stable, continue current regimen   - Reviewed GERD precautions   - omeprazole (PRILOSEC) 20 MG delayed release capsule; TAKE 1 CAPSULE BY MOUTH EVERY DAY  Dispense: 90 capsule; Refill: 1    5. AMOS (obstructive sleep apnea)  - Stable, continue nightly CPAP usage    6. Other chronic pain  - Sharron Escalera MD, Rheumatology, [facility]    [facility]. [facility]    8. Positive MATTHEW (antinuclear antibody)  - [facility]    9. Vitamin D deficiency  - Stable, continue vitamin D3 supplementation    10. Anxiety  - Stable, continue current regimen   - Follow up with psychiatrist     11. Other depression  - Stable, continue current regimen   - Follow up with psychiatrist as needed/directed    12.  Bipolar 1 disorder (HCC)  - Stable, continue current regimen   - Follow up with psychiatrist as needed/directed    13. PTSD (post-traumatic stress disorder)  - Stable, continue current regimen   - Follow up with psychiatrist as needed/directed      Continue current treatment plan. Current Outpatient Medications   Medication Sig Dispense Refill    omeprazole (PRILOSEC) 20 MG delayed release capsule TAKE 1 CAPSULE BY MOUTH EVERY DAY 90 capsule 1    rosuvastatin (CRESTOR) 40 MG tablet TAKE 1 TABLET BY MOUTH EVERY DAY 90 tablet 1    metFORMIN (GLUCOPHAGE-XR) 500 MG extended release tablet Take 2 tablets by mouth daily (with breakfast) 180 tablet 1    metoprolol tartrate (LOPRESSOR) 50 MG tablet Take 1 tablet by mouth 2 times daily 180 tablet 1    ezetimibe (ZETIA) 10 MG tablet Take 1 tablet by mouth daily 90 tablet 1    citalopram (CELEXA) 40 MG tablet TAKE 1 TABLET BY MOUTH EVERY DAY IN THE MORNING      methylPREDNISolone (MEDROL, CATARINA,) 4 MG tablet By mouth. 1 kit 0    Cholecalciferol (VITAMIN D3) 50 MCG (2000 UT) CAPS TAKE 1 CAPSULE BY MOUTH EVERY DAY      diclofenac sodium (VOLTAREN) 1 % GEL Apply 2 g topically 2 times daily 150 g 1    meloxicam (MOBIC) 15 MG tablet Take 1 tablet by mouth daily as needed for Pain 90 tablet 0    albuterol sulfate  (90 Base) MCG/ACT inhaler TAKE 2 PUFFS BY MOUTH EVERY 6 HOURS AS NEEDED FOR WHEEZE 6.7 each 1    nitroGLYCERIN (NITROSTAT) 0.4 MG SL tablet Place 1 tablet under the tongue every 5 minutes as needed for Chest pain up to max of 3 total doses. If no relief after 1 dose, call 911. 25 tablet 0    ammonium lactate (LAC-HYDRIN) 12 % lotion Apply topically daily 225 mL 2    thiothixene (NAVANE) 2 MG capsule TAKE 1 CAPSULE BY MOUTH EVERY EVENING AT BEDTIME      pregabalin (LYRICA) 75 MG capsule Take 1 capsule by mouth 2 times daily for 90 days.  60 capsule 2    citalopram (CELEXA) 20 MG tablet Take 20 mg by mouth daily       divalproex (DEPAKOTE ER) 500 MG extended release tablet Take 500 mg by mouth daily      hydrOXYzine (ATARAX) 25 MG tablet Take 25 mg by mouth 3 times daily as needed for Anxiety        No current facility-administered medications for this visit. Return in about 6 months (around 10/29/2022), or if symptoms worsen or fail to improve, for lipidemia, diabetes, tachy, GERD, AMOS, chronic pain, fibro, + MATTHEW, vit D, anxiety. Ezra Mcgovern received counseling on the following healthy behaviors: nutrition, exercise and medication adherence    Discussed use, benefit, and side effects of prescribed medications. Barriers to medication compliance addressed. All patient questions answered. Pt voiced understanding. Call office if experience side effects from medications. Please note that some or all of this record was generated using voice recognition software. If there are any questions about the content of this document, please contact the author as some errors in transcription may have occurred.

## 2022-04-29 ENCOUNTER — OFFICE VISIT (OUTPATIENT)
Dept: FAMILY MEDICINE CLINIC | Age: 42
End: 2022-04-29
Payer: COMMERCIAL

## 2022-04-29 VITALS
DIASTOLIC BLOOD PRESSURE: 80 MMHG | WEIGHT: 229 LBS | OXYGEN SATURATION: 98 % | TEMPERATURE: 97.2 F | BODY MASS INDEX: 38.11 KG/M2 | SYSTOLIC BLOOD PRESSURE: 118 MMHG | HEART RATE: 53 BPM

## 2022-04-29 DIAGNOSIS — F31.9 BIPOLAR 1 DISORDER (HCC): ICD-10-CM

## 2022-04-29 DIAGNOSIS — E78.2 MIXED HYPERLIPIDEMIA: Primary | ICD-10-CM

## 2022-04-29 DIAGNOSIS — E55.9 VITAMIN D DEFICIENCY: ICD-10-CM

## 2022-04-29 DIAGNOSIS — M77.01 MEDIAL EPICONDYLITIS, RIGHT ELBOW: ICD-10-CM

## 2022-04-29 DIAGNOSIS — F32.89 OTHER DEPRESSION: ICD-10-CM

## 2022-04-29 DIAGNOSIS — G47.33 OSA (OBSTRUCTIVE SLEEP APNEA): ICD-10-CM

## 2022-04-29 DIAGNOSIS — R76.8 POSITIVE ANA (ANTINUCLEAR ANTIBODY): ICD-10-CM

## 2022-04-29 DIAGNOSIS — E11.9 TYPE 2 DIABETES MELLITUS WITHOUT COMPLICATION, WITHOUT LONG-TERM CURRENT USE OF INSULIN (HCC): ICD-10-CM

## 2022-04-29 DIAGNOSIS — K21.9 GASTROESOPHAGEAL REFLUX DISEASE, UNSPECIFIED WHETHER ESOPHAGITIS PRESENT: ICD-10-CM

## 2022-04-29 DIAGNOSIS — M79.7 FIBROMYALGIA: ICD-10-CM

## 2022-04-29 DIAGNOSIS — G89.29 OTHER CHRONIC PAIN: ICD-10-CM

## 2022-04-29 DIAGNOSIS — F41.9 ANXIETY: ICD-10-CM

## 2022-04-29 DIAGNOSIS — F43.10 PTSD (POST-TRAUMATIC STRESS DISORDER): ICD-10-CM

## 2022-04-29 DIAGNOSIS — R00.0 CHRONIC TACHYCARDIA: ICD-10-CM

## 2022-04-29 LAB
BILIRUBIN, POC: NORMAL
BLOOD URINE, POC: NORMAL
CHP ED QC CHECK: NORMAL
CLARITY, POC: CLEAR
COLOR, POC: YELLOW
GLUCOSE BLD-MCNC: 130 MG/DL
GLUCOSE URINE, POC: NORMAL
KETONES, POC: NORMAL
LEUKOCYTE EST, POC: NORMAL
NITRITE, POC: NORMAL
PH, POC: 7
PROTEIN, POC: NORMAL
SPECIFIC GRAVITY, POC: 1.02
UROBILINOGEN, POC: 0.2

## 2022-04-29 PROCEDURE — 82962 GLUCOSE BLOOD TEST: CPT | Performed by: CLINICAL NURSE SPECIALIST

## 2022-04-29 PROCEDURE — 2022F DILAT RTA XM EVC RTNOPTHY: CPT | Performed by: CLINICAL NURSE SPECIALIST

## 2022-04-29 PROCEDURE — 99214 OFFICE O/P EST MOD 30 MIN: CPT | Performed by: CLINICAL NURSE SPECIALIST

## 2022-04-29 PROCEDURE — G8417 CALC BMI ABV UP PARAM F/U: HCPCS | Performed by: CLINICAL NURSE SPECIALIST

## 2022-04-29 PROCEDURE — 81002 URINALYSIS NONAUTO W/O SCOPE: CPT | Performed by: CLINICAL NURSE SPECIALIST

## 2022-04-29 PROCEDURE — 1036F TOBACCO NON-USER: CPT | Performed by: CLINICAL NURSE SPECIALIST

## 2022-04-29 PROCEDURE — G8427 DOCREV CUR MEDS BY ELIG CLIN: HCPCS | Performed by: CLINICAL NURSE SPECIALIST

## 2022-04-29 PROCEDURE — 3044F HG A1C LEVEL LT 7.0%: CPT | Performed by: CLINICAL NURSE SPECIALIST

## 2022-04-29 RX ORDER — OMEPRAZOLE 20 MG/1
CAPSULE, DELAYED RELEASE ORAL
Qty: 90 CAPSULE | Refills: 1 | Status: SHIPPED | OUTPATIENT
Start: 2022-04-29 | End: 2022-10-31 | Stop reason: SDUPTHER

## 2022-04-29 RX ORDER — METFORMIN HYDROCHLORIDE 500 MG/1
1000 TABLET, EXTENDED RELEASE ORAL
Qty: 180 TABLET | Refills: 0 | Status: SHIPPED | OUTPATIENT
Start: 2022-04-29 | End: 2022-04-29 | Stop reason: DRUGHIGH

## 2022-04-29 RX ORDER — METOPROLOL TARTRATE 50 MG/1
50 TABLET, FILM COATED ORAL 2 TIMES DAILY
Qty: 180 TABLET | Refills: 1 | Status: SHIPPED | OUTPATIENT
Start: 2022-04-29 | End: 2022-10-31 | Stop reason: SDUPTHER

## 2022-04-29 RX ORDER — EZETIMIBE 10 MG/1
10 TABLET ORAL DAILY
Qty: 90 TABLET | Refills: 0 | Status: SHIPPED | OUTPATIENT
Start: 2022-04-29 | End: 2022-04-29 | Stop reason: DRUGHIGH

## 2022-04-29 RX ORDER — ROSUVASTATIN CALCIUM 40 MG/1
TABLET, COATED ORAL
Qty: 90 TABLET | Refills: 1 | Status: SHIPPED | OUTPATIENT
Start: 2022-04-29 | End: 2022-10-31 | Stop reason: SDUPTHER

## 2022-04-29 RX ORDER — EZETIMIBE 10 MG/1
10 TABLET ORAL DAILY
Qty: 90 TABLET | Refills: 1 | Status: SHIPPED | OUTPATIENT
Start: 2022-04-29 | End: 2022-09-12

## 2022-04-29 RX ORDER — METFORMIN HYDROCHLORIDE 500 MG/1
1000 TABLET, EXTENDED RELEASE ORAL
Qty: 180 TABLET | Refills: 1 | Status: SHIPPED | OUTPATIENT
Start: 2022-04-29 | End: 2022-10-31 | Stop reason: SDUPTHER

## 2022-04-29 RX ORDER — OMEPRAZOLE 20 MG/1
CAPSULE, DELAYED RELEASE ORAL
Qty: 90 CAPSULE | Refills: 0 | Status: SHIPPED | OUTPATIENT
Start: 2022-04-29 | End: 2022-04-29 | Stop reason: DRUGHIGH

## 2022-04-29 RX ORDER — METOPROLOL TARTRATE 50 MG/1
50 TABLET, FILM COATED ORAL 2 TIMES DAILY
Qty: 180 TABLET | Refills: 0 | Status: SHIPPED | OUTPATIENT
Start: 2022-04-29 | End: 2022-04-29 | Stop reason: DRUGHIGH

## 2022-04-29 RX ORDER — CITALOPRAM 40 MG/1
TABLET ORAL
COMMUNITY
Start: 2022-04-19

## 2022-04-29 RX ORDER — ROSUVASTATIN CALCIUM 40 MG/1
TABLET, COATED ORAL
Qty: 90 TABLET | Refills: 0 | Status: SHIPPED | OUTPATIENT
Start: 2022-04-29 | End: 2022-04-29 | Stop reason: DRUGHIGH

## 2022-04-29 ASSESSMENT — PATIENT HEALTH QUESTIONNAIRE - PHQ9
SUM OF ALL RESPONSES TO PHQ QUESTIONS 1-9: 0
SUM OF ALL RESPONSES TO PHQ QUESTIONS 1-9: 0
SUM OF ALL RESPONSES TO PHQ9 QUESTIONS 1 & 2: 0
SUM OF ALL RESPONSES TO PHQ QUESTIONS 1-9: 0
1. LITTLE INTEREST OR PLEASURE IN DOING THINGS: 0
2. FEELING DOWN, DEPRESSED OR HOPELESS: 0
SUM OF ALL RESPONSES TO PHQ QUESTIONS 1-9: 0

## 2022-05-01 NOTE — PROGRESS NOTES
Chief Complaint:   Chief Complaint   Patient presents with    Knee Pain     right, overall unchaged, steroids helped decrease pain some, but wearing boot on L foot is not helping knee pain, difficulty getting comfortable at night          History of Present Illness:       Patient is a 39 y.o. female returns follow up for the above complaint. The patient was last seen approximately 1 monthsago. The symptoms show no change since the last visit. The patient has had no further testing for the problem. She experiences cycling good days and bad days no new symptoms. No appreciable response to trial of Medrol. Pain levels 0-8/10    She is currently ambulating with a brace boot on the contralateral side which she feels has aggravated her right knee. She senses subjective instability into extension. She denies any mechanical symptoms    She continues on I HEP         Past Medical History:        Past Medical History:   Diagnosis Date    Arthritis     Depression     Diabetes mellitus (HCC)     Hypertension     Tachycardia         Present Medications:         Current Outpatient Medications   Medication Sig Dispense Refill    diclofenac sodium (VOLTAREN) 1 % GEL APPLY 2 GRAMS TOPICALLY 2 TIMES DAILY 100 g 1    citalopram (CELEXA) 40 MG tablet TAKE 1 TABLET BY MOUTH EVERY DAY IN THE MORNING      omeprazole (PRILOSEC) 20 MG delayed release capsule TAKE 1 CAPSULE BY MOUTH EVERY DAY 90 capsule 1    rosuvastatin (CRESTOR) 40 MG tablet TAKE 1 TABLET BY MOUTH EVERY DAY 90 tablet 1    metFORMIN (GLUCOPHAGE-XR) 500 MG extended release tablet Take 2 tablets by mouth daily (with breakfast) 180 tablet 1    metoprolol tartrate (LOPRESSOR) 50 MG tablet Take 1 tablet by mouth 2 times daily 180 tablet 1    ezetimibe (ZETIA) 10 MG tablet Take 1 tablet by mouth daily 90 tablet 1    methylPREDNISolone (MEDROL, CATARINA,) 4 MG tablet By mouth.  1 kit 0    Cholecalciferol (VITAMIN D3) 50 MCG (2000 UT) CAPS TAKE 1 CAPSULE BY MOUTH EVERY DAY      meloxicam (MOBIC) 15 MG tablet Take 1 tablet by mouth daily as needed for Pain 90 tablet 0    albuterol sulfate  (90 Base) MCG/ACT inhaler TAKE 2 PUFFS BY MOUTH EVERY 6 HOURS AS NEEDED FOR WHEEZE 6.7 each 1    nitroGLYCERIN (NITROSTAT) 0.4 MG SL tablet Place 1 tablet under the tongue every 5 minutes as needed for Chest pain up to max of 3 total doses. If no relief after 1 dose, call 911. 25 tablet 0    ammonium lactate (LAC-HYDRIN) 12 % lotion Apply topically daily 225 mL 2    thiothixene (NAVANE) 2 MG capsule TAKE 1 CAPSULE BY MOUTH EVERY EVENING AT BEDTIME      pregabalin (LYRICA) 75 MG capsule Take 1 capsule by mouth 2 times daily for 90 days. 60 capsule 2    citalopram (CELEXA) 20 MG tablet Take 20 mg by mouth daily       divalproex (DEPAKOTE ER) 500 MG extended release tablet Take 500 mg by mouth daily      hydrOXYzine (ATARAX) 25 MG tablet Take 25 mg by mouth 3 times daily as needed for Anxiety        No current facility-administered medications for this visit. Allergies: Allergies   Allergen Reactions    Cinnamon Itching    Codeine Hives and Itching           Review of Systems:    Pertinent items are noted in HPI        Vital Signs: There were no vitals filed for this visit. General Exam:     Constitutional: Patient is adequately groomed with no evidence of malnutrition    Physical Exam: right knee      Primary Exam:    Inspection: No appreciable effusion      Palpation: No focal tenderness      Range of Motion: Full range and symmetric      Strength: Normal with SLR      Special Tests: Negative Steinmann's      Skin: There are no rashes, ulcerations or lesions.       Gait: Nonantalgic      Neurovascular - non focal and intact       Additional Comments:        Additional Examinations:                    Office Imaging Results/Procedures PerformedToday:             Office Procedures:   No orders of the defined types were placed in this encounter. Other Outside Imaging and Testing Personally Reviewed:    No results found. Assessment   Impression: . Encounter Diagnoses   Name Primary? 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 Tremayne Loza MD.      Disclaimer: \"This note was dictated with voice recognition software. Though review and correction are routine, we apologize for any errors. \"

## 2022-05-03 SDOH — HEALTH STABILITY: PHYSICAL HEALTH: ON AVERAGE, HOW MANY DAYS PER WEEK DO YOU ENGAGE IN MODERATE TO STRENUOUS EXERCISE (LIKE A BRISK WALK)?: 2 DAYS

## 2022-05-03 SDOH — HEALTH STABILITY: PHYSICAL HEALTH: ON AVERAGE, HOW MANY MINUTES DO YOU ENGAGE IN EXERCISE AT THIS LEVEL?: 20 MIN

## 2022-05-04 ENCOUNTER — OFFICE VISIT (OUTPATIENT)
Dept: ORTHOPEDIC SURGERY | Age: 42
End: 2022-05-04

## 2022-05-04 ENCOUNTER — TELEPHONE (OUTPATIENT)
Dept: ORTHOPEDIC SURGERY | Age: 42
End: 2022-05-04

## 2022-05-04 VITALS — BODY MASS INDEX: 38.16 KG/M2 | HEIGHT: 65 IN | WEIGHT: 229.06 LBS

## 2022-05-04 DIAGNOSIS — M25.551 BILATERAL HIP PAIN: ICD-10-CM

## 2022-05-04 DIAGNOSIS — M16.2 OSTEOARTHRITIS OF BOTH HIPS RESULTING FROM HIP DYSPLASIA: ICD-10-CM

## 2022-05-04 DIAGNOSIS — Z98.890 STATUS POST ARTHROSCOPY OF HIP: ICD-10-CM

## 2022-05-04 DIAGNOSIS — S73.191A TEAR OF RIGHT ACETABULAR LABRUM, INITIAL ENCOUNTER: ICD-10-CM

## 2022-05-04 DIAGNOSIS — S73.192A TEAR OF LEFT ACETABULAR LABRUM, INITIAL ENCOUNTER: ICD-10-CM

## 2022-05-04 DIAGNOSIS — M25.552 BILATERAL HIP PAIN: ICD-10-CM

## 2022-05-04 DIAGNOSIS — M25.859 FEMORAL ACETABULAR IMPINGEMENT: ICD-10-CM

## 2022-05-04 PROCEDURE — 1036F TOBACCO NON-USER: CPT | Performed by: INTERNAL MEDICINE

## 2022-05-04 PROCEDURE — G8427 DOCREV CUR MEDS BY ELIG CLIN: HCPCS | Performed by: INTERNAL MEDICINE

## 2022-05-04 PROCEDURE — G8417 CALC BMI ABV UP PARAM F/U: HCPCS | Performed by: INTERNAL MEDICINE

## 2022-05-04 PROCEDURE — 99214 OFFICE O/P EST MOD 30 MIN: CPT | Performed by: INTERNAL MEDICINE

## 2022-05-04 NOTE — TELEPHONE ENCOUNTER
General Question     Subject: Monika Zepeda says they haven't received the 2nd fax.     Patient: Mickey Muñozmatt  Contact Number: [phone]

## 2022-05-04 NOTE — TELEPHONE ENCOUNTER
General Question     Subject: [facility] only got a referral for 1 hip instead of both. Should the patient wait for the other.  Requesting a kathleen.    Patient: Ally Will  Contact Number: [phone]

## 2022-05-04 NOTE — PROGRESS NOTES
Chief Complaint:   Chief Complaint   Patient presents with    Hip Pain     Bilateral hip pn started last year, has had hip issues/sx over the pat 10 years, pain is so bad now that it constantly hurts to do anything, stabbing pain in hips that radiate all over the joint          History of Present Illness:       Patient is a 39 y.o. female presents with the above complaint. The symptoms began worsening 1 yearsago and started without an injury. The patient describes a aching pain that does not radiate. The symptoms are constant  and are are worsening since the onset. Past history significant for bilateral hip arthroscopy greater than 10 years ago      Pain localizesanterior and  does not seems to follow  provacative pattern suggestive of greater trochanteric pain syndrome. There are not mechanical symptoms that are active at this time. .  The patient admits to subjective instability about the hip and denies new onset or progressive weakness of the lower extremity. Pain level 10    The patient denies a pattern of activity related swelling. Treatment to date:NSAIDS meloxicam with mild improvement    There is no prior history of hip trauma. There is no prior history of autoimmune disease, autoimmune disease, or crystal arthropathy.      Past Medical History:        Past Medical History:   Diagnosis Date    Arthritis     Depression     Diabetes mellitus ([facility] [address]     Hypertension     Tachycardia          Past Surgical History:   Procedure Laterality Date    ANKLE FUSION Bilateral     ARTHRODESIS Left 12/6/2019    REVISION OF TALONAVICULAR JOINT ARTHRODESIS LEFT FOOT  -SLEEP APNEA- performed by Sea Rogers DPM at [facility] Bilateral     ELBOW SURGERY Bilateral     ulnar nerve release    HERNIA REPAIR      HIP ARTHROSCOPY Bilateral     TONSILLECTOMY AND ADENOIDECTOMY      WRIST GANGLION EXCISION Bilateral          Present Medications:         Current Outpatient Medications   Medication Sig Dispense Refill    diclofenac sodium (VOLTAREN) 1 % GEL APPLY 2 GRAMS TOPICALLY 2 TIMES DAILY 100 g 1    citalopram (CELEXA) 40 MG tablet TAKE 1 TABLET BY MOUTH EVERY DAY IN THE MORNING      omeprazole (PRILOSEC) 20 MG delayed release capsule TAKE 1 CAPSULE BY MOUTH EVERY DAY 90 capsule 1    rosuvastatin (CRESTOR) 40 MG tablet TAKE 1 TABLET BY MOUTH EVERY DAY 90 tablet 1    metFORMIN (GLUCOPHAGE-XR) 500 MG extended release tablet Take 2 tablets by mouth daily (with breakfast) 180 tablet 1    metoprolol tartrate (LOPRESSOR) 50 MG tablet Take 1 tablet by mouth 2 times daily 180 tablet 1    ezetimibe (ZETIA) 10 MG tablet Take 1 tablet by mouth daily 90 tablet 1    methylPREDNISolone (MEDROL, CATARINA,) 4 MG tablet By mouth. 1 kit 0    Cholecalciferol (VITAMIN D3) 50 MCG (2000 UT) CAPS TAKE 1 CAPSULE BY MOUTH EVERY DAY      meloxicam (MOBIC) 15 MG tablet Take 1 tablet by mouth daily as needed for Pain 90 tablet 0    albuterol sulfate  (90 Base) MCG/ACT inhaler TAKE 2 PUFFS BY MOUTH EVERY 6 HOURS AS NEEDED FOR WHEEZE 6.7 each 1    nitroGLYCERIN (NITROSTAT) 0.4 MG SL tablet Place 1 tablet under the tongue every 5 minutes as needed for Chest pain up to max of 3 total doses. If no relief after 1 dose, call 911. 25 tablet 0    ammonium lactate (LAC-HYDRIN) 12 % lotion Apply topically daily 225 mL 2    thiothixene (NAVANE) 2 MG capsule TAKE 1 CAPSULE BY MOUTH EVERY EVENING AT BEDTIME      pregabalin (LYRICA) 75 MG capsule Take 1 capsule by mouth 2 times daily for 90 days. 60 capsule 2    citalopram (CELEXA) 20 MG tablet Take 20 mg by mouth daily       divalproex (DEPAKOTE ER) 500 MG extended release tablet Take 500 mg by mouth daily      hydrOXYzine (ATARAX) 25 MG tablet Take 25 mg by mouth 3 times daily as needed for Anxiety        No current facility-administered medications for this visit. Allergies:         Allergies   Allergen Reactions    Cinnamon Itching    Codeine Hives and Itching        Social History:         Social History     Socioeconomic History    Marital status:      Spouse name: Not on file    Number of children: Not on file    Years of education: Not on file    Highest education level: Not on file   Occupational History    Not on file   Tobacco Use    Smoking status: Former Smoker     Packs/day: 1.00     Years: 27.00     Pack years: 27.00     Types: Cigarettes     Start date: 12     Quit date: 2019     Years since quittin.4    Smokeless tobacco: Never Used    Tobacco comment: started to smoke at 15 / smoked up to 1 ppd / now smoking 3 to 4 cigarettes a day   Vaping Use    Vaping Use: Never used   Substance and Sexual Activity    Alcohol use: Never    Drug use: Never    Sexual activity: Not on file   Other Topics Concern    Not on file   Social History Narrative    Not on file     Social Determinants of Health     Financial Resource Strain: Low Risk     Difficulty of Paying Living Expenses: Not hard at all   Food Insecurity: No Food Insecurity    Worried About Running Out of Food in the Last Year: Never true    [address] in the Last Year: Never true   Transportation Needs:     Lack of Transportation (Medical): Not on file    Lack of Transportation (Non-Medical):  Not on file   Physical Activity: Insufficiently Active    Days of Exercise per Week: 2 days    Minutes of Exercise per Session: 20 min   Stress:     Feeling of Stress : Not on file   Social Connections:     Frequency of Communication with Friends and Family: Not on file    Frequency of Social Gatherings with Friends and Family: Not on file    Attends [employer] Services: Not on file    Active Member of Clubs or Organizations: Not on file    Attends Club or Organization Meetings: Not on file    Marital Status: Not on file   Intimate Partner Violence: Not At Risk    Fear of Current or Ex-Partner: No    Emotionally Abused: No    Physically Abused: No    Sexually Abused: No   Housing Stability:     Unable to Pay for Housing in the Last Year: Not on file    Number of Places Lived in the Last Year: Not on file    Unstable Housing in the Last Year: Not on file        Review of Symptoms:    Pertinent items are noted in HPI    10 point review of systems negative except as mentioned in HPI  . Vital Signs: There were no vitals filed for this visit. General Exam:     Constitutional: Patient is adequately groomed with no evidence of malnutrition  Mental Status: The patient is oriented to time, place and person. The patient's mood and affect are appropriate. Vascular: Examination reveals no swelling or calf tenderness. Peripheral pulses are palpable and 2+. Lymphatics: no lymphadenopathy of the inguinal region or lower extremity      Physical Exam: bilateral hip      Primary Exam:    Inspection: No deformity atrophy appreciable effusion      Palpation: No focal trigger point tenderness      Range of Motion: Mild global restriction associate with pain bilaterally      Strength: Normal with SLR      Special Tests: Stinchfield positive, FAD IR positive bilaterally      Skin: There are no rashes, ulcerations or lesions. Gait: Nonantalgic      Reflex intact lower     Additional Comments:        Additional Examinations:         Neurolgic -Light touch sensation and manual muscle testing normal L2-S1. No fasiculations. Pattella tendon and Achilles tendon reflexes +2 bilaterally. Seated SLR negative           Office Imaging Results/Procedures PerformedToday:      Radiology:      X-rays obtained and reviewed in office:   Views 3 views bilateral hips   Location bilateral hips   Impression there is stigmata of mixed type ABILIO involving the hips bilaterally and at least mild degenerative change affecting the femoral acetabular joints bilaterally. The AP projection is underpenetrated in technique.        Office Procedures:     Orders Placed This Encounter Procedures    XR HIP RIGHT (2-3 VIEWS)     Standing Status:   Future     Number of Occurrences:   1     Standing Expiration Date:   5/4/2023     Order Specific Question:   Reason for exam:     Answer:   pain    XR HIP LEFT (2-3 VIEWS)     Standing Status:   Future     Number of Occurrences:   1     Standing Expiration Date:   5/4/2023     Order Specific Question:   Reason for exam:     Answer:   pain    MRI HIP RIGHT WO CONTRAST     [facility], please call pt to schedule, [address] [place] [address] will obtain auth and fwd to your facility. Pt advised to f/u in clinic 2-3 days after MRI for results. Standing Status:   Future     Standing Expiration Date:   5/4/2023     Scheduling Instructions:      Concurrent orders for L hip also. Please coordinate scheduling of both. Order Specific Question:   Reason for exam:     Answer:   r/o labral tear, ABILIO     Order Specific Question:   What is the sedation requirement? Answer:   None    MRI HIP LEFT WO CONTRAST     [facility] please [facility] to schedule, [address] [place] [address] will obtain auth and fwd to your facility. Pt advised to f/u in clinic 2-3 days after MRI for results. Standing Status:   Future     Standing Expiration Date:   5/4/2023     Scheduling Instructions:      Concurrent orders for R hip also. Please coordinate scheduling of both. Order Specific Question:   Reason for exam:     Answer:   r/o labral tear, ABILIO     Order Specific Question:   What is the sedation requirement? Answer:   None           Other Outside Imaging and Testing Personally Reviewed:      MRI HIP RIGHT WITHOUT CONTRAST      Impression    IMPRESSION:   Several small circumscribed fluid signal intensities noted along the neck of the right femur likely representing subchondral cysts. No perilabral cyst demonstrated. No enlarged bursa is seen in association with the iliopsoas or greater trochanteric bursa.      Correlate with clinical orthopedic exam. If there is persistent concern for the possibility of labral injury, consider MR arthrogram of the right hip joint for further characterization.          Workstation VX:[id number]    Narrative    EXAM: MRI HIP RIGHT WITHOUT CONTRAST     CLINICAL STATEMENT:       Labral tear of hip, degenerative, Sprain of hip, unspecified laterality, initial encounter,     COMPARISON: 8/28/2015     FINDINGS:   There is a small rounded T2 hyperintense focus, bilobed in configuration, noted along the anterior aspect of the right femoral neck measuring approximately 10 mm x 4 mm in dimension. This could reflect two adjacent foci. There is no bony buttressing or abnormal configuration to the right femoral head/neck junction to suggest femoroacetabular impingement. The signal intensity associated with the visualized portions of the bony pelvis is normal without areas of marrow edema. Amount of fluid in the hip joint spaces is normal.     No perilabral cysts are appreciated. No enlarged iliopsoas or greater trochanteric bursa evident. The signal intensity associated with the origin of the common hamstring tendons is normal. Subcentimeter short axis lymph nodes are seen in the inguinal regions. Other Result Text    Interface, Radiant Results - 12/10/2015  8:18 AM EST   EXAM: MRI HIP RIGHT WITHOUT CONTRAST     CLINICAL STATEMENT:       Labral tear of hip, degenerative, Sprain of hip, unspecified laterality, initial encounter,     COMPARISON: 8/28/2015     FINDINGS:   There is a small rounded T2 hyperintense focus, bilobed in configuration, noted along the anterior aspect of the right femoral neck measuring approximately 10 mm x 4 mm in dimension. This could reflect two adjacent foci. There is no bony buttressing or abnormal configuration to the right femoral head/neck junction to suggest femoroacetabular impingement.      The signal intensity associated with the visualized portions of the bony pelvis is normal without areas of marrow edema. Amount of fluid in the hip joint spaces is normal.     No perilabral cysts are appreciated. No enlarged iliopsoas or greater trochanteric bursa evident. The signal intensity associated with the origin of the common hamstring tendons is normal. Subcentimeter short axis lymph nodes are seen in the inguinal regions. IMPRESSION:   Several small circumscribed fluid signal intensities noted along the neck of the right femur likely representing subchondral cysts. No perilabral cyst demonstrated. No enlarged bursa is seen in association with the iliopsoas or greater trochanteric bursa. Correlate with clinical orthopedic exam. If there is persistent concern for the possibility of labral injury, consider MR arthrogram of the right hip joint for further characterization. XR HIP RIGHT MINIMUM 2 VIEWS      Impression    IMPRESSION:   Negative radiographs of the right hip.              Workstation [id number]    Narrative    EXAM: RIGHT HIP - 2 VIEWS         CLINICAL STATEMENT:       Pain in joint, lower leg   Pain in joint, pelvic region and thigh     COMPARISON: None available. FINDINGS:       The right proximal femur, hip joint, and visualized pelvic bones are intact. No acute fracture or dislocation is identified.                Other Result Text    Interface, Radiant Results - 2015 12:25 PM EDT   EXAM: RIGHT HIP - 2 VIEWS         CLINICAL STATEMENT:       Pain in joint, lower leg   Pain in joint, pelvic region and thigh     COMPARISON: None available. FINDINGS:       The right proximal femur, hip joint, and visualized pelvic bones are intact.  No acute fracture or dislocation is identified.             IMPRESSION:   Negative radiographs of the right hip.                            [facility]                      Diagnostic X-Ray Main     Patient Name: Argelia Schulz: [id number]   : 1980   Sex: F  Patient Status: E   Patient Type: E   Visit #: [id number]                 Patient Location: BLUE-A25-A   Accession: [id number]                Completed: 04/13/2010 05:11 AM   Exam: [id number] STAT/STAT-XR HIP 2 VIEWS LT   ______________________________________________________________________   Requesting Provider:Kenisha Marin                     Attending Provider:     Signs & Symptoms: Mult CO   History:    BACK PAIN   Comments: ENTERED BY:KENISHA MARIN     ______________________________________________________________________                                FINAL REPORT       Indication:       Multiple complaints, fell in tube.        Technique:      Two views of the left hip 04/13/2010        Findings:   No fracture or dislocation is demonstrated. Superior joint space is   preserved.  No radiographic manifestations of erosive arthropathy or   avascular necrosis are demonstrated. Calcifications in the left   hemipelvis likely represent vascular phleboliths. A rounded density   projecting over the left hemipelvis on the AP view may represent post   surgical changes related to prior tubal ligation.  Please correlate   clinically. This could also represent an ornamental ring overlying   the patient's abdomen.        Impression:        Essentially unremarkable radiographic evaluation of the left hip as   described above.  If there is concern for radiographically occult   fracture, MRI could be performed for more definitive characterization   as clinically warranted.             ER4     No preliminary report available at time of dictation.              Assessment   Impression: . Encounter Diagnoses   Name Primary?     Tear of right acetabular labrum, initial encounter     Tear of left acetabular labrum, initial encounter     Osteoarthritis of both hips resulting from hip dysplasia     Femoral acetabular impingement     Status post arthroscopy of hip     Bilateral hip pain               Plan:     MRI evaluation right and left hips evaluate severity of labral pathology suspected clinically and severity of chondropathy  Ultrasound-guided intra-articular steroid injection only for escalating pain levels  Continue meloxicam and modified hip flexibility program-ABILIO precautions      The nature of the finding, probable diagnosis and likely treatment was thoroughly discussed with the patient. The options, risks, complications, alternative treatment as well as some of the differential diagnosis was discussed. The patient was thoroughly informed and all questions were answered. the patient indicated understanding and satisfaction with the discussion. Orders:        Orders Placed This Encounter   Procedures    XR HIP RIGHT (2-3 VIEWS)     Standing Status:   Future     Number of Occurrences:   1     Standing Expiration Date:   5/4/2023     Order Specific Question:   Reason for exam:     Answer:   pain    XR HIP LEFT (2-3 VIEWS)     Standing Status:   Future     Number of Occurrences:   1     Standing Expiration Date:   5/4/2023     Order Specific Question:   Reason for exam:     Answer:   pain    MRI HIP RIGHT WO CONTRAST     [facility], please call pt to schedule, [address] [place] [address] will obtain auth and fwd to your facility. Pt advised to f/u in clinic 2-3 days after MRI for results. Standing Status:   Future     Standing Expiration Date:   5/4/2023     Scheduling Instructions:      Concurrent orders for L hip also. Please coordinate scheduling of both. Order Specific Question:   Reason for exam:     Answer:   r/o labral tear, ABILIO     Order Specific Question:   What is the sedation requirement? Answer:   None    MRI HIP LEFT WO CONTRAST     [facility], please call pt to schedule, [address] [place] [address] Street will obtain auth and fwd to your facility. Pt advised to f/u in clinic 2-3 days after MRI for results.      Standing Status:   Future     Standing Expiration Date:   5/4/2023     Scheduling Instructions:      Concurrent orders for R hip also. Please coordinate scheduling of both. Order Specific Question:   Reason for exam:     Answer:   r/o labral tear, ABILIO     Order Specific Question:   What is the sedation requirement? Answer:   None           Disclaimer: \"This note was dictated with voice recognition software. Though review and correction are routine, we apologize for any errors. \"

## 2022-05-16 DIAGNOSIS — L30.9 CHRONIC ECZEMA: ICD-10-CM

## 2022-05-17 RX ORDER — ACETAMINOPHEN 160 MG
TABLET,DISINTEGRATING ORAL
Qty: 90 CAPSULE | Refills: 0 | Status: SHIPPED | OUTPATIENT
Start: 2022-05-17 | End: 2022-05-17 | Stop reason: SDUPTHER

## 2022-05-17 RX ORDER — AMMONIUM LACTATE 12 G/100G
LOTION TOPICAL DAILY
Qty: 225 ML | Refills: 2 | OUTPATIENT
Start: 2022-05-17

## 2022-05-17 RX ORDER — NITROGLYCERIN 0.4 MG/1
0.4 TABLET SUBLINGUAL EVERY 5 MIN PRN
Qty: 25 TABLET | Refills: 0 | OUTPATIENT
Start: 2022-05-17

## 2022-05-17 RX ORDER — ACETAMINOPHEN 160 MG
TABLET,DISINTEGRATING ORAL
Qty: 90 CAPSULE | Refills: 1 | Status: SHIPPED | OUTPATIENT
Start: 2022-05-17

## 2022-05-17 NOTE — TELEPHONE ENCOUNTER
Pt requesting refill on the pended medication.      Pharmacy: [facility] with pended meds  Best pt contacts: [phone]

## 2022-05-17 NOTE — TELEPHONE ENCOUNTER
Clarification sent to the pharmacy vitamin D3 2000 mg once daily # 90 with 1 refill sent to pharmacy.

## 2022-05-31 ENCOUNTER — OFFICE VISIT (OUTPATIENT)
Dept: ORTHOPEDIC SURGERY | Age: 42
End: 2022-05-31
Payer: COMMERCIAL

## 2022-05-31 VITALS — WEIGHT: 229.06 LBS | HEIGHT: 65 IN | BODY MASS INDEX: 38.16 KG/M2

## 2022-05-31 DIAGNOSIS — N83.201 OVARIAN CYST, RIGHT: ICD-10-CM

## 2022-05-31 DIAGNOSIS — Z98.890 STATUS POST ARTHROSCOPY OF HIP: ICD-10-CM

## 2022-05-31 DIAGNOSIS — M94.252 CHONDROMALACIA OF HIP, LEFT: ICD-10-CM

## 2022-05-31 DIAGNOSIS — M25.859 FEMORAL ACETABULAR IMPINGEMENT: ICD-10-CM

## 2022-05-31 DIAGNOSIS — M94.251 CHONDROMALACIA OF HIP, RIGHT: ICD-10-CM

## 2022-05-31 DIAGNOSIS — S73.192D TEAR OF LEFT ACETABULAR LABRUM, SUBSEQUENT ENCOUNTER: ICD-10-CM

## 2022-05-31 DIAGNOSIS — S73.191D TEAR OF RIGHT ACETABULAR LABRUM, SUBSEQUENT ENCOUNTER: ICD-10-CM

## 2022-05-31 PROCEDURE — G8417 CALC BMI ABV UP PARAM F/U: HCPCS | Performed by: INTERNAL MEDICINE

## 2022-05-31 PROCEDURE — G8427 DOCREV CUR MEDS BY ELIG CLIN: HCPCS | Performed by: INTERNAL MEDICINE

## 2022-05-31 PROCEDURE — 1036F TOBACCO NON-USER: CPT | Performed by: INTERNAL MEDICINE

## 2022-05-31 PROCEDURE — 99214 OFFICE O/P EST MOD 30 MIN: CPT | Performed by: INTERNAL MEDICINE

## 2022-05-31 SDOH — HEALTH STABILITY: PHYSICAL HEALTH: ON AVERAGE, HOW MANY MINUTES DO YOU ENGAGE IN EXERCISE AT THIS LEVEL?: 60 MIN

## 2022-05-31 SDOH — HEALTH STABILITY: PHYSICAL HEALTH: ON AVERAGE, HOW MANY DAYS PER WEEK DO YOU ENGAGE IN MODERATE TO STRENUOUS EXERCISE (LIKE A BRISK WALK)?: 4 DAYS

## 2022-05-31 NOTE — LETTER
[address]  [address] [facility] [address]  Phone: [phone]  Fax: [phone]    Brandon Chakraborty MD    May 31, 2022     JUAN RAMON Mobley - CNP  [address] [facility] [address] [facility] [address]    Patient: Rm Camejo   MR Number: [id number]   YOB: 1980   Date of Visit: 5/31/2022       Dear Deo Harvey:    Thank you for referring Vernon Carrel to me for evaluation/treatment. Below are the relevant portions of my assessment and plan of care. Impression: . Encounter Diagnoses   Name Primary?  Femoral acetabular impingement     Tear of right acetabular labrum, subsequent encounter     Tear of left acetabular labrum, subsequent encounter     Chondromalacia of hip, right     Chondromalacia of hip, left     Status post arthroscopy of hip         Mixed ABILIO bilateral hips      Plan: Activity modification ABILIO precautions and home flexibility program-ABILIO protocol  Surgical consultation-Dr. Masha Foote for consideration of hip preservation intervention mixed ABILIO decompression osteoplasty/acetabuloplasty and labral repair/debridement  Ultrasound-guided intra-articular [id number] only as a temporizing measure which she is not interested in pursuing at this time  Pelvic ultrasound evaluate right hemorrhagic ovarian cyst clinical follow-up with her PCP and or gynecologist    Overall the ovarian cyst is felt to likely represent incidental finding but needs to be further characterized and evaluated.   Proceed as outlined above    [id number]  minutes was spent related to previewing pertinent medical documentation prior to the patient's visit along with counseling during the patient's visit with respect to treatment options inclusive of alternatives to treatment and the complications and risks related to those treatment options along with expectations of outcome related to those treatments and inclusive of time in the documentation and ordering of testing and treatment after the visit. Orders:        Orders Placed This Encounter   Procedures   Elvi Devi MD, Orthopedics and Sports Medicine, [place]     Referral Priority:   Routine     Referral Type:   Eval and Treat     Referral Reason:   Specialty Services Required     Referred to Provider:   Shakir Hodge MD     Requested Specialty:   Orthopedic Surgery     Number of Visits Requested:   1         Hunter Kirk MD.      [facility]: \"This note was dictated with voice recognition software. Though review and correction are routine, we apologize for any errors. \"       If you have questions, please do not hesitate to call me. I look forward to following Deloris Gutierrezs along with you.     Sincerely,      Sea Burks MD

## 2022-05-31 NOTE — PROGRESS NOTES
Chief Complaint:   Chief Complaint   Patient presents with    Hip Pain     Bilateral hip pain,  TR MRI R hip, no change since last visit. Still pain whenever doing activity. History of Present Illness:       Patient is a 39 y.o. female returns follow up for the above complaint. The patient was last seen approximately 3 weeksago. The symptoms show no change since the last visit. The patient has had further testing for the problem. MRI completed bilateral hips    Hips remain problematic she localizes pain to the anterior hips deep in sensation with intermittent mechanical symptoms. Pain levels 8/10 she remains on meloxicam    She is not interested and temporizing measures specifically as relates to injections. Her pain is of similar quality and character as prior to initial hip arthroscopies. She denies any subjective instability     Past Medical History:        Past Medical History:   Diagnosis Date    Arthritis     Depression     Diabetes mellitus (HCC)     Hypertension     Tachycardia         Present Medications:         Current Outpatient Medications   Medication Sig Dispense Refill    Cholecalciferol (VITAMIN D3) 50 MCG (2000 UT) CAPS TAKE 1 CAPSULE BY MOUTH EVERY DAY 90 capsule 1    diclofenac sodium (VOLTAREN) 1 % GEL APPLY 2 GRAMS TOPICALLY 2 TIMES DAILY 100 g 1    citalopram (CELEXA) 40 MG tablet TAKE 1 TABLET BY MOUTH EVERY DAY IN THE MORNING      omeprazole (PRILOSEC) 20 MG delayed release capsule TAKE 1 CAPSULE BY MOUTH EVERY DAY 90 capsule 1    rosuvastatin (CRESTOR) 40 MG tablet TAKE 1 TABLET BY MOUTH EVERY DAY 90 tablet 1    metFORMIN (GLUCOPHAGE-XR) 500 MG extended release tablet Take 2 tablets by mouth daily (with breakfast) 180 tablet 1    metoprolol tartrate (LOPRESSOR) 50 MG tablet Take 1 tablet by mouth 2 times daily 180 tablet 1    ezetimibe (ZETIA) 10 MG tablet Take 1 tablet by mouth daily 90 tablet 1    methylPREDNISolone (MEDROL, CATARINA,) 4 MG tablet By mouth. Additional Examinations:                   Office Imaging Results/Procedures PerformedToday:          Office Procedures:     Orders Placed This Encounter   Procedures   Gerard Suarez MD, Orthopedics and Sports Medicine, [facility]     Referral Priority:   Routine     Referral Type:   Eval and Treat     Referral Reason:   Specialty Services Required     Referred to Provider:   Sheryl Bennett MD     Requested Specialty:   Orthopedic Surgery     Number of Visits Requested:   1           Other Outside Imaging and Testing Personally Reviewed:  Site: [facility] #: [id number] #: [id number] Procedure: MR Right Hip w/o Contrast ; Reason for Exam: Dx: Bilateral hip pain per script   This document is confidential medical information.  Unauthorized disclosure or use of this information is prohibited by law. If you are not the intended recipient of this document, please advise us by calling immediately [phone].       Health: [facility] [facility], [address] [place] [address]           Patient Name: Zachariah Quevedo   Case ID: [id number]   Patient : 1980   Referring Physician: Dennis Walker MD   Exam Date: 2022   Exam Description: MR Right Hip w/o Contrast            HISTORY:   Dx:  Bilateral hip pain per script       TECHNICAL FACTORS:  Long- and short-axis fat- and water-weighted images were performed.       COMPARISON:  None.       FINDINGS:  No osseous fracture, AVN or mass.  Acetabular and femoral head spurring.  Anterior    superior and superior labrum is degenerated and worn and likely chronically torn       IUD within the uterus.  Heterogeneity uterine myometrium.  Adenomyosis change contributory.       Complex cyst right adnexal region with fluid level.  Hemorrhagic cyst favored.  This measures    approximately 3.9 x 3.15 x 4.25 cm.  Smaller cyst subjacent to this.       No tendon tear.  No muscle strain.  No bursitis.  Mild tendinopathy and peritendinitis involves    the gluteus minimus and medius insertion.  No sports hernia.       CONCLUSION:   1. No osseous fracture, AVN or mass. 2. Frayed and worn anterior superior labrum.  Chronic tear present. 3. Mild tendinopathy and peritendinitis involves the gluteus minimus and medius insertion. No    tear or bursitis. 4. Complex cyst right adnexal region measures 3.9 x 3.15 x 4.25 cm. Hemorrhagic cyst favored. Follow-up imaging may be considered if patient has symptoms referable to this region. 5. IUD present within the uterus.  Adenomyosis contributory.               Thank you for the opportunity to provide your interpretation.               Ugo Jordan MD       A: JACKI 2022 11:54 PM     Site: [email or web address] #: [id number] #: [id number] Procedure: MR Left Hip w/o Contrast ; Reason for Exam: Dx: Bilateral hip pain per script   This document is confidential medical information.  Unauthorized disclosure or use of this information is prohibited by law. If you are not the intended recipient of this document, please advise us by calling immediately [phone].       [email or web address] [address]           Patient Name: Bhupendra Wagner   Case ID: [id number]   Patient : 1980   Referring Physician: Wayne Lemon MD   Exam Date: 2022   Exam Description: MR Left Hip w/o Contrast            HISTORY:   Dx:  Bilateral hip pain per script       TECHNICAL FACTORS:  Long- and short-axis fat- and water-weighted images were performed.       COMPARISON:  None.       FINDINGS:  No osseous fracture, AVN, or mass.  Acetabular spurring and remodeling present.       Mild tendinopathy and peritendinitis involves the gluteus minimus and medius insertion.       Anterior superior and superior labrum is torn.       Inflammation deep to the ITB may be on a posttraumatic or repetitive micro traumatic basis.       Hamstring origin is intact iliopsoas and rectus femoris are intact.  No sports hernia.       CONCLUSION:   1. Anterior superior and superior labrum is torn and scarred.  No acute or displaced labral    tear. .   2. No osseous stress injury, fracture, AVN or mass. 3. Mild tendinopathy and peritendinitis involves the gluteus minimus and medius insertion. No    tear or bursitis. 4. Inflammation deep to the ITB may be on a posttraumatic or repetitive micro traumatic basis.             Thank you for the opportunity to provide your interpretation.               Keysha Cruz MD       A:  05/23/2022 11:54 PM                 Assessment   Impression: . Encounter Diagnoses   Name Primary?  Femoral acetabular impingement     Tear of right acetabular labrum, subsequent encounter     Tear of left acetabular labrum, subsequent encounter     Chondromalacia of hip, right     Chondromalacia of hip, left     Status post arthroscopy of hip         Mixed ABILIO bilateral hips      Plan: Activity modification ABILIO precautions and home flexibility program-ABILIO protocol  Surgical consultation-Dr. Tiera Gamble for consideration of hip preservation intervention mixed ABILIO decompression osteoplasty/acetabuloplasty and labral repair/debridement  Ultrasound-guided intra-articular [id number] only as a temporizing measure which she is not interested in pursuing at this time  Pelvic ultrasound evaluate right hemorrhagic ovarian cyst clinical follow-up with her PCP and or gynecologist    Overall the ovarian cyst is felt to likely represent incidental finding but needs to be further characterized and evaluated.   Proceed as outlined above    [id number]  minutes was spent related to previewing pertinent medical documentation prior to the patient's visit along with counseling during the patient's visit with respect to treatment options inclusive of alternatives to treatment and the complications and risks related to those treatment options along with expectations of outcome related to those treatments and inclusive of time in the documentation and ordering of testing and treatment after the visit. Orders:        Orders Placed This Encounter   Procedures   Roya Sutton MD, Orthopedics and Sports Medicine, [employer]     Referral Priority:   Routine     Referral Type:   Eval and Treat     Referral Reason:   Specialty Services Required     Referred to Provider:   Litzy Bustos MD     Requested Specialty:   Orthopedic Surgery     Number of Visits Requested:   1         Nicholas Herring MD.      Maxine Abdi: \"This note was dictated with voice recognition software. Though review and correction are routine, we apologize for any errors. \"

## 2022-06-01 ENCOUNTER — OFFICE VISIT (OUTPATIENT)
Dept: ORTHOPEDIC SURGERY | Age: 42
End: 2022-06-01
Payer: COMMERCIAL

## 2022-06-01 VITALS — BODY MASS INDEX: 38.15 KG/M2 | HEIGHT: 65 IN | WEIGHT: 229 LBS

## 2022-06-01 DIAGNOSIS — M25.859 FEMORAL ACETABULAR IMPINGEMENT: Primary | ICD-10-CM

## 2022-06-01 DIAGNOSIS — S73.199A TEAR OF ACETABULAR LABRUM, UNSPECIFIED LATERALITY, INITIAL ENCOUNTER: ICD-10-CM

## 2022-06-01 PROCEDURE — 1036F TOBACCO NON-USER: CPT | Performed by: ORTHOPAEDIC SURGERY

## 2022-06-01 PROCEDURE — 99204 OFFICE O/P NEW MOD 45 MIN: CPT | Performed by: ORTHOPAEDIC SURGERY

## 2022-06-01 PROCEDURE — G8427 DOCREV CUR MEDS BY ELIG CLIN: HCPCS | Performed by: ORTHOPAEDIC SURGERY

## 2022-06-01 PROCEDURE — G8417 CALC BMI ABV UP PARAM F/U: HCPCS | Performed by: ORTHOPAEDIC SURGERY

## 2022-06-01 NOTE — PROGRESS NOTES
[address] Record Number: [id number]  YOB: 1980  Date of Encounter: 6/1/2022     Chief Complaint   Patient presents with    Hip Pain     NP. BILATERAL HIP PAIN. RIGHT SLIGHTLY MORE THAN LEFT       History of Present Illness:  Heather Jiménez is a 39 y.o. female here for evaluation of her chronic bilateral hip pain. Her pain assessment is documented below and I reviewed this with her today. Patient states she has had bilateral hip pain for many years. She had bilateral hip arthroscopies 10+ years ago in [place] which she states did help for a few years. Patient states her pain has continued to worsen to the point where it is dramatically affecting her mobility and activity level. She states most of her pain is in her bilateral groins and radiates to her low back. She states her pain is constant and worsens with certain activity or positions of her hips. She does feel her pain is weather dependent. She states she has most recently done physical therapy about 2 years ago at which time she did 10+ visits. 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 She has spent a lot of her life living in [place]. Pain Assessment  Location of Pain:  (HIP)  Location Modifiers: Right,Left  Severity of Pain: 10  Quality of Pain: Aching (BURNING. STABBING.)  Frequency of Pain: Constant  Aggravating Factors: Bending,Straightening,Kneeling,Squatting,Walking,Stairs (SITTING. LAYING.  DRIVING)  Limiting Behavior: Yes  Result of Injury: No  Work-Related Injury: No  Are there other pain locations you wish to document?: No    Past Medical History:   Diagnosis Date    Arthritis     Depression     Diabetes mellitus ([facility] [address]     Hypertension     Tachycardia        Past Surgical History:   Procedure Laterality Date    ANKLE FUSION Bilateral     ARTHRODESIS Left 12/6/2019    REVISION OF TALONAVICULAR JOINT ARTHRODESIS LEFT FOOT -SLEEP APNEA- performed by King Magana DPM at [facility] Bilateral     ELBOW SURGERY Bilateral     ulnar nerve release    HERNIA REPAIR      HIP ARTHROSCOPY Bilateral     TONSILLECTOMY AND ADENOIDECTOMY      WRIST GANGLION EXCISION Bilateral        Current Outpatient Medications   Medication Sig Dispense Refill    Cholecalciferol (VITAMIN D3) 50 MCG (2000 UT) CAPS TAKE 1 CAPSULE BY MOUTH EVERY DAY 90 capsule 1    diclofenac sodium (VOLTAREN) 1 % GEL APPLY 2 GRAMS TOPICALLY 2 TIMES DAILY 100 g 1    citalopram (CELEXA) 40 MG tablet TAKE 1 TABLET BY MOUTH EVERY DAY IN THE MORNING      omeprazole (PRILOSEC) 20 MG delayed release capsule TAKE 1 CAPSULE BY MOUTH EVERY DAY 90 capsule 1    rosuvastatin (CRESTOR) 40 MG tablet TAKE 1 TABLET BY MOUTH EVERY DAY 90 tablet 1    metFORMIN (GLUCOPHAGE-XR) 500 MG extended release tablet Take 2 tablets by mouth daily (with breakfast) 180 tablet 1    metoprolol tartrate (LOPRESSOR) 50 MG tablet Take 1 tablet by mouth 2 times daily 180 tablet 1    ezetimibe (ZETIA) 10 MG tablet Take 1 tablet by mouth daily 90 tablet 1    methylPREDNISolone (MEDROL, CATARINA,) 4 MG tablet By mouth. 1 kit 0    meloxicam (MOBIC) 15 MG tablet Take 1 tablet by mouth daily as needed for Pain 90 tablet 0    albuterol sulfate  (90 Base) MCG/ACT inhaler TAKE 2 PUFFS BY MOUTH EVERY 6 HOURS AS NEEDED FOR WHEEZE 6.7 each 1    nitroGLYCERIN (NITROSTAT) 0.4 MG SL tablet Place 1 tablet under the tongue every 5 minutes as needed for Chest pain up to max of 3 total doses.  If no relief after 1 dose, call 911. 25 tablet 0    ammonium lactate (LAC-HYDRIN) 12 % lotion Apply topically daily 225 mL 2    thiothixene (NAVANE) 2 MG capsule TAKE 1 CAPSULE BY MOUTH EVERY EVENING AT BEDTIME      divalproex (DEPAKOTE ER) 500 MG extended release tablet Take 500 mg by mouth daily      hydrOXYzine (ATARAX) 25 MG tablet Take 25 mg by mouth 3 times daily as needed for Anxiety       pregabalin (LYRICA) 75 MG capsule Take 1 capsule by mouth 2 times daily for 90 days. 60 capsule 2    citalopram (CELEXA) 20 MG tablet Take 20 mg by mouth daily        No current facility-administered medications for this visit. Allergies, social and family histories were reviewed and updated as appropriate. Allergies   Allergen Reactions    Cinnamon Itching    Codeine Hives and Itching       Review of Systems:  A 14 point review of systems available in the scanned medical record as documented by the patient. Today's review pertinent items are noted in HPI. Vital Signs:  Ht 5' 5\" (1.651 m)   Wt 229 lb (103.9 kg)   LMP 04/10/2022   BMI 38.11 kg/m²     General Exam:   Neurological: Alert and oriented x 3. There is no focal weakness or sensory deficit. Constitutional: Patient is adequately groomed with no evidence of malnutrition  Mental Status: The patient is oriented to time, place and person. The patient's mood and affect are appropriate. Lymphatic: The lymphatic examination bilaterally reveals all areas to be without enlargement or induration. Eyes: Sclera clear  Ears: Normal external ear  Mouth:  No perioral lesions  Pulm: Respirations unlabored and regular   Skin: Warm, well perfused      Bialteral Hip Examination:    Inspection: There are no rashes, ulcerations or lesions. Normal muscle contours and no significant limb length discrepancy. No gross atrophy in any particular myotome. There is no obvious swelling or joint effusion of the hip. There are no abrasions, lacerations, contusions, hematomas or ecchymosis. There is no erythema, induration or warmth to suggest an infectious process. Standing/Walking:    Gait: Patient has a antalgic gait and is taking short strides.     Assistive Device: None  Trendelenburg: positive    Sitting Exam:   Straight Leg Raise: positive for hip pain  5/5 Hip Flexor Strength, 5/5 Abductor Strength, 5/5 Adductor strength    Supine Exam:   Leg Length: equal  non-tender around the ASIS and AIIS  Flexion arc 0 - 100 degrees, IR 15 degrees, ER 25 degrees with pain  Special Tests:  Stinchfield (resisted SLR): positive     Deep Flexion: positive   FADIR: positive with pain that is anterior   CHACHO: positive with pain that is anterior and SI joint   Athletic Pubalgia: negative     Side Lying Exam:   tender at greater trochanter  tender abductor musculature  tender along course of the iliotibial band  Abductor side leg raise 5/5  Juanpablo Test: tight    Prone Exam:   Hip Flexion Contracture: Positive on the left, negative on the right   IR 15 degrees, ER 25 degrees  not tender at the ischial tuberosity/proximal hamstring  tender along the Bilateral sacro-Iliac joint(s). Circulation: The limb is warm and well perfused. Distal pulses intact. Capillary refill is intact. Edema: none. Venous stasis changes: none. Neuro: Sensation equal bilateral lower extremities           Diagnostics:    Radiology:       Pertinent imaging reviewed, images only - no report available. Old radiographs were obtained and reviewed in the office; 3 views: AP Pelvis and bilateral hip 45-deg Brunson View, and bilateral False Profile View    Tonnis Grade: grade 1  LCEA: 37 degrees without any advnaced DJD   Alpha Angle: 70 deg without DJD   Cross over-sign is focal  Other:  Negative Coxa Profunda, Negative Protrusio  Impression: no acute findings regarding any fractures, loose bodies, or dislocation. There is bilateral MIXED ABILIO with type subspine impingement. MRI Left Hip 5/23/22:     CONCLUSION:   1. Anterior superior and superior labrum is torn and scarred.  No acute or displaced labral    tear. .   2. No osseous stress injury, fracture, AVN or mass. 3. Mild tendinopathy and peritendinitis involves the gluteus minimus and medius insertion. No    tear or bursitis. 4. Inflammation deep to the ITB may be on a posttraumatic or repetitive micro traumatic basis.            MRI Right Hip 5/23/22:      CONCLUSION:   1. No osseous fracture, AVN or mass. 2. Frayed and worn anterior superior labrum.  Chronic tear present. 3. Mild tendinopathy and peritendinitis involves the gluteus minimus and medius insertion. No    tear or bursitis. 4. Complex cyst right adnexal region measures 3.9 x 3.15 x 4.25 cm. Hemorrhagic cyst favored. Follow-up imaging may be considered if patient has symptoms referable to this region. 5. IUD present within the uterus.  Adenomyosis contributory. Assessment: Patient is a 39 y.o. female who presents today regarding her chronic bilateral hip pain which is secondary to x-ray confirmed femoral acetabular impingement and subspine impingement as well as MRI confirmed bilateral labral tearing/degeneration    Impression:   Diagnosis Orders   1. Femoral acetabular impingement     2. Tear of acetabular labrum, unspecified laterality, initial encounter         Office Procedures:  No orders of the defined types were placed in this encounter. No orders of the defined types were placed in this encounter. Treatment Plan:       Pertinent imaging was reviewed. The etiology, natural history, and treatment options for the disorder were discussed. The roles of activity modification, antiinflammatory medicine, injections, bracing, physical therapy, and surgical interventions were all described to the patient and questions were answered. We believe patient is a candidate for bilateral hip injections. Patient will follow-up in the [place] office on Monday. Advised patient we will pick the most painful hip and do a freezing injection to determine if that will give her any meaningful pain relief for a few hours. We will do a cortisone injection in the contralateral hip. One side will be diagnostic, the other therapeutic, patient will decide. Patient's right hip MRI did show a complex cyst within the right adnexal region.   On her questionnaire she answered today she stated she does urinate more than 8 times per day, has urinary urgency, painful intercourse, and pelvic pain. I recommend patient follow-up with her gynecologist at her earliest convenience, and we will consider Pelvic Floor PT eval.       Rebecca Samano was informed of the results of any imaging. We discussed treatment options and a time was given to answer questions while in the clinic. A plan was proposed and Rebecca Samano understand and accepts this course of care. Approximately 45 minutes was spent on patient education and coordinating care. Follow up in: No follow-ups on file. Hip Self assessment forms           Sincerely,    Maribel Loera MD [address] [facility] [address] [place] [id number]  Email: [email or web address]  Office: [phone]    06/01/22  9:54 AM      The encounter with Rebecca Samano was carried out by myself, Dr Shannan Clemente, who personally examined the patient and reviewed the plan. José Camp PA-C, functioned as a scribe for Dr. Maribel Loera during this examination. The history taking and physical examination were also performed by Dr. Maribel Loera. Please note that portions of this dictation was performed with a voice recognition program ([employer]). All efforts were made to edit the dictation but occasionally words are mis-transcribed. It is possible that there are still dictated errors within this office note.   If so, please bring any errors to my attention for an addendum

## 2022-06-01 NOTE — LETTER
[address]  [address] [facility] [address]  Phone: [phone]  Fax: [phone]    Constanza Wade MD    June 1, 2022     JUAN RAMON Dowd - CNP  [address] [facility] [address] [facility] [address]    Patient: Courtney Suárez   MR Number: [id number]   YOB: 1980   Date of Visit: 6/1/2022       Dear Ita Wu:    Thank you for referring Shahida Terrazas to me for evaluation/treatment. Below are the relevant portions of my assessment and plan of care. If you have questions, please do not hesitate to call me. I look forward to following Segun Triplett along with you.     Sincerely,      Constanza Wade MD

## 2022-06-01 NOTE — LETTER
[address]  [address] [facility] [address]  Phone: [phone]  Fax: [phone]           Junior Suha MD      June 1, 2022     Patient: Sebastian Sanchez   MR Number: [id number]   YOB: 1980   Date of Visit: 6/1/2022       Dear Dr. Hector Loya,    Thank you for referring Massimo Rivera to me for evaluation/treatment. Below are the relevant portions of my assessment and plan of care. If you have questions, please do not hesitate to call me. I look forward to following Min Qureshi along with you.     Sincerely,        Junior Suha MD    CC providers:  MD Stoney Su[place] [address]  [address] [place]

## 2022-06-06 ENCOUNTER — OFFICE VISIT (OUTPATIENT)
Dept: ORTHOPEDIC SURGERY | Age: 42
End: 2022-06-06

## 2022-06-06 VITALS — HEIGHT: 65 IN | WEIGHT: 229 LBS | BODY MASS INDEX: 38.15 KG/M2

## 2022-06-06 DIAGNOSIS — M25.859 FEMORAL ACETABULAR IMPINGEMENT: Primary | ICD-10-CM

## 2022-06-06 DIAGNOSIS — S73.191D TEAR OF RIGHT ACETABULAR LABRUM, SUBSEQUENT ENCOUNTER: ICD-10-CM

## 2022-06-06 DIAGNOSIS — S73.192D TEAR OF LEFT ACETABULAR LABRUM, SUBSEQUENT ENCOUNTER: ICD-10-CM

## 2022-06-06 PROCEDURE — 20611 DRAIN/INJ JOINT/BURSA W/US: CPT | Performed by: ORTHOPAEDIC SURGERY

## 2022-06-06 RX ORDER — METHYLPREDNISOLONE ACETATE 40 MG/ML
40 INJECTION, SUSPENSION INTRA-ARTICULAR; INTRALESIONAL; INTRAMUSCULAR; SOFT TISSUE ONCE
Status: CANCELLED | OUTPATIENT
Start: 2022-06-06 | End: 2022-06-06

## 2022-06-06 RX ORDER — ROPIVACAINE HYDROCHLORIDE 5 MG/ML
30 INJECTION, SOLUTION EPIDURAL; INFILTRATION; PERINEURAL ONCE
Status: CANCELLED | OUTPATIENT
Start: 2022-06-06 | End: 2022-06-06

## 2022-06-06 RX ORDER — ROPIVACAINE HYDROCHLORIDE 5 MG/ML
30 INJECTION, SOLUTION EPIDURAL; INFILTRATION; PERINEURAL ONCE
Status: COMPLETED | OUTPATIENT
Start: 2022-06-06 | End: 2022-06-06

## 2022-06-06 RX ORDER — LIDOCAINE HYDROCHLORIDE 10 MG/ML
20 INJECTION, SOLUTION INFILTRATION; PERINEURAL ONCE
Status: COMPLETED | OUTPATIENT
Start: 2022-06-06 | End: 2022-06-06

## 2022-06-06 RX ADMIN — LIDOCAINE HYDROCHLORIDE 20 ML: 10 INJECTION, SOLUTION INFILTRATION; PERINEURAL at 17:02

## 2022-06-06 RX ADMIN — ROPIVACAINE HYDROCHLORIDE 30 ML: 5 INJECTION, SOLUTION EPIDURAL; INFILTRATION; PERINEURAL at 17:02

## 2022-06-06 NOTE — PROGRESS NOTES
6/6/22  5:01 PM        NDC: 35670-052-35   -   Ropivacaine 0.5%    LOT: [id number]    COMMENT: LEFT HIP FREEZING INJECTION      NDC: 8333-5603-88   -   Lidocaine 1.0 %    LOT: [id number]    COMMENT: LEFT HIP FREEZING INJECTION

## 2022-06-06 NOTE — PROGRESS NOTES
Chief Complaint  Hip Pain (BILATERAL HIP INJECTIONS)      History of Present Illness:  Rm Camejo is a pleasant 39 y.o. female who is here for pre-scheduled left hip diagnostic intra-articular injection for ABILIO. We also planned for right hip diagnostic/therapeutic cortisone injection for ABILIO as well. She denies any setbacks. She is accompanied by her mother today. Starting a new program for addictions counseling in August 2022    Medical History:  Patient's medications, allergies, past medical, surgical, social and family histories were reviewed and updated as appropriate. Pain Assessment  Location of Pain:  (HIP)  Location Modifiers: Left,Right  Severity of Pain: 9  Quality of Pain: Sharp,Aching  Frequency of Pain: Constant  Aggravating Factors: Walking,Standing,Squatting,Stairs,Kneeling,Straightening,Stretching,Bending  Limiting Behavior: Yes  Result of Injury: No  Work-Related Injury: No  Are there other pain locations you wish to document?: No  ROS: Review of systems reviewed from Patient History Form completed today and available in the patient's chart under the Media tab. Pertinent items are noted in HPI  Review of systems reviewed from Patient History Form completed today and available in the patient's chart under the Media tab. Vital Signs:  Ht 5' 5\" (1.651 m)   Wt 229 lb (103.9 kg)   BMI 38.11 kg/m²         Neuro: Alert & oriented x 3,  normal,  no focal deficits noted. Normal affect. Eyes: sclera clear  Ears: Normal external ear  Mouth:  No perioral lesions  Pulm: Respirations unlabored and regular  Pulse: Extremities well perfused. 2+ peripheral pulses. Skin: Warm. No ulcerations. Constitutional: The physical examination finds the patient to be well-developed and well-nourished. The patient is alert and oriented x3 and was cooperative throughout the visit.     Hip Examination: left    Skin/Inspection: No skin lesions, cellulitis, or extreme edema in the lower the patient who consented to the procedure. Under sterile conditions and after informed consent was obtained the patient was given an injection into the left hip joint with ultrasound guidance. Using a 20 gauge needle, 3 cc of 1% lidocaine and 3 ccof 0.5% ropivacaine (Naropin) were placed in the hip after it was prepped with chlorhexidine and needle localization was confirmed on ultrasound. This resulted in good relief of symptoms, and flexion/rotation tests of the involved hip shortly after the injection were immediately less provocative. There were no complications. The patient was advised to ice the hip  this evening, but as well to take part in normal or recreational activity in order to journal any relief to the injection. They were advised to call us if there was any erythema, enduration, swelling or increasing pain. To return in 48 hours for reevaluation post injection and to discuss the next steps. All the patient's questions were answered while in the clinic. The patient is understanding of all instructions and agrees with the plan. Approximately 30 minutes was spent on patient education and coordinating care. Follow up in: Return in about 2 days (around 6/8/2022) for VIRTUAL VISIT . Sincerely,    Gabino Escalera MD [address] [facility] [address] [place] [address]  Email: [email or web address]  Office: [phone]    06/06/22  6:04 PM        The encounter with Lucille Day was carried out by myself, Dr Jamin Ramos, who personally examined the patient and reviewed the plan. This dictation was performed with a verbal recognition program (DRAGON) and it was checked for errors. It is possible that there are still dictated errors within this office note. If so, please bring any errors to my attention for an addendum.   All efforts were made to ensure that this office note is accurate.

## 2022-06-06 NOTE — LETTER
[address]  [address]  Phone: [phone]  Fax: [phone]    Katherine Schulz MD    June 6, 2022     Ghada Marie, APRN - CNP  [address] [facility] [address] [place] [address]    Patient: Mali Recinos   MR Number: [id number]   YOB: 1980   Date of Visit: 6/6/2022       Dear Ghada Marie:    Thank you for referring Erasto Byrd to me for evaluation/treatment. Below are the relevant portions of my assessment and plan of care. If you have questions, please do not hesitate to call me. I look forward to following BODØ along with you.     Sincerely,      Katherine Schulz MD

## 2022-06-08 ENCOUNTER — TELEMEDICINE (OUTPATIENT)
Dept: ORTHOPEDIC SURGERY | Age: 42
End: 2022-06-08
Payer: COMMERCIAL

## 2022-06-08 ENCOUNTER — HOSPITAL ENCOUNTER (OUTPATIENT)
Dept: ULTRASOUND IMAGING | Age: 42
Discharge: HOME | End: 2022-06-08
Payer: COMMERCIAL

## 2022-06-08 DIAGNOSIS — N83.201 OVARIAN CYST, RIGHT: ICD-10-CM

## 2022-06-08 DIAGNOSIS — M25.859 FEMORAL ACETABULAR IMPINGEMENT: Primary | ICD-10-CM

## 2022-06-08 PROCEDURE — 76830 TRANSVAGINAL US NON-OB: CPT

## 2022-06-08 PROCEDURE — 76856 US EXAM PELVIC COMPLETE: CPT

## 2022-06-08 PROCEDURE — 99214 OFFICE O/P EST MOD 30 MIN: CPT | Performed by: ORTHOPAEDIC SURGERY

## 2022-06-08 NOTE — LETTER
SURGERY SCHEDULING SHEET         Account: [de-identified]  Patient: Dell Hurtado    : 1980  Address:  [address] [place] [address] [address] [place] [id number]    Diagnosis:     ICD-10-CM    1. Femoral acetabular impingement  M25.859      Date of Surgery: 2022  Time of Surgery: 7:30 am  Facility: [facility]  Surgeon Name: Marium Woodruff   Assisting: Froilan Cerrato  Procedure: LEFT HIP ARTHROSCOPY, SYNVECTOMY, ABILIO FEMOROACETABULAR IMPINGEMENT DECOMPRESSION, AND LABRAL REPAIR  CPT Code:    05095, 80847, 24798, 11014  Insurance: [facility] PP   Length of Procedure: 120 Minutes  Special Equipment: Jeane Mar and Nephew Hip Distractor - [place] Pad: Yes             Alexis Pivot Hip Arthroscopy Equipment  Labral Reconstruction: []Fascia Renata Allograft  []Graft prep stand []Anterior Tibialis  SALBADOR: []Cortical allograft ilium  Allograft: []Achilles tendon  []anterior tibialis  []Posterior tibialis  Position:  [x]Supine  []Lateral  []Beach-chair  []Prone    OR Bed:  []Regular  [][place]  []Tommie  []Hip wright  []Beach-chair  []Spyder  Radiology:  [x]Large C-arm (small headed with digital printing)  []Small C-arm  []Portable X-ray  Anesthesia:   [x] General [x] Block (PENG) [] MAC [x] Local (Orthomix) [] Spinal [] Choice  Endo Sedation:    [x] None [] Topical [] Other:   Patient Status:    [x] SDS (OP) [] AMA [] 23 HR OBS [] OIB  [] INPT (RM#)    PCP: Miladul Barrier, APRN - CNP  Blood Thinner: No   Allergies:    Allergies   Allergen Reactions    Cinnamon Itching    Codeine Hives and Itching     Pre op H&P Yes   IMPLEMENT ATTACHED CLINICAL ORDERS FOR THIS PATIENT  MD SIGNATURE:   Marium Woodruff MD Today's Date: 6/10/22 Time  8:58 am  Phone:  [phone]  Fax: 120 [facility] P: [phone] F: [phone] PAT F: [phone]    [address] H:[phone]  E:[phone] PAT A:[phone]

## 2022-06-08 NOTE — PROGRESS NOTES
Stan Bloom (:  1980) is a Established patient, here for evaluation of the following:    Assessment & Plan   Below is the assessment and plan developed based on review of pertinent history, physical exam, labs, studies, and medications. 1. Femoral acetabular impingement    Ivan Serrano is a candidate for left hip arthroscopy ABILIO surgery. And then contralateral right hip ABILIO surgery 3 months later. We will send Stan Bloom for routine pre operative testing and see the patient back in my office for their pre surgical visit to answer questions, prescribe post-operative medications, and discuss general recovery timelines. Patient will not need to be pre-fitted with orthotic   prior to surgery. Patient will need to be prescribed  walker for ambulatory support, cannot tolerate crutches   Patient will be off work 6 weeks post operatively   Patient will require pre-operative physical therapy for baseline evaluation, walker teaching, stair navigation and general precautions at our [facility] office with Antonio Delvalle. No follow-ups on file. Subjective   HPI  Review of Systems        Patient is 48 hours hours post diagnostic intra-articular marcaine injection to the  left hip. The patient tested the hip with the following provocative activities:  -Regular walk/hike  -Activities of daily living    The hip pain was was significantly relieved during the above activities, and noted her posture and limp improved. It wore off after 6 hours and pain returned. Therefore, we can further confirm that the patient's LEFT  hip pain is  intra-articular due to ABILIO. Therefore, Petrona's left hip pain has persisted despite extensive nonoperative treatment focused on hip conditioning, mobility exercises, and activity modification. She has radiologic evidence of a labral tear and ABILIO. The patient meets the 4 criteria for arthroscopic surgery of the left hip.   This includes groin dominant pain, reproducible on exam, with imaging confirming labral tear and ABILIO, and relief with an intra-articular freezing injection of the hip administered in the office through US-guidance.      Objective   Patient-Reported Vitals  No data recorded     Physical Exam  [INSTRUCTIONS:  \"[x]\" Indicates a positive item  \"[]\" Indicates a negative item  -- DELETE ALL ITEMS NOT EXAMINED]    Constitutional: [x] Appears well-developed and well-nourished [x] No apparent distress      [] Abnormal -     Mental status: [x] Alert and awake  [x] Oriented to person/place/time [x] Able to follow commands    [] Abnormal -     Eyes:   EOM    [x]  Normal    [] Abnormal -   Sclera  [x]  Normal    [] Abnormal -          Discharge [x]  None visible   [] Abnormal -     HENT: [x] Normocephalic, atraumatic  [] Abnormal -   [x] Mouth/Throat: Mucous membranes are moist    External Ears [x] Normal  [] Abnormal -    Neck: [x] No visualized mass [] Abnormal -     Pulmonary/Chest: [x] Respiratory effort normal   [x] No visualized signs of difficulty breathing or respiratory distress        [] Abnormal -      Musculoskeletal:   [x] Normal gait with no signs of ataxia         [x] Normal range of motion of neck        [] Abnormal -     Neurological:        [x] No Facial Asymmetry (Cranial nerve 7 motor function) (limited exam due to video visit)          [x] No gaze palsy        [] Abnormal -          Skin:        [x] No significant exanthematous lesions or discoloration noted on facial skin         [] Abnormal -            Psychiatric:       [x] Normal Affect [] Abnormal -        [x] No Hallucinations    Other pertinent observable physical exam findings:-  No signs of infection to injection site  Pain in c-sign distribution  Pain with with active FADIR self performed           On this date 6/8/2022 I have spent 30 minutes reviewing previous notes, test results and face to face (virtual) with the patient discussing the diagnosis and importance of compliance with the treatment plan as well as documenting on the day of the visit. Joshua Fofana, was evaluated through a synchronous (real-time) audio-video encounter. The patient (or guardian if applicable) is aware that this is a billable service, which includes applicable co-pays. This Virtual Visit was conducted with patient's (and/or legal guardian's) consent. The visit was conducted pursuant to the emergency declaration under the [address] [place] [address] authority and the [employer] and [employer] General Act. Patient identification was verified, and a caregiver was present when appropriate. The patient was located at Home: [address] [place] [address] [address]  [place] [id number]. Provider was located at [facility] (Appt Dept): [address].         --Savannah Poole MD

## 2022-06-09 ENCOUNTER — TELEPHONE (OUTPATIENT)
Dept: ORTHOPEDIC SURGERY | Age: 42
End: 2022-06-09

## 2022-06-09 DIAGNOSIS — M25.859 FEMORAL ACETABULAR IMPINGEMENT: Primary | ICD-10-CM

## 2022-06-09 DIAGNOSIS — S73.192D TEAR OF LEFT ACETABULAR LABRUM, SUBSEQUENT ENCOUNTER: ICD-10-CM

## 2022-06-09 NOTE — TELEPHONE ENCOUNTER
General Question     Subject: PATIENT IS NEEDING BLOOD WORK ORDER FOR PRE OP CHECK LIST .  PLEASE ADVISE   Patient: Rafaela Limon  Contact Number: [phone]

## 2022-06-13 ENCOUNTER — OFFICE VISIT (OUTPATIENT)
Dept: FAMILY MEDICINE CLINIC | Age: 42
End: 2022-06-13
Payer: COMMERCIAL

## 2022-06-13 VITALS
BODY MASS INDEX: 38.11 KG/M2 | TEMPERATURE: 97.1 F | HEART RATE: 77 BPM | SYSTOLIC BLOOD PRESSURE: 108 MMHG | WEIGHT: 229 LBS | DIASTOLIC BLOOD PRESSURE: 66 MMHG | OXYGEN SATURATION: 97 %

## 2022-06-13 DIAGNOSIS — M25.859 FEMORAL ACETABULAR IMPINGEMENT: ICD-10-CM

## 2022-06-13 DIAGNOSIS — J44.9 COPD WITH ASTHMA (HCC): ICD-10-CM

## 2022-06-13 DIAGNOSIS — Z01.818 PRE-OP EXAM: Primary | ICD-10-CM

## 2022-06-13 DIAGNOSIS — M79.7 FIBROMYALGIA: ICD-10-CM

## 2022-06-13 DIAGNOSIS — E78.2 MIXED HYPERLIPIDEMIA: ICD-10-CM

## 2022-06-13 DIAGNOSIS — G47.33 OSA (OBSTRUCTIVE SLEEP APNEA): ICD-10-CM

## 2022-06-13 DIAGNOSIS — E11.9 TYPE 2 DIABETES MELLITUS WITHOUT COMPLICATION, WITHOUT LONG-TERM CURRENT USE OF INSULIN (HCC): ICD-10-CM

## 2022-06-13 PROCEDURE — 93000 ELECTROCARDIOGRAM COMPLETE: CPT | Performed by: FAMILY MEDICINE

## 2022-06-13 PROCEDURE — 3023F SPIROM DOC REV: CPT | Performed by: FAMILY MEDICINE

## 2022-06-13 PROCEDURE — 2022F DILAT RTA XM EVC RTNOPTHY: CPT | Performed by: FAMILY MEDICINE

## 2022-06-13 PROCEDURE — G8427 DOCREV CUR MEDS BY ELIG CLIN: HCPCS | Performed by: FAMILY MEDICINE

## 2022-06-13 PROCEDURE — G8417 CALC BMI ABV UP PARAM F/U: HCPCS | Performed by: FAMILY MEDICINE

## 2022-06-13 PROCEDURE — 99214 OFFICE O/P EST MOD 30 MIN: CPT | Performed by: FAMILY MEDICINE

## 2022-06-13 ASSESSMENT — PATIENT HEALTH QUESTIONNAIRE - PHQ9
7. TROUBLE CONCENTRATING ON THINGS, SUCH AS READING THE NEWSPAPER OR WATCHING TELEVISION: 0
2. FEELING DOWN, DEPRESSED OR HOPELESS: 0
SUM OF ALL RESPONSES TO PHQ QUESTIONS 1-9: 0
1. LITTLE INTEREST OR PLEASURE IN DOING THINGS: 0
SUM OF ALL RESPONSES TO PHQ QUESTIONS 1-9: 0
10. IF YOU CHECKED OFF ANY PROBLEMS, HOW DIFFICULT HAVE THESE PROBLEMS MADE IT FOR YOU TO DO YOUR WORK, TAKE CARE OF THINGS AT HOME, OR GET ALONG WITH OTHER PEOPLE: 0
SUM OF ALL RESPONSES TO PHQ9 QUESTIONS 1 & 2: 0
9. THOUGHTS THAT YOU WOULD BE BETTER OFF DEAD, OR OF HURTING YOURSELF: 0
6. FEELING BAD ABOUT YOURSELF - OR THAT YOU ARE A FAILURE OR HAVE LET YOURSELF OR YOUR FAMILY DOWN: 0
SUM OF ALL RESPONSES TO PHQ QUESTIONS 1-9: 0
4. FEELING TIRED OR HAVING LITTLE ENERGY: 0
5. POOR APPETITE OR OVEREATING: 0
SUM OF ALL RESPONSES TO PHQ QUESTIONS 1-9: 0
8. MOVING OR SPEAKING SO SLOWLY THAT OTHER PEOPLE COULD HAVE NOTICED. OR THE OPPOSITE, BEING SO FIGETY OR RESTLESS THAT YOU HAVE BEEN MOVING AROUND A LOT MORE THAN USUAL: 0
3. TROUBLE FALLING OR STAYING ASLEEP: 0

## 2022-06-13 NOTE — PROGRESS NOTES
Preoperative Consultation      Reema Villagran  YOB: 1980    Date of Service:  6/13/2022    Vitals:    06/13/22 1626   BP: 108/66   Pulse: 77   Temp: 97.1 °F (36.2 °C)   SpO2: 97%   Weight: 229 lb (103.9 kg)      Wt Readings from Last 2 Encounters:   06/13/22 229 lb (103.9 kg)   06/06/22 229 lb (103.9 kg)     BP Readings from Last 3 Encounters:   06/13/22 108/66   04/29/22 118/80   03/08/22 100/72        Chief Complaint   Patient presents with    Pre-op Exam     6/28/2022  left hip arthroscopy      Allergies   Allergen Reactions    Cinnamon Itching    Codeine Hives and Itching     Outpatient Medications Marked as Taking for the 6/13/22 encounter (Office Visit) with Donovan Whiteside MD   Medication Sig Dispense Refill    Cholecalciferol (VITAMIN D3) 50 MCG (2000 UT) CAPS TAKE 1 CAPSULE BY MOUTH EVERY DAY 90 capsule 1    diclofenac sodium (VOLTAREN) 1 % GEL APPLY 2 GRAMS TOPICALLY 2 TIMES DAILY 100 g 1    citalopram (CELEXA) 40 MG tablet TAKE 1 TABLET BY MOUTH EVERY DAY IN THE MORNING      omeprazole (PRILOSEC) 20 MG delayed release capsule TAKE 1 CAPSULE BY MOUTH EVERY DAY 90 capsule 1    rosuvastatin (CRESTOR) 40 MG tablet TAKE 1 TABLET BY MOUTH EVERY DAY 90 tablet 1    metFORMIN (GLUCOPHAGE-XR) 500 MG extended release tablet Take 2 tablets by mouth daily (with breakfast) 180 tablet 1    metoprolol tartrate (LOPRESSOR) 50 MG tablet Take 1 tablet by mouth 2 times daily 180 tablet 1    ezetimibe (ZETIA) 10 MG tablet Take 1 tablet by mouth daily 90 tablet 1    meloxicam (MOBIC) 15 MG tablet Take 1 tablet by mouth daily as needed for Pain 90 tablet 0    albuterol sulfate  (90 Base) MCG/ACT inhaler TAKE 2 PUFFS BY MOUTH EVERY 6 HOURS AS NEEDED FOR WHEEZE 6.7 each 1    nitroGLYCERIN (NITROSTAT) 0.4 MG SL tablet Place 1 tablet under the tongue every 5 minutes as needed for Chest pain up to max of 3 total doses.  If no relief after 1 dose, call 911. 25 tablet 0    ammonium lactate (LAC-HYDRIN) 12 % lotion Apply topically daily 225 mL 2    thiothixene (NAVANE) 2 MG capsule TAKE 1 CAPSULE BY MOUTH EVERY EVENING AT BEDTIME      divalproex (DEPAKOTE ER) 500 MG extended release tablet Take 500 mg by mouth daily      hydrOXYzine (ATARAX) 25 MG tablet Take 25 mg by mouth 3 times daily as needed for Anxiety          This patient presents to the office today for a preoperative consultation at the request of surgeon, Dr. Kelsi Townsend, who plans on performing left hip orthoscopic surgery on June 28 at [facility].  The current problem began 2 years ago, and symptoms have been worsening with time. Conservative therapy: N/A.     Planned anesthesia: General   Known anesthesia problems: None   Bleeding risk: No recent or remote history of abnormal bleeding  Personal or FH of DVT/PE: No    Patient objection to receiving blood products: No    Patient Active Problem List   Diagnosis    AMOS (obstructive sleep apnea)    Type 2 diabetes mellitus without complication, without long-term current use of insulin (HCC)    Essential hypertension    Hyperlipidemia    History of fibromyalgia    Positive MATTHEW (antinuclear antibody)    CS (cervical spondylosis)    Cervical discogenic pain syndrome    DDD (degenerative disc disease), cervical    Herniated cervical disc without myelopathy    Bipolar disorder ([address]    Fibromyalgia    Mixed anxiety and depressive disorder    Morbidly obese ([address]    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[address]    Gastroesophageal reflux disease    Chronic tachycardia    Migraine without status migrainosus, not intractable       Past Medical History:   Diagnosis Date    Arthritis     Depression     Diabetes mellitus ([address]     Hypertension     Tachycardia      Past Surgical History:   Procedure Laterality Date    ANKLE FUSION Bilateral     ARTHRODESIS Left 12/6/2019    REVISION OF TALONAVICULAR JOINT ARTHRODESIS LEFT FOOT  -SLEEP APNEA- performed by Alan Guajardo DPM at [address] [place] [address] ASC OR    CARPAL TUNNEL RELEASE Bilateral     ELBOW SURGERY Bilateral     ulnar nerve release    HERNIA REPAIR      HIP ARTHROSCOPY Bilateral     TONSILLECTOMY AND ADENOIDECTOMY      WRIST GANGLION EXCISION Bilateral      Family History   Problem Relation Age of Onset    High Blood Pressure Mother     Arthritis Mother     Other Father         hypothyroidism    High Blood Pressure Father     Arthritis Father     Asthma Father     Heart Failure Maternal Aunt         22 stents.  Heart Failure Maternal Grandmother     Pacemaker Maternal Grandfather     Heart Surgery Paternal Grandmother     Pacemaker Paternal Grandfather      Social History     Socioeconomic History    Marital status:      Spouse name: Not on file    Number of children: Not on file    Years of education: Not on file    Highest education level: Not on file   Occupational History    Not on file   Tobacco Use    Smoking status: Former Smoker     Packs/day: 1.00     Years: 27.00     Pack years: 27.00     Types: Cigarettes     Start date: 12     Quit date: 2019     Years since quittin.5    Smokeless tobacco: Never Used    Tobacco comment: started to smoke at 15 / smoked up to 1 ppd / now smoking 3 to 4 cigarettes a day   Vaping Use    Vaping Use: Never used   Substance and Sexual Activity    Alcohol use: Never    Drug use: Never    Sexual activity: Not on file   Other Topics Concern    Not on file   Social History Narrative    Not on file     Social Determinants of Health     Financial Resource Strain: Low Risk     Difficulty of Paying Living Expenses: Not hard at all   Food Insecurity: No Food Insecurity    Worried About Running Out of Food in the Last Year: Never true    Parish of Food in the Last Year: Never true   Transportation Needs:     Lack of Transportation (Medical): Not on file    Lack of Transportation (Non-Medical):  Not on file   Physical Activity: Sufficiently Active    Days of Exercise per Week: 4 days    Minutes of Exercise per Session: 60 min   Stress:     Feeling of Stress : Not on file   Social Connections:     Frequency of Communication with Friends and Family: Not on file    Frequency of Social Gatherings with Friends and Family: Not on file    Attends U[employer] Services: Not on file    Active Member of Clubs or Organizations: Not on file    Attends Club or Organization Meetings: Not on file    Marital Status: Not on file   Intimate Partner Violence: Not At Risk    Fear of Current or Ex-Partner: No    Emotionally Abused: No    Physically Abused: No    Sexually Abused: No   Housing Stability:     Unable to Pay for Housing in the Last Year: Not on file    Number of [place] in the Last Year: Not on file    Unstable Housing in the Last Year: Not on file       Review of Systems  A comprehensive review of systems was negative except for what was noted in the HPI. Physical Exam   Constitutional: She is oriented to person, place, and time. She appears well-developed and well-nourished. No distress. HENT:   Head: Normocephalic and atraumatic. Mouth/Throat: Uvula is midline, oropharynx is clear and moist and mucous membranes are normal.   Eyes: Conjunctivae and EOM are normal. Pupils are equal, round, and reactive to light. Neck: Trachea normal and normal range of motion. Neck supple. No JVD present. Carotid bruit is not present. No mass and no thyromegaly present. Cardiovascular: Normal rate, regular rhythm, normal heart sounds and intact distal pulses. Exam reveals no gallop and no friction rub. No murmur heard. Pulmonary/Chest: Effort normal and breath sounds normal. No respiratory distress. She has no wheezes. She has no rales. Abdominal: Soft. Normal aorta and bowel sounds are normal. She exhibits no distension and no mass. There is no hepatosplenomegaly. No tenderness. Musculoskeletal: She exhibits no edema and no tenderness.    Neurological: She is alert and oriented to person, place, and time. She has normal strength. No cranial nerve deficit or sensory deficit. Coordination and gait normal.   Skin: Skin is warm and dry. No rash noted. No erythema. Psychiatric: She has a normal mood and affect. Her behavior is normal.     EKG Interpretation:  normal EKG, normal sinus rhythm, nonspecific ST and T waves changes. Lab Review pending        Assessment:       39 y.o. patient with planned surgery as above. Known risk factors for perioperative complications: Asthma, COPD, Diabetes mellitus, Hypertension, Obstructive sleep apnea  Current medications which may produce withdrawal symptoms if withheld perioperatively: none      Plan:     1. Preoperative workup as follows: ECG, hemoglobin, hematocrit, electrolytes, creatinine, glucose  2. Change in medication regimen before surgery: None  3. Prophylaxis for cardiac events with perioperative beta-blockers: Not indicated  ACC/AHA indications for pre-operative beta-blocker use:    · Vascular surgery with history of postitive stress test  · Intermediate or high risk surgery with history of CAD   · Intermediate or high risk surgery with multiple clinical predictors of CAD- 2 of the following: history of compensated or prior heart failure, history of cerebrovascular disease, DM, or renal insufficiency    Routine administration of higher-dose, long-acting metoprolol in beta-blockernaïve patients on the day of surgery, and in the absence of dose titration is associated with an overall increase in mortality. Beta-blockers should be started days to weeks prior to surgery and titrated to pulse < 70.  4. Deep vein thrombosis prophylaxis: regimen to be chosen by surgical team  5. No contraindications to planned surgery  Pt has been advised to get clearance from cardiology        Addendumreviewed last follow-up with cardiology which was about a year ago.   Did have stress test which was reported to her to be normal.  He has no symptoms. She has no personal history of CAD but strong family history. Last cardiology visit was reviewed [place] [phone] for a follow-up and cardiac clearance prior to surgery.

## 2022-06-14 ENCOUNTER — HOSPITAL ENCOUNTER (OUTPATIENT)
Age: 42
Discharge: HOME | End: 2022-06-14
Payer: COMMERCIAL

## 2022-06-14 ENCOUNTER — TELEPHONE (OUTPATIENT)
Dept: CARDIOLOGY CLINIC | Age: 42
End: 2022-06-14

## 2022-06-14 ENCOUNTER — TELEPHONE (OUTPATIENT)
Dept: ORTHOPEDIC SURGERY | Age: 42
End: 2022-06-14

## 2022-06-14 DIAGNOSIS — Z01.818 PRE-OP EXAM: ICD-10-CM

## 2022-06-14 DIAGNOSIS — E11.9 TYPE 2 DIABETES MELLITUS WITHOUT COMPLICATION, WITHOUT LONG-TERM CURRENT USE OF INSULIN (HCC): ICD-10-CM

## 2022-06-14 DIAGNOSIS — E78.2 MIXED HYPERLIPIDEMIA: ICD-10-CM

## 2022-06-14 LAB
A/G RATIO: 1.5 (ref 1.1–2.2)
ALBUMIN SERPL-MCNC: 4.4 G/DL (ref 3.4–5)
ALP BLD-CCNC: 57 U/L (ref 40–129)
ALT SERPL-CCNC: 21 U/L (ref 10–40)
ANION GAP SERPL CALCULATED.3IONS-SCNC: 13 MMOL/L (ref 3–16)
AST SERPL-CCNC: 19 U/L (ref 15–37)
BASOPHILS ABSOLUTE: 0 K/UL (ref 0–0.2)
BASOPHILS RELATIVE PERCENT: 0.3 %
BILIRUB SERPL-MCNC: <0.2 MG/DL (ref 0–1)
BUN BLDV-MCNC: 13 MG/DL (ref 7–20)
CALCIUM SERPL-MCNC: 9.2 MG/DL (ref 8.3–10.6)
CHLORIDE BLD-SCNC: 102 MMOL/L (ref 99–110)
CHOLESTEROL, TOTAL: 138 MG/DL (ref 0–199)
CO2: 25 MMOL/L (ref 21–32)
CREAT SERPL-MCNC: 0.8 MG/DL (ref 0.6–1.1)
EOSINOPHILS ABSOLUTE: 0.1 K/UL (ref 0–0.6)
EOSINOPHILS RELATIVE PERCENT: 1.3 %
GFR AFRICAN AMERICAN: >60
GFR NON-AFRICAN AMERICAN: >60
GLUCOSE BLD-MCNC: 102 MG/DL (ref 70–99)
HCT VFR BLD CALC: 37.7 % (ref 36–48)
HDLC SERPL-MCNC: 33 MG/DL (ref 40–60)
HEMOGLOBIN: 12.6 G/DL (ref 12–16)
LDL CHOLESTEROL CALCULATED: 85 MG/DL
LYMPHOCYTES ABSOLUTE: 3 K/UL (ref 1–5.1)
LYMPHOCYTES RELATIVE PERCENT: 36.1 %
MCH RBC QN AUTO: 28.4 PG (ref 26–34)
MCHC RBC AUTO-ENTMCNC: 33.5 G/DL (ref 31–36)
MCV RBC AUTO: 84.9 FL (ref 80–100)
MONOCYTES ABSOLUTE: 0.7 K/UL (ref 0–1.3)
MONOCYTES RELATIVE PERCENT: 9 %
NEUTROPHILS ABSOLUTE: 4.4 K/UL (ref 1.7–7.7)
NEUTROPHILS RELATIVE PERCENT: 53.3 %
PDW BLD-RTO: 14.1 % (ref 12.4–15.4)
PLATELET # BLD: 234 K/UL (ref 135–450)
PMV BLD AUTO: 7.3 FL (ref 5–10.5)
POTASSIUM SERPL-SCNC: 4.3 MMOL/L (ref 3.5–5.1)
RBC # BLD: 4.44 M/UL (ref 4–5.2)
SODIUM BLD-SCNC: 140 MMOL/L (ref 136–145)
TOTAL CK: 139 U/L (ref 26–192)
TOTAL PROTEIN: 7.3 G/DL (ref 6.4–8.2)
TRIGL SERPL-MCNC: 99 MG/DL (ref 0–150)
VLDLC SERPL CALC-MCNC: 20 MG/DL
WBC # BLD: 8.2 K/UL (ref 4–11)

## 2022-06-14 PROCEDURE — 83036 HEMOGLOBIN GLYCOSYLATED A1C: CPT

## 2022-06-14 PROCEDURE — 80053 COMPREHEN METABOLIC PANEL: CPT

## 2022-06-14 PROCEDURE — 85025 COMPLETE CBC W/AUTO DIFF WBC: CPT

## 2022-06-14 PROCEDURE — 82550 ASSAY OF CK (CPK): CPT

## 2022-06-14 PROCEDURE — 80061 LIPID PANEL: CPT

## 2022-06-14 PROCEDURE — 36415 COLL VENOUS BLD VENIPUNCTURE: CPT

## 2022-06-14 NOTE — TELEPHONE ENCOUNTER
Pt [facility] mailbox evening of 6/13 stating she needs cardiac clearance, pt is having left hip orthoscopic surgery on June 28 th.

## 2022-06-14 NOTE — TELEPHONE ENCOUNTER
Patient scheduled for surgery on 6/28/2022  All questions answered. H&P complete. Labs to be done at [facility]. EKG complete    At this time. EKG abnormal. She will go to [facility] for cardiac clearance.

## 2022-06-14 NOTE — TELEPHONE ENCOUNTER
General Question     Subject: PRE-SURGERY QUESTIONS  Patient and /or Facility Request: Jennie Hummel  Contact Number: [phone]    PATIENT CALLING REGARDING PRE-SURGERY QUESTIONS PAPERWORK THAT NEEDS TO BE FILLED OUT. PLEASE CALL BACK PATIENT AT THE ABOVE NUMBER.

## 2022-06-15 LAB
ESTIMATED AVERAGE GLUCOSE: 142.7 MG/DL
HBA1C MFR BLD: 6.6 %

## 2022-06-16 ENCOUNTER — OFFICE VISIT (OUTPATIENT)
Dept: CARDIOLOGY CLINIC | Age: 42
End: 2022-06-16
Payer: COMMERCIAL

## 2022-06-16 VITALS
HEIGHT: 65 IN | BODY MASS INDEX: 38.45 KG/M2 | WEIGHT: 230.8 LBS | DIASTOLIC BLOOD PRESSURE: 64 MMHG | HEART RATE: 85 BPM | SYSTOLIC BLOOD PRESSURE: 108 MMHG | OXYGEN SATURATION: 99 %

## 2022-06-16 DIAGNOSIS — Z01.810 PREOP CARDIOVASCULAR EXAM: Primary | ICD-10-CM

## 2022-06-16 DIAGNOSIS — I10 ESSENTIAL HYPERTENSION: ICD-10-CM

## 2022-06-16 DIAGNOSIS — M25.559 HIP PAIN: ICD-10-CM

## 2022-06-16 DIAGNOSIS — Z82.49 FAMILY HISTORY OF HEART DISEASE: ICD-10-CM

## 2022-06-16 DIAGNOSIS — E78.01 FAMILIAL HYPERCHOLESTEROLEMIA: ICD-10-CM

## 2022-06-16 PROCEDURE — G8427 DOCREV CUR MEDS BY ELIG CLIN: HCPCS | Performed by: INTERNAL MEDICINE

## 2022-06-16 PROCEDURE — G8417 CALC BMI ABV UP PARAM F/U: HCPCS | Performed by: INTERNAL MEDICINE

## 2022-06-16 PROCEDURE — 99214 OFFICE O/P EST MOD 30 MIN: CPT | Performed by: INTERNAL MEDICINE

## 2022-06-16 PROCEDURE — 1036F TOBACCO NON-USER: CPT | Performed by: INTERNAL MEDICINE

## 2022-06-16 PROCEDURE — 93000 ELECTROCARDIOGRAM COMPLETE: CPT | Performed by: INTERNAL MEDICINE

## 2022-06-16 RX ORDER — ASPIRIN 81 MG/1
81 TABLET ORAL DAILY
Qty: 90 TABLET | Refills: 1 | Status: SHIPPED | OUTPATIENT
Start: 2022-06-16 | End: 2022-06-27

## 2022-06-16 NOTE — PATIENT INSTRUCTIONS
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

## 2022-06-16 NOTE — PROGRESS NOTES
[address]  22  Referring: Dr. Segal ref. provider found    REASON FOR CONSULT/CHIEF COMPLAINT/HPI     Reason for visit/ Chief complaint  New Patient  Pre-operative   HPI Jen Shannon is a 39 y.o. here for cardiovascular clearance for hip surgery. She was last seen in office by Audrey Mayberry NP for chest pain. She had SOB at that time that she attributed to COPD. She had a normal stress in , normal echo in  and LHC in  with no lesions. History of Hld, Htn, DM, asthma. Family hx of heart disease, grandmother  during heart surgery, m-aunt has multiple stents. Former smoker (quit 2 yrs ago). She had Covid twice, once prior to vaccines and once about 2 months ago after having vaccines and boosters. Today she is here for clearance for hip surgery. She grew up in [place] (18 [place]rs), her father was in the [employer]. 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 She likes to sing and cook for fun. She has prior surgery on both hips years ago. This procedure is to help prolong a hip replacement in the future. She has constant pain. She was told that since birth her joins are too big for the socket. She has some stairs to get in and out of her apartment and does just ok with them, but it is painful. She can walk up a flight of stairs without stopping. She uses a scooter for longer distances for the past 5-6 yrs due to hip pain. She was going to the gym and using the treadmill up until her surgeon told her to stop d/t hips. She does have sharp chest pains that she attributes to \"overdoing\" it, every now and then they are strong enough to take a Nitro. She has come to the ER previously when she gets concerned. These pains have been going on for many years and have not changed in intensity, frequency or duration. She has not taken a nitro in several months. Her  and mother have told her she stops breathing at night.  She has not had a sleep study. Patient is adherent with medications and is tolerating them well without side effects     HISTORY/ALLERGIES/ROS     MedHx:  has a past medical history of Arthritis, Depression, Diabetes mellitus ([address] Hypertension, and Tachycardia. SurgHx:  has a past surgical history that includes Ankle Fusion (Bilateral); Hip arthroscopy (Bilateral); hernia repair; Tonsillectomy and adenoidectomy; Elbow surgery (Bilateral); Wrist ganglion excision (Bilateral); Carpal tunnel release (Bilateral); and arthrodesis (Left, 12/6/2019). SocHx:  reports that she quit smoking about 2 years ago. Her smoking use included cigarettes. She started smoking about 30 years ago. She has a 27.00 pack-year smoking history. She has never used smokeless tobacco. She reports that she does not drink alcohol and does not use drugs. FamHx: Family history of cad  Allergies: Cinnamon and Codeine   ROS:   Review of Systems   Constitutional: Positive for fatigue. Negative for activity change, diaphoresis and fever. HENT: Negative for congestion and ear discharge. Eyes: Negative for photophobia and visual disturbance. Respiratory: Negative for cough, chest tightness and shortness of breath. Cardiovascular: Positive for chest pain. Negative for palpitations. Gastrointestinal: Positive for nausea. Negative for abdominal distention, abdominal pain and blood in stool. Endocrine: Negative for cold intolerance and polydipsia. Genitourinary: Negative for difficulty urinating and flank pain. Musculoskeletal: Positive for arthralgias, myalgias and neck pain. Skin: Negative for rash and wound. Allergic/Immunologic: Negative for environmental allergies and immunocompromised state. Neurological: Negative for dizziness and headaches. Hematological: Negative for adenopathy. Does not bruise/bleed easily. Psychiatric/Behavioral: Negative for confusion. The patient is not hyperactive.            MEDICATIONS      Prior to Admission medications    Medication Sig Start Date End Date Taking? Authorizing Provider   Evolocumab 140 MG/ML SOAJ Inject 140 mg into the skin every 14 days 6/16/22  Yes Zacarias Busby, DO   aspirin EC 81 MG EC tablet Take 1 tablet by mouth daily 6/16/22  Yes Zacarias Busby, DO   Cholecalciferol (VITAMIN D3) 50 MCG (2000 UT) CAPS TAKE 1 CAPSULE BY MOUTH EVERY DAY 5/17/22  Yes JUAN RAMON Feldman CNP   diclofenac sodium (VOLTAREN) 1 % GEL APPLY 2 GRAMS TOPICALLY 2 TIMES DAILY 4/29/22  Yes Hernandez Mena MD   citalopram (CELEXA) 40 MG tablet TAKE 1 TABLET BY MOUTH EVERY DAY IN THE MORNING 4/19/22  Yes Historical Provider, MD   omeprazole (PRILOSEC) 20 MG delayed release capsule TAKE 1 CAPSULE BY MOUTH EVERY DAY 4/29/22  Yes JUAN RAMON Feldman CNP   rosuvastatin (CRESTOR) 40 MG tablet TAKE 1 TABLET BY MOUTH EVERY DAY 4/29/22  Yes JUAN RAMON Feldman CNP   metFORMIN (GLUCOPHAGE-XR) 500 MG extended release tablet Take 2 tablets by mouth daily (with breakfast) 4/29/22  Yes JUAN RAMON Feldman CNP   metoprolol tartrate (LOPRESSOR) 50 MG tablet Take 1 tablet by mouth 2 times daily 4/29/22  Yes JUAN RAMON Feldman CNP   ezetimibe (ZETIA) 10 MG tablet Take 1 tablet by mouth daily 4/29/22  Yes JUAN RAMON Feldman CNP   meloxicam (MOBIC) 15 MG tablet Take 1 tablet by mouth daily as needed for Pain 3/8/22  Yes Hernandez Mena MD   albuterol sulfate  (90 Base) MCG/ACT inhaler TAKE 2 PUFFS BY MOUTH EVERY 6 HOURS AS NEEDED FOR WHEEZE 9/30/21  Yes JUAN RAMON Feldman CNP   nitroGLYCERIN (NITROSTAT) 0.4 MG SL tablet Place 1 tablet under the tongue every 5 minutes as needed for Chest pain up to max of 3 total doses.  If no relief after 1 dose, call 911. 4/5/21  Yes JUAN RAMON Feldman CNP   ammonium lactate (LAC-HYDRIN) 12 % lotion Apply topically daily 4/5/21  Yes JUAN RAMON Feldman CNP   thiothixene (NAVANE) 2 MG capsule TAKE 1 CAPSULE BY MOUTH EVERY EVENING AT BEDTIME 6/11/20  Yes Historical Provider, MD pregabalin (LYRICA) 75 MG capsule Take 1 capsule by mouth 2 times daily for 90 days. 6/25/20 6/16/22 Yes Randene Najjar, APRN - CNP   divalproex (DEPAKOTE ER) 500 MG extended release tablet Take 500 mg by mouth daily   Yes Historical Provider, MD   hydrOXYzine (ATARAX) 25 MG tablet Take 25 mg by mouth 3 times daily as needed for Anxiety    Yes Historical Provider, MD       PHYSICAL EXAM        Vitals:    06/16/22 1325   BP: 108/64   Pulse: 85   SpO2: 99%    Weight: 230 lb 12.8 oz (104.7 kg)     Gen Alert, cooperative, no distress Heart  Regular rate and rhythm, no murmur   Head Normocephalic, atraumatic, no abnormalities Abd  Soft, NT, +BS, no mass, no OM   Eyes PERRLA, conj/corn clear Ext  Ext nl, AT, no C/C, no edema   Nose Nares normal, no drain age, Non-tender Pulse 2+ and symmetric   Throat Lips, mucosa, tongue normal Skin Color/text/turg nl, no rash/lesions   Neck S/S, TM, NT, no bruit Psych Nl mood and affect   Lung CTA-B, unlabored, no DTP     Ch wall NT, no deform       LABS and Imaging     Relevant and available CV data reviewed  Echo/MRI: 4/2021    Cath: 2017  Last Angiogram: Lt & Rt heart cath: '17  FINDINGS:  Pulmonary capillary wedge pressure 7, PA pressure 32/16, mean  pressure of 21.  Right atrial pressure of 9.  Right  ventricular pressure of 18.  Oxygen saturation throughout the  right heart system was around 68%.  Qp:Qs is calculated to be  1.  Cardiac output by Farhad method was 5.7 with cardiac index  of 2.84. FINDINGS:  Left main 0% stenosis.    LAD large, 0% stenosis.    Diagonal is  small, 0% stenosis.    Left circumflex is codominant artery, 0% stenosis.    Obtuse marginal is small.  Right coronary artery codominant artery with 0% stenosis.    LV angiogram shows LV ejection fraction 55% with LVEDP of 19 mmHg.     Holter none  EKG personally interpreted: sinus rhythm, anterior t wave inversion   Stress:4/2019   Summary  Normal myocardial perfusion study. Normal LV size and systolic function. Moderate Risk  Moderate Complexity/Medical Decision Making  Outside/Care everywhere records Reviewed  Labs Reviewed  Prior Imaging, ekg, cath, echo reviewed when available  Medications reviewed  Old Notes reviewed  ASSESSMENT AND PLAN     1. Cardiovascular pre-op  -Hip surgery 6/28  - abnormal EKG has not changed from prior EKGs   - chest pain is stable and unchanged, she   Plan:  - No unstable chest pain, valvular disease, chf, or arrhythmia. She is able to complete 4 mets and completed a stress test in the last year where she completed 6 minutes of exercise without ischemia. No further testing recommended per acc/aha 2014 perioperative guidelines. She is moderate risk for moderate risk surgery. Recommend that she continue betablocker during perioperative period. Letter sent to Dr Shelby Gamez     2. Hip pain  - new problem  Plan:  - surgery planned    3. Essential hypertension  -108/64  - stable on metoprolol  Plan:   - stable    4. Heterozygous Familial hypercholesterolemia  Historical LDL of 261, 245. Family history of high cholesterol (daughter and mom)  6/2022  TG 99 HDL 33 LDL 85  -crestor and zetia  - new problem   Plan:  - recommend PCSK9 inhibitor given familial hypercholesterolemia and LDL not at goal (<70) despite maximally tolerated statin, zetia and diet  - start 81 mg aspirin daily for primary prevention       5. AMOS  - stops breathing at night  - would benefit from a sleep study  Plan: not interested in study at this time, would like to follow up with her hips first    6. Diabetes    7. Family history of heart disease    Patient counseled on lifestyle modification, diet, and exercise. Follow Up: 6 months    Dr. Olivia Conroy: This note was scribed in the presence of Dr. Marvin Leija DO by Cody Garza RN. Physician Attestation  The scribe for and in the presence of remigio Xiao DO).   The scribe lisa fischer  may have prepopulated components of this note with my historical  intellectual property under my direct supervision. Any additions to this intellectual property were performed in my presence and at my direction.   Furthermore, the content and accuracy of this note have been reviewed by me Dock DO Yolande).  6/19/2022 6:33 PM

## 2022-06-16 NOTE — LETTER
[address] Cardiology - [facility] [address]  Phone: [phone]  Fax: [address], DO    June 19, 2022     Zahra Anderson, APRN - CNP  [address] [facility] [address] [facility] [address]    Patient: Dell Hurtado   MR Number: [id number]   YOB: 1980   Date of Visit: 6/16/2022       Dear Zahra Anderson:    Thank you for referring Anthony Juarez to me for evaluation/treatment. Below are the relevant portions of my assessment and plan of care. If you have questions, please do not hesitate to call me. I look forward to following Laura Reveles along with you.     Sincerely,      Brittanie Louis, DO

## 2022-06-16 NOTE — LETTER
[facility] CARDIOLOGY[address]  Dept: [phone]  Dept Fax: [phone]      2022    Patient:Petrona Cat   : 1980  DOS: 2022    To Whom it May Concern:    Stan Bloom has been evaluated for cardiac clearance and is considered at a moderate risk for surgery. There is no further cardiac testing that could be done to lower the risk. Please let my office know if you have any questions or concerns.           Kai Matta, [address] [facility]

## 2022-06-16 NOTE — Clinical Note
She is moderate risk for moderate risk surgery, continue betablocker. Will start PCSK9 inhibitor and aspirin, she has heterozygous FH (multiple LDL >190). Very high lifetime risk of ASCVD. I will see her back. Thanks!  [employer]

## 2022-06-17 NOTE — TELEPHONE ENCOUNTER
Maribeth Hamman from [facility] called in stating that they received the refill request or patient medication Evolocumab 140 MG/ML SOAJ     They cant fill it because it needs a PA.     They are also requesting the two office visit notes for he PA.        [id number] [facility] [place] [place] - [address] [facility]   [address] [place] [address]   Phone:  [phone]  Fax:  [phone]

## 2022-06-19 ASSESSMENT — ENCOUNTER SYMPTOMS
ABDOMINAL DISTENTION: 0
BLOOD IN STOOL: 0
PHOTOPHOBIA: 0
CHEST TIGHTNESS: 0
NAUSEA: 1
COUGH: 0
SHORTNESS OF BREATH: 0
ABDOMINAL PAIN: 0

## 2022-06-20 ENCOUNTER — TELEPHONE (OUTPATIENT)
Dept: ORTHOPEDIC SURGERY | Age: 42
End: 2022-06-20

## 2022-06-20 RX ORDER — ACETAMINOPHEN 160 MG
TABLET,DISINTEGRATING ORAL
Qty: 90 CAPSULE | Refills: 1 | OUTPATIENT
Start: 2022-06-20

## 2022-06-20 NOTE — TELEPHONE ENCOUNTER
Spoke with pt and she stated they only gave her 30 days last month and pharmacy won't refill their medication. I spoke with Nicolle from the pharmacy and she stated that since pt has medicare they won't fill 3 months at a time, they do 30 days with refills but pt can  med with no issue.     I spoke with pt and advised pharmacy note, pt verbalized understanding

## 2022-06-20 NOTE — TELEPHONE ENCOUNTER
Mateusz Niño is having issues with getting her refills of her Vitamin D3 refills. Please calll to advise.

## 2022-06-21 ENCOUNTER — TELEPHONE (OUTPATIENT)
Dept: ORTHOPEDIC SURGERY | Age: 42
End: 2022-06-21

## 2022-06-21 NOTE — TELEPHONE ENCOUNTER
APPROVED  CPT: [facility]  AUTH: [id number]  DATE RANGE: 6/28/22 TO 8/26/22  INSURANCE: [employer]  LOCATION [facility]  NOTE: Mp Newman

## 2022-06-21 NOTE — TELEPHONE ENCOUNTER
General Question     Subject: AUTHORIZATION FOR SURGERY   Patient and /or Facility Request: Harpreet Ramirez  Contact Number: [phone]    [address] [place] [address] Avenue A CALL BACK TO GIVE AUTHORIZATION FOR PATIENTS UPCOMING SURGERY HSILPA CAN BE REACHED [phone]

## 2022-06-23 ENCOUNTER — TELEPHONE (OUTPATIENT)
Dept: ORTHOPEDIC SURGERY | Age: 42
End: 2022-06-23

## 2022-06-23 NOTE — TELEPHONE ENCOUNTER
sw patient. All questions answered. S/w mff pt. All notified. Appointment verified for pre hab w/ walker training. S/w patient again.  All questions answered

## 2022-06-24 ENCOUNTER — APPOINTMENT (OUTPATIENT)
Dept: PHYSICAL THERAPY | Age: 42
End: 2022-06-24
Payer: COMMERCIAL

## 2022-06-27 ENCOUNTER — OFFICE VISIT (OUTPATIENT)
Dept: ORTHOPEDIC SURGERY | Age: 42
End: 2022-06-27
Payer: COMMERCIAL

## 2022-06-27 ENCOUNTER — ANESTHESIA EVENT (OUTPATIENT)
Dept: OPERATING ROOM | Age: 42
End: 2022-06-27
Payer: COMMERCIAL

## 2022-06-27 VITALS — HEIGHT: 65 IN | BODY MASS INDEX: 38.32 KG/M2 | WEIGHT: 230 LBS

## 2022-06-27 DIAGNOSIS — M25.859 FEMORAL ACETABULAR IMPINGEMENT: ICD-10-CM

## 2022-06-27 DIAGNOSIS — S73.192D TEAR OF LEFT ACETABULAR LABRUM, SUBSEQUENT ENCOUNTER: Primary | ICD-10-CM

## 2022-06-27 PROCEDURE — 1036F TOBACCO NON-USER: CPT | Performed by: ORTHOPAEDIC SURGERY

## 2022-06-27 PROCEDURE — G8427 DOCREV CUR MEDS BY ELIG CLIN: HCPCS | Performed by: ORTHOPAEDIC SURGERY

## 2022-06-27 PROCEDURE — G8417 CALC BMI ABV UP PARAM F/U: HCPCS | Performed by: ORTHOPAEDIC SURGERY

## 2022-06-27 PROCEDURE — E0135 WALKER FOLDING ADJUST/FIXED: HCPCS | Performed by: ORTHOPAEDIC SURGERY

## 2022-06-27 PROCEDURE — MISCCOLD COLD THERAPY UNIT AND PAD: Performed by: ORTHOPAEDIC SURGERY

## 2022-06-27 PROCEDURE — L1686 HO POST-OP HIP ABDUCTION: HCPCS | Performed by: ORTHOPAEDIC SURGERY

## 2022-06-27 PROCEDURE — 99214 OFFICE O/P EST MOD 30 MIN: CPT | Performed by: ORTHOPAEDIC SURGERY

## 2022-06-27 RX ORDER — ASPIRIN 81 MG/1
81 TABLET ORAL 2 TIMES DAILY
Qty: 28 TABLET | Refills: 0 | Status: CANCELLED | OUTPATIENT
Start: 2022-06-27 | End: 2022-07-11

## 2022-06-27 RX ORDER — SENNOSIDES 8.6 MG
1 TABLET ORAL 2 TIMES DAILY
Qty: 14 TABLET | Refills: 0 | Status: SHIPPED | OUTPATIENT
Start: 2022-06-27 | End: 2022-07-04

## 2022-06-27 RX ORDER — HYDROCODONE BITARTRATE AND ACETAMINOPHEN 5; 325 MG/1; MG/1
1 TABLET ORAL EVERY 6 HOURS PRN
Qty: 12 TABLET | Refills: 0 | Status: SHIPPED | OUTPATIENT
Start: 2022-06-27 | End: 2022-06-30 | Stop reason: SDUPTHER

## 2022-06-27 RX ORDER — RIVAROXABAN 10 MG/1
10 TABLET, FILM COATED ORAL
Qty: 10 TABLET | Refills: 0 | Status: SHIPPED | OUTPATIENT
Start: 2022-06-27 | End: 2022-07-11 | Stop reason: ALTCHOICE

## 2022-06-27 RX ORDER — NAPROXEN 500 MG/1
500 TABLET ORAL 2 TIMES DAILY WITH MEALS
Qty: 28 TABLET | Refills: 0 | Status: SHIPPED | OUTPATIENT
Start: 2022-06-27 | End: 2022-07-19 | Stop reason: ALTCHOICE

## 2022-06-27 RX ORDER — TRAMADOL HYDROCHLORIDE 50 MG/1
50 TABLET ORAL EVERY 6 HOURS PRN
Qty: 12 TABLET | Refills: 0 | Status: SHIPPED | OUTPATIENT
Start: 2022-06-27 | End: 2022-07-02

## 2022-06-27 NOTE — PROGRESS NOTES
Chief Complaint  Pre-op Exam (L HIP SURGERY 6/28/2022)      History of Present Illness:  Tanna Velazquez is a pleasant 39 y.o. female who is here for a preoperative visit. She is on the schedule tomorrow for a left hip arthroscopy with synovectomy, ABILIO impingement decompression, labral repair, possible abductor repair, endoscopic bursectomy possible open. Patient denies changes in her symptoms. She states her left hip pain can still go up to a 9/10. She was cleared by her PCP and cardiologist.     She does have diabetes, and prior cardiac catheter with stens. Only on ASA. Medical History:  Patient's medications, allergies, past medical, surgical, social and family histories were reviewed and updated as appropriate. Pain Assessment  Location of Pain: Hip  Location Modifiers: Left  Severity of Pain: 9  Quality of Pain: Sharp,Aching (BURNING. STABBING)  Frequency of Pain: Constant  Aggravating Factors: Walking,Standing,Bending  Limiting Behavior: Yes  Result of Injury: No  Work-Related Injury: No  Are there other pain locations you wish to document?: No  ROS: Review of systems reviewed from Patient History Form completed today and available in the patient's chart under the Media tab. Pertinent items are noted in HPI  Review of systems reviewed from Patient History Form completed today and available in the patient's chart under the Media tab. Vital Signs:  Ht 5' 5\" (1.651 m)   Wt 230 lb (104.3 kg)   LMP 06/01/2022 (Approximate)   BMI 38.27 kg/m²         Neuro: Alert & oriented x 3,  normal,  no focal deficits noted. Normal affect. Eyes: sclera clear  Ears: Normal external ear  Mouth:  No perioral lesions  Pulm: Respirations unlabored and regular  Pulse: Extremities well perfused. 2+ peripheral pulses. Skin: Warm. No ulcerations. Constitutional: The physical examination finds the patient to be well-developed and well-nourished.   The patient is alert and oriented x3 and was cooperative throughout the visit. Hip Examination: left    Skin/Inspection: No skin lesions, cellulitis, or extreme edema in the lower extremities. Standing/Walking: abnormal:   gait, negative Trendelenburg sign. Supine/Side Lying Exam: Non tender around the major bony prominences  diminished range with pain  FADIR Positive  CHACHO Positive  Resisted Abduction  4/5   Resisted Adduction 5/5   Resisted  Flexion 5/5   severely tender at greater trochanter    Distal Neurovascular exam is intact (foot sensation, pulses, and motor exam)       Diagnostics:    MRI left hip completed 5/23/2022:       CONCLUSION:   1. Anterior superior and superior labrum is torn and scarred.  No acute or displaced labral    tear. .   2. No osseous stress injury, fracture, AVN or mass. 3. Mild tendinopathy and peritendinitis involves the gluteus minimus and medius insertion. No    tear or bursitis. 4. Inflammation deep to the ITB may be on a posttraumatic or repetitive micro traumatic basis.             Assessment: Patient is a 39 y.o. female who is here for a preoperative visit. She is scheduled for surgery tomorrow. Her symptoms are unchanged. She is still a good candidate for left hip surgery, which based on her signs/symptoms and physical exam findings today will include ABILIO surgery and endoscopic/open bursectomy and abductor repair. Impression:  Visit Diagnoses       Codes    Tear of left acetabular labrum, subsequent encounter    -  Primary S73.192D    Femoral acetabular impingement     M25.859          Office Procedures:  No orders of the defined types were placed in this encounter. No orders of the defined types were placed in this encounter. Plan: We recommended a left hip arthroscopy with synovectomy, ABILIO impingement decompression, labral repair, possible abductor repair, endoscopic bursectomy possible open.      Risks, benefits, advantages, disadvantages and potential complications as well as the anticipated postoperative course were discussed. We outlined the 80 - 90% success rate of the operation. The risks of infection, bleeding, nerve injury, perineal numbness, sexual dysfunction, hip stiffness, and the possibility of persistent pain and prolonged recovery. We also discussed the involved rehabilitation timelines, no driving for  2  weeks, a need for a hip brace for up to 6 weeks, the general trajectory of improvement after hip surgery (pain relief, activities of daily living, return to sport), full hip loading and strengthening commencing at 3 months, and up to 6 - 7 months before full return to sport and unrestricted activities. The patient agreed to pursue this treatment option. All questions were addressed to their satisfaction. no latex allergy  no adhesive allergy  yes - medication allergy, codeine   yes - OCP or hormonal treatment - IUD   Will DVTp with Xarelto x 10 days, then to resume her own home ASA  yes - personal history of diabetes, or HTN/CAD with catheterization   Monitor for ezra-op symptoms. no personal or family history of bleeding  no personal or family history of DVT/PE  no personal or family history of intolerance no NSAIDS or history of GI ulcers  yes  -  personal or family history of problems with Anaesthesia - father \"flatline\"   Yes - blood thinners - ASA 81mg       All the patient's questions were answered while in the clinic. The patient is understanding of all instructions and agrees with the plan. Approximately 30 minutes was spent on patient education and coordinating care. Follow up in: No follow-ups on file. Sincerely,    Dodie Lynn MD [address] [address]  Email: [email or web address]  Office: [phone]    06/27/22  3:01 PM        The encounter with Gracie Gonzales was carried out by myself,  Srinivas Virk, who personally examined the patient and reviewed the plan. This dictation was performed with a verbal recognition program (DRAGON) and it was checked for errors. It is possible that there are still dictated errors within this office note. If so, please bring any errors to my attention for an addendum. All efforts were made to ensure that this office note is accurate.

## 2022-06-27 NOTE — LETTER
[facility]  [address] [address]  Phone: [phone]  Fax: [phone]    Hermilo Romano MD    June 27, 2022     Ethan Landon, APRN - CNP  [address] [facility] [address] [facility]. [address]    Patient: Hemal Chowdhury   MR Number: [id number]   YOB: 1980   Date of Visit: 6/27/2022       Dear Ethan Landon:    Thank you for referring Sulema Chang to me for evaluation/treatment. Below are the relevant portions of my assessment and plan of care. If you have questions, please do not hesitate to call me. I look forward to following Alejandro Graf along with you.     Sincerely,      Hermilo Romano MD

## 2022-06-28 ENCOUNTER — APPOINTMENT (OUTPATIENT)
Dept: GENERAL RADIOLOGY | Age: 42
End: 2022-06-28
Attending: ORTHOPAEDIC SURGERY
Payer: COMMERCIAL

## 2022-06-28 ENCOUNTER — TELEPHONE (OUTPATIENT)
Dept: ORTHOPEDIC SURGERY | Age: 42
End: 2022-06-28

## 2022-06-28 ENCOUNTER — HOSPITAL ENCOUNTER (OUTPATIENT)
Age: 42
Setting detail: OUTPATIENT SURGERY
Discharge: HOME | End: 2022-06-28
Attending: ORTHOPAEDIC SURGERY | Admitting: ORTHOPAEDIC SURGERY
Payer: COMMERCIAL

## 2022-06-28 ENCOUNTER — ANESTHESIA (OUTPATIENT)
Dept: OPERATING ROOM | Age: 42
End: 2022-06-28
Payer: COMMERCIAL

## 2022-06-28 VITALS
BODY MASS INDEX: 37.82 KG/M2 | HEIGHT: 65 IN | TEMPERATURE: 97.3 F | SYSTOLIC BLOOD PRESSURE: 124 MMHG | HEART RATE: 99 BPM | DIASTOLIC BLOOD PRESSURE: 73 MMHG | WEIGHT: 227 LBS | OXYGEN SATURATION: 97 % | RESPIRATION RATE: 22 BRPM

## 2022-06-28 PROBLEM — M25.859 FEMORAL ACETABULAR IMPINGEMENT: Status: ACTIVE | Noted: 2022-06-28

## 2022-06-28 LAB
GLUCOSE BLD-MCNC: 117 MG/DL (ref 70–99)
GLUCOSE BLD-MCNC: 137 MG/DL (ref 70–99)
HCG(URINE) PREGNANCY TEST: NEGATIVE
PERFORMED ON: ABNORMAL
PERFORMED ON: ABNORMAL

## 2022-06-28 PROCEDURE — C1713 ANCHOR/SCREW BN/BN,TIS/BN: HCPCS | Performed by: ORTHOPAEDIC SURGERY

## 2022-06-28 PROCEDURE — 2709999900 HC NON-CHARGEABLE SUPPLY: Performed by: ORTHOPAEDIC SURGERY

## 2022-06-28 PROCEDURE — 64999 UNLISTED PX NERVOUS SYSTEM: CPT | Performed by: ANESTHESIOLOGY

## 2022-06-28 PROCEDURE — 3600000004 HC SURGERY LEVEL 4 BASE: Performed by: ORTHOPAEDIC SURGERY

## 2022-06-28 PROCEDURE — 73501 X-RAY EXAM HIP UNI 1 VIEW: CPT

## 2022-06-28 PROCEDURE — 6360000002 HC RX W HCPCS: Performed by: ANESTHESIOLOGY

## 2022-06-28 PROCEDURE — 6360000002 HC RX W HCPCS: Performed by: ORTHOPAEDIC SURGERY

## 2022-06-28 PROCEDURE — C1788 PORT, INDWELLING, IMP: HCPCS | Performed by: ORTHOPAEDIC SURGERY

## 2022-06-28 PROCEDURE — 84703 CHORIONIC GONADOTROPIN ASSAY: CPT

## 2022-06-28 PROCEDURE — 3700000001 HC ADD 15 MINUTES (ANESTHESIA): Performed by: ORTHOPAEDIC SURGERY

## 2022-06-28 PROCEDURE — 7100000000 HC PACU RECOVERY - FIRST 15 MIN: Performed by: ORTHOPAEDIC SURGERY

## 2022-06-28 PROCEDURE — 2580000003 HC RX 258: Performed by: ORTHOPAEDIC SURGERY

## 2022-06-28 PROCEDURE — 2500000003 HC RX 250 WO HCPCS: Performed by: NURSE ANESTHETIST, CERTIFIED REGISTERED

## 2022-06-28 PROCEDURE — 3600000014 HC SURGERY LEVEL 4 ADDTL 15MIN: Performed by: ORTHOPAEDIC SURGERY

## 2022-06-28 PROCEDURE — 6370000000 HC RX 637 (ALT 250 FOR IP): Performed by: NURSE ANESTHETIST, CERTIFIED REGISTERED

## 2022-06-28 PROCEDURE — 7100000001 HC PACU RECOVERY - ADDTL 15 MIN: Performed by: ORTHOPAEDIC SURGERY

## 2022-06-28 PROCEDURE — 3700000000 HC ANESTHESIA ATTENDED CARE: Performed by: ORTHOPAEDIC SURGERY

## 2022-06-28 PROCEDURE — 6360000002 HC RX W HCPCS: Performed by: NURSE ANESTHETIST, CERTIFIED REGISTERED

## 2022-06-28 PROCEDURE — 2500000003 HC RX 250 WO HCPCS: Performed by: ORTHOPAEDIC SURGERY

## 2022-06-28 PROCEDURE — 2720000010 HC SURG SUPPLY STERILE: Performed by: ORTHOPAEDIC SURGERY

## 2022-06-28 PROCEDURE — 7100000010 HC PHASE II RECOVERY - FIRST 15 MIN: Performed by: ORTHOPAEDIC SURGERY

## 2022-06-28 PROCEDURE — 7100000011 HC PHASE II RECOVERY - ADDTL 15 MIN: Performed by: ORTHOPAEDIC SURGERY

## 2022-06-28 PROCEDURE — 2500000003 HC RX 250 WO HCPCS: Performed by: ANESTHESIOLOGY

## 2022-06-28 PROCEDURE — 3209999900 FLUORO FOR SURGICAL PROCEDURES

## 2022-06-28 DEVICE — OMEGA 4.75MM PEEK KNOTLESS ANCHOR SYSTEM, DOUBLE-DOUBLE
Type: IMPLANTABLE DEVICE | Site: HIP | Status: FUNCTIONAL
Brand: OMEGA

## 2022-06-28 DEVICE — NANOTACK SUTURE ANCHOR 1.4MM WITH FLEX INSERTER
Type: IMPLANTABLE DEVICE | Site: HIP | Status: FUNCTIONAL
Brand: NANOTACK

## 2022-06-28 DEVICE — QFIX 1.8 MINI SUTURE ANCHOR
Type: IMPLANTABLE DEVICE | Site: HIP | Status: FUNCTIONAL
Brand: Q-FIX

## 2022-06-28 RX ORDER — OXYCODONE HYDROCHLORIDE 5 MG/1
5 TABLET ORAL
Status: DISCONTINUED | OUTPATIENT
Start: 2022-06-28 | End: 2022-06-28 | Stop reason: HOSPADM

## 2022-06-28 RX ORDER — HYDROMORPHONE HCL 110MG/55ML
0.5 PATIENT CONTROLLED ANALGESIA SYRINGE INTRAVENOUS EVERY 5 MIN PRN
Status: DISCONTINUED | OUTPATIENT
Start: 2022-06-28 | End: 2022-06-28 | Stop reason: HOSPADM

## 2022-06-28 RX ORDER — LIDOCAINE HYDROCHLORIDE 20 MG/ML
INJECTION, SOLUTION EPIDURAL; INFILTRATION; INTRACAUDAL; PERINEURAL PRN
Status: DISCONTINUED | OUTPATIENT
Start: 2022-06-28 | End: 2022-06-28 | Stop reason: SDUPTHER

## 2022-06-28 RX ORDER — FENTANYL CITRATE 50 UG/ML
50 INJECTION, SOLUTION INTRAMUSCULAR; INTRAVENOUS ONCE
Status: COMPLETED | OUTPATIENT
Start: 2022-06-28 | End: 2022-06-28

## 2022-06-28 RX ORDER — ALBUTEROL SULFATE 90 UG/1
AEROSOL, METERED RESPIRATORY (INHALATION) PRN
Status: DISCONTINUED | OUTPATIENT
Start: 2022-06-28 | End: 2022-06-28 | Stop reason: SDUPTHER

## 2022-06-28 RX ORDER — SODIUM CHLORIDE 9 MG/ML
INJECTION, SOLUTION INTRAVENOUS CONTINUOUS
Status: DISCONTINUED | OUTPATIENT
Start: 2022-06-28 | End: 2022-06-28 | Stop reason: HOSPADM

## 2022-06-28 RX ORDER — KETAMINE HCL IN NACL, ISO-OSM 100MG/10ML
SYRINGE (ML) INJECTION PRN
Status: DISCONTINUED | OUTPATIENT
Start: 2022-06-28 | End: 2022-06-28 | Stop reason: SDUPTHER

## 2022-06-28 RX ORDER — MIDAZOLAM HYDROCHLORIDE 2 MG/2ML
1 INJECTION, SOLUTION INTRAMUSCULAR; INTRAVENOUS ONCE
Status: COMPLETED | OUTPATIENT
Start: 2022-06-28 | End: 2022-06-28

## 2022-06-28 RX ORDER — DIPHENHYDRAMINE HYDROCHLORIDE 50 MG/ML
INJECTION INTRAMUSCULAR; INTRAVENOUS PRN
Status: DISCONTINUED | OUTPATIENT
Start: 2022-06-28 | End: 2022-06-28 | Stop reason: SDUPTHER

## 2022-06-28 RX ORDER — ONDANSETRON 2 MG/ML
INJECTION INTRAMUSCULAR; INTRAVENOUS PRN
Status: DISCONTINUED | OUTPATIENT
Start: 2022-06-28 | End: 2022-06-28 | Stop reason: SDUPTHER

## 2022-06-28 RX ORDER — PROCHLORPERAZINE EDISYLATE 5 MG/ML
5 INJECTION INTRAMUSCULAR; INTRAVENOUS
Status: DISCONTINUED | OUTPATIENT
Start: 2022-06-28 | End: 2022-06-28 | Stop reason: HOSPADM

## 2022-06-28 RX ORDER — ONDANSETRON 2 MG/ML
4 INJECTION INTRAMUSCULAR; INTRAVENOUS
Status: DISCONTINUED | OUTPATIENT
Start: 2022-06-28 | End: 2022-06-28 | Stop reason: HOSPADM

## 2022-06-28 RX ORDER — MAGNESIUM SULFATE HEPTAHYDRATE 500 MG/ML
INJECTION, SOLUTION INTRAMUSCULAR; INTRAVENOUS PRN
Status: DISCONTINUED | OUTPATIENT
Start: 2022-06-28 | End: 2022-06-28 | Stop reason: SDUPTHER

## 2022-06-28 RX ORDER — ROCURONIUM BROMIDE 10 MG/ML
INJECTION, SOLUTION INTRAVENOUS PRN
Status: DISCONTINUED | OUTPATIENT
Start: 2022-06-28 | End: 2022-06-28 | Stop reason: SDUPTHER

## 2022-06-28 RX ORDER — FENTANYL CITRATE 50 UG/ML
25 INJECTION, SOLUTION INTRAMUSCULAR; INTRAVENOUS EVERY 5 MIN PRN
Status: DISCONTINUED | OUTPATIENT
Start: 2022-06-28 | End: 2022-06-28 | Stop reason: HOSPADM

## 2022-06-28 RX ORDER — BUPIVACAINE HYDROCHLORIDE 5 MG/ML
INJECTION, SOLUTION EPIDURAL; INTRACAUDAL
Status: COMPLETED | OUTPATIENT
Start: 2022-06-28 | End: 2022-06-28

## 2022-06-28 RX ORDER — PREGABALIN 75 MG/1
75 CAPSULE ORAL 2 TIMES DAILY
COMMUNITY
End: 2022-07-19 | Stop reason: SDUPTHER

## 2022-06-28 RX ORDER — FENTANYL CITRATE 50 UG/ML
INJECTION, SOLUTION INTRAMUSCULAR; INTRAVENOUS PRN
Status: DISCONTINUED | OUTPATIENT
Start: 2022-06-28 | End: 2022-06-28 | Stop reason: SDUPTHER

## 2022-06-28 RX ORDER — PROPOFOL 10 MG/ML
INJECTION, EMULSION INTRAVENOUS PRN
Status: DISCONTINUED | OUTPATIENT
Start: 2022-06-28 | End: 2022-06-28 | Stop reason: SDUPTHER

## 2022-06-28 RX ORDER — LIDOCAINE HYDROCHLORIDE 10 MG/ML
1 INJECTION, SOLUTION EPIDURAL; INFILTRATION; INTRACAUDAL; PERINEURAL
Status: DISCONTINUED | OUTPATIENT
Start: 2022-06-28 | End: 2022-06-28 | Stop reason: HOSPADM

## 2022-06-28 RX ORDER — HYDROMORPHONE HCL 110MG/55ML
PATIENT CONTROLLED ANALGESIA SYRINGE INTRAVENOUS
Status: DISCONTINUED
Start: 2022-06-28 | End: 2022-06-28 | Stop reason: HOSPADM

## 2022-06-28 RX ORDER — DEXAMETHASONE SODIUM PHOSPHATE 4 MG/ML
INJECTION, SOLUTION INTRA-ARTICULAR; INTRALESIONAL; INTRAMUSCULAR; INTRAVENOUS; SOFT TISSUE PRN
Status: DISCONTINUED | OUTPATIENT
Start: 2022-06-28 | End: 2022-06-28 | Stop reason: SDUPTHER

## 2022-06-28 RX ORDER — LABETALOL HYDROCHLORIDE 5 MG/ML
10 INJECTION, SOLUTION INTRAVENOUS
Status: DISCONTINUED | OUTPATIENT
Start: 2022-06-28 | End: 2022-06-28 | Stop reason: HOSPADM

## 2022-06-28 RX ORDER — SUCCINYLCHOLINE CHLORIDE 20 MG/ML
INJECTION INTRAMUSCULAR; INTRAVENOUS PRN
Status: DISCONTINUED | OUTPATIENT
Start: 2022-06-28 | End: 2022-06-28 | Stop reason: SDUPTHER

## 2022-06-28 RX ORDER — HYDRALAZINE HYDROCHLORIDE 20 MG/ML
10 INJECTION INTRAMUSCULAR; INTRAVENOUS
Status: DISCONTINUED | OUTPATIENT
Start: 2022-06-28 | End: 2022-06-28 | Stop reason: HOSPADM

## 2022-06-28 RX ADMIN — DEXAMETHASONE SODIUM PHOSPHATE 8 MG: 4 INJECTION, SOLUTION INTRAMUSCULAR; INTRAVENOUS at 08:32

## 2022-06-28 RX ADMIN — ROCURONIUM BROMIDE 5 MG: 10 INJECTION, SOLUTION INTRAVENOUS at 08:00

## 2022-06-28 RX ADMIN — CEFAZOLIN 2000 MG: 2 INJECTION, POWDER, FOR SOLUTION INTRAMUSCULAR; INTRAVENOUS at 07:58

## 2022-06-28 RX ADMIN — PHENYLEPHRINE HYDROCHLORIDE 100 MCG: 10 INJECTION INTRAVENOUS at 09:06

## 2022-06-28 RX ADMIN — PHENYLEPHRINE HYDROCHLORIDE 200 MCG: 10 INJECTION INTRAVENOUS at 08:18

## 2022-06-28 RX ADMIN — SODIUM CHLORIDE: 9 INJECTION, SOLUTION INTRAVENOUS at 09:02

## 2022-06-28 RX ADMIN — ROCURONIUM BROMIDE 20 MG: 10 INJECTION, SOLUTION INTRAVENOUS at 10:34

## 2022-06-28 RX ADMIN — ROCURONIUM BROMIDE 10 MG: 10 INJECTION, SOLUTION INTRAVENOUS at 09:55

## 2022-06-28 RX ADMIN — PHENYLEPHRINE HYDROCHLORIDE 100 MCG: 10 INJECTION INTRAVENOUS at 08:43

## 2022-06-28 RX ADMIN — SUCCINYLCHOLINE CHLORIDE 140 MG: 20 INJECTION, SOLUTION INTRAMUSCULAR; INTRAVENOUS at 08:10

## 2022-06-28 RX ADMIN — PROPOFOL 200 MG: 10 INJECTION, EMULSION INTRAVENOUS at 08:09

## 2022-06-28 RX ADMIN — FENTANYL CITRATE 25 MCG: 50 INJECTION, SOLUTION INTRAMUSCULAR; INTRAVENOUS at 10:22

## 2022-06-28 RX ADMIN — ROCURONIUM BROMIDE 10 MG: 10 INJECTION, SOLUTION INTRAVENOUS at 08:34

## 2022-06-28 RX ADMIN — FENTANYL CITRATE 50 MCG: 50 INJECTION, SOLUTION INTRAMUSCULAR; INTRAVENOUS at 07:44

## 2022-06-28 RX ADMIN — PHENYLEPHRINE HYDROCHLORIDE 200 MCG: 10 INJECTION INTRAVENOUS at 08:25

## 2022-06-28 RX ADMIN — HYDROMORPHONE HYDROCHLORIDE 0.5 MG: 2 INJECTION, SOLUTION INTRAMUSCULAR; INTRAVENOUS; SUBCUTANEOUS at 12:07

## 2022-06-28 RX ADMIN — ROCURONIUM BROMIDE 35 MG: 10 INJECTION, SOLUTION INTRAVENOUS at 08:16

## 2022-06-28 RX ADMIN — FENTANYL CITRATE 50 MCG: 50 INJECTION, SOLUTION INTRAMUSCULAR; INTRAVENOUS at 08:09

## 2022-06-28 RX ADMIN — PHENYLEPHRINE HYDROCHLORIDE 200 MCG: 10 INJECTION INTRAVENOUS at 08:30

## 2022-06-28 RX ADMIN — MAGNESIUM SULFATE HEPTAHYDRATE 1 G: 500 INJECTION, SOLUTION INTRAMUSCULAR; INTRAVENOUS at 08:32

## 2022-06-28 RX ADMIN — ROCURONIUM BROMIDE 10 MG: 10 INJECTION, SOLUTION INTRAVENOUS at 09:36

## 2022-06-28 RX ADMIN — PHENYLEPHRINE HYDROCHLORIDE 100 MCG: 10 INJECTION INTRAVENOUS at 08:52

## 2022-06-28 RX ADMIN — BUPIVACAINE HYDROCHLORIDE 20 ML: 5 INJECTION, SOLUTION EPIDURAL; INTRACAUDAL at 07:49

## 2022-06-28 RX ADMIN — ALBUTEROL SULFATE 12 PUFF: 90 AEROSOL, METERED RESPIRATORY (INHALATION) at 11:19

## 2022-06-28 RX ADMIN — DIPHENHYDRAMINE HYDROCHLORIDE 12.5 MG: 50 INJECTION, SOLUTION INTRAMUSCULAR; INTRAVENOUS at 08:32

## 2022-06-28 RX ADMIN — MIDAZOLAM HYDROCHLORIDE 1 MG: 1 INJECTION, SOLUTION INTRAMUSCULAR; INTRAVENOUS at 07:43

## 2022-06-28 RX ADMIN — FENTANYL CITRATE 25 MCG: 50 INJECTION, SOLUTION INTRAMUSCULAR; INTRAVENOUS at 10:09

## 2022-06-28 RX ADMIN — ROCURONIUM BROMIDE 20 MG: 10 INJECTION, SOLUTION INTRAVENOUS at 09:03

## 2022-06-28 RX ADMIN — ROCURONIUM BROMIDE 10 MG: 10 INJECTION, SOLUTION INTRAVENOUS at 10:18

## 2022-06-28 RX ADMIN — Medication 50 MG: at 08:41

## 2022-06-28 RX ADMIN — TRANEXAMIC ACID 1000 MG: 1 INJECTION, SOLUTION INTRAVENOUS at 08:19

## 2022-06-28 RX ADMIN — SODIUM CHLORIDE: 9 INJECTION, SOLUTION INTRAVENOUS at 06:49

## 2022-06-28 RX ADMIN — PHENYLEPHRINE HYDROCHLORIDE 100 MCG: 10 INJECTION INTRAVENOUS at 08:23

## 2022-06-28 RX ADMIN — LIDOCAINE HYDROCHLORIDE 100 MG: 20 INJECTION, SOLUTION EPIDURAL; INFILTRATION; INTRACAUDAL; PERINEURAL at 08:09

## 2022-06-28 RX ADMIN — PHENYLEPHRINE HYDROCHLORIDE 200 MCG: 10 INJECTION INTRAVENOUS at 08:37

## 2022-06-28 RX ADMIN — ONDANSETRON 4 MG: 2 INJECTION INTRAMUSCULAR; INTRAVENOUS at 08:32

## 2022-06-28 RX ADMIN — SUGAMMADEX 200 MG: 100 INJECTION, SOLUTION INTRAVENOUS at 11:09

## 2022-06-28 ASSESSMENT — ENCOUNTER SYMPTOMS: SHORTNESS OF BREATH: 0

## 2022-06-28 ASSESSMENT — PAIN - FUNCTIONAL ASSESSMENT: PAIN_FUNCTIONAL_ASSESSMENT: 0-10

## 2022-06-28 ASSESSMENT — PAIN SCALES - GENERAL
PAINLEVEL_OUTOF10: 4
PAINLEVEL_OUTOF10: 10
PAINLEVEL_OUTOF10: 4

## 2022-06-28 ASSESSMENT — PAIN DESCRIPTION - LOCATION: LOCATION: HIP

## 2022-06-28 ASSESSMENT — PAIN DESCRIPTION - ORIENTATION: ORIENTATION: LEFT

## 2022-06-28 NOTE — ANESTHESIA POSTPROCEDURE EVALUATION
Department of Anesthesiology  Postprocedure Note    Patient: Belgica Narayan  MRN: [id number]  YOB: 1980  Date of evaluation: 6/28/2022      Procedure Summary     Date: 06/28/22 Room / Location: [address]    Anesthesia Start: 0800 Anesthesia Stop: [phone]    Procedure: LEFT HIP REVISION ARTHROSCOPY, LYSIS OF ADHESIONS, REMOVAL LOOSE BODY, SYNOVECTOMY, FEMOROACETABULAR IMPINGEMENT DECOMPRESSION AND LABRAL REPAIR, ABDUCTOR REPAIR, ENDOSCOPIC BURSECTOMY  - ELMA BLOCK; LOCAL (ORTHOMIX) (Left Hip) Diagnosis:       Femoral acetabular impingement      (Femoral acetabular impingement [M25.859])    Surgeons: Tiera Gamble MD Responsible Provider: Sophy Faulkner MD    Anesthesia Type: General ASA Status: 3          Anesthesia Type: General    Kevin Phase I: Kevin Score: 8    Kevin Phase II:        Anesthesia Post Evaluation    Patient location during evaluation: PACU  Patient participation: complete - patient participated  Level of consciousness: awake and alert  Airway patency: patent  Nausea & Vomiting: no nausea and no vomiting  Complications: no  Cardiovascular status: hemodynamically stable  Respiratory status: acceptable  Hydration status: stable  Multimodal analgesia pain management approach

## 2022-06-28 NOTE — PROGRESS NOTES
Arrived into PACU following surgical procedure ( Left hip arthroscopy / repairs by Dr Todd Jacobs). Report received from CRNA and OR nurse)  Awakening . Oral airway removed easily by CRNA. Dressing C,D & I to left hip. Brace in place. Toes warm with brisk capillary refill. Wiggles toes easily. Denies nausea. C/O 4/10 surgical pain. States she has to urinate. Placed on fracture pan. VSS.  Will continue to monitor
Discharge instructions review with patient and pt mom/daughter. All home medications have been reviewed, pt v/u. Lux Harmon Pt discharged via wheelchair. Pt discharged with ice machine, walker, instructions and all belongings. Family taking stable pt home.
Pt awake and alert at this time, quick to fall back asleep. Pt on RA, and VSS. Pt states pain is still present but improved after medication; pt deneis nausea, tolerating PO. Skin warm to left lower leg, palpable pulses and able to wiggle left toes. Pt meets criteria to be discharged from Phase 1.
Pt mom/ daughter brought bedside to be with pt. Pt becoming more awake at this time. Discharge information gone over with pt and family. Pt states pain is tolerable at this time.
Teaching / education initiated regarding perioperative experience, expectations, and pain management during stay. Patient verbalized understanding.
Time out done, 02 on @ 2 l/min per n/c, then versed & fentanyl IV given, then Dr Shahab Rose completed left PENG  Block, patient toerated procedure well.
Visit our web site for additional information:  [facility]/patient-eprep              20.During flu season no children under the age of 15 are permitted in the hospital for the safety of all patients. 21. If you take a long acting insulin in the evening only  take half of your usual  dose the night  before your procedure              22. If you use a c-pap please bring DOS if staying overnight,             23.For your convenience [facility] has a pharmacy on site to fill your prescriptions. 24. If you use oxygen and have a portable tank please bring it  with you the DOS             25. Bring a complete list of all your medications with name and dose include any supplements. 26. Other__________________________________________   *Please call pre admission testing if you any further questions   [facility]   [address]    [phone]  [phone]   [address] [place] [address]       VISITOR POLICY(subject to change)    Current policy is 2 visitors per patient. No children. A mask is required. Visiting hours are 8a-8p. Overnight visitors will be at the discretion of the nurse. All above information reviewed with patient in person or by phone. Patient verbalizes understanding. All questions and concerns addressed.                                                                                                  Patient/Rep____pt________________                                                                                                                                    PRE OP INSTRUCTIONS

## 2022-06-28 NOTE — ANESTHESIA PRE PROCEDURE
Department of Anesthesiology  Preprocedure Note       Name:  Des Neumann   Age:  39 y.o.  :  1980                                          MRN:  [id number]         Date:  2022      Surgeon: Dante Roldan):  Jonathon Mcdonald MD    Procedure: Procedure(s):  LEFT HIP ARTHROSCOPY, SYNOVECTOMY, AEMOROACETABULAR IMPINGEMENT DECOMPRESSION AND LABRAL REPAIR,POSSIBLE ABDUCTOR REPAIR, ENDOSCOPIC BURSECTOMY POSSIBLE OPEN -PENG BLOCK; LOCAL ([address])    Medications prior to admission:   Prior to Admission medications    Medication Sig Start Date End Date Taking? Authorizing Provider   pregabalin (LYRICA) 75 MG capsule Take 75 mg by mouth 2 times daily. Yes Historical Provider, MD   naproxen (NAPROSYN) 500 MG tablet Take 1 tablet by mouth 2 times daily (with meals) for 14 days 22  Jonathon Mcdonald MD   HYDROcodone-acetaminophen (NORCO) 5-325 MG per tablet Take 1 tablet by mouth every 6 hours as needed for Pain for up to 5 days. 22  Jonathon Mcdonald MD   senna (SENOKOT) 8.6 MG TABS tablet Take 1 tablet by mouth 2 times daily for 7 days 22  Jonathon Mcdonald MD   traMADol (ULTRAM) 50 MG tablet Take 1 tablet by mouth every 6 hours as needed for Pain for up to 5 days.  22  Jonathon Mcdonald MD   rivaroxaban (XARELTO) 10 MG TABS tablet Take 1 tablet by mouth daily (with breakfast) 22   Jonathon Mcdonald MD   Evolocumab 140 MG/ML SOAJ Inject 140 mg into the skin every 14 days  Patient not taking: Reported on 2022   Rober Gaffney DO   Cholecalciferol (VITAMIN D3) 50 MCG ( UT) CAPS TAKE 1 CAPSULE BY MOUTH EVERY DAY 22   JUAN RAMON Eagle - CNP   diclofenac sodium (VOLTAREN) 1 % GEL APPLY 2 GRAMS TOPICALLY 2 TIMES DAILY  Patient not taking: Reported on 2022   Goldy Harris MD   citalopram (CELEXA) 40 MG tablet TAKE 1 TABLET BY MOUTH EVERY DAY IN THE MORNING 22   Historical Provider, MD   omeprazole (PRILOSEC) 20 MG delayed release capsule TAKE 1 CAPSULE BY MOUTH EVERY DAY 4/29/22   Randene Najjar, APRN - CNP   rosuvastatin (CRESTOR) 40 MG tablet TAKE 1 TABLET BY MOUTH EVERY DAY 4/29/22   Randene Najjar, APRN - CNP   metFORMIN (GLUCOPHAGE-XR) 500 MG extended release tablet Take 2 tablets by mouth daily (with breakfast) 4/29/22   Randene Najjar, APRN - CNP   metoprolol tartrate (LOPRESSOR) 50 MG tablet Take 1 tablet by mouth 2 times daily 4/29/22   Randene Najjar, APRN - CNP   ezetimibe (ZETIA) 10 MG tablet Take 1 tablet by mouth daily 4/29/22   Randene Najjar, APRN - CNP   albuterol sulfate  (90 Base) MCG/ACT inhaler TAKE 2 PUFFS BY MOUTH EVERY 6 HOURS AS NEEDED FOR WHEEZE 9/30/21   Randene Najjar, APRN - CNP   nitroGLYCERIN (NITROSTAT) 0.4 MG SL tablet Place 1 tablet under the tongue every 5 minutes as needed for Chest pain up to max of 3 total doses. If no relief after 1 dose, call 911. 4/5/21   Randene Najjar, APRN - CNP   ammonium lactate (LAC-HYDRIN) 12 % lotion Apply topically daily 4/5/21   Randene Najjar, APRN - CNP   thiothixene (NAVANE) 2 MG capsule TAKE 1 CAPSULE BY MOUTH EVERY EVENING AT BEDTIME 6/11/20   Historical Provider, MD   pregabalin (LYRICA) 75 MG capsule Take 1 capsule by mouth 2 times daily for 90 days.  6/25/20 6/16/22  Randene Najjar, APRN - CNP   divalproex (DEPAKOTE ER) 500 MG extended release tablet Take 500 mg by mouth daily  Patient not taking: Reported on 6/22/2022    Historical Provider, MD   hydrOXYzine (ATARAX) 25 MG tablet Take 25 mg by mouth 3 times daily as needed for Anxiety     Historical Provider, MD       Current medications:    Current Facility-Administered Medications   Medication Dose Route Frequency Provider Last Rate Last Admin    ceFAZolin (ANCEF) 2,000 mg in dextrose 5 % 50 mL IVPB (mini-bag)  2,000 mg IntraVENous On Call to RAFAEL Singh 1, MD        tranexamic acid (CYKLOKAPRON) 1,000 mg in sodium chloride 0.9 % 60 mL IVPB  1,000 mg IntraVENous Once Dariusz Plata MD        ortho mix injection   Injection On Call Hermilo Romano MD        0.9 % sodium chloride infusion   IntraVENous Continuous Hermilo Romano  mL/hr at 06/28/22 0649 New Bag at 06/28/22 4503       Allergies:     Allergies   Allergen Reactions    Cinnamon Itching    Codeine Hives and Itching       Problem List:    Patient Active Problem List   Diagnosis Code    AMOS (obstructive sleep apnea) G47.33    Type 2 diabetes mellitus without complication, without long-term current use of insulin ([facility]) E11.9    Essential hypertension I10    Familial hypercholesterolemia E78.01    History of fibromyalgia Z87.39    Positive MATTHEW (antinuclear antibody) R76.8    CS (cervical spondylosis) M47.812    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    DDD (degenerative disc disease), cervical M50.30    Herniated cervical disc without myelopathy M50.20    Bipolar disorder ([facility]) F31.9    Fibromyalgia M79.7    Mixed anxiety and depressive disorder F41.8    Morbidly obese ([facility]) E66.01    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    Gastroesophageal reflux disease K21.9    Chronic tachycardia R00.0    Migraine without status migrainosus, not intractable G43.909    Preop cardiovascular exam Z01.810       Past Medical History:        Diagnosis Date    Arthritis     Bipolar 1 disorder ([facility])     COPD (chronic obstructive pulmonary disease) ([facility])     Depression     Diabetes mellitus ([facility] [address]     Hyperlipidemia     Hypertension     Tachycardia     Tachycardia        Past Surgical History:        Procedure Laterality Date    ANKLE FUSION Bilateral     ARTHRODESIS Left 12/6/2019    REVISION OF TALONAVICULAR JOINT ARTHRODESIS LEFT FOOT  -SLEEP APNEA- performed by Therese Gutierrez DPM at [facility] Bilateral     ELBOW SURGERY Bilateral     ulnar nerve release    HERNIA REPAIR      HIP ARTHROSCOPY Bilateral     TONSILLECTOMY AND ADENOIDECTOMY      WRIST GANGLION EXCISION Bilateral        Social History:    Social History     Tobacco Use    Smoking status: Former Smoker     Packs/day: 1.00     Years: 27.00     Pack years: 27.00     Types: Cigarettes     Start date: 12     Quit date: 2019     Years since quittin.6    Smokeless tobacco: Never Used    Tobacco comment: started to smoke at 15 / smoked up to 1 ppd / now smoking 3 to 4 cigarettes a day   Substance Use Topics    Alcohol use: Never                                Counseling given: Not Answered  Comment: started to smoke at 12 / smoked up to 1 ppd / now smoking 3 to 4 cigarettes a day      Vital Signs (Current):   Vitals:    22 1427 22 0629   BP:  120/70   Pulse:  60   Resp:  16   Temp:  96.8 °F (36 °C)   TempSrc:  Temporal   SpO2:  99%   Weight: 230 lb (104.3 kg) 227 lb (103 kg)   Height: 5' 5\" (1.651 m) 5' 5\" (1.651 m)                                              BP Readings from Last 3 Encounters:   22 120/70   22 108/64   22 108/66       NPO Status: Time of last liquid consumption: 0000                        Time of last solid consumption: 0000                        Date of last liquid consumption: 22                        Date of last solid food consumption: 22    BMI:   Wt Readings from Last 3 Encounters:   22 227 lb (103 kg)   22 230 lb (104.3 kg)   22 230 lb 12.8 oz (104.7 kg)     Body mass index is 37.77 kg/m².     CBC:   Lab Results   Component Value Date    WBC 8.2 2022    RBC 4.44 2022    HGB 12.6 2022    HCT 37.7 2022    MCV 84.9 2022    RDW 14.1 2022     2022       CMP:   Lab Results   Component Value Date     2022    K 4.3 2022     2022    CO2 25 2022    BUN 13 2022    CREATININE 0.8 2022    GFRAA >60 2022    AGRATIO 1.5 2022    LABGLOM >60 2022    GLUCOSE 102 2022    PROT 7.3 2022    CALCIUM 9.2 2022    BILITOT <0.2 2022    ALKPHOS 57 06/14/2022    AST 19 06/14/2022    ALT 21 06/14/2022       POC Tests:   Recent Labs     06/28/22  0647   POCGLU 117*       Coags:   Lab Results   Component Value Date    PROTIME 12.4 09/08/2019    INR 1.09 09/08/2019       HCG (If Applicable):   Lab Results   Component Value Date    PREGTESTUR Negative 06/28/2022        ABGs: No results found for: PHART, PO2ART, RXR0LJT, KFL5TXZ, BEART, Q7QWODJB     Type & Screen (If Applicable):  No results found for: LABABO, LABRH    Drug/Infectious Status (If Applicable):  No results found for: HIV, HEPCAB    COVID-19 Screening (If Applicable):   Lab Results   Component Value Date    COVID19 Not Detected 12/28/2021    COVID19 NOT DETECTED 12/31/2020           Anesthesia Evaluation  Patient summary reviewed and Nursing notes reviewed no history of anesthetic complications:   Airway: Mallampati: I  TM distance: >3 FB   Neck ROM: full  Mouth opening: > = 3 FB   Dental: normal exam         Pulmonary:   (+) COPD:  sleep apnea:  asthma:     (-) shortness of breath                           Cardiovascular:    (+) hypertension:, hyperlipidemia    (-)  angina                Neuro/Psych:   (+) headaches:, psychiatric history:   (-) CVA           GI/Hepatic/Renal:   (+) GERD:,      (-) liver disease       Endo/Other:    (+) DiabetesType II DM, , .    (-) hypothyroidism               Abdominal:             Vascular:     - PVD. Other Findings:           Anesthesia Plan      general and regional     ASA 3     (Pt consented for PENG nerve block for post-operative pain control. Discussed risks/benefits of procedure, including bleeding/infection, nerve injury, LAST. Pt understood and expressed understanding to continue.)  Induction: intravenous. MIPS: Postoperative opioids intended and Prophylactic antiemetics administered. Anesthetic plan and risks discussed with patient. Use of blood products discussed with patient whom. Plan discussed with CRNA.                     Magdalena Villarreal Betsey Roman MD   6/28/2022

## 2022-06-28 NOTE — H&P
H&P Update     An electronic and hard copy history and physical was reviewed in the patient's chart. Date of Surgery Update: June 28, 2022  Scarlett Yots was seen and examined. There have been no significant clinical changes since the completion of the above History and Physical.  Patient identified by surgeon; surgical site was confirmed by patient and surgeon. ?  Signed By: Sincerely,    Emelyn Nagy  [address] [facility] [address], [facility]  Email: [email or web address]  Office: [phone]    06/28/22  7:46 AM     ?    ?  ?

## 2022-06-28 NOTE — TELEPHONE ENCOUNTER
Telephone call  Eliane Gutierrez  left  hip scope for ABILIO and abductor repair  Spoke with Patient  Patient not overly comfortable but is taking pain medication, eating  No major pain or N/V  Re discussed post op instructions, weight bearing, and medications    All questions answered to satisfaction    Routine follow-up in my office in 48 hours    Sincerely,    Ida Barrios MD [address] [place] [address] and Simone   [address], [address]  Email: [email or web address]  Office: [phone]    06/28/22  7:05 PM

## 2022-06-28 NOTE — OP NOTE
Operative Note      Patient: Bobo Lam  YOB: 1980  MRN: [id number]    Date of Procedure: 6/28/2022    Pre-Op Diagnosis: Femoral acetabular impingement [M25.859]    Post-Op Diagnosis: Same and gluteus medius tear       Procedure(s):  LEFT HIP REVISION ARTHROSCOPY, , REMOVAL LOOSE BODY, SYNOVECTOMY, FEMOROACETABULAR IMPINGEMENT DECOMPRESSION AND LABRAL REPAIR, ABDUCTOR REPAIR, ENDOSCOPIC BURSECTOMY  - ELMA BLOCK; LOCAL (ORTHOMIX)    LEFT REVISION HIP ARTHROSCOPY & SYNOVECTOMY, LYSIS OF ADHESIONS (78587), ACETABULOPLASTY (09523),   REMOVAL OF LOOSE BODY , OLD SUTURE ANCHOR (60012), LABRAL REPAIR (01585),  FEMOROPLASTY/OSTEOCHONDROPLASTY (36608),  ENDOSCOPIC GREATER TROCHANTERIC BURSECTOMY (51715 to [phone]), ENDOSCOPIC ABDUCTOR REPAIR (17784 to [phone]),     Surgeon(s):  MD Fernando Husain MD    Assistant:   Physician Assistant: Keyonna Simmons PA-C    Anesthesia: General + PENG + ORTHO MIX    Estimated Blood Loss (mL): Minimal    Complications: None    Specimens:   * No specimens in log *    Implants:  Implant Name Type Inv.  Item Serial No.  Lot No. LRB No. Used Action   ANCHOR SUT 1.4MM W/ FLX INSRT HI STRENGTH FLXIBLE DEL FOR - [id number]  ANCHOR SUT 1.4MM W/ FLX INSRT HI STRENGTH FLXIBLE DEL FOR  [employer]- [id number] Left 2 Implanted   SUTURE ANCHR Q-FIX MINI ALL SUT 1.8MM - [id number]  SUTURE ANCHR Q-FIX MINI ALL SUT 1.8MM  [employer]- [id number] Left 1 Implanted   ANCHOR SUT 1.4MM W/ FLX INSRT HI STRENGTH FLXIBLE DEL FOR - [id number]  ANCHOR SUT 1.4MM W/ FLX INSRT HI STRENGTH FLXIBLE DEL FOR  [employer]- [id number] Left 1 Implanted   ANCHOR SUT 2.3MM 2 STRND 2MM XBRAID TT SUT TAPE [facility] - [id number]  ANCHOR SUT 2.3MM 2 STRND 2MM XBRAID TT SUT TAPE SPD [employer]  [employer]- [id number] Left 1 Implanted   ANCHOR SUTURE DA 4.75 MM PEEK KNOTLESS SYS OMEGA - [id number]  ANCHOR SUTURE DA 4.75 MM PEEK KNOTLESS SYS OMEGA  [employer] END[employer]-WD [id number] Left 1 Implanted         Drains: * No LDAs found *    Findings:  See below     Detailed Description of Procedure:     Clinical Note:  Tayla Mckeon is a 39y.o.-year-old who has been having anterior hip pain on the  left side for the past  Number of  years years. This hip pain has persisted despite extensive nonoperative treatment focused on pelvic conditioning, abductor strengthening, hip and spine mobility exercises, and activity modification, and anti-inflammatory medicine. Despite that symptoms have persisted. The patient had reproducible pain in the hip on physical exam with deep flexion and flexion internal rotation, imaging that confirmed a labral tear due to ABILIO, and relief with an intra-articular injection. The patient meets the 4 criteria for arthroscopic surgery of the  left  hip. This includes anterior/russ-lateral hip pain, reproducible pain in the hip on physical exam with deep flexion and flexion internal rotation, imaging that confirmed a labral tear due to ABILIO, and relief with an intra-articular injection. This allowed Tayla Mckeon to be a candidate for surgery in the form of left hip arthroscopy labral repair and ABILIO decompression surgery. Risks, benefits, advantages, disadvantages and potential complications as well as the anticipated postoperative course were discussed. The patient agreed to pursue this treatment option. All questions were addressed to their satisfaction. Tayla Mckeon has also been having concomitant recalcitrant lateral sided hip pain on the  left side for the past  Few months   This hip pain has persisted despite extensive nonoperative treatment focused on pelvic conditioning, abductor strengthening, hip and spine mobility exercises, and activity modification. Tayla Mckeon has radiologic evidence of an abductor tendon disorder.   Therefore we recommended a concomitant left hip arthroscopy for ABILIO and endoscopic bursectomy and abductor repair, possible open repair. Detailed Description of Procedure:   Clifford Shields was met outside the operative theater. Consent was signed and questions were answered and the  left  side was confirmed as the correct operative side. Anaesthesia administered a PENG block (peripheral nerve group) block that has no femoral nerve motor blockade. The patient was then brought back to the operative room in stable condition. The patient had a general anesthetic. Following this, the patient received intravenous antibiotics and was then transferred to the operative table. Weight-based IV TXA was also given pre-incision for intra-operative hemostasis. Traction was then applied to the operative hip and this was deemed to be excellent for hip arthroscopy. We used pink pad post-less technology. The  left   lower extremity was then prepped and draped in the usual sterile fashion. Findings: Normal sized (6-8mm) labrum with instability between 1 and 10 O'Clock,  Prominent pincer at 12 O'clock, no subspine prominence. Head-Neck junction prominence consistent with CAM ABILIO. Old suture anchor at 12 oclock position. Access & Diagnostic Scope: An anterolateral portal was then established using Seldinger technique under x-ray guidance. Under direct visualization a mid anterior portal was then established anteriorly. Periportal capsulotomies were completed. A diagnostic arthroscopy revealed: Full Partial thickness labral  tear between 1 O'Clock and 10 oclock, with instability and displacement into the joint,  Mild capsulitis. The chondrolabral junction showed grade 2 chondramalacia between 1 and 2 oclock. The femoral head  Showed some focal grade 2 chondromalacia. The ligamentum teres was normal. Labrum size measured to approximately  8mm. No bony loose bodies, but an old suture anchor at the 12 oclock position was prominent and non functional, and this was removed a loose body.      Central Compartment Work:  We decided to proceed with an acetabuloplasty of the pincer lesion, and as well we planned for labral stabilization surgery at those involved segments. Using ablator soft tissue was then elevated off the anterior acetabular rim at the segments of the tear being careful not to truncate any of the labrum. Under x-ray guidance, a  4.0 mm motorized dexter kiki was used to perform a pincer decompression, by approximately 3-4 mm, equating to a 3-4 degree LCEA radiographic correction cecilia fluoroscopy. Suture anchors were then inserted first at the 2 o'clock position and then at the following acetabular positions:  1 o'clock,  12 o'clock and then  11 and then 10 o'clock position. The 10 o'clock position was drilled by viewing MA and working from  PL portal.  All  anchors were 1.4 mm NanoTak (Bandon) single loaded anchors, except for the 10 o'clock anchor which was a 1.8mm Qfix anchor due to frequently softened bone at that junction. Using a  Bird-Beak , a looped technique around the  labrum was used to refixate it back down to the area on the acetabular rim. A revo- sliding knot with 3 alternating half hitches was used to complete the repair. This exact same repair was then repeated at the 3 remaining torn segments. Each anchor had good purchase. Once labral repair was completed, cautery was used in a careful and limited manner to ablate the injected parts of the labrum and capsule, and  caramelize the chondrolabral junction. Stability was then confirmed using hook instrument at the site of the repair. This repair was satisfactory, and a total of 4 anchors were used to refixate the labrum. Peripheral compartment work: At this point a distal anterolateral portal was created and a T capsulotomy showing the head neck junction almost 1.5  cm in length was created. A moderate size impingement lesion was found anteriorly and russ-laterally.   An osteochondroplasty was completed using a kiki at the head neck junction. On the AP plane correction was obtained. Once AP correction was obtained the hip was released from traction at approximately  1 hour : 30  minutes and flexed and externally rotated to get a modified Brunson view. Osteochondral plasty was then completed in this position to obtain a full bony correction. The hip was rotated internally externally while resecting bone to ensure no bony impingement or bridge was left. Dynamic hip rotation during hip flexion showed no further impingement. Also looking back at the head neck junction the suction seal was restored with the repaired labrum. Final x-rays and extended position were taken to document bony recontouring and this was well noted. Bony debris was suctioned and final contouring was completed to ensure there were no stress risers or ridges. Once the that was completed and osteochondroplasty was satisfactory the capsule was closed. First with 2 vertical stitches (with #2 ForceFiber) for the longitudinal/vertical component of the capsulotomy. Good reapproximation was noted in the capsular closure. However, The ezra-portal was not approximated given the chondramalacia, severe capsular thickness, and hip stiffness that this patient's protoplasm lended itself to. I felt that a complete re-approximation would cause adhesion/causation between the labral repair and interportal closure which may otherwise have lead to iatrogenic stiffness and adhesive capsulitis. Bursectomy and Abductor Repair  The arthroscope was then withdrawn from the peripheral compartment and under x-ray guidance, localized to the top/tip of the greater trochanter. The shaver was then triangulated to the area of the abductor insertion on the lateral facet of the greater trochanter, and a thorough bursectomy was carried out. The ablator was then used to cauterize any bleeders from what was a very inflamed and injected bursa.       A switching stick was then used to probe the abductor insertion, and quickly entered into the tear and thinned tissue within the anterior-portion of the gluteus medius tendon insertion and gluteus minimus. The tear parameters were delineated and measured 1 cm (A to P) by 1.5 cm (proximal to distal). This tissue was quite thinned and edematous, but was deemed reparable. (STAPLE TYPE REPAIR) We advanced a self-tapping double loaded ICONIX SPEED anchor, double loaded with 2.3mm suture tapes onto the tear in a stapling type fashion, using the pointed ends of the anchor to reduce the tear to bone with some advancement. I then carried one pair of the tapes onto a lateral row 3.9 Omega self-punching anchor. This produced a double row tape-bridge construct. This gave an excellent re-approximation. Dynamic rotation of the limb showed a no residual gapping and an intact tendon repair on probing. No lateral row fixation was deemed necessary. This concluded the endoscopic bursectomy, iliotibial band lengthening, and abductor repair. The arthroscope was then withdrawn and portal sites were irrigated and closed with 3-0 Nylon interrupted suture in Farmer's Knot high-tension suture. Bulky compressive dressing was then applied in addition. No complications were encountered. Blood loss was minimal.    For added post-operative pain relief, a 90cc analgesic/anaesthetic orthopaedic mixture was then injected into the arthroscopy portals, subcuntaeous tissue and along the course of the TFL and abductor musculature. Patient was then awoken from surgery and transferred onto the stretcher. ANTONETTE hose stockings were reapplied. Foot was warm and well-perfused after surgery and coming out of the traction boot. Patient was taken to the recovery room in stable condition. All needle and sponge counts matched the initial count per circulating and scrub personnel x2 prior to terminating this case.      Leo Colin PA-C assisted me during this surgery. Due to the intricate and complex nature of this procedure,  His services were required as a first assist with exposure, anchor drilling, suture retrieval and management, precise manipulation of the arthroscope during my knot tying, and a meticulous layered closure. The surgery could be not be executed without to skilled sets of hands. Post-Operative Plan:  -Patient is Same Day Discharge  -Weight bearinlb flat foot touch WB with crutches x 3 weeks, then WBAT and off crutches by 4 weeks if  tolerated  -Brace: Ossur Hip Brace x 4 weeks , to be worn for ambulation and sleep, and for comfort while seated   No active abduction x 4 weeks (small gluteus medius tear)  -PT: Early supervised PT for ROM and phase 1 hip rehabilitation protocol          Zero resistance upright stationary bike (or seated peddler bike if either available) as early as post op day 2 to 3 as tolerated. PT and family member daily passive circumduction 3-6x/daily for 5 minutes, x 3 weeks  -Showering: Showering can commence post operative day 3  -Dressing removal: Waterproof Dressing remains on for 7 days  -Suture Removal: 14 days post op by MD in office  -Follow-up: day 2-3 for bandage check and xrays, day 14 for suture removal, week 6 for gait check/ROM check, week 12 for full ROM/early loading/strength check, 6 months for return to sport check, 1 year for xray and patient reported questionnaire completion    Sincerely,    Dioni Syed MD [address] [place] [address] and [address] [place] [address], [address], [id number]  Email: [email or web address]  Office: [phone]    22  3:36 PM      Electronically signed by Dioni Syed MD on 2022 at 3:28 PM

## 2022-06-28 NOTE — ANESTHESIA PROCEDURE NOTES
Peripheral Block    Patient location during procedure: pre-op  Reason for block: post-op pain management and at surgeon's request  Start time: 6/28/2022 7:49 AM  End time: 6/28/2022 7:52 AM  Staffing  Performed: anesthesiologist   Anesthesiologist: Hernando Lozano MD  Preanesthetic Checklist  Completed: patient identified, IV checked, site marked, risks and benefits discussed, surgical/procedural consents, equipment checked, pre-op evaluation, timeout performed, anesthesia consent given, oxygen available and monitors applied/VS acknowledged  Peripheral Block   Patient position: supine  Prep: ChloraPrep  Provider prep: mask and sterile gloves  Patient monitoring: cardiac monitor, continuous pulse ox, frequent blood pressure checks and IV access  Block type: PENG  Laterality: left  Injection technique: single-shot  Guidance: ultrasound guided  Local infiltration: lidocaine  Infiltration strength: 1 %  Local infiltration: lidocaine  Dose: 3 mL    Needle   Needle type: long-bevel   Needle gauge: 20 G  Needle localization: ultrasound guidance  Needle length: 10 cm  Assessment   Injection assessment: negative aspiration for heme, no paresthesia on injection and local visualized surrounding nerve on ultrasound  Paresthesia pain: none  Slow fractionated injection: yes  Hemodynamics: stable  Real-time US image taken/store: yes  Outcomes: uncomplicated and patient tolerated procedure well    Additional Notes  U/S 10099.  (1) Under ultrasound guidance, a 20 gauge needle was inserted and placed.  (2) Ultrasound was also used to visualize the spread of the anesthetic in close proximity to the nerve being blocked. (3) The nerve appeared anatomically normal, and (4 there were no apparent abnormal pathological findings on the image that were readily visible and related to the nerve being blocked. (5) A permanent ultrasound image was saved in the patient's record.             Medications Administered  Bupivacaine (MARCAINE) PF injection 0.5%, 20 mL

## 2022-06-30 ENCOUNTER — OFFICE VISIT (OUTPATIENT)
Dept: ORTHOPEDIC SURGERY | Age: 42
End: 2022-06-30

## 2022-06-30 ENCOUNTER — HOSPITAL ENCOUNTER (OUTPATIENT)
Dept: PHYSICAL THERAPY | Age: 42
Setting detail: THERAPIES SERIES
Discharge: HOME | End: 2022-06-30
Payer: COMMERCIAL

## 2022-06-30 VITALS — HEIGHT: 65 IN | WEIGHT: 227 LBS | BODY MASS INDEX: 37.82 KG/M2

## 2022-06-30 DIAGNOSIS — Z98.890 STATUS POST ARTHROSCOPY OF HIP: Primary | ICD-10-CM

## 2022-06-30 PROCEDURE — 99024 POSTOP FOLLOW-UP VISIT: CPT | Performed by: ORTHOPAEDIC SURGERY

## 2022-06-30 PROCEDURE — 97530 THERAPEUTIC ACTIVITIES: CPT

## 2022-06-30 PROCEDURE — 97110 THERAPEUTIC EXERCISES: CPT

## 2022-06-30 PROCEDURE — 97162 PT EVAL MOD COMPLEX 30 MIN: CPT

## 2022-06-30 RX ORDER — HYDROCODONE BITARTRATE AND ACETAMINOPHEN 5; 325 MG/1; MG/1
1 TABLET ORAL EVERY 6 HOURS PRN
Qty: 12 TABLET | Refills: 0 | Status: SHIPPED | OUTPATIENT
Start: 2022-06-30 | End: 2022-07-05

## 2022-06-30 NOTE — PROGRESS NOTES
History of Present Illness:  Gibson Mercer is a pleasant 39 y.o. female who presents for a post operative visit. She is 2 days out following a left hip revision arthroscopy and synovectomy, lysis of adhesions, acetabuloplasty, removal of loose body, old suture anchor, labral repair, femoroplasty/osteochondroplasty, endoscopic greater trochanteric bursectomy and endoscopic abductor repair. Overall She is doing okay and feels that their pain is well controlled with current pain medications. She has been compliant with the 20lb flat foot weight bearing instructions and the Ossur hip abductor brace. She plans to do physical therapy at our [facility] location. She has only been taking her Tramadol and Hydrocodone every 6 hours. She feels her pain is somewhat high today. She also notes experiencing a lateral sided hip \"popping\" yesterday that was very painful at the time of the incident. She notes she was attempting to sit down when the \"popping\" occurred. Pain is somewhat improved today but still present laterally. She denies fevers, chills, numbness, tingling, and shortness of breath. Medical History:  Patient's medications, allergies, past medical, surgical, social and family histories were reviewed and updated as appropriate. No notes on file    Review of Systems  A 14 point review of systems was completed by the patient and is available in the media section of the scanned medical record and was reviewed on 6/30/2022. Vital Signs: There were no vitals filed for this visit. General/Appearance: Alert and oriented and in no apparent distress. Skin:  There are no skin lesions, cellulitis, or extreme edema. The patient has warm and well-perfused Bilateral lower  extremities with brisk capillary refill. Hip  Exam: LEFT    Inspection: Hip incision(s) were intact. The Therabond dressing was replaced today in the office as they were >50% saturated.  . Mild ecchymosis and swelling are present as can be expected. There is no erythema, drainage or other signs of infection    Palpation:  No crepitus to gentle motion / circumduction of the hip    Active Range of Motion: Deferred    Passive Range of Motion: 0 to 80 deg, limber    Strength:  Deferred    Special Tests:  Deferred. Neurovascular: Sensation to light touch is intact, no motor deficits, palpable radial pulses 2+    Radiology:     Plain radiographs of the left hip comprising 3 views were obtained and reviewed in the office: Shows postsurgical changes from the hip arthroscopy and osteochondroplasty. No evidence of acute fracture or complications. Impression: Stable postop x-ray. Assessment :  Ms. Des Neumann is a pleasant 39 y.o. patient who is 2 days post-op from a left revision hip arthroscopy and synovectomy, labral repair, femoroplasty/osteochondroplasty and abductor repair. Overall she is doing well and there is no concerns for post-operative complications and/or infections. Her dressing was changed. Impression:  Encounter Diagnoses   Name Primary?  Status post arthroscopy of hip Yes    Hip pain        Office Procedures:  Orders Placed This Encounter   Procedures    XR HIP LEFT (2-3 VIEWS)     Standing Status:   Future     Number of Occurrences:   1     Standing Expiration Date:   6/30/2022       Treatment Plan:    Overall Des Neumann is doing well. The pain is well-controlled. We recommend that She commence with physical therapy for timeline based progression of motion, weight bearing, and and strengthening. The patient was told that she is restricted from driving for at least 2 weeks postop. They were advised to continue their ASA, NSAIDs, and Avni-Hose compression stockings for 14 days post surgery. I will send a refill of Hydrocodone to her her pharmacy today. She was advised to alternate the Tramadol and Hydrocodone every 3 hours instead of every 6 hours.   This should help to reduce her initial post-operative pain level. All of her questions were fully answered today. We would like to see Des Neumann back in 2 weeks for follow-up visit and suture removal.    Sincerely,      I, Kath Mahan, am scribing for Dr. Jonathon Mcdonald MD.  06/30/22 9:21 AM Kath Mahan. The physical examination was performed between the patient and Dr. Jonathon Mcdonald MD.  All counseling during the appointment was performed between the patient and the provider. [place]onald [place] [address] [place] [address]  Email: [email or web address]  Office: [phone]    06/30/22  9:20 AM

## 2022-06-30 NOTE — FLOWSHEET NOTE
Therapeutic Activities (95155)       GAIT TRAINING                             Neuromuscular Re-ed (99873)                            Manual Intervention (84966)       PROM within precautions                                              Modalities:     Pt. Education: 8' at eval   -pt educated on diagnosis, prognosis and expectations for rehab, post op precautions and guidelines, WB status.   -all pt questions were answered    Home Exercise Program:      Therapeutic Exercise and NMR EXR  [x] (05721) Provided verbal/tactile cueing for activities related to strengthening, flexibility, endurance, ROM for improvements in LE, proximal hip, and core control with self care, mobility, lifting, ambulation.  [] (18982) Provided verbal/tactile cueing for activities related to improving balance, coordination, kinesthetic sense, posture, motor skill, proprioception  to assist with LE, proximal hip, and core control in self care, mobility, lifting, ambulation and eccentric single leg control.   [] (45465) Therapist is in constant attendance of 2 or more patients providing skilled therapy interventions, but not providing any significant amount of measurable one-on-one time to either patient, for improvements in LE, proximal hip, and core control in self care, mobility, lifting, ambulation and eccentric single leg control.      NMR and Therapeutic Activities:    [] (14468 or 83517) Provided verbal/tactile cueing for activities related to improving balance, coordination, kinesthetic sense, posture, motor skill, proprioception and motor activation to allow for proper function of core, proximal hip and LE with self care and ADLs  [] (08308) Gait Re-education- Provided training and instruction to the patient for proper LE, core and proximal hip recruitment and positioning and eccentric body weight control with ambulation re-education including up and down stairs     Home Exercise Program:    [x] (41072) Reviewed/Progressed HEP activities related to strengthening, flexibility, endurance, ROM of core, proximal hip and LE for functional self-care, mobility, lifting and ambulation/stair navigation   [] (64307)Reviewed/Progressed HEP activities related to improving balance, coordination, kinesthetic sense, posture, motor skill, proprioception of core, proximal hip and LE for self care, mobility, lifting, and ambulation/stair navigation      Manual Treatments:  PROM / STM / Oscillations-Mobs:  G-I, II, III, IV (PA's, Inf., Post.)  [] (70654) Provided manual therapy to mobilize LE, proximal hip and/or LS spine soft tissue/joints for the purpose of modulating pain, promoting relaxation,  increasing ROM, reducing/eliminating soft tissue swelling/inflammation/restriction, improving soft tissue extensibility and allowing for proper ROM for normal function with self care, mobility, lifting and ambulation. Modalities:  [] (17358) Vasopneumatic compression: Utilized vasopneumatic compression to decrease edema / swelling for the purpose of improving mobility and quad tone / recruitment which will allow for increased overall function including but not limited to self-care, transfers, ambulation, and ascending / descending stairs. Charges:  Timed Code Treatment Minutes: 25   Total Treatment Minutes: 40     [] EVAL - LOW (13895)   [x] EVAL - MOD (25152)  [] EVAL - HIGH (50351)  [] RE-EVAL (96358)  [x] ZN(64715) x 1      [] Ionto  [] NMR (53388) x       [] Vaso  [] Manual (31731) x       [] Ultrasound  [x] TA x 1       [] Mech Traction (73803)  [] Aquatic Therapy x     [] ES (un) (60537):   [] Home Management Training x  [] ES(attended) (62622)   [] Dry Needling 1-2 muscles (74871):  [] Dry Needling 3+ muscles (241770  [] Group:      [] Other:     GOALS:  Patient stated goal: GET BACK TO WALKING , NO AD  []? Progressing: []? Met: []? Not Met: []? Adjusted     Therapist goals for Patient:   Short Term Goals:  To be achieved in: 2 weeks  1. Independent in HEP and progression per patient tolerance, in order to prevent re-injury. []? Progressing: []? Met: []? Not Met: []? Adjusted  2. Patient will have a decrease in pain to facilitate improvement in movement, function, and ADLs as indicated by Functional Deficits. []? Progressing: []? Met: []? Not Met: []? Adjusted     Long Term Goals: To be achieved in: 10 weeks  1. FOTO score of at least 60 to assist with reaching prior level of function. []? Progressing: []? Met: []? Not Met: []? Adjusted  2. Patient will demonstrate increased AROM to WNL without pain to allow for proper joint functioning as indicated by patients Functional Deficits. []? Progressing: []? Met: []? Not Met: []? Adjusted  3. Patient will demonstrate an increase in Strength to at least 4+/5 as well as good proximal hip strength and control to allow for proper functional mobility as indicated by patients Functional Deficits. []? Progressing: []? Met: []? Not Met: []? Adjusted  4. Patient will return to functional activities including ADLs, walking for 15+ minutes, stairs, without increased symptoms or restriction. []? Progressing: []? Met: []? Not Met: []? Adjusted  5. Pt will improve SLS on L to 10+ seconds when able to fully weight bear to improve balance and proprioception. []? Progressing: []? Met: []? Not Met: []? Adjusted      Overall Progression Towards Functional goals/ Treatment Progress Update:  [] Patient is progressing as expected towards functional goals listed. [] Progression is slowed due to complexities/Impairments listed. [] Progression has been slowed due to co-morbidities.   [x] Plan just implemented, too soon to assess goals progression <30days   [] Goals require adjustment due to lack of progress  [] Patient is not progressing as expected and requires additional follow up with physician  [] Other    Persisting Functional Limitations/Impairments:  []Sitting []Standing   []Walking []Stairs   []Transfers []ADLs   []Squatting/bending []Kneeling  []Housework []Job related tasks  []Driving []Sports/Recreation   []Sleeping []Other:    ASSESSMENT:  See eval  Treatment/Activity Tolerance:  [x] Pt able to complete treatment [] Patient limited by fatique  [x] Patient limited by pain  [] Patient limited by other medical complications  [] Other:      Prognosis:  [x] Good [] Fair  [] Poor    Patient Requires Follow-up: [x] Yes  [] No            PLAN: See eval. PT 1-2x / week for 10 weeks. [] Continue per plan of care [] Alter current plan (see comments)  [x] Plan of care initiated [] Hold pending MD visit [] Discharge    Electronically signed by: Ashwin Posey PT, DPT      Note: If patient does not return for scheduled/ recommended follow up visits, this note will serve as a discharge from care along with most recent update on progress.

## 2022-06-30 NOTE — LETTER
Physical Therapy Rehabilitation Referral    Patient Name: Sulema Chang MRN: [id number]  DOS: 6/30/2022     Diagnosis:   1.  Status post arthroscopy of hip            Precautions:     [x] Evaluate and Treat    Post Op Instructions:  [x] 20 lb flat foot weight bearing x 3 weeks with crutches  [] 20 lb flat foot weight bearing x 4 weeks with crutches (cartilage repair protocol)  [] Weight bearing as tolerated x 2 weeks with crutches  [x] No active abduction x 4 weeks (abductor repair protocol)  [] Continuous passive motion (CPM)   [] AAROM: Flexion to 90   [] Uni-planar passive range of motion   [] AAROM: External rotation to 10   [x] Hip brace on at all times x 4 weeks  [] AAROM: Extension to 10   [] Hip brace when hip at risk     [] AAROM:  Neutral IR   [x] Home exercise program (copy to patient)   [] AAROM: Abduction to 25    [] Isometric external rotator strengthening    [] Isometric glute max strengthening     [] Isometric abductor strengthening     [x] Leg Circumduction (PT and family member assisted x 3 weeks)                 Post-op Phases:  [x]Phase I: Maximum Protection (Day 1-3 Weeks)  []Phase II: Mobility & Neuromuscular Retraining (3-6 Weeks)  []Phase III: Phase III: Muscle Balance and Strengthening (6-12 Weeks)  []Phase IV: Functional Training of the Hip & Lower Extremity (12-18 Weeks)  []Phase V: Advanced Training  Specificity for Return to Sport and/or Work (18-24 [facility])    Non-Operative & Pre-Operative Rehabilitation:    Cardiovascular:            Deep Hip Rotators  [] Upright Bike (Baseline)           [] External Rotators AROM in 4PK (Baseline)  [] Walking (Progression 1)           [] Internal Rotators AROM in 4PK (Baseline)  [] Running (Progression 2)           [] ER in side lying (Progression 1)  [] Dynamic Loading (Progression 3)          [] IR in side lying (Progression 1)                [] ER with resistance in 4PK (Progression 2)                [] IR with resistance in 4PK (Progression 2)                 [] Lateral Step Up (Progression 3)    Positioning:  [] 4PK with pelvic tilts (Baseline)  [] Pelvic tilts in sitting (Progression 1)      Core:   [] Relaxation Breathing (Baseline)         [] [place] lying elbow presses (Progression 1)  [] Side Planks (Baseline)         [] Side planks + hip abduction (Progression 1)  [] Bird-Dog (Baseline)            [] Bird-Dog with resistance (Progression 1)    Glute Complex:            Load Transfer:  [] Bridging (Baseline)            [] Weight Shifting (Baseline)     [] Single Leg Bridge (Progression 1)        [] Single Leg Stance to SEBT(Progression 1)     [] Single Leg Lower (Progression 2)         [] Hip hinge + monster walks (Progression 2)       Mobility:  [] Rodrigo position with breathing (baseline)  [] Hip Hinge with stick & neutral spine (baseline)  [] Sit to stand (baseline)  [] Hip Hinge without guidance (Progression 1)  [] Hip Hinge with resisted punch out guidance (Progression 2)      Pelvic Floor:  [] Relaxation breathing         Activities:       [] Rowing       [] Stepper/Exercise bike     [] Swimming  [] Water exercises    Modalities:     Return to Sport:  [x] Of Choice      [] Plyometrics  [] Ultrasound     [] Rhythmic stabilization  [] Iontophoresis    [] Core strengthening   [] Moist heat     [] Sports specific program:   [] Massage         [x] Cryotherapy      [] Electrical stimulation     [] Paraffin  [] Whirlpool  [] TENS    [x] Home exercise program (copy to patient). Perform exercises for:   15     minutes    3      times/day  [x] Supervised physical therapy  Frequency: []  1x week  [x] 2x week  [] 3x week  [] Other:   Duration: [] 2 weeks   [] 4 weeks  [x] 6 weeks  [] Other:     Additional Instructions:     Zero resistance upright stationary bike (or seated peddler bike if either available) as early as post op day 2 to 3 as tolerated.     Sincerely,    Madhuri Pitts MD [facility] [place]  Orthopaedic Surgeon - Hip Preservation & Sports [address] [facility]   [place] [address] [place] [address]  Email: [email or web address]  Office: [phone]    06/30/22  9:51 AM

## 2022-06-30 NOTE — PLAN OF CARE
[address] [address], [address] [place] [address] Drive  Phone: [phone]   Fax: [phone]                                                       Physical Therapy Certification    Dear Maria L Granado MD  ,    We had the pleasure of evaluating the following patient for physical therapy services at [address] [place] [address]. A summary of our findings can be found in the initial assessment below. This includes our plan of care. If you have any questions or concerns regarding these findings, please do not hesitate to contact me at the office phone number checked above. Thank you for the referral.       Physician Signature:_______________________________Date:__________________  By signing above (or electronic signature), therapists plan is approved by physician      Patient: Addison Nath   : 1980   MRN: [id number]  Referring Physician: Maria L Granado MD        Evaluation Date: 2022      Medical Diagnosis Information:  Diagnosis: M25.859 (ICD-10-CM) - Femoral acetabular impingement , S73.192D (ICD-10-CM) - Tear of left acetabular labrum, subsequent encounter   Treatment Diagnosis: decreased L hip ROM/ strength, impaired gait/ balance, decreased functional status. Insurance information: PT Insurance Information: [place] - AIM     Precautions/ Contra-indications: see post op precautions   Latex Allergy:  [x]NO      []YES  Preferred Language for Healthcare:   [x]English       []Other:    C-SSRS Triggered by Intake questionnaire (Past 2 wk assessment ):   [x] No, Questionnaire did not trigger screening.   [] Yes, Patient intake triggered C-SSRS Screening     [] Completed, no further action required.    [] Completed, PCP notified via Epic    SUBJECTIVE: Patient stated complaint: Pt is S/P L hip scope LEFT HIP REVISION ARTHROSCOPY, LYSIS OF ADHESIONS, REMOVAL LOOSE BODY, SYNOVECTOMY, FEMOROACETABULAR IMPINGEMENT DECOMPRESSION AND LABRAL REPAIR, ABDUCTOR REPAIR, ENDOSCOPIC BURSECTOMY on 6/28/22 . Pt is PWB using RW, had visit with MD this morning and got a good post op report. 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 Living Status/Prior Level of Function:Prior to this injury / incident, pt was independent with ADLs and IADLs, loves to walk       OBJECTIVE:   Palpation: N/A    Functional Mobility/Transfers: Vargas - needs increased time     Posture: decreased stance on LLE    Bandages/Dressings/Incisions: intact - no s/s of infection or issue.      Gait: (include devices/WB status) SW, PWB as instructed, antalgic, foot flat     Dermatomes Normal Abnormal Comments   inguinal area (L1)       anterior mid-thigh (L2)      distal ant thigh/med knee (L3)      medial lower leg and foot (L4) x     lateral lower leg and foot (L5) x     posterior calf (S1) x     medial calcaneus (S2) x         Reflexes Normal Abnormal Comments   S1-2 Seated achilles      S1-2 Prone knee bend      L3-4 Patellar tendon      Clonus      Babinski        Lumbar AROM screen: [x] WFL  [] abnormal:     PROM AROM    L R L R   Hip Flexion 90      Hip Abduction 10      Hip ER 10      Hip IR       Knee Flexion       Knee Extension       Dorsiflexion        Plantarflexion        Inversion        Eversion            Strength (0-5) / Myotomes Left Right   Hip Flexion - supine     Hip Flexion - seated (L1-2) NT    Hip Abduction NT    Hip Adduction NT    Hip ER NT    Hip IR NT    Quads (L2-4)     Hamstrings     Ankle Dorsiflexion (L4-5)     Ankle Plantarflexion (S1-2)     Ankle Inversion     Ankle Eversion (S1-2)     Great Toe Extension (L5)          Flexibility     Hamstrings (90/90)     ITB [place])     Quads (Ely's)     Hip Flexor Joan Koyanagi)          Girth (cm)     Mid patella     Suprapatellar     Figure 8     Transmalleolar     Metatarsal Heads         Joint mobility: NT   []Normal    []Hypo   []Hyper    Orthopaedic Special Tests * Positive  Negative  NT Comments    Hip       CHACHO / Juan's   XX    FADIR   X    Scour   X    Trendelenburg   X           Knee       Lachman's / Anterior Drawer       Posterior Drawer       Varus Stress       Valgus Stress       Angel's        Appley's       Thessaly's       Patellar Tracking              Ankle       Anterior Drawer       Talar Tilt       Aleman       Laurence's                   Balance: POOR                [x] Patient history, allergies, meds reviewed. Medical chart reviewed. See intake form. Review Of Systems (ROS):  [x]Performed Review of systems (Integumentary, CardioPulmonary, Neurological) by intake and observation. Intake form has been scanned into medical record. Patient has been instructed to contact their primary care physician regarding ROS issues if not already being addressed at this time.       Co-morbidities/Complexities (which will affect course of rehabilitation):   []None        []Hx of COVID   Arthritic conditions   []Rheumatoid arthritis (M05.9)  [x]Osteoarthritis (M19.91)  []Gout   Cardiovascular conditions   [x]Hypertension (I10)  []Hyperlipidemia (E78.5)  []Angina pectoris (I20)  []Atherosclerosis (I70)  []Pacemaker  []Hx of CABG/stent/  cardiac surgeries   Musculoskeletal conditions   []Disc pathology   []Congenital spine pathologies   []Osteoporosis (M81.8)  []Osteopenia (M85.8)  []Scoliosis       Endocrine conditions   []Hypothyroid (E03.9)  []Hyperthyroid Gastrointestinal conditions   []Constipation (F79.17)   Metabolic conditions   []Morbid obesity (E66.01)  [x]Diabetes type 1(E10.65) or 2 (E11.65)   []Neuropathy (G60.9)     Cardio/Pulmonary conditions   []Asthma (J45)  []Coughing   [x]COPD (J44.9)  []CHF  []A-fib   Psychological Disorders  []Anxiety (F41.9)  []Depression (F32.9)   []Other:   Developmental Disorders  []Autism (F84.0)  []CP (G80)  []Down Syndrome (Q90.9)  []Developmental delay     Neurological conditions  []Prior Stroke (I69.30)  []Parkinson's (G20)  []Encephalopathy (G93.40)  []MS (G35)  []Post-polio (G14)  []SCI  []TBI  []ALS Other conditions  []Fibromyalgia (M79.7)  []Vertigo  []Syncope  []Kidney Failure  []Cancer      []currently undergoing                treatment  []Pregnancy  []Incontinence   Prior surgeries  []involved limb  []previous spinal surgery  [] section birth  []hysterectomy  []bowel / bladder surgery  []other relevant surgeries   []Other:              Barriers to/and or personal factors that will affect rehab potential:              []Age  []Sex    []Smoker              []Motivation/Lack of Motivation                        []Co-Morbidities              []Cognitive Function, education/learning barriers              []Environmental, home barriers              []profession/work barriers  []past PT/medical experience  []other:  Justification:     Falls Risk Assessment (30 days):   [x] Falls Risk assessed and no intervention required.   [] Falls Risk assessed and Patient requires intervention due to being higher risk   TUG score (>12s at risk):     [] Falls education provided, including        ASSESSMENT:   Functional Impairments:     [x]Noted lumbar/proximal hip/LE joint hypomobility   [x]Decreased LE functional ROM   [x]Decreased core/proximal hip strength and neuromuscular control   [x]Decreased LE functional strength   [x]Reduced balance/proprioceptive control   []other:      Functional Activity Limitations (from functional questionnaire and intake)   [x]Reduced ability to tolerate prolonged functional positions   [x]Reduced ability or difficulty with changes of positions or transfers between positions   [x]Reduced ability to maintain good posture and demonstrate good body mechanics with sitting, bending, and lifting   [x]Reduced ability to sleep   [x] Reduced ability or tolerance with driving and/or computer work   [x]Reduced ability to perform lifting, carrying tasks   [x]Reduced ability to squat   [x]Reduced ability to forward bend   [x]Reduced ability to ambulate prolonged functional periods/distances/surfaces   [x]Reduced ability to ascend/descend stairs   [x]Reduced ability to run, hop, cut or jump   []other:    Participation Restrictions   [x]Reduced participation in self care activities   [x]Reduced participation in home management activities   [x]Reduced participation in work activities   [x]Reduced participation in social activities. [x]Reduced participation in sport/recreation activities. Classification :    [x]Signs/symptoms consistent with post-surgical status including decreased ROM, strength and function.    []Signs/symptoms consistent with joint sprain/strain   []Signs/symptoms consistent with patella-femoral syndrome   []Signs/symptoms consistent with knee OA/hip OA   []Signs/symptoms consistent with internal derangement of knee/Hip   []Signs/symptoms consistent with functional hip weakness/NMR control      []Signs/symptoms consistent with tendinitis/tendinosis    []signs/symptoms consistent with pathology which may benefit from Dry needling      []other:      Prognosis/Rehab Potential:      []Excellent   [x]Good    []Fair   []Poor    Tolerance of evaluation/treatment:    []Excellent   [x]Good    []Fair   []Poor    Physical Therapy Evaluation Complexity Justification  [x] A history of present problem with:  [] no personal factors and/or comorbidities that impact the plan of care;  []1-2 personal factors and/or comorbidities that impact the plan of care  [x]3 personal factors and/or comorbidities that impact the plan of care  [x] An examination of body systems using standardized tests and measures addressing any of the following: body structures and functions (impairments), activity limitations, and/or participation restrictions;:  [] a total of 1-2 or more elements   [] a total of 3 or more elements   [x] a total of 4 or more elements   [x] A clinical presentation with:  [] stable and/or uncomplicated characteristics   [x] evolving clinical presentation with changing characteristics  [] unstable and unpredictable characteristics;   [x] Clinical decision making of [] Low, [] moderate, [] high complexity using standardized patient assessment instrument and/or measurable assessment of functional outcome. [] EVAL (LOW) 79557 (typically 15 minutes face-to-face)  [x] EVAL (MOD) 24484 (typically 30 minutes face-to-face)  [] EVAL (HIGH) 60053 (typically 45 minutes face-to-face)  [] RE-EVAL     PLAN:   Frequency/Duration:  1-2 days per week for 10 Weeks: INSURANCE REQUIRES AUTH AND LIMITED VISITS  Interventions:  [x]  Therapeutic exercise including: strength training, ROM, for Lower extremity and core   [x]  NMR activation and proprioception for LE, Glutes and Core   [x]  Manual therapy as indicated for LE, Hip and spine to include: Dry Needling/IASTM, STM, PROM, Gr I-IV mobilizations, manipulation. [x] Modalities as needed that may include: thermal agents, E-stim, Biofeedback, US, iontophoresis as indicated  [x] Patient education on joint protection, postural re-education, activity modification, progression of HEP. HEP instruction: Written HEP instructions provided and reviewed. GOALS:  Patient stated goal: GET BACK TO WALKING , NO AD  [] Progressing: [] Met: [] Not Met: [] Adjusted    Therapist goals for Patient:   Short Term Goals: To be achieved in: 2 weeks  1. Independent in HEP and progression per patient tolerance, in order to prevent re-injury.    [] Progressing: [] Met: [] Not Met:

## 2022-06-30 NOTE — LETTER
[place]  [address] [facility] [address]  Phone: [phone]  Fax: [phone]    Cheng Gamino MD    June 30, 2022     [facility]  [address] [facility] [address] [place] [address]. [address]    Patient: Aurea Wolfe   MR Number: [id number]   YOB: 1980   Date of Visit: 6/30/2022       Dear Shabbir Hall:    Thank you for referring Chente Wayne to me for evaluation/treatment. Below are the relevant portions of my assessment and plan of care. If you have questions, please do not hesitate to call me. I look forward to following Massimo Caraballo along with you.     Sincerely,      Cheng Gamino MD

## 2022-07-11 ENCOUNTER — OFFICE VISIT (OUTPATIENT)
Dept: ORTHOPEDIC SURGERY | Age: 42
End: 2022-07-11

## 2022-07-11 VITALS — HEIGHT: 65 IN | WEIGHT: 227 LBS | BODY MASS INDEX: 37.82 KG/M2

## 2022-07-11 DIAGNOSIS — M25.859 FEMORAL ACETABULAR IMPINGEMENT: Primary | ICD-10-CM

## 2022-07-11 DIAGNOSIS — Z98.890 STATUS POST ARTHROSCOPY OF HIP: ICD-10-CM

## 2022-07-11 PROCEDURE — 99024 POSTOP FOLLOW-UP VISIT: CPT | Performed by: ORTHOPAEDIC SURGERY

## 2022-07-11 NOTE — PROGRESS NOTES
Patient [address] Benjamin Record Number: [id number]  YOB: 1980  Date of Encounter: 7/11/2022     Chief Complaint   Patient presents with    Post-Op Check     2 WEEK POST OP L HIP SCOPE 6/28/2022       History of Present Illness:   Ms. Regis Hall presents today in 2 week follow up from her left hip revision arthroscopy and synovectomy, lysis of adhesions, acetabuloplasty, removal of loose body - old surgery anchor, labral repair, femoroplasty/osteochondroplasty, endoscopic greater trochanteric bursectomy, endoscopic abductor repair on 6/28/2022. Patient reports pain level a 6/10 and is controlled with current pain medication. The patient denies fever, wound drainage, increasing redness, pus, increasing pain, increasing swelling. She denies numbness, tingling, shortness of breath. Post op problems reported: none. She has been compliant with the 20lb flat foot weight bearing instructions and the [place] hip abductor brace. She is ambulating using a walker . She is working with physical therapy at [facility]. DVT prophylaxis was Xarelto. Patient is taking Naprosyn    Pain Assessment  Location of Pain: Hip  Location Modifiers: Left  Severity of Pain: 6  Quality of Pain: Sharp,Aching  Frequency of Pain: Intermittent  Aggravating Factors: Walking (SITTING TO STANDING)  Limiting Behavior: Yes  Relieving Factors: Rest,Nsaids  Result of Injury: No  Work-Related Injury: No  Are there other pain locations you wish to document?: No    Review of Systems:  A 14 point review of systems available in the scanned medical record as documented by the patient. Today's review pertinent items are noted in HPI. Vital Signs:  Ht 5' 5\" (1.651 m)   Wt 227 lb (103 kg)   BMI 37.77 kg/m²     General Exam:   Neurological: Alert and oriented x 3. There is no focal weakness or sensory deficit. Constitutional: Patient is adequately groomed with no evidence of malnutrition  Mental Status:  The is well-controlled    We recommend that she start with physical therapy for timeline based progression of motion, weight bearing, and and strengthening. Patient can start phase I of physical therapy. Patient can progress to 50% weightbearing on the left lower extremity. Patient will plan on discontinuing her walker and hip brace by 4 weeks postop. The patient was told that she is restricted from driving for at least 4 weeks postop. She has finished Xarelto. She can discontinue NSAIDs as well as her ANTONETTE stockings. Patient has known right hip pathology including labral tear and tendinitis involving the gluteus minimus and medius insertion. She states her right hip pain has been worsening. We will plan on proceeding with surgery in 4 weeks. I cautioned about possible muscular/pain flare ups to her left hip as it may not be fully recovered by then. The patient is understanding of this possibility. All of her questions were fully answered today. We would like to see Cris Moreno back in 4 weeks for follow-up visit and ROM/Gait/SLR check. And pre op visit for her contra lateral right hip. Sincerely,    Consuela Dakin, MD [address] [facility] [address] [place], [place] [id number]  Email: [email or web address]  Office: [phone]    07/11/22  2:31 PM      The encounter with Cris Moreno was carried out by myself, Dr Truong Robertson, who personally examined the patient and reviewed the plan. Cirilo Camp PA-C, functioned as a scribe for Dr. Consuela Dakin during this examination. The history taking and physical examination were also performed by Dr. Consuela Dakin. Please note that portions of this dictation was performed with a voice recognition program (Yatedo). All efforts were made to edit the dictation but occasionally words are mis-transcribed.  It is possible that there are still dictated errors within this office note.   If so, please bring any errors to my attention for an addendum

## 2022-07-11 NOTE — LETTER
[facility]  [address] [address] [address]  Phone: [phone]  Fax: [phone]    James Johnson MD        July 11, 2022     Patient: Zoie Lacey   YOB: 1980   Date of Visit: 7/11/2022       To Whom It May Concern: It is my medical opinion that Suraj Moctezuma is not allowed to ride roller coasters from 6/27/22 to 1/1/23. She underwent hip surgery on 6/28/22. If you have any questions or concerns, please don't hesitate to call.     Sincerely,          James Johnson MD

## 2022-07-11 NOTE — LETTER
[facility]  [address] [address]  Phone: [phone]  Fax: [phone]    Silva Chadwick MD    July 11, 2022     Lenny Carbajal, APRN - CNP  [address] [facility] [address] [place] [address]    Patient: Alysa Lyon   MR Number: [id number]   YOB: 1980   Date of Visit: 7/11/2022       Dear Lenny Carbajal:    Thank you for referring Jayy Rosas to me for evaluation/treatment. Below are the relevant portions of my assessment and plan of care. If you have questions, please do not hesitate to call me. I look forward to following Ever Fu along with you.     Sincerely,      Silva Chadwick MD

## 2022-07-11 NOTE — LETTER
SURGERY SCHEDULING SHEET         Account: [de-identified]  Patient: Mario Infante    : 1980  Address:  [address] [place] [address] [address]  [facility] [address]    Diagnosis:     ICD-10-CM    1. Femoral acetabular impingement  M25.859 Ambulatory referral to Physical Therapy   2. Status post arthroscopy of hip  Z98.890      Date of Surgery: 2022  Time of Surgery: 8:00 AM  Facility: [address] [place] [address]  Surgeon Name: Racquel Vigil   Assisting: TBD  Procedure: RIGHT HIP REVISION, ARTHROSCOPY, ABILIO FEMOROACETABULAR IMPINGEMENT SURGERY, LABRAL REPAIR, ENDOSCOPIC, POSSIBLE OPEN BURSECTOMY AND ABDUCTOR REPAIR  CPT Code:   44036, 84981, 88016, 88892  Insurance: [facility]  Length of Procedure: 180 Minutes  Special Equipment: Awad Raheel and Nephew Hip Distractor - [place] Pad: YES             [place] Pivot Hip Arthroscopy Equipment  Labral Reconstruction: []Fascia Renata Allograft  []Graft prep stand []Anterior Tibialis  SALBADOR: []Cortical allograft ilium  Allograft: []Achilles tendon  []anterior tibialis  []Posterior tibialis  Position:  [x]Supine  []Lateral  []Beach-chair  []Prone    OR Bed:  []Regular  []Alpine  []Tommie  []Hip wright  []Beach-chair  [][facility]  Radiology:  [x]Large C-arm (small headed with digital printing)  []Small C-arm  []Portable X-ray  Anesthesia:   [x] General [x] Block (PENG) [] MAC [x] Local (Orthomix) [] Spinal [] Choice  Endo Sedation:    [x] None [] Topical [] Other:   Patient Status:    [x] SDS (OP) [] AMA [] 23 HR OBS [] OIB  [] INPT (RM#)    PCP: JUAN RAMON Erickson - CNP  Blood Thinner: Yes Xarelto/ASA  Allergies:    Allergies   Allergen Reactions    Cinnamon Itching    Codeine Hives and Itching     Pre op H&P Yes   IMPLEMENT ATTACHED CLINICAL ORDERS FOR THIS PATIENT  MD SIGNATURE:   Racquel Vigil MD Today's Date: 22 Time  2:57 pm  Phone:  [phone]  Fax: [address] [place] [address] P: [phone] F: [phone] [facility] F: [phone]    [address] H:[phone]  H:[phone] PAT F:[phone]

## 2022-07-11 NOTE — LETTER
[facility]  [address] [address]  Phone: [phone]  Fax: [phone]    Dioni Syed MD        July 11, 2022     Patient: Jana Schneider   YOB: 1980   Date of Visit: 7/11/2022       To Whom It May Concern: It is my medical opinion that Samm Saver {Work release (duty restriction):50579}. If you have any questions or concerns, please don't hesitate to call.     Sincerely,        Dioni Syed MD

## 2022-07-13 ENCOUNTER — HOSPITAL ENCOUNTER (OUTPATIENT)
Dept: PHYSICAL THERAPY | Age: 42
Setting detail: THERAPIES SERIES
Discharge: HOME | End: 2022-07-13
Payer: COMMERCIAL

## 2022-07-13 ENCOUNTER — TELEPHONE (OUTPATIENT)
Dept: ORTHOPEDIC SURGERY | Age: 42
End: 2022-07-13

## 2022-07-13 PROCEDURE — 97530 THERAPEUTIC ACTIVITIES: CPT

## 2022-07-13 PROCEDURE — 97140 MANUAL THERAPY 1/> REGIONS: CPT

## 2022-07-13 PROCEDURE — 97110 THERAPEUTIC EXERCISES: CPT

## 2022-07-13 NOTE — TELEPHONE ENCOUNTER
Patient requests to RTW 7/31/2022-8/6/20022  She underwent L hip arthroscopy on 6/28/2022 and has R hip revision arthroscopy scheduled on 8/9/2022.     Please advise for approval

## 2022-07-13 NOTE — FLOWSHEET NOTE
DECOMPRESSION AND LABRAL REPAIR, ABDUCTOR REPAIR, ENDOSCOPIC BURSECTOMY on 6/28/22 . Exercises/Interventions:     Therapeutic Exercise (19768) 18' Resistance / level Sets/sec Reps Notes / Cues   Upright bike - = No resistance 3'  Added 7/13   Calf Stretch  3 30'' c strap    Ankle pumps  x30  Held   Quad set  10'' 10    LAQ  2 12    Standing HSC  2 12 Added 7/13   [place]  To Neutral   DL 2 12 Added 7/13 cueing for glut drive, neutral extension only   Prone laying Gentle hip flexor stretc 1'  Added 7/13 cued to avoid significant extension                 Therapeutic Activities (85789) x 15'       GAIT TRAINING  5'      PT education on tissue healing/expected recovery timeframes, expected gait progression including use of AD, safety at home, current precautions/post op restrictions 10'                    Neuromuscular Re-ed (25979)                            Manual Intervention (56806) x 10'       PROM within precautions x10'   Gentle hip circumduction, knee ROM                                          Modalities:     Pt. Education: 8' at eval   -pt educated on diagnosis, prognosis and expectations for rehab, post op precautions and guidelines, WB status.   -all pt questions were answered    Home Exercise Program:      Therapeutic Exercise and NMR EXR  [x] (76203) Provided verbal/tactile cueing for activities related to strengthening, flexibility, endurance, ROM for improvements in LE, proximal hip, and core control with self care, mobility, lifting, ambulation.   [x] (65281) Provided verbal/tactile cueing for activities related to improving balance, coordination, kinesthetic sense, posture, motor skill, proprioception  to assist with LE, proximal hip, and core control in self care, mobility, lifting, ambulation and eccentric single leg control.   [] (19749) Therapist is in constant attendance of 2 or more patients providing skilled therapy interventions, but not providing any significant amount of measurable one-on-one time to either patient, for improvements in LE, proximal hip, and core control in self care, mobility, lifting, ambulation and eccentric single leg control. NMR and Therapeutic Activities:    [] (68273 or 36113) Provided verbal/tactile cueing for activities related to improving balance, coordination, kinesthetic sense, posture, motor skill, proprioception and motor activation to allow for proper function of core, proximal hip and LE with self care and ADLs  [x] (46255) Gait Re-education- Provided training and instruction to the patient for proper LE, core and proximal hip recruitment and positioning and eccentric body weight control with ambulation re-education including up and down stairs     Home Exercise Program:    [x] (72718) Reviewed/Progressed HEP activities related to strengthening, flexibility, endurance, ROM of core, proximal hip and LE for functional self-care, mobility, lifting and ambulation/stair navigation   [] (18226)Reviewed/Progressed HEP activities related to improving balance, coordination, kinesthetic sense, posture, motor skill, proprioception of core, proximal hip and LE for self care, mobility, lifting, and ambulation/stair navigation      Manual Treatments:  PROM / STM / Oscillations-Mobs:  G-I, II, III, IV (PA's, Inf., Post.)  [x] (18271) Provided manual therapy to mobilize LE, proximal hip and/or LS spine soft tissue/joints for the purpose of modulating pain, promoting relaxation,  increasing ROM, reducing/eliminating soft tissue swelling/inflammation/restriction, improving soft tissue extensibility and allowing for proper ROM for normal function with self care, mobility, lifting and ambulation.      Modalities:  [] (67491) Vasopneumatic compression: Utilized vasopneumatic compression to decrease edema / swelling for the purpose of improving mobility and quad tone / recruitment which will allow for increased overall function including but not limited to self-care, transfers, ambulation, and ascending / descending stairs. Charges:  Timed Code Treatment Minutes: 43   Total Treatment Minutes: 43     [] EVAL - LOW (88490)   [] EVAL - MOD (03913)  [] EVAL - HIGH (28896)  [] RE-EVAL (63370)  [x] KJ(62293) x 1      [] Ionto  [] NMR (66536) x       [] Vaso  [x] Manual (41312) x       [] Ultrasound  [x] TA x 1       [] Mech Traction (64218)  [] Aquatic Therapy x     [] ES (un) (34667):   [] Home Management Training x  [] ES(attended) (84567)   [] Dry Needling 1-2 muscles (49109):  [] Dry Needling 3+ muscles (315135  [] Group:      [] Other:     GOALS:  Patient stated goal: GET BACK TO WALKING , NO AD  []? Progressing: []? Met: []? Not Met: []? Adjusted     Therapist goals for Patient:   Short Term Goals: To be achieved in: 2 weeks  1. Independent in HEP and progression per patient tolerance, in order to prevent re-injury. []? Progressing: []? Met: []? Not Met: []? Adjusted  2. Patient will have a decrease in pain to facilitate improvement in movement, function, and ADLs as indicated by Functional Deficits. []? Progressing: []? Met: []? Not Met: []? Adjusted     Long Term Goals: To be achieved in: 10 weeks  1. FOTO score of at least 60 to assist with reaching prior level of function. []? Progressing: []? Met: []? Not Met: []? Adjusted  2. Patient will demonstrate increased AROM to WNL without pain to allow for proper joint functioning as indicated by patients Functional Deficits. []? Progressing: []? Met: []? Not Met: []? Adjusted  3. Patient will demonstrate an increase in Strength to at least 4+/5 as well as good proximal hip strength and control to allow for proper functional mobility as indicated by patients Functional Deficits. []? Progressing: []? Met: []? Not Met: []? Adjusted  4. Patient will return to functional activities including ADLs, walking for 15+ minutes, stairs, without increased symptoms or restriction. []? Progressing: []? Met: []?  Not Met: []? Adjusted  5. Pt will improve SLS on L to 10+ seconds when able to fully weight bear to improve balance and proprioception. []? Progressing: []? Met: []? Not Met: []? Adjusted      Overall Progression Towards Functional goals/ Treatment Progress Update:  [] Patient is progressing as expected towards functional goals listed. [] Progression is slowed due to complexities/Impairments listed. [] Progression has been slowed due to co-morbidities. [x] Plan just implemented, too soon to assess goals progression <30days   [] Goals require adjustment due to lack of progress  [] Patient is not progressing as expected and requires additional follow up with physician  [] Other    Persisting Functional Limitations/Impairments:  [x]Sitting [x]Standing   [x]Walking [x]Stairs   []Transfers [x]ADLs   [x]Squatting/bending []Kneeling  [x]Housework [x]Job related tasks  []Driving [x]Sports/Recreation   []Sleeping []Other:    ASSESSMENT:  Reviewed WB restrictions as patient was observed minimally using her walker upon entering the clinic. Re-educated pt on tissue healing and general post op restrictions. Overall, pt appears to be doing quite well with pain well controlled. Within current restrictions, pt has excellent mobility and had no issues with additional exercises this date. Treatment/Activity Tolerance:  [x] Pt able to complete treatment [] Patient limited by fatique  [x] Patient limited by pain  [] Patient limited by other medical complications  [] Other:      Prognosis:  [x] Good [] Fair  [] Poor    Patient Requires Follow-up: [x] Yes  [] No            PLAN:l. PT 1-2x / week for 10 weeks.    [] Continue per plan of care [] Alter current plan (see comments)  [x] Plan of care initiated [] Hold pending MD visit [] Discharge    Electronically signed by: Arian Gorman, PT, DPT      Note: If patient does not return for scheduled/ recommended follow up visits, this note will serve as a discharge from care along with most recent update on progress.

## 2022-07-13 NOTE — TELEPHONE ENCOUNTER
Other PATIENT CALLED STATES THAT SHE WOULD LIKE A RETURN TO WORK LETTER JUST FOR THE WEEK BEFORE HER SX. [facility] CALL TO ADVISE [phone]

## 2022-07-14 ENCOUNTER — HOSPITAL ENCOUNTER (OUTPATIENT)
Age: 42
Discharge: HOME | End: 2022-07-14
Payer: COMMERCIAL

## 2022-07-14 LAB
A/G RATIO: 1.5 (ref 1.1–2.2)
ABO/RH: NORMAL
ALBUMIN SERPL-MCNC: 4.4 G/DL (ref 3.4–5)
ALP BLD-CCNC: 67 U/L (ref 40–129)
ALT SERPL-CCNC: 22 U/L (ref 10–40)
ANION GAP SERPL CALCULATED.3IONS-SCNC: 10 MMOL/L (ref 3–16)
ANTIBODY SCREEN: NORMAL
AST SERPL-CCNC: 17 U/L (ref 15–37)
BACTERIA: ABNORMAL /HPF
BASOPHILS ABSOLUTE: 0 K/UL (ref 0–0.2)
BASOPHILS RELATIVE PERCENT: 0.3 %
BILIRUB SERPL-MCNC: 0.3 MG/DL (ref 0–1)
BILIRUBIN URINE: NEGATIVE
BLOOD, URINE: ABNORMAL
BUN BLDV-MCNC: 14 MG/DL (ref 7–20)
C-REACTIVE PROTEIN: <3 MG/L (ref 0–5.1)
CALCIUM SERPL-MCNC: 9.4 MG/DL (ref 8.3–10.6)
CHLORIDE BLD-SCNC: 102 MMOL/L (ref 99–110)
CLARITY: CLEAR
CO2: 28 MMOL/L (ref 21–32)
COLOR: YELLOW
CREAT SERPL-MCNC: <0.5 MG/DL (ref 0.6–1.1)
EKG ATRIAL RATE: 81 BPM
EKG DIAGNOSIS: NORMAL
EKG P AXIS: 36 DEGREES
EKG P-R INTERVAL: 140 MS
EKG Q-T INTERVAL: 402 MS
EKG QRS DURATION: 96 MS
EKG QTC CALCULATION (BAZETT): 466 MS
EKG R AXIS: 9 DEGREES
EKG T AXIS: 25 DEGREES
EKG VENTRICULAR RATE: 81 BPM
EOSINOPHILS ABSOLUTE: 0.1 K/UL (ref 0–0.6)
EOSINOPHILS RELATIVE PERCENT: 0.6 %
EPITHELIAL CELLS, UA: 1 /HPF (ref 0–5)
ESTIMATED AVERAGE GLUCOSE: 134.1 MG/DL
GFR AFRICAN AMERICAN: >60
GFR NON-AFRICAN AMERICAN: >60
GLUCOSE BLD-MCNC: 123 MG/DL (ref 70–99)
GLUCOSE URINE: NEGATIVE MG/DL
HBA1C MFR BLD: 6.3 %
HCT VFR BLD CALC: 37.1 % (ref 36–48)
HEMOGLOBIN: 12.5 G/DL (ref 12–16)
HYALINE CASTS: 0 /LPF (ref 0–8)
INR BLD: 0.99 (ref 0.87–1.14)
KETONES, URINE: ABNORMAL MG/DL
LEUKOCYTE ESTERASE, URINE: NEGATIVE
LYMPHOCYTES ABSOLUTE: 2.5 K/UL (ref 1–5.1)
LYMPHOCYTES RELATIVE PERCENT: 25.3 %
MCH RBC QN AUTO: 29 PG (ref 26–34)
MCHC RBC AUTO-ENTMCNC: 33.8 G/DL (ref 31–36)
MCV RBC AUTO: 85.8 FL (ref 80–100)
MICROSCOPIC EXAMINATION: YES
MONOCYTES ABSOLUTE: 0.6 K/UL (ref 0–1.3)
MONOCYTES RELATIVE PERCENT: 6.2 %
NEUTROPHILS ABSOLUTE: 6.7 K/UL (ref 1.7–7.7)
NEUTROPHILS RELATIVE PERCENT: 67.6 %
NITRITE, URINE: NEGATIVE
PDW BLD-RTO: 14.2 % (ref 12.4–15.4)
PH UA: 5.5 (ref 5–8)
PLATELET # BLD: 336 K/UL (ref 135–450)
PMV BLD AUTO: 6.7 FL (ref 5–10.5)
POTASSIUM SERPL-SCNC: 4 MMOL/L (ref 3.5–5.1)
PROTEIN UA: ABNORMAL MG/DL
PROTHROMBIN TIME: 13 SEC (ref 11.7–14.5)
RBC # BLD: 4.32 M/UL (ref 4–5.2)
RBC UA: 6 /HPF (ref 0–4)
SEDIMENTATION RATE, ERYTHROCYTE: 26 MM/HR (ref 0–20)
SODIUM BLD-SCNC: 140 MMOL/L (ref 136–145)
SPECIFIC GRAVITY UA: 1.02 (ref 1–1.03)
TOTAL PROTEIN: 7.4 G/DL (ref 6.4–8.2)
URINE TYPE: ABNORMAL
UROBILINOGEN, URINE: 1 E.U./DL
WBC # BLD: 9.8 K/UL (ref 4–11)
WBC UA: 1 /HPF (ref 0–5)

## 2022-07-14 PROCEDURE — 85652 RBC SED RATE AUTOMATED: CPT

## 2022-07-14 PROCEDURE — 86900 BLOOD TYPING SEROLOGIC ABO: CPT

## 2022-07-14 PROCEDURE — 86850 RBC ANTIBODY SCREEN: CPT

## 2022-07-14 PROCEDURE — 93005 ELECTROCARDIOGRAM TRACING: CPT | Performed by: ORTHOPAEDIC SURGERY

## 2022-07-14 PROCEDURE — 87086 URINE CULTURE/COLONY COUNT: CPT

## 2022-07-14 PROCEDURE — 86901 BLOOD TYPING SEROLOGIC RH(D): CPT

## 2022-07-14 PROCEDURE — 85610 PROTHROMBIN TIME: CPT

## 2022-07-14 PROCEDURE — 80053 COMPREHEN METABOLIC PANEL: CPT

## 2022-07-14 PROCEDURE — 86140 C-REACTIVE PROTEIN: CPT

## 2022-07-14 PROCEDURE — 81001 URINALYSIS AUTO W/SCOPE: CPT

## 2022-07-14 PROCEDURE — 93010 ELECTROCARDIOGRAM REPORT: CPT | Performed by: INTERNAL MEDICINE

## 2022-07-14 PROCEDURE — 87081 CULTURE SCREEN ONLY: CPT

## 2022-07-14 PROCEDURE — 85025 COMPLETE CBC W/AUTO DIFF WBC: CPT

## 2022-07-14 PROCEDURE — 83036 HEMOGLOBIN GLYCOSYLATED A1C: CPT

## 2022-07-14 PROCEDURE — 36415 COLL VENOUS BLD VENIPUNCTURE: CPT

## 2022-07-15 ENCOUNTER — APPOINTMENT (OUTPATIENT)
Dept: PHYSICAL THERAPY | Age: 42
End: 2022-07-15
Payer: COMMERCIAL

## 2022-07-15 LAB — URINE CULTURE, ROUTINE: NORMAL

## 2022-07-16 PROBLEM — Z01.810 PREOP CARDIOVASCULAR EXAM: Status: RESOLVED | Noted: 2022-06-16 | Resolved: 2022-07-16

## 2022-07-16 LAB — MRSA CULTURE ONLY: NORMAL

## 2022-07-19 ENCOUNTER — TELEPHONE (OUTPATIENT)
Dept: ORTHOPEDIC SURGERY | Age: 42
End: 2022-07-19

## 2022-07-19 ENCOUNTER — OFFICE VISIT (OUTPATIENT)
Dept: FAMILY MEDICINE CLINIC | Age: 42
End: 2022-07-19
Payer: COMMERCIAL

## 2022-07-19 VITALS
HEART RATE: 70 BPM | DIASTOLIC BLOOD PRESSURE: 64 MMHG | SYSTOLIC BLOOD PRESSURE: 100 MMHG | WEIGHT: 231 LBS | OXYGEN SATURATION: 99 % | BODY MASS INDEX: 38.44 KG/M2 | TEMPERATURE: 97.4 F

## 2022-07-19 DIAGNOSIS — M79.7 FIBROMYALGIA: ICD-10-CM

## 2022-07-19 DIAGNOSIS — Z01.818 PRE-OP EXAM: Primary | ICD-10-CM

## 2022-07-19 DIAGNOSIS — J44.9 COPD WITH ASTHMA (HCC): ICD-10-CM

## 2022-07-19 DIAGNOSIS — E78.2 MIXED HYPERLIPIDEMIA: ICD-10-CM

## 2022-07-19 DIAGNOSIS — G47.33 OSA (OBSTRUCTIVE SLEEP APNEA): ICD-10-CM

## 2022-07-19 DIAGNOSIS — G43.909 MIGRAINE WITHOUT STATUS MIGRAINOSUS, NOT INTRACTABLE, UNSPECIFIED MIGRAINE TYPE: ICD-10-CM

## 2022-07-19 DIAGNOSIS — F43.10 PTSD (POST-TRAUMATIC STRESS DISORDER): ICD-10-CM

## 2022-07-19 DIAGNOSIS — E11.9 TYPE 2 DIABETES MELLITUS WITHOUT COMPLICATION, WITHOUT LONG-TERM CURRENT USE OF INSULIN (HCC): ICD-10-CM

## 2022-07-19 DIAGNOSIS — Z72.0 CURRENT TOBACCO USE: ICD-10-CM

## 2022-07-19 DIAGNOSIS — F31.9 BIPOLAR 1 DISORDER (HCC): ICD-10-CM

## 2022-07-19 DIAGNOSIS — M70.71 BURSITIS OF RIGHT HIP, UNSPECIFIED BURSA: ICD-10-CM

## 2022-07-19 PROCEDURE — G8427 DOCREV CUR MEDS BY ELIG CLIN: HCPCS | Performed by: FAMILY MEDICINE

## 2022-07-19 PROCEDURE — 2022F DILAT RTA XM EVC RTNOPTHY: CPT | Performed by: FAMILY MEDICINE

## 2022-07-19 PROCEDURE — 3023F SPIROM DOC REV: CPT | Performed by: FAMILY MEDICINE

## 2022-07-19 PROCEDURE — 99213 OFFICE O/P EST LOW 20 MIN: CPT | Performed by: FAMILY MEDICINE

## 2022-07-19 PROCEDURE — G8417 CALC BMI ABV UP PARAM F/U: HCPCS | Performed by: FAMILY MEDICINE

## 2022-07-19 RX ORDER — ASPIRIN 81 MG/1
81 TABLET ORAL DAILY
Status: ON HOLD | COMMUNITY
End: 2022-08-09 | Stop reason: HOSPADM

## 2022-07-19 ASSESSMENT — PATIENT HEALTH QUESTIONNAIRE - PHQ9
7. TROUBLE CONCENTRATING ON THINGS, SUCH AS READING THE NEWSPAPER OR WATCHING TELEVISION: 0
10. IF YOU CHECKED OFF ANY PROBLEMS, HOW DIFFICULT HAVE THESE PROBLEMS MADE IT FOR YOU TO DO YOUR WORK, TAKE CARE OF THINGS AT HOME, OR GET ALONG WITH OTHER PEOPLE: 0
5. POOR APPETITE OR OVEREATING: 0
SUM OF ALL RESPONSES TO PHQ QUESTIONS 1-9: 0
SUM OF ALL RESPONSES TO PHQ QUESTIONS 1-9: 0
4. FEELING TIRED OR HAVING LITTLE ENERGY: 0
3. TROUBLE FALLING OR STAYING ASLEEP: 0
SUM OF ALL RESPONSES TO PHQ QUESTIONS 1-9: 0
2. FEELING DOWN, DEPRESSED OR HOPELESS: 0
8. MOVING OR SPEAKING SO SLOWLY THAT OTHER PEOPLE COULD HAVE NOTICED. OR THE OPPOSITE, BEING SO FIGETY OR RESTLESS THAT YOU HAVE BEEN MOVING AROUND A LOT MORE THAN USUAL: 0
6. FEELING BAD ABOUT YOURSELF - OR THAT YOU ARE A FAILURE OR HAVE LET YOURSELF OR YOUR FAMILY DOWN: 0
SUM OF ALL RESPONSES TO PHQ QUESTIONS 1-9: 0

## 2022-07-19 NOTE — PROGRESS NOTES
Preoperative Consultation      Reagan Leslie  YOB: 1980    Date of Service:  7/19/2022    Vitals:    07/19/22 1258   BP: 100/64   Pulse: 70   Temp: 97.4 °F (36.3 °C)   SpO2: 99%   Weight: 231 lb (104.8 kg)      Wt Readings from Last 2 Encounters:   07/19/22 231 lb (104.8 kg)   07/11/22 227 lb (103 kg)     BP Readings from Last 3 Encounters:   07/19/22 100/64   06/28/22 124/73   06/16/22 108/64        Chief Complaint   Patient presents with    Pre-op Exam     EKG, labs, and urine done at [facility]     Allergies   Allergen Reactions    Cinnamon Itching    Codeine Hives and Itching     Outpatient Medications Marked as Taking for the 7/19/22 encounter (Office Visit) with Rod Velazquez MD   Medication Sig Dispense Refill    aspirin EC 81 MG EC tablet Take 81 mg by mouth in the morning. Evolocumab 140 MG/ML SOAJ Inject 140 mg into the skin every 14 days 2 pen 11    Cholecalciferol (VITAMIN D3) 50 MCG (2000 UT) CAPS TAKE 1 CAPSULE BY MOUTH EVERY DAY 90 capsule 1    diclofenac sodium (VOLTAREN) 1 % GEL APPLY 2 GRAMS TOPICALLY 2 TIMES DAILY 100 g 1    citalopram (CELEXA) 40 MG tablet TAKE 1 TABLET BY MOUTH EVERY DAY IN THE MORNING      omeprazole (PRILOSEC) 20 MG delayed release capsule TAKE 1 CAPSULE BY MOUTH EVERY DAY 90 capsule 1    rosuvastatin (CRESTOR) 40 MG tablet TAKE 1 TABLET BY MOUTH EVERY DAY 90 tablet 1    metFORMIN (GLUCOPHAGE-XR) 500 MG extended release tablet Take 2 tablets by mouth daily (with breakfast) 180 tablet 1    metoprolol tartrate (LOPRESSOR) 50 MG tablet Take 1 tablet by mouth 2 times daily 180 tablet 1    ezetimibe (ZETIA) 10 MG tablet Take 1 tablet by mouth daily 90 tablet 1    albuterol sulfate  (90 Base) MCG/ACT inhaler TAKE 2 PUFFS BY MOUTH EVERY 6 HOURS AS NEEDED FOR WHEEZE 6.7 each 1    nitroGLYCERIN (NITROSTAT) 0.4 MG SL tablet Place 1 tablet under the tongue every 5 minutes as needed for Chest pain up to max of 3 total doses.  If no relief after 1 dose, call 911. 25 tablet 0    ammonium lactate (LAC-HYDRIN) 12 % lotion Apply topically daily 225 mL 2    thiothixene (NAVANE) 2 MG capsule TAKE 1 CAPSULE BY MOUTH EVERY EVENING AT BEDTIME      pregabalin (LYRICA) 75 MG capsule Take 1 capsule by mouth 2 times daily for 90 days. 60 capsule 2    divalproex (DEPAKOTE ER) 500 MG extended release tablet Take 500 mg by mouth daily       hydrOXYzine (ATARAX) 25 MG tablet Take 25 mg by mouth 3 times daily as needed for Anxiety          This patient presents to the office today for a preoperative consultation at the request of surgeon, Dr. Bill Melchor who plans on performing R hip orthoscopic surgery on August 9 at [facility].  The current problem began 2 years ago, and symptoms have been worsening with time. Conservative therapy: N/A.     Planned anesthesia: general  Known anesthesia problems: None   Bleeding risk: No recent or remote history of abnormal bleeding  Personal or FH of DVT/PE: No    Patient objection to receiving blood products: No    Patient Active Problem List   Diagnosis    AMOS (obstructive sleep apnea)    Type 2 diabetes mellitus without complication, without long-term current use of insulin (HCC)    Essential hypertension    Familial hypercholesterolemia    History of fibromyalgia    Positive MATTHEW (antinuclear antibody)    CS (cervical spondylosis)    Cervical discogenic pain syndrome    DDD (degenerative disc disease), cervical    Herniated cervical disc without myelopathy    Bipolar disorder (HCC)    Fibromyalgia    Mixed anxiety and depressive disorder    Morbidly obese (HCC)    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[address]    Gastroesophageal reflux disease    Chronic tachycardia    Migraine without status migrainosus, not intractable    Femoral acetabular impingement       Past Medical History:   Diagnosis Date    Arthritis     Bipolar 1 disorder (HCC)     COPD (chronic obstructive pulmonary disease) ([address]     Depression     Diabetes mellitus ([facility] [address]     Hyperlipidemia     Hypertension     Tachycardia     Tachycardia      Past Surgical History:   Procedure Laterality Date    ANKLE FUSION Bilateral     ARTHRODESIS Left 2019    REVISION OF TALONAVICULAR JOINT ARTHRODESIS LEFT FOOT  -SLEEP APNEA- performed by Roger Moyer DPM at [address] [address] Bilateral     ELBOW SURGERY Bilateral     ulnar nerve release    HERNIA REPAIR      HIP ARTHROSCOPY Bilateral     HIP SURGERY Left 2022    LEFT HIP REVISION ARTHROSCOPY, LYSIS OF ADHESIONS, REMOVAL LOOSE BODY, SYNOVECTOMY, FEMOROACETABULAR IMPINGEMENT DECOMPRESSION AND LABRAL REPAIR, ABDUCTOR REPAIR, ENDOSCOPIC BURSECTOMY  - ELMA BLOCK; LOCAL (ORTHOMIX) performed by Danny Norman MD at [address] [place] [address] Bilateral      Family History   Problem Relation Age of Onset    High Blood Pressure Mother     Arthritis Mother     Other Father         hypothyroidism    High Blood Pressure Father     Arthritis Father     Asthma Father     Heart Failure Maternal Aunt         22 stents.      Heart Failure Maternal Grandmother     Pacemaker Maternal Grandfather     Heart Surgery Paternal Grandmother     Pacemaker Paternal Grandfather      Social History     Socioeconomic History    Marital status:      Spouse name: Not on file    Number of children: Not on file    Years of education: Not on file    Highest education level: Not on file   Occupational History    Not on file   Tobacco Use    Smoking status: Former     Packs/day: 1.00     Years: 27.00     Pack years: 27.00     Types: Cigarettes     Start date: 12     Quit date: 2019     Years since quittin.6    Smokeless tobacco: Never    Tobacco comments:     started to smoke at 15 / smoked up to 1 ppd / now smoking 3 to 4 cigarettes a day   Vaping Use    Vaping Use: Never used   Substance and Sexual Activity    Alcohol use: Never    Drug use: Never    Sexual activity: Not on file   Other Topics Concern    Not on file   Social History Narrative    Not on file     Social Determinants of Health     Financial Resource Strain: Low Risk     Difficulty of Paying Living Expenses: Not hard at all   Food Insecurity: No Food Insecurity    Worried About Running Out of Food in the Last Year: Never true    Ran Out of Food in the Last Year: Never true   Transportation Needs: Not on file   Physical Activity: Sufficiently Active    Days of Exercise per Week: 4 days    Minutes of Exercise per Session: 60 min   Stress: Not on file   Social Connections: Not on file   Intimate Partner Violence: Not At Risk    Fear of Current or Ex-Partner: No    Emotionally Abused: No    Physically Abused: No    Sexually Abused: No   Housing Stability: Not on file       Review of Systems  A comprehensive review of systems was negative except for what was noted in the HPI. Physical Exam   Constitutional: She is oriented to person, place, and time. She appears well-developed and well-nourished. No distress. HENT:   Head: Normocephalic and atraumatic. Mouth/Throat: Uvula is midline, oropharynx is clear and moist and mucous membranes are normal.   Eyes: Conjunctivae and EOM are normal. Pupils are equal, round, and reactive to light. Neck: Trachea normal and normal range of motion. Neck supple. No JVD present. Carotid bruit is not present. No mass and no thyromegaly present. Cardiovascular: Normal rate, regular rhythm, normal heart sounds and intact distal pulses. .  No murmur heard. Pulmonary/Chest: Effort normal and breath sounds normal. No respiratory distress. She has no wheezes. She has no rales. Abdominal: Soft. Normal aorta and bowel sounds are normal. She exhibits no distension and no mass. . No tenderness. Musculoskeletal: She exhibits no edema and no tenderness. Neurological: She is alert and oriented to person, place, and time. She has normal strength. No cranial nerve deficit or sensory deficit. Coordination and gait normal.   Skin: Skin is warm and dry. No rash noted. No erythema. Psychiatric: She has a normal mood and affect. Her behavior is normal.     EKG Interpretation:  nonspecific ST and T waves changes, long QT, unchanged from previous tracings. Lab Review- nl CBC and CMP. HgA1c 6.3         Assessment:       39 y.o. patient with planned surgery as above. Known risk factors for perioperative complications: Asthma, COPD, Diabetes mellitus, Hypertension, Obstructive sleep apnea  Current medications which may produce withdrawal symptoms if withheld perioperatively: none      Plan:     1. Preoperative workup as follows: ECG, hemoglobin, hematocrit, electrolytes, creatinine  2. Change in medication regimen before surgery: None  3. Prophylaxis for cardiac events with perioperative beta-blockers: Currently taking  metoprolol  ACC/AHA indications for pre-operative beta-blocker use:    Vascular surgery with history of postitive stress test  Intermediate or high risk surgery with history of CAD   Intermediate or high risk surgery with multiple clinical predictors of CAD- 2 of the following: history of compensated or prior heart failure, history of cerebrovascular disease, DM, or renal insufficiency    Routine administration of higher-dose, long-acting metoprolol in beta-blocker-naïve patients on the day of surgery, and in the absence of dose titration is associated with an overall increase in mortality. Beta-blockers should be started days to weeks prior to surgery and titrated to pulse < 70.  4. Deep vein thrombosis prophylaxis: regimen to be chosen by surgical team  5. No contraindications to planned surgery.  Pt will need clearance from cardiologist

## 2022-07-21 ENCOUNTER — TELEPHONE (OUTPATIENT)
Dept: ORTHOPEDIC SURGERY | Age: 42
End: 2022-07-21

## 2022-07-21 DIAGNOSIS — M79.7 FIBROMYALGIA: ICD-10-CM

## 2022-07-21 DIAGNOSIS — G89.29 OTHER CHRONIC PAIN: ICD-10-CM

## 2022-07-21 NOTE — TELEPHONE ENCOUNTER
CPT: Jigar  AUTH: [id number]  DATE RANGE: 8/9/22 TO 10/7/22  INSURANCE: [facility]  LOCATION [facility]  NOTE: SONJA RAMIREZ [facility] SCANNED

## 2022-07-22 ENCOUNTER — HOSPITAL ENCOUNTER (OUTPATIENT)
Dept: PHYSICAL THERAPY | Age: 42
Setting detail: THERAPIES SERIES
Discharge: HOME | End: 2022-07-22
Payer: COMMERCIAL

## 2022-07-22 PROCEDURE — 97530 THERAPEUTIC ACTIVITIES: CPT

## 2022-07-22 PROCEDURE — 97110 THERAPEUTIC EXERCISES: CPT

## 2022-07-22 PROCEDURE — 97140 MANUAL THERAPY 1/> REGIONS: CPT

## 2022-07-22 NOTE — FLOWSHEET NOTE
[address] [place] [address]  Phone: [phone]   Fax: [phone]    Physical Therapy Treatment Note/ Progress Report:     Date:  2022    Patient Name:  Darell Harrison    :  1980  MRN: [id number]  Restrictions/Precautions:    Medical/Treatment Diagnosis Information:  Diagnosis: M25.859 (ICD-10-CM) - Femoral acetabular impingement , S73.192D (ICD-10-CM) - Tear of left acetabular labrum, subsequent encounter  Treatment Diagnosis: decreased L hip ROM/ strength, impaired gait/ balance, decreased functional status. Insurance/Certification information:  PT Insurance Information: [employer] - AIM  Physician Information:  Tamara William MD    Plan of care signed (Y/N): []  Yes [x]  No     Date of Patient follow up with Physician:      Progress Report: []  Yes  [x]  No     Date Range for reporting period:  Beginnin22  Ending:     Progress report due (10 Rx/or 30 days whichever is less): visit #10 or     Recertification due (POC duration/ or 90 days whichever is less): visit #20 or 9/10/22    Visit # Insurance Allowable Auth required? Date Range   3 20 [x]  Yes  []  No TBD       Latex Allergy:  [x]NO      []YES  Preferred Language for Healthcare:   [x]English       []other:    Functional Scale:           Date assessed:  TO physical FS primary measure score = 26; risk adjusted = 39  22    Pain level:  1/10 currently  4/10 at worst     SUBJECTIVE:   Patient reports she uses railing and non reciprocal pattern to get into home and walks with cart if no scooter at grocery store. OBJECTIVE:  2022      RESTRICTIONS/PRECAUTIONS: See post op precautions and guidelines in teams  S/P L hip scope LEFT HIP REVISION ARTHROSCOPY, LYSIS OF ADHESIONS, REMOVAL LOOSE BODY, SYNOVECTOMY, FEMOROACETABULAR IMPINGEMENT DECOMPRESSION AND LABRAL REPAIR, ABDUCTOR REPAIR, ENDOSCOPIC BURSECTOMY on 22 .   Messaged MD about WB progressions on - ask Ana Lilia Mar for update  Exercises/Interventions:     Therapeutic Exercise (19533) 18' Resistance / level Sets/sec Reps Notes / Cues   Upright bike - = No resistance 5'  Added 7/13   Calf Stretch  30\" 3 c strap     Held   Quad set     LAQ 2# 3 12    Standing HSC  2 12 Added 7/13   Hooklying Bridge  To Neutral   DL 2 12 Added 7/13 cueing for glut drive, neutral extension only   Prone laying Gentle hip flexor stretc 2'  Added 7/13 cued to avoid significant extension   HSS supine w/ strap  30\" 3           Therapeutic Activities (12578) x 15'            PT education on POC, WB progressions, HEP 10'                    Neuromuscular Re-ed (33404)                            Manual Intervention (64157) x 10'       PROM within precautions x10'   Gentle hip circumduction, knee ROM  Wk 3-6  Extension <15? External Rotation <20? Abduction <25? Flexion to 120? Modalities:     Pt. Education: 8' at eval   -pt educated on diagnosis, prognosis and expectations for rehab, post op precautions and guidelines, WB status.   -all pt questions were answered    Home Exercise Program:  Access Code: [email or web address]  URL: [email or web address]  Date: 07/22/2022  Prepared by:  Rashaun Drummond    Exercises  Supine Bridge - 1 x daily - 7 x weekly - 2-3 sets - 10 reps  Standing Knee Flexion AROM with Chair Support - 1 x daily - 7 x weekly - 3 sets - 10 reps  Seated Long Arc Quad - 1 x daily - 7 x weekly - 3 sets - 10 reps      Therapeutic Exercise and NMR EXR  [x] (92038) Provided verbal/tactile cueing for activities related to strengthening, flexibility, endurance, ROM for improvements in LE, proximal hip, and core control with self care, mobility, lifting, ambulation.  [] (11171) Provided verbal/tactile cueing for activities related to improving balance, coordination, kinesthetic sense, posture, motor skill, proprioception  to assist with LE, proximal hip, and core control in self care, mobility, lifting, ambulation and eccentric single leg control.   [] (57022) Therapist is in constant attendance of 2 or more patients providing skilled therapy interventions, but not providing any significant amount of measurable one-on-one time to either patient, for improvements in LE, proximal hip, and core control in self care, mobility, lifting, ambulation and eccentric single leg control. NMR and Therapeutic Activities:    [x] (64786 or 75187) Provided verbal/tactile cueing for activities related to improving balance, coordination, kinesthetic sense, posture, motor skill, proprioception and motor activation to allow for proper function of core, proximal hip and LE with self care and ADLs  [] (66420) Gait Re-education- Provided training and instruction to the patient for proper LE, core and proximal hip recruitment and positioning and eccentric body weight control with ambulation re-education including up and down stairs     Home Exercise Program:    [x] (25564) Reviewed/Progressed HEP activities related to strengthening, flexibility, endurance, ROM of core, proximal hip and LE for functional self-care, mobility, lifting and ambulation/stair navigation   [] (19600)Reviewed/Progressed HEP activities related to improving balance, coordination, kinesthetic sense, posture, motor skill, proprioception of core, proximal hip and LE for self care, mobility, lifting, and ambulation/stair navigation      Manual Treatments:  PROM / STM / Oscillations-Mobs:  G-I, II, III, IV (PA's, Inf., Post.)  [x] (32612) Provided manual therapy to mobilize LE, proximal hip and/or LS spine soft tissue/joints for the purpose of modulating pain, promoting relaxation,  increasing ROM, reducing/eliminating soft tissue swelling/inflammation/restriction, improving soft tissue extensibility and allowing for proper ROM for normal function with self care, mobility, lifting and ambulation.      Modalities:  [] (61128) Vasopneumatic compression: Utilized vasopneumatic compression to decrease edema / swelling for the purpose of improving mobility and quad tone / recruitment which will allow for increased overall function including but not limited to self-care, transfers, ambulation, and ascending / descending stairs. Charges:  Timed Code Treatment Minutes: 40   Total Treatment Minutes: 40     [] EVAL - LOW (70998)   [] EVAL - MOD (07436)  [] EVAL - HIGH (42482)  [] RE-EVAL (01712)  [x] JH(02088) x 1      [] Ionto  [] NMR (05301) x       [] Vaso  [x] Manual (03474) x       [] Ultrasound  [x] TA x 1       [] Mech Traction (01160)  [] Aquatic Therapy x     [] ES (un) (57586):   [] Home Management Training x  [] ES(attended) (92595)   [] Dry Needling 1-2 muscles (22736):  [] Dry Needling 3+ muscles (709972  [] Group:      [] Other:     GOALS:  Patient stated goal: GET BACK TO WALKING , NO AD  [] Progressing: [] Met: [] Not Met: [] Adjusted     Therapist goals for Patient:   Short Term Goals: To be achieved in: 2 weeks  1. Independent in HEP and progression per patient tolerance, in order to prevent re-injury. [] Progressing: [] Met: [] Not Met: [] Adjusted  2. Patient will have a decrease in pain to facilitate improvement in movement, function, and ADLs as indicated by Functional Deficits. [] Progressing: [] Met: [] Not Met: [] Adjusted     Long Term Goals: To be achieved in: 10 weeks  1. FOTO score of at least 60 to assist with reaching prior level of function. [] Progressing: [] Met: [] Not Met: [] Adjusted  2. Patient will demonstrate increased AROM to WNL without pain to allow for proper joint functioning as indicated by patients Functional Deficits. [] Progressing: [] Met: [] Not Met: [] Adjusted  3. Patient will demonstrate an increase in Strength to at least 4+/5 as well as good proximal hip strength and control to allow for proper functional mobility as indicated by patients Functional Deficits. [] Progressing: [] Met: [] Not Met: [] Adjusted  4. Patient will return to functional activities including ADLs, walking for 15+ minutes, stairs, without increased symptoms or restriction. [] Progressing: [] Met: [] Not Met: [] Adjusted  5. Pt will improve SLS on L to 10+ seconds when able to fully weight bear to improve balance and proprioception. [] Progressing: [] Met: [] Not Met: [] Adjusted      Overall Progression Towards Functional goals/ Treatment Progress Update:  [] Patient is progressing as expected towards functional goals listed. [] Progression is slowed due to complexities/Impairments listed. [] Progression has been slowed due to co-morbidities. [x] Plan just implemented, too soon to assess goals progression <30days   [] Goals require adjustment due to lack of progress  [] Patient is not progressing as expected and requires additional follow up with physician  [] Other    Persisting Functional Limitations/Impairments:  [x]Sitting [x]Standing   [x]Walking [x]Stairs   []Transfers [x]ADLs   [x]Squatting/bending []Kneeling  [x]Housework [x]Job related tasks  []Driving [x]Sports/Recreation   []Sleeping []Other:    ASSESSMENT:  Reviewed WB restrictions again to maintain precautions with standing and walking  with AD. Sent message to MD to clarify WB progression in phase 2. Pt received HEP of phase 2 exercises; will drop to 1x/wk during phase 2 to save visits. Treatment/Activity Tolerance:  [x] Pt able to complete treatment [] Patient limited by fatique  [x] Patient limited by pain  [] Patient limited by other medical complications  [] Other:      Prognosis:  [x] Good [] Fair  [] Poor    Patient Requires Follow-up: [x] Yes  [] No            PLAN:l. PT 1-2x / week for 10 weeks. [x] Continue per plan of care [] Alter current plan (see comments)  [] Plan of care initiated [] Hold pending MD visit [] Discharge    Electronically signed by:  Paty Byrd, PT, DPT ATC      Note: If patient does not return for scheduled/ recommended follow up visits, this note will serve as a discharge from care along with most recent update on progress. Oriented - self; Oriented - place; Oriented - time

## 2022-07-23 DIAGNOSIS — E78.2 MIXED HYPERLIPIDEMIA: ICD-10-CM

## 2022-07-25 ENCOUNTER — APPOINTMENT (OUTPATIENT)
Dept: PHYSICAL THERAPY | Age: 42
End: 2022-07-25
Payer: COMMERCIAL

## 2022-07-25 DIAGNOSIS — E78.2 MIXED HYPERLIPIDEMIA: ICD-10-CM

## 2022-07-25 DIAGNOSIS — K21.9 GASTROESOPHAGEAL REFLUX DISEASE, UNSPECIFIED WHETHER ESOPHAGITIS PRESENT: ICD-10-CM

## 2022-07-25 RX ORDER — OMEPRAZOLE 20 MG/1
CAPSULE, DELAYED RELEASE ORAL
Qty: 90 CAPSULE | Refills: 1 | OUTPATIENT
Start: 2022-07-25

## 2022-07-25 RX ORDER — ROSUVASTATIN CALCIUM 40 MG/1
TABLET, COATED ORAL
Qty: 90 TABLET | Refills: 1 | OUTPATIENT
Start: 2022-07-25

## 2022-07-25 RX ORDER — EZETIMIBE 10 MG/1
TABLET ORAL
Qty: 90 TABLET | Refills: 1 | OUTPATIENT
Start: 2022-07-25

## 2022-07-25 RX ORDER — MELOXICAM 15 MG/1
TABLET ORAL
Qty: 90 TABLET | Refills: 0 | OUTPATIENT
Start: 2022-07-25

## 2022-07-27 ENCOUNTER — HOSPITAL ENCOUNTER (OUTPATIENT)
Dept: PHYSICAL THERAPY | Age: 42
Setting detail: THERAPIES SERIES
Discharge: HOME | End: 2022-07-27
Payer: COMMERCIAL

## 2022-07-27 PROCEDURE — 97110 THERAPEUTIC EXERCISES: CPT

## 2022-07-27 PROCEDURE — 97112 NEUROMUSCULAR REEDUCATION: CPT

## 2022-07-27 PROCEDURE — 97140 MANUAL THERAPY 1/> REGIONS: CPT

## 2022-07-27 NOTE — FLOWSHEET NOTE
[address] [facility]  Phone: [phone]   Fax: [phone]    Physical Therapy Treatment Note/ Progress Report:     Date:  2022    Patient Name:  Grover Hernandez    :  1980  MRN: [id number]  Restrictions/Precautions:    Medical/Treatment Diagnosis Information:  Diagnosis: M25.859 (ICD-10-CM) - Femoral acetabular impingement , S73.192D (ICD-10-CM) - Tear of left acetabular labrum, subsequent encounter  Treatment Diagnosis: decreased L hip ROM/ strength, impaired gait/ balance, decreased functional status. Insurance/Certification information:  PT Insurance Information: [place] - AIM  Physician Information:  Eulogio Alamo MD    Plan of care signed (Y/N): []  Yes [x]  No     Date of Patient follow up with Physician:      Progress Report: []  Yes  [x]  No     Date Range for reporting period:  Beginnin22  Ending:     Progress report due (10 Rx/or 30 days whichever is less): visit #10 or     Recertification due (POC duration/ or 90 days whichever is less): visit #20 or 9/10/22    Visit # Insurance Allowable Auth required? Date Range   4 20 [x]  Yes  []  No TBD       Latex Allergy:  [x]NO      []YES  Preferred Language for Healthcare:   [x]English       []other:    Functional Scale:           Date assessed:  FOTO physical FS primary measure score = 26; risk adjusted = 39  22    Pain level:  1/10 currently  2/10 at worst     SUBJECTIVE:   Pt reports pain in L hip is very mild. Overall feeling good. OBJECTIVE:  2022      RESTRICTIONS/PRECAUTIONS: See post op precautions and guidelines in teams  S/P L hip scope LEFT HIP REVISION ARTHROSCOPY, LYSIS OF ADHESIONS, REMOVAL LOOSE BODY, SYNOVECTOMY, FEMOROACETABULAR IMPINGEMENT DECOMPRESSION AND LABRAL REPAIR, ABDUCTOR REPAIR, ENDOSCOPIC BURSECTOMY on 22 .   Messaged MD about WB progressions on - ask Phil Poor for update  Exercises/Interventions:     Therapeutic Exercise (87030) Resistance / level Sets/sec Reps Notes / Cues   Upright bike - = No resistance 5'  Added 7/13   Calf Stretch  30\" 3 c strap     Held   Quad set     LAQ 3# 3 10    Standing HSC  2 12 Added 7/13   Hooklying Bridge  To Neutral   DL 2 10 Added 7/13 cueing for glut drive, neutral extension only   Prone laying    Prone glute sets Gentle hip flexor stretc 2'    10''     10 Added 7/13 cued to avoid significant extension   HSS supine w/ strap  30\" 3                         Therapeutic Activities (49320) x             PT education on POC, WB progressions, HEP      Mini squat   2 10 7/27 - NMR          Neuromuscular Re-ed (92936)       NBOS  NBOS EC  NBOS Head turns  2  2  2 30''  30''  30''                  Manual Intervention (63267) x        PROM within precautions x10'   Gentle hip circumduction, knee ROM  Wk 3-6  Extension <15? External Rotation <20? Abduction <25? Flexion to 120? Modalities:     Pt. Education: 8' at eval   -pt educated on diagnosis, prognosis and expectations for rehab, post op precautions and guidelines, WB status.   -all pt questions were answered    Home Exercise Program:  Access Code: [email or web address]  URL: [email or web address]  Date: 07/22/2022  Prepared by:  Stephon Nixon    Exercises  Supine Bridge - 1 x daily - 7 x weekly - 2-3 sets - 10 reps  Standing Knee Flexion AROM with Chair Support - 1 x daily - 7 x weekly - 3 sets - 10 reps  Seated Long Arc Quad - 1 x daily - 7 x weekly - 3 sets - 10 reps      Therapeutic Exercise and NMR EXR  [x] (24604) Provided verbal/tactile cueing for activities related to strengthening, flexibility, endurance, ROM for improvements in LE, proximal hip, and core control with self care, mobility, lifting, ambulation.  [] (63967) Provided verbal/tactile cueing for activities related to improving balance, coordination, kinesthetic sense, posture, motor skill, proprioception  to assist with LE, proximal hip, and core control in self care, mobility, lifting, ambulation and eccentric single leg control.   [] (87048) Therapist is in constant attendance of 2 or more patients providing skilled therapy interventions, but not providing any significant amount of measurable one-on-one time to either patient, for improvements in LE, proximal hip, and core control in self care, mobility, lifting, ambulation and eccentric single leg control. NMR and Therapeutic Activities:    [x] (37339 or 31360) Provided verbal/tactile cueing for activities related to improving balance, coordination, kinesthetic sense, posture, motor skill, proprioception and motor activation to allow for proper function of core, proximal hip and LE with self care and ADLs  [] (72636) Gait Re-education- Provided training and instruction to the patient for proper LE, core and proximal hip recruitment and positioning and eccentric body weight control with ambulation re-education including up and down stairs     Home Exercise Program:    [x] (59862) Reviewed/Progressed HEP activities related to strengthening, flexibility, endurance, ROM of core, proximal hip and LE for functional self-care, mobility, lifting and ambulation/stair navigation   [] (00118)Reviewed/Progressed HEP activities related to improving balance, coordination, kinesthetic sense, posture, motor skill, proprioception of core, proximal hip and LE for self care, mobility, lifting, and ambulation/stair navigation      Manual Treatments:  PROM / STM / Oscillations-Mobs:  G-I, II, III, IV (PA's, Inf., Post.)  [x] (19358) Provided manual therapy to mobilize LE, proximal hip and/or LS spine soft tissue/joints for the purpose of modulating pain, promoting relaxation,  increasing ROM, reducing/eliminating soft tissue swelling/inflammation/restriction, improving soft tissue extensibility and allowing for proper ROM for normal function with self care, mobility, lifting and ambulation.      Modalities:  [] (40478) Vasopneumatic compression: Utilized vasopneumatic compression to decrease edema / swelling for the purpose of improving mobility and quad tone / recruitment which will allow for increased overall function including but not limited to self-care, transfers, ambulation, and ascending / descending stairs. Charges:  Timed Code Treatment Minutes: 40   Total Treatment Minutes: 40     [] EVAL - LOW (56046)   [] EVAL - MOD (19429)  [] EVAL - HIGH (66922)  [] RE-EVAL (37502)  [x] ZH(71673) x 1      [] Ionto  [x] NMR (96183) x 1      [] Vaso  [x] Manual (31122) x 1      [] Ultrasound  [] TA x 1       [] Mech Traction (11926)  [] Aquatic Therapy x     [] ES (un) (79285):   [] Home Management Training x  [] ES(attended) (66796)   [] Dry Needling 1-2 muscles (07993):  [] Dry Needling 3+ muscles (232738  [] Group:      [] Other:     GOALS:  Patient stated goal: GET BACK TO WALKING , NO AD  [] Progressing: [] Met: [] Not Met: [] Adjusted     Therapist goals for Patient:   Short Term Goals: To be achieved in: 2 weeks  1. Independent in HEP and progression per patient tolerance, in order to prevent re-injury. [] Progressing: [] Met: [] Not Met: [] Adjusted  2. Patient will have a decrease in pain to facilitate improvement in movement, function, and ADLs as indicated by Functional Deficits. [] Progressing: [] Met: [] Not Met: [] Adjusted     Long Term Goals: To be achieved in: 10 weeks  1. FOTO score of at least 60 to assist with reaching prior level of function. [] Progressing: [] Met: [] Not Met: [] Adjusted  2. Patient will demonstrate increased AROM to WNL without pain to allow for proper joint functioning as indicated by patients Functional Deficits. [] Progressing: [] Met: [] Not Met: [] Adjusted  3. Patient will demonstrate an increase in Strength to at least 4+/5 as well as good proximal hip strength and control to allow for proper functional mobility as indicated by patients Functional Deficits.    [] Progressing: [] Met: [] Not Met: [] Adjusted  4. Patient will return to functional activities including ADLs, walking for 15+ minutes, stairs, without increased symptoms or restriction. [] Progressing: [] Met: [] Not Met: [] Adjusted  5. Pt will improve SLS on L to 10+ seconds when able to fully weight bear to improve balance and proprioception. [] Progressing: [] Met: [] Not Met: [] Adjusted      Overall Progression Towards Functional goals/ Treatment Progress Update:  [] Patient is progressing as expected towards functional goals listed. [] Progression is slowed due to complexities/Impairments listed. [] Progression has been slowed due to co-morbidities. [x] Plan just implemented, too soon to assess goals progression <30days   [] Goals require adjustment due to lack of progress  [] Patient is not progressing as expected and requires additional follow up with physician  [] Other    Persisting Functional Limitations/Impairments:  [x]Sitting [x]Standing   [x]Walking [x]Stairs   []Transfers [x]ADLs   [x]Squatting/bending []Kneeling  [x]Housework [x]Job related tasks  []Driving [x]Sports/Recreation   []Sleeping []Other:    ASSESSMENT: Pt with good tolerance to visit, has good understanding of POC. Added gentle mini squats  and balance today with good results. Continue to progress as tolerated within protocol. Treatment/Activity Tolerance:  [x] Pt able to complete treatment [] Patient limited by fatique  [x] Patient limited by pain  [] Patient limited by other medical complications  [] Other:      Prognosis:  [x] Good [] Fair  [] Poor    Patient Requires Follow-up: [x] Yes  [] No            PLAN:l. PT 1-2x / week for 10 weeks.    [x] Continue per plan of care [] Alter current plan (see comments)  [] Plan of care initiated [] Hold pending MD visit [] Discharge    Electronically signed by: Wei Yañez, PT, DPT       Note: If patient does not return for scheduled/ recommended follow up visits, this note will serve as a discharge from care along with most recent update on progress.

## 2022-08-01 ENCOUNTER — APPOINTMENT (OUTPATIENT)
Dept: PHYSICAL THERAPY | Age: 42
End: 2022-08-01
Payer: COMMERCIAL

## 2022-08-03 ENCOUNTER — HOSPITAL ENCOUNTER (OUTPATIENT)
Dept: PHYSICAL THERAPY | Age: 42
Setting detail: THERAPIES SERIES
Discharge: HOME | End: 2022-08-03
Payer: COMMERCIAL

## 2022-08-03 PROCEDURE — 97530 THERAPEUTIC ACTIVITIES: CPT

## 2022-08-03 PROCEDURE — 97110 THERAPEUTIC EXERCISES: CPT

## 2022-08-03 NOTE — FLOWSHEET NOTE
[address] [facility]  Phone: [phone]   Fax: [phone]    Physical Therapy Treatment Note/ Progress Report:     Date:  8/3/2022    Patient Name:  Adolfo Myrick    :  1980  MRN: [id number]  Restrictions/Precautions:    Medical/Treatment Diagnosis Information:  Diagnosis: M25.859 (ICD-10-CM) - Femoral acetabular impingement , S73.192D (ICD-10-CM) - Tear of left acetabular labrum, subsequent encounter  Treatment Diagnosis: decreased L hip ROM/ strength, impaired gait/ balance, decreased functional status. Insurance/Certification information:  PT Insurance Information: [place] - AIM  Physician Information:  Siegfried Brunner, MD    Plan of care signed (Y/N): []  Yes [x]  No     Date of Patient follow up with Physician:      Progress Report: []  Yes  [x]  No     Date Range for reporting period:  Beginnin22  Ending:     Progress report due (10 Rx/or 30 days whichever is less): visit #10 or 26    Recertification due (POC duration/ or 90 days whichever is less): visit #20 or 9/10/22    Visit # Insurance Allowable Auth required? Date Range   5 20 [x]  Yes  []  No TBD       Latex Allergy:  [x]NO      []YES  Preferred Language for Healthcare:   [x]English       []other:    Functional Scale:           Date assessed:  FOTO physical FS primary measure score = 26; risk adjusted = 39  22    Pain level:  1/10 currently  2/10 at worst     SUBJECTIVE:    Pt reports nothing new since last visit. Surgery for R hip is next week. Pt needing shorter visit today due to another appt. OBJECTIVE:        RESTRICTIONS/PRECAUTIONS: See post op precautions and guidelines in teams  S/P L hip scope LEFT HIP REVISION ARTHROSCOPY, LYSIS OF ADHESIONS, REMOVAL LOOSE BODY, SYNOVECTOMY, FEMOROACETABULAR IMPINGEMENT DECOMPRESSION AND LABRAL REPAIR, ABDUCTOR REPAIR, ENDOSCOPIC BURSECTOMY on 22.   Messaged MD about WB progressions on - ask Anton Edmond for update  Exercises/Interventions:     Therapeutic Exercise (90077)  Resistance / level Sets/sec Reps Notes / Cues   Upright bike - = No resistance 5'  Added 7/13   Calf Stretch  30\" 3 c strap    Standing HR 2 10    Quad set     LAQ 3# 3 10    Standing HSC  2 12 Added 7/13   Hooklying Bridge  To Neutral   DL 2 10 Added 7/13 cueing for glut drive, neutral extension only   Prone laying    Prone glute sets Gentle hip flexor stretc 2'    10''     10 Added 7/13 cued to avoid significant extension   HSS supine w/ strap  30\" 3                         Therapeutic Activities (43487) x             PT education on POC, WB progressions, HEP      Mini squat   2 10 7/27 - NMR   Supported standing 3 way hip No resistance  1 15 ea Very small range   Hip hinge   x20  With costael   Neuromuscular Re-ed (73348)       NBOS  NBOS EC  NBOS Head turns                   Manual Intervention (28662) x        PROM within precautions '   Gentle hip circumduction, knee ROM  Wk 3-6  Extension <15? External Rotation <20? Abduction <25? Flexion to 120? Modalities:     Pt. Education: 8' at eval   -pt educated on diagnosis, prognosis and expectations for rehab, post op precautions and guidelines, WB status.   -all pt questions were answered    Home Exercise Program:  Access Code: [email or web address]  URL: [email or web address]  Date: 07/22/2022  Prepared by:  Rashaun Drummond    Exercises  Supine Bridge - 1 x daily - 7 x weekly - 2-3 sets - 10 reps  Standing Knee Flexion AROM with Chair Support - 1 x daily - 7 x weekly - 3 sets - 10 reps  Seated Long Arc Quad - 1 x daily - 7 x weekly - 3 sets - 10 reps      Therapeutic Exercise and NMR EXR  [x] (39010) Provided verbal/tactile cueing for activities related to strengthening, flexibility, endurance, ROM for improvements in LE, proximal hip, and core control with self care, mobility, lifting, ambulation.  [] (53566) Provided verbal/tactile cueing for activities related to improving balance, coordination, kinesthetic sense, posture, motor skill, proprioception  to assist with LE, proximal hip, and core control in self care, mobility, lifting, ambulation and eccentric single leg control.   [] (99603) Therapist is in constant attendance of 2 or more patients providing skilled therapy interventions, but not providing any significant amount of measurable one-on-one time to either patient, for improvements in LE, proximal hip, and core control in self care, mobility, lifting, ambulation and eccentric single leg control.      NMR and Therapeutic Activities:    [x] (06268 or 88766) Provided verbal/tactile cueing for activities related to improving balance, coordination, kinesthetic sense, posture, motor skill, proprioception and motor activation to allow for proper function of core, proximal hip and LE with self care and ADLs  [] (87451) Gait Re-education- Provided training and instruction to the patient for proper LE, core and proximal hip recruitment and positioning and eccentric body weight control with ambulation re-education including up and down stairs     Home Exercise Program:    [x] (29611) Reviewed/Progressed HEP activities related to strengthening, flexibility, endurance, ROM of core, proximal hip and LE for functional self-care, mobility, lifting and ambulation/stair navigation   [] (83277)Reviewed/Progressed HEP activities related to improving balance, coordination, kinesthetic sense, posture, motor skill, proprioception of core, proximal hip and LE for self care, mobility, lifting, and ambulation/stair navigation      Manual Treatments:  PROM / STM / Oscillations-Mobs:  G-I, II, III, IV (PA's, Inf., Post.)  [x] (11316) Provided manual therapy to mobilize LE, proximal hip and/or LS spine soft tissue/joints for the purpose of modulating pain, promoting relaxation,  increasing ROM, reducing/eliminating soft tissue swelling/inflammation/restriction, improving soft tissue extensibility and allowing for proper ROM for normal function with self care, mobility, lifting and ambulation. Modalities:  [] (38261) Vasopneumatic compression: Utilized vasopneumatic compression to decrease edema / swelling for the purpose of improving mobility and quad tone / recruitment which will allow for increased overall function including but not limited to self-care, transfers, ambulation, and ascending / descending stairs. Charges:  Timed Code Treatment Minutes: 30   Total Treatment Minutes: 30     [] EVAL - LOW (59870)   [] EVAL - MOD (76990)  [] EVAL - HIGH (79738)  [] RE-EVAL (85074)  [x] RU(44299) x 1      [] Ionto  [] NMR (74856) x 1      [] Vaso  [] Manual (72897) x 1      [] Ultrasound  [x] TA x 1       [] Mech Traction (51474)  [] Aquatic Therapy x     [] ES (un) (71224):   [] Home Management Training x  [] ES(attended) (40383)   [] Dry Needling 1-2 muscles (03803):  [] Dry Needling 3+ muscles (857373  [] Group:      [] Other:     GOALS:  Patient stated goal: GET BACK TO WALKING , NO AD  [] Progressing: [] Met: [] Not Met: [] Adjusted     Therapist goals for Patient:   Short Term Goals: To be achieved in: 2 weeks  1. Independent in HEP and progression per patient tolerance, in order to prevent re-injury. [] Progressing: [] Met: [] Not Met: [] Adjusted  2. Patient will have a decrease in pain to facilitate improvement in movement, function, and ADLs as indicated by Functional Deficits. [] Progressing: [] Met: [] Not Met: [] Adjusted     Long Term Goals: To be achieved in: 10 weeks  1. FOTO score of at least 60 to assist with reaching prior level of function. [] Progressing: [] Met: [] Not Met: [] Adjusted  2. Patient will demonstrate increased AROM to WNL without pain to allow for proper joint functioning as indicated by patients Functional Deficits. [] Progressing: [] Met: [] Not Met: [] Adjusted  3.  Patient will demonstrate an increase in Strength to at least 4+/5 as well as good proximal hip strength and control to allow for proper functional mobility as indicated by patients Functional Deficits. [] Progressing: [] Met: [] Not Met: [] Adjusted  4. Patient will return to functional activities including ADLs, walking for 15+ minutes, stairs, without increased symptoms or restriction. [] Progressing: [] Met: [] Not Met: [] Adjusted  5. Pt will improve SLS on L to 10+ seconds when able to fully weight bear to improve balance and proprioception. [] Progressing: [] Met: [] Not Met: [] Adjusted      Overall Progression Towards Functional goals/ Treatment Progress Update:  [] Patient is progressing as expected towards functional goals listed. [] Progression is slowed due to complexities/Impairments listed. [] Progression has been slowed due to co-morbidities. [x] Plan just implemented, too soon to assess goals progression <30days   [] Goals require adjustment due to lack of progress  [] Patient is not progressing as expected and requires additional follow up with physician  [] Other    Persisting Functional Limitations/Impairments:  [x]Sitting [x]Standing   [x]Walking [x]Stairs   []Transfers [x]ADLs   [x]Squatting/bending []Kneeling  [x]Housework [x]Job related tasks  []Driving [x]Sports/Recreation   []Sleeping []Other:    ASSESSMENT: PT added hip hinge training today with pt performing well with cueing and very small range AROM for L hip with no issues. Re-assess next visit post op. Treatment/Activity Tolerance:  [x] Pt able to complete treatment [] Patient limited by fatique  [x] Patient limited by pain  [] Patient limited by other medical complications  [] Other:      Prognosis:  [x] Good [] Fair  [] Poor    Patient Requires Follow-up: [x] Yes  [] No            PLAN:l. PT 1-2x / week for 10 weeks.    [x] Continue per plan of care [] Alter current plan (see comments)  [] Plan of care initiated [] Hold pending MD visit [] Discharge    Electronically signed by: Greg Sandy LISA PT, DPT       Note: If patient does not return for scheduled/ recommended follow up visits, this note will serve as a discharge from care along with most recent update on progress.

## 2022-08-04 ENCOUNTER — TELEMEDICINE (OUTPATIENT)
Dept: PULMONOLOGY | Age: 42
End: 2022-08-04
Payer: COMMERCIAL

## 2022-08-04 DIAGNOSIS — E66.01 MORBIDLY OBESE (HCC): Chronic | ICD-10-CM

## 2022-08-04 DIAGNOSIS — I10 ESSENTIAL HYPERTENSION: Chronic | ICD-10-CM

## 2022-08-04 DIAGNOSIS — E11.9 TYPE 2 DIABETES MELLITUS WITHOUT COMPLICATION, WITHOUT LONG-TERM CURRENT USE OF INSULIN (HCC): Chronic | ICD-10-CM

## 2022-08-04 DIAGNOSIS — J44.9 COPD WITH ASTHMA (HCC): Chronic | ICD-10-CM

## 2022-08-04 DIAGNOSIS — G47.33 OSA (OBSTRUCTIVE SLEEP APNEA): Chronic | ICD-10-CM

## 2022-08-04 PROBLEM — J44.89 COPD WITH ASTHMA: Chronic | Status: ACTIVE | Noted: 2020-10-09

## 2022-08-04 PROCEDURE — 3044F HG A1C LEVEL LT 7.0%: CPT | Performed by: INTERNAL MEDICINE

## 2022-08-04 PROCEDURE — 99214 OFFICE O/P EST MOD 30 MIN: CPT | Performed by: INTERNAL MEDICINE

## 2022-08-04 PROCEDURE — 2022F DILAT RTA XM EVC RTNOPTHY: CPT | Performed by: INTERNAL MEDICINE

## 2022-08-04 PROCEDURE — G8427 DOCREV CUR MEDS BY ELIG CLIN: HCPCS | Performed by: INTERNAL MEDICINE

## 2022-08-04 ASSESSMENT — SLEEP AND FATIGUE QUESTIONNAIRES
ESS TOTAL SCORE: 19
HOW LIKELY ARE YOU TO NOD OFF OR FALL ASLEEP WHILE SITTING AND TALKING TO SOMEONE: 1
HOW LIKELY ARE YOU TO NOD OFF OR FALL ASLEEP WHILE LYING DOWN TO REST IN THE AFTERNOON WHEN CIRCUMSTANCES PERMIT: 3
HOW LIKELY ARE YOU TO NOD OFF OR FALL ASLEEP WHILE SITTING INACTIVE IN A PUBLIC PLACE: 2
HOW LIKELY ARE YOU TO NOD OFF OR FALL ASLEEP WHILE WATCHING TV: 2
HOW LIKELY ARE YOU TO NOD OFF OR FALL ASLEEP WHEN YOU ARE A PASSENGER IN A CAR FOR AN HOUR WITHOUT A BREAK: 3
HOW LIKELY ARE YOU TO NOD OFF OR FALL ASLEEP WHILE SITTING QUIETLY AFTER LUNCH WITHOUT ALCOHOL: 3
HOW LIKELY ARE YOU TO NOD OFF OR FALL ASLEEP IN A CAR, WHILE STOPPED FOR A FEW MINUTES IN TRAFFIC: 2
HOW LIKELY ARE YOU TO NOD OFF OR FALL ASLEEP WHILE SITTING AND READING: 3

## 2022-08-04 NOTE — PROGRESS NOTES
Dillon Ye [facility]  [address] [place] [address], [address] [facility]- [phone]   [facility] Yoly Mar. [address] [place] [address]. Julio César David [address] [phone]     Video Visit- Follow up    Halle Clancy, was evaluated through a synchronous (real-time) audio-video  encounter. The patient (or guardian if applicable) is aware that this is a billable  service, which includes applicable co-pays. This Virtual Visit was conducted with  patient's (and/or legal guardian's) consent. The visit was conducted pursuant to  the emergency declaration under the [address] [place] [address] authority and the [employer] Act. Patient identification was verified,  and a caregiver was present when appropriate. The patient was located in a  state where the provider was licensed to provide care. Assessment/Plan:      1. AMOS (obstructive sleep apnea)  Assessment & Plan:  Chronic-with progression/exacerbation:  Continue medications per her PCP and other physicians. Instructed not to drive unless had 4 hrs of effective therapy for her AMOS the night before. Did review the risks of under or untreated AMOS including, but not limited to, higher risks of motor vehicle accidents, stroke, heart attacks, and death. She understands and accepts all these risks. 2. 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[facility] [address]  Assessment & Plan:  Chronic- Stable. Discussed the importance of treating sleep apnea as part of the management of this disorder. Cont any meds per PCP and other physicians. 3. Essential hypertension  Assessment & Plan:  Chronic- Stable. Discussed the importance of treating sleep apnea as part of the management of this disorder. Cont any meds per PCP and other physicians. 4. Type 2 diabetes mellitus without complication, without long-term current use of insulin (HCC)  Assessment & Plan:  Chronic- Stable.   Discussed the importance of 2 PUFFS BY MOUTH EVERY 6 HOURS AS NEEDED FOR WHEEZE    ammonium lactate (LAC-HYDRIN) 12 % lotion Topical, DAILY    aspirin EC 81 mg, Oral, DAILY    Cholecalciferol (VITAMIN D3) 50 MCG (2000 UT) CAPS TAKE 1 CAPSULE BY MOUTH EVERY DAY    citalopram (CELEXA) 40 MG tablet TAKE 1 TABLET BY MOUTH EVERY DAY IN THE MORNING    diclofenac sodium (VOLTAREN) 1 % GEL APPLY 2 GRAMS TOPICALLY 2 TIMES DAILY    divalproex (DEPAKOTE ER) 500 mg, Oral, DAILY    Evolocumab 140 mg, SubCUTAneous, EVERY 14 DAYS    ezetimibe (ZETIA) 10 mg, Oral, DAILY    hydrOXYzine HCl (ATARAX) 25 mg, Oral, 3 TIMES DAILY PRN    metFORMIN (GLUCOPHAGE-XR) 1,000 mg, Oral, DAILY WITH BREAKFAST    metoprolol tartrate (LOPRESSOR) 50 mg, Oral, 2 TIMES DAILY    nitroGLYCERIN (NITROSTAT) 0.4 mg, SubLINGual, EVERY 5 MIN PRN, up to max of 3 total doses. If no relief after 1 dose, call 911.      omeprazole (PRILOSEC) 20 MG delayed release capsule TAKE 1 CAPSULE BY MOUTH EVERY DAY    pregabalin (LYRICA) 75 mg, Oral, 2 TIMES DAILY    rosuvastatin (CRESTOR) 40 MG tablet TAKE 1 TABLET BY MOUTH EVERY DAY    thiothixene (NAVANE) 2 MG capsule TAKE 1 CAPSULE BY MOUTH EVERY EVENING AT BEDTIME        Electronically signed by Senia Valle MD on 8/4/2022 at 9:28 AM

## 2022-08-04 NOTE — ASSESSMENT & PLAN NOTE
Chronic-with progression/exacerbation:  Continue medications per her PCP and other physicians. Instructed not to drive unless had 4 hrs of effective therapy for her AMOS the night before. Did review the risks of under or untreated AMOS including, but not limited to, higher risks of motor vehicle accidents, stroke, heart attacks, and death. She understands and accepts all these risks. We will set the patient up on auto bilevel machine. If patient does not do better during initial compliance visit will need an in lab titration to confirm her settings.

## 2022-08-04 NOTE — PROGRESS NOTES
Linda Blanchard         : 1980    Diagnosis: [x] AMOS (G47.33) [] CSA (G47.31) [] Apnea (G47.30)   Length of Need: [x] 18 Months [] 99 Months [] Other:    Machine (PRAKASH!): [] Respironics Dream Station   2   Auto [x] ResMed AirSense     Auto S10 [] Other:     []  CPAP () [x] Bilevel ()   Mode: [] Auto [] Spontaneous    Mode: [x] Auto [] Spontaneous       IPAPmax-25  EPAPmin-12  PS-4     Comfort Settings:   - Ramp Pressure: 5 cmH2O                                        - Ramp time: 15 min                                     -  Flex/EPR - 3 full time                                    - For ResMed Bilevel (TiMax-4 sec   TiMin- 0.2 sec)     Humidifier: [x] Heated ()        [x] Water chamber replacement ()/ 1 per 6 months        Mask:   [x] Nasal () /1 per 3 months [] Full Face () /1 per 3 months   [x] Patient choice -Size and fit mask [] Patient Choice - Size and fit mask   [] Dispense:  [] Dispense:    [x] Headgear () / 1 per 3 months [] Headgear () / 1 per 3 months   [x] Replacement Nasal Cushion ()/2 per month [] Interface Replacement ()/1 per month   [x] Replacement Nasal Pillows ()/2 per month         Tubing: [x] Heated ()/1 per 3 months    [] Standard ()/1 per 3 months [] Other:           Filters: [x] Non-disposable ()/1 per 6 months     [x] Ultra-Fine, Disposable ()/2 per month        Miscellaneous: [] Chin Strap ()/ 1 per 6 months [] O2 bleed-in:       LPM   [] Oximetry on CPAP/Bilevel []  Other:    [x] Modem: ()         Start Order Date: 22    MEDICAL JUSTIFICATION:  I, the undersigned, certify that the above prescribed supplies are medically necessary for this patients wellbeing. In my opinion, the supplies are both reasonable and necessary in reference to accepted standards of medicalpractice in treatment of this patients condition.     Briana Fields MD      NPI: [id number]       Order Signed Date: 22    Electronically signed by Genevieve Hines MD on 2022 at 9:37 AM    Alysa Lyon  1980  [id number]  [address]  [phone] (home)   [phone] (mobile)      Insurance Info (confirm with patient if correct):  Payer/Plan Subscr  Sex Relation Sub.  Ins. ID Effective Group Num

## 2022-08-04 NOTE — PROGRESS NOTES
Name_______________________________________Printed:____________________  Date and time of surgery___8/9/2022_______0800______________Arrival Time:___0630_____________   1. The instructions given regarding when and if a patient needs to stop oral intake prior to surgery varies. Follow the specific instructions you were given                  __x_Nothing to eat or to drink after Midnight the night before.                   ____Carbo loading or ERAS instructions will be given to select patients-if you have been given those instructions -please do the following                           The evening before your surgery after dinner before midnight drink 40 ounces of gatorade. If you are diabetic use sugar free. The morning of surgery drink 40 ounces of water. This needs to be finished 3 hours prior to your surgery start time. 2. Take the following pills with a small sip of water on the morning of surgery_metoprolol and omeprazole__________________________________________________                  Do not take blood pressure medications ending in pril or sartan the wilfrido prior to surgery or the morning of surgery_   3. Aspirin, Ibuprofen, Advil, Naproxen, Vitamin E and other Anti-inflammatory products and supplements should be stopped for 5 -7days before surgery or as directed by your physician. 4. Check with your Doctor regarding stopping Plavix, Coumadin,Eliquis, Lovenox,Effient,Pradaxa,Xarelto, Fragmin or other blood thinners and follow their instructions. 5. Do not smoke, and do not drink any alcoholic beverages 24 hours prior to surgery. This includes NA Beer. Refrain from the usage of any recreational drugs. 6. You may brush your teeth and gargle the morning of surgery. DO NOT SWALLOW WATER   7. You MUST make arrangements for a responsible adult to stay on site while you are here and take you home after your surgery. You will not be allowed to leave alone or drive yourself home.   It is strongly suggested someone stay with you the first 24 hrs. Your surgery will be cancelled if you do not have a ride home. 8. A parent/legal guardian must accompany a child scheduled for surgery and plan to stay at the hospital until the child is discharged. Please do not bring other children with you. 9. Please wear simple, loose fitting clothing to the hospital.  Kristeen Cava not bring valuables (money, credit cards, checkbooks, etc.) Do not wear any makeup (including no eye makeup) or nail polish on your fingers or toes. 10. DO NOT wear any jewelry or piercings on day of surgery. All body piercing jewelry must be removed. 11. If you have ___dentures, they will be removed before going to the OR; we will provide you a container. If you wear ___contact lenses or ___glasses, they will be removed; please bring a case for them. 12. Please see your family doctor/pediatrician for a history & physical and/or concerning medications. Bring any test results/reports from your physician's office. PCP__________________Phone___________H&P Appt. Date________             13 If you  have a Living Will and Durable Power of  for Healthcare, please bring in a copy. 15. Notify your Surgeon if you develop any illness between now and surgery  time, cough, cold, fever, sore throat, nausea, vomiting, etc.  Please notify your surgeon if you experience dizziness, shortness of breath or blurred vision between now & the time of your surgery             15. DO NOT shave your operative site 96 hours prior to surgery. For face & neck surgery, men may use an electric razor 48 hours prior to surgery. 16. Shower the night before or morning of surgery using an antibacterial soap or as you have been instructed. 17. To provide excellent care visitors will be limited to one in the room at any given time. 18.  Please bring picture ID and insurance card.              19.  Visit our web site for additional information:  "[email or web address]"/[email or web address]              20.During flu season no children under the age of 15 are permitted in the hospital for the safety of all patients. 21. If you take a long acting insulin in the evening only  take half of your usual  dose the night  before your procedure              22. If you use a c-pap please bring DOS if staying overnight,             23.For your convenience [facility] has a pharmacy on site to fill your prescriptions. 24. If you use oxygen and have a portable tank please bring it  with you the DOS             25. Bring a complete list of all your medications with name and dose include any supplements. 26. Other__________________________________________   *Please call pre admission testing if you any further questions   Maryanne Conner   [address] [place] [address]. [place]  [phone]   [address] [place] [address]       VISITOR POLICY(subject to change)    Current policy is 2 visitors per patient. No children. A mask is required. Visiting hours are 8a-8p. Overnight visitors will be at the discretion of the nurse. All above information reviewed with patient in person or by phone. Patient verbalizes understanding. All questions and concerns addressed.                                                                                                  Patient/Rep_____patient_______________                                                                                                                                    PRE OP INSTRUCTIONS

## 2022-08-04 NOTE — LETTER
[facility]  [place] [address] [address] [address]  Phone: [phone]  Fax: [phone]    Gita Sequeira MD    August 4, 2022     Sangeeta Meza, APRN - CNP  [address] [facility] [address] [facility]    Patient: Marquis Bailey   MR Number: [id number]   YOB: 1980   Date of Visit: 8/4/2022       Dear Sangeeta Meza:    Thank you for referring Lyn Gregorio to me for evaluation/treatment. Below are the relevant portions of my assessment and plan of care. 1. AMOS (obstructive sleep apnea)  Assessment & Plan:  Chronic-with progression/exacerbation:  Continue medications per her PCP and other physicians. Instructed not to drive unless had 4 hrs of effective therapy for her AMOS the night before. Did review the risks of under or untreated AMOS including, but not limited to, higher risks of motor vehicle accidents, stroke, heart attacks, and death. She understands and accepts all these risks. 2. 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[address]  Assessment & Plan:  Chronic- Stable. Discussed the importance of treating sleep apnea as part of the management of this disorder. Cont any meds per PCP and other physicians. 3. Essential hypertension  Assessment & Plan:  Chronic- Stable. Discussed the importance of treating sleep apnea as part of the management of this disorder. Cont any meds per PCP and other physicians. 4. Type 2 diabetes mellitus without complication, without long-term current use of insulin (HCC)  Assessment & Plan:  Chronic- Stable. Discussed the importance of treating sleep apnea as part of the management of this disorder. Cont any meds per PCP and other physicians. 5. Morbidly obese ([address]  Assessment & Plan:  Chronic-not stable:  Discussed importance of treating obstructive sleep apnea and getting sufficient sleep to assist with weight control. Encouraged her to work on weight loss through diet and exercise.  Recommended DASH or Mediterranean

## 2022-08-08 ENCOUNTER — OFFICE VISIT (OUTPATIENT)
Dept: ORTHOPEDIC SURGERY | Age: 42
End: 2022-08-08

## 2022-08-08 VITALS — BODY MASS INDEX: 38.49 KG/M2 | HEIGHT: 65 IN | WEIGHT: 231 LBS

## 2022-08-08 DIAGNOSIS — M25.859 FEMORAL ACETABULAR IMPINGEMENT: Primary | ICD-10-CM

## 2022-08-08 DIAGNOSIS — S73.191D TEAR OF RIGHT ACETABULAR LABRUM, SUBSEQUENT ENCOUNTER: ICD-10-CM

## 2022-08-08 PROCEDURE — 99024 POSTOP FOLLOW-UP VISIT: CPT | Performed by: ORTHOPAEDIC SURGERY

## 2022-08-08 RX ORDER — RIVAROXABAN 10 MG/1
10 TABLET, FILM COATED ORAL DAILY
Qty: 10 TABLET | Refills: 0 | Status: SHIPPED | OUTPATIENT
Start: 2022-08-08 | End: 2022-08-15

## 2022-08-08 RX ORDER — SENNOSIDES 8.6 MG
1 TABLET ORAL 2 TIMES DAILY
Qty: 14 TABLET | Refills: 0 | Status: SHIPPED | OUTPATIENT
Start: 2022-08-08 | End: 2022-08-15

## 2022-08-08 RX ORDER — NAPROXEN 500 MG/1
500 TABLET ORAL 2 TIMES DAILY WITH MEALS
Qty: 28 TABLET | Refills: 0 | Status: SHIPPED | OUTPATIENT
Start: 2022-08-08 | End: 2022-09-14

## 2022-08-08 RX ORDER — TRAMADOL HYDROCHLORIDE 50 MG/1
50 TABLET ORAL EVERY 6 HOURS PRN
Qty: 12 TABLET | Refills: 0 | Status: SHIPPED | OUTPATIENT
Start: 2022-08-08 | End: 2022-08-13

## 2022-08-08 RX ORDER — HYDROCODONE BITARTRATE AND ACETAMINOPHEN 5; 325 MG/1; MG/1
1 TABLET ORAL EVERY 6 HOURS PRN
Qty: 12 TABLET | Refills: 0 | Status: SHIPPED | OUTPATIENT
Start: 2022-08-08 | End: 2022-08-13

## 2022-08-08 NOTE — LETTER
Physical Therapy Rehabilitation Referral    Patient Name: Amanda Zuniga MRN: [id number]  DOS: 8/8/2022     Diagnosis:   1. Femoral acetabular impingement    2.  Tear of right acetabular labrum, subsequent encounter            Precautions:     [x] Evaluate and Treat    Post Op Instructions:  [] 20 lb flat foot weight bearing x 3 weeks with crutches  [] 20 lb flat foot weight bearing x 4 weeks with crutches (cartilage repair protocol)  [] Weight bearing as tolerated x 2 weeks with crutches  [] No active abduction x 6 weeks (abductor repair protocol)  [] Continuous passive motion (CPM)   [] AAROM: Flexion to 90   [] Uni-planar passive range of motion   [] AAROM: External rotation to 10   [] Hip brace on at all times    [] AAROM: Extension to 10   [] Hip brace when hip at risk     [] AAROM:  Neutral IR   [] Home exercise program (copy to patient)   [] AAROM: Abduction to 25    [] Isometric external rotator strengthening    [] Isometric glute max strengthening     [] Isometric abductor strengthening     [] Leg Circumduction (PT and family member assisted x 3 weeks)                 Post-op Phases:  []Phase I: Maximum Protection (Day 1-3 Weeks)  []Phase II: Mobility & Neuromuscular Retraining (3-6 Weeks)  []Phase III: Phase III: Muscle Balance and Strengthening (6-12 Weeks)  []Phase IV: Functional Training of the Hip & Lower Extremity (12-18 Weeks)  []Phase V: Advanced Training - Specificity for Return to Sport and/or Work (18-24 [employer])    Non-Operative & Pre-Operative Rehabilitation:    Cardiovascular:            Deep Hip Rotators  [] Upright Bike (Baseline)           [] External Rotators AROM in 4PK (Baseline)  [] Walking (Progression 1)           [] Internal Rotators AROM in 4PK (Baseline)  [] Running (Progression 2)           [] ER in side lying (Progression 1)  [] Dynamic Loading (Progression 3)          [] IR in side lying (Progression 1)                [] ER with resistance in 4PK (Progression 2)                [] IR with resistance in 4PK (Progression 2)                 [] Lateral Step Up (Progression 3)    Positioning:  [] 4PK with pelvic tilts (Baseline)  [] Pelvic tilts in sitting (Progression 1)      Core:   [] Relaxation Breathing (Baseline)         [] Valencia lying elbow presses (Progression 1)  [] Side Planks (Baseline)         [] Side planks + hip abduction (Progression 1)  [] Bird-Dog (Baseline)            [] Bird-Dog with resistance (Progression 1)    Glute Complex:            Load Transfer:  [] Bridging (Baseline)            [] Weight Shifting (Baseline)     [] Single Leg Bridge (Progression 1)        [] Single Leg Stance to SEBT(Progression 1)     [] Single Leg Lower (Progression 2)         [] Hip hinge + monster walks (Progression 2)       Mobility:  [] Rodrigo position with breathing (baseline)  [] Hip Hinge with stick & neutral spine (baseline)  [] Sit to stand (baseline)  [] Hip Hinge without guidance (Progression 1)  [] Hip Hinge with resisted punch out guidance (Progression 2)      Pelvic Floor:  [] Relaxation breathing         Activities:       [] Rowing       [] Stepper/Exercise bike     [] Swimming  [] Water exercises    Modalities:     Return to Sport:  [x] Of Choice      [] Plyometrics  [] Ultrasound     [] Rhythmic stabilization  [] Iontophoresis    [] Core strengthening   [] Moist heat     [] Sports specific program:   [] Massage         [x] Cryotherapy      [] Electrical stimulation     [] Paraffin  [] Whirlpool  [] TENS    [x] Home exercise program (copy to patient).         Perform exercises for:   15     minutes    3      times/day  [x] Supervised physical therapy  Frequency: []  1x week  [x] 2x week  [] 3x week  [] Other:   Duration: [] 2 weeks   [] 4 weeks  [x] 6 weeks  [] Other:     Additional Instructions:         Sincerely,    João Cali MD [facility] [address] [place], [address], [address]  Email: [email or web address]  Office: [phone]    08/08/22  4:29 PM

## 2022-08-08 NOTE — LETTER
[address] [address]  Phone: [phone]  Fax: [phone]    Pancho Grewal MD    August 9, 2022     Emerald Islas, APRN - CNP  [address] [facility] [address] [place] [address]    Patient: Jovan Parrish   MR Number: [id number]   YOB: 1980   Date of Visit: 8/8/2022       Dear Emerald Islas:    Thank you for referring Pratibha Hodgson to me for evaluation/treatment. Below are the relevant portions of my assessment and plan of care. If you have questions, please do not hesitate to call me. I look forward to following Lisa Madden along with you.     Sincerely,      Pancho Grewal MD

## 2022-08-09 ENCOUNTER — ANESTHESIA (OUTPATIENT)
Dept: OPERATING ROOM | Age: 42
End: 2022-08-09
Payer: COMMERCIAL

## 2022-08-09 ENCOUNTER — HOSPITAL ENCOUNTER (OUTPATIENT)
Age: 42
Setting detail: OUTPATIENT SURGERY
Discharge: HOME | End: 2022-08-09
Attending: ORTHOPAEDIC SURGERY | Admitting: ORTHOPAEDIC SURGERY
Payer: COMMERCIAL

## 2022-08-09 ENCOUNTER — APPOINTMENT (OUTPATIENT)
Dept: GENERAL RADIOLOGY | Age: 42
End: 2022-08-09
Attending: ORTHOPAEDIC SURGERY
Payer: COMMERCIAL

## 2022-08-09 ENCOUNTER — ANESTHESIA EVENT (OUTPATIENT)
Dept: OPERATING ROOM | Age: 42
End: 2022-08-09
Payer: COMMERCIAL

## 2022-08-09 VITALS
SYSTOLIC BLOOD PRESSURE: 130 MMHG | RESPIRATION RATE: 17 BRPM | BODY MASS INDEX: 37.99 KG/M2 | HEART RATE: 93 BPM | OXYGEN SATURATION: 94 % | TEMPERATURE: 98 F | WEIGHT: 228 LBS | HEIGHT: 65 IN | DIASTOLIC BLOOD PRESSURE: 95 MMHG

## 2022-08-09 PROBLEM — S76.011A TEAR OF RIGHT GLUTEUS MEDIUS TENDON: Status: ACTIVE | Noted: 2022-08-09

## 2022-08-09 PROBLEM — M25.851 FEMOROACETABULAR IMPINGEMENT OF RIGHT HIP: Status: ACTIVE | Noted: 2022-08-09

## 2022-08-09 LAB
GLUCOSE BLD-MCNC: 112 MG/DL (ref 70–99)
GLUCOSE BLD-MCNC: 113 MG/DL (ref 70–99)
HCG(URINE) PREGNANCY TEST: NEGATIVE
PERFORMED ON: ABNORMAL
PERFORMED ON: ABNORMAL

## 2022-08-09 PROCEDURE — 6360000002 HC RX W HCPCS: Performed by: ORTHOPAEDIC SURGERY

## 2022-08-09 PROCEDURE — 73501 X-RAY EXAM HIP UNI 1 VIEW: CPT

## 2022-08-09 PROCEDURE — 7100000000 HC PACU RECOVERY - FIRST 15 MIN: Performed by: ORTHOPAEDIC SURGERY

## 2022-08-09 PROCEDURE — 84703 CHORIONIC GONADOTROPIN ASSAY: CPT

## 2022-08-09 PROCEDURE — 3600000015 HC SURGERY LEVEL 5 ADDTL 15MIN: Performed by: ORTHOPAEDIC SURGERY

## 2022-08-09 PROCEDURE — C1788 PORT, INDWELLING, IMP: HCPCS | Performed by: ORTHOPAEDIC SURGERY

## 2022-08-09 PROCEDURE — 2500000003 HC RX 250 WO HCPCS: Performed by: NURSE ANESTHETIST, CERTIFIED REGISTERED

## 2022-08-09 PROCEDURE — 3700000000 HC ANESTHESIA ATTENDED CARE: Performed by: ORTHOPAEDIC SURGERY

## 2022-08-09 PROCEDURE — 2500000003 HC RX 250 WO HCPCS: Performed by: ORTHOPAEDIC SURGERY

## 2022-08-09 PROCEDURE — 6360000002 HC RX W HCPCS: Performed by: ANESTHESIOLOGY

## 2022-08-09 PROCEDURE — 2580000003 HC RX 258: Performed by: ORTHOPAEDIC SURGERY

## 2022-08-09 PROCEDURE — 6360000002 HC RX W HCPCS

## 2022-08-09 PROCEDURE — 7100000011 HC PHASE II RECOVERY - ADDTL 15 MIN: Performed by: ORTHOPAEDIC SURGERY

## 2022-08-09 PROCEDURE — 6360000002 HC RX W HCPCS: Performed by: NURSE ANESTHETIST, CERTIFIED REGISTERED

## 2022-08-09 PROCEDURE — 7100000010 HC PHASE II RECOVERY - FIRST 15 MIN: Performed by: ORTHOPAEDIC SURGERY

## 2022-08-09 PROCEDURE — 3600000014 HC SURGERY LEVEL 4 ADDTL 15MIN: Performed by: ORTHOPAEDIC SURGERY

## 2022-08-09 PROCEDURE — 3600000005 HC SURGERY LEVEL 5 BASE: Performed by: ORTHOPAEDIC SURGERY

## 2022-08-09 PROCEDURE — 3700000001 HC ADD 15 MINUTES (ANESTHESIA): Performed by: ORTHOPAEDIC SURGERY

## 2022-08-09 PROCEDURE — 3600000004 HC SURGERY LEVEL 4 BASE: Performed by: ORTHOPAEDIC SURGERY

## 2022-08-09 PROCEDURE — 2709999900 HC NON-CHARGEABLE SUPPLY: Performed by: ORTHOPAEDIC SURGERY

## 2022-08-09 PROCEDURE — C1713 ANCHOR/SCREW BN/BN,TIS/BN: HCPCS | Performed by: ORTHOPAEDIC SURGERY

## 2022-08-09 PROCEDURE — 7100000001 HC PACU RECOVERY - ADDTL 15 MIN: Performed by: ORTHOPAEDIC SURGERY

## 2022-08-09 PROCEDURE — 64447 NJX AA&/STRD FEMORAL NRV IMG: CPT | Performed by: ANESTHESIOLOGY

## 2022-08-09 PROCEDURE — 2720000010 HC SURG SUPPLY STERILE: Performed by: ORTHOPAEDIC SURGERY

## 2022-08-09 DEVICE — ICONIX SPEED ANCHOR 2.3MM 2 STRANDS 2.0MM XBRAID TT SUTURE TAPE
Type: IMPLANTABLE DEVICE | Site: HIP | Status: FUNCTIONAL
Brand: ICONIX

## 2022-08-09 DEVICE — QFIX 1.8 MINI SUTURE ANCHOR
Type: IMPLANTABLE DEVICE | Site: HIP | Status: FUNCTIONAL
Brand: Q-FIX

## 2022-08-09 DEVICE — OMEGA 4.75MM PEEK KNOTLESS ANCHOR SYSTEM, SINGLE
Type: IMPLANTABLE DEVICE | Site: HIP | Status: FUNCTIONAL
Brand: OMEGA

## 2022-08-09 DEVICE — NANOTACK SUTURE ANCHOR 1.4MM WITH FLEX INSERTER
Type: IMPLANTABLE DEVICE | Site: HIP | Status: FUNCTIONAL
Brand: NANOTACK

## 2022-08-09 RX ORDER — MAGNESIUM SULFATE HEPTAHYDRATE 500 MG/ML
INJECTION, SOLUTION INTRAMUSCULAR; INTRAVENOUS PRN
Status: DISCONTINUED | OUTPATIENT
Start: 2022-08-09 | End: 2022-08-09 | Stop reason: SDUPTHER

## 2022-08-09 RX ORDER — HYDROMORPHONE HCL 110MG/55ML
0.5 PATIENT CONTROLLED ANALGESIA SYRINGE INTRAVENOUS EVERY 5 MIN PRN
Status: DISCONTINUED | OUTPATIENT
Start: 2022-08-09 | End: 2022-08-09 | Stop reason: HOSPADM

## 2022-08-09 RX ORDER — SODIUM CHLORIDE 9 MG/ML
INJECTION, SOLUTION INTRAVENOUS CONTINUOUS
Status: DISCONTINUED | OUTPATIENT
Start: 2022-08-09 | End: 2022-08-09 | Stop reason: HOSPADM

## 2022-08-09 RX ORDER — FENTANYL CITRATE 50 UG/ML
INJECTION, SOLUTION INTRAMUSCULAR; INTRAVENOUS PRN
Status: DISCONTINUED | OUTPATIENT
Start: 2022-08-09 | End: 2022-08-09 | Stop reason: SDUPTHER

## 2022-08-09 RX ORDER — SODIUM CHLORIDE 0.9 % (FLUSH) 0.9 %
5-40 SYRINGE (ML) INJECTION EVERY 12 HOURS SCHEDULED
Status: DISCONTINUED | OUTPATIENT
Start: 2022-08-09 | End: 2022-08-09 | Stop reason: HOSPADM

## 2022-08-09 RX ORDER — MEPERIDINE HYDROCHLORIDE 50 MG/ML
12.5 INJECTION INTRAMUSCULAR; INTRAVENOUS; SUBCUTANEOUS EVERY 5 MIN PRN
Status: DISCONTINUED | OUTPATIENT
Start: 2022-08-09 | End: 2022-08-09 | Stop reason: HOSPADM

## 2022-08-09 RX ORDER — DEXAMETHASONE SODIUM PHOSPHATE 4 MG/ML
INJECTION, SOLUTION INTRA-ARTICULAR; INTRALESIONAL; INTRAMUSCULAR; INTRAVENOUS; SOFT TISSUE PRN
Status: DISCONTINUED | OUTPATIENT
Start: 2022-08-09 | End: 2022-08-09 | Stop reason: SDUPTHER

## 2022-08-09 RX ORDER — ONDANSETRON 2 MG/ML
4 INJECTION INTRAMUSCULAR; INTRAVENOUS
Status: DISCONTINUED | OUTPATIENT
Start: 2022-08-09 | End: 2022-08-09 | Stop reason: HOSPADM

## 2022-08-09 RX ORDER — SODIUM CHLORIDE 9 MG/ML
INJECTION, SOLUTION INTRAVENOUS PRN
Status: DISCONTINUED | OUTPATIENT
Start: 2022-08-09 | End: 2022-08-09 | Stop reason: HOSPADM

## 2022-08-09 RX ORDER — DIPHENHYDRAMINE HYDROCHLORIDE 50 MG/ML
12.5 INJECTION INTRAMUSCULAR; INTRAVENOUS
Status: DISCONTINUED | OUTPATIENT
Start: 2022-08-09 | End: 2022-08-09 | Stop reason: HOSPADM

## 2022-08-09 RX ORDER — SUCCINYLCHOLINE/SOD CL,ISO/PF 200MG/10ML
SYRINGE (ML) INTRAVENOUS PRN
Status: DISCONTINUED | OUTPATIENT
Start: 2022-08-09 | End: 2022-08-09 | Stop reason: SDUPTHER

## 2022-08-09 RX ORDER — ONDANSETRON 2 MG/ML
INJECTION INTRAMUSCULAR; INTRAVENOUS PRN
Status: DISCONTINUED | OUTPATIENT
Start: 2022-08-09 | End: 2022-08-09 | Stop reason: SDUPTHER

## 2022-08-09 RX ORDER — PROPOFOL 10 MG/ML
INJECTION, EMULSION INTRAVENOUS PRN
Status: DISCONTINUED | OUTPATIENT
Start: 2022-08-09 | End: 2022-08-09 | Stop reason: SDUPTHER

## 2022-08-09 RX ORDER — SODIUM CHLORIDE 0.9 % (FLUSH) 0.9 %
5-40 SYRINGE (ML) INJECTION PRN
Status: DISCONTINUED | OUTPATIENT
Start: 2022-08-09 | End: 2022-08-09 | Stop reason: HOSPADM

## 2022-08-09 RX ORDER — ROPIVACAINE HYDROCHLORIDE 5 MG/ML
INJECTION, SOLUTION EPIDURAL; INFILTRATION; PERINEURAL
Status: COMPLETED | OUTPATIENT
Start: 2022-08-09 | End: 2022-08-09

## 2022-08-09 RX ORDER — MIDAZOLAM HYDROCHLORIDE 2 MG/2ML
2 INJECTION, SOLUTION INTRAMUSCULAR; INTRAVENOUS ONCE
Status: COMPLETED | OUTPATIENT
Start: 2022-08-09 | End: 2022-08-09

## 2022-08-09 RX ORDER — LIDOCAINE HYDROCHLORIDE 20 MG/ML
INJECTION, SOLUTION EPIDURAL; INFILTRATION; INTRACAUDAL; PERINEURAL PRN
Status: DISCONTINUED | OUTPATIENT
Start: 2022-08-09 | End: 2022-08-09 | Stop reason: SDUPTHER

## 2022-08-09 RX ORDER — FENTANYL CITRATE 50 UG/ML
100 INJECTION, SOLUTION INTRAMUSCULAR; INTRAVENOUS ONCE
Status: COMPLETED | OUTPATIENT
Start: 2022-08-09 | End: 2022-08-09

## 2022-08-09 RX ORDER — MEPERIDINE HYDROCHLORIDE 50 MG/ML
INJECTION INTRAMUSCULAR; INTRAVENOUS; SUBCUTANEOUS
Status: COMPLETED
Start: 2022-08-09 | End: 2022-08-09

## 2022-08-09 RX ORDER — MEPERIDINE HYDROCHLORIDE 25 MG/ML
12.5 INJECTION INTRAMUSCULAR; INTRAVENOUS; SUBCUTANEOUS EVERY 5 MIN PRN
Status: DISCONTINUED | OUTPATIENT
Start: 2022-08-09 | End: 2022-08-09

## 2022-08-09 RX ORDER — ROCURONIUM BROMIDE 10 MG/ML
INJECTION, SOLUTION INTRAVENOUS PRN
Status: DISCONTINUED | OUTPATIENT
Start: 2022-08-09 | End: 2022-08-09 | Stop reason: SDUPTHER

## 2022-08-09 RX ORDER — HYDROMORPHONE HCL 110MG/55ML
0.25 PATIENT CONTROLLED ANALGESIA SYRINGE INTRAVENOUS EVERY 5 MIN PRN
Status: DISCONTINUED | OUTPATIENT
Start: 2022-08-09 | End: 2022-08-09 | Stop reason: HOSPADM

## 2022-08-09 RX ADMIN — SUGAMMADEX 200 MG: 100 INJECTION, SOLUTION INTRAVENOUS at 11:14

## 2022-08-09 RX ADMIN — HYDROMORPHONE HYDROCHLORIDE 0.5 MG: 2 INJECTION INTRAMUSCULAR; INTRAVENOUS; SUBCUTANEOUS at 12:01

## 2022-08-09 RX ADMIN — CEFAZOLIN 2000 MG: 2 INJECTION, POWDER, FOR SOLUTION INTRAMUSCULAR; INTRAVENOUS at 12:45

## 2022-08-09 RX ADMIN — MEPERIDINE HYDROCHLORIDE 12.5 MG: 50 INJECTION INTRAMUSCULAR; INTRAVENOUS; SUBCUTANEOUS at 12:20

## 2022-08-09 RX ADMIN — FENTANYL CITRATE 100 MCG: 50 INJECTION, SOLUTION INTRAMUSCULAR; INTRAVENOUS at 07:42

## 2022-08-09 RX ADMIN — TRANEXAMIC ACID 1000 MG: 1 INJECTION, SOLUTION INTRAVENOUS at 08:09

## 2022-08-09 RX ADMIN — SODIUM CHLORIDE: 9 INJECTION, SOLUTION INTRAVENOUS at 09:25

## 2022-08-09 RX ADMIN — SODIUM CHLORIDE: 9 INJECTION, SOLUTION INTRAVENOUS at 07:00

## 2022-08-09 RX ADMIN — HYDROMORPHONE HYDROCHLORIDE 0.5 MG: 2 INJECTION INTRAMUSCULAR; INTRAVENOUS; SUBCUTANEOUS at 11:54

## 2022-08-09 RX ADMIN — Medication 140 MG: at 08:05

## 2022-08-09 RX ADMIN — PHENYLEPHRINE HYDROCHLORIDE 100 MCG: 10 INJECTION INTRAVENOUS at 08:25

## 2022-08-09 RX ADMIN — FENTANYL CITRATE 25 MCG: 50 INJECTION, SOLUTION INTRAMUSCULAR; INTRAVENOUS at 10:55

## 2022-08-09 RX ADMIN — ROPIVACAINE HYDROCHLORIDE 20 ML: 5 INJECTION, SOLUTION EPIDURAL; INFILTRATION; PERINEURAL at 07:53

## 2022-08-09 RX ADMIN — FENTANYL CITRATE 50 MCG: 50 INJECTION, SOLUTION INTRAMUSCULAR; INTRAVENOUS at 08:00

## 2022-08-09 RX ADMIN — SODIUM CHLORIDE: 9 INJECTION, SOLUTION INTRAVENOUS at 07:59

## 2022-08-09 RX ADMIN — MAGNESIUM SULFATE HEPTAHYDRATE 1 G: 500 INJECTION, SOLUTION INTRAMUSCULAR; INTRAVENOUS at 09:01

## 2022-08-09 RX ADMIN — ONDANSETRON 4 MG: 2 INJECTION INTRAMUSCULAR; INTRAVENOUS at 09:04

## 2022-08-09 RX ADMIN — PROPOFOL 200 MG: 10 INJECTION, EMULSION INTRAVENOUS at 08:04

## 2022-08-09 RX ADMIN — PHENYLEPHRINE HYDROCHLORIDE 100 MCG: 10 INJECTION INTRAVENOUS at 08:38

## 2022-08-09 RX ADMIN — LIDOCAINE HYDROCHLORIDE 100 MG: 20 INJECTION, SOLUTION EPIDURAL; INFILTRATION; INTRACAUDAL; PERINEURAL at 08:03

## 2022-08-09 RX ADMIN — ROCURONIUM BROMIDE 30 MG: 10 INJECTION, SOLUTION INTRAVENOUS at 08:58

## 2022-08-09 RX ADMIN — FENTANYL CITRATE 25 MCG: 50 INJECTION, SOLUTION INTRAMUSCULAR; INTRAVENOUS at 10:50

## 2022-08-09 RX ADMIN — PHENYLEPHRINE HYDROCHLORIDE 100 MCG: 10 INJECTION INTRAVENOUS at 08:17

## 2022-08-09 RX ADMIN — HYDROMORPHONE HYDROCHLORIDE 0.5 MG: 2 INJECTION INTRAMUSCULAR; INTRAVENOUS; SUBCUTANEOUS at 12:15

## 2022-08-09 RX ADMIN — CEFAZOLIN 2000 MG: 2 INJECTION, POWDER, FOR SOLUTION INTRAMUSCULAR; INTRAVENOUS at 07:52

## 2022-08-09 RX ADMIN — ROCURONIUM BROMIDE 30 MG: 10 INJECTION, SOLUTION INTRAVENOUS at 10:23

## 2022-08-09 RX ADMIN — DEXAMETHASONE SODIUM PHOSPHATE 12 MG: 4 INJECTION, SOLUTION INTRAMUSCULAR; INTRAVENOUS at 09:04

## 2022-08-09 RX ADMIN — ROCURONIUM BROMIDE 30 MG: 10 INJECTION, SOLUTION INTRAVENOUS at 08:20

## 2022-08-09 RX ADMIN — PHENYLEPHRINE HYDROCHLORIDE 100 MCG: 10 INJECTION INTRAVENOUS at 08:09

## 2022-08-09 RX ADMIN — MIDAZOLAM 2 MG: 1 INJECTION INTRAMUSCULAR; INTRAVENOUS at 07:42

## 2022-08-09 RX ADMIN — PHENYLEPHRINE HYDROCHLORIDE 100 MCG: 10 INJECTION INTRAVENOUS at 08:20

## 2022-08-09 ASSESSMENT — PAIN DESCRIPTION - DESCRIPTORS
DESCRIPTORS: SHARP
DESCRIPTORS: DISCOMFORT
DESCRIPTORS: ACHING
DESCRIPTORS: ACHING;THROBBING
DESCRIPTORS: DISCOMFORT
DESCRIPTORS: DISCOMFORT

## 2022-08-09 ASSESSMENT — PAIN SCALES - GENERAL
PAINLEVEL_OUTOF10: 8
PAINLEVEL_OUTOF10: 3
PAINLEVEL_OUTOF10: 3
PAINLEVEL_OUTOF10: 9
PAINLEVEL_OUTOF10: 7
PAINLEVEL_OUTOF10: 3
PAINLEVEL_OUTOF10: 8

## 2022-08-09 ASSESSMENT — PAIN DESCRIPTION - ORIENTATION
ORIENTATION: RIGHT

## 2022-08-09 ASSESSMENT — PAIN DESCRIPTION - LOCATION
LOCATION: HIP

## 2022-08-09 ASSESSMENT — ENCOUNTER SYMPTOMS: SHORTNESS OF BREATH: 0

## 2022-08-09 ASSESSMENT — PAIN - FUNCTIONAL ASSESSMENT
PAIN_FUNCTIONAL_ASSESSMENT: PREVENTS OR INTERFERES SOME ACTIVE ACTIVITIES AND ADLS
PAIN_FUNCTIONAL_ASSESSMENT: 0-10

## 2022-08-09 NOTE — PROGRESS NOTES
Awake, alert and oriented. Dozes off intermittently. Needs reminding to take deep breath. Fluids given to drink. Rates right hip pain a 3, decreasing after IV med. Respirations easy on room air. VSS. Ancef infusing.

## 2022-08-09 NOTE — PROGRESS NOTES
Pt arrived from OR to PACU bay 1. Reported received from [address] staff. Pt arousable to voice. Surgical incisions dressings in place to RLE. Pt on 6L simple mask, ST, and VSS. Will continue to monitor.

## 2022-08-09 NOTE — ANESTHESIA POSTPROCEDURE EVALUATION
Department of Anesthesiology  Postprocedure Note    Patient: Michele Sagastume  MRN: [id number]  YOB: 1980  Date of evaluation: 8/9/2022      Procedure Summary     Date: 08/09/22 Room / Location: [address]    Anesthesia Start: 1174 Anesthesia Stop: 1134    Procedure: RIGHT HIP REVISION, ARTHROSCOPY, FEMOROACETABULAR IMPINGEMENT SURGERY, LABRAL REPAIR, ENDOSCOPIC,-BLOCK (PENG), LOCAL (ORTHOMIX)-ROSE AND NEPHEW-SHANIQUA (Right: Hip) Diagnosis:       Femoroacetabular impingement      Status post arthroscopy of hip      (Femoroacetabular impingement [M25.859])      (Status post arthroscopy of hip [Z98.890])    Surgeons: Thanh Leiva MD Responsible Provider: Urban Menezes MD    Anesthesia Type: general, regional ASA Status: 3          Anesthesia Type: No value filed.     Kevin Phase I: Kevin Score: 8    Kevin Phase II:        Anesthesia Post Evaluation    Patient location during evaluation: PACU  Patient participation: complete - patient participated  Level of consciousness: awake and alert  Airway patency: patent  Nausea & Vomiting: no nausea and no vomiting  Complications: no  Cardiovascular status: blood pressure returned to baseline  Respiratory status: acceptable  Hydration status: euvolemic  Multimodal analgesia pain management approach

## 2022-08-09 NOTE — PROGRESS NOTES
Instructed on use of incentive spirometer. Patient able to demonstrate correct use. Able to pull up to 3500ml. Explained to use several times daily. Reviewed discharge instructions with patient. Questions answered. Copies given.

## 2022-08-09 NOTE — PROGRESS NOTES
Block completed . Pt tolerated well. Post procedure VS= 117/82  P= 76  O2 sat remained 96-99% throughout.

## 2022-08-09 NOTE — PROGRESS NOTES
Monitors applied. Baseline VS= 122/71 P=77 R=16 O2 sat = 99% with 2L via NC  Time out completed per protocol prior to right PENG block. Medicated with 100mcg fentanyl and versed 2mg as ordered by anesthesia.

## 2022-08-09 NOTE — PROGRESS NOTES
Assisted to dress and then to bathroom. Discharged to home with mom. Has instructions, incentive spirometer, ice machine, inhaler and all belongings.

## 2022-08-09 NOTE — ANESTHESIA PROCEDURE NOTES
Peripheral Block    Patient location during procedure: pre-op  Reason for block: post-op pain management and at surgeon's request  Start time: 8/9/2022 7:32 AM  End time: 8/9/2022 7:40 AM  Staffing  Performed: anesthesiologist   Anesthesiologist: Kaushik Chavez MD  Preanesthetic Checklist  Completed: patient identified, IV checked, site marked, risks and benefits discussed, surgical/procedural consents, equipment checked, pre-op evaluation, timeout performed, anesthesia consent given, oxygen available and monitors applied/VS acknowledged  Peripheral Block   Patient position: supine  Prep: ChloraPrep  Provider prep: mask and sterile gloves  Patient monitoring: cardiac monitor, continuous pulse ox, frequent blood pressure checks and IV access  Block type: PENG  Laterality: right  Injection technique: single-shot  Guidance: ultrasound guided  Local infiltration: lidocaine  Infiltration strength: 1 %  Local infiltration: lidocaine  Dose: 3 mL    Needle   Needle type: insulated echogenic nerve stimulator needle   Needle gauge: 21 G  Needle localization: ultrasound guidance  Needle length: 10 cm  Assessment   Injection assessment: negative aspiration for heme, no paresthesia on injection and local visualized surrounding nerve on ultrasound  Paresthesia pain: none  Slow fractionated injection: yes  Hemodynamics: stable  Real-time US image taken/store: yes  Outcomes: uncomplicated    Additional Notes  U/S [id number]. (1) Under ultrasound guidance, a  gauge needle was inserted and placed in close proximity to the  nerve. (2) Ultrasound was also used to visualize the spread of the anesthetic in close proximity to the nerve being blocked. (3) The nerve appeared anatomically normal, and (4 there were no apparent abnormal pathological findings on the image that were readily visible and related to the nerve being blocked. (5) A permanent ultrasound image was saved in the patient's record.             Medications Administered  ropivacaine (NAROPIN) injection 0.5% - Perineural   20 mL - 8/9/2022 7:53:00 AM

## 2022-08-09 NOTE — OP NOTE
pain in the hip on physical exam with deep flexion and flexion internal rotation, imaging that confirmed a labral tear due to ABILIO, and relief with an intra-articular injection. The patient meets the 4 criteria for arthroscopic surgery of the right hip. This includes anterior/russ-lateral hip pain, reproducible pain in the hip on physical exam with deep flexion and flexion internal rotation, imaging that confirmed a labral tear due to ABILIO, and relief with an intra-articular injection. This allowed Beulah Henson to be a candidate for surgery in the form of right  hip arthroscopy labral repair and ABILIO decompression surgery. Risks, benefits, advantages, disadvantages and potential complications as well as the anticipated postoperative course were discussed. The patient agreed to pursue this treatment option. All questions were addressed to their satisfaction. Beulah Henson has also been having concomitant recalcitrant lateral sided hip pain on the  right side for the past  Few months   This hip pain has persisted despite extensive nonoperative treatment focused on pelvic conditioning, abductor strengthening, hip and spine mobility exercises, and activity modification. 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 Therefore we recommended a concomitant right  hip arthroscopy for ABILIO and endoscopic bursectomy and abductor repair, possible open repair. Detailed Description of Procedure:   Beulah Henson was met outside the operative theater. Consent was signed and questions were answered and the  RIGHT  side was confirmed as the correct operative side. Anaesthesia administered a PENG block (peripheral nerve group) block that has no femoral nerve motor blockade. The patient was then brought back to the operative room in stable condition. The patient had a general anesthetic.   Following this, the patient received intravenous antibiotics and was then transferred to equating to a 3-4 degree LCEA radiographic correction cecilia fluoroscopy. Suture anchors were then inserted first at the 2 o'clock position and then at the following acetabular positions:  1 o'clock,  12 o'clock and then  11 o'clock position. The 11o'clock position was drilled by viewing MA and working from  PL portal.  All  anchors were 1.4 mm NanoTak ([place]) single loaded anchors, except for the 11 o'clock anchor which was a 1.8mm Qfix anchor due to frequently softened bone at that junction. Using a  Bird-Beak , a looped technique around the  labrum was used to refixate it back down to the area on the acetabular rim. A revo- sliding knot with 3 alternating half hitches was used to complete the repair. This exact same repair was then repeated at the 3 remaining torn segments. Each anchor had good purchase. Once labral repair was completed, cautery was used in a careful and limited manner to ablate the injected parts of the labrum and capsule, and  caramelize the chondrolabral junction. Stability was then confirmed using hook instrument at the site of the repair. The 3 oclock labrum was gently debrided but not deemed to require a specific suture anchor repair as it was stable to probing. This repair was satisfactory, and a total of 4 anchors were used to refixate the labrum. Given the injected labrum at 3'oclock consistent with a tight psoas tendon, a controlled fractional lengthening was performed in a diagonal checkered pattern , giving adequate lengthening while avoiding a complete release. Peripheral compartment work: At this point a distal anterolateral portal was created and a T capsulotomy showing the head neck junction almost 1  cm in length was created. A moderate size impingement lesion was found anteriorly and rsus-laterally. An osteochondroplasty was completed using a kiki at the head neck junction. On the AP plane correction was obtained.  Once AP correction was obtained the hip was released from traction at approximately  1 hour : 44 minutes and flexed and externally rotated to get a modified Brunson view. Osteochondral plasty was then completed in this position to obtain a full bony correction. The hip was rotated internally externally while resecting bone to ensure no bony impingement or bridge was left. Dynamic hip rotation during hip flexion showed no further impingement. Also looking back at the head neck junction the suction seal was restored with the repaired labrum. Final x-rays and extended position were taken to document bony recontouring and this was well noted. Bony debris was suctioned and final contouring was completed to ensure there were no stress risers or ridges. Once the that was completed and osteochondroplasty was satisfactory the capsule was closed. First with 1 vertical stitches (with #2 ForceFiber) for the longitudinal/vertical component of the capsulotomy and one #2 vicryl  for the horizontal/interportal portion of the capsule. Good reapproximation was noted in the capsular closure. But I took care not to over tighten the capsular closure given the chondramalacia, severe capsular thickness, and hip stiffness that this patient's protoplasm lended itself to. I felt that a complete plication would cause adhesion/causation between the labral repair and interportal closure which may otherwise have lead to iatrogenic stiffness and adhesive capsulitis. Bursectomy and Abductor Repair  The arthroscope was then withdrawn from the peripheral compartment and under x-ray guidance, localized to the top/tip of the greater trochanter. The shaver was then triangulated to the area of the abductor insertion on the lateral facet of the greater trochanter, and a thorough bursectomy was carried out. The ablator was then used to cauterize any bleeders from what was a very inflamed and injected bursa.       A switching stick was then used to probe the abductor insertion, and quickly entered into the tear and thinned tissue within the anterior-portion of the gluteus medius tendon insertion and gluteus minimus. The tear parameters were delineated and measured 1 cm (A to P) by 1.5 cm (proximal to distal). This tissue was quite thinned and edematous, but was deemed reparable. --  Bursectomy and Abductor Repair  The arthroscope was then withdrawn from the peripheral compartment and under x-ray guidance, localized to the top/tip of the greater trochanter. The shaver was then triangulated to the area of the abductor insertion on the lateral facet of the greater trochanter, and a thorough bursectomy was carried out. The ablator was then used to cauterize any bleeders from what was a very inflamed and injected bursa. I     A switching stick was then used to probe the abductor insertion, and quickly entered into the tear and thinned tissue within the anterior-portion of the gluteus medius tendon insertion and gluteus minimus. The tear parameters were delineated and measured 2 cm (A to P) by 1.5 cm (proximal to distal). This tissue was quite thinned and edematous, but was deemed reparable. (DOUBLE-BARRELL / DOUBLE PULLEY TECHNIQUE)  We advanced two self-tapping double loaded ICONIX SPEED anchor, double loaded with 2.3mm suture tapes onto the tear in a stapling type fashion, using the pointed ends of the anchor to reduce the tear (at the muscle-tendon junction) to bone with some advancement. This acted as our medial row of anchors. This provided me with 4 limbs from anterior anchor, and 4 limbs from the posterior anchor. The matching colours from each anchor were then tied outside the cannula in a double antoni knot fashion. These were then cut short. I then pulled on the respective untied limbs and then pulled them out a proximal portal, which effectively used the anchors as a double pulley system.  This seated the double barrelt knots nicely onto the gluteus medius muscle/tendon junction on the medial row. I then carried all 8 pair of the tapes onto a lateral row 4.75mm Omega self-punching anchor. This produced a double row tape-bridge construct. This gave an excellent re-approximation. Dynamic rotation of the limb showed a no residual gapping and an intact tendon repair on probing. No lateral row fixation was deemed necessary. This concluded the endoscopic bursectomy and abductor repair. ---     Closure: The arthroscope was then withdrawn and portal sites were irrigated and closed with 3-0 Nylon interrupted suture in Farmer's Knot high-tension suture. Bulky compressive dressing was then applied in addition. No complications were encountered. Blood loss was minimal.    For added post-operative pain relief, a 90cc analgesic/anaesthetic orthopaedic mixture was then injected into the arthroscopy portals, subcuntaeous tissue and along the course of the TFL and abductor musculature. Patient was then awoken from surgery and transferred onto the stretcher. ANTONETTE hose stockings were reapplied. Foot was warm and well-perfused after surgery and coming out of the traction boot. Patient was taken to the recovery room in stable condition. All needle and sponge counts matched the initial count per circulating and scrub personnel x2 prior to terminating this case. Jayleen Hutton PA-C assisted me during this surgery. Due to the intricate and complex nature of this procedure,  His services were required as a first assist with exposure, anchor drilling, suture retrieval and management, precise manipulation of the arthroscope during my knot tying, and a meticulous layered closure. The surgery could be not be executed without to skilled sets of hands.      Post-Operative Plan:  -Patient is Same Day Discharge  -Weight bearinlb flat foot touch WB with crutches x 4 weeks, then WBAT and off crutches by 5 to 6 weeks if  tolerated  -Brace: [place] Hip Brace x 4 weeks , to be worn for ambulation and sleep, and for comfort while seated   No active abduction x 4 weeks (medium  gluteus medius tear)  -PT: Early supervised PT for ROM and phase 1 hip rehabilitation protocol          Zero resistance upright stationary bike (or seated peddler bike if either available) as early as post op day 2 to 3 as tolerated. PT and family member daily passive circumduction 3-6x/daily for 5 minutes, x 3 weeks  -Showering: Showering can commence post operative day 3  -Dressing removal: Waterproof Dressing remains on for 7 days  -Suture Removal: 14 days post op by MD in office  -Follow-up: day 2-3 for bandage check and xrays, day 14 for suture removal, week 6 for gait check/ROM check, week 12 for full ROM/early loading/strength check, 6 months for return to sport check, 1 year for xray and patient reported questionnaire completion    Sincerely,    Pura Tello  [address], [address], [address]  Email: [email or web address]  Office: [phone]    08/09/22  11:53 AM      Electronically signed by Pura Tello MD on 8/9/2022 at 7:14 AM

## 2022-08-09 NOTE — ANESTHESIA PRE PROCEDURE
Department of Anesthesiology  Preprocedure Note       Name:  Kristine Whiteside   Age:  39 y.o.  :  1980                                          MRN:  [id number]         Date:  2022      Surgeon: Elizabeth Rawls):  Fiorella Manuel MD    Procedure: Procedure(s):  RIGHT HIP REVISION, ARTHROSCOPY, FEMOROACETABULAR IMPINGEMENT SURGERY, LABRAL REPAIR, ENDOSCOPIC, POSSIBLE OPEN BURSECTOMY AND ABDUCTOR REPAIR-BLOCK (PENG), LOCAL (ORTHOMIX)-ROSE AND NEPHEW-SHANIQUA    Medications prior to admission:   Prior to Admission medications    Medication Sig Start Date End Date Taking? Authorizing Provider   HYDROcodone-acetaminophen (NORCO) 5-325 MG per tablet Take 1 tablet by mouth every 6 hours as needed for Pain for up to 5 days. 22  Fiorella Manuel MD   naproxen (NAPROSYN) 500 MG tablet Take 1 tablet by mouth in the morning and 1 tablet in the evening. Take with meals. Do all this for 14 days. 22  Fiorella Manuel MD   senna (SENOKOT) 8.6 MG TABS tablet Take 1 tablet by mouth in the morning and 1 tablet before bedtime. Do all this for 7 days. 8/8/22 8/15/22  Fiorella Manuel MD   traMADol (ULTRAM) 50 MG tablet Take 1 tablet by mouth every 6 hours as needed for Pain for up to 5 days. 22  Fiorella Manuel MD   rivaroxaban (XARELTO) 10 MG TABS tablet Take 1 tablet by mouth in the morning for 10 days.  22  Fiorella Manuel MD   Evolocumab 140 MG/ML SOAJ Inject 140 mg into the skin every 14 days 22   [facility], DO   Cholecalciferol (VITAMIN D3) 50 MCG (2000 UT) CAPS TAKE 1 CAPSULE BY MOUTH EVERY DAY 22   JUAN RAMON Belcher CNP   citalopram (CELEXA) 40 MG tablet TAKE 1 TABLET BY MOUTH EVERY DAY IN THE MORNING 22   Historical Provider, MD   omeprazole (PRILOSEC) 20 MG delayed release capsule TAKE 1 CAPSULE BY MOUTH EVERY DAY 22   JUAN RAMON Belcher CNP   rosuvastatin (CRESTOR) 40 MG tablet TAKE 1 TABLET BY MOUTH EVERY DAY 22   JUAN RAMON Belcher - CNP   metFORMIN (GLUCOPHAGE-XR) 500 MG extended release tablet Take 2 tablets by mouth daily (with breakfast) 4/29/22   JUAN RAMON Miles CNP   metoprolol tartrate (LOPRESSOR) 50 MG tablet Take 1 tablet by mouth 2 times daily 4/29/22   JUAN RAMON Miles CNP   ezetimibe (ZETIA) 10 MG tablet Take 1 tablet by mouth daily 4/29/22   JUAN RAMON Miles CNP   diclofenac sodium (VOLTAREN) 1 % GEL APPLY 2 GRAMS TOPICALLY 2 TIMES DAILY  Patient not taking: Reported on 8/8/2022 4/29/22 8/9/22  Princess Lorenzo MD   albuterol sulfate  (90 Base) MCG/ACT inhaler TAKE 2 PUFFS BY MOUTH EVERY 6 HOURS AS NEEDED FOR WHEEZE 9/30/21   JUAN RAMON Miles CNP   nitroGLYCERIN (NITROSTAT) 0.4 MG SL tablet Place 1 tablet under the tongue every 5 minutes as needed for Chest pain up to max of 3 total doses. If no relief after 1 dose, call 911. 4/5/21   JUAN RAMON Miles CNP   ammonium lactate (LAC-HYDRIN) 12 % lotion Apply topically daily 4/5/21   JUAN RAMON Miles CNP   thiothixene (NAVANE) 2 MG capsule TAKE 1 CAPSULE BY MOUTH EVERY EVENING AT BEDTIME 6/11/20   Historical Provider, MD   pregabalin (LYRICA) 75 MG capsule Take 1 capsule by mouth 2 times daily for 90 days. 6/25/20 7/19/22  JUAN RAMON Miles CNP   divalproex (DEPAKOTE ER) 500 MG extended release tablet Take 500 mg by mouth daily     Historical Provider, MD   hydrOXYzine (ATARAX) 25 MG tablet Take 25 mg by mouth 3 times daily as needed for Anxiety     Historical Provider, MD       Current medications:    No current facility-administered medications for this visit. No current outpatient medications on file.      Facility-Administered Medications Ordered in Other Visits   Medication Dose Route Frequency Provider Last Rate Last Admin    ceFAZolin (ANCEF) 2,000 mg in dextrose 5 % 50 mL IVPB (mini-bag)  2,000 mg IntraVENous On Call to RAFAEL Monreal MD        tranexamic acid (CYKLOKAPRON) 1,000 mg in sodium chloride 0.9 % 60 mL IVPB  1,000 mg IntraVENous Once Benigno Gale MD        ortho mix injection   Injection On Call Benigno Gale MD        0.9 % sodium chloride infusion   IntraVENous Continuous Benigno Gale  mL/hr at 08/09/22 0700 New Bag at 08/09/22 0700    midazolam PF (VERSED) injection 2 mg  2 mg IntraVENous Once Jose Guadalupe Owen MD        fentaNYL (SUBLIMAZE) injection 100 mcg  100 mcg IntraVENous Once Jose Guadalupe Owen MD           Allergies:     Allergies   Allergen Reactions    Cinnamon Itching    Codeine Hives and Itching       Problem List:    Patient Active Problem List   Diagnosis Code    AMOS (obstructive sleep apnea) G47.33    Type 2 diabetes mellitus without complication, without long-term current use of insulin ([facility]) E11.9    Essential hypertension I10    Familial hypercholesterolemia E78.01    History of fibromyalgia Z87.39    Positive MATTHEW (antinuclear antibody) R76.8    CS (cervical spondylosis) M47.812    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    DDD (degenerative disc disease), cervical M50.30    Herniated cervical disc without myelopathy M50.20    Bipolar disorder ([facility]) F31.9    Fibromyalgia M79.7    Mixed anxiety and depressive disorder F41.8    Morbidly obese ([facility]) E66.01    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    Gastroesophageal reflux disease K21.9    Chronic tachycardia R00.0    Migraine without status migrainosus, not intractable G43.909    Femoral acetabular impingement M25.859    Femoroacetabular impingement of right hip M25.851    Tear of right gluteus medius tendon S76.011A       Past Medical History:        Diagnosis Date    Arthritis     B[facility] 1 disorder [facility]     COPD (chronic obstructive pulmonary disease) [facility]     Depression     Diabetes mellitus ([facility]     Femoroacetabular impingement of right hip 8/9/2022    Hyperlipidemia     Hypertension     Tachycardia     Tachycardia     Tear of right gluteus medius tendon 8/9/2022       Past Surgical History: HCT 37.1 07/14/2022 08:28 AM    MCV 85.8 07/14/2022 08:28 AM    RDW 14.2 07/14/2022 08:28 AM     07/14/2022 08:28 AM       CMP:   Lab Results   Component Value Date/Time     07/14/2022 08:28 AM    K 4.0 07/14/2022 08:28 AM     07/14/2022 08:28 AM    CO2 28 07/14/2022 08:28 AM    BUN 14 07/14/2022 08:28 AM    CREATININE <0.5 07/14/2022 08:28 AM    GFRAA >60 07/14/2022 08:28 AM    AGRATIO 1.5 07/14/2022 08:28 AM    LABGLOM >60 07/14/2022 08:28 AM    GLUCOSE 123 07/14/2022 08:28 AM    PROT 7.4 07/14/2022 08:28 AM    CALCIUM 9.4 07/14/2022 08:28 AM    BILITOT 0.3 07/14/2022 08:28 AM    ALKPHOS 67 07/14/2022 08:28 AM    AST 17 07/14/2022 08:28 AM    ALT 22 07/14/2022 08:28 AM       POC Tests:   Recent Labs     08/09/22  0657   POCGLU 113*       Coags:   Lab Results   Component Value Date/Time    PROTIME 13.0 07/14/2022 08:28 AM    INR 0.99 07/14/2022 08:28 AM       HCG (If Applicable):   Lab Results   Component Value Date    PREGTESTUR Negative 08/09/2022        ABGs: No results found for: PHART, PO2ART, UGY8VIX, HTV5VOC, BEART, U4UTWCMB     Type & Screen (If Applicable):  No results found for: LABABO, LABRH    Drug/Infectious Status (If Applicable):  No results found for: HIV, HEPCAB    COVID-19 Screening (If Applicable):   Lab Results   Component Value Date/Time    COVID19 Not Detected 12/28/2021 08:34 AM    COVID19 NOT DETECTED 12/31/2020 11:45 AM           Anesthesia Evaluation  Patient summary reviewed and Nursing notes reviewed no history of anesthetic complications:   Airway: Mallampati: I  TM distance: >3 FB   Neck ROM: full  Mouth opening: > = 3 FB   Dental: normal exam         Pulmonary:   (+) COPD:  sleep apnea:  asthma:     (-) shortness of breath                           Cardiovascular:  Exercise tolerance: poor (<4 METS),   (+) hypertension:, hyperlipidemia    (-)  angina                Neuro/Psych:   (+) headaches:, psychiatric history (bipolar):   (-) CVA           GI/Hepatic/Renal: (+) GERD: well controlled, morbid obesity     (-) liver disease       Endo/Other:    (+) DiabetesType II DM, , .    (-) hypothyroidism               Abdominal:             Vascular:     - PVD. Other Findings:             Anesthesia Plan      general and regional     ASA 3     (Pt consented for PENG nerve block for post-operative pain control. Discussed risks/benefits of procedure, including bleeding/infection, nerve injury, LAST. Pt understood and expressed understanding to continue.)  Induction: intravenous. MIPS: Postoperative opioids intended and Prophylactic antiemetics administered. Anesthetic plan and risks discussed with patient. Use of blood products discussed with patient whom. Plan discussed with CRNA.           Post-op pain plan if not by surgeon: single peripheral nerve block            Scarlet Sotelo MD   8/9/2022

## 2022-08-09 NOTE — PROGRESS NOTES
Patient [address] Record Number: [id number]  YOB: 1980  Date of Encounter: 8/8/2022     Chief Complaint   Patient presents with    Post-Op Check     6 WEEK POST OP LEFT HIP SCOPE     Hip Pain     PRE SURGERY RIGHT HIP SCOPE 8/9/2022       History of Present Illness:   Ms. Lynn Reyes presents today in 6 week follow up from her left hip revision arthroscopy and synovectomy, lysis of adhesions, acetabuloplasty, removal of loose body - old surgery anchor, labral repair, femoroplasty/osteochondroplasty, endoscopic greater trochanteric bursectomy, endoscopic abductor repair on 6/28/2022. Patient reports pain level a 2 /10 and is controlled with no  pain medication. She is feeling better and progressing well. This is her preoperative visit for tomorrow surgery of the contralateral right hip. She tolerated all previous postop medications quite well. She is working with physical therapy at [place]. Pain Assessment  Location of Pain: Hip  Location Modifiers: Left  Severity of Pain: 2  Quality of Pain: [facility][1])  Frequency of Pain: Intermittent  Aggravating Factors:  (EXTENDED ACTIVITY)  Limiting Behavior: Some  Relieving Factors: Rest  Result of Injury: No  Work-Related Injury: No  Are there other pain locations you wish to document?: No    Review of Systems:  A 14 point review of systems available in the scanned medical record as documented by the patient. Today's review pertinent items are noted in HPI. Vital Signs:  Ht 5' 5\" (1.651 m)   Wt 231 lb (104.8 kg)   LMP  (LMP Unknown)   BMI 38.44 kg/m²     General Exam:   Neurological: Alert and oriented x 3. There is no focal weakness or sensory deficit. Constitutional: Patient is adequately groomed with no evidence of malnutrition  Mental Status: The patient is oriented to time, place and person. The patient's mood and affect are appropriate.   Lymphatic: The lymphatic examination bilaterally reveals all areas to be without enlargement or induration. Eyes: Sclera clear  Ears: Normal external ear  Mouth:  No perioral lesions  Pulm: Respirations unlabored and regular   Skin: Warm, well perfused    Left Hip Examination:    Inspection: Hip incision(s) are fully healed. There is no erythema, drainage or other signs of infection    Palpation:  No crepitus to gentle motion / circumduction of the hip    Active Range of Motion: 0 to 90 deg+ active circumduction    Passive Range of Motion: Flexion 95 degrees with no pain     Strength:  4+/5 resisted abduction    Neurovascular: Sensation to light touch is intact, no motor deficits, palpable distal pulses    Hip Examination: right    Skin/Inspection: No skin lesions, cellulitis, or extreme edema in the lower extremities. Standing/Walking:     abnormal:   gait, negative Trendelenburg sign. Supine/Side Lying Exam: Non tender around the major bony prominences  diminished range with pain  FADIR Positive  CHACHO Positive  Resisted Abduction  4/5   Resisted Adduction  5/5   Resisted  Flexion  5/5  severe tender at greater trochanter    Prone:  deferred    Distal Neurovascular exam is intact (foot sensation, pulses, and motor exam)      Radiology:    No new XR obtained at this time. Assessment: Ms. Angelika Herman is a 39 y.o. female who is 6 weeks postop left hip revision arthroscopy and synovectomy, lysis of adhesions, acetabuloplasty, removal of loose body - old surgical anchor, labral repair, femoroplasty/osteochondroplasty, endoscopic greater trochanteric bursectomy, endoscopic abductor repair on 6/28/2022. Patient is doing well and progressing as expected. She has persistent contralateral right hip pain consistent with ABILIO and abductor disorder, that also meets the criteria for surgical management, and the patient is interested in having this repaired in time for the start of her school semester. Impression:   Diagnosis Orders   1.  Femoral acetabular impingement  HYDROcodone-acetaminophen (NORCO) 5-325 MG per tablet    naproxen (NAPROSYN) 500 MG tablet    senna (SENOKOT) 8.6 MG TABS tablet    traMADol (ULTRAM) 50 MG tablet    rivaroxaban (XARELTO) 10 MG TABS tablet      2. Tear of right acetabular labrum, subsequent encounter  HYDROcodone-acetaminophen (NORCO) 5-325 MG per tablet    naproxen (NAPROSYN) 500 MG tablet    senna (SENOKOT) 8.6 MG TABS tablet    traMADol (ULTRAM) 50 MG tablet    rivaroxaban (XARELTO) 10 MG TABS tablet          Office Procedures:  Orders Placed This Encounter   Medications    HYDROcodone-acetaminophen (NORCO) 5-325 MG per tablet     Sig: Take 1 tablet by mouth every 6 hours as needed for Pain for up to 5 days. Dispense:  12 tablet     Refill:  0     Reduce doses taken as pain becomes manageable    naproxen (NAPROSYN) 500 MG tablet     Sig: Take 1 tablet by mouth in the morning and 1 tablet in the evening. Take with meals. Do all this for 14 days. Dispense:  28 tablet     Refill:  0    senna (SENOKOT) 8.6 MG TABS tablet     Sig: Take 1 tablet by mouth in the morning and 1 tablet before bedtime. Do all this for 7 days. Dispense:  14 tablet     Refill:  0    traMADol (ULTRAM) 50 MG tablet     Sig: Take 1 tablet by mouth every 6 hours as needed for Pain for up to 5 days. Dispense:  12 tablet     Refill:  0     Reduce doses taken as pain becomes manageable    rivaroxaban (XARELTO) 10 MG TABS tablet     Sig: Take 1 tablet by mouth in the morning for 10 days. Dispense:  10 tablet     Refill:  0     No orders of the defined types were placed in this encounter. Treatment Plan:     Overall, Grover Hernandez is doing well from her left hip arthroscopy 6 weeks ago, and she is  now a candidate for repair of the right hip. Patient has known right hip pathology including labral tear, persistent ABILIO despite a prior scope, and tendinitis involving the gluteus minimus and medius insertion.   She states her right hip pain has been worsening. We will plan on proceeding with surgery tomorrow as planned. I cautioned about possible muscular/pain flare ups to her left hip as it may not be fully recovered by then. The patient is understanding of this possibility. We recommended a RIGHT  hip arthroscopy, ABILIO surgery,  labral repair, possible labral reconstruction, endoscopic, possible open bursectomy, and endoscopic, possible open abductor repair     Risks, benefits, advantages, disadvantages and potential complications as well as the anticipated postoperative course were discussed. We outlined the 80 - 90% success rate of the operation. The risks of infection, bleeding, nerve injury, perineal numbness, sexual dysfunction, hip stiffness, and the possibility of persistent pain and prolonged recovery. We also discussed the involved rehabilitation timelines, no driving for 4 weeks weeks,  the need for a hip brace for 4 to 6 weeks,  the general trajectory of improvement after hip surgery (pain relief, activities of daily living, return to sport), full hip loading and strengthening commencing at 3 months, and up to 6 - 7 months before full return to sport and unrestricted activities. The patient agreed to pursue this treatment option. All questions were addressed to their satisfaction. no latex allergy  no adhesive allergy  yes - medication allergy, codeine   yes - OCP or hormonal treatment - IUD   Will DVTp with Xarelto x 10 days, then to resume her own home ASA  yes - personal history of diabetes, or HTN/CAD with catheterization   Monitor for ezra-op symptoms.   no personal or family history of bleeding  no personal or family history of DVT/PE  no personal or family history of intolerance no NSAIDS or history of GI ulcers  yes  -  personal or family history of problems with Anaesthesia - father \"flatline\"   Yes - blood thinners - ASA 81mg     We discussed the surgical process, postoperative recovery, the medications to be taken, and wound care instructions,  same hip brace fitting, and crutch instructions and fitting. Prescriptions were printed and given to the patient today. All of her questions were fully answered today. We would like to see Hammad Chang back in 4 weeks for follow-up visit and ROM/Gait/SLR check. And pre op visit for her contra lateral right hip. Sincerely,    Miriam Manzo MD [address] [address]  Email: [email or web address]  Office: [phone]    08/09/22  12:18 PM      The encounter with Hammad Chang was carried out by myself, Dr Teresa Dinh, who personally examined the patient and reviewed the plan. Franki Garces PA-C, functioned as a scribe for Dr. Miriam Manzo during this examination. The history taking and physical examination were also performed by Dr. Miriam Manzo. Please note that portions of this dictation was performed with a voice recognition program (iRewardChart). All efforts were made to edit the dictation but occasionally words are mis-transcribed. It is possible that there are still dictated errors within this office note.   If so, please bring any errors to my attention for an addendum

## 2022-08-11 ENCOUNTER — OFFICE VISIT (OUTPATIENT)
Dept: ORTHOPEDIC SURGERY | Age: 42
End: 2022-08-11

## 2022-08-11 ENCOUNTER — HOSPITAL ENCOUNTER (OUTPATIENT)
Dept: PHYSICAL THERAPY | Age: 42
Setting detail: THERAPIES SERIES
Discharge: HOME | End: 2022-08-11
Payer: COMMERCIAL

## 2022-08-11 VITALS — WEIGHT: 228 LBS | HEIGHT: 65 IN | BODY MASS INDEX: 37.99 KG/M2

## 2022-08-11 DIAGNOSIS — Z98.890 STATUS POST ARTHROSCOPY OF HIP: Primary | ICD-10-CM

## 2022-08-11 PROCEDURE — 97140 MANUAL THERAPY 1/> REGIONS: CPT

## 2022-08-11 PROCEDURE — 99024 POSTOP FOLLOW-UP VISIT: CPT | Performed by: ORTHOPAEDIC SURGERY

## 2022-08-11 PROCEDURE — 97110 THERAPEUTIC EXERCISES: CPT

## 2022-08-11 PROCEDURE — 97164 PT RE-EVAL EST PLAN CARE: CPT

## 2022-08-11 NOTE — PLAN OF CARE
[facility]  Phone: [phone]   Fax: [phone]    Physical Therapy Re-Certification Plan of Care    Dear Michelle Delarosa MD  ,    We had the pleasure of treating the following patient for physical therapy services at [facility]. A summary of our findings can be found in the updated assessment below. This includes our plan of care. If you have any questions or concerns regarding these findings, please do not hesitate to contact me at the office phone number checked above. Thank you for the referral.     Physician Signature:________________________________Date:__________________  By signing above (or electronic signature), therapist's plan is approved by physician                                                       Sub-sections:   ;   ;       Overall Response to Treatment:   []Patient is responding well to treatment and improvement is noted with regards  to goals   []Patient should continue to improve in reasonable time if they continue HEP   []Patient has plateaued and is no longer responding to skilled PT intervention    []Patient is getting worse and would benefit from return to referring MD   []Patient unable to adhere to initial POC   [x]Other: Pt is s/p  RIGHT HIP REVISION, ARTHROSCOPY, FEMOROACETABULAR IMPINGEMENT SURGERY, LABRAL REPAIR on . Adding R hip to POC. Recommendation:    [x]Continue PT 1-2x / wk for 8 weeks.                []Hold PT, pending MD visit         Physical Therapy Treatment Note/ Progress Report:     Date:  2022    Patient Name:  Asim Ochoa    :  1980  MRN: [id number]  Restrictions/Precautions:    Medical/Treatment Diagnosis Information:  Diagnosis: M25.859 (ICD-10-CM) - Femoral acetabular impingement , S73.192D (ICD-10-CM) - Tear of left acetabular labrum, subsequent encounter  Treatment Diagnosis: decreased L hip ROM/ strength, impaired gait/ balance, decreased functional status. Insurance/Certification information:  PT Insurance Information: On license of [facility]  Physician Information:  Siegfried Brunner, MD    Plan of care signed (Y/N): []  Yes [x]  No     Date of Patient follow up with Physician:      Progress Report: []  Yes  [x]  No     Date Range for reporting period:  Beginnin22  POC:      Progress report due (10 Rx/or 30 days whichever is less):     Recertification due (POC duration/ or 90 days whichever is less): 10/8/22    Visit # Insurance Allowable Auth required? Date Range    [x]  Yes  []  No APPROVED 14 VISITS UNTIL        Latex Allergy:  [x]NO      []YES  Preferred Language for Healthcare:   [x]English       []other:    Functional Scale:           Date assessed:  [facility] physical FS primary measure score = 26; risk adjusted = 39  22    Pain level:  8/10 currently      SUBJECTIVE:    Pt reports she is doing ok post op , 8/10 currently.      OBJECTIVE:  PT to remove post op dressing on        RESTRICTIONS/PRECAUTIONS: See post op precautions and guidelines in teams   S/P L hip scope LEFT HIP REVISION ARTHROSCOPY, LYSIS OF ADHESIONS, REMOVAL LOOSE BODY, SYNOVECTOMY, FEMOROACETABULAR IMPINGEMENT DECOMPRESSION AND LABRAL REPAIR, ABDUCTOR REPAIR, ENDOSCOPIC BURSECTOMY on 22.  22 RIGHT HIP REVISION, ARTHROSCOPY, FEMOROACETABULAR IMPINGEMENT SURGERY, LABRAL REPAIR    Exercises/Interventions:     Therapeutic Exercise (11377)  Resistance / level Sets/sec Reps Notes / Cues   Upright bike - = No resistance Added    Calf Stretch  c strap    Standing HR    Quad set     LAQ 3#    Standing [address]  Added    Hooklying Bridge  To Neutral   DL Added  cueing for glut drive, neutral extension only   Prone laying    Prone glute sets Gentle hip flexor stretc Added  cued to avoid significant extension   HSS supine w/ strap                          Therapeutic Activities (63888) x             PT education on POC, WB progressions, HEP      Mini squat   7/27 - NMR   Supported standing 3 way hip No resistance  Very small range   Hip hinge   With dowel          Neuromuscular Re-ed (32057)       NBOS  NBOS EC  NBOS Head turns                   Manual Intervention (51843) x        PROM within precautions '   Gentle hip circumduction, knee ROM  Wk 3-6  Extension <15? External Rotation <20? Abduction <25? Flexion to 120? Circumduction, PROM within precautions 10'   8/11                                   Modalities:     Pt. Education: 8' at eval   -pt educated on diagnosis, prognosis and expectations for rehab, post op precautions and guidelines, WB status.   -all pt questions were answered    Home Exercise Program:    Access Code: [email or web address]  URL: [email or web address]  Date: 08/11/2022  Prepared by: Binta Norman    Exercises - completed 8/11  Supine Heel Slide with Strap - 1 x daily - 7 x weekly - 3 sets - 10 reps  Supine Quad Set - 1 x daily - 7 x weekly - 3 sets - 10 reps  Long Sitting Ankle Pumps - 1 x daily - 7 x weekly - 3 sets - 10 reps  Seated Long Arc Quad - 1 x daily - 7 x weekly - 3 sets - 10 reps  Seated Hip Adduction Isometrics with Ball - 1 x daily - 7 x weekly - 3 sets - 10 reps        Therapeutic Exercise and NMR EXR  [x] (38760) Provided verbal/tactile cueing for activities related to strengthening, flexibility, endurance, ROM for improvements in LE, proximal hip, and core control with self care, mobility, lifting, ambulation.  [] (32261) Provided verbal/tactile cueing for activities related to improving balance, coordination, kinesthetic sense, posture, motor skill, proprioception  to assist with LE, proximal hip, and core control in self care, mobility, lifting, ambulation and eccentric single leg control.   [] (56645) Therapist is in constant attendance of 2 or more patients providing skilled therapy interventions, but not providing any significant amount of measurable one-on-one time to either patient, for improvements in LE, proximal hip, and core control in self care, mobility, lifting, ambulation and eccentric single leg control. NMR and Therapeutic Activities:    [x] (72382 or 55917) Provided verbal/tactile cueing for activities related to improving balance, coordination, kinesthetic sense, posture, motor skill, proprioception and motor activation to allow for proper function of core, proximal hip and LE with self care and ADLs  [] (96307) Gait Re-education- Provided training and instruction to the patient for proper LE, core and proximal hip recruitment and positioning and eccentric body weight control with ambulation re-education including up and down stairs     Home Exercise Program:    [x] (22832) Reviewed/Progressed HEP activities related to strengthening, flexibility, endurance, ROM of core, proximal hip and LE for functional self-care, mobility, lifting and ambulation/stair navigation   [] (63290)Reviewed/Progressed HEP activities related to improving balance, coordination, kinesthetic sense, posture, motor skill, proprioception of core, proximal hip and LE for self care, mobility, lifting, and ambulation/stair navigation      Manual Treatments:  PROM / STM / Oscillations-Mobs:  G-I, II, III, IV (PA's, Inf., Post.)  [x] (27631) Provided manual therapy to mobilize LE, proximal hip and/or LS spine soft tissue/joints for the purpose of modulating pain, promoting relaxation,  increasing ROM, reducing/eliminating soft tissue swelling/inflammation/restriction, improving soft tissue extensibility and allowing for proper ROM for normal function with self care, mobility, lifting and ambulation.      Modalities:  [] (87455) Vasopneumatic compression: Utilized vasopneumatic compression to decrease edema / swelling for the purpose of improving mobility and quad tone / recruitment which will allow for increased overall function including but not limited to self-care, transfers, ambulation, and ascending / descending stairs. Charges:  Timed Code Treatment Minutes: 25   Total Treatment Minutes: 43     [] EVAL - LOW (23381)   [] EVAL - MOD (52087)  [] EVAL - HIGH (94239)  [x] RE-EVAL (32100)  [x] NB(95620) x 1      [] Ionto  [] NMR (43022) x 1      [] Vaso  [x] Manual (52884) x 1      [] Ultrasound  [] TA x 1       [] Mech Traction (40125)  [] Aquatic Therapy x     [] ES (un) (97211):   [] Home Management Training x  [] ES(attended) (27267)   [] Dry Needling 1-2 muscles (62004):  [] Dry Needling 3+ muscles (866232  [] Group:      [] Other:     GOALS:  Patient stated goal: GET BACK TO WALKING , NO AD  [] Progressing: [] Met: [] Not Met: [] Adjusted     Therapist goals for Patient:   Short Term Goals: To be achieved in: 2 weeks  1. Independent in HEP and progression per patient tolerance, in order to prevent re-injury. [] Progressing: [] Met: [] Not Met: [] Adjusted  2. Patient will have a decrease in pain to facilitate improvement in movement, function, and ADLs as indicated by Functional Deficits. [] Progressing: [] Met: [] Not Met: [] Adjusted     Long Term Goals: To be achieved in: 10 weeks  1. FOTO score of at least 60 to assist with reaching prior level of function. [] Progressing: [] Met: [] Not Met: [] Adjusted  2. Patient will demonstrate increased AROM to WNL without pain to allow for proper joint functioning as indicated by patients Functional Deficits. [] Progressing: [] Met: [] Not Met: [] Adjusted  3. Patient will demonstrate an increase in Strength to at least 4+/5 as well as good proximal hip strength and control to allow for proper functional mobility as indicated by patients Functional Deficits. [] Progressing: [] Met: [] Not Met: [] Adjusted  4. Patient will return to functional activities including ADLs, walking for 15+ minutes, stairs, without increased symptoms or restriction. [] Progressing: [] Met: [] Not Met: [] Adjusted  5.  Pt will improve SLS on L and R to 10+ seconds when able to fully weight bear to improve balance and proprioception. [] Progressing: [] Met: [] Not Met: [] Adjusted      Overall Progression Towards Functional goals/ Treatment Progress Update:  [] Patient is progressing as expected towards functional goals listed. [] Progression is slowed due to complexities/Impairments listed. [] Progression has been slowed due to co-morbidities. [x] Plan just implemented, too soon to assess goals progression <30days   [] Goals require adjustment due to lack of progress  [] Patient is not progressing as expected and requires additional follow up with physician  [] Other    Persisting Functional Limitations/Impairments:  [x]Sitting [x]Standing   [x]Walking [x]Stairs   []Transfers [x]ADLs   [x]Squatting/bending []Kneeling  [x]Housework [x]Job related tasks  []Driving [x]Sports/Recreation   []Sleeping []Other:    ASSESSMENT:   Treatment/Activity Tolerance:  [x] Pt able to complete treatment [] Patient limited by fatique  [x] Patient limited by pain  [] Patient limited by other medical complications  [] Other:      Prognosis:  [x] Good [] Fair  [] Poor    Patient Requires Follow-up: [x] Yes  [] No            PLAN:l. PT 1-2x / week for 8 weeks. [x] Continue per plan of care [] Alter current plan (see comments)  [] Plan of care initiated [] Hold pending MD visit [] Discharge    Electronically signed by: Cooper Cosme PT, DPT       Note: If patient does not return for scheduled/ recommended follow up visits, this note will serve as a discharge from care along with most recent update on progress.

## 2022-08-11 NOTE — LETTER
[place]  [address] [facility] [address]  Phone: [phone]  Fax: [phone]    Kristine Beavers MD    August 11, 2022     Bailey Pemberton, APRN - CNP  [address] [facility] [address] [place] [address]    Patient: Cheyenne Wraner   MR Number: [id number]   YOB: 1980   Date of Visit: 8/11/2022       Dear Bailey Pemberton:    Thank you for referring Kenton Umana to me for evaluation/treatment. Below are the relevant portions of my assessment and plan of care. If you have questions, please do not hesitate to call me. I look forward to following Tosha Raphael along with you.     Sincerely,      Kristine Beavers MD

## 2022-08-11 NOTE — PROGRESS NOTES
History of Present Illness:  Lynn Reyes is a pleasant 39 y.o. female who presents for a post operative visit. She is 2 days out following a right hip revision arthroscopy and synovectomy, acetabuloplasty, removal of loose body cartilage flap, labral repair, femoroplasty/oscteochondroplasty, fractional psoas lengthening, capsular closure, endoscopic greater trochanteric bursectomy, endoscopic abductor repair. Overall She is doing okay and feels that their pain is well controlled with current pain medications. She does report having more pain with this surgery than her left hip surgery. She is only taking medication as needed instead of as prescribed. She has been compliant with the 20lb flat foot weight bearing instructions and using the Ossur Rebound hip abductor brace. She plans to do physical therapy at [facility] location. She denies fevers, chills, numbness, tingling, and shortness of breath. Medical History:  Patient's medications, allergies, past medical, surgical, social and family histories were reviewed and updated as appropriate. No notes on file    Review of Systems  A 14 point review of systems was completed by the patient and is available in the media section of the scanned medical record and was reviewed on 8/11/2022. Vital Signs: There were no vitals filed for this visit. General/Appearance: Alert and oriented and in no apparent distress. Skin:  There are no skin lesions, cellulitis, or extreme edema. The patient has warm and well-perfused Bilateral lower  extremities with brisk capillary refill. Hip  Exam: RIGHT    Inspection: Hip incision(s) are clean, dry and intact and well approximated. The Therabond dressing is still in place. Mild ecchymosis and swelling are present as can be expected.  There is no erythema, drainage or other signs of infection    Palpation:  No crepitus to gentle motion / circumduction of the hip    Active Range of Motion: Deferred    Passive Range of Motion: 0-70 deg flexion    Strength:  Deferred    Special Tests:  Deferred. Neurovascular: Sensation to light touch is intact, no motor deficits, palpable radial pulses 2+    Radiology:     Plain radiographs of the right hip comprising 3 views were obtained and reviewed in the office: Shows postsurgical changes from the hip arthroscopy and oscteochondroplasty. No evidence of acute fracture or complications. Impression: Stable postop x-ray. Tiny intra-osseous metallic point from a GT anchor. Assessment :  Ms. Jana Schneider is a pleasant 39 y.o. patient who is 2 days post-op right hip revision arthroscopy and synovectomy with acetabuloplasty, femoroplasty/oscteochondroplasty and labral repair. Overall she is doing well. There are no concerns for post-operative complications and/or infections. Impression:  Encounter Diagnosis   Name Primary? Status post arthroscopy of hip Yes       Office Procedures:  Orders Placed This Encounter   Procedures    XR HIP 2-3 VW W PELVIS RIGHT     Standing Status:   Future     Number of Occurrences:   1     Standing Expiration Date:   8/11/2022       Treatment Plan:    Overall Jana Schneider is doing well. The pain is well-controlled. We recommend that She commence with physical therapy for timeline based progression of motion, weight bearing, and and strengthening. She will begin with phase 1 of our rehabilitation protocol and a new physical therapy letter was sent today outlining post-operative recommendations. Therabond dressing can be removed 7 days post-operatively by physical therapist.  The patient was told that she is restricted from driving for at least 2 weeks postop. They were advised to continue their ASA, NSAIDs, and Avni-Hose compression stockings for 14 days post surgery. She was advised for the next few days to use pain medication as prescribed.   She will continue to ice as needed and is recommended to wear Ossur Rebound hip brace at all times other than when showering. All of her questions were fully answered today. We would like to see Hiramevcecilia Myrick back in 2 weeks for follow-up visit and suture removal.    Sincerely,      I, Kath Mcnair, am scribing for Dr. Mateo Morejon MD.  08/11/22 10:37 AM Kath Mcnair. The physical examination was performed between the patient and Dr. Mateo Morejon MD.  All counseling during the appointment was performed between the patient and the provider. Mateo Morejon MD [address] [facility] [address] [place] [id number]  Email: [email or web address]  Office: [phone]    08/11/22  10:37 AM

## 2022-08-11 NOTE — LETTER
[] Lateral Step Up (Progression 3)    Positioning:  [] 4PK with pelvic tilts (Baseline)  [] Pelvic tilts in sitting (Progression 1)      Core:   [] Relaxation Breathing (Baseline)         [] Bolivar lying elbow presses (Progression 1)  [] Side Planks (Baseline)         [] Side planks + hip abduction (Progression 1)  [] Bird-Dog (Baseline)            [] Bird-Dog with resistance (Progression 1)    Glute Complex:            Load Transfer:  [] Bridging (Baseline)            [] Weight Shifting (Baseline)     [] Single Leg Bridge (Progression 1)        [] Single Leg Stance to SEBT(Progression 1)     [] Single Leg Lower (Progression 2)         [] Hip hinge + monster walks (Progression 2)       Mobility:  [] Rodrigo position with breathing (baseline)  [] Hip Hinge with stick & neutral spine (baseline)  [] Sit to stand (baseline)  [] Hip Hinge without guidance (Progression 1)  [] Hip Hinge with resisted punch out guidance (Progression 2)      Pelvic Floor:  [] Relaxation breathing         Activities:       [] Rowing       [] Stepper/Exercise bike     [] Swimming  [] Water exercises    Modalities:     Return to Sport:  [x] Of Choice      [] Plyometrics  [] Ultrasound     [] Rhythmic stabilization  [] Iontophoresis    [] Core strengthening   [] Moist heat     [] Sports specific program:   [] Massage         [x] Cryotherapy      [] Electrical stimulation     [] Paraffin  [] Whirlpool  [] TENS    [x] Home exercise program (copy to patient).         Perform exercises for:   15     minutes    3      times/day  [x] Supervised physical therapy  Frequency: []  1x week  [x] 2x week  [] 3x week  [] Other:   Duration: [] 2 weeks   [] 4 weeks  [x] 6 weeks  [] Other:     Additional Instructions:         Sincerely,    Thanh Leiva MD [address] [facility]   [address] [address],[facility] [address], [phone]  Email: [email or web address]  Office: [phone]    08/11/22  10:35 AM

## 2022-08-14 DIAGNOSIS — M25.859 FEMORAL ACETABULAR IMPINGEMENT: ICD-10-CM

## 2022-08-14 DIAGNOSIS — S73.191D TEAR OF RIGHT ACETABULAR LABRUM, SUBSEQUENT ENCOUNTER: ICD-10-CM

## 2022-08-15 RX ORDER — RIVAROXABAN 10 MG/1
TABLET, FILM COATED ORAL
Qty: 10 TABLET | Refills: 0 | Status: SHIPPED | OUTPATIENT
Start: 2022-08-15 | End: 2022-09-23

## 2022-08-16 ENCOUNTER — HOSPITAL ENCOUNTER (OUTPATIENT)
Dept: PHYSICAL THERAPY | Age: 42
Setting detail: THERAPIES SERIES
Discharge: HOME | End: 2022-08-16
Payer: COMMERCIAL

## 2022-08-16 DIAGNOSIS — S73.191D TEAR OF RIGHT ACETABULAR LABRUM, SUBSEQUENT ENCOUNTER: Primary | ICD-10-CM

## 2022-08-16 DIAGNOSIS — M25.859 FEMORAL ACETABULAR IMPINGEMENT: ICD-10-CM

## 2022-08-16 PROCEDURE — 97140 MANUAL THERAPY 1/> REGIONS: CPT

## 2022-08-16 PROCEDURE — 97530 THERAPEUTIC ACTIVITIES: CPT

## 2022-08-16 PROCEDURE — 97110 THERAPEUTIC EXERCISES: CPT

## 2022-08-16 RX ORDER — MELOXICAM 15 MG/1
TABLET ORAL
Qty: 90 TABLET | Refills: 0 | OUTPATIENT
Start: 2022-08-16

## 2022-08-16 NOTE — FLOWSHEET NOTE
it all came off on its own. Pt educated on purchasing waterproof bandages large enough to not have adhesives near stitches and wear them before all showers. During shower pt instructed to let water run over the bandaids and not scrub the skin or glue on the skin. All incisions are clean, dry, and healing appropriately. There was a very minimal scab on most proximal incision that is healing appropriately. No steri strips applied this date. RESTRICTIONS/PRECAUTIONS: See post op precautions and guidelines in teams   S/P L hip scope LEFT HIP REVISION ARTHROSCOPY, LYSIS OF ADHESIONS, REMOVAL LOOSE BODY, SYNOVECTOMY, FEMOROACETABULAR IMPINGEMENT DECOMPRESSION AND LABRAL REPAIR, ABDUCTOR REPAIR, ENDOSCOPIC BURSECTOMY on 6/28/22.  8/9/22 RIGHT HIP REVISION, ARTHROSCOPY, FEMOROACETABULAR IMPINGEMENT SURGERY, LABRAL REPAIR    Exercises/Interventions:     Therapeutic Exercise (84110)  Resistance / level Sets/sec Reps Notes / Cues   Upright bike - = No resistance Added 7/13   Calf Stretch  30\" 3 c strap    Standing HR    Quad set     LAQ 3#    Standing [address]  Added 7/13   Hooklying Bridge  To Neutral   DL Added 7/13 cueing for glut drive, neutral extension only   Prone laying    Prone glute sets Gentle hip flexor stretc Added 7/13 cued to avoid significant extension   HSS supine w/ strap     SAQ 0# 2 10 8/16   Heel slide  2 10 8/16   Hookyling TrA  1/10\" 10 8/16                 Therapeutic Activities (48990) x             PT education on POC, WB progressions, HEP      Mini squat   7/27 - NMR   Supported standing 3 way hip No resistance  Very small range   Hip hinge   With dowel   Pt education X 15 min   8/16: See objective   Neuromuscular Re-ed (79933)       NBOS  NBOS EC  NBOS Head turns                   Manual Intervention (59474) x        PROM within precautions '   Gentle hip circumduction, knee ROM  Wk 3-6  Extension <15? External Rotation <20? Abduction <25? Flexion to 120?     Circumduction CW, CCW, PROM and LE with self care and ADLs  [] (41120) Gait Re-education- Provided training and instruction to the patient for proper LE, core and proximal hip recruitment and positioning and eccentric body weight control with ambulation re-education including up and down stairs     Home Exercise Program:    [x] (58304) Reviewed/Progressed HEP activities related to strengthening, flexibility, endurance, ROM of core, proximal hip and LE for functional self-care, mobility, lifting and ambulation/stair navigation   [] (33724)Reviewed/Progressed HEP activities related to improving balance, coordination, kinesthetic sense, posture, motor skill, proprioception of core, proximal hip and LE for self care, mobility, lifting, and ambulation/stair navigation      Manual Treatments:  PROM / STM / Oscillations-Mobs:  G-I, II, III, IV (PA's, Inf., Post.)  [x] (75331) Provided manual therapy to mobilize LE, proximal hip and/or LS spine soft tissue/joints for the purpose of modulating pain, promoting relaxation,  increasing ROM, reducing/eliminating soft tissue swelling/inflammation/restriction, improving soft tissue extensibility and allowing for proper ROM for normal function with self care, mobility, lifting and ambulation. Modalities:  [] (54797) Vasopneumatic compression: Utilized vasopneumatic compression to decrease edema / swelling for the purpose of improving mobility and quad tone / recruitment which will allow for increased overall function including but not limited to self-care, transfers, ambulation, and ascending / descending stairs.        Charges:  Timed Code Treatment Minutes: 45   Total Treatment Minutes: 45     [] EVAL - LOW (33965)   [] EVAL - MOD (57364)  [] EVAL - HIGH (41272)  [] RE-EVAL (05716)  [x] UO(10215) x 1      [] Ionto  [] NMR (12525) x 1      [] Vaso  [x] Manual (40227) x 1      [] Ultrasound  [x] TA x 1       [] Mech Traction (85726)  [] Aquatic Therapy x     [] ES (un) (47310):   [] Home Management Training x  [] ES(attended) (02014)   [] Dry Needling 1-2 muscles (48956):  [] Dry Needling 3+ muscles (409311  [] Group:      [] Other:     GOALS:  Patient stated goal: GET BACK TO WALKING , NO AD  [] Progressing: [] Met: [] Not Met: [] Adjusted     Therapist goals for Patient:   Short Term Goals: To be achieved in: 2 weeks  1. Independent in HEP and progression per patient tolerance, in order to prevent re-injury. [] Progressing: [] Met: [] Not Met: [] Adjusted  2. Patient will have a decrease in pain to facilitate improvement in movement, function, and ADLs as indicated by Functional Deficits. [] Progressing: [] Met: [] Not Met: [] Adjusted     Long Term Goals: To be achieved in: 10 weeks  1. FOTO score of at least 60 to assist with reaching prior level of function. [] Progressing: [] Met: [] Not Met: [] Adjusted  2. Patient will demonstrate increased AROM to WNL without pain to allow for proper joint functioning as indicated by patients Functional Deficits. [] Progressing: [] Met: [] Not Met: [] Adjusted  3. Patient will demonstrate an increase in Strength to at least 4+/5 as well as good proximal hip strength and control to allow for proper functional mobility as indicated by patients Functional Deficits. [] Progressing: [] Met: [] Not Met: [] Adjusted  4. Patient will return to functional activities including ADLs, walking for 15+ minutes, stairs, without increased symptoms or restriction. [] Progressing: [] Met: [] Not Met: [] Adjusted  5. Pt will improve SLS on L and R to 10+ seconds when able to fully weight bear to improve balance and proprioception. [] Progressing: [] Met: [] Not Met: [] Adjusted      Overall Progression Towards Functional goals/ Treatment Progress Update:  [] Patient is progressing as expected towards functional goals listed. [] Progression is slowed due to complexities/Impairments listed. [] Progression has been slowed due to co-morbidities.   [x] Plan just implemented, too soon to assess goals progression <30days   [] Goals require adjustment due to lack of progress  [] Patient is not progressing as expected and requires additional follow up with physician  [] Other    Persisting Functional Limitations/Impairments:  [x]Sitting [x]Standing   [x]Walking [x]Stairs   []Transfers [x]ADLs   [x]Squatting/bending []Kneeling  [x]Housework [x]Job related tasks  []Driving [x]Sports/Recreation   []Sleeping []Other:    ASSESSMENT: Pt with high level of pain in beginning of session and apprehensive. Pt able to decrease pain by one point to 8/10 by end of session following manual. Pt ambulates throughout clinic following WBing restrictions with SW. Pt provided with education regarding incision healing and precautions with bandages. Pt will benefit from further therapy to improve ROM, strength, gait mechanics to return to PLOF. Treatment/Activity Tolerance:  [x] Pt able to complete treatment [] Patient limited by fatique  [x] Patient limited by pain  [] Patient limited by other medical complications  [] Other:      Prognosis:  [x] Good [] Fair  [] Poor    Patient Requires Follow-up: [x] Yes  [] No            PLAN:l. PT 1-2x / week for 8 weeks. [x] Continue per plan of care [] Alter current plan (see comments)  [] Plan of care initiated [] Hold pending MD visit [] Discharge    Electronically signed by: Yumi Pinedo, PT, DPT       Note: If patient does not return for scheduled/ recommended follow up visits, this note will serve as a discharge from care along with most recent update on progress.

## 2022-08-17 RX ORDER — TRAMADOL HYDROCHLORIDE 50 MG/1
TABLET ORAL
Qty: 12 TABLET | Refills: 0 | Status: SHIPPED | OUTPATIENT
Start: 2022-08-17 | End: 2022-08-20

## 2022-08-17 RX ORDER — NAPROXEN 500 MG/1
TABLET ORAL
Qty: 28 TABLET | Refills: 0 | OUTPATIENT
Start: 2022-08-17

## 2022-08-22 ENCOUNTER — TELEPHONE (OUTPATIENT)
Dept: ORTHOPEDIC SURGERY | Age: 42
End: 2022-08-22

## 2022-08-22 ENCOUNTER — OFFICE VISIT (OUTPATIENT)
Dept: ORTHOPEDIC SURGERY | Age: 42
End: 2022-08-22

## 2022-08-22 ENCOUNTER — HOSPITAL ENCOUNTER (OUTPATIENT)
Dept: PHYSICAL THERAPY | Age: 42
Setting detail: THERAPIES SERIES
Discharge: HOME | End: 2022-08-22
Payer: COMMERCIAL

## 2022-08-22 VITALS — WEIGHT: 228 LBS | HEIGHT: 65 IN | BODY MASS INDEX: 37.99 KG/M2

## 2022-08-22 DIAGNOSIS — M25.859 FEMORAL ACETABULAR IMPINGEMENT: ICD-10-CM

## 2022-08-22 DIAGNOSIS — Z98.890 STATUS POST ARTHROSCOPY OF HIP: Primary | ICD-10-CM

## 2022-08-22 PROCEDURE — 97140 MANUAL THERAPY 1/> REGIONS: CPT

## 2022-08-22 PROCEDURE — 97110 THERAPEUTIC EXERCISES: CPT

## 2022-08-22 PROCEDURE — 99024 POSTOP FOLLOW-UP VISIT: CPT | Performed by: ORTHOPAEDIC SURGERY

## 2022-08-22 NOTE — TELEPHONE ENCOUNTER
Notified [facility] regarding the completed [facility] form for patient  at that office.

## 2022-08-22 NOTE — PROGRESS NOTES
History of Present Illness:  Cris Moreno is a pleasant 39 y.o. female who presents for a post operative visit. She is 2 weeks out following a right hip revision arthroscopy and synovectomy, acetabuloplasty, removal of loose body cartilage flap, labral repair, femoroplasty/osteochondroplasty, fractional psoas lengthening, capsular closure, endoscopic greater trochanteric bursectomy and endoscopic abductor repair. Overall She is doing okay and feels that their pain is well controlled with current pain medications. She has been compliant with the 20lb flat foot weight bearing instructions and the Ossur Rebound hip abductor brace. She has been in physical therapy at [facility]. She denies fevers, chills, numbness, tingling, and shortness of breath. Medical History:  Patient's medications, allergies, past medical, surgical, social and family histories were reviewed and updated as appropriate. No notes on file    Review of Systems  A 14 point review of systems was completed by the patient and is available in the media section of the scanned medical record and was reviewed on 8/22/2022. Vital Signs: There were no vitals filed for this visit. General/Appearance: Alert and oriented and in no apparent distress. Skin:  There are no skin lesions, cellulitis, or extreme edema. The patient has warm and well-perfused Bilateral lower  extremities with brisk capillary refill. Hip Exam: Right    Inspection: The nylon closure was removal and the Hip incision(s) are clean, dry and intact and well approximated. Steri-strips were applied. Mild ecchymosis and swelling are present as can be expected. There is no erythema, drainage or other signs of infection    Palpation:  No crepitus to gentle motion / circumduction of the hip    Active Range of Motion: Deferred    Passive Range of Motion: 0-70 deg flexion    Strength:  Deferred    Special Tests:  Deferred.     Neurovascular: Sensation to light

## 2022-08-22 NOTE — LETTER
resistance in 4PK (Progression 2)                 [] Lateral Step Up (Progression 3)    Positioning:  [] 4PK with pelvic tilts (Baseline)  [] Pelvic tilts in sitting (Progression 1)      Core:   [] Relaxation Breathing (Baseline)         [] Hickman lying elbow presses (Progression 1)  [] Side Planks (Baseline)         [] Side planks + hip abduction (Progression 1)  [] Bird-Dog (Baseline)            [] Bird-Dog with resistance (Progression 1)    Glute Complex:            Load Transfer:  [] Bridging (Baseline)            [] Weight Shifting (Baseline)     [] Single Leg Bridge (Progression 1)        [] Single Leg Stance to SEBT(Progression 1)     [] Single Leg Lower (Progression 2)         [] Hip hinge + monster walks (Progression 2)       Mobility:  [] Rodrigo position with breathing (baseline)  [] Hip Hinge with stick & neutral spine (baseline)  [] Sit to stand (baseline)  [] Hip Hinge without guidance (Progression 1)  [] Hip Hinge with resisted punch out guidance (Progression 2)      Pelvic Floor:  [] Relaxation breathing         Activities:       [] Rowing       [] Stepper/Exercise bike     [] Swimming  [] Water exercises    Modalities:     Return to Sport:  [x] Of Choice      [] Plyometrics  [] Ultrasound     [] Rhythmic stabilization  [] Iontophoresis    [] Core strengthening   [] Moist heat     [] Sports specific program:   [] Massage         [x] Cryotherapy      [] Electrical stimulation     [] Paraffin  [] Whirlpool  [] TENS    [x] Home exercise program (copy to patient).         Perform exercises for:   15     minutes    3      times/day  [x] Supervised physical therapy  Frequency: []  1x week  [x] 2x week  [] 3x week  [] Other:   Duration: [] 2 weeks   [] 4 weeks  [x] 6 weeks  [] Other:     Additional Instructions:           Sincerely,    Rojelio Avalos MD [address] [facility] [address] [place] [address], [address], [address]  Email: [email or web address]  Office: [phone]    08/22/22  8:53 AM

## 2022-08-22 NOTE — FLOWSHEET NOTE
[address] [place] [address]  Phone: [phone]   Fax: [phone]    Physical Therapy Treatment Note/ Progress Report:     Date:  2022    Patient Name:  Adolfo Myrick    :  1980  MRN: [id number]  Restrictions/Precautions:    Medical/Treatment Diagnosis Information:  Diagnosis: M25.859 (ICD-10-CM) - Femoral acetabular impingement , S73.192D (ICD-10-CM) - Tear of left acetabular labrum, subsequent encounter  Treatment Diagnosis: decreased L hip ROM/ strength, impaired gait/ balance, decreased functional status. Insurance/Certification information:  PT Insurance Information: [place] - AIM  Physician Information:  Siegfried Brunner, MD    Plan of care signed (Y/N): []  Yes [x]  No     Date of Patient follow up with Physician: 22     Progress Report: []  Yes  [x]  No     Date Range for reporting period:  Beginnin22  POC:      Progress report due (10 Rx/or 30 days whichever is less):     Recertification due (POC duration/ or 90 days whichever is less): 10/8/22    Visit # Insurance Allowable Auth required? Date Range   8  [x]  Yes  []  No APPROVED 14 VISITS UNTIL        Latex Allergy:  [x]NO      []YES  Preferred Language for Healthcare:   [x]English       []other:    Functional Scale:           Date assessed:  TO physical FS primary measure score = 26; risk adjusted = 39  22    Pain level:  5/10 R hip 7/10 L hip      SUBJECTIVE:    Pt reports  she is sore in anterior L thigh and L knee, pain is worse when getting up from low chair. OBJECTIVE:  PT to remove post op dressing on : no bandage, pt reports it all came off on its own. Pt educated on purchasing waterproof bandages large enough to not have adhesives near stitches and wear them before all showers. During shower pt instructed to let water run over the bandaids and not scrub the skin or glue on the skin.  All incisions are clean, dry, and healing appropriately. There was a very minimal scab on most proximal incision that is healing appropriately. No steri strips applied this date. RESTRICTIONS/PRECAUTIONS: See post op precautions and guidelines in teams   S/P L hip scope LEFT HIP REVISION ARTHROSCOPY, LYSIS OF ADHESIONS, REMOVAL LOOSE BODY, SYNOVECTOMY, FEMOROACETABULAR IMPINGEMENT DECOMPRESSION AND LABRAL REPAIR, ABDUCTOR REPAIR, ENDOSCOPIC BURSECTOMY on 6/28/22.  8/9/22 RIGHT HIP REVISION, ARTHROSCOPY, FEMOROACETABULAR IMPINGEMENT SURGERY, LABRAL REPAIR    50% WB on 9/6    Exercises/Interventions:     Therapeutic Exercise (49738)  Resistance / level Sets/sec Reps Notes / Cues   Upright bike - = No resistance 5' Added 7/13   Calf Stretch  30\" 3 c strap    Standing HR- reinitiate on 9/6        LAQ 10# L, 5# R 3 10    Standing HSC  Added 7/13   Hooklying Bridge - reintiation 8/30 To Neutral   DL Added 7/13 cueing for glut drive, neutral extension only   PGentle hip flexor stretc Added 7/13 cued to avoid significant extension   HSS supine w/ strap     SAQ 10#L, 2# R 10\" 10 8/16   Heel slide- HEP  8/16   Hookyling TrA with L LE march 1 20 8/16   Prone ext on L only  3 10    Prone TKE  10\" 10    LP SL L only (maintain <90 degree hip flex, seat at 6 back fully reclined) 70# 2 10    SLR on L only  1 10 8/22- stopped after 10 d/t inc groin pain   Therapeutic Activities (69786) x             PT education on POC, WB progressions, HEP      Mini squat   7/27 - NMR   Supported standing 3 way hip No resistance  Very small range   Hip hinge   With dowel       8/16: See objective   Neuromuscular Re-ed (72587) reinitiate on 9/6 when 50% WB       NBOS  NBOS EC  NBOS Head turns                   Manual Intervention (40700) x        PROM within precautions '   Gentle hip circumduction, knee ROM  Wk 3-6  Extension <15? External Rotation <20? Abduction <25? Flexion to 120?     Circumduction CW, CCW, PROM within precautions R hip 6'   8/11   Light STM L ITB and lateral hip 8 '                               Modalities:     Pt. Education: 8' at eval   -pt educated on diagnosis, prognosis and expectations for rehab, post op precautions and guidelines, WB status.   -all pt questions were answered    8/16: see objective    Home Exercise Program:    Access Code: [email or web address]  URL: [email or web address]  Date: 08/11/2022  Prepared by: Love Mcgrath    Exercises - completed 8/11  Supine Heel Slide with Strap - 1 x daily - 7 x weekly - 3 sets - 10 reps  Supine Quad Set - 1 x daily - 7 x weekly - 3 sets - 10 reps  Long Sitting Ankle Pumps - 1 x daily - 7 x weekly - 3 sets - 10 reps  Seated Long Arc Quad - 1 x daily - 7 x weekly - 3 sets - 10 reps  Seated Hip Adduction Isometrics with Ball - 1 x daily - 7 x weekly - 3 sets - 10 reps    Strive down on 8/22 but added L hip prone ext and B prone TKE        Therapeutic Exercise and NMR EXR  [x] (97053) Provided verbal/tactile cueing for activities related to strengthening, flexibility, endurance, ROM for improvements in LE, proximal hip, and core control with self care, mobility, lifting, ambulation.  [] (95037) Provided verbal/tactile cueing for activities related to improving balance, coordination, kinesthetic sense, posture, motor skill, proprioception  to assist with LE, proximal hip, and core control in self care, mobility, lifting, ambulation and eccentric single leg control.   [] (52055) Therapist is in constant attendance of 2 or more patients providing skilled therapy interventions, but not providing any significant amount of measurable one-on-one time to either patient, for improvements in LE, proximal hip, and core control in self care, mobility, lifting, ambulation and eccentric single leg control.      NMR and Therapeutic Activities:    [x] (61480 or 83730) Provided verbal/tactile cueing for activities related to improving balance, coordination, kinesthetic sense, posture, motor skill, proprioception and motor activation to ES (un) (81646):   [] Home Management Training x  [] ES(attended) (88690)   [] Dry Needling 1-2 muscles (36720):  [] Dry Needling 3+ muscles (854045  [] Group:      [] Other:     GOALS:  Patient stated goal: GET BACK TO WALKING , NO AD  [] Progressing: [] Met: [] Not Met: [] Adjusted     Therapist goals for Patient:   Short Term Goals: To be achieved in: 2 weeks  1. Independent in HEP and progression per patient tolerance, in order to prevent re-injury. [] Progressing: [] Met: [] Not Met: [] Adjusted  2. Patient will have a decrease in pain to facilitate improvement in movement, function, and ADLs as indicated by Functional Deficits. [] Progressing: [] Met: [] Not Met: [] Adjusted     Long Term Goals: To be achieved in: 10 weeks  1. FOTO score of at least 60 to assist with reaching prior level of function. [] Progressing: [] Met: [] Not Met: [] Adjusted  2. Patient will demonstrate increased AROM to WNL without pain to allow for proper joint functioning as indicated by patients Functional Deficits. [] Progressing: [] Met: [] Not Met: [] Adjusted  3. Patient will demonstrate an increase in Strength to at least 4+/5 as well as good proximal hip strength and control to allow for proper functional mobility as indicated by patients Functional Deficits. [] Progressing: [] Met: [] Not Met: [] Adjusted  4. Patient will return to functional activities including ADLs, walking for 15+ minutes, stairs, without increased symptoms or restriction. [] Progressing: [] Met: [] Not Met: [] Adjusted  5. Pt will improve SLS on L and R to 10+ seconds when able to fully weight bear to improve balance and proprioception. [] Progressing: [] Met: [] Not Met: [] Adjusted      Overall Progression Towards Functional goals/ Treatment Progress Update:  [] Patient is progressing as expected towards functional goals listed. [] Progression is slowed due to complexities/Impairments listed.   [] Progression has been slowed due to co-morbidities. [x] Plan just implemented, too soon to assess goals progression <30days   [] Goals require adjustment due to lack of progress  [] Patient is not progressing as expected and requires additional follow up with physician  [] Other    Persisting Functional Limitations/Impairments:  [x]Sitting [x]Standing   [x]Walking [x]Stairs   []Transfers [x]ADLs   [x]Squatting/bending []Kneeling  [x]Housework [x]Job related tasks  []Driving [x]Sports/Recreation   []Sleeping []Other:    ASSESSMENT: Pt educated on desensitization and light STM for L lateral hip. Pt tolerated light L hip progressions but noted deep groin pain with SLR so exercise held after 10 reps. Pt educated on importance of maintaining R LE WB precautions and icing regularly, especially with prolonged WB or walking. Will continue to slowly progress L hip strength as tolerated within post op restrictions. Treatment/Activity Tolerance:  [x] Pt able to complete treatment [] Patient limited by fatique  [x] Patient limited by pain  [] Patient limited by other medical complications  [] Other:      Prognosis:  [x] Good [] Fair  [] Poor    Patient Requires Follow-up: [x] Yes  [] No            PLAN:l. PT 1-2x / week for 8 weeks. [x] Continue per plan of care [] Alter current plan (see comments)  [] Plan of care initiated [] Hold pending MD visit [] Discharge    Electronically signed by: Jadon Castro, PT, DPT ATC      Note: If patient does not return for scheduled/ recommended follow up visits, this note will serve as a discharge from care along with most recent update on progress.

## 2022-08-28 DIAGNOSIS — E78.2 MIXED HYPERLIPIDEMIA: ICD-10-CM

## 2022-08-29 RX ORDER — ROSUVASTATIN CALCIUM 40 MG/1
TABLET, COATED ORAL
Qty: 90 TABLET | Refills: 1 | OUTPATIENT
Start: 2022-08-29

## 2022-08-31 ENCOUNTER — HOSPITAL ENCOUNTER (OUTPATIENT)
Dept: PHYSICAL THERAPY | Age: 42
Setting detail: THERAPIES SERIES
Discharge: HOME | End: 2022-08-31
Payer: COMMERCIAL

## 2022-08-31 PROCEDURE — 97110 THERAPEUTIC EXERCISES: CPT

## 2022-08-31 PROCEDURE — 97140 MANUAL THERAPY 1/> REGIONS: CPT

## 2022-08-31 NOTE — FLOWSHEET NOTE
[address] [place] [address] Renate Cage  Phone: [phone]   Fax: [phone]    Physical Therapy Treatment Note/ Progress Report:     Date:  2022    Patient Name:  Mario Infante    :  1980  MRN: [id number]  Restrictions/Precautions:    Medical/Treatment Diagnosis Information:  Diagnosis: M25.859 (ICD-10-CM) - Femoral acetabular impingement , S73.192D (ICD-10-CM) - Tear of left acetabular labrum, subsequent encounter  Treatment Diagnosis: decreased L hip ROM/ strength, impaired gait/ balance, decreased functional status. Insurance/Certification information:  PT Insurance Information: [place] - AIM  Physician Information:  Valeria Hartman MD    Plan of care signed (Y/N): []  Yes [x]  No     Date of Patient follow up with Physician: 22     Progress Report: []  Yes  [x]  No     Date Range for reporting period:  Beginnin22  POC:      Progress report due (10 Rx/or 30 days whichever is less): 3/41    Recertification due (POC duration/ or 90 days whichever is less): 10/8/22    Visit # Insurance Allowable Auth required? Date Range   9 20 [x]  Yes  []  No APPROVED 14 VISITS UNTIL        Latex Allergy:  [x]NO      []YES  Preferred Language for Healthcare:   [x]English       []other:    Functional Scale:           Date assessed:  TO physical FS primary measure score = 26; risk adjusted = 39  22    Pain level:  5/10 L hip 7/10 R hip      SUBJECTIVE:    Pt reports she is doing alright today, having some discomfort in B hips. OBJECTIVE:  PT to remove post op dressing on : no bandage, pt reports it all came off on its own. Pt educated on purchasing waterproof bandages large enough to not have adhesives near stitches and wear them before all showers. During shower pt instructed to let water run over the bandaids and not scrub the skin or glue on the skin. All incisions are clean, dry, and healing appropriately.  There was a very minimal Modalities:     Pt. Education: 8' at eval   -pt educated on diagnosis, prognosis and expectations for rehab, post op precautions and guidelines, WB status.   -all pt questions were answered    8/16: see objective    Home Exercise Program:    Access Code: [email or web address]  URL: [email or web address]  Date: 08/11/2022  Prepared by: Bowen Partida    Exercises - completed 8/11  Supine Heel Slide with Strap - 1 x daily - 7 x weekly - 3 sets - 10 reps  Supine Quad Set - 1 x daily - 7 x weekly - 3 sets - 10 reps  Long Sitting Ankle Pumps - 1 x daily - 7 x weekly - 3 sets - 10 reps  Seated Long Arc Quad - 1 x daily - 7 x weekly - 3 sets - 10 reps  Seated Hip Adduction Isometrics with Ball - 1 x daily - 7 x weekly - 3 sets - 10 reps    Strive down on 8/22 but added L hip prone ext and B prone TKE        Therapeutic Exercise and NMR EXR  [x] (90991) Provided verbal/tactile cueing for activities related to strengthening, flexibility, endurance, ROM for improvements in LE, proximal hip, and core control with self care, mobility, lifting, ambulation.  [] (95290) Provided verbal/tactile cueing for activities related to improving balance, coordination, kinesthetic sense, posture, motor skill, proprioception  to assist with LE, proximal hip, and core control in self care, mobility, lifting, ambulation and eccentric single leg control.   [] (40246) Therapist is in constant attendance of 2 or more patients providing skilled therapy interventions, but not providing any significant amount of measurable one-on-one time to either patient, for improvements in LE, proximal hip, and core control in self care, mobility, lifting, ambulation and eccentric single leg control.      NMR and Therapeutic Activities:    [x] (70224 or 98476) Provided verbal/tactile cueing for activities related to improving balance, coordination, kinesthetic sense, posture, motor skill, proprioception and motor activation to allow for proper function of core, proximal hip and LE with self care and ADLs  [] (03327) Gait Re-education- Provided training and instruction to the patient for proper LE, core and proximal hip recruitment and positioning and eccentric body weight control with ambulation re-education including up and down stairs     Home Exercise Program:    [x] (13839) Reviewed/Progressed HEP activities related to strengthening, flexibility, endurance, ROM of core, proximal hip and LE for functional self-care, mobility, lifting and ambulation/stair navigation   [] (00668)Reviewed/Progressed HEP activities related to improving balance, coordination, kinesthetic sense, posture, motor skill, proprioception of core, proximal hip and LE for self care, mobility, lifting, and ambulation/stair navigation      Manual Treatments:  PROM / STM / Oscillations-Mobs:  G-I, II, III, IV (PA's, Inf., Post.)  [x] (49321) Provided manual therapy to mobilize LE, proximal hip and/or LS spine soft tissue/joints for the purpose of modulating pain, promoting relaxation,  increasing ROM, reducing/eliminating soft tissue swelling/inflammation/restriction, improving soft tissue extensibility and allowing for proper ROM for normal function with self care, mobility, lifting and ambulation. Modalities:  [] (07310) Vasopneumatic compression: Utilized vasopneumatic compression to decrease edema / swelling for the purpose of improving mobility and quad tone / recruitment which will allow for increased overall function including but not limited to self-care, transfers, ambulation, and ascending / descending stairs.        Charges:  Timed Code Treatment Minutes: 45   Total Treatment Minutes: 45     [] EVAL - LOW (06779)   [] EVAL - MOD (96587)  [] EVAL - HIGH (92898)  [] RE-EVAL (49220)  [x] GK(53494) x 2     [] Ionto  [] NMR (25262) x 1      [] Vaso  [x] Manual (66990) x 1      [] Ultrasound  [] TA x       [] Mech Traction (57045)  [] Aquatic Therapy x     [] ES (un) (56738):   [] Home Management Training x  [] ES(attended) (07661)   [] Dry Needling 1-2 muscles (52407):  [] Dry Needling 3+ muscles (981418  [] Group:      [] Other:     GOALS:  Patient stated goal: GET BACK TO WALKING , NO AD  [] Progressing: [] Met: [] Not Met: [] Adjusted     Therapist goals for Patient:   Short Term Goals: To be achieved in: 2 weeks  1. Independent in HEP and progression per patient tolerance, in order to prevent re-injury. [] Progressing: [] Met: [] Not Met: [] Adjusted  2. Patient will have a decrease in pain to facilitate improvement in movement, function, and ADLs as indicated by Functional Deficits. [] Progressing: [] Met: [] Not Met: [] Adjusted     Long Term Goals: To be achieved in: 10 weeks  1. FOTO score of at least 60 to assist with reaching prior level of function. [] Progressing: [] Met: [] Not Met: [] Adjusted  2. Patient will demonstrate increased AROM to WNL without pain to allow for proper joint functioning as indicated by patients Functional Deficits. [] Progressing: [] Met: [] Not Met: [] Adjusted  3. Patient will demonstrate an increase in Strength to at least 4+/5 as well as good proximal hip strength and control to allow for proper functional mobility as indicated by patients Functional Deficits. [] Progressing: [] Met: [] Not Met: [] Adjusted  4. Patient will return to functional activities including ADLs, walking for 15+ minutes, stairs, without increased symptoms or restriction. [] Progressing: [] Met: [] Not Met: [] Adjusted  5. Pt will improve SLS on L and R to 10+ seconds when able to fully weight bear to improve balance and proprioception. [] Progressing: [] Met: [] Not Met: [] Adjusted      Overall Progression Towards Functional goals/ Treatment Progress Update:  [] Patient is progressing as expected towards functional goals listed. [] Progression is slowed due to complexities/Impairments listed. [] Progression has been slowed due to co-morbidities.   [x] Plan just implemented, too soon to assess goals progression <30days   [] Goals require adjustment due to lack of progress  [] Patient is not progressing as expected and requires additional follow up with physician  [] Other    Persisting Functional Limitations/Impairments:  [x]Sitting [x]Standing   [x]Walking [x]Stairs   []Transfers [x]ADLs   [x]Squatting/bending []Kneeling  [x]Housework [x]Job related tasks  []Driving [x]Sports/Recreation   []Sleeping []Other:    ASSESSMENT: Pt tolerated treatment with no increase in pain. Pt progressed to phase 2 for R hip. Pt educated on importance of maintaining R LE WB precautions and icing regularly, especially with prolonged WB or walking. Will continue to slowly progress L hip strength as tolerated within post op restrictions. Treatment/Activity Tolerance:  [x] Pt able to complete treatment [] Patient limited by fatique  [x] Patient limited by pain  [] Patient limited by other medical complications  [] Other:      Prognosis:  [x] Good [] Fair  [] Poor    Patient Requires Follow-up: [x] Yes  [] No            PLAN:l. PT 1-2x / week for 8 weeks. [x] Continue per plan of care [] Alter current plan (see comments)  [] Plan of care initiated [] Hold pending MD visit [] Discharge    Electronically signed by: Zechariah Tapia, PT, DPT OMT-C, S       Note: If patient does not return for scheduled/ recommended follow up visits, this note will serve as a discharge from care along with most recent update on progress.

## 2022-09-01 DIAGNOSIS — M25.859 FEMORAL ACETABULAR IMPINGEMENT: ICD-10-CM

## 2022-09-01 DIAGNOSIS — S73.191D TEAR OF RIGHT ACETABULAR LABRUM, SUBSEQUENT ENCOUNTER: ICD-10-CM

## 2022-09-02 NOTE — TELEPHONE ENCOUNTER
Patient last seen 08/11/2022 and medication last filled 08/08/2022:        Impression:       Encounter Diagnosis   Name Primary? Status post arthroscopy of hip Yes         Office Procedures:        Orders Placed This Encounter   Procedures    XR HIP 2-3 VW W PELVIS RIGHT       Standing Status:   Future       Number of Occurrences:   1       Standing Expiration Date:   8/11/2022         Treatment Plan:    Overall Lacey Velasquez is doing well. The pain is well-controlled. We recommend that She commence with physical therapy for timeline based progression of motion, weight bearing, and and strengthening. She will begin with phase 1 of our rehabilitation protocol and a new physical therapy letter was sent today outlining post-operative recommendations. Therabond dressing can be removed 7 days post-operatively by physical therapist.  The patient was told that she is restricted from driving for at least 2 weeks postop. They were advised to continue their ASA, NSAIDs, and Avni-Hose compression stockings for 14 days post surgery. She was advised for the next few days to use pain medication as prescribed. She will continue to ice as needed and is recommended to wear Ossur Rebound hip brace at all times other than when showering. All of her questions were fully answered today.  We would like to see Lacey Velasquez back in 2 weeks for follow-up visit and suture removal.

## 2022-09-06 ENCOUNTER — TELEPHONE (OUTPATIENT)
Dept: ORTHOPEDIC SURGERY | Age: 42
End: 2022-09-06

## 2022-09-06 ENCOUNTER — HOSPITAL ENCOUNTER (OUTPATIENT)
Dept: PHYSICAL THERAPY | Age: 42
Setting detail: THERAPIES SERIES
Discharge: HOME | End: 2022-09-06
Payer: COMMERCIAL

## 2022-09-06 PROCEDURE — 97140 MANUAL THERAPY 1/> REGIONS: CPT

## 2022-09-06 PROCEDURE — 97110 THERAPEUTIC EXERCISES: CPT

## 2022-09-06 PROCEDURE — 97112 NEUROMUSCULAR REEDUCATION: CPT

## 2022-09-06 NOTE — FLOWSHEET NOTE
[address] [place] [address]  Phone: [phone]   Fax: [phone]    Physical Therapy Treatment Note/ Progress Report:     Date:  2022    Patient Name:  Arthur Tijerina    :  1980  MRN: [id number]  Restrictions/Precautions:    Medical/Treatment Diagnosis Information:  Diagnosis: M25.859 (ICD-10-CM) - Femoral acetabular impingement , S73.192D (ICD-10-CM) - Tear of left acetabular labrum, subsequent encounter  Treatment Diagnosis: decreased L hip ROM/ strength, impaired gait/ balance, decreased functional status. Insurance/Certification information:  PT Insurance Information: [place] - AIM  Physician Information:  Negar Mathews MD    Plan of care signed (Y/N): []  Yes [x]  No     Date of Patient follow up with Physician: 22     Progress Report: []  Yes  [x]  No     Date Range for reporting period:  Beginnin22  POC:      Progress report due (10 Rx/or 30 days whichever is less):     Recertification due (POC duration/ or 90 days whichever is less): 10/8/22    Visit # Insurance Allowable Auth required? Date Range   10 20 [x]  Yes  []  No APPROVED 14 VISITS UNTIL        Latex Allergy:  [x]NO      []YES  Preferred Language for Healthcare:   [x]English       []other:    Functional Scale:           Date assessed:  TO physical FS primary measure score = 26; risk adjusted = 39  22    Pain level:  6/10 L hip 7/10 R hip      SUBJECTIVE:    Pt cleared to drive on . Pt with no new complaints. Pt eager to transition to cane and get rid of SW.     OBJECTIVE:  PT to remove post op dressing on : no bandage, pt reports it all came off on its own. Pt educated on purchasing waterproof bandages large enough to not have adhesives near stitches and wear them before all showers. During shower pt instructed to let water run over the bandaids and not scrub the skin or glue on the skin.  All incisions are clean, dry, and healing appropriately. There was a very minimal scab on most proximal incision that is healing appropriately. No steri strips applied this date. RESTRICTIONS/PRECAUTIONS: See post op precautions and guidelines in teams   S/P L hip scope LEFT HIP REVISION ARTHROSCOPY, LYSIS OF ADHESIONS, REMOVAL LOOSE BODY, SYNOVECTOMY, FEMOROACETABULAR IMPINGEMENT DECOMPRESSION AND LABRAL REPAIR, ABDUCTOR REPAIR, ENDOSCOPIC BURSECTOMY on 6/28/22.  8/9/22 RIGHT HIP REVISION, ARTHROSCOPY, FEMOROACETABULAR IMPINGEMENT SURGERY, LABRAL REPAIR    50% WB on 9/6    Exercises/Interventions:     Therapeutic Exercise (94802)  Resistance / level Sets/sec Reps Notes / Cues   Upright bike - = No resistance 5' Added 7/13   Calf Stretch  30\" 3 9/6: 50% IB    Standing HR- reinitiate on 9/6 2 10    LAQ 10# L, 5# R 3 10    Standing HSC  Added 7/13   Hooklying Bridge - reintiation 8/30 To Neutral   DL up, SL R down 2 10 Added 7/13 cueing for glut drive, neutral extension only   Hookyling TrA with L LE march 8/16         LP (maintain <120 degree hip flex, seat at 5 back fully reclined) 60# 2 10 9/6\" performed DL   SLR on L only  2 10 8/31 - able to complete a second set   Therapeutic Activities (10297) x             PT education on POC, WB progressions, HEP      Mini squat  EOT 2 10 7/27 - NMR   Supported standing 3 way hip No resistance  Very small range   Hip hinge   With dowel       8/16: See objective   Neuromuscular Re-ed (03004) reinitiate on 9/6 when 50% WB       NBOS  NBOS EC  NBOS Head turns, EO, EC  2  2  1 ea 30''  30''  30'' 9/6   Standing TKE CC 2 plates (29#) 3 10 9/6          Manual Intervention (96234) x        PROM within precautions '   Gentle hip circumduction, knee ROM  Wk 3-6  Extension <15? External Rotation <20? Abduction <25? Flexion to 120?     Circumduction CW, CCW, PROM within precautions R hip / L hip 10'   8/11   Light STM L ITB and lateral hip                               Modalities:     Pt. Education: 8' at eval -pt educated on diagnosis, prognosis and expectations for rehab, post op precautions and guidelines, WB status.   -all pt questions were answered    8/16: see objective    Home Exercise Program:    Access Code: [email or web address]  URL: [email or web address]  Date: 08/11/2022  Prepared by: Sherice Alfonso    Exercises - completed 8/11  Supine Heel Slide with Strap - 1 x daily - 7 x weekly - 3 sets - 10 reps  Supine Quad Set - 1 x daily - 7 x weekly - 3 sets - 10 reps  Long Sitting Ankle Pumps - 1 x daily - 7 x weekly - 3 sets - 10 reps  Seated Long Arc Quad - 1 x daily - 7 x weekly - 3 sets - 10 reps  Seated Hip Adduction Isometrics with Ball - 1 x daily - 7 x weekly - 3 sets - 10 reps    Strive down on 8/22 but added L hip prone ext and B prone TKE        Therapeutic Exercise and NMR EXR  [x] (58447) Provided verbal/tactile cueing for activities related to strengthening, flexibility, endurance, ROM for improvements in LE, proximal hip, and core control with self care, mobility, lifting, ambulation.  [] (49749) Provided verbal/tactile cueing for activities related to improving balance, coordination, kinesthetic sense, posture, motor skill, proprioception  to assist with LE, proximal hip, and core control in self care, mobility, lifting, ambulation and eccentric single leg control.   [] (80843) Therapist is in constant attendance of 2 or more patients providing skilled therapy interventions, but not providing any significant amount of measurable one-on-one time to either patient, for improvements in LE, proximal hip, and core control in self care, mobility, lifting, ambulation and eccentric single leg control.      NMR and Therapeutic Activities:    [x] (33201 or 47843) Provided verbal/tactile cueing for activities related to improving balance, coordination, kinesthetic sense, posture, motor skill, proprioception and motor activation to allow for proper function of core, proximal hip and LE with self care and ADLs  [] (99830) Gait Re-education- Provided training and instruction to the patient for proper LE, core and proximal hip recruitment and positioning and eccentric body weight control with ambulation re-education including up and down stairs     Home Exercise Program:    [x] (36995) Reviewed/Progressed HEP activities related to strengthening, flexibility, endurance, ROM of core, proximal hip and LE for functional self-care, mobility, lifting and ambulation/stair navigation   [] (84919)Reviewed/Progressed HEP activities related to improving balance, coordination, kinesthetic sense, posture, motor skill, proprioception of core, proximal hip and LE for self care, mobility, lifting, and ambulation/stair navigation      Manual Treatments:  PROM / STM / Oscillations-Mobs:  G-I, II, III, IV (PA's, Inf., Post.)  [x] (26611) Provided manual therapy to mobilize LE, proximal hip and/or LS spine soft tissue/joints for the purpose of modulating pain, promoting relaxation,  increasing ROM, reducing/eliminating soft tissue swelling/inflammation/restriction, improving soft tissue extensibility and allowing for proper ROM for normal function with self care, mobility, lifting and ambulation. Modalities:  [] (32377) Vasopneumatic compression: Utilized vasopneumatic compression to decrease edema / swelling for the purpose of improving mobility and quad tone / recruitment which will allow for increased overall function including but not limited to self-care, transfers, ambulation, and ascending / descending stairs.        Charges:  Timed Code Treatment Minutes: 45   Total Treatment Minutes: 45     [] EVAL - LOW (83608)   [] EVAL - MOD (72030)  [] EVAL - HIGH (47978)  [] RE-EVAL (76119)  [x] ZY(83482) x 1     [] Ionto  [x] NMR (54419) x 1      [] Vaso  [x] Manual (13047) x 1      [] Ultrasound  [] TA x       [] Mech Traction (69224)  [] Aquatic Therapy x     [] ES (un) (82085):   [] Home Management Training x  [] ES(attended) (66246)   [] Dry Needling 1-2 muscles (64207):  [] Dry Needling 3+ muscles (252756  [] Group:      [] Other:     GOALS:  Patient stated goal: GET BACK TO WALKING , NO AD  [] Progressing: [] Met: [] Not Met: [] Adjusted     Therapist goals for Patient:   Short Term Goals: To be achieved in: 2 weeks  1. Independent in HEP and progression per patient tolerance, in order to prevent re-injury. [] Progressing: [] Met: [] Not Met: [] Adjusted  2. Patient will have a decrease in pain to facilitate improvement in movement, function, and ADLs as indicated by Functional Deficits. [] Progressing: [] Met: [] Not Met: [] Adjusted     Long Term Goals: To be achieved in: 10 weeks  1. FOTO score of at least 60 to assist with reaching prior level of function. [] Progressing: [] Met: [] Not Met: [] Adjusted  2. Patient will demonstrate increased AROM to WNL without pain to allow for proper joint functioning as indicated by patients Functional Deficits. [] Progressing: [] Met: [] Not Met: [] Adjusted  3. Patient will demonstrate an increase in Strength to at least 4+/5 as well as good proximal hip strength and control to allow for proper functional mobility as indicated by patients Functional Deficits. [] Progressing: [] Met: [] Not Met: [] Adjusted  4. Patient will return to functional activities including ADLs, walking for 15+ minutes, stairs, without increased symptoms or restriction. [] Progressing: [] Met: [] Not Met: [] Adjusted  5. Pt will improve SLS on L and R to 10+ seconds when able to fully weight bear to improve balance and proprioception. [] Progressing: [] Met: [] Not Met: [] Adjusted      Overall Progression Towards Functional goals/ Treatment Progress Update:  [] Patient is progressing as expected towards functional goals listed. [] Progression is slowed due to complexities/Impairments listed. [] Progression has been slowed due to co-morbidities.   [x] Plan just implemented, too soon to assess goals progression <30days [] Goals require adjustment due to lack of progress  [] Patient is not progressing as expected and requires additional follow up with physician  [] Other    Persisting Functional Limitations/Impairments:  [x]Sitting [x]Standing   [x]Walking [x]Stairs   []Transfers [x]ADLs   [x]Squatting/bending []Kneeling  [x]Housework [x]Job related tasks  []Driving [x]Sports/Recreation   []Sleeping []Other:    ASSESSMENT: Pt tolerated treatment with no increase in pain. Pt able to demonstrate improved gait pattern with SW within protocol. Educated pt on progressing to [facility] in future visits. Pt tolerated increased standing with Double leg support well this date. Will continue to slowly progress L hip strength as tolerated within post op restrictions. Treatment/Activity Tolerance:  [x] Pt able to complete treatment [] Patient limited by fatique  [x] Patient limited by pain  [] Patient limited by other medical complications  [] Other:      Prognosis:  [x] Good [] Fair  [] Poor    Patient Requires Follow-up: [x] Yes  [] No            PLAN:l. PT 1-2x / week for 8 weeks. [x] Continue per plan of care [] Alter current plan (see comments)  [] Plan of care initiated [] Hold pending MD visit [] Discharge    Electronically signed by: Sergey Cope, PT, DPT \      Note: If patient does not return for scheduled/ recommended follow up visits, this note will serve as a discharge from care along with most recent update on progress.

## 2022-09-06 NOTE — TELEPHONE ENCOUNTER
General Question     Subject: Patient requesting a call would like to know if she is released to drive.   Patient Brandy Brunson  Contact Number: [phone]

## 2022-09-07 DIAGNOSIS — J44.9 COPD WITH ASTHMA (HCC): ICD-10-CM

## 2022-09-07 RX ORDER — ALBUTEROL SULFATE 90 UG/1
AEROSOL, METERED RESPIRATORY (INHALATION)
Qty: 6.7 EACH | Refills: 1 | Status: SHIPPED | OUTPATIENT
Start: 2022-09-07

## 2022-09-12 DIAGNOSIS — E78.2 MIXED HYPERLIPIDEMIA: ICD-10-CM

## 2022-09-12 RX ORDER — MELOXICAM 15 MG/1
TABLET ORAL
Qty: 90 TABLET | Refills: 0 | OUTPATIENT
Start: 2022-09-12

## 2022-09-12 RX ORDER — EZETIMIBE 10 MG/1
TABLET ORAL
Qty: 90 TABLET | Refills: 0 | Status: SHIPPED | OUTPATIENT
Start: 2022-09-12 | End: 2022-10-31 | Stop reason: SDUPTHER

## 2022-09-12 NOTE — TELEPHONE ENCOUNTER
Pt no longer under Dr. Donald varghese. Will need to forward any future requests for refill to Dr. Nona Rebollar office.

## 2022-09-13 ENCOUNTER — HOSPITAL ENCOUNTER (OUTPATIENT)
Dept: PHYSICAL THERAPY | Age: 42
Setting detail: THERAPIES SERIES
Discharge: HOME | End: 2022-09-13
Payer: COMMERCIAL

## 2022-09-13 DIAGNOSIS — R00.0 CHRONIC TACHYCARDIA: ICD-10-CM

## 2022-09-13 PROCEDURE — 97110 THERAPEUTIC EXERCISES: CPT

## 2022-09-13 PROCEDURE — 97140 MANUAL THERAPY 1/> REGIONS: CPT

## 2022-09-13 PROCEDURE — 97530 THERAPEUTIC ACTIVITIES: CPT

## 2022-09-13 RX ORDER — METOPROLOL TARTRATE 50 MG/1
TABLET, FILM COATED ORAL
Qty: 180 TABLET | Refills: 1 | OUTPATIENT
Start: 2022-09-13

## 2022-09-13 NOTE — FLOWSHEET NOTE
[address] [place] [address] Renate Cage  Phone: [phone]   Fax: [phone]    Physical Therapy Treatment Note/ Progress Report:     Date:  2022    Patient Name:  Mario Infante    :  1980  MRN: [id number]  Restrictions/Precautions:    Medical/Treatment Diagnosis Information:  Diagnosis: M25.859 (ICD-10-CM) - Femoral acetabular impingement , S73.192D (ICD-10-CM) - Tear of left acetabular labrum, subsequent encounter  Treatment Diagnosis: decreased L hip ROM/ strength, impaired gait/ balance, decreased functional status. Insurance/Certification information:  PT Insurance Information: [place] - AIM  Physician Information:  Luis Soria MD    Plan of care signed (Y/N): []  Yes [x]  No     Date of Patient follow up with Physician: 22     Progress Report: []  Yes  [x]  No     Date Range for reporting period:  Beginnin22  POC:      Progress report due (10 Rx/or 30 days whichever is less):     Recertification due (POC duration/ or 90 days whichever is less): 10/8/22    Visit # Insurance Allowable Auth required? Date Range   11  PN NPV 20 [x]  Yes  []  No APPROVED 14 VISITS UNTIL        Latex Allergy:  [x]NO      []YES  Preferred Language for Healthcare:   [x]English       []other:    Functional Scale:           Date assessed:  TO physical FS primary measure score = 26; risk adjusted = 39  22    Pain level:  3-4/10 L hip 9/10 R hip      SUBJECTIVE:    Pt reports she gets catching in B hips R > L since driving and pain worse in R leg over last weeks. OBJECTIVE:  PT to remove post op dressing on : no bandage, pt reports it all came off on its own. Pt educated on purchasing waterproof bandages large enough to not have adhesives near stitches and wear them before all showers. During shower pt instructed to let water run over the bandaids and not scrub the skin or glue on the skin.  All incisions are clean, dry, and healing appropriately. There was a very minimal scab on most proximal incision that is healing appropriately. No steri strips applied this date. RESTRICTIONS/PRECAUTIONS: See post op precautions and guidelines in teams   S/P L hip scope LEFT HIP REVISION ARTHROSCOPY, LYSIS OF ADHESIONS, REMOVAL LOOSE BODY, SYNOVECTOMY, FEMOROACETABULAR IMPINGEMENT DECOMPRESSION AND LABRAL REPAIR, ABDUCTOR REPAIR, ENDOSCOPIC BURSECTOMY on 6/28/22.  8/9/22 RIGHT HIP REVISION, ARTHROSCOPY, FEMOROACETABULAR IMPINGEMENT SURGERY, LABRAL REPAIR    50% WB on 9/6    Exercises/Interventions:     Therapeutic Exercise (74876)  Resistance / level Sets/sec Reps Notes / Cues   Upright bike - = No resistance 5' Added 7/13   Calf Stretch  30\" 3 9/6: 50% IB    Standing HR- ecc 2 10 9/13   LAQ 10# L, 10# R 10\" 15    Standing [address]  Added 7/13   Hooklying Bridge - reintiation 8/30 To Neutral   DL up, SL R down 2 10 Added 7/13 cueing for glut drive, neutral extension only   Hookyling TrA with L LE march 8/16         LP (maintain <120 degree hip flex, seat at 5 back fully reclined) 60# 2 10 9/6\" performed DL   bridge  10\" 10    SLR on L only  3 10 8/31 - able to complete a second set   Therapeutic Activities (39603) x             PT education on POC, WB progressions, HEP      Mini squat wiyth TRX to chair EOT 2 10 7/27 - NMR   Supported standing 3 way hip No resistance  Very small range   Hip hinge   With dowel   Gait training with SPC 6 min   8/16: See objective   Neuromuscular Re-ed (15345) time,resume       NBOS  NBOS EC  NBOS Head turns, EO, EC 9/6   Standing TKE CC 9/6          Manual Intervention ([phone]) x        PROM within precautions '   Gentle hip circumduction, knee ROM  Wk 3-6  Extension <15? External Rotation <20? Abduction <25? Flexion to 120?     Circumduction CW, CCW, PROM within precautions R hip / L hip 10'   8/11   Light STM L ITB and lateral hip                               Modalities:     Pt. Education: 8' at eval   -pt educated on diagnosis, prognosis and expectations for rehab, post op precautions and guidelines, WB status.   -all pt questions were answered    8/16: see objective    Home Exercise Program:    Access Code: [email or web address]  URL: [email or web address]  Date: 08/11/2022  Prepared by: Morteza Griffin    Exercises - completed 8/11  Supine Heel Slide with Strap - 1 x daily - 7 x weekly - 3 sets - 10 reps  Supine Quad Set - 1 x daily - 7 x weekly - 3 sets - 10 reps  Long Sitting Ankle Pumps - 1 x daily - 7 x weekly - 3 sets - 10 reps  Seated Long Arc Quad - 1 x daily - 7 x weekly - 3 sets - 10 reps  Seated Hip Adduction Isometrics with Ball - 1 x daily - 7 x weekly - 3 sets - 10 reps    Strive down on 8/22 but added L hip prone ext and B prone TKE        Therapeutic Exercise and NMR EXR  [x] (17419) Provided verbal/tactile cueing for activities related to strengthening, flexibility, endurance, ROM for improvements in LE, proximal hip, and core control with self care, mobility, lifting, ambulation.  [] (27942) Provided verbal/tactile cueing for activities related to improving balance, coordination, kinesthetic sense, posture, motor skill, proprioception  to assist with LE, proximal hip, and core control in self care, mobility, lifting, ambulation and eccentric single leg control.   [] (52903) Therapist is in constant attendance of 2 or more patients providing skilled therapy interventions, but not providing any significant amount of measurable one-on-one time to either patient, for improvements in LE, proximal hip, and core control in self care, mobility, lifting, ambulation and eccentric single leg control.      NMR and Therapeutic Activities:    [x] (92866 or 87484) Provided verbal/tactile cueing for activities related to improving balance, coordination, kinesthetic sense, posture, motor skill, proprioception and motor activation to allow for proper function of core, proximal hip and LE with self care and ADLs  [] (72635) Gait Re-education- Provided training and instruction to the patient for proper LE, core and proximal hip recruitment and positioning and eccentric body weight control with ambulation re-education including up and down stairs     Home Exercise Program:    [x] (81024) Reviewed/Progressed HEP activities related to strengthening, flexibility, endurance, ROM of core, proximal hip and LE for functional self-care, mobility, lifting and ambulation/stair navigation   [] (98858)Reviewed/Progressed HEP activities related to improving balance, coordination, kinesthetic sense, posture, motor skill, proprioception of core, proximal hip and LE for self care, mobility, lifting, and ambulation/stair navigation      Manual Treatments:  PROM / STM / Oscillations-Mobs:  G-I, II, III, IV (PA's, Inf., Post.)  [x] (03692) Provided manual therapy to mobilize LE, proximal hip and/or LS spine soft tissue/joints for the purpose of modulating pain, promoting relaxation,  increasing ROM, reducing/eliminating soft tissue swelling/inflammation/restriction, improving soft tissue extensibility and allowing for proper ROM for normal function with self care, mobility, lifting and ambulation. Modalities:  [] (50955) Vasopneumatic compression: Utilized vasopneumatic compression to decrease edema / swelling for the purpose of improving mobility and quad tone / recruitment which will allow for increased overall function including but not limited to self-care, transfers, ambulation, and ascending / descending stairs.        Charges:  Timed Code Treatment Minutes: 45   Total Treatment Minutes: 45     [] EVAL - LOW (11857)   [] EVAL - MOD (34702)  [] EVAL - HIGH (76355)  [] RE-EVAL (30497)  [x] RN(84107) x 1     [] Ionto  [] NMR (25459) x 1      [] Vaso  [x] Manual (23099) x 1      [] Ultrasound  [x] TA x  1     [] Mech Traction (14386)  [] Aquatic Therapy x     [] ES (un) (52297):   [] Home Management Training x  [] ES(attended) (62277)   [] Dry Needling 1-2 muscles (54438):  [] Dry Needling 3+ muscles (647922  [] Group:      [] Other:     GOALS:  Patient stated goal: GET BACK TO WALKING , NO AD  [] Progressing: [] Met: [] Not Met: [] Adjusted     Therapist goals for Patient:   Short Term Goals: To be achieved in: 2 weeks  1. Independent in HEP and progression per patient tolerance, in order to prevent re-injury. [] Progressing: [] Met: [] Not Met: [] Adjusted  2. Patient will have a decrease in pain to facilitate improvement in movement, function, and ADLs as indicated by Functional Deficits. [] Progressing: [] Met: [] Not Met: [] Adjusted     Long Term Goals: To be achieved in: 10 weeks  1. FOTO score of at least 60 to assist with reaching prior level of function. [] Progressing: [] Met: [] Not Met: [] Adjusted  2. Patient will demonstrate increased AROM to WNL without pain to allow for proper joint functioning as indicated by patients Functional Deficits. [] Progressing: [] Met: [] Not Met: [] Adjusted  3. Patient will demonstrate an increase in Strength to at least 4+/5 as well as good proximal hip strength and control to allow for proper functional mobility as indicated by patients Functional Deficits. [] Progressing: [] Met: [] Not Met: [] Adjusted  4. Patient will return to functional activities including ADLs, walking for 15+ minutes, stairs, without increased symptoms or restriction. [] Progressing: [] Met: [] Not Met: [] Adjusted  5. Pt will improve SLS on L and R to 10+ seconds when able to fully weight bear to improve balance and proprioception. [] Progressing: [] Met: [] Not Met: [] Adjusted      Overall Progression Towards Functional goals/ Treatment Progress Update:  [] Patient is progressing as expected towards functional goals listed. [] Progression is slowed due to complexities/Impairments listed. [] Progression has been slowed due to co-morbidities.   [x] Plan just implemented, too soon to assess goals progression <30days   [] Goals require adjustment due to lack of progress  [] Patient is not progressing as expected and requires additional follow up with physician  [] Other    Persisting Functional Limitations/Impairments:  [x]Sitting [x]Standing   [x]Walking [x]Stairs   []Transfers [x]ADLs   [x]Squatting/bending []Kneeling  [x]Housework [x]Job related tasks  []Driving [x]Sports/Recreation   []Sleeping []Other:    ASSESSMENT: Pt tolerated gait with SPC without increase in pain but c/o intermittnet catching in R>L hip. Progressed depth of squat to improve STS. Pt still unable SL on R hip to perform L LE progressions. Plan to progress to next phase nest session pending MD visit. Treatment/Activity Tolerance:  [x] Pt able to complete treatment [] Patient limited by fatique  [x] Patient limited by pain  [] Patient limited by other medical complications  [] Other:      Prognosis:  [x] Good [] Fair  [] Poor    Patient Requires Follow-up: [x] Yes  [] No            PLAN:l. PT 1-2x / week for 8 weeks. [x] Continue per plan of care [] Alter current plan (see comments)  [] Plan of care initiated [] Hold pending MD visit [] Discharge    Electronically signed by: Irineo Chiu, PT, DPT ATC      Note: If patient does not return for scheduled/ recommended follow up visits, this note will serve as a discharge from care along with most recent update on progress.

## 2022-09-14 RX ORDER — NAPROXEN 500 MG/1
TABLET ORAL
Qty: 28 TABLET | Refills: 0 | Status: SHIPPED | OUTPATIENT
Start: 2022-09-14

## 2022-09-15 ENCOUNTER — TELEPHONE (OUTPATIENT)
Dept: ORTHOPEDIC SURGERY | Age: 42
End: 2022-09-15

## 2022-09-21 DIAGNOSIS — E78.2 MIXED HYPERLIPIDEMIA: ICD-10-CM

## 2022-09-22 ENCOUNTER — OFFICE VISIT (OUTPATIENT)
Dept: ORTHOPEDIC SURGERY | Age: 42
End: 2022-09-22

## 2022-09-22 VITALS — HEIGHT: 65 IN | WEIGHT: 228 LBS | BODY MASS INDEX: 37.99 KG/M2

## 2022-09-22 DIAGNOSIS — Z98.890 STATUS POST ARTHROSCOPY OF HIP: Primary | ICD-10-CM

## 2022-09-22 PROCEDURE — 99024 POSTOP FOLLOW-UP VISIT: CPT | Performed by: ORTHOPAEDIC SURGERY

## 2022-09-22 RX ORDER — ROSUVASTATIN CALCIUM 40 MG/1
TABLET, COATED ORAL
Qty: 90 TABLET | Refills: 1 | OUTPATIENT
Start: 2022-09-22

## 2022-09-22 NOTE — LETTER
KIERSTEN Whitlock Ma  [address] [facility] [address]  Phone: [phone]  Fax: [phone]    Kimberly Moreno MD        September 22, 2022     Patient: Regis Hall   YOB: 1980   Date of Visit: 9/22/2022       To Whom It May Concern: It is my medical opinion that Froilan Maria may return to work on 10/10/2022 with the following restrictions: lifting/carrying not to exceed 10 lbs. , pushing/pulling not to exceed 10 lbs. , avoid excessive walking or standing, sit down/desk duty . Restrictions in place for 6 weeks. If you have any questions or concerns, please don't hesitate to call.     Sincerely,          Kimberly Moreno MD

## 2022-09-22 NOTE — PROGRESS NOTES
History of Present Illness:  Arthur Tijerina is a pleasant 39 y.o. female who presents for a post operative visit. She is 6 weeks out following a right hip revision arthroscopy and synovectomy, acetabuloplasty, removal of loose body cartilage flap, labral repair, femoroplasty/osteochondroplasty, fractional psoas lengthening, capsular closure, endoscopic greater trochanteric bursectomy and endoscopic abductor repair. Overall She is doing okay and feels that their pain is well controlled with current pain medications. She continues to have mild clicking and catching in her right hip. She has been in physical therapy at [facility]. She is 12 weeks post repair of the left hip. She denies fevers, chills, numbness, tingling, and shortness of breath. Medical History:  Patient's medications, allergies, past medical, surgical, social and family histories were reviewed and updated as appropriate. No notes on file    Review of Systems  A 14 point review of systems was completed by the patient and is available in the media section of the scanned medical record and was reviewed on 9/22/2022. Vital Signs: There were no vitals filed for this visit. General/Appearance: Alert and oriented and in no apparent distress. Skin:  There are no skin lesions, cellulitis, or extreme edema. The patient has warm and well-perfused Bilateral lower  extremities with brisk capillary refill. Hip Exam: Right    Inspection: The nylon closure was removal and the Hip incision(s) are clean, dry and intact and well approximated. Steri-strips were applied. Mild ecchymosis and swelling are present as can be expected. There is no erythema, drainage or other signs of infection    Able to walk in the exam room with a cane with a mildly antalgic and Trendelenburg gait.     Palpation:  No crepitus to gentle motion / circumduction of the hip    Active Range of Motion: 0-70 deg flexion    Passive Range of Motion: 0-90 deg flexion    Strength:  Deferred    Special Tests:  Deferred. Neurovascular: Sensation to light touch is intact, no motor deficits, palpable radial pulses 2+    Radiology:     No new XR obtained at this time. Assessment :  Ms. Addison Nath is a pleasant 39 y.o. patient who is 6 weeks status post right hip revision arthroscopy and synovectomy with acetabuloplasty, femoroplasty/osteochondroplasty and labral repair. She is doing well at this time. There continues to be no concerns for post-operative complications and/or infections. Impression:  Encounter Diagnosis   Name Primary? Status post arthroscopy of hip Yes       Office Procedures:  No orders of the defined types were placed in this encounter. Treatment Plan:    Overall Addison Nath is doing well. The pain is well-controlled. We recommend that She continuing with physical therapy for timeline based progression of motion, weight bearing, and and strengthening. Patient can continue phase 2 of our hip rehabilitation protocol transition into phase 3. She can start driving now. 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 We would like to see Addison Nath back in 6 weeks for follow-up visit and ROM/Gait/SLR check. Guilherme Carlos MD  Sports Medicine Fellow  [facility]    Sincerely,    Nicci Dias  [address] [address]  Email: [email or web address]  Office: [phone]    09/23/22  10:19 AM    The encounter with Addison Nath was supervised by Dr. Valencia Cortez who personally examined the patient and reviewed the plan. This dictation was performed with a verbal recognition program (DRAGON) and it was checked for errors.   It is possible that there are still dictated errors within this office note. If so, please bring any errors to my attention for an addendum. All efforts were made to ensure that this office note is accurate.

## 2022-09-22 NOTE — LETTER
[place]  [address] [facility] [address]  Phone: [phone]  Fax: [phone]    Pura Tello MD    September 23, 2022     Keturah Hawkins APRN - CNP  [address] [facility] [address] [place] [address]    Patient: Priscilla Nuñez   MR Number: [id number]   YOB: 1980   Date of Visit: 9/22/2022       Dear Keturah Hawkins:    Thank you for referring Gina Stallings to me for evaluation/treatment. Below are the relevant portions of my assessment and plan of care. If you have questions, please do not hesitate to call me. I look forward to following Sonia Kinseyf along with you.     Sincerely,      Pura Tello MD

## 2022-09-23 ENCOUNTER — HOSPITAL ENCOUNTER (OUTPATIENT)
Dept: PHYSICAL THERAPY | Age: 42
Setting detail: THERAPIES SERIES
Discharge: HOME | End: 2022-09-23
Payer: COMMERCIAL

## 2022-09-23 PROCEDURE — 97110 THERAPEUTIC EXERCISES: CPT

## 2022-09-23 PROCEDURE — 97530 THERAPEUTIC ACTIVITIES: CPT

## 2022-09-23 NOTE — PROGRESS NOTES
[address]  Phone: [phone]   Fax: [phone]  Physical Therapy Re-Certification Plan of Care    Dear Nathalie Araujo MD  ,    We had the pleasure of treating the following patient for physical therapy services at [facility]. A summary of our findings can be found in the updated assessment below. This includes our plan of care. If you have any questions or concerns regarding these findings, please do not hesitate to contact me at the office phone number checked above. Thank you for the referral.     Physician Signature:________________________________Date:__________________  By signing above (or electronic signature), therapist's plan is approved by physician      Functional Outcome:     FOTO:    Overall Response to Treatment:   []Patient is responding well to treatment and improvement is noted with regards  to goals   []Patient should continue to improve in reasonable time if they continue HEP   []Patient has plateaued and is no longer responding to skilled PT intervention    []Patient is getting worse and would benefit from return to referring MD   []Patient unable to adhere to initial POC   [x]Other: Pt just cleared for FWB on R LE. Pt continues to c/o pain with ambulation for prolonged periods. Pt continues to have pain limiting sleep hygiene and pain/stiffness with prolonged sitting. Pt slowly progressing LE strength and ROM; R LE WB activities initiated today. Pt has progressed to cane ambulation but indep ambulation has significant Trendelenburg and pain. Progress limited by protocol restrictions and pain while laying on R side limiting L hip progressions. Pt would benefit from continued skilled PT to normalize gait and hip Rom and improve functional LE strength to return to [employer] and work.     Date range of Visits: 6/30-9/23  Total Visits: 12    Recommendation:    []Continue PT 1x / wk for 8 weeks. []Hold PT, pending MD visit      Physical Therapy Treatment Note/ Progress Report:     Date:  2022    Patient Name:  Ailyn Almodovar    :  1980  MRN: [id number]  Restrictions/Precautions:    Medical/Treatment Diagnosis Information:  Diagnosis: M25.859 (ICD-10-CM) - Femoral acetabular impingement , S73.192D (ICD-10-CM) - Tear of left acetabular labrum, subsequent encounter  Treatment Diagnosis: decreased L hip ROM/ strength, impaired gait/ balance, decreased functional status. Insurance/Certification information:  PT Insurance Information: [facility] [email or web address]  Physician Information:  Dima Aguirre MD    Plan of care signed (Y/N): []  Yes [x]  No     Date of Patient follow up with Physician: 22     Progress Report: []  Yes  [x]  No     Date Range for reporting period:  Beginnin22  POC:    POC     Progress report due (10 Rx/or 30 days whichever is less): 10/23 or visit 20    Recertification due (POC duration/ or 90 days whichever is less): 22 visit 20    Visit # Insurance Allowable Auth required? Date Range   12 20 [x]  Yes  []  No APPROVED 14 VISITS UNTIL        Latex Allergy:  [x]NO      []YES  Preferred Language for Healthcare:   [x]English       []other:    Functional Scale:           Date assessed:  FOTO physical FS primary measure score = 26; risk adjusted = 39  22  FOTO physical FS primary measure score = 34     22    Pain level:  1/10 L hip 3/10 R hip      SUBJECTIVE:    Pt reports MD not concerned with catching at this time, cleared to start next phase. Pt starts light duty on 10/10.     OBJECTIVE:    SLS R 4sec, L 5 sec      PROM AROM     L R L R   Hip Flexion 90         Hip Abduction 10         Hip ER     24, p! 28    Hip IR      30  20   Knee Flexion           Knee Extension           Dorsiflexion            Plantarflexion            Inversion            Eversion                  Strength (0-5) / Myotomes   Left Right   Hip Flexion - supine       Hip Flexion - seated (L1-2) 5 3 MMT NT    Hip Abduction 4- 3 MMT NT    Hip ext 3+ 3 MMT NT    Hip ER 3  3  MMT NT    Hip IR 3  3  MMT NT    Quads (L2-4) 5   4+   Hamstrings  5 4+    Ankle Dorsiflexion (L4-5)       Ankle Plantarflexion (S1-2)       Ankle Inversion       Ankle Eversion (S1-2)       Great Toe Extension (L5)               Flexibility       Hamstrings (90/90)       ITB (Juanpablo)       Quads (Ely's)       Hip Flexor (Rodrigo)               Girth (cm)       Mid patella       Suprapatellar       Figure 8       Transmalleolar       Metatarsal Heads            PT to remove post op dressing on 8/16 8/16: no bandage, pt reports it all came off on its own. Pt educated on purchasing waterproof bandages large enough to not have adhesives near stitches and wear them before all showers. During shower pt instructed to let water run over the bandaids and not scrub the skin or glue on the skin. All incisions are clean, dry, and healing appropriately. There was a very minimal scab on most proximal incision that is healing appropriately. No steri strips applied this date.        RESTRICTIONS/PRECAUTIONS: See post op precautions and guidelines in teams   S/P L hip scope LEFT HIP REVISION ARTHROSCOPY, LYSIS OF ADHESIONS, REMOVAL LOOSE BODY, SYNOVECTOMY, FEMOROACETABULAR IMPINGEMENT DECOMPRESSION AND LABRAL REPAIR, ABDUCTOR REPAIR, ENDOSCOPIC BURSECTOMY on 6/28/22.  8/9/22 RIGHT HIP REVISION, ARTHROSCOPY, FEMOROACETABULAR IMPINGEMENT SURGERY, LABRAL REPAIR    50% WB on 9/6    Exercises/Interventions:     Therapeutic Exercise (97309)  Resistance / level Sets/sec Reps Notes / Cues   Upright bike - = No resistance 5' Added 7/13   Calf Stretch  30\" 3 9/6: 50% IB    Standing HR- ecc 2 10B 9/13      Standing [address]  Added 7/13   Standing ABD  Bent over hip ext  1 10B 9/23   Hookyling TrA with L LE march 8/16         LP (maintain <120 degree hip flex, seat at 5 back fully reclined) 9/6\" performed DL Bridge ecc  1 10B 9/23   SLR on L/R   3/1 10 8/31 - able to complete a second set   Therapeutic Activities (47297) x       Pt education on HEP , progress, and POC 10 min      Step up 1 15B 9/23   Mini squat wiyth TRX to chair 7/27 - NMR   Supported standing 3 way hip No resistance  Very small range   Hip hinge   With dowel     8/16: See objective   Neuromuscular Re-ed (27338) time,resume       NBOS  NBOS EC  NBOS Head turns, EO, EC 9/6   Standing TKE CC 9/6   Airex tandem  20-30\" 2ea 9/23   Manual Intervention (13089) x        PROM within precautions '   Gentle hip circumduction, knee ROM  Wk 3-6  Extension <15? External Rotation <20? Abduction <25? Flexion to 120?      8/11   Light STM L ITB and lateral hip                               Modalities:     Pt. Education: 8' at eval   -pt educated on diagnosis, prognosis and expectations for rehab, post op precautions and guidelines, WB status.   -all pt questions were answered    8/16: see objective    Home Exercise Program:  Access Code: [email or web address]  URL: [employer]/  Date: 09/23/2022  Prepared by: Jadon Castro    Exercises  Standing Hip Abduction with Counter Support - 1 x daily - 7 x weekly - 2-3 sets - 10 reps  [employer] with Eccentric Lowering - 1 x daily - 7 x weekly - 1-2 sets - 10 reps  Standing Eccentric Heel Raise - 1 x daily - 7 x weekly - 2-3 sets - 10 reps  Step Up - 1 x daily - 7 x weekly - 1-2 sets - 10 reps  Squat with Chair Touch - 1 x daily - 7 x weekly - 3 sets - 10 reps  Seated Hip Internal Rotation AROM - 1 x daily - 7 x weekly - 1-2 sets - 10 reps  Tandem Stance - 1 x daily - 7 x weekly - 3 sets - 30 sec hold      Access Code: [email or web address]  URL: [email or web address]  Date: 08/11/2022  Prepared by: Magy Chris    Exercises - completed 8/11  Supine Heel Slide with Strap - 1 x daily - 7 x weekly - 3 sets - 10 reps  Supine Quad Set - 1 x daily - 7 x weekly - 3 sets - 10 reps  Long Sitting Ankle Pumps - 1 x daily - 7 x weekly - 3 sets - 10 reps  Seated Long Arc Quad - 1 x daily - 7 x weekly - 3 sets - 10 reps  Seated Hip Adduction Isometrics with Ball - 1 x daily - 7 x weekly - 3 sets - 10 reps    Strive down on 8/22 but added L hip prone ext and B prone TKE        Therapeutic Exercise and NMR EXR  [x] (62046) Provided verbal/tactile cueing for activities related to strengthening, flexibility, endurance, ROM for improvements in LE, proximal hip, and core control with self care, mobility, lifting, ambulation.  [] (95622) Provided verbal/tactile cueing for activities related to improving balance, coordination, kinesthetic sense, posture, motor skill, proprioception  to assist with LE, proximal hip, and core control in self care, mobility, lifting, ambulation and eccentric single leg control.   [] (15789) Therapist is in constant attendance of 2 or more patients providing skilled therapy interventions, but not providing any significant amount of measurable one-on-one time to either patient, for improvements in LE, proximal hip, and core control in self care, mobility, lifting, ambulation and eccentric single leg control.      NMR and Therapeutic Activities:    [x] (14192 or 04181) Provided verbal/tactile cueing for activities related to improving balance, coordination, kinesthetic sense, posture, motor skill, proprioception and motor activation to allow for proper function of core, proximal hip and LE with self care and ADLs  [] (92918) Gait Re-education- Provided training and instruction to the patient for proper LE, core and proximal hip recruitment and positioning and eccentric body weight control with ambulation re-education including up and down stairs     Home Exercise Program:    [x] (18115) Reviewed/Progressed HEP activities related to strengthening, flexibility, endurance, ROM of core, proximal hip and LE for functional self-care, mobility, lifting and ambulation/stair navigation   [] (08631)Reviewed/Progressed movement, function, and ADLs as indicated by Functional Deficits. [] Progressing: [x] Met: [] Not Met: [] Adjusted     Long Term Goals: To be achieved in: 10 weeks  1. FOTO score of at least 60 to assist with reaching prior level of function. [x] Progressing: [] Met: [] Not Met: [] Adjusted  2. Patient will demonstrate increased AROM to WNL without pain to allow for proper joint functioning as indicated by patients Functional Deficits. [x] Progressing: [] Met: [] Not Met: [] Adjusted  3. Patient will demonstrate an increase in Strength to at least 4+/5 as well as good proximal hip strength and control to allow for proper functional mobility as indicated by patients Functional Deficits. [x] Progressing: [] Met: [] Not Met: [] Adjusted  4. Patient will return to functional activities including ADLs, walking for 15+ minutes, stairs, without increased symptoms or restriction. [x] Progressing: [] Met: [] Not Met: [] Adjusted  5. Pt will improve SLS on L and R to 10+ seconds when able to fully weight bear to improve balance and proprioception. [x] Progressing: [] Met: [] Not Met: [] Adjusted      Overall Progression Towards Functional goals/ Treatment Progress Update:  [x] Patient is progressing as expected towards functional goals listed. [] Progression is slowed due to complexities/Impairments listed. [] Progression has been slowed due to co-morbidities.   [] Plan just implemented, too soon to assess goals progression <30days   [] Goals require adjustment due to lack of progress  [] Patient is not progressing as expected and requires additional follow up with physician  [] Other    Persisting Functional Limitations/Impairments:  [x]Sitting [x]Standing   [x]Walking [x]Stairs   []Transfers [x]ADLs   [x]Squatting/bending []Kneeling  [x]Housework [x]Job related tasks  []Driving [x]Sports/Recreation   []Sleeping []Other:    ASSESSMENT: see above  Treatment/Activity Tolerance:  [x] Pt able to complete treatment [] Patient limited by fatique  [x] Patient limited by pain  [] Patient limited by other medical complications  [] Other:      Prognosis:  [x] Good [] Fair  [] Poor    Patient Requires Follow-up: [x] Yes  [] No            PLAN:l. PT 1-2x / week for 8 weeks. [x] Continue per plan of care [] Alter current plan (see comments)  [] Plan of care initiated [] Hold pending MD visit [] Discharge    Electronically signed by: Melissa Roland PT, DPT ATC      Note: If patient does not return for scheduled/ recommended follow up visits, this note will serve as a discharge from care along with most recent update on progress.

## 2022-09-30 ENCOUNTER — HOSPITAL ENCOUNTER (OUTPATIENT)
Dept: PHYSICAL THERAPY | Age: 42
Setting detail: THERAPIES SERIES
Discharge: HOME | End: 2022-09-30
Payer: COMMERCIAL

## 2022-09-30 PROCEDURE — 97112 NEUROMUSCULAR REEDUCATION: CPT

## 2022-09-30 PROCEDURE — 97110 THERAPEUTIC EXERCISES: CPT

## 2022-09-30 PROCEDURE — 97530 THERAPEUTIC ACTIVITIES: CPT

## 2022-09-30 NOTE — FLOWSHEET NOTE
[address] [place] [address]  Phone: [phone]   Fax: [phone]      Physical Therapy Treatment Note/ Progress Report:     Date:  2022    Patient Name:  Faustino Wick    :  1980  MRN: [id number]  Restrictions/Precautions:    Medical/Treatment Diagnosis Information:  Diagnosis: M25.859 (ICD-10-CM) - Femoral acetabular impingement , S73.192D (ICD-10-CM) - Tear of left acetabular labrum, subsequent encounter  Treatment Diagnosis: decreased L hip ROM/ strength, impaired gait/ balance, decreased functional status. Insurance/Certification information:  PT Insurance Information: [place] - AIM  Physician Information:  Jeanmarie Escalera MD    Plan of care signed (Y/N): []  Yes [x]  No     Date of Patient follow up with Physician: 22     Progress Report: []  Yes  [x]  No     Date Range for reporting period:  Beginnin22  POC:    POC     Progress report due (10 Rx/or 30 days whichever is less): 10/23 or visit 20    Recertification due (POC duration/ or 90 days whichever is less): 22 visit 20    Visit # Insurance Allowable Auth required? Date Range   13 20 [x]  Yes  []  No APPROVED 14 VISITS UNTIL        Latex Allergy:  [x]NO      []YES  Preferred Language for Healthcare:   [x]English       []other:    Functional Scale:           Date assessed:  FOTO physical FS primary measure score = 26; risk adjusted = 39  22  FOTO physical FS primary measure score = 34     22    Pain level:  0/10 L hip 3/10 R hip      SUBJECTIVE:    Pt reports balance is improving on R and she has been practicing walking without AD.     OBJECTIVE:    SLS R 4sec, L 5 sec      PROM AROM     L R L R   Hip Flexion 90         Hip Abduction 10         Hip ER     24, p! 28    Hip IR      30  20   Knee Flexion           Knee Extension           Dorsiflexion            Plantarflexion            Inversion            Eversion                  Strength (0-5) / Myotomes  9/23 Left Right   Hip Flexion - supine       Hip Flexion - seated (L1-2) 5 3 MMT NT    Hip Abduction 4- 3 MMT NT    Hip ext 3+ 3 MMT NT    Hip ER 3  3  MMT NT    Hip IR 3  3  MMT NT    Quads (L2-4) 5   4+   Hamstrings  5 4+    Ankle Dorsiflexion (L4-5)       Ankle Plantarflexion (S1-2)       Ankle Inversion       Ankle Eversion (S1-2)       Great Toe Extension (L5)               Flexibility       Hamstrings (90/90)       ITB (Juanpablo)       Quads (Ely's)       Hip Flexor (Rodrigo)               Girth (cm)       Mid patella       Suprapatellar       Figure 8       Transmalleolar       Metatarsal Heads            PT to remove post op dressing on 8/16 8/16: no bandage, pt reports it all came off on its own. Pt educated on purchasing waterproof bandages large enough to not have adhesives near stitches and wear them before all showers. During shower pt instructed to let water run over the bandaids and not scrub the skin or glue on the skin. All incisions are clean, dry, and healing appropriately. There was a very minimal scab on most proximal incision that is healing appropriately. No steri strips applied this date.        RESTRICTIONS/PRECAUTIONS: See post op precautions and guidelines in teams   S/P L hip scope LEFT HIP REVISION ARTHROSCOPY, LYSIS OF ADHESIONS, REMOVAL LOOSE BODY, SYNOVECTOMY, FEMOROACETABULAR IMPINGEMENT DECOMPRESSION AND LABRAL REPAIR, ABDUCTOR REPAIR, ENDOSCOPIC BURSECTOMY on 6/28/22.  8/9/22 RIGHT HIP REVISION, ARTHROSCOPY, FEMOROACETABULAR IMPINGEMENT SURGERY, LABRAL REPAIR    50% WB on 9/6    Exercises/Interventions:     Therapeutic Exercise (59740)  Resistance / level Sets/sec Reps Notes / Cues   Upright bike - = No resistance 5' Added 7/13   Calf Stretch  30\" 3 9/6: 50% IB    Standing HR- ecc- HEP 9/13      Standing [address]  Added 7/13   Standing ABD  Bent over hip ext lime 2 10B 9/23, 9/30 added TB   [place] TrA with L LE march 8/16         LP (maintain <120 degree hip flex, seat at 5 back fully reclined) 80# 2 10 9/6\" performed DL; 9/30 re-initiated   Bridge ecc  1 10B 9/23   Lateral walk lime 1 6 laps at counter 9/30   Quantum NPV       SLR on L/R   3 10B 8/31 - able to complete a second set9/30 3 reps on R   Therapeutic Activities (67684) x            Step up F/L 6 in 1 15ea  10ea 9/23, 9/30 added lateral   Mini squat with TRX to chair 2 10 7/27 - NMR9/30 re-initiated   Supported standing 3 way hip No resistance  Very small range   Hip hinge   With dowel     8/16: See objective   Neuromuscular Re-ed (84328)        NBOS  NBOS EC  NBOS Head turns, EO, EC 9/6   bosu step up 1 15B 9/30   Standing TKE CC 9/6   Airex tandem  30\" 3ea 9/23   Manual Intervention (74793) x        PROM within precautions '   Gentle hip circumduction, knee ROM  Wk 3-6  Extension <15? External Rotation <20? Abduction <25? Flexion to 120?      8/11   Light STM L ITB and lateral hip                               Modalities:     Pt. Education: 8' at eval   -pt educated on diagnosis, prognosis and expectations for rehab, post op precautions and guidelines, WB status.   -all pt questions were answered    8/16: see objective    Home Exercise Program:  Access Code: [email or web address]  URL: [email or web address]  Date: 09/23/2022  Prepared by: Abraham Ladd    Exercises  Standing Hip Abduction with Counter Support - 1 x daily - 7 x weekly - 2-3 sets - 10 reps  [employer] with Eccentric Lowering - 1 x daily - 7 x weekly - 1-2 sets - 10 reps  Standing Eccentric Heel Raise - 1 x daily - 7 x weekly - 2-3 sets - 10 reps  Step Up - 1 x daily - 7 x weekly - 1-2 sets - 10 reps  Squat with Chair Touch - 1 x daily - 7 x weekly - 3 sets - 10 reps  Seated Hip Internal Rotation AROM - 1 x daily - 7 x weekly - 1-2 sets - 10 reps  Tandem Stance - 1 x daily - 7 x weekly - 3 sets - 30 sec hold      Access Code: [email or web address]  URL: [email or web address]  Date: 08/11/2022  Prepared by: Surendra Townsend    Exercises - completed 8/11  Supine Heel Slide with Strap - 1 x daily - 7 x weekly - 3 sets - 10 reps  Supine Quad Set - 1 x daily - 7 x weekly - 3 sets - 10 reps  Long Sitting Ankle Pumps - 1 x daily - 7 x weekly - 3 sets - 10 reps  Seated Long Arc Quad - 1 x daily - 7 x weekly - 3 sets - 10 reps  Seated Hip Adduction Isometrics with Ball - 1 x daily - 7 x weekly - 3 sets - 10 reps    Strive down on 8/22 but added L hip prone ext and B prone TKE        Therapeutic Exercise and NMR EXR  [x] (27529) Provided verbal/tactile cueing for activities related to strengthening, flexibility, endurance, ROM for improvements in LE, proximal hip, and core control with self care, mobility, lifting, ambulation.  [] (87567) Provided verbal/tactile cueing for activities related to improving balance, coordination, kinesthetic sense, posture, motor skill, proprioception  to assist with LE, proximal hip, and core control in self care, mobility, lifting, ambulation and eccentric single leg control.   [] (59841) Therapist is in constant attendance of 2 or more patients providing skilled therapy interventions, but not providing any significant amount of measurable one-on-one time to either patient, for improvements in LE, proximal hip, and core control in self care, mobility, lifting, ambulation and eccentric single leg control.      NMR and Therapeutic Activities:    [x] (82459 or 85699) Provided verbal/tactile cueing for activities related to improving balance, coordination, kinesthetic sense, posture, motor skill, proprioception and motor activation to allow for proper function of core, proximal hip and LE with self care and ADLs  [] (59742) Gait Re-education- Provided training and instruction to the patient for proper LE, core and proximal hip recruitment and positioning and eccentric body weight control with ambulation re-education including up and down stairs     Home Exercise Program:    [] (71429) Reviewed/Progressed HEP activities related to strengthening, flexibility, endurance, ROM of core, proximal hip and LE for functional self-care, mobility, lifting and ambulation/stair navigation   [] (61880)Reviewed/Progressed HEP activities related to improving balance, coordination, kinesthetic sense, posture, motor skill, proprioception of core, proximal hip and LE for self care, mobility, lifting, and ambulation/stair navigation      Manual Treatments:  PROM / STM / Oscillations-Mobs:  G-I, II, III, IV (PA's, Inf., Post.)  [] (39067) Provided manual therapy to mobilize LE, proximal hip and/or LS spine soft tissue/joints for the purpose of modulating pain, promoting relaxation,  increasing ROM, reducing/eliminating soft tissue swelling/inflammation/restriction, improving soft tissue extensibility and allowing for proper ROM for normal function with self care, mobility, lifting and ambulation. Modalities:  [] (67023) Vasopneumatic compression: Utilized vasopneumatic compression to decrease edema / swelling for the purpose of improving mobility and quad tone / recruitment which will allow for increased overall function including but not limited to self-care, transfers, ambulation, and ascending / descending stairs. Charges:  Timed Code Treatment Minutes: 44   Total Treatment Minutes: 44     [] EVAL - LOW (99654)   [] EVAL - MOD (75831)  [] EVAL - HIGH (89502)  [] RE-EVAL (40447)  [x] BV(75446) x 1    [] Ionto  [x] NMR (55937) x 1      [] Vaso  [] Manual (61441) x 1      [] Ultrasound  [x] TA x  1     [] Mech Traction (95466)  [] Aquatic Therapy x     [] ES (un) (85485):   [] Home Management Training x  [] ES(attended) (13789)   [] Dry Needling 1-2 muscles (45797):  [] Dry Needling 3+ muscles (779663  [] Group:      [] Other:     GOALS:  Patient stated goal: GET BACK TO WALKING , NO AD  [x] Progressing: [] Met: [] Not Met: [] Adjusted     Therapist goals for Patient:   Short Term Goals: To be achieved in: 2 weeks  1.  Independent in HEP and progression per patient tolerance, in order to prevent re-injury. [] Progressing: [x] Met: [] Not Met: [] Adjusted  2. Patient will have a decrease in pain to facilitate improvement in movement, function, and ADLs as indicated by Functional Deficits. [] Progressing: [x] Met: [] Not Met: [] Adjusted     Long Term Goals: To be achieved in: 10 weeks  1. FOTO score of at least 60 to assist with reaching prior level of function. [x] Progressing: [] Met: [] Not Met: [] Adjusted  2. Patient will demonstrate increased AROM to WNL without pain to allow for proper joint functioning as indicated by patients Functional Deficits. [x] Progressing: [] Met: [] Not Met: [] Adjusted  3. Patient will demonstrate an increase in Strength to at least 4+/5 as well as good proximal hip strength and control to allow for proper functional mobility as indicated by patients Functional Deficits. [x] Progressing: [] Met: [] Not Met: [] Adjusted  4. Patient will return to functional activities including ADLs, walking for 15+ minutes, stairs, without increased symptoms or restriction. [x] Progressing: [] Met: [] Not Met: [] Adjusted  5. Pt will improve SLS on L and R to 10+ seconds when able to fully weight bear to improve balance and proprioception. [x] Progressing: [] Met: [] Not Met: [] Adjusted      Overall Progression Towards Functional goals/ Treatment Progress Update:  [x] Patient is progressing as expected towards functional goals listed. [] Progression is slowed due to complexities/Impairments listed. [] Progression has been slowed due to co-morbidities.   [] Plan just implemented, too soon to assess goals progression <30days   [] Goals require adjustment due to lack of progress  [] Patient is not progressing as expected and requires additional follow up with physician  [] Other    Persisting Functional Limitations/Impairments:  [x]Sitting [x]Standing   [x]Walking [x]Stairs   []Transfers [x]ADLs   [x]Squatting/bending []Kneeling  [x]Housework [x]Job related tasks  []Driving [x]Sports/Recreation   []Sleeping []Other:    ASSESSMENT: Progressed functional hip strength with leg press, squats, and lateral step ups. Improved performance with tandem stance and SLR and able to tolerated addition of resistance with hip 2 way. Will continue to progress with loaded hip strengthening as tolerated. Treatment/Activity Tolerance:  [x] Pt able to complete treatment [] Patient limited by fatique  [x] Patient limited by pain  [] Patient limited by other medical complications  [] Other:      Prognosis:  [x] Good [] Fair  [] Poor    Patient Requires Follow-up: [x] Yes  [] No            PLAN:l. PT 1-2x / week for 8 weeks. [x] Continue per plan of care [] Alter current plan (see comments)  [] Plan of care initiated [] Hold pending MD visit [] Discharge    Electronically signed by: [facility], PT, DPT ATC      Note: If patient does not return for scheduled/ recommended follow up visits, this note will serve as a discharge from care along with most recent update on progress.

## 2022-10-06 ENCOUNTER — TELEPHONE (OUTPATIENT)
Dept: FAMILY MEDICINE CLINIC | Age: 42
End: 2022-10-06

## 2022-10-06 DIAGNOSIS — E78.01 FAMILIAL HYPERCHOLESTEROLEMIA: ICD-10-CM

## 2022-10-06 DIAGNOSIS — I10 ESSENTIAL HYPERTENSION: Primary | ICD-10-CM

## 2022-10-06 DIAGNOSIS — E11.9 TYPE 2 DIABETES MELLITUS WITHOUT COMPLICATION, WITHOUT LONG-TERM CURRENT USE OF INSULIN (HCC): ICD-10-CM

## 2022-10-06 NOTE — TELEPHONE ENCOUNTER
----- Message from Tereza Lawton sent at 10/6/2022  8:04 AM EDT -----  Subject: Message to Provider    QUESTIONS  Information for Provider? Mrs. Uziel Lopez called today to ask for an order   for labs. she would like to have them drawn before her appt. 10-. Please call her when that order is put in place. Thank you  ---------------------------------------------------------------------------  --------------  Antonio NOGUEIRA  [phone]; OK to leave message on voicemail  ---------------------------------------------------------------------------  --------------  SCRIPT ANSWERS  Relationship to Patient?  Self

## 2022-10-07 ENCOUNTER — HOSPITAL ENCOUNTER (OUTPATIENT)
Dept: WOMENS IMAGING | Age: 42
Discharge: HOME | End: 2022-10-07
Payer: COMMERCIAL

## 2022-10-07 ENCOUNTER — HOSPITAL ENCOUNTER (OUTPATIENT)
Dept: PHYSICAL THERAPY | Age: 42
Setting detail: THERAPIES SERIES
Discharge: HOME | End: 2022-10-07
Payer: COMMERCIAL

## 2022-10-07 ENCOUNTER — HOSPITAL ENCOUNTER (OUTPATIENT)
Dept: ULTRASOUND IMAGING | Age: 42
End: 2022-10-07
Payer: COMMERCIAL

## 2022-10-07 DIAGNOSIS — R92.8 ABNORMAL MAMMOGRAM: ICD-10-CM

## 2022-10-07 PROCEDURE — 97110 THERAPEUTIC EXERCISES: CPT

## 2022-10-07 PROCEDURE — 97530 THERAPEUTIC ACTIVITIES: CPT

## 2022-10-07 PROCEDURE — G0279 TOMOSYNTHESIS, MAMMO: HCPCS

## 2022-10-07 NOTE — FLOWSHEET NOTE
[address] [place] [address]  Phone: [phone]   Fax: [phone]      Physical Therapy Treatment Note/ Progress Report:     Date:  10/7/2022    Patient Name:  Cleo Bee    :  1980  MRN: [id number]  Restrictions/Precautions:    Medical/Treatment Diagnosis Information:  Diagnosis: M25.859 (ICD-10-CM) - Femoral acetabular impingement , S73.192D (ICD-10-CM) - Tear of left acetabular labrum, subsequent encounter  Treatment Diagnosis: decreased L hip ROM/ strength, impaired gait/ balance, decreased functional status. Insurance/Certification information:  PT Insurance Information: [employer] - [employer]  Physician Information:  Shanti Brewer MD    Plan of care signed (Y/N): []  Yes [x]  No     Date of Patient follow up with Physician: 22     Progress Report: []  Yes  [x]  No     Date Range for reporting period:  Beginnin22  POC:    POC     Progress report due (10 Rx/or 30 days whichever is less): 10/23 or visit 20    Recertification due (POC duration/ or 90 days whichever is less): 22 visit 20    Visit # Insurance Allowable Auth required? Date Range   + 20 [x]  Yes  []  No APPROVED 14 VISITS UNTIL   4 visits 10/3-       Latex Allergy:  [x]NO      []YES  Preferred Language for Healthcare:   [x]English       []other:    Functional Scale:           Date assessed:  FOTO physical FS primary measure score = 26; risk adjusted = 39  22  FOTO physical FS primary measure score = 34     22    Pain level:  2/10 L hip 2/10 R hip      SUBJECTIVE:    Pt reports she feels like she waddles when she walks.     OBJECTIVE:    SLS R 4sec, L 5 sec      PROM AROM     L R L R   Hip Flexion 90         Hip Abduction 10         Hip ER     24, p! 28    Hip IR      30  20   Knee Flexion           Knee Extension           Dorsiflexion            Plantarflexion            Inversion            Eversion                  Strength (0-5) / Myotomes  9/23 Left Right   Hip Flexion - supine       Hip Flexion - seated (L1-2) 5 3 MMT NT    Hip Abduction 4- 3 MMT NT    Hip ext 3+ 3 MMT NT    Hip ER 3  3  MMT NT    Hip IR 3  3  MMT NT    Quads (L2-4) 5   4+   Hamstrings  5 4+    Ankle Dorsiflexion (L4-5)       Ankle Plantarflexion (S1-2)       Ankle Inversion       Ankle Eversion (S1-2)       Great Toe Extension (L5)               Flexibility       Hamstrings (90/90)       ITB (Juanpablo)       Quads (Ely's)       Hip Flexor (Rodrigo)               Girth (cm)       Mid patella       Suprapatellar       Figure 8       Transmalleolar       Metatarsal Heads            PT to remove post op dressing on 8/16 8/16: no bandage, pt reports it all came off on its own. Pt educated on purchasing waterproof bandages large enough to not have adhesives near stitches and wear them before all showers. During shower pt instructed to let water run over the bandaids and not scrub the skin or glue on the skin. All incisions are clean, dry, and healing appropriately. There was a very minimal scab on most proximal incision that is healing appropriately. No steri strips applied this date.        RESTRICTIONS/PRECAUTIONS: See post op precautions and guidelines in teams   S/P L hip scope LEFT HIP REVISION ARTHROSCOPY, LYSIS OF ADHESIONS, REMOVAL LOOSE BODY, SYNOVECTOMY, FEMOROACETABULAR IMPINGEMENT DECOMPRESSION AND LABRAL REPAIR, ABDUCTOR REPAIR, ENDOSCOPIC BURSECTOMY on 6/28/22.  8/9/22 RIGHT HIP REVISION, ARTHROSCOPY, FEMOROACETABULAR IMPINGEMENT SURGERY, LABRAL REPAIR    50% WB on 9/6    Exercises/Interventions:     Therapeutic Exercise (47551)  Resistance / level Sets/sec Reps Notes / Cues   Upright bike - = No resistance 5' Added 7/13   Prone quad stretch  30\" 3B 10/7   Calf Stretch  30\" 3 9/6: 50% IB    Standing HR- ecc- HEP 9/13      Standing [address]  Added 7/13   Standing ABD  Bent over hip ext lime 2 10B 9/23, 9/30 added TB   Hookyling TrA with L LE march 8/16         LP (maintain <120 degree hip flex, seat at 5 back fully reclined)  SL LP 80#    45#R, 55#L 2 10ea 9/6\" performed DL; 9/30 re-initiated;10/7 added DL   Bridge ecc HEP  9/23    Lateral walk lime 1 6 laps at counter 9/30   Quantum   LAQ  HSC   25  35   2   10ea 10/7   SLR on L/R   5 \" lower 10B 8/31 - able to complete a second set9/30 3 reps on R   Therapeutic Activities (08412) x            Slider lunge post and lateral  1 10ea B 10/7   Step up F/L 6 in 2 10ea  10ea 9/23, 9/30 added lateral   Mini squat with TRX to chair 2 10 7/27 - NMR9/30 re-initiated   Supported standing 3 way hip No resistance  Very small range   Hip hinge   With dowel     8/16: See objective   Neuromuscular Re-ed (30353)        NBOS  NBOS EC  NBOS Head turns, EO, EC 9/6   bosu step up- resume 9/30   Standing TKE CC 9/6   Airex tandem resume 9/23   Manual Intervention (98031) x        PROM within precautions '   Gentle hip circumduction, knee ROM  Wk 3-6  Extension <15? External Rotation <20? Abduction <25? Flexion to 120?      8/11   Light STM L ITB and lateral hip                               Modalities:     Pt. Education: 8' at eval   -pt educated on diagnosis, prognosis and expectations for rehab, post op precautions and guidelines, WB status.   -all pt questions were answered    8/16: see objective    Home Exercise Program:  Access Code: [email or web address]  URL: [email or web address]  Date: 09/23/2022  Prepared by:  Kristine Gtz    Exercises  Standing Hip Abduction with Counter Support - 1 x daily - 7 x weekly - 2-3 sets - 10 reps  [employer] with Eccentric Lowering - 1 x daily - 7 x weekly - 1-2 sets - 10 reps  Standing Eccentric Heel Raise - 1 x daily - 7 x weekly - 2-3 sets - 10 reps  Step Up - 1 x daily - 7 x weekly - 1-2 sets - 10 reps  Squat with Chair Touch - 1 x daily - 7 x weekly - 3 sets - 10 reps  Seated Hip Internal Rotation AROM - 1 x daily - 7 x weekly - 1-2 sets - 10 reps  Tandem Stance - 1 x daily - 7 x weekly - 3 sets - 30 sec hold      Access Code: [email or web address]  URL: [email or web address]  Date: 08/11/2022  Prepared by: Richard Rater    Exercises - completed 8/11  Supine Heel Slide with Strap - 1 x daily - 7 x weekly - 3 sets - 10 reps  Supine Quad Set - 1 x daily - 7 x weekly - 3 sets - 10 reps  Long Sitting Ankle Pumps - 1 x daily - 7 x weekly - 3 sets - 10 reps  Seated Long Arc Quad - 1 x daily - 7 x weekly - 3 sets - 10 reps  Seated Hip Adduction Isometrics with Ball - 1 x daily - 7 x weekly - 3 sets - 10 reps    Strive down on 8/22 but added L hip prone ext and B prone TKE        Therapeutic Exercise and NMR EXR  [x] (40983) Provided verbal/tactile cueing for activities related to strengthening, flexibility, endurance, ROM for improvements in LE, proximal hip, and core control with self care, mobility, lifting, ambulation.  [] (65849) Provided verbal/tactile cueing for activities related to improving balance, coordination, kinesthetic sense, posture, motor skill, proprioception  to assist with LE, proximal hip, and core control in self care, mobility, lifting, ambulation and eccentric single leg control.   [] (29622) Therapist is in constant attendance of 2 or more patients providing skilled therapy interventions, but not providing any significant amount of measurable one-on-one time to either patient, for improvements in LE, proximal hip, and core control in self care, mobility, lifting, ambulation and eccentric single leg control.      NMR and Therapeutic Activities:    [x] (96712 or 77903) Provided verbal/tactile cueing for activities related to improving balance, coordination, kinesthetic sense, posture, motor skill, proprioception and motor activation to allow for proper function of core, proximal hip and LE with self care and ADLs  [] (38699) Gait Re-education- Provided training and instruction to the patient for proper LE, core and proximal hip recruitment and positioning and eccentric body weight control with ambulation re-education including up and down stairs     Home Exercise Program:    [] (06486) Reviewed/Progressed HEP activities related to strengthening, flexibility, endurance, ROM of core, proximal hip and LE for functional self-care, mobility, lifting and ambulation/stair navigation   [] (13863)Reviewed/Progressed HEP activities related to improving balance, coordination, kinesthetic sense, posture, motor skill, proprioception of core, proximal hip and LE for self care, mobility, lifting, and ambulation/stair navigation      Manual Treatments:  PROM / STM / Oscillations-Mobs:  G-I, II, III, IV (PA's, Inf., Post.)  [] (03959) Provided manual therapy to mobilize LE, proximal hip and/or LS spine soft tissue/joints for the purpose of modulating pain, promoting relaxation,  increasing ROM, reducing/eliminating soft tissue swelling/inflammation/restriction, improving soft tissue extensibility and allowing for proper ROM for normal function with self care, mobility, lifting and ambulation. Modalities:  [] (45223) Vasopneumatic compression: Utilized vasopneumatic compression to decrease edema / swelling for the purpose of improving mobility and quad tone / recruitment which will allow for increased overall function including but not limited to self-care, transfers, ambulation, and ascending / descending stairs.        Charges:  Timed Code Treatment Minutes: 45   Total Treatment Minutes: 45     [] EVAL - LOW (80568)   [] EVAL - MOD (09871)  [] EVAL - HIGH (41766)  [] RE-EVAL (14079)  [x] LZ(85855) x 2    [] Ionto  [] NMR (60330) x 1      [] Vaso  [] Manual (49054) x 1      [] Ultrasound  [x] TA x  1     [] Mech Traction (53267)  [] Aquatic Therapy x     [] ES (un) (18780):   [] Home Management Training x  [] ES(attended) (99475)   [] Dry Needling 1-2 muscles (20142):  [] Dry Needling 3+ muscles (891927  [] Group:      [] Other:     GOALS:  Patient stated goal: GET BACK TO WALKING , NO AD  [x] Progressing: [] Met: [] Not Met: [] Adjusted     Therapist goals for Patient:   Short Term Goals: To be achieved in: 2 weeks  1. Independent in HEP and progression per patient tolerance, in order to prevent re-injury. [] Progressing: [x] Met: [] Not Met: [] Adjusted  2. Patient will have a decrease in pain to facilitate improvement in movement, function, and ADLs as indicated by Functional Deficits. [] Progressing: [x] Met: [] Not Met: [] Adjusted     Long Term Goals: To be achieved in: 10 weeks  1. FOTO score of at least 60 to assist with reaching prior level of function. [x] Progressing: [] Met: [] Not Met: [] Adjusted  2. Patient will demonstrate increased AROM to WNL without pain to allow for proper joint functioning as indicated by patients Functional Deficits. [x] Progressing: [] Met: [] Not Met: [] Adjusted  3. Patient will demonstrate an increase in Strength to at least 4+/5 as well as good proximal hip strength and control to allow for proper functional mobility as indicated by patients Functional Deficits. [x] Progressing: [] Met: [] Not Met: [] Adjusted  4. Patient will return to functional activities including ADLs, walking for 15+ minutes, stairs, without increased symptoms or restriction. [x] Progressing: [] Met: [] Not Met: [] Adjusted  5. Pt will improve SLS on L and R to 10+ seconds when able to fully weight bear to improve balance and proprioception. [x] Progressing: [] Met: [] Not Met: [] Adjusted      Overall Progression Towards Functional goals/ Treatment Progress Update:  [x] Patient is progressing as expected towards functional goals listed. [] Progression is slowed due to complexities/Impairments listed. [] Progression has been slowed due to co-morbidities.   [] Plan just implemented, too soon to assess goals progression <30days   [] Goals require adjustment due to lack of progress  [] Patient is not progressing as expected and requires additional follow up with physician  [] Other    Persisting Functional Limitations/Impairments:  [x]Sitting [x]Standing   [x]Walking [x]Stairs   []Transfers [x]ADLs   [x]Squatting/bending []Kneeling  [x]Housework [x]Job related tasks  []Driving [x]Sports/Recreation   []Sleeping []Other:    ASSESSMENT: Progressed functional hip strength with SL leg press, quantum, and slider lunges within pain free ROM. Initiated prone quad stretch to address quad tension. Pt noted mild irritation in L groin with quantum HSS and lateral slider but didn't progress to pain. Pt educated to progress reps with lateral hip strength in HEP. Mild Trendelenburg with gait in clinic. [x] Pt able to complete treatment [] Patient limited by fatique  [x] Patient limited by pain  [] Patient limited by other medical complications  [] Other:      Prognosis:  [x] Good [] Fair  [] Poor    Patient Requires Follow-up: [x] Yes  [] No            PLAN:l. PT 1-2x / week for 8 weeks. [x] Continue per plan of care [] Alter current plan (see comments)  [] Plan of care initiated [] Hold pending MD visit [] Discharge    Electronically signed by: Yvonne Solis, PT, DPT ATC      Note: If patient does not return for scheduled/ recommended follow up visits, this note will serve as a discharge from care along with most recent update on progress.

## 2022-10-14 ENCOUNTER — APPOINTMENT (OUTPATIENT)
Dept: GENERAL RADIOLOGY | Age: 42
End: 2022-10-14
Payer: COMMERCIAL

## 2022-10-14 ENCOUNTER — APPOINTMENT (OUTPATIENT)
Dept: CT IMAGING | Age: 42
End: 2022-10-14
Payer: COMMERCIAL

## 2022-10-14 ENCOUNTER — HOSPITAL ENCOUNTER (OUTPATIENT)
Dept: PHYSICAL THERAPY | Age: 42
Setting detail: THERAPIES SERIES
Discharge: HOME | End: 2022-10-14
Payer: COMMERCIAL

## 2022-10-14 ENCOUNTER — TELEPHONE (OUTPATIENT)
Dept: CARDIOLOGY CLINIC | Age: 42
End: 2022-10-14

## 2022-10-14 ENCOUNTER — HOSPITAL ENCOUNTER (EMERGENCY)
Age: 42
Discharge: LEFT BEFORE DISCHARGE | End: 2022-10-14
Attending: EMERGENCY MEDICINE
Payer: COMMERCIAL

## 2022-10-14 VITALS
WEIGHT: 232.6 LBS | OXYGEN SATURATION: 100 % | BODY MASS INDEX: 38.71 KG/M2 | DIASTOLIC BLOOD PRESSURE: 56 MMHG | TEMPERATURE: 98.3 F | SYSTOLIC BLOOD PRESSURE: 103 MMHG | HEART RATE: 63 BPM | RESPIRATION RATE: 16 BRPM

## 2022-10-14 DIAGNOSIS — R07.9 CHEST PAIN, UNSPECIFIED TYPE: Primary | ICD-10-CM

## 2022-10-14 DIAGNOSIS — R20.2 PARESTHESIA: ICD-10-CM

## 2022-10-14 LAB
ANION GAP SERPL CALCULATED.3IONS-SCNC: 8 MMOL/L (ref 3–16)
BASOPHILS ABSOLUTE: 0 K/UL (ref 0–0.2)
BASOPHILS RELATIVE PERCENT: 0.5 %
BUN BLDV-MCNC: 9 MG/DL (ref 7–20)
CALCIUM SERPL-MCNC: 9.4 MG/DL (ref 8.3–10.6)
CHLORIDE BLD-SCNC: 100 MMOL/L (ref 99–110)
CO2: 27 MMOL/L (ref 21–32)
CREAT SERPL-MCNC: <0.5 MG/DL (ref 0.6–1.1)
D DIMER: 0.7 UG/ML FEU (ref 0–0.6)
EKG ATRIAL RATE: 57 BPM
EKG DIAGNOSIS: NORMAL
EKG P AXIS: 15 DEGREES
EKG P-R INTERVAL: 134 MS
EKG Q-T INTERVAL: 444 MS
EKG QRS DURATION: 96 MS
EKG QTC CALCULATION (BAZETT): 432 MS
EKG R AXIS: 11 DEGREES
EKG T AXIS: 38 DEGREES
EKG VENTRICULAR RATE: 57 BPM
EOSINOPHILS ABSOLUTE: 0.1 K/UL (ref 0–0.6)
EOSINOPHILS RELATIVE PERCENT: 1.1 %
GFR AFRICAN AMERICAN: >60
GFR NON-AFRICAN AMERICAN: >60
GLUCOSE BLD-MCNC: 130 MG/DL (ref 70–99)
HCT VFR BLD CALC: 35.3 % (ref 36–48)
HEMOGLOBIN: 11.8 G/DL (ref 12–16)
LYMPHOCYTES ABSOLUTE: 2.3 K/UL (ref 1–5.1)
LYMPHOCYTES RELATIVE PERCENT: 37.6 %
MCH RBC QN AUTO: 28.2 PG (ref 26–34)
MCHC RBC AUTO-ENTMCNC: 33.3 G/DL (ref 31–36)
MCV RBC AUTO: 84.8 FL (ref 80–100)
MONOCYTES ABSOLUTE: 0.8 K/UL (ref 0–1.3)
MONOCYTES RELATIVE PERCENT: 13.4 %
NEUTROPHILS ABSOLUTE: 2.9 K/UL (ref 1.7–7.7)
NEUTROPHILS RELATIVE PERCENT: 47.4 %
PDW BLD-RTO: 13.6 % (ref 12.4–15.4)
PLATELET # BLD: 289 K/UL (ref 135–450)
PMV BLD AUTO: 6.9 FL (ref 5–10.5)
POTASSIUM REFLEX MAGNESIUM: 4.4 MMOL/L (ref 3.5–5.1)
RBC # BLD: 4.17 M/UL (ref 4–5.2)
SODIUM BLD-SCNC: 135 MMOL/L (ref 136–145)
TROPONIN: <0.01 NG/ML
WBC # BLD: 6 K/UL (ref 4–11)

## 2022-10-14 PROCEDURE — 71046 X-RAY EXAM CHEST 2 VIEWS: CPT

## 2022-10-14 PROCEDURE — 93005 ELECTROCARDIOGRAM TRACING: CPT | Performed by: EMERGENCY MEDICINE

## 2022-10-14 PROCEDURE — 36415 COLL VENOUS BLD VENIPUNCTURE: CPT

## 2022-10-14 PROCEDURE — 85379 FIBRIN DEGRADATION QUANT: CPT

## 2022-10-14 PROCEDURE — 80048 BASIC METABOLIC PNL TOTAL CA: CPT

## 2022-10-14 PROCEDURE — 93010 ELECTROCARDIOGRAM REPORT: CPT | Performed by: INTERNAL MEDICINE

## 2022-10-14 PROCEDURE — 99285 EMERGENCY DEPT VISIT HI MDM: CPT

## 2022-10-14 PROCEDURE — 85025 COMPLETE CBC W/AUTO DIFF WBC: CPT

## 2022-10-14 PROCEDURE — 84484 ASSAY OF TROPONIN QUANT: CPT

## 2022-10-14 PROCEDURE — 6360000004 HC RX CONTRAST MEDICATION: Performed by: EMERGENCY MEDICINE

## 2022-10-14 PROCEDURE — 6370000000 HC RX 637 (ALT 250 FOR IP): Performed by: EMERGENCY MEDICINE

## 2022-10-14 PROCEDURE — 71260 CT THORAX DX C+: CPT | Performed by: EMERGENCY MEDICINE

## 2022-10-14 RX ORDER — ASPIRIN 81 MG/1
243 TABLET, CHEWABLE ORAL ONCE
Status: COMPLETED | OUTPATIENT
Start: 2022-10-14 | End: 2022-10-14

## 2022-10-14 RX ADMIN — ASPIRIN 81 MG 243 MG: 81 TABLET ORAL at 13:19

## 2022-10-14 RX ADMIN — IOPAMIDOL 75 ML: 755 INJECTION, SOLUTION INTRAVENOUS at 14:13

## 2022-10-14 ASSESSMENT — PAIN SCALES - GENERAL: PAINLEVEL_OUTOF10: 5

## 2022-10-14 ASSESSMENT — PAIN - FUNCTIONAL ASSESSMENT: PAIN_FUNCTIONAL_ASSESSMENT: 0-10

## 2022-10-14 ASSESSMENT — PAIN DESCRIPTION - LOCATION: LOCATION: CHEST

## 2022-10-14 NOTE — TELEPHONE ENCOUNTER
Pt states she had chest pain, sob, left arm numbness that woke her up last night. She took a nitro and subsided in about 5 min. States this morning she has chest tightness and pain when she has a deep breath. She went to therapy this morning and the told her to call cardio. Please call to advise.

## 2022-10-14 NOTE — TELEPHONE ENCOUNTER
Spoke to patient she stated since the episode last night she has not had any symptoms. Patient also stated that she was been using Nitro more often.

## 2022-10-14 NOTE — FLOWSHEET NOTE
[facility]     Physical Therapy  Cancellation/No-show Note  Patient Name:  Dontrell Pena  :  1980   Date:  10/14/2022  Cancelled visits to date: 10/14  No-shows to date: 0    Patient status for today's appointment patient:  [x]  Cancelled 10/14  []  Rescheduled appointment  []  No-show     Reason given by patient:  []  Patient ill  []  Conflicting appointment  []  No transportation    []  Conflict with work  []  No reason given  [x]  Other:  Pt arrived on time for appt but reported she awoke from sleep at 2 am with severe chest pain (10/10) and SOB; pt took nitro and chest pain minimized to 4/10 and SOB resolved and was able to fall back asleep about 1 hr later. At presentation of visit chest pain/pressure still 4/10; vitals normal (HR 61 bpm and /60). Pt reports increased need for use of nitro in past few months but sx fully resolve with taking meds until todays incident. Pt has not checked in with PCP or cardiologist due to appts being later in . Pt advised to hold PT and HEP and contact PCP when office opens today. Pt in agreement with plan. Will send my chart message to PCP to inform of status. Comments:      Phone call information:   []  Phone call made today to patient at _ time at number provided:      []  Patient answered, conversation as follows:    []  Patient did not answer, message left as follows:  []  Phone call not made today  []  Phone call not needed - pt contacted us to cancel and provided reason for cancellation. Electronically signed by:   [facility], BASILIO DPT ATC

## 2022-10-14 NOTE — ED NOTES
Pt signed out AMA. Dr. Streeter Nicely was at bedside and explained risks of leaving. Pt verbalized understanding and signed [facility] paperwork. Pt is A&Ox4 and able to make her own medical decisions.       Ford Elena, RN  10/14/22 [address] [facility], RN  10/14/22 1408

## 2022-10-14 NOTE — TELEPHONE ENCOUNTER
Returned patient call. She reports that she had her covid/flu shot on Tuesday. She reports that she has been recovering from her hip surgery and making progress. She states that she woke up at 2 am with chest discomfort, \"not being able to breathe\" and discomfort that radiated down her left arm. It worsened with breathing. She states that she took a nitroglycerin and the pain resolved after 3-5 minutes. She reports that the pain is different from her typical sharp chest pains. She states that she she went to physical therapy and they told her not to participate. They checked her blood pressure, which was reportedly normal. She states that she still has some light \"soreness\" and \"tightness\" in her chest. I recommended that she go to the ER for further evaluation now. She will need to be evaluated for pulmonary embolism and acs given her strong family history of premature cad and familial hyperlipidemia.

## 2022-10-14 NOTE — DISCHARGE INSTRUCTIONS
Please follow-up with your primary care physician as soon as possible for reevaluation of your symptoms. It is also important that you follow-up with cardiology as soon as possible for your chest pain and for evaluation for stress testing. Begin taking 81 mg aspirin daily if you do not already do so. You may additionally take Tylenol as needed for pain. While you have elected to leave the hospital [address] today, you are welcome to return to the emergency department at any time should you change your mind and wish to be reevaluated. I especially encourage you to return should you have worsened or changing chest pain, shortness of breath, fevers or abdominal pain. Return if you have any other new or changing symptoms that concern you and you wish to be reevaluated.

## 2022-10-15 NOTE — ED PROVIDER NOTES
Bipolar 1 disorder (HCC)     COPD (chronic obstructive pulmonary disease) ([facility])     Depression     Diabetes mellitus ([facility] [address]     Femoroacetabular impingement of right hip 8/9/2022    Hyperlipidemia     Hypertension     Tachycardia     Tachycardia     Tear of right gluteus medius tendon 8/9/2022     Past surgical history:   Past Surgical History:   Procedure Laterality Date    ANKLE FUSION Bilateral     ARTHRODESIS Left 12/6/2019    REVISION OF TALONAVICULAR JOINT ARTHRODESIS LEFT FOOT  -SLEEP APNEA- performed by Ron Boyd DPM at [facility] Bilateral     ELBOW SURGERY Bilateral     ulnar nerve release    HERNIA REPAIR      HIP ARTHROSCOPY Bilateral     HIP SURGERY Left 6/28/2022    LEFT HIP REVISION ARTHROSCOPY, LYSIS OF ADHESIONS, REMOVAL LOOSE BODY, SYNOVECTOMY, FEMOROACETABULAR IMPINGEMENT DECOMPRESSION AND LABRAL REPAIR, ABDUCTOR REPAIR, ENDOSCOPIC BURSECTOMY  - ELMA BLOCK; LOCAL (ORTHOMIX) performed by Liss Truong MD at [address] Right 8/9/2022    RIGHT HIP REVISION, ARTHROSCOPY, FEMOROACETABULAR IMPINGEMENT SURGERY, LABRAL REPAIR, ENDOSCOPIC,-BLOCK (PENG), LOCAL (ORTHOMIX)-ROSE AND NEPHEW-SHANIQUA performed by Liss Truong MD at [address] [place] [address] Bilateral        Home medications:   Discharge Medication List as of 10/14/2022  6:13 PM        CONTINUE these medications which have NOT CHANGED    Details   naproxen (NAPROSYN) 500 MG tablet TAKE ONE TABLET BY MOUTH EVERY MORNING AND EVERY EVENING .  TAKE WITH MEALS  FOR 14 DAYS, Disp-28 tablet, R-0Normal      ezetimibe (ZETIA) 10 MG tablet TAKE 1 TABLET BY MOUTH EVERY DAY, Disp-90 tablet, R-0Normal      albuterol sulfate HFA (PROVENTIL;VENTOLIN;PROAIR) 108 (90 Base) MCG/ACT inhaler TAKE 2 PUFFS BY MOUTH EVERY 6 HOURS AS NEEDED FOR WHEEZE, Disp-6.7 each, R-1Normal      Evolocumab 140 MG/ML SOAJ Inject 140 mg into the skin every 14 days, Disp-2 pen, R-11Normal      Cholecalciferol (VITAMIN D3) 50 MCG (2000 UT) CAPS TAKE 1 CAPSULE BY MOUTH EVERY DAY, Disp-90 capsule, R-1Normal      citalopram (CELEXA) 40 MG tablet TAKE 1 TABLET BY MOUTH EVERY DAY IN THE MORNINGHistorical Med      omeprazole (PRILOSEC) 20 MG delayed release capsule TAKE 1 CAPSULE BY MOUTH EVERY DAY, Disp-90 capsule, R-1Normal      rosuvastatin (CRESTOR) 40 MG tablet TAKE 1 TABLET BY MOUTH EVERY DAY, Disp-90 tablet, R-1Normal      metFORMIN (GLUCOPHAGE-XR) 500 MG extended release tablet Take 2 tablets by mouth daily (with breakfast), Disp-180 tablet, R-1Normal      metoprolol tartrate (LOPRESSOR) 50 MG tablet Take 1 tablet by mouth 2 times daily, Disp-180 tablet, R-1Normal      nitroGLYCERIN (NITROSTAT) 0.4 MG SL tablet Place 1 tablet under the tongue every 5 minutes as needed for Chest pain up to max of 3 total doses. If no relief after 1 dose, call 911., Disp-25 tablet, R-0Normal      ammonium lactate (LAC-HYDRIN) 12 % lotion Apply topically daily, Topical, DAILY Starting Mon 4/5/2021, Disp-225 mL, R-2, Normal      thiothixene (NAVANE) 2 MG capsule TAKE 1 CAPSULE BY MOUTH EVERY EVENING AT BEDTIMEHistorical Med      pregabalin (LYRICA) 75 MG capsule Take 1 capsule by mouth 2 times daily for 90 days. , Disp-60 capsule, R-2Normal      divalproex (DEPAKOTE ER) 500 MG extended release tablet Take 500 mg by mouth daily Historical Med      hydrOXYzine (ATARAX) 25 MG tablet Take 25 mg by mouth 3 times daily as needed for Anxiety Historical Med             Social history:  reports that she quit smoking about 2 years ago. Her smoking use included cigarettes. She started smoking about 30 years ago. She has a 27.00 pack-year smoking history. She has never used smokeless tobacco. She reports that she does not drink alcohol and does not use drugs.     Family history:    Family History   Problem Relation Age of Onset    High Blood Pressure Mother     Arthritis Mother     Other Father         hypothyroidism High Blood Pressure Father     Arthritis Father     Asthma Father     Heart Failure Maternal Aunt         22 stents. Heart Failure Maternal Grandmother     Pacemaker Maternal Grandfather     Heart Surgery Paternal Grandmother     Pacemaker Paternal Grandfather          Exam  ED Triage Vitals   BP Temp Temp Source Heart Rate Resp SpO2 Height Weight   10/14/22 1246 10/14/22 1246 10/14/22 1246 10/14/22 1246 10/14/22 1246 10/14/22 1246 -- 10/14/22 1244   132/82 98.3 °F (36.8 °C) Oral 63 20 100 %  232 lb 9.6 oz (105.5 kg)     Nursing note and vitals reviewed. Constitutional: Well developed, well nourished. Non-toxic in appearance. BMI 38.71  HENT:      Head: Normocephalic and atraumatic. Ears: External ears normal.      Nose: Nose normal.     Mouth: Membrane mucosa moist and pink. Eyes: Anicteric sclera. No discharge. Neck: Supple. Trachea midline. Cardiovascular: RRR; no murmurs, rubs, or gallops. Radial 2+ and symmetric. Pulmonary/Chest: Effort normal. No respiratory distress. CTAB. No stridor. No wheezes. No rales. Abdominal: Soft. No distension. Nontender to deep palpation all quadrants. Musculoskeletal: Moves all extremities. No gross deformity. Neurological: Alert and orientedx4. Face symmetric. Speech is clear. 5 out of 5 motor and sensation grossly intact bilateral upper extremities. Skin: Warm and dry. No rash. Psychiatric: Normal mood and affect. Behavior is normal.    Procedures      EKG    EKG was reviewed by emergency department physician in the absence of a cardiologist    Narrow complex sinus rhythm, rate 57, normal axis, normal CT and QRS intervals, normal Qtc, no ST elevations or depressions, TWI V2-V4, impression sinus bradycardia with nonspecific T wave morphology, no STEMI, no significant change from comparison 7/14/2022      Radiology  CT CHEST PULMONARY EMBOLISM W CONTRAST   Final Result   No evidence of pulmonary embolism or acute pulmonary abnormality.          XR CHEST (2 VW)   Final Result   No radiographic evidence of acute pulmonary abnormality seen.              Labs  Results for orders placed or performed during the hospital encounter of 10/14/22   CBC with Auto Differential   Result Value Ref Range    WBC 6.0 4.0 - 11.0 K/uL    RBC 4.17 4.00 - 5.20 M/uL    Hemoglobin 11.8 (L) 12.0 - 16.0 g/dL    Hematocrit 35.3 (L) 36.0 - 48.0 %    MCV 84.8 80.0 - 100.0 fL    MCH 28.2 26.0 - 34.0 pg    MCHC 33.3 31.0 - 36.0 g/dL    RDW 13.6 12.4 - 15.4 %    Platelets 567 372 - 944 K/uL    MPV 6.9 5.0 - 10.5 fL    Neutrophils % 47.4 %    Lymphocytes % 37.6 %    Monocytes % 13.4 %    Eosinophils % 1.1 %    Basophils % 0.5 %    Neutrophils Absolute 2.9 1.7 - 7.7 K/uL    Lymphocytes Absolute 2.3 1.0 - 5.1 K/uL    Monocytes Absolute 0.8 0.0 - 1.3 K/uL    Eosinophils Absolute 0.1 0.0 - 0.6 K/uL    Basophils Absolute 0.0 0.0 - 0.2 K/uL   BMP w/ Reflex to MG   Result Value Ref Range    Sodium 135 (L) 136 - 145 mmol/L    Potassium reflex Magnesium 4.4 3.5 - 5.1 mmol/L    Chloride 100 99 - 110 mmol/L    CO2 27 21 - 32 mmol/L    Anion Gap 8 3 - 16    Glucose 130 (H) 70 - 99 mg/dL    BUN 9 7 - 20 mg/dL    Creatinine <0.5 (L) 0.6 - 1.1 mg/dL    GFR Non-African American >60 >60    GFR African American >60 >60    Calcium 9.4 8.3 - 10.6 mg/dL   Troponin   Result Value Ref Range    Troponin <0.01 <0.01 ng/mL   D-Dimer, Quantitative   Result Value Ref Range    D-Dimer, Quant 0.70 (H) 0.00 - 0.60 ug/mL FEU   EKG 12 Lead   Result Value Ref Range    Ventricular Rate 57 BPM    Atrial Rate 57 BPM    P-R Interval 134 ms    QRS Duration 96 ms    Q-T Interval 444 ms    QTc Calculation (Bazett) 432 ms    P Axis 15 degrees    R Axis 11 degrees    T Axis 38 degrees    Diagnosis       Sinus bradycardiaT wave abnormality, consider anterior ischemiaConfirmed by Claire Workman ([id number]) on 10/14/2022 5:00:58 PM       Screenings   Nicolás Coma Scale  Eye Opening: Spontaneous  Best Verbal Response: Oriented  Best Motor Response: Obeys commands  Nicolás Coma Scale Score: 15       Is this patient to be included in the SEP-1 Core Measure due to severe sepsis or septic shock? No   Exclusion criteria - the patient is NOT to be included for SEP-1 Core Measure due to: Infection is not suspected      MDM and ED Course    Patient afebrile and nontoxic. She is in no distress. EKG no STEMI, initial troponin normal, ACS is not immediately evident. No malignant dysrhythmia. D-dimer was modestly elevated, follow-up CTA chest revealed no evidence of pulmonary embolism, pneumothorax, mediastinal abnormality or other acute process. Clinically my suspicion for aortic dissection is extremely low. No findings to suggest infection, patient not septic. Remainder of laboratory work-up is reassuring, no evidence of acute endorgan dysfunction or clinically significant electrolyte derangement. Patient is moderate risk by HEART score. Despite reassuring work-up, patient's symptoms are concerning for unstable angina and I did strongly recommend admission to patient for further evaluation and care and to exclude potential cardiac etiology of her symptoms. I spent a lengthy amount of time at bedside with patient and attempted convince her to stay, however patient states she is unwilling to be hospitalized that she must go to [employer] as no one else is able to feel her role at [employer] service. I discussed my concerns of chest pain, possible heart attack with Yael Holter and recommended admission to the hospital for further evaluation and management of a potentially life-threatening medical condition. The patient is adamant that she does not want to be hospitalized and wishes to be discharged. I discussed with the patient the risk of permanent disability or death and the patient verbalized understanding of these risks. The patient is alert, fully oriented and without any evidence of intoxication and has the capacity to make this decision.  I counseled the patient that they are welcome to return to the emergency department at any time should they change their mind or have any other concerns. I Dr. Anam Loya am the primary clinician of record. Final Impression  1. Chest pain, unspecified type    2. Paresthesia        Blood pressure (!) 103/56, pulse 63, temperature 98.3 °F (36.8 °C), temperature source Oral, resp. rate 16, weight 232 lb 9.6 oz (105.5 kg), SpO2 100 %. Disposition:  DISPOSITION [place] 10/14/2022 06:06:58 PM      Patient Referrals:  Katey Manuel, APRN - CNP  [facility]  [address] [place] [address]. [address]  [phone]    In 1 day      Piedad West DO  [address] [place] [id number]  [phone]    In 1 day        Discharge Medications:  Discharge Medication List as of 10/14/2022  6:13 PM          Discontinued Medications:  Discharge Medication List as of 10/14/2022  6:13 PM        STOP taking these medications       diclofenac sodium (VOLTAREN) 1 % GEL Comments:   Reason for Stopping: This chart was generated using the [address] [place] [address] dictation system. I created this record but it may contain dictation errors given the limitations of this technology.     Klaus Sheets DO (electronically signed)  Attending Emergency Physician       Klaus Sheets DO  10/15/22 0007

## 2022-10-18 ENCOUNTER — TELEPHONE (OUTPATIENT)
Dept: CARDIOLOGY CLINIC | Age: 42
End: 2022-10-18

## 2022-10-18 NOTE — TELEPHONE ENCOUNTER
Pt states they were in the ER this past fri. For Chest Pain. After waiting 6 hours she left. Pt states blood work showed no sign of Heart Attack but they had concerns. Pt asking if DKW would like to order a stress test prior to her 12/16/22 appt or just wait to see her on 12/16/22. Please advise.

## 2022-10-19 ENCOUNTER — HOSPITAL ENCOUNTER (OUTPATIENT)
Dept: PHYSICAL THERAPY | Age: 42
Setting detail: THERAPIES SERIES
End: 2022-10-19
Payer: COMMERCIAL

## 2022-10-21 ENCOUNTER — HOSPITAL ENCOUNTER (OUTPATIENT)
Age: 42
Discharge: HOME | End: 2022-10-21
Payer: COMMERCIAL

## 2022-10-21 DIAGNOSIS — E78.01 FAMILIAL HYPERCHOLESTEROLEMIA: ICD-10-CM

## 2022-10-21 DIAGNOSIS — E11.9 TYPE 2 DIABETES MELLITUS WITHOUT COMPLICATION, WITHOUT LONG-TERM CURRENT USE OF INSULIN (HCC): ICD-10-CM

## 2022-10-21 DIAGNOSIS — I10 ESSENTIAL HYPERTENSION: ICD-10-CM

## 2022-10-21 LAB
A/G RATIO: 1.5 (ref 1.1–2.2)
ALBUMIN SERPL-MCNC: 4.3 G/DL (ref 3.4–5)
ALP BLD-CCNC: 50 U/L (ref 40–129)
ALT SERPL-CCNC: 15 U/L (ref 10–40)
ANION GAP SERPL CALCULATED.3IONS-SCNC: 12 MMOL/L (ref 3–16)
AST SERPL-CCNC: 16 U/L (ref 15–37)
BASOPHILS ABSOLUTE: 0.1 K/UL (ref 0–0.2)
BASOPHILS RELATIVE PERCENT: 1.2 %
BILIRUB SERPL-MCNC: 0.3 MG/DL (ref 0–1)
BUN BLDV-MCNC: 12 MG/DL (ref 7–20)
CALCIUM SERPL-MCNC: 9.3 MG/DL (ref 8.3–10.6)
CHLORIDE BLD-SCNC: 100 MMOL/L (ref 99–110)
CHOLESTEROL, TOTAL: 144 MG/DL (ref 0–199)
CO2: 26 MMOL/L (ref 21–32)
CREAT SERPL-MCNC: 0.6 MG/DL (ref 0.6–1.1)
EOSINOPHILS ABSOLUTE: 0.1 K/UL (ref 0–0.6)
EOSINOPHILS RELATIVE PERCENT: 0.8 %
ESTIMATED AVERAGE GLUCOSE: 131.2 MG/DL
GFR SERPL CREATININE-BSD FRML MDRD: >60 ML/MIN/{1.73_M2}
GLUCOSE BLD-MCNC: 102 MG/DL (ref 70–99)
HBA1C MFR BLD: 6.2 %
HCT VFR BLD CALC: 36.7 % (ref 36–48)
HDLC SERPL-MCNC: 33 MG/DL (ref 40–60)
HEMOGLOBIN: 12.5 G/DL (ref 12–16)
LDL CHOLESTEROL CALCULATED: 79 MG/DL
LYMPHOCYTES ABSOLUTE: 2.8 K/UL (ref 1–5.1)
LYMPHOCYTES RELATIVE PERCENT: 38.8 %
MCH RBC QN AUTO: 28.4 PG (ref 26–34)
MCHC RBC AUTO-ENTMCNC: 34.1 G/DL (ref 31–36)
MCV RBC AUTO: 83.4 FL (ref 80–100)
MONOCYTES ABSOLUTE: 0.6 K/UL (ref 0–1.3)
MONOCYTES RELATIVE PERCENT: 8.9 %
NEUTROPHILS ABSOLUTE: 3.6 K/UL (ref 1.7–7.7)
NEUTROPHILS RELATIVE PERCENT: 50.3 %
PDW BLD-RTO: 13.5 % (ref 12.4–15.4)
PLATELET # BLD: 343 K/UL (ref 135–450)
PMV BLD AUTO: 7.4 FL (ref 5–10.5)
POTASSIUM SERPL-SCNC: 4.1 MMOL/L (ref 3.5–5.1)
RBC # BLD: 4.41 M/UL (ref 4–5.2)
SODIUM BLD-SCNC: 138 MMOL/L (ref 136–145)
TOTAL CK: 87 U/L (ref 26–192)
TOTAL PROTEIN: 7.2 G/DL (ref 6.4–8.2)
TRIGL SERPL-MCNC: 160 MG/DL (ref 0–150)
VLDLC SERPL CALC-MCNC: 32 MG/DL
WBC # BLD: 7.2 K/UL (ref 4–11)

## 2022-10-21 PROCEDURE — 83036 HEMOGLOBIN GLYCOSYLATED A1C: CPT

## 2022-10-21 PROCEDURE — 80053 COMPREHEN METABOLIC PANEL: CPT

## 2022-10-21 PROCEDURE — 80061 LIPID PANEL: CPT

## 2022-10-21 PROCEDURE — 85025 COMPLETE CBC W/AUTO DIFF WBC: CPT

## 2022-10-21 PROCEDURE — 36415 COLL VENOUS BLD VENIPUNCTURE: CPT

## 2022-10-21 PROCEDURE — 82550 ASSAY OF CK (CPK): CPT

## 2022-10-24 ENCOUNTER — TELEPHONE (OUTPATIENT)
Dept: CARDIOLOGY CLINIC | Age: 42
End: 2022-10-24

## 2022-10-24 NOTE — TELEPHONE ENCOUNTER
Edel Zhao called to request the Pt recent Lipid Panel. Please fax to:   [phone].   Please advise

## 2022-10-25 DIAGNOSIS — E11.9 TYPE 2 DIABETES MELLITUS WITHOUT COMPLICATION, WITHOUT LONG-TERM CURRENT USE OF INSULIN (HCC): ICD-10-CM

## 2022-10-25 RX ORDER — METFORMIN HYDROCHLORIDE 500 MG/1
1000 TABLET, EXTENDED RELEASE ORAL
Qty: 180 TABLET | Refills: 1 | OUTPATIENT
Start: 2022-10-25

## 2022-10-28 ENCOUNTER — HOSPITAL ENCOUNTER (OUTPATIENT)
Dept: PHYSICAL THERAPY | Age: 42
Setting detail: THERAPIES SERIES
End: 2022-10-28
Payer: COMMERCIAL

## 2022-10-29 ASSESSMENT — ENCOUNTER SYMPTOMS
CONSTIPATION: 0
CHEST TIGHTNESS: 0
COUGH: 0
NAUSEA: 0
WHEEZING: 0
SHORTNESS OF BREATH: 0
VOMITING: 0
ABDOMINAL PAIN: 0
DIARRHEA: 0

## 2022-10-30 DIAGNOSIS — J44.9 COPD WITH ASTHMA (HCC): ICD-10-CM

## 2022-10-30 NOTE — PROGRESS NOTES
SUBJECTIVE:    Patient ID:  Shellee Holter is a 43 y.o. female      Patient is here for a medication check for hyperlipidemia, diabetes, chronic tachycardia, GERD, AMOS, vitamin D deficiency, fibromyalgia and chronic pain. Tachycardia managed with metoprolol twice daily. GERD symptoms are managed with omeprazole. Denies indigestion/heartburn, difficulty swallowing, epigastric pain, nausea, vomiting, diarrhea, constipation or blood in stool. She has a history of anxiety, depression, bipolar and PTSD. She is followed a psychiatrist every 2 to 3 months and sees a counselor twice a week. She is currently taking Celexa, Depakote and hydroxyzine as needed. Currently denies thoughts of self-harm, suicidal or homicidal ideations. States she has been evaluated by cardiology and will be followed annually. She was told to follow-up with pulmonology annually for COPD and AMOS. AMOS is managed with nightly CPAP usage. She also has a history or alcoholism for 24 years, she has been sober for almost 4 years. Labs reviewed from 10/21/2022 CBC unremarkable CMP essentially normal, fasting glucose 102, A1c 6.2 total cholesterol 144, triglycerides 160 HDL 33, LDL DL 79, CK 87. She was recently seen in the ED for concerns of chest pain and was encouraged to stay for further workup evaluation. Patient declined and opted for close out patient follow up. ER notes, lab and imaging were reviewed. She does have a history of positive MATTHEW and was initially evaluated by rheumatology 7/13/2020. Apparently was lost to follow-up during the COVID-19 pandemic. Labs reviewed from 8/5/2020, Anti SSA 3.1 and Anti RNP 1.4. Encouraged to reestablish with rheumatology. States she has been working with Ortho for her hip and knee pain in addition to podiatry for her foot pain. She was also also working with pain management, but has been lost to follow up. States she had bilateral hip scopes with bone scraping.   She will eventually need bilateral hip replacements. Ortho is working on putting it off as long as they possibly can. Hyperlipidemia  This is a chronic problem. The current episode started more than 1 year ago. The problem is controlled. Recent lipid tests were reviewed and are normal. Exacerbating diseases include obesity. Pertinent negatives include no chest pain, focal sensory loss, focal weakness, myalgias or shortness of breath. Current antihyperlipidemic treatment includes statins and ezetimibe (injectable). The current treatment provides significant improvement of lipids. Risk factors for coronary artery disease include dyslipidemia and obesity. Diabetes  She presents for her follow-up diabetic visit. She has type 2 diabetes mellitus. Her disease course has been stable. Pertinent negatives for hypoglycemia include no headaches or nervousness/anxiousness. Pertinent negatives for diabetes include no chest pain, no foot paresthesias, no polydipsia, no polyphagia and no polyuria. Risk factors for coronary artery disease include hypertension and dyslipidemia. Current diabetic treatment includes oral agent (monotherapy) and diet. She is following a generally healthy diet. An ACE inhibitor/angiotensin II receptor blocker is being taken. Current Outpatient Medications on File Prior to Visit   Medication Sig Dispense Refill    ASPIRIN LOW DOSE 81 MG EC tablet TAKE 1 TABLET BY MOUTH EVERY DAY      naproxen (NAPROSYN) 500 MG tablet TAKE ONE TABLET BY MOUTH EVERY MORNING AND EVERY EVENING .  TAKE WITH MEALS  FOR 14 DAYS 28 tablet 0    albuterol sulfate HFA (PROVENTIL;VENTOLIN;PROAIR) 108 (90 Base) MCG/ACT inhaler TAKE 2 PUFFS BY MOUTH EVERY 6 HOURS AS NEEDED FOR WHEEZE 6.7 each 1    Evolocumab 140 MG/ML SOAJ Inject 140 mg into the skin every 14 days 2 pen 11    Cholecalciferol (VITAMIN D3) 50 MCG (2000 UT) CAPS TAKE 1 CAPSULE BY MOUTH EVERY DAY 90 capsule 1    citalopram (CELEXA) 40 MG tablet TAKE 1 TABLET BY MOUTH EVERY DAY IN THE MORNING      [DISCONTINUED] diclofenac sodium (VOLTAREN) 1 % GEL APPLY 2 GRAMS TOPICALLY 2 TIMES DAILY (Patient not taking: Reported on 8/8/2022) 100 g 1    nitroGLYCERIN (NITROSTAT) 0.4 MG SL tablet Place 1 tablet under the tongue every 5 minutes as needed for Chest pain up to max of 3 total doses. If no relief after 1 dose, call 911. 25 tablet 0    ammonium lactate (LAC-HYDRIN) 12 % lotion Apply topically daily 225 mL 2    thiothixene (NAVANE) 2 MG capsule TAKE 1 CAPSULE BY MOUTH EVERY EVENING AT BEDTIME      pregabalin (LYRICA) 75 MG capsule Take 1 capsule by mouth 2 times daily for 90 days. 60 capsule 2    divalproex (DEPAKOTE ER) 500 MG extended release tablet Take 500 mg by mouth daily       hydrOXYzine (ATARAX) 25 MG tablet Take 25 mg by mouth 3 times daily as needed for Anxiety        No current facility-administered medications on file prior to visit.       Past Medical History:   Diagnosis Date    Arthritis     Bipolar 1 disorder (HCC)     COPD (chronic obstructive pulmonary disease) (HCC)     Depression     Diabetes mellitus ([facility])     Femoroacetabular impingement of right hip 8/9/2022    Hyperlipidemia     Hypertension     Tachycardia     Tachycardia     Tear of right gluteus medius tendon 8/9/2022     Past Surgical History:   Procedure Laterality Date    ANKLE FUSION Bilateral     ARTHRODESIS Left 12/6/2019    REVISION OF TALONAVICULAR JOINT ARTHRODESIS LEFT FOOT  -SLEEP APNEA- performed by Richard Ott DPM at [address] [place] [address],[facility] Bilateral     ELBOW SURGERY Bilateral     ulnar nerve release    HERNIA REPAIR      HIP ARTHROSCOPY Bilateral     HIP SURGERY Left 6/28/2022    LEFT HIP REVISION ARTHROSCOPY, LYSIS OF ADHESIONS, REMOVAL LOOSE BODY, SYNOVECTOMY, FEMOROACETABULAR IMPINGEMENT DECOMPRESSION AND LABRAL REPAIR, ABDUCTOR REPAIR, ENDOSCOPIC BURSECTOMY  - ELMA BLOCK; LOCAL (ORTHOMIX) performed by Noah Mcdonnell MD at [address] Right 8/9/2022    RIGHT HIP REVISION, ARTHROSCOPY, FEMOROACETABULAR IMPINGEMENT SURGERY, LABRAL REPAIR, ENDOSCOPIC,-BLOCK (PENG), LOCAL (ORTHOMIX)-ROSE AND NEPHEW-SHANIQUA performed by Donald Castro MD at [address] [place] [address] Bilateral      Family History   Problem Relation Age of Onset    High Blood Pressure Mother     Arthritis Mother     Other Father         hypothyroidism    High Blood Pressure Father     Arthritis Father     Asthma Father     Heart Failure Maternal Aunt         22 stents.      Heart Failure Maternal Grandmother     Pacemaker Maternal Grandfather     Heart Surgery Paternal Grandmother     Pacemaker Paternal Grandfather      Social History     Socioeconomic History    Marital status:      Spouse name: Not on file    Number of children: Not on file    Years of education: Not on file    Highest education level: Not on file   Occupational History    Not on file   Tobacco Use    Smoking status: Former     Packs/day: 1.00     Years: 27.00     Pack years: 27.00     Types: Cigarettes     Start date:      Quit date: 2019     Years since quittin.9    Smokeless tobacco: Never    Tobacco comments:     started to smoke at 15 / smoked up to 1 ppd / now smoking 3 to 4 cigarettes a day   Vaping Use    Vaping Use: Never used   Substance and Sexual Activity    Alcohol use: Never    Drug use: Never    Sexual activity: Not on file   Other Topics Concern    Not on file   Social History Narrative    Not on file     Social Determinants of Health     Financial Resource Strain: Not on file   Food Insecurity: Not on file   Transportation Needs: Not on file   Physical Activity: Sufficiently Active    Days of Exercise per Week: 4 days    Minutes of Exercise per Session: 60 min   Stress: Not on file   Social Connections: Not on file   Intimate Partner Violence: Not At Risk    Fear of Current or Ex-Partner: No    Emotionally Abused: No    Physically Abused: No    Sexually Abused: No   Housing Stability: Not on file       Review of Systems   Constitutional:  Negative for chills and fever. Eyes:  Negative for visual disturbance. Respiratory:  Negative for cough, chest tightness, shortness of breath and wheezing. Cardiovascular:  Negative for chest pain, palpitations and leg swelling. Gastrointestinal:  Negative for abdominal pain, constipation, diarrhea, nausea and vomiting. Endocrine: Negative for polydipsia, polyphagia and polyuria. Musculoskeletal:  Negative for arthralgias and myalgias. Skin:  Negative for rash. Neurological:  Negative for focal weakness and headaches. Psychiatric/Behavioral:  Negative for dysphoric mood, self-injury, sleep disturbance and suicidal ideas. The patient is not nervous/anxious. OBJECTIVE:    Physical Exam  Vitals and nursing note reviewed. Constitutional:       General: She is not in acute distress. Appearance: She is well-developed. She is obese. She is not ill-appearing. HENT:      Head: Normocephalic and atraumatic. Right Ear: External ear normal.      Left Ear: External ear normal.      Nose: Nose normal.      Mouth/Throat:      Mouth: Mucous membranes are moist.   Eyes:      Conjunctiva/sclera: Conjunctivae normal.      Pupils: Pupils are equal, round, and reactive to light. Neck:      Trachea: No tracheal deviation. Cardiovascular:      Rate and Rhythm: Normal rate and regular rhythm. Heart sounds: Normal heart sounds. Pulmonary:      Effort: Pulmonary effort is normal. No respiratory distress. Breath sounds: Normal breath sounds. No wheezing or rales. Chest:      Chest wall: No tenderness. Abdominal:      General: Bowel sounds are normal. There is no distension. Palpations: Abdomen is soft. There is no mass. Tenderness: There is no abdominal tenderness. Hernia: No hernia is present. Musculoskeletal:         General: Normal range of motion.       Cervical back: Normal range of motion and neck supple. Skin:     General: Skin is warm and dry. Capillary Refill: Capillary refill takes less than 2 seconds. Findings: No rash. Neurological:      Mental Status: She is alert and oriented to person, place, and time. Psychiatric:         Behavior: Behavior normal.     /82   Pulse 66   Temp 97.3 °F (36.3 °C)   Wt 232 lb (105.2 kg)   SpO2 98%   BMI 38.61 kg/m²    BP Readings from Last 3 Encounters:   10/31/22 116/82   10/14/22 (!) 103/56   08/09/22 (!) 130/95      Wt Readings from Last 3 Encounters:   10/31/22 232 lb (105.2 kg)   10/14/22 232 lb 9.6 oz (105.5 kg)   09/22/22 228 lb (103.4 kg)       ASSESSMENT & PLAN:    1. Mixed hyperlipidemia  - Stable, continue current regimen  - Reviewed low-cholesterol diet  - CBC with Auto Differential; Future  - Comprehensive Metabolic Panel; Future  - Lipid Panel; Future  - CK; Future  - TSH with reflex, Future  - ezetimibe (ZETIA) 10 MG tablet; Take 1 tablet by mouth daily  Dispense: 90 tablet; Refill: 1  - rosuvastatin (CRESTOR) 40 MG tablet; TAKE 1 TABLET BY MOUTH EVERY DAY  Dispense: 90 tablet; Refill: 0    2. Type 2 diabetes mellitus without complication, without long-term current use of insulin (HCC)  - Stable, continue current regimen  - Reviewed diabetic diet  - CBC with Auto Differential; Future  - Comprehensive Metabolic Panel; Future  - Hemoglobin A1C; Future  - metFORMIN (GLUCOPHAGE-XR) 500 MG extended release tablet; Take 2 tablets by mouth daily (with breakfast)  Dispense: 180 tablet; Refill: 0    3. Chronic tachycardia  - Stable, continue current regimen  - TSH with reflex, Future  - metoprolol tartrate (LOPRESSOR) 50 MG tablet; Take 1 tablet by mouth 2 times daily  Dispense: 180 tablet; Refill: 0  - Follow-up with cardiologist as needed/directed    4.  Gastroesophageal reflux disease, unspecified whether esophagitis present  - Stable, continue current regimen  - Reviewed GERD precautions  - CBC with Auto Differential; Future  - omeprazole (PRILOSEC) 20 MG delayed release capsule; TAKE 1 CAPSULE BY MOUTH EVERY DAY  Dispense: 90 capsule; Refill: 0    5. 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[address]  - Stable, continue current regimen  - Follow-up with pulmonology as needed/directed    6. AMOS (obstructive sleep apnea)  - Stable, continue nightly CPAP usage  - Follow-up with sleep specialist as needed/directed    7. Fibromyalgia  - Stable, continue current regimen  - meloxicam (MOBIC) 15 MG tablet; Take 1 tablet by mouth daily as needed for Pain  Dispense: 90 tablet; Refill: 0    8. Other chronic pain  - Stable, continue current regimen  - meloxicam (MOBIC) 15 MG tablet; Take 1 tablet by mouth daily as needed for Pain  Dispense: 90 tablet; Refill: 0    9. Positive MATTHEW (antinuclear antibody)  - Kelby Bauman MD, Rheumatology, [facility]  - Encouraged to reestablish with rheumatology    10. Vitamin D deficiency  - Stable, continue vitamin D3 supplementation    11. Anxiety  - Stable, continue citalopram 40 mg once daily  - Follow-up with psychiatry as needed/directed    12. Depression  - Stable, continue citalopram 40 mg once daily  - Follow-up with psychiatry as needed/directed    13. Bipolar 1 disorder (HCC)  - Stable, continue citalopram 40 mg once daily  - Follow-up with psychiatry as needed/directed    14. PTSD (post-traumatic stress disorder)  - Stable, continue citalopram 40 mg once daily  - Follow-up with psychiatry as needed/directed    15. Current tobacco use  - Encouraged tobacco cessation    16. BMI 38.0-38.9, adult   - Encourage continued lifestyle modifications (better food choices, portion control and increasing activity)    Continue current treatment plan.     Current Outpatient Medications   Medication Sig Dispense Refill    ezetimibe (ZETIA) 10 MG tablet Take 1 tablet by mouth daily 90 tablet 1    omeprazole (PRILOSEC) 20 MG delayed release capsule TAKE 1 CAPSULE BY MOUTH EVERY DAY 90 capsule 0    rosuvastatin (CRESTOR) 40 MG tablet TAKE 1 TABLET BY MOUTH EVERY DAY 90 tablet 0    metFORMIN (GLUCOPHAGE-XR) 500 MG extended release tablet Take 2 tablets by mouth daily (with breakfast) 180 tablet 0    metoprolol tartrate (LOPRESSOR) 50 MG tablet Take 1 tablet by mouth 2 times daily 180 tablet 0    meloxicam (MOBIC) 15 MG tablet Take 1 tablet by mouth daily as needed for Pain 90 tablet 0    ASPIRIN LOW DOSE 81 MG EC tablet TAKE 1 TABLET BY MOUTH EVERY DAY      naproxen (NAPROSYN) 500 MG tablet TAKE ONE TABLET BY MOUTH EVERY MORNING AND EVERY EVENING . TAKE WITH MEALS  FOR 14 DAYS 28 tablet 0    albuterol sulfate HFA (PROVENTIL;VENTOLIN;PROAIR) 108 (90 Base) MCG/ACT inhaler TAKE 2 PUFFS BY MOUTH EVERY 6 HOURS AS NEEDED FOR WHEEZE 6.7 each 1    Evolocumab 140 MG/ML SOAJ Inject 140 mg into the skin every 14 days 2 pen 11    Cholecalciferol (VITAMIN D3) 50 MCG (2000 UT) CAPS TAKE 1 CAPSULE BY MOUTH EVERY DAY 90 capsule 1    citalopram (CELEXA) 40 MG tablet TAKE 1 TABLET BY MOUTH EVERY DAY IN THE MORNING      nitroGLYCERIN (NITROSTAT) 0.4 MG SL tablet Place 1 tablet under the tongue every 5 minutes as needed for Chest pain up to max of 3 total doses. If no relief after 1 dose, call 911. 25 tablet 0    ammonium lactate (LAC-HYDRIN) 12 % lotion Apply topically daily 225 mL 2    thiothixene (NAVANE) 2 MG capsule TAKE 1 CAPSULE BY MOUTH EVERY EVENING AT BEDTIME      pregabalin (LYRICA) 75 MG capsule Take 1 capsule by mouth 2 times daily for 90 days. 60 capsule 2    divalproex (DEPAKOTE ER) 500 MG extended release tablet Take 500 mg by mouth daily       hydrOXYzine (ATARAX) 25 MG tablet Take 25 mg by mouth 3 times daily as needed for Anxiety        No current facility-administered medications for this visit. Return in about 3 months (around 1/31/2023), or if symptoms worsen or fail to improve, for lipidemia, diabetes, chronic tachycardia, GERD, COPD, AMOS, fibro, bipolar, PTSD, tobacco use, BMI.     Kayla Lin received counseling on the following healthy behaviors: nutrition, exercise, medication adherence, and tobacco cessation    Discussed use, benefit, and side effects of prescribed medications. Barriers to medication compliance addressed. All patient questions answered. Pt voiced understanding. Call office if experience side effects from medications. Please note that some or all of this record was generated using voice recognition software. If there are any questions about the content of this document, please contact the author as some errors in transcription may have occurred.

## 2022-10-31 ENCOUNTER — OFFICE VISIT (OUTPATIENT)
Dept: FAMILY MEDICINE CLINIC | Age: 42
End: 2022-10-31
Payer: COMMERCIAL

## 2022-10-31 VITALS
SYSTOLIC BLOOD PRESSURE: 116 MMHG | BODY MASS INDEX: 38.61 KG/M2 | HEART RATE: 66 BPM | TEMPERATURE: 97.3 F | WEIGHT: 232 LBS | OXYGEN SATURATION: 98 % | DIASTOLIC BLOOD PRESSURE: 82 MMHG

## 2022-10-31 DIAGNOSIS — G47.33 OSA (OBSTRUCTIVE SLEEP APNEA): ICD-10-CM

## 2022-10-31 DIAGNOSIS — F31.9 BIPOLAR 1 DISORDER (HCC): ICD-10-CM

## 2022-10-31 DIAGNOSIS — R76.8 POSITIVE ANA (ANTINUCLEAR ANTIBODY): ICD-10-CM

## 2022-10-31 DIAGNOSIS — E55.9 VITAMIN D DEFICIENCY: ICD-10-CM

## 2022-10-31 DIAGNOSIS — Z72.0 CURRENT TOBACCO USE: ICD-10-CM

## 2022-10-31 DIAGNOSIS — J44.9 COPD WITH ASTHMA (HCC): ICD-10-CM

## 2022-10-31 DIAGNOSIS — E78.2 MIXED HYPERLIPIDEMIA: Primary | ICD-10-CM

## 2022-10-31 DIAGNOSIS — G89.29 OTHER CHRONIC PAIN: ICD-10-CM

## 2022-10-31 DIAGNOSIS — R00.0 CHRONIC TACHYCARDIA: ICD-10-CM

## 2022-10-31 DIAGNOSIS — F43.10 PTSD (POST-TRAUMATIC STRESS DISORDER): ICD-10-CM

## 2022-10-31 DIAGNOSIS — E11.9 TYPE 2 DIABETES MELLITUS WITHOUT COMPLICATION, WITHOUT LONG-TERM CURRENT USE OF INSULIN (HCC): ICD-10-CM

## 2022-10-31 DIAGNOSIS — K21.9 GASTROESOPHAGEAL REFLUX DISEASE, UNSPECIFIED WHETHER ESOPHAGITIS PRESENT: ICD-10-CM

## 2022-10-31 DIAGNOSIS — M79.7 FIBROMYALGIA: ICD-10-CM

## 2022-10-31 PROCEDURE — 3074F SYST BP LT 130 MM HG: CPT | Performed by: CLINICAL NURSE SPECIALIST

## 2022-10-31 PROCEDURE — 3078F DIAST BP <80 MM HG: CPT | Performed by: CLINICAL NURSE SPECIALIST

## 2022-10-31 PROCEDURE — G8417 CALC BMI ABV UP PARAM F/U: HCPCS | Performed by: CLINICAL NURSE SPECIALIST

## 2022-10-31 PROCEDURE — G8482 FLU IMMUNIZE ORDER/ADMIN: HCPCS | Performed by: CLINICAL NURSE SPECIALIST

## 2022-10-31 PROCEDURE — 2022F DILAT RTA XM EVC RTNOPTHY: CPT | Performed by: CLINICAL NURSE SPECIALIST

## 2022-10-31 PROCEDURE — 3023F SPIROM DOC REV: CPT | Performed by: CLINICAL NURSE SPECIALIST

## 2022-10-31 PROCEDURE — G8427 DOCREV CUR MEDS BY ELIG CLIN: HCPCS | Performed by: CLINICAL NURSE SPECIALIST

## 2022-10-31 PROCEDURE — 99214 OFFICE O/P EST MOD 30 MIN: CPT | Performed by: CLINICAL NURSE SPECIALIST

## 2022-10-31 PROCEDURE — 1036F TOBACCO NON-USER: CPT | Performed by: CLINICAL NURSE SPECIALIST

## 2022-10-31 PROCEDURE — 3044F HG A1C LEVEL LT 7.0%: CPT | Performed by: CLINICAL NURSE SPECIALIST

## 2022-10-31 RX ORDER — METOPROLOL TARTRATE 50 MG/1
50 TABLET, FILM COATED ORAL 2 TIMES DAILY
Qty: 180 TABLET | Refills: 0 | Status: SHIPPED | OUTPATIENT
Start: 2022-10-31

## 2022-10-31 RX ORDER — EZETIMIBE 10 MG/1
10 TABLET ORAL DAILY
Qty: 90 TABLET | Refills: 1 | Status: SHIPPED | OUTPATIENT
Start: 2022-10-31

## 2022-10-31 RX ORDER — OMEPRAZOLE 20 MG/1
CAPSULE, DELAYED RELEASE ORAL
Qty: 90 CAPSULE | Refills: 0 | Status: SHIPPED | OUTPATIENT
Start: 2022-10-31

## 2022-10-31 RX ORDER — METFORMIN HYDROCHLORIDE 500 MG/1
1000 TABLET, EXTENDED RELEASE ORAL
Qty: 180 TABLET | Refills: 0 | Status: SHIPPED | OUTPATIENT
Start: 2022-10-31

## 2022-10-31 RX ORDER — ALBUTEROL SULFATE 90 UG/1
AEROSOL, METERED RESPIRATORY (INHALATION)
Qty: 6.7 EACH | Refills: 1 | OUTPATIENT
Start: 2022-10-31

## 2022-10-31 RX ORDER — MELOXICAM 15 MG/1
15 TABLET ORAL DAILY PRN
Qty: 90 TABLET | Refills: 0 | Status: SHIPPED | OUTPATIENT
Start: 2022-10-31

## 2022-10-31 RX ORDER — ASPIRIN 81 MG/1
TABLET, COATED ORAL
COMMUNITY
Start: 2022-09-11

## 2022-10-31 RX ORDER — ROSUVASTATIN CALCIUM 40 MG/1
TABLET, COATED ORAL
Qty: 90 TABLET | Refills: 0 | Status: SHIPPED | OUTPATIENT
Start: 2022-10-31

## 2022-10-31 ASSESSMENT — PATIENT HEALTH QUESTIONNAIRE - PHQ9
2. FEELING DOWN, DEPRESSED OR HOPELESS: 0
SUM OF ALL RESPONSES TO PHQ9 QUESTIONS 1 & 2: 0
1. LITTLE INTEREST OR PLEASURE IN DOING THINGS: 0
SUM OF ALL RESPONSES TO PHQ QUESTIONS 1-9: 0

## 2022-11-10 ENCOUNTER — OFFICE VISIT (OUTPATIENT)
Dept: ORTHOPEDIC SURGERY | Age: 42
End: 2022-11-10

## 2022-11-10 VITALS — HEIGHT: 65 IN | WEIGHT: 232 LBS | BODY MASS INDEX: 38.65 KG/M2 | RESPIRATION RATE: 12 BRPM

## 2022-11-10 DIAGNOSIS — Z98.890 STATUS POST ARTHROSCOPY OF HIP: Primary | ICD-10-CM

## 2022-11-10 PROCEDURE — 99024 POSTOP FOLLOW-UP VISIT: CPT | Performed by: ORTHOPAEDIC SURGERY

## 2022-11-10 RX ORDER — KETOROLAC TROMETHAMINE 10 MG/1
10 TABLET, FILM COATED ORAL EVERY 6 HOURS PRN
Qty: 20 TABLET | Refills: 0 | Status: SHIPPED | OUTPATIENT
Start: 2022-11-10

## 2022-11-10 NOTE — LETTER
KIERSTEN Olson  [address] [facility] [address]  Phone: [phone]  Fax: [phone]    Sangeeta Shultz MD        November 10, 2022     Patient: Anand Barrera   YOB: 1980   Date of Visit: 11/10/2022       To Whom It May Concern: It is my medical opinion that Benita Jolly may return to light duty immediately for 1 month. If you have any questions or concerns, please don't hesitate to call.     Sincerely,        Sangeeta Shultz MD

## 2022-11-11 NOTE — PROGRESS NOTES
History of Present Illness:  Providence Rubinstein is a pleasant 43 y.o. female who presents for a post operative visit. She is 12 weeks out following a right hip revision arthroscopy and synovectomy, acetabuloplasty, removal of loose body cartilage flap, labral repair, femoroplasty/osteochondroplasty, fractional psoas lengthening, capsular closure, endoscopic greater trochanteric bursectomy and endoscopic abductor repair. She continues to have mild clicking and catching in her right hip. She has been in physical therapy at [facility] location but has missed numerous appointments. She has completed 14 PT visits since surgery 12 weeks ago. She is 18 weeks post repair of the left hip, doing well with no complaints. She denies fevers, chills, numbness, tingling, and shortness of breath. Medical History:  Patient's medications, allergies, past medical, surgical, social and family histories were reviewed and updated as appropriate. No notes on file    Review of Systems  A 14 point review of systems was completed by the patient and is available in the media section of the scanned medical record and was reviewed on 11/11/2022. Vital Signs:  Vitals:    11/10/22 1515   Resp: 12       General/Appearance: Alert and oriented and in no apparent distress. Skin:  There are no skin lesions, cellulitis, or extreme edema. The patient has warm and well-perfused Bilateral lower  extremities with brisk capillary refill. Hip Exam: Right    Inspection: Incisions clean, dry and intact. Mild ecchymosis and swelling are present as can be expected. There is no erythema, drainage or other signs of infection    Palpation:  No crepitus to gentle motion / circumduction of the hip.  Tender iliopsoas    Active Range of Motion: 0-90 deg flexion    Passive Range of Motion: 0-90 deg flexion    Strength:  Deferred    Special Tests:  Double Leg Squats (Standing): good  Lunges: poor  Single Leg Squats: deferred  Hip Hinge (Sit to Stand):  excellent  Double Leg Bridges: excellent  Single Leg Bridges: deferred  Single Leg Step-Downs : deferred    Distal Neurovascular exam is intact (foot sensation, pulses, and motor exam)     Neurovascular: Sensation to light touch is intact, no motor deficits, palpable radial pulses 2+    Radiology:     No new XR obtained at this time. Assessment :  Ms. Anand Barrera is a pleasant 43 y.o. patient who is 12 weeks status post right hip revision arthroscopy and synovectomy with acetabuloplasty, femoroplasty/osteochondroplasty and labral repair. She is doing well at this time with some residual clicking and catching in her right hip, likely caused by hip flexor tendonitis. There continues to be no concerns for post-operative complications and/or infections. Impression:  Encounter Diagnosis   Name Primary? Status post arthroscopy of hip Yes       Office Procedures:  Orders Placed This Encounter   Procedures    Lucien Solano MD, Orthopedics and Sports Medicine (Shoulder; Hip; Knee), [facility]     Referral Priority:   Routine     Referral Type:   Eval and Treat     Referral Reason:   Specialty Services Required     Requested Specialty:   Orthopedic Surgery     Number of Visits Requested:   1         Treatment Plan:    Overall Anand Barrera is doing well. The pain is well-controlled. We recommend that She continuing with physical therapy for timeline based progression of motion, weight bearing, and and strengthening. Patient can continue phase 2 of our hip rehabilitation protocol transition into phase 4. She will see Dr. Elijah Manrique for hip flexor injection when she returns from her trip [facility]. She was given Toradol does pack to help with pain during the long car ride. She has returned to work with light duty. She will continue to have light duty restrictions for one more month. All of her questions were fully answered today.  We would like to see [address] back in 12 weeks for follow-up visit and ROM/Gait/SLR check. Sincerely,  [employer], [employer], [place], am scribing for and in the presence of Kristel Marshall MD.      Kristel Marshall  [address] [facility] [address] [place], [place] [id number]  Email: [email or web address]  Office: [phone]    11/11/22  7:23 AM    The encounter with [address] was supervised by Dr. Nathan Narvaez who personally examined the patient and reviewed the plan. This dictation was performed with a verbal recognition program (DRAGON) and it was checked for errors. It is possible that there are still dictated errors within this office note. If so, please bring any errors to my attention for an addendum. All efforts were made to ensure that this office note is accurate.

## 2022-11-14 RX ORDER — NITROGLYCERIN 0.4 MG/1
0.4 TABLET SUBLINGUAL EVERY 5 MIN PRN
Qty: 25 TABLET | Refills: 0 | Status: SHIPPED | OUTPATIENT
Start: 2022-11-14

## 2022-11-14 NOTE — TELEPHONE ENCOUNTER
Pt requesting a medication refill. nitroGLYCERIN (NITROSTAT) 0.4 MG SL tablet 25 tablet 0 4/5/2021     Sig - Route: Place 1 tablet under the tongue every 5 minutes as needed for Chest pain up to max of 3 total doses. If no relief after 1 dose, call 911. - SubLINGual    [facility] 10/31/22  Nov 1/30/23   Pt was advised an appt to discuss this medication.

## 2022-11-14 NOTE — TELEPHONE ENCOUNTER
I will send in nitro, but she needs to contact her cardiologist and get in sooner then 12/16/22. To the ER if chest pain unrelieved with nitro.   Thanks

## 2022-11-15 ENCOUNTER — OFFICE VISIT (OUTPATIENT)
Dept: ORTHOPEDIC SURGERY | Age: 42
End: 2022-11-15
Payer: COMMERCIAL

## 2022-11-15 VITALS — WEIGHT: 231.92 LBS | HEIGHT: 65 IN | BODY MASS INDEX: 38.64 KG/M2

## 2022-11-15 DIAGNOSIS — Z98.890 STATUS POST ARTHROSCOPY OF HIP: ICD-10-CM

## 2022-11-15 DIAGNOSIS — M70.71 ILIOPSOAS BURSITIS OF RIGHT HIP: Primary | ICD-10-CM

## 2022-11-15 PROCEDURE — 99213 OFFICE O/P EST LOW 20 MIN: CPT | Performed by: INTERNAL MEDICINE

## 2022-11-15 PROCEDURE — G8427 DOCREV CUR MEDS BY ELIG CLIN: HCPCS | Performed by: INTERNAL MEDICINE

## 2022-11-15 PROCEDURE — G8417 CALC BMI ABV UP PARAM F/U: HCPCS | Performed by: INTERNAL MEDICINE

## 2022-11-15 PROCEDURE — G8482 FLU IMMUNIZE ORDER/ADMIN: HCPCS | Performed by: INTERNAL MEDICINE

## 2022-11-15 PROCEDURE — 1036F TOBACCO NON-USER: CPT | Performed by: INTERNAL MEDICINE

## 2022-11-15 PROCEDURE — 20611 DRAIN/INJ JOINT/BURSA W/US: CPT | Performed by: INTERNAL MEDICINE

## 2022-11-15 RX ORDER — BUPIVACAINE HYDROCHLORIDE 2.5 MG/ML
13 INJECTION, SOLUTION INFILTRATION; PERINEURAL ONCE
Status: COMPLETED | OUTPATIENT
Start: 2022-11-15 | End: 2022-11-16

## 2022-11-15 RX ORDER — BETAMETHASONE SODIUM PHOSPHATE AND BETAMETHASONE ACETATE 3; 3 MG/ML; MG/ML
6 INJECTION, SUSPENSION INTRA-ARTICULAR; INTRALESIONAL; INTRAMUSCULAR; SOFT TISSUE ONCE
Status: COMPLETED | OUTPATIENT
Start: 2022-11-15 | End: 2022-11-16

## 2022-11-15 NOTE — LETTER
[address]  [address] [facility] [address]  Phone: [phone]  Fax: [phone]           Jaiden Goodman MD      November 15, 2022     Patient: Gutierrez Gomez   MR Number: [id number]   YOB: 1980   Date of Visit: 11/15/2022       Dear Dr. Healy Ink:    Thank you for referring Marita Ha to me for evaluation/treatment. Below are the relevant portions of my assessment and plan of care. Impression: . Encounter Diagnoses   Name Primary? Iliopsoas bursitis of right hip     Status post arthroscopy of hip               Plan:       Postinjection protocol  Clinical follow-up with Dr. Adam Garcia as scheduled. The nature of the finding, probable diagnosis and likely treatment was thoroughly discussed with the patient. The options, risks, complications, alternative treatment as well as some of the differential diagnosis was discussed. The patient was thoroughly informed and all questions were answered. the patient indicated understanding and satisfaction with the discussion. Orders:        Orders Placed This Encounter   Procedures    US GUIDED NEEDLE PLACEMENT     Standing Status:   Future     Number of Occurrences:   1     Standing Expiration Date:   11/15/2023     Order Specific Question:   Reason for exam:     Answer:   CSI    SD ARTHROCENTESIS ASPIR&/INJ MAJOR JT/BURSA W/O US           Disclaimer: \"This note was dictated with voice recognition software. Though review and correction are routine, we apologize for any errors. \"             If you have questions, please do not hesitate to call me. I look forward to following Luis Fernando Blank along with you.     Sincerely,        Jaiden Goodman MD     providers:  Adam Garcia MD  [facility] [address] [address]  Via In Basket

## 2022-11-15 NOTE — PROGRESS NOTES
Chief Complaint:   Chief Complaint   Patient presents with    Hip Pain     R hip, CSI HIP FLEXOR referred by Dr. Delvis Lehman. Increased pain going from sitting to standing and hip flexion. Feels like her hip catches. History of Present Illness:       Patient is a 43 y.o. female presents with the above complaint. Patient is seen in consultation at the request of Dr. Itzel Young consideration of ultrasound-guided steroid injection of the iliopsoas bursa. The symptoms began  sometime in the postoperative state . Date of Procedure: 8/9/2022  RIGHT HIP REVISION ARTHROSCOPY & SYNOVECTOMY WITH LYSIS OF ADHESIONS ([address] to [address]),   ACETABULOPLASTY (48536),    REMOVAL OF LOOSE BODY CARTILAGE BKYK(76203), LABRAL REPAIR (16647), FEMOROPLASTY/OSTEOCHONDROPLASTY (52611), FRACTIONAL PSOAS LENGTHENING (97282 TO 91693), CAPSULAR CLOSURE, ENDOSCOPIC GREATER TROCHANTERIC BURSECTOMY (75631 to [phone]), ENDOSCOPIC ABDUCTOR REPAIR (76707 to 46014)    6/10    Remain complaint relates to subjective \"catching\" localized to the anterior aspect of the hip and pain with sit to stand.     She denies any constitutional symptoms       Past Medical History:        Past Medical History:   Diagnosis Date    Arthritis     Bipolar 1 disorder ([address]     COPD (chronic obstructive pulmonary disease) ([facility])     Depression     Diabetes mellitus ([facility])     Femoroacetabular impingement of right hip 8/9/2022    Hyperlipidemia     Hypertension     Tachycardia     Tachycardia     Tear of right gluteus medius tendon 8/9/2022         Past Surgical History:   Procedure Laterality Date    ANKLE FUSION Bilateral     ARTHRODESIS Left 12/6/2019    REVISION OF TALONAVICULAR JOINT ARTHRODESIS LEFT FOOT  -SLEEP APNEA- performed by Jannette King DPM at [address] [place] [address],[facility] Bilateral     ELBOW SURGERY Bilateral     ulnar nerve release    HERNIA REPAIR      HIP ARTHROSCOPY Bilateral     HIP SURGERY Left 6/28/2022    LEFT HIP REVISION ARTHROSCOPY, LYSIS OF ADHESIONS, REMOVAL LOOSE BODY, SYNOVECTOMY, FEMOROACETABULAR IMPINGEMENT DECOMPRESSION AND LABRAL REPAIR, ABDUCTOR REPAIR, ENDOSCOPIC BURSECTOMY  - ELMA BLOCK; LOCAL (ORTHOMIX) performed by Nora Kanner, MD at [address] Right 8/9/2022    RIGHT HIP REVISION, ARTHROSCOPY, FEMOROACETABULAR IMPINGEMENT SURGERY, LABRAL REPAIR, ENDOSCOPIC,-BLOCK (PENG), LOCAL (ORTHOMIX)-ROSE AND NEPHEW-SHANIQUA performed by Nora Kanner, MD at [address] [place] [address] Bilateral          Present Medications:         Current Outpatient Medications   Medication Sig Dispense Refill    nitroGLYCERIN (NITROSTAT) 0.4 MG SL tablet Place 1 tablet under the tongue every 5 minutes as needed for Chest pain up to max of 3 total doses. If no relief after 1 dose, call 911. 25 tablet 0    ketorolac (TORADOL) 10 MG tablet Take 1 tablet by mouth every 6 hours as needed for Pain 20 tablet 0    ASPIRIN LOW DOSE 81 MG EC tablet TAKE 1 TABLET BY MOUTH EVERY DAY      ezetimibe (ZETIA) 10 MG tablet Take 1 tablet by mouth daily 90 tablet 1    omeprazole (PRILOSEC) 20 MG delayed release capsule TAKE 1 CAPSULE BY MOUTH EVERY DAY 90 capsule 0    rosuvastatin (CRESTOR) 40 MG tablet TAKE 1 TABLET BY MOUTH EVERY DAY 90 tablet 0    metFORMIN (GLUCOPHAGE-XR) 500 MG extended release tablet Take 2 tablets by mouth daily (with breakfast) 180 tablet 0    metoprolol tartrate (LOPRESSOR) 50 MG tablet Take 1 tablet by mouth 2 times daily 180 tablet 0    meloxicam (MOBIC) 15 MG tablet Take 1 tablet by mouth daily as needed for Pain 90 tablet 0    naproxen (NAPROSYN) 500 MG tablet TAKE ONE TABLET BY MOUTH EVERY MORNING AND EVERY EVENING .  TAKE WITH MEALS  FOR 14 DAYS 28 tablet 0    albuterol sulfate HFA (PROVENTIL;VENTOLIN;PROAIR) 108 (90 Base) MCG/ACT inhaler TAKE 2 PUFFS BY MOUTH EVERY 6 HOURS AS NEEDED FOR WHEEZE 6.7 each 1    Evolocumab 140 MG/ML SOAJ Inject 140 mg into the skin every 14 days 2 pen 11    Cholecalciferol (VITAMIN D3) 50 MCG ( UT) CAPS TAKE 1 CAPSULE BY MOUTH EVERY DAY 90 capsule 1    citalopram (CELEXA) 40 MG tablet TAKE 1 TABLET BY MOUTH EVERY DAY IN THE MORNING      ammonium lactate (LAC-HYDRIN) 12 % lotion Apply topically daily 225 mL 2    thiothixene (NAVANE) 2 MG capsule TAKE 1 CAPSULE BY MOUTH EVERY EVENING AT BEDTIME      pregabalin (LYRICA) 75 MG capsule Take 1 capsule by mouth 2 times daily for 90 days. 60 capsule 2    divalproex (DEPAKOTE ER) 500 MG extended release tablet Take 500 mg by mouth daily       hydrOXYzine (ATARAX) 25 MG tablet Take 25 mg by mouth 3 times daily as needed for Anxiety        Current Facility-Administered Medications   Medication Dose Route Frequency Provider Last Rate Last Admin    betamethasone acetate-betamethasone sodium phosphate (CELESTONE) injection 6 mg  6 mg Intra-artICUlar Once Shahla Moralez MD        bupivacaine (MARCAINE) 0.25 % injection 32.5 mg  13 mL Intra-artICUlar Once Shahla Moralez MD             Allergies:         Allergies   Allergen Reactions    Cinnamon Itching    Codeine Hives and Itching        Social History:         Social History     Socioeconomic History    Marital status:      Spouse name: Not on file    Number of children: Not on file    Years of education: Not on file    Highest education level: Not on file   Occupational History    Not on file   Tobacco Use    Smoking status: Former     Packs/day: 1.00     Years: 27.00     Pack years: 27.00     Types: Cigarettes     Start date: 12     Quit date: 2019     Years since quittin.9    Smokeless tobacco: Never    Tobacco comments:     started to smoke at 15 / smoked up to 1 ppd / now smoking 3 to 4 cigarettes a day   Vaping Use    Vaping Use: Never used   Substance and Sexual Activity    Alcohol use: Never    Drug use: Never    Sexual activity: Not on file   Other Topics Concern    Not on file   Social History Narrative    Not on file     Social Determinants of Health     Financial Resource Strain: Not on file   Food Insecurity: Not on file   Transportation Needs: Not on file   Physical Activity: Sufficiently Active    Days of Exercise per Week: 4 days    Minutes of Exercise per Session: 60 min   Stress: Not on file   Social Connections: Not on file   Intimate Partner Violence: Not At Risk    Fear of Current or Ex-Partner: No    Emotionally Abused: No    Physically Abused: No    Sexually Abused: No   Housing Stability: Not on file        Review of Symptoms:    Pertinent items are noted in HPI   10 point review of systems negative except as mentioned in HPI      Vital Signs: There were no vitals filed for this visit. General Exam:     Constitutional: Patient is adequately groomed with no evidence of malnutrition  Mental Status: The patient is oriented to time, place and person. The patient's mood and affect are appropriate. Vascular: Examination reveals no swelling or calf tenderness. Peripheral pulses are palpable and 2+. Lymphatics: no lymphadenopathy of the inguinal region or lower extremity      Physical Exam: right hip      Primary Exam:    Inspection: No deformity atrophy or appreciable curvature      Palpation: No focal tenderness      Range of Motion: Hip flexion to 90 to 95 degrees      Strength: Near normal with SLR      Skin: There are no rashes, ulcerations or lesions.       Gait: Nonantalgic     Neurovascular - non focal and intact       Additional Comments:        Additional Examinations:                    Office Imaging Results/Procedures PerformedToday:         Office Procedures:     Orders Placed This Encounter   Procedures    US GUIDED NEEDLE PLACEMENT     Standing Status:   Future     Number of Occurrences:   1     Standing Expiration Date:   11/15/2023     Order Specific Question:   Reason for exam:     Answer:   CSI    ME ARTHROCENTESIS ASPIR&/INJ MAJOR JT/BURSA W/O US discussed. The patient was thoroughly informed and all questions were answered. the patient indicated understanding and satisfaction with the discussion. Orders:        Orders Placed This Encounter   Procedures    US GUIDED NEEDLE PLACEMENT     Standing Status:   Future     Number of Occurrences:   1     Standing Expiration Date:   11/15/2023     Order Specific Question:   Reason for exam:     Answer:   CSI    CT ARTHROCENTESIS ASPIR&/INJ MAJOR JT/BURSA W/O US           Disclaimer: \"This note was dictated with voice recognition software. Though review and correction are routine, we apologize for any errors. \"

## 2022-11-15 NOTE — TELEPHONE ENCOUNTER
Spoke with pt and advised PCP note, pt verbalized understanding. Pt stated that she's been trying to get in sooner with the cardiologist but they are booked up.

## 2022-11-16 RX ADMIN — BETAMETHASONE SODIUM PHOSPHATE AND BETAMETHASONE ACETATE 6 MG: 3; 3 INJECTION, SUSPENSION INTRA-ARTICULAR; INTRALESIONAL; INTRAMUSCULAR; SOFT TISSUE at 09:44

## 2022-11-16 RX ADMIN — BUPIVACAINE HYDROCHLORIDE 32.5 MG: 2.5 INJECTION, SOLUTION INFILTRATION; PERINEURAL at 09:45

## 2022-11-18 ENCOUNTER — OFFICE VISIT (OUTPATIENT)
Dept: PULMONOLOGY | Age: 42
End: 2022-11-18
Payer: COMMERCIAL

## 2022-11-18 VITALS
HEIGHT: 66 IN | SYSTOLIC BLOOD PRESSURE: 110 MMHG | HEART RATE: 64 BPM | DIASTOLIC BLOOD PRESSURE: 86 MMHG | TEMPERATURE: 97.8 F | BODY MASS INDEX: 38.09 KG/M2 | OXYGEN SATURATION: 99 % | WEIGHT: 237 LBS

## 2022-11-18 DIAGNOSIS — I10 ESSENTIAL HYPERTENSION: Chronic | ICD-10-CM

## 2022-11-18 DIAGNOSIS — J44.9 COPD WITH ASTHMA (HCC): Chronic | ICD-10-CM

## 2022-11-18 DIAGNOSIS — G47.33 OSA (OBSTRUCTIVE SLEEP APNEA): Primary | Chronic | ICD-10-CM

## 2022-11-18 DIAGNOSIS — E11.9 TYPE 2 DIABETES MELLITUS WITHOUT COMPLICATION, WITHOUT LONG-TERM CURRENT USE OF INSULIN (HCC): Chronic | ICD-10-CM

## 2022-11-18 DIAGNOSIS — E66.01 MORBIDLY OBESE (HCC): Chronic | ICD-10-CM

## 2022-11-18 PROCEDURE — G8417 CALC BMI ABV UP PARAM F/U: HCPCS | Performed by: NURSE PRACTITIONER

## 2022-11-18 PROCEDURE — 3023F SPIROM DOC REV: CPT | Performed by: NURSE PRACTITIONER

## 2022-11-18 PROCEDURE — 3044F HG A1C LEVEL LT 7.0%: CPT | Performed by: NURSE PRACTITIONER

## 2022-11-18 PROCEDURE — 3074F SYST BP LT 130 MM HG: CPT | Performed by: NURSE PRACTITIONER

## 2022-11-18 PROCEDURE — 2022F DILAT RTA XM EVC RTNOPTHY: CPT | Performed by: NURSE PRACTITIONER

## 2022-11-18 PROCEDURE — 99214 OFFICE O/P EST MOD 30 MIN: CPT | Performed by: NURSE PRACTITIONER

## 2022-11-18 PROCEDURE — G8427 DOCREV CUR MEDS BY ELIG CLIN: HCPCS | Performed by: NURSE PRACTITIONER

## 2022-11-18 PROCEDURE — 1036F TOBACCO NON-USER: CPT | Performed by: NURSE PRACTITIONER

## 2022-11-18 PROCEDURE — 3078F DIAST BP <80 MM HG: CPT | Performed by: NURSE PRACTITIONER

## 2022-11-18 PROCEDURE — G8482 FLU IMMUNIZE ORDER/ADMIN: HCPCS | Performed by: NURSE PRACTITIONER

## 2022-11-18 ASSESSMENT — SLEEP AND FATIGUE QUESTIONNAIRES
HOW LIKELY ARE YOU TO NOD OFF OR FALL ASLEEP IN A CAR, WHILE STOPPED FOR A FEW MINUTES IN TRAFFIC: 1
HOW LIKELY ARE YOU TO NOD OFF OR FALL ASLEEP WHILE SITTING QUIETLY AFTER LUNCH WITHOUT ALCOHOL: 1
HOW LIKELY ARE YOU TO NOD OFF OR FALL ASLEEP WHILE SITTING AND READING: 2
HOW LIKELY ARE YOU TO NOD OFF OR FALL ASLEEP WHILE WATCHING TV: 1
HOW LIKELY ARE YOU TO NOD OFF OR FALL ASLEEP WHEN YOU ARE A PASSENGER IN A CAR FOR AN HOUR WITHOUT A BREAK: 3
HOW LIKELY ARE YOU TO NOD OFF OR FALL ASLEEP WHILE SITTING AND TALKING TO SOMEONE: 0
HOW LIKELY ARE YOU TO NOD OFF OR FALL ASLEEP WHILE SITTING INACTIVE IN A PUBLIC PLACE: 1
ESS TOTAL SCORE: 11
HOW LIKELY ARE YOU TO NOD OFF OR FALL ASLEEP WHILE LYING DOWN TO REST IN THE AFTERNOON WHEN CIRCUMSTANCES PERMIT: 2

## 2022-11-18 NOTE — PROGRESS NOTES
Diagnosis: [x] AMOS (G47.33) [] CSA (G47.31) [] Apnea (G47.30)   Length of Need: [x] 15 Months [] 99 Months [] Other:   Machine (PRAKASH!): [] Respironics Dream Station      Auto [] ResMed AirSense     Auto [] Other:     []  CPAP () [] Bilevel ()   Mode: [] Auto [] Spontaneous    Mode: [] Auto [] Spontaneous            Comfort Settings:      Humidifier: [] Heated ()        [x] Water chamber replacement ()/ 1 per 6 months        Mask:   [x] Nasal () /1 per 3 months [] Full Face () /1 per 3 months   [x] Patient choice -Size and fit mask [] Patient Choice - Size and fit mask   [] Dispense: [] Dispense:   [x] Headgear () / 1 per 3 months [] Headgear () / 1 per 3 months   [x] Replacement Nasal Cushion ()/2 per month [] Interface Replacement ()/1 per month   [x] Replacement Nasal Pillows ()/2 per month         Tubing: [x] Heated ()/1 per 3 months    [] Standard ()/1 per 3 months [] Other:           Filters: [x] Non-disposable ()/1 per 6 months     [x] Ultra-Fine, Disposable ()/2 per month        Miscellaneous: [] Chin Strap ()/ 1 per 6 months [] O2 bleed-in:        LPM   [] Oxymetry on CPAP/Bilevel []  Other:         Start Order Date: 11/18/22    MEDICAL JUSTIFICATION:  I, the undersigned, certify that the above prescribed supplies are medically necessary for this patients wellbeing. In my opinion, the supplies are both reasonable and necessary in reference to accepted standards of medicalpractice in treatment of this patients condition. Marisol Adam NP    NPI: [id number]       Order Signed Date: 11/18/22  [facility]  Pulmonary, Sleep, and Critical Care    Pulmonary, Sleep, and Critical Care  [address] [place] [address].  [facility], [address] [facility] , [address] [facility]  Phone: [phone]    Fax: [address] [facility] [address]  1980  [address]  [address]  [phone] (home)   [phone] (mobile)      Insurance Info (confirm with patient if correct):  Payer/Plan Subscr  Sex Relation Sub.  Ins. ID Effective Group Num

## 2022-11-18 NOTE — ASSESSMENT & PLAN NOTE
Chronic-Improving: Reviewed and analyzed results of physiologic download from patient's machine and reviewed with patient. Supplies and parts as needed for her machine. These are medically necessary. Limit caffeine use after 3pm. Based on the analyzed data will change following settings: EPAP min increased to 14. Pressure changes sent via machine modem as she did not bring her machine with her to the office today. Will trial pressure increase to see if this will help decrease instances of waking feeling short of breath and anxiety. Encouraged her to work with her equipment company on mask fit and comfort. Discussed trying a mask liner. Provided resources for her to view different styles of masks and mask liners. Discussed no driving when sleepy. Will see her back in 3 months. Encouraged her to contact the office with any questions or concerns.

## 2022-11-18 NOTE — LETTER
[facility]  [address] [facility]  [place]  [address]  Phone: [phone]  Fax: [phone]    November 18, 2022       Patient: Michelle Elma   MR Number: [id number]   YOB: 1980   Date of Visit: 11/18/2022       Clifford Lopez was seen for a follow up visit today. Here is my assessment and plan as well as an attached copy of her visit today:    Essential hypertension   Chronic- Stable. Discussed the importance of treating obstructive sleep apnea as part of the management of this disorder. Cont any meds per PCP and other physicians. 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[address]   Chronic- Stable. Discussed the importance of treating obstructive sleep apnea as part of the management of this disorder. Cont any meds per PCP and other physicians. Type 2 diabetes mellitus without complication, without long-term current use of insulin (HCC)   Chronic- Stable. Discussed the importance of treating obstructive sleep apnea as part of the management of this disorder. Cont any meds per PCP and other physicians. Morbidly obese (HCC)   Chronic-not stable:  Discussed importance of treating obstructive sleep apnea and getting sufficient sleep to assist with weight control. Encouraged her to work on weight loss through diet and exercise. Recommended DASH or Mediterranean diets. AMOS (obstructive sleep apnea)  Chronic-Improving: Reviewed and analyzed results of physiologic download from patient's machine and reviewed with patient. Supplies and parts as needed for her machine. These are medically necessary. Limit caffeine use after 3pm. Based on the analyzed data will change following settings: EPAP min increased to 14. Pressure changes sent via machine modem as she did not bring her machine with her to the office today. Will trial pressure increase to see if this will help decrease instances of waking feeling short of breath and anxiety.   Encouraged her to work with her equipment company on mask fit and comfort. Discussed trying a mask liner. Provided resources for her to view different styles of masks and mask liners. Discussed no driving when sleepy. Will see her back in 3 months. Encouraged her to contact the office with any questions or concerns. If you have questions or concerns, please do not hesitate to call me. I look forward to following Tereasa Severin along with you.     Sincerely,    JUAN RAMON Goodman    CC providers:  JUAN RAMON Car - CNP  [address] [facility] - [address] [address]  [place] [place]

## 2022-11-18 NOTE — PROGRESS NOTES
Shena Diallo MD  [facility]  [address] [facility], [address] [facility]  P- [phone]   [facility] [address]. [facility] [facility]. [address] [phone]     [address] [address] [phone]      Assessment/Plan:      1. AMOS (obstructive sleep apnea)  Assessment & Plan:  Chronic-Improving: Reviewed and analyzed results of physiologic download from patient's machine and reviewed with patient. Supplies and parts as needed for her machine. These are medically necessary. Limit caffeine use after 3pm. Based on the analyzed data will change following settings: EPAP min increased to 14. Pressure changes sent via machine modem as she did not bring her machine with her to the office today. Will trial pressure increase to see if this will help decrease instances of waking feeling short of breath and anxiety. Encouraged her to work with her equipment company on mask fit and comfort. Discussed trying a mask liner. Provided resources for her to view different styles of masks and mask liners. Discussed no driving when sleepy. Will see her back in 3 months. Encouraged her to contact the office with any questions or concerns. 2. Essential hypertension  Assessment & Plan:   Chronic- Stable. Discussed the importance of treating obstructive sleep apnea as part of the management of this disorder. Cont any meds per PCP and other physicians. 3. 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[facility]  Assessment & Plan:   Chronic- Stable. Discussed the importance of treating obstructive sleep apnea as part of the management of this disorder. Cont any meds per PCP and other physicians. 4. Type 2 diabetes mellitus without complication, without long-term current use of insulin (HCC)  Assessment & Plan:   Chronic- Stable.   Discussed the importance of treating obstructive sleep apnea as part of the management of this disorder. Cont any meds per PCP and other physicians. 5. Morbidly obese ([address]  Assessment & Plan:   Chronic-not stable:  Discussed importance of treating obstructive sleep apnea and getting sufficient sleep to assist with weight control. Encouraged her to work on weight loss through diet and exercise. Recommended DASH or Mediterranean diets. Reviewed, analyzed, and documented physiologic data from patient's PAP machine. This information was analyzed to assess complexity and medical decision making in regards to further testing and management. The primary encounter diagnosis was AMOS (obstructive sleep apnea). Diagnoses of Essential hypertension, 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[facility] [address] [address] Type 2 diabetes mellitus without complication, without long-term current use of insulin ([facility] and Morbidly obese ([facility] [address] were also pertinent to this visit. The chronic medical conditions listed are directly related to the primary diagnosis listed above. The management of the primary diagnosis affects the secondary diagnosis and vice versa. Subjective:   Subjective   Patient ID: Adalberto Rubalcava is a 43 y.o. female. Chief Complaint   Patient presents with    Sleep Apnea       HPI:  Machine Modem/Download Info:  Compliance (hours/night): 6.8 hrs/night  % of nights >= 4 hrs: 89 %  Download AHI (/hour): 2 /HR   Average IPAP Pressure: 17.4 cmH2O  Average EPAP Pressure: 13.4 cmH2O               AUTO BILEVEL - Settings (ResMed)  IPAP Max: 25 cmH2O  EPAP Min: 12 cmH2O  Pressure Support: 4         PAP Mask  Mask Type: Nasal mask     Adalberto Rubalcava presents today for follow up for sleep apnea. She reports she is doing well acclimating to her machine. She is struggling with her mask leaking and sometimes exacerbates her anxiety. She will wake at times during the night feeling she is not getting enough air and take her mask off. The pressure on her machine is comfortable and she is waking rested.  When her mask is fitting well she is sleeping better and waking more rested. she denies headaches, congestion, nosebleeds, dryness, aerophagia, or drowsiness while driving.     [address]    [place] Sleepiness Score: 11    Social History     Socioeconomic History    Marital status:      Spouse name: Not on file    Number of children: Not on file    Years of education: Not on file    Highest education level: Not on file   Occupational History    Not on file   Tobacco Use    Smoking status: Former     Packs/day: 1.00     Years: 27.00     Pack years: 27.00     Types: Cigarettes     Start date: 12     Quit date: 11/19/2019     Years since quitting: 3.0    Smokeless tobacco: Never    Tobacco comments:     started to smoke at 15 / smoked up to 1 ppd / now smoking 3 to 4 cigarettes a day   Vaping Use    Vaping Use: Never used   Substance and Sexual Activity    Alcohol use: Never    Drug use: Never    Sexual activity: Not on file   Other Topics Concern    Not on file   Social History Narrative    Not on file     Social Determinants of Health     Financial Resource Strain: Not on file   Food Insecurity: Not on file   Transportation Needs: Not on file   Physical Activity: Sufficiently Active    Days of Exercise per Week: 4 days    Minutes of Exercise per Session: 60 min   Stress: Not on file   Social Connections: Not on file   Intimate Partner Violence: Not At Risk    Fear of Current or Ex-Partner: No    Emotionally Abused: No    Physically Abused: No    Sexually Abused: No   Housing Stability: Not on file       Current Outpatient Medications   Medication Instructions    albuterol sulfate HFA (PROVENTIL;VENTOLIN;PROAIR) 108 (90 Base) MCG/ACT inhaler TAKE 2 PUFFS BY MOUTH EVERY 6 HOURS AS NEEDED FOR WHEEZE    ammonium lactate (LAC-HYDRIN) 12 % lotion Topical, DAILY    ASPIRIN LOW DOSE 81 MG EC tablet TAKE 1 TABLET BY MOUTH EVERY DAY    Cholecalciferol (VITAMIN D3) 50 MCG (2000 UT) CAPS TAKE 1 CAPSULE BY MOUTH EVERY DAY    citalopram (CELEXA) 40 MG tablet TAKE 1 TABLET BY MOUTH EVERY DAY IN THE MORNING    divalproex (DEPAKOTE ER) 500 mg, Oral, DAILY    Evolocumab 140 mg, SubCUTAneous, EVERY 14 DAYS    ezetimibe (ZETIA) 10 mg, Oral, DAILY    hydrOXYzine HCl (ATARAX) 25 mg, Oral, 3 TIMES DAILY PRN    ketorolac (TORADOL) 10 mg, Oral, EVERY 6 HOURS PRN    meloxicam (MOBIC) 15 mg, Oral, DAILY PRN    metFORMIN (GLUCOPHAGE-XR) 1,000 mg, Oral, DAILY WITH BREAKFAST    metoprolol tartrate (LOPRESSOR) 50 mg, Oral, 2 TIMES DAILY    naproxen (NAPROSYN) 500 MG tablet TAKE ONE TABLET BY MOUTH EVERY MORNING AND EVERY EVENING . TAKE WITH MEALS  FOR 14 DAYS    nitroGLYCERIN (NITROSTAT) 0.4 mg, SubLINGual, EVERY 5 MIN PRN, up to max of 3 total doses. If no relief after 1 dose, call 911. omeprazole (PRILOSEC) 20 MG delayed release capsule TAKE 1 CAPSULE BY MOUTH EVERY DAY    pregabalin (LYRICA) 75 mg, Oral, 2 TIMES DAILY    rosuvastatin (CRESTOR) 40 MG tablet TAKE 1 TABLET BY MOUTH EVERY DAY    thiothixene (NAVANE) 2 MG capsule TAKE 1 CAPSULE BY MOUTH EVERY EVENING AT BEDTIME          Vitals:  Weight BMI   Wt Readings from Last 3 Encounters:   11/18/22 237 lb (107.5 kg)   11/15/22 231 lb 14.8 oz (105.2 kg)   11/10/22 232 lb (105.2 kg)    Body mass index is 38.25 kg/m².      BP HR SaO2   BP Readings from Last 3 Encounters:   11/18/22 110/86   10/31/22 116/82   10/14/22 (!) 103/56    Pulse Readings from Last 3 Encounters:   11/18/22 64   10/31/22 66   10/14/22 63    SpO2 Readings from Last 3 Encounters:   11/18/22 99%   10/31/22 98%   10/14/22 100%        Electronically signed by JUAN RAMON Helm on 11/18/2022 at 11:38 AM

## 2022-12-06 ENCOUNTER — OFFICE VISIT (OUTPATIENT)
Dept: RHEUMATOLOGY | Age: 42
End: 2022-12-06
Payer: COMMERCIAL

## 2022-12-06 VITALS — SYSTOLIC BLOOD PRESSURE: 126 MMHG | DIASTOLIC BLOOD PRESSURE: 80 MMHG | BODY MASS INDEX: 38.41 KG/M2 | WEIGHT: 238 LBS

## 2022-12-06 DIAGNOSIS — M79.7 FIBROMYALGIA: ICD-10-CM

## 2022-12-06 DIAGNOSIS — R76.8 SS-A ANTIBODY POSITIVE: Primary | ICD-10-CM

## 2022-12-06 DIAGNOSIS — M15.9 PRIMARY OSTEOARTHRITIS INVOLVING MULTIPLE JOINTS: ICD-10-CM

## 2022-12-06 DIAGNOSIS — M51.36 DDD (DEGENERATIVE DISC DISEASE), LUMBAR: ICD-10-CM

## 2022-12-06 PROCEDURE — G8482 FLU IMMUNIZE ORDER/ADMIN: HCPCS | Performed by: INTERNAL MEDICINE

## 2022-12-06 PROCEDURE — 3074F SYST BP LT 130 MM HG: CPT | Performed by: INTERNAL MEDICINE

## 2022-12-06 PROCEDURE — G8417 CALC BMI ABV UP PARAM F/U: HCPCS | Performed by: INTERNAL MEDICINE

## 2022-12-06 PROCEDURE — 3078F DIAST BP <80 MM HG: CPT | Performed by: INTERNAL MEDICINE

## 2022-12-06 PROCEDURE — 99214 OFFICE O/P EST MOD 30 MIN: CPT | Performed by: INTERNAL MEDICINE

## 2022-12-06 PROCEDURE — G8427 DOCREV CUR MEDS BY ELIG CLIN: HCPCS | Performed by: INTERNAL MEDICINE

## 2022-12-06 PROCEDURE — 1036F TOBACCO NON-USER: CPT | Performed by: INTERNAL MEDICINE

## 2022-12-06 NOTE — PROGRESS NOTES
2022  Patient Name: Tania Emmanuel  : 1980  Medical Record: [id number]    MEDICATIONS  Current Outpatient Medications   Medication Sig Dispense Refill    nitroGLYCERIN (NITROSTAT) 0.4 MG SL tablet Place 1 tablet under the tongue every 5 minutes as needed for Chest pain up to max of 3 total doses. If no relief after 1 dose, call 911. 25 tablet 0    ketorolac (TORADOL) 10 MG tablet Take 1 tablet by mouth every 6 hours as needed for Pain 20 tablet 0    ASPIRIN LOW DOSE 81 MG EC tablet TAKE 1 TABLET BY MOUTH EVERY DAY      ezetimibe (ZETIA) 10 MG tablet Take 1 tablet by mouth daily 90 tablet 1    omeprazole (PRILOSEC) 20 MG delayed release capsule TAKE 1 CAPSULE BY MOUTH EVERY DAY 90 capsule 0    rosuvastatin (CRESTOR) 40 MG tablet TAKE 1 TABLET BY MOUTH EVERY DAY 90 tablet 0    metFORMIN (GLUCOPHAGE-XR) 500 MG extended release tablet Take 2 tablets by mouth daily (with breakfast) 180 tablet 0    metoprolol tartrate (LOPRESSOR) 50 MG tablet Take 1 tablet by mouth 2 times daily 180 tablet 0    meloxicam (MOBIC) 15 MG tablet Take 1 tablet by mouth daily as needed for Pain 90 tablet 0    naproxen (NAPROSYN) 500 MG tablet TAKE ONE TABLET BY MOUTH EVERY MORNING AND EVERY EVENING .  TAKE WITH MEALS  FOR 14 DAYS 28 tablet 0    albuterol sulfate HFA (PROVENTIL;VENTOLIN;PROAIR) 108 (90 Base) MCG/ACT inhaler TAKE 2 PUFFS BY MOUTH EVERY 6 HOURS AS NEEDED FOR WHEEZE 6.7 each 1    Evolocumab 140 MG/ML SOAJ Inject 140 mg into the skin every 14 days 2 pen 11    Cholecalciferol (VITAMIN D3) 50 MCG (2000 UT) CAPS TAKE 1 CAPSULE BY MOUTH EVERY DAY 90 capsule 1    citalopram (CELEXA) 40 MG tablet TAKE 1 TABLET BY MOUTH EVERY DAY IN THE MORNING      ammonium lactate (LAC-HYDRIN) 12 % lotion Apply topically daily 225 mL 2    thiothixene (NAVANE) 2 MG capsule TAKE 1 CAPSULE BY MOUTH EVERY EVENING AT BEDTIME      divalproex (DEPAKOTE ER) 500 MG extended release tablet Take 500 mg by mouth daily       hydrOXYzine (ATARAX) 25 Pt demonstrates ability to turn self in bed without assistance of staff. Family understands importance in prevention of skin breakdown, ulcers, and potential infection. Hourly rounding in effect. RN skin check complete.   Devices in place include: PIV.  Skin assessed under devices: Yes.  Confirmed HAPI identified on the following date: NA   Location of HAPI: NA.  Wound Care RN following: No.  The following interventions are in place: Dressing changes as needed.     MG tablet Take 25 mg by mouth 3 times daily as needed for Anxiety        No current facility-administered medications for this visit. ALLERGIES  Allergies   Allergen Reactions    Cinnamon Itching    Codeine Hives and Itching         Comments  No specialty comments available. Background history:  Georgie Linares is a 43 y.o. female with past medical history of fibromyalgia, chronic pain, bipolar disorder, anxiety, depression, diabetes, hypertension, chronic low back pain who is being seen for follow up evaluation of  positive MATTHEW and joint pain. Her symptoms started 6 years ago and are progressively getting worse. She is complaining of pain in all the joints. Symptoms are worse in her shoulders, neck, lower back, ankles. She has pain on daily basis, 10 out of 10, without any significant living or aggravating factors. She has intermittent swelling in knuckles and ankles. She has morning stiffness lasting for 1 to 2 hours. She also has chronic widespread musculoskeletal pain involving upper and lower body on both sides of the midline. She wakes up frequently at night due to pain. Sleep is not refreshing. She has fatigue. She also has short-term memory changes. She has anxiety, depression, headaches. She is on Lyrica 75 mg twice a day and meloxicam 15 mg daily without any significant benefit. She has tried gabapentin, Flexeril, Zanaflex in the past without any improvement. She was recently seen by pain specialist and prescribed lidocaine patches which she could not afford. She was also given buprenorphine, but has not filled it yet. She has tried physical therapy for neck and shoulder pain recently without any improvement  She also has a chronic low back pain. Back pain gets worse with activity/sitting/standing and prolonged resting. Pain travels down the legs. She denies tingling or numbness, bowel or bladder dysfunction or weakness.   She has seen a spine surgeon in the past.  She has received epidural spinal injections, physical therapy and chiropractic manipulation without improvement. She is seeing an orthopedic surgeon. MRI of the cervical spine was ordered but has not been obtained yet  She had blood work by PCP which showed low titer positive MATTHEW 1: 80. She denies malar rash, photosensitivity, oral/nasal ulcers, dry eyes/dry mouth, Raynaud's, pleurisy or pericarditis, kidney diseases. She has a history of one miscarriage. She denies psoriasis, inflammatory bowel disease, inflammatory back pain, dactylitis, enthesitis, tenosynovitis or uveitis. Interim history: She presents for follow-up of fibromyalgia, osteoarthritis, degenerative disease lumbar spine, positive MATTHEW. She is noncompliant with follow-ups. She was last seen 2 years ago. She continues to complain of chronic widespread musculoskeletal pain. She has failed Lyrica, gabapentin, Flexeril and Zanaflex in the past.  She is on meloxicam 15 mg daily. She is no longer seeing a pain specialist.  She continues to complain of chronic low back pain secondary to degenerative disc disease. She has had epidural spinal injections, physical therapy and chiropractic manipulation in the past without any improvement. She also has concomitant osteoarthritis. She sees an orthopedic surgeon. She had arthroscopic hip surgeries and corticosteroid injections in the hips without any benefit. She is on meloxicam 15 mg daily with minimal relief. She has positive any 1: 80 and positive SSA antibody. dsDNA, anti-Carrington, RNP, SSB, APL panel, C3-C4 is negative. She has dry mouth. She denies dry eyes. She denies malar rash, photosensitivity, oral/nasal ulcers, Raynaud's, pleurisy or pericarditis, kidney diseases. She has a history of 1 miscarriage. She denies fever, weight loss or swollen glands.   HPI  Review of Systems    REVIEW OF SYSTEMS: Positive for fatigue, sleep disturbance, anxiety, depression, memory changes, headaches  Constitutional: No unanticipated weight loss or fevers. Integumentary: No rash, photosensitivity, malar rash, livedo reticularis, alopecia and Raynaud's symptoms, sclerodactyly, skin tightening  Eyes: negative for visual disturbance and persistent redness, discharge from eyes   ENT: - No tinnitus, loss of hearing, vertigo, or recurrent ear infections.  - No history of nasal/oral ulcers. - No history of dry eyes/dry mouth  Cardiovascular: No history of pericarditis, chest pain or murmur  Respiratory: No shortness of breath, cough or history of interstitial lung disease. No history of pleurisy. No history of tuberculosis or atypical infections. Gastrointestinal: No history of heart burn, dysphagia or esophageal dysmotility. No change in bowel habits or any inflammatory bowel disease. Genitourinary: No history renal disease  Hematologic/Lymphatic: No abnormal bruising or bleeding, blood clots or swollen lymph nodes. Neurological: No history of  seizure or focal weakness. No history of neuropathies, paresthesias or hyperesthesias, facial droop, diplopia  Endocrine: Denies any polyuria, polydipsia and osteoporosis  Allergic/Immunologic: No nasal congestion or hives. I have reviewed patients Past medical History, Social History and Family History as mentioned in her chart and this remains unchanged fromprevious.     Past Medical History:   Diagnosis Date    Arthritis     Bipolar 1 disorder ([facility] [address]     COPD (chronic obstructive pulmonary disease) (HCC)     Depression     Diabetes mellitus ([facility] [address]     Femoroacetabular impingement of right hip 8/9/2022    Hyperlipidemia     Hypertension     Tachycardia     Tachycardia     Tear of right gluteus medius tendon 8/9/2022     Past Surgical History:   Procedure Laterality Date    ANKLE FUSION Bilateral     ARTHRODESIS Left 12/6/2019    REVISION OF TALONAVICULAR JOINT ARTHRODESIS LEFT FOOT  -SLEEP APNEA- performed by Karla Arthur DPM at [address] [place] [address] RELEASE Bilateral     ELBOW SURGERY Bilateral     ulnar nerve release    HERNIA REPAIR      HIP ARTHROSCOPY Bilateral     HIP SURGERY Left 6/28/2022    LEFT HIP REVISION ARTHROSCOPY, LYSIS OF ADHESIONS, REMOVAL LOOSE BODY, SYNOVECTOMY, FEMOROACETABULAR IMPINGEMENT DECOMPRESSION AND LABRAL REPAIR, ABDUCTOR REPAIR, ENDOSCOPIC BURSECTOMY  - ELMA BLOCK; LOCAL (ORTHOMIX) performed by Ramon Mccartney MD at [address] Right 8/9/2022    RIGHT HIP REVISION, ARTHROSCOPY, FEMOROACETABULAR IMPINGEMENT SURGERY, LABRAL REPAIR, ENDOSCOPIC,-BLOCK (PENG), LOCAL (ORTHOMIX)-ROSE AND NEPHEW-SHANIQUA performed by Ramon Mccartney MD at [address] [place] [address] Bilateral      Social History     Socioeconomic History    Marital status:      Spouse name: Not on file    Number of children: Not on file    Years of education: Not on file    Highest education level: Not on file   Occupational History    Not on file   Tobacco Use    Smoking status: Former     Packs/day: 1.00     Years: 27.00     Pack years: 27.00     Types: Cigarettes     Start date: 12     Quit date: 11/19/2019     Years since quitting: 3.0    Smokeless tobacco: Never    Tobacco comments:     started to smoke at 15 / smoked up to 1 ppd / now smoking 3 to 4 cigarettes a day   Vaping Use    Vaping Use: Never used   Substance and Sexual Activity    Alcohol use: Never    Drug use: Never    Sexual activity: Not on file   Other Topics Concern    Not on file   Social History Narrative    Not on file     Social Determinants of Health     Financial Resource Strain: Not on file   Food Insecurity: Not on file   Transportation Needs: Not on file   Physical Activity: Sufficiently Active    Days of Exercise per Week: 4 days    Minutes of Exercise per Session: 60 min   Stress: Not on file   Social Connections: Not on file   Intimate Partner Violence: Not At Risk    Fear of Current or Ex-Partner: No    Emotionally Abused: No    Physically Abused: No    Sexually Abused: No   Housing Stability: Not on file     Family History   Problem Relation Age of Onset    High Blood Pressure Mother     Arthritis Mother     Other Father         hypothyroidism    High Blood Pressure Father     Arthritis Father     Asthma Father     Heart Failure Maternal Aunt         22 stents. Heart Failure Maternal Grandmother     Pacemaker Maternal Grandfather     Heart Surgery Paternal Grandmother     Pacemaker Paternal Grandfather          PHYSICAL EXAM   Vitals:    12/06/22 1528   BP: 126/80   Site: Left Upper Arm   Position: Sitting   Cuff Size: Large Adult   Weight: 238 lb (108 kg)     Physical Exam  Constitutional:  Well developed, well nourished, no acute distress, non-toxic appearance   Musculoskeletal:  Painful full range of motion in bilateral shoulders                                          Right            Left                                   Tender Tender    Costochondral  + +   Low cervical + +   suboccipital + +   Trapezius  + +   Supraspinatus  + +   Lateral epicondyl + +   Gluteal + +   Greater trochanter  + +   knees + +     Tenderness to palpation multiple PIP, MCP joints, wrists, elbows, shoulders, ankles, knees and MTP joints    Ambulates without assistance, normal gait  Neck: Full ROM, no tenderness,supple   Back-bilateral SI joint, lumbar spinal and paraspinal tenderness, bilateral SLR negative  Eyes:  PERRL, extra ocular movements intact, conjunctiva normal   HEENT:  Atraumatic, normocephalic, external ears normal, oropharynx moist, no pharyngeal exudates.   No parotid gland enlargement or lymphadenopathy  Respiratory:  No respiratory distress  GI:  Soft, nondistended, normal bowel sounds, nontender, noorganomegaly, no mass, no rebound, no guarding   :  No costovertebral angle tenderness   Integument:  Well hydrated, no rash or telangiectasias  Lymphatic:  No lymphadenopathy noted   Neurologic:   Alert & oriented x 3, CN 2-12 normal, no focal deficits noted. Sensations Intact. Muscle strength 5/5 proximallyand distally in upper and lower extremities.    Psychiatric:  Speech and behavior appropriate           LABS AND IMAGING  Outside data reviewed and in HPI    Lab Results   Component Value Date/Time    WBC 7.2 10/21/2022 08:45 AM    RBC 4.41 10/21/2022 08:45 AM    HGB 12.5 10/21/2022 08:45 AM    HCT 36.7 10/21/2022 08:45 AM     10/21/2022 08:45 AM    MCV 83.4 10/21/2022 08:45 AM    MCH 28.4 10/21/2022 08:45 AM    MCHC 34.1 10/21/2022 08:45 AM    RDW 13.5 10/21/2022 08:45 AM    LYMPHOPCT 38.8 10/21/2022 08:45 AM    MONOPCT 8.9 10/21/2022 08:45 AM    BASOPCT 1.2 10/21/2022 08:45 AM    MONOSABS 0.6 10/21/2022 08:45 AM    LYMPHSABS 2.8 10/21/2022 08:45 AM    EOSABS 0.1 10/21/2022 08:45 AM    BASOSABS 0.1 10/21/2022 08:45 AM       Chemistry        Component Value Date/Time     10/21/2022 0845    K 4.1 10/21/2022 0845    K 4.4 10/14/2022 1315     10/21/2022 0845    CO2 26 10/21/2022 0845    BUN 12 10/21/2022 0845    CREATININE 0.6 10/21/2022 0845        Component Value Date/Time    CALCIUM 9.3 10/21/2022 0845    ALKPHOS 50 10/21/2022 0845    AST 16 10/21/2022 0845    ALT 15 10/21/2022 0845    BILITOT 0.3 10/21/2022 0845          Lab Results   Component Value Date    SEDRATE 26 (H) 07/14/2022     Lab Results   Component Value Date    CRP <3.0 07/14/2022     Lab Results   Component Value Date/Time    MATTHEW POSITIVE 06/15/2020 08:44 AM    C3 140.8 08/06/2020 04:25 PM    C4 25.7 08/06/2020 04:25 PM     Lab Results   Component Value Date/Time    RF <10.0 06/15/2020 08:44 AM     Lab Results   Component Value Date/Time    MATTHEW POSITIVE 06/15/2020 08:44 AM    ANATITER 1:80 06/15/2020 08:44 AM    ANAINT see below 06/15/2020 08:44 AM     No results found for: DSDNAG, DSDNAIGGIFA  No results found for: SSAROAB, SSALAAB  No results found for: SMAB, RNPAB  No results found for: CENTABIGG  Lab Results   Component Value Date/Time    C3 140.8 08/06/2020 04:25 PM    C4 25.7 08/06/2020 04:25 PM     Lab Results   Component Value Date/Time    VITD25 26.6 01/09/2021 10:25 AM     No results found for: Gila Dunham  Lab Results   Component Value Date    JOMZGGMT11 469 08/05/2020     Lab Results   Component Value Date    TSHFT4 1.73 08/05/2020    TSH 1.24 10/07/2019     Lab Results   Component Value Date    VITD25 26.6 (L) 01/09/2021       ASSESSMENT AND PLAN      Assessment/Plan:      ASSESSMENT:    1. SS-A antibody positive    2. Fibromyalgia    3. Primary osteoarthritis involving multiple joints    4. DDD (degenerative disc disease), lumbar        PLAN:   1. SS-A antibody positive  Positive MATTHEW 1: 80 and positive SSA antibody, dry mouth. Denies dry eyes. Denies fever, weight loss or swollen glands. dsDNA, anti-Carrington, SSB, APL panel, C3-C4, RF, anti-CCP antibodies negative. Most likely possibility Sjogren's. I will repeat SSA antibody  I will also check SPEP to rule out paraproteinemia. Advised to use Biotene mouthwash, frequent sips of water and sugar-free gum  Slightly increased risk of lymphoma was explained. -     Electrophoresis Protein, Serum; Future  -     Anti SSB; Future  -     Anti SSA; Future  -     Anti-Scleroderma Antibody; Future  -     Anti-Centromere B; Future  -     C-Reactive Protein; Future  -     Sedimentation Rate; Future  -     Rheumatoid Factor; Future  -     C4 Complement; Future  -     IgG, IgA, IgM; Future    2. Fibromyalgia  CK, TSH, B12 and vitamin D is normal  She has fibromyalgia/chronic pain and osteoarthritis. She no longer sees a pain specialist.  She has failed Zanaflex, Lyrica, gabapentin, Flexeril. I do not have much to offer. I would recommend referral to a pain specialist for further management of chronic pain    3. Primary osteoarthritis involving multiple joints. RF, CCP is negative. Less likely rheumatoid arthritis. Continue follow-up with an orthopedic surgeon and meloxicam 15 mg daily    4.  DDD (degenerative disc disease), lumbar   No warning signs or radiculopathy. Failed PT, KYLE and chiropractic manipulation. Recommend referral to pain specialist for further management. Time Spent With Patient:  Time spent with patient: 40 minutes  Time spent discussing diagnosis, management, and treatment plan: > 20 minutes   The patient indicates understanding of these issues and agrees with the plan. Return in about 8 weeks (around 1/31/2023). The risks and benefits of my recommendations, as well as other treatment options, benefits and side effects werediscussed with the patient. All questions were answered. ######################################################################    I thank you for giving me theopportunity to participate in [facility]. If you have any questions or concerns please feel free to contact me. I look forward to following  Daryl Crook along with you. Electronically signed by: Meenakshi Burrows MD, MD, 12/6/2022 4:11 PM    Documentation was done using voice recognition dragon software. Every effort was made to ensure accuracy;however, inadvertent unintentional computerized transcription errors may be present.

## 2022-12-09 NOTE — TELEPHONE ENCOUNTER
Received refill request for Aspirin.     Last ov: 06/16/2022 MARYCARMEN    Next appointment: 12/12/2022 MARYCARMEN      Historical medication

## 2022-12-11 RX ORDER — ASPIRIN 81 MG/1
TABLET, COATED ORAL
Qty: 90 TABLET | Refills: 3 | Status: SHIPPED | OUTPATIENT
Start: 2022-12-11

## 2022-12-11 ASSESSMENT — ENCOUNTER SYMPTOMS
ABDOMINAL PAIN: 0
SHORTNESS OF BREATH: 0
ABDOMINAL DISTENTION: 0
PHOTOPHOBIA: 0
COUGH: 0
CHEST TIGHTNESS: 0
BLOOD IN STOOL: 0
NAUSEA: 1

## 2022-12-11 NOTE — PROGRESS NOTES
[address]  Referring: Dr. Valentina Anderson CONSULT/CHIEF COMPLAINT/HPI     Reason for visit/ Chief complaint  New Patient  Pre-operative   HPI Margarita Broderick is a 43 y.o. here for evaluation of chest pain. Originally seen and cleared for hip surgery. She has a history of LHC in 2017, no lesions. History of Hld, Htn, DM, asthma. Family hx of heart disease, grandmother  during heart surgery, m-aunt has multiple stents. Former smoker (quit 2 yrs ago). She had Covid twice, once prior to vaccines and once about 2 months ago after having vaccines and boosters. Two weeks ago she had chest pain associated with feeling woozy (never passed out), and diaphoresis when she was [employer]. She sat down to rest and it went away. Unchanged exertional shortness of breath. She also had an episode of waking up with chest pain, took a nitro and went to the ER. Sometimes associated with palpitations. She was evaluated and discharged. She reports that the pain has not returned since this episode. Patient is adherent with medications and is tolerating them well without side effects     HISTORY/ALLERGIES/ROS     MedHx:  has a past medical history of Arthritis, Bipolar 1 disorder ([address] COPD (chronic obstructive pulmonary disease) ([address] Depression, Diabetes mellitus ([address] Femoroacetabular impingement of right hip, Hyperlipidemia, Hypertension, Tachycardia, Tachycardia, and Tear of right gluteus medius tendon. SurgHx:  has a past surgical history that includes Ankle Fusion (Bilateral); Hip arthroscopy (Bilateral); hernia repair; Tonsillectomy and adenoidectomy; Elbow surgery (Bilateral); Wrist ganglion excision (Bilateral); Carpal tunnel release (Bilateral); arthrodesis (Left, 2019); hip surgery (Left, 2022); and hip surgery (Right, 2022). SocHx:  reports that she quit smoking about 3 years ago. Her smoking use included cigarettes.  She started smoking about 30 years ago. She has a 27.00 pack-year smoking history. She has never used smokeless tobacco. She reports that she does not drink alcohol and does not use drugs. FamHx: Family history of cad  Allergies: Cinnamon and Codeine   ROS:   Review of Systems   Constitutional:  Positive for fatigue. Negative for activity change, diaphoresis and fever. HENT:  Negative for congestion and ear discharge. Eyes:  Negative for photophobia and visual disturbance. Respiratory:  Negative for cough, chest tightness and shortness of breath. Cardiovascular:  Positive for chest pain. Negative for palpitations. Gastrointestinal:  Positive for nausea. Negative for abdominal distention, abdominal pain and blood in stool. Endocrine: Negative for cold intolerance and polydipsia. Genitourinary:  Negative for difficulty urinating and flank pain. Musculoskeletal:  Positive for arthralgias, myalgias and neck pain. Skin:  Negative for rash and wound. Allergic/Immunologic: Negative for environmental allergies and immunocompromised state. Neurological:  Negative for dizziness and headaches. Hematological:  Negative for adenopathy. Does not bruise/bleed easily. Psychiatric/Behavioral:  Negative for confusion. The patient is not hyperactive. MEDICATIONS      Prior to Admission medications    Medication Sig Start Date End Date Taking? Authorizing Provider   ASPIRIN LOW DOSE 81 MG EC tablet TAKE 1 TABLET BY MOUTH EVERY DAY 12/11/22  Yes Scott Juliann, DO   nitroGLYCERIN (NITROSTAT) 0.4 MG SL tablet Place 1 tablet under the tongue every 5 minutes as needed for Chest pain up to max of 3 total doses.  If no relief after 1 dose, call 911. 11/14/22  Yes JUAN RAMON Esquivel CNP   ezetimibe (ZETIA) 10 MG tablet Take 1 tablet by mouth daily 10/31/22  Yes JUAN RAMON Esquivel CNP   omeprazole (PRILOSEC) 20 MG delayed release capsule TAKE 1 CAPSULE BY MOUTH EVERY DAY 10/31/22  Yes JUAN RAMON Esquivel CNP   rosuvastatin (CRESTOR) 40 MG tablet TAKE 1 TABLET BY MOUTH EVERY DAY 10/31/22  Yes JUAN RAMON Daniel CNP   metFORMIN (GLUCOPHAGE-XR) 500 MG extended release tablet Take 2 tablets by mouth daily (with breakfast) 10/31/22  Yes JUAN RAMON Daniel CNP   metoprolol tartrate (LOPRESSOR) 50 MG tablet Take 1 tablet by mouth 2 times daily 10/31/22  Yes JUAN RAMON Daniel CNP   meloxicam (MOBIC) 15 MG tablet Take 1 tablet by mouth daily as needed for Pain 10/31/22  Yes JUAN RAMON Daniel CNP   albuterol sulfate HFA (PROVENTIL;VENTOLIN;PROAIR) 108 (90 Base) MCG/ACT inhaler TAKE 2 PUFFS BY MOUTH EVERY 6 HOURS AS NEEDED FOR WHEEZE 9/7/22  Yes Piedad Escalona MD   Evolocumab 140 MG/ML SOAJ Inject 140 mg into the skin every 14 days 6/16/22  Yes Ebonie Amor DO   Cholecalciferol (VITAMIN D3) 50 MCG (2000 UT) CAPS TAKE 1 CAPSULE BY MOUTH EVERY DAY 5/17/22  Yes JUAN RAMON aDniel CNP   citalopram (CELEXA) 40 MG tablet TAKE 1 TABLET BY MOUTH EVERY DAY IN THE MORNING 4/19/22  Yes Historical Provider, MD   ammonium lactate (LAC-HYDRIN) 12 % lotion Apply topically daily 4/5/21  Yes JUAN RAMON Daniel CNP   thiothixene (NAVANE) 2 MG capsule TAKE 1 CAPSULE BY MOUTH EVERY EVENING AT BEDTIME 6/11/20  Yes Historical Provider, MD   divalproex (DEPAKOTE ER) 500 MG extended release tablet Take 500 mg by mouth daily    Yes Historical Provider, MD   hydrOXYzine (ATARAX) 25 MG tablet Take 25 mg by mouth 3 times daily as needed for Anxiety    Yes Historical Provider, MD   diclofenac sodium (VOLTAREN) 1 % GEL APPLY 2 GRAMS TOPICALLY 2 TIMES DAILY  Patient not taking: Reported on 8/8/2022 4/29/22 8/9/22  Shahla Moralez MD       PHYSICAL EXAM        Vitals:    12/12/22 1409   BP: 104/68   Pulse: 67   SpO2: 98%    Weight: 230 lb 9.6 oz (104.6 kg)     Gen Alert, cooperative, no distress Heart  Regular rate and rhythm, no murmur   Head Normocephalic, atraumatic, no abnormalities Abd  Soft, NT, +BS, no mass, no OM   Eyes PERRLA, conj/corn clear Ext  Ext nl, AT, no C/C, no edema   Nose Nares normal, no drain age, Non-tender Pulse 2+ and symmetric   Throat Lips, mucosa, tongue normal Skin Color/text/turg nl, no rash/lesions   Neck S/S, TM, NT, no bruit Psych Nl mood and affect   Lung CTA-B, unlabored, no DTP     Ch wall NT, no deform       LABS and Imaging     Relevant and available CV data reviewed  Echo/MRI: 4/2021    Cath: 2017  Last Angiogram: Lt & Rt heart cath: '17  FINDINGS:  Pulmonary capillary wedge pressure 7, PA pressure 32/16, mean  pressure of 21. Right atrial pressure of 9. Right  ventricular pressure of 18. Oxygen saturation throughout the  right heart system was around 68%. Qp:Qs is calculated to be  1. Cardiac output by Farhad method was 5.7 with cardiac index  of 2.84. FINDINGS:  Left main 0% stenosis. LAD large, 0% stenosis. Diagonal is  small, 0% stenosis. Left circumflex is codominant artery, 0% stenosis. Obtuse marginal is small. Right coronary artery codominant artery with 0% stenosis. LV angiogram shows LV ejection fraction 55% with LVEDP of 19 mmHg. Holter none  EKG personally interpreted: sinus rhythm, anterior t wave inversion   Stress:4/2019   Summary  Normal myocardial perfusion study. Normal LV size and systolic function. Moderate Risk  Moderate Complexity/Medical Decision Making  Outside/Care everywhere records Reviewed  Labs Reviewed  Prior Imaging, ekg, cath, echo reviewed when available  Medications reviewed  Old Notes reviewed  ASSESSMENT AND PLAN     1. Chest pain  -     new problem  Plan  -     coronary cta to evaluate for obstructive cad, metoprolol and ativan prior  -     asa 81 mg daily  -    continue zetia, crestor, repatha, lopressor    2. Essential hypertension  -     104/68  -     stable  Plan:   -      metoprolol    3. Heterozygous Familial hypercholesterolemia  Historical LDL of 261, 245.  Family history of high cholesterol (daughter and mom)  6/2022  TG 99 HDL 33 LDL 85  -stable  plan  - crestor, zetia, repatha      4. AMOS  -      treated with cpap  Plan  -      recommended continue cpap nightly    5. Diabetes  Plan  -      glucophage    6. Sob  -       diagnosed with copd  -       unchanged  Plan  -       followed by pulmonary    7. Obesity  - new problem  Plan:  - referral to bariatric surgery  -          Patient counseled on lifestyle modification, diet, and exercise. Follow Up: 6 months    Dr. Ly Ast: This note was scribed in the presence of Dr. Richard Hart DO by Jina Manrique RN        Physician Attestation  The scribe for and in the presence of remigio Nieves DO). The scribe Jina Manrique RN   may have prepopulated components of this note with my historical  intellectual property under my direct supervision. Any additions to this intellectual property were performed in my presence and at my direction. Furthermore, the content and accuracy of this note have been reviewed by remigio Nieves DO).   12/12/2022 2:36 PM

## 2022-12-12 ENCOUNTER — OFFICE VISIT (OUTPATIENT)
Dept: CARDIOLOGY CLINIC | Age: 42
End: 2022-12-12
Payer: COMMERCIAL

## 2022-12-12 VITALS
BODY MASS INDEX: 38.42 KG/M2 | SYSTOLIC BLOOD PRESSURE: 104 MMHG | HEIGHT: 65 IN | OXYGEN SATURATION: 98 % | HEART RATE: 67 BPM | WEIGHT: 230.6 LBS | DIASTOLIC BLOOD PRESSURE: 68 MMHG

## 2022-12-12 DIAGNOSIS — R07.89 OTHER CHEST PAIN: ICD-10-CM

## 2022-12-12 DIAGNOSIS — I10 ESSENTIAL HYPERTENSION: ICD-10-CM

## 2022-12-12 DIAGNOSIS — E78.2 MIXED HYPERLIPIDEMIA: ICD-10-CM

## 2022-12-12 DIAGNOSIS — R00.2 PALPITATION: ICD-10-CM

## 2022-12-12 DIAGNOSIS — E78.01 FAMILIAL HYPERCHOLESTEROLEMIA: ICD-10-CM

## 2022-12-12 DIAGNOSIS — Z82.49 FAMILY HISTORY OF HEART DISEASE: ICD-10-CM

## 2022-12-12 DIAGNOSIS — I25.9 CHEST PAIN DUE TO MYOCARDIAL ISCHEMIA, UNSPECIFIED ISCHEMIC CHEST PAIN TYPE: Primary | ICD-10-CM

## 2022-12-12 DIAGNOSIS — E66.9 OBESITY, UNSPECIFIED CLASSIFICATION, UNSPECIFIED OBESITY TYPE, UNSPECIFIED WHETHER SERIOUS COMORBIDITY PRESENT: ICD-10-CM

## 2022-12-12 PROCEDURE — G8417 CALC BMI ABV UP PARAM F/U: HCPCS | Performed by: INTERNAL MEDICINE

## 2022-12-12 PROCEDURE — 99214 OFFICE O/P EST MOD 30 MIN: CPT | Performed by: INTERNAL MEDICINE

## 2022-12-12 PROCEDURE — 3078F DIAST BP <80 MM HG: CPT | Performed by: INTERNAL MEDICINE

## 2022-12-12 PROCEDURE — 3074F SYST BP LT 130 MM HG: CPT | Performed by: INTERNAL MEDICINE

## 2022-12-12 PROCEDURE — G8482 FLU IMMUNIZE ORDER/ADMIN: HCPCS | Performed by: INTERNAL MEDICINE

## 2022-12-12 PROCEDURE — 1036F TOBACCO NON-USER: CPT | Performed by: INTERNAL MEDICINE

## 2022-12-12 PROCEDURE — G8427 DOCREV CUR MEDS BY ELIG CLIN: HCPCS | Performed by: INTERNAL MEDICINE

## 2022-12-12 RX ORDER — ACETAMINOPHEN 160 MG
TABLET,DISINTEGRATING ORAL
Qty: 90 CAPSULE | Refills: 0 | Status: SHIPPED | OUTPATIENT
Start: 2022-12-12

## 2022-12-12 RX ORDER — LORAZEPAM 0.5 MG/1
0.5 TABLET ORAL SEE ADMIN INSTRUCTIONS
Qty: 2 TABLET | Refills: 0 | Status: SHIPPED | OUTPATIENT
Start: 2022-12-12 | End: 2023-01-11

## 2022-12-12 NOTE — PROGRESS NOTES
7 day Bio[employer] monitor placed on the patient here in clinic today. Reviewed proper care and instructions with the patient.      SN : [id number]

## 2022-12-12 NOTE — PATIENT INSTRUCTIONS
Coronary cta  -  take metoprolol 75 mg the night prior and one hour prior test  -  ativan 0.5 mg one hour prior test and take second pill

## 2022-12-12 NOTE — LETTER
[address] Cardiology - [address]  [address] [address]  Phone: [phone]  Fax: [phone]    Toni Wang DO    December 20, 2022     Charles Lovell, APRN - CNP  [address] [facility] THE [facility] [address] [place] [address]    Patient: Mik Bautista   MR Number: [id number]   YOB: 1980   Date of Visit: 12/12/2022       Dear Charles Lovell:    Thank you for referring Vickie Loza to me for evaluation/treatment. Below are the relevant portions of my assessment and plan of care. If you have questions, please do not hesitate to call me. I look forward to following Zhane Templeton along with you.     Sincerely,      Toni Wang DO

## 2022-12-16 ENCOUNTER — TELEPHONE (OUTPATIENT)
Dept: CARDIOLOGY CLINIC | Age: 42
End: 2022-12-16

## 2022-12-16 NOTE — TELEPHONE ENCOUNTER
Pt called stating that they have skin irritation from the monitor. Pt wants to know what to do? Can they get a different monitor?     Pls advise thank you

## 2022-12-21 ENCOUNTER — HOSPITAL ENCOUNTER (OUTPATIENT)
Age: 42
Discharge: HOME | End: 2022-12-21
Payer: COMMERCIAL

## 2022-12-21 ENCOUNTER — NURSE ONLY (OUTPATIENT)
Dept: CARDIOLOGY CLINIC | Age: 42
End: 2022-12-21

## 2022-12-21 DIAGNOSIS — R00.0 CHRONIC TACHYCARDIA: ICD-10-CM

## 2022-12-21 DIAGNOSIS — E55.9 VITAMIN D DEFICIENCY: ICD-10-CM

## 2022-12-21 DIAGNOSIS — K21.9 GASTROESOPHAGEAL REFLUX DISEASE, UNSPECIFIED WHETHER ESOPHAGITIS PRESENT: ICD-10-CM

## 2022-12-21 DIAGNOSIS — E11.9 TYPE 2 DIABETES MELLITUS WITHOUT COMPLICATION, WITHOUT LONG-TERM CURRENT USE OF INSULIN (HCC): ICD-10-CM

## 2022-12-21 DIAGNOSIS — R76.8 SS-A ANTIBODY POSITIVE: ICD-10-CM

## 2022-12-21 DIAGNOSIS — E78.2 MIXED HYPERLIPIDEMIA: ICD-10-CM

## 2022-12-21 LAB
A/G RATIO: 1.3 (ref 1.1–2.2)
ALBUMIN SERPL-MCNC: 3.9 G/DL (ref 3.4–5)
ALP BLD-CCNC: 53 U/L (ref 40–129)
ALT SERPL-CCNC: 15 U/L (ref 10–40)
ANION GAP SERPL CALCULATED.3IONS-SCNC: 12 MMOL/L (ref 3–16)
AST SERPL-CCNC: 18 U/L (ref 15–37)
BASOPHILS ABSOLUTE: 0.1 K/UL (ref 0–0.2)
BASOPHILS RELATIVE PERCENT: 1 %
BILIRUB SERPL-MCNC: 0.3 MG/DL (ref 0–1)
BUN BLDV-MCNC: 11 MG/DL (ref 7–20)
C-REACTIVE PROTEIN: <3 MG/L (ref 0–5.1)
C4 COMPLEMENT: 30 MG/DL (ref 10–40)
CALCIUM SERPL-MCNC: 9.2 MG/DL (ref 8.3–10.6)
CHLORIDE BLD-SCNC: 99 MMOL/L (ref 99–110)
CHOLESTEROL, TOTAL: 78 MG/DL (ref 0–199)
CO2: 26 MMOL/L (ref 21–32)
CREAT SERPL-MCNC: 0.6 MG/DL (ref 0.6–1.1)
EOSINOPHILS ABSOLUTE: 0 K/UL (ref 0–0.6)
EOSINOPHILS RELATIVE PERCENT: 0.5 %
ESTIMATED AVERAGE GLUCOSE: 131.2 MG/DL
GFR SERPL CREATININE-BSD FRML MDRD: >60 ML/MIN/{1.73_M2}
GLUCOSE BLD-MCNC: 113 MG/DL (ref 70–99)
HBA1C MFR BLD: 6.2 %
HCT VFR BLD CALC: 40.3 % (ref 36–48)
HDLC SERPL-MCNC: 34 MG/DL (ref 40–60)
HEMOGLOBIN: 13.1 G/DL (ref 12–16)
IGA: 153 MG/DL (ref 70–400)
IGG: 1204 MG/DL (ref 700–1600)
IGM: 81 MG/DL (ref 40–230)
LDL CHOLESTEROL CALCULATED: 33 MG/DL
LYMPHOCYTES ABSOLUTE: 2.9 K/UL (ref 1–5.1)
LYMPHOCYTES RELATIVE PERCENT: 40.9 %
MCH RBC QN AUTO: 27.9 PG (ref 26–34)
MCHC RBC AUTO-ENTMCNC: 32.6 G/DL (ref 31–36)
MCV RBC AUTO: 85.5 FL (ref 80–100)
MONOCYTES ABSOLUTE: 0.5 K/UL (ref 0–1.3)
MONOCYTES RELATIVE PERCENT: 6.3 %
NEUTROPHILS ABSOLUTE: 3.7 K/UL (ref 1.7–7.7)
NEUTROPHILS RELATIVE PERCENT: 51.3 %
PDW BLD-RTO: 14.2 % (ref 12.4–15.4)
PLATELET # BLD: 242 K/UL (ref 135–450)
PMV BLD AUTO: 8.3 FL (ref 5–10.5)
POTASSIUM SERPL-SCNC: 4.3 MMOL/L (ref 3.5–5.1)
RBC # BLD: 4.72 M/UL (ref 4–5.2)
RHEUMATOID FACTOR: <10 IU/ML
SEDIMENTATION RATE, ERYTHROCYTE: 7 MM/HR (ref 0–20)
SODIUM BLD-SCNC: 137 MMOL/L (ref 136–145)
TOTAL CK: 128 U/L (ref 26–192)
TRIGL SERPL-MCNC: 56 MG/DL (ref 0–150)
TSH REFLEX: 2.42 UIU/ML (ref 0.27–4.2)
VITAMIN D 25-HYDROXY: 43 NG/ML
VLDLC SERPL CALC-MCNC: 11 MG/DL
WBC # BLD: 7.2 K/UL (ref 4–11)

## 2022-12-21 PROCEDURE — 86160 COMPLEMENT ANTIGEN: CPT

## 2022-12-21 PROCEDURE — 84155 ASSAY OF PROTEIN SERUM: CPT

## 2022-12-21 PROCEDURE — 86140 C-REACTIVE PROTEIN: CPT

## 2022-12-21 PROCEDURE — 84443 ASSAY THYROID STIM HORMONE: CPT

## 2022-12-21 PROCEDURE — 82784 ASSAY IGA/IGD/IGG/IGM EACH: CPT

## 2022-12-21 PROCEDURE — 83036 HEMOGLOBIN GLYCOSYLATED A1C: CPT

## 2022-12-21 PROCEDURE — 86431 RHEUMATOID FACTOR QUANT: CPT

## 2022-12-21 PROCEDURE — 84165 PROTEIN E-PHORESIS SERUM: CPT

## 2022-12-21 PROCEDURE — 82306 VITAMIN D 25 HYDROXY: CPT

## 2022-12-21 PROCEDURE — 36415 COLL VENOUS BLD VENIPUNCTURE: CPT

## 2022-12-21 PROCEDURE — 85652 RBC SED RATE AUTOMATED: CPT

## 2022-12-21 PROCEDURE — 80053 COMPREHEN METABOLIC PANEL: CPT

## 2022-12-21 PROCEDURE — 82550 ASSAY OF CK (CPK): CPT

## 2022-12-21 PROCEDURE — 86235 NUCLEAR ANTIGEN ANTIBODY: CPT

## 2022-12-21 PROCEDURE — 80061 LIPID PANEL: CPT

## 2022-12-21 PROCEDURE — 85025 COMPLETE CBC W/AUTO DIFF WBC: CPT

## 2022-12-21 NOTE — PROGRESS NOTES
30 Day MCOT requested for pt. Device was registered and placed. Tutorial given, pt verbalized understanding. Based upon her skin condition, in which she has burns from the patch. We switched her to the two lead. She may not make the full 30 days but was willing to try another method.  Date-12/21/2022  Time 09:00am  [id number]   saloni

## 2022-12-22 LAB
ANTI-SCL70 IGG: <0.2 AI (ref 0–0.9)
ANTI-SS-A IGG: 1.9 AI (ref 0–0.9)
ANTI-SS-B IGG: <0.2 AI (ref 0–0.9)

## 2022-12-23 LAB
ALBUMIN SERPL-MCNC: 3.4 G/DL (ref 3.1–4.9)
ALPHA-1-GLOBULIN: 0.2 G/DL (ref 0.2–0.4)
ALPHA-2-GLOBULIN: 1 G/DL (ref 0.4–1.1)
BETA GLOBULIN: 1 G/DL (ref 0.9–1.6)
GAMMA GLOBULIN: 1.4 G/DL (ref 0.6–1.8)
SPE/IFE INTERPRETATION: NORMAL
TOTAL PROTEIN: 6.9 G/DL (ref 6.4–8.2)

## 2022-12-29 ENCOUNTER — TELEPHONE (OUTPATIENT)
Dept: INTERVENTIONAL RADIOLOGY/VASCULAR | Age: 42
End: 2022-12-29

## 2022-12-30 ENCOUNTER — TELEPHONE (OUTPATIENT)
Dept: CARDIOLOGY CLINIC | Age: 42
End: 2022-12-30

## 2022-12-30 NOTE — TELEPHONE ENCOUNTER
Heavenly Marie from [facility] calling to advise that CTA has been approved AUTH# [id number].

## 2023-01-04 ENCOUNTER — TELEPHONE (OUTPATIENT)
Dept: CARDIOLOGY CLINIC | Age: 43
End: 2023-01-04

## 2023-01-04 NOTE — TELEPHONE ENCOUNTER
----- Message from Scarlett Mcbride DO sent at 1/4/2023  7:53 AM EST -----  Please let patient know monitor looked okay

## 2023-01-05 ENCOUNTER — HOSPITAL ENCOUNTER (OUTPATIENT)
Dept: CT IMAGING | Age: 43
Discharge: HOME | End: 2023-01-05
Payer: COMMERCIAL

## 2023-01-05 VITALS
TEMPERATURE: 96.5 F | BODY MASS INDEX: 38.65 KG/M2 | RESPIRATION RATE: 16 BRPM | SYSTOLIC BLOOD PRESSURE: 162 MMHG | HEART RATE: 67 BPM | WEIGHT: 232 LBS | OXYGEN SATURATION: 100 % | HEIGHT: 65 IN | DIASTOLIC BLOOD PRESSURE: 80 MMHG

## 2023-01-05 DIAGNOSIS — R07.89 OTHER CHEST PAIN: ICD-10-CM

## 2023-01-05 DIAGNOSIS — R00.2 PALPITATION: ICD-10-CM

## 2023-01-05 DIAGNOSIS — I25.9 CHEST PAIN DUE TO MYOCARDIAL ISCHEMIA, UNSPECIFIED ISCHEMIC CHEST PAIN TYPE: ICD-10-CM

## 2023-01-05 DIAGNOSIS — E78.01 FAMILIAL HYPERCHOLESTEROLEMIA: ICD-10-CM

## 2023-01-05 DIAGNOSIS — Z82.49 FAMILY HISTORY OF HEART DISEASE: ICD-10-CM

## 2023-01-05 PROCEDURE — 6360000004 HC RX CONTRAST MEDICATION: Performed by: INTERNAL MEDICINE

## 2023-01-05 PROCEDURE — 6370000000 HC RX 637 (ALT 250 FOR IP): Performed by: RADIOLOGY

## 2023-01-05 PROCEDURE — 75574 CT ANGIO HRT W/3D IMAGE: CPT

## 2023-01-05 RX ORDER — NITROGLYCERIN 0.4 MG/1
0.4 TABLET SUBLINGUAL ONCE
Status: COMPLETED | OUTPATIENT
Start: 2023-01-05 | End: 2023-01-05

## 2023-01-05 RX ADMIN — IOPAMIDOL 85 ML: 755 INJECTION, SOLUTION INTRAVENOUS at 08:38

## 2023-01-05 RX ADMIN — NITROGLYCERIN 0.4 MG: 0.4 TABLET, ORALLY DISINTEGRATING SUBLINGUAL at 08:41

## 2023-01-05 NOTE — FLOWSHEET NOTE
Pt tolerated procedure well. VSS. IV removed without difficulty. Pt given d/c instructions and stated understanding. Released in stable condition to home.  /77

## 2023-01-06 ENCOUNTER — TELEPHONE (OUTPATIENT)
Dept: CARDIOLOGY CLINIC | Age: 43
End: 2023-01-06

## 2023-01-06 NOTE — TELEPHONE ENCOUNTER
----- Message from Isaiah Duncan DO sent at 1/5/2023 10:22 PM EST -----  Please let patient know that her arteries are beautiful. No blockages or calcium. She does have some incidental spurs seen in her spine.  She can follow up routinely with her pcp about that

## 2023-01-06 NOTE — LETTER
[facility]  [address] [facility] [address] [address]  Phone: [phone]  Fax: [address], DO        January 10, 2023     Patient: Carmela Sever   YOB: 1980   Date of Visit: 1/6/2023       Nixon Cordero:    Several attempts have been made to contact you, unsuccessfully to relay CTA results. It is my medical opinion that your arteries are beautiful. No blockages or calcium. You do have some incidental spurs seen in your spine. You can follow up routinely with your pcp about that. If you have any questions or concerns, please don't hesitate to call.     Sincerely,        Rodrigue Tucker, DO

## 2023-01-06 NOTE — RESULT ENCOUNTER NOTE
Please let patient know that her arteries are beautiful. No blockages or calcium. She does have some incidental spurs seen in her spine.  She can follow up routinely with her pcp about that

## 2023-01-14 ASSESSMENT — SLEEP AND FATIGUE QUESTIONNAIRES
HOW LIKELY ARE YOU TO NOD OFF OR FALL ASLEEP WHILE WATCHING TV: 1
HOW LIKELY ARE YOU TO NOD OFF OR FALL ASLEEP WHILE SITTING AND TALKING TO SOMEONE: WOULD NEVER DOZE
HOW LIKELY ARE YOU TO NOD OFF OR FALL ASLEEP WHILE SITTING INACTIVE IN A PUBLIC PLACE: SLIGHT CHANCE OF DOZING
HOW LIKELY ARE YOU TO NOD OFF OR FALL ASLEEP WHILE LYING DOWN TO REST IN THE AFTERNOON WHEN CIRCUMSTANCES PERMIT: HIGH CHANCE OF DOZING
HOW LIKELY ARE YOU TO NOD OFF OR FALL ASLEEP WHILE WATCHING TV: SLIGHT CHANCE OF DOZING
HOW LIKELY ARE YOU TO NOD OFF OR FALL ASLEEP WHILE SITTING AND READING: MODERATE CHANCE OF DOZING
HOW LIKELY ARE YOU TO NOD OFF OR FALL ASLEEP WHILE SITTING QUIETLY AFTER LUNCH WITHOUT ALCOHOL: HIGH CHANCE OF DOZING
HOW LIKELY ARE YOU TO NOD OFF OR FALL ASLEEP IN A CAR, WHILE STOPPED FOR A FEW MINUTES IN TRAFFIC: WOULD NEVER DOZE
HOW LIKELY ARE YOU TO NOD OFF OR FALL ASLEEP WHEN YOU ARE A PASSENGER IN A CAR FOR AN HOUR WITHOUT A BREAK: HIGH CHANCE OF DOZING

## 2023-01-17 ENCOUNTER — TELEMEDICINE (OUTPATIENT)
Dept: PULMONOLOGY | Age: 43
End: 2023-01-17
Payer: COMMERCIAL

## 2023-01-17 DIAGNOSIS — J44.9 COPD WITH ASTHMA (HCC): Chronic | ICD-10-CM

## 2023-01-17 DIAGNOSIS — G47.33 OSA (OBSTRUCTIVE SLEEP APNEA): Primary | Chronic | ICD-10-CM

## 2023-01-17 DIAGNOSIS — E66.01 MORBIDLY OBESE (HCC): Chronic | ICD-10-CM

## 2023-01-17 DIAGNOSIS — E11.9 TYPE 2 DIABETES MELLITUS WITHOUT COMPLICATION, WITHOUT LONG-TERM CURRENT USE OF INSULIN (HCC): Chronic | ICD-10-CM

## 2023-01-17 DIAGNOSIS — I10 ESSENTIAL HYPERTENSION: Chronic | ICD-10-CM

## 2023-01-17 PROCEDURE — 3046F HEMOGLOBIN A1C LEVEL >9.0%: CPT | Performed by: NURSE PRACTITIONER

## 2023-01-17 PROCEDURE — G8427 DOCREV CUR MEDS BY ELIG CLIN: HCPCS | Performed by: NURSE PRACTITIONER

## 2023-01-17 PROCEDURE — 2022F DILAT RTA XM EVC RTNOPTHY: CPT | Performed by: NURSE PRACTITIONER

## 2023-01-17 PROCEDURE — 99214 OFFICE O/P EST MOD 30 MIN: CPT | Performed by: NURSE PRACTITIONER

## 2023-01-17 ASSESSMENT — SLEEP AND FATIGUE QUESTIONNAIRES
HOW LIKELY ARE YOU TO NOD OFF OR FALL ASLEEP WHILE SITTING INACTIVE IN A PUBLIC PLACE: 1
HOW LIKELY ARE YOU TO NOD OFF OR FALL ASLEEP WHILE SITTING AND READING: 2
HOW LIKELY ARE YOU TO NOD OFF OR FALL ASLEEP WHEN YOU ARE A PASSENGER IN A CAR FOR AN HOUR WITHOUT A BREAK: 3
HOW LIKELY ARE YOU TO NOD OFF OR FALL ASLEEP WHILE WATCHING TV: 1
HOW LIKELY ARE YOU TO NOD OFF OR FALL ASLEEP WHILE LYING DOWN TO REST IN THE AFTERNOON WHEN CIRCUMSTANCES PERMIT: 3
HOW LIKELY ARE YOU TO NOD OFF OR FALL ASLEEP WHILE SITTING QUIETLY AFTER LUNCH WITHOUT ALCOHOL: 2
HOW LIKELY ARE YOU TO NOD OFF OR FALL ASLEEP WHILE SITTING AND TALKING TO SOMEONE: 0
ESS TOTAL SCORE: 12
HOW LIKELY ARE YOU TO NOD OFF OR FALL ASLEEP IN A CAR, WHILE STOPPED FOR A FEW MINUTES IN TRAFFIC: 0

## 2023-01-17 NOTE — LETTER
[facility]  [id number] [facility]  [address] [address]  Phone: [phone]  Fax: [phone]    January 17, 2023       Patient: Stan Bloom   MR Number: [id number]   YOB: 1980   Date of Visit: 1/17/2023       Christi Cárdenas was seen for a follow up visit today. Here is my assessment and plan as well as an attached copy of her visit today:    Essential hypertension   Chronic- Stable. Discussed the importance of treating obstructive sleep apnea as part of the management of this disorder. Cont any meds per PCP and other physicians. Type 2 diabetes mellitus without complication, without long-term current use of insulin (HCC)  Chronic- Stable. Discussed the importance of treating obstructive sleep apnea as part of the management of this disorder. Cont any meds per PCP and other physicians. 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[facility]   Chronic- Stable. Discussed the importance of treating obstructive sleep apnea as part of the management of this disorder. Cont any meds per PCP and other physicians. Morbidly obese (HCC)  Chronic-not stable:  Discussed importance of treating obstructive sleep apnea and getting sufficient sleep to assist with weight control. Encouraged her to work on weight loss through diet and exercise. Recommended DASH or Mediterranean diets. AMOS (obstructive sleep apnea)  Chronic-with progression/exacerbation:Reviewed and analyzed results of physiologic download from patient's machine and reviewed with patient. Supplies and parts as needed for her machine. These are medically necessary. Limit caffeine use after 3pm. Based on the analyzed data will change following settings: EPAP min increased to 15. Pressure changes sent via machine modem. Discussed increasing the tube temperature to help with water accumulation in her tubing. Discussed she may be waking with anxiety due to not enough pressure on her machine.  Recommended a full night in lab bilevel titration but she declined at this time. Encouraged her to contact her DME to attempt to place an order for supplies. If she is unable to obtain supplies due to noncompliance she could try ordering them online and pay out of pocket. She could also try a mask liner in the interim. Provided her with resources to view masks and mask liners. Will see her back in 3 months. Encouraged her to contact the office with any questions or concerns. Encouraged consistent use of her machine each night, all night. Discussed the importance of treating Obstructive sleep apnea from a physiological standpoint. Instructed not to drive unless had 4 hrs of effective therapy for her AMOS the night before. No driving when drowsy. Did review the risks of under or untreated AMOS including, but not limited to, higher risks of motor vehicle accidents, stroke, heart attacks, and death. She understands and accepts all these risks. If you have questions or concerns, please do not hesitate to call me. I look forward to following Min Qureshi along with you.     Sincerely,    JUAN RAMON Mina    CC providers:  JUAN RAMON Bell - CNP  [address] [facility] - [address] [address]  [address] [place]

## 2023-01-17 NOTE — ASSESSMENT & PLAN NOTE
Chronic-with progression/exacerbation:Reviewed and analyzed results of physiologic download from patient's machine and reviewed with patient. Supplies and parts as needed for her machine. These are medically necessary. Limit caffeine use after 3pm. Based on the analyzed data will change following settings: EPAP min increased to 15. Pressure changes sent via machine modem. Discussed increasing the tube temperature to help with water accumulation in her tubing. Discussed she may be waking with anxiety due to not enough pressure on her machine. Recommended a full night in lab bilevel titration but she declined at this time. Encouraged her to contact her DME to attempt to place an order for supplies. If she is unable to obtain supplies due to noncompliance she could try ordering them online and pay out of pocket. She could also try a mask liner in the interim. Provided her with resources to view masks and mask liners. Discussed with weight loss may be able to consider Inspire or an Oral appliance as an alternative treatment for her Obstructive sleep apnea. Will see her back in 3 months. Encouraged her to contact the office with any questions or concerns. Encouraged consistent use of her machine each night, all night. Discussed the importance of treating Obstructive sleep apnea from a physiological standpoint. Instructed not to drive unless had 4 hrs of effective therapy for her AMOS the night before. No driving when drowsy. Did review the risks of under or untreated AMOS including, but not limited to, higher risks of motor vehicle accidents, stroke, heart attacks, and death. She understands and accepts all these risks.

## 2023-01-17 NOTE — PROGRESS NOTES
Haritha Wilkerson [facility]  [id number] [place] [address], [address] [facility]- [phone]   [facility] [address] Mellisa Haji. [address] [place] [address]. Julio César David [address] [phone]     Video Visit- Follow up    Gibson Mercer, was evaluated through a synchronous (real-time) audio-video  encounter. The patient (or guardian if applicable) is aware that this is a billable  service, which includes applicable co-pays. This Virtual Visit was conducted with  patient's (and/or legal guardian's) consent. The visit was conducted pursuant to  the emergency declaration under the [address] [place] [address] authority and the [employer] General Act. Patient identification was verified,  and a caregiver was present when appropriate. The patient was located in a  state where the provider was licensed to provide care. Assessment/Plan:      1. AMOS (obstructive sleep apnea)  Assessment & Plan:  Chronic-with progression/exacerbation:Reviewed and analyzed results of physiologic download from patient's machine and reviewed with patient. Supplies and parts as needed for her machine. These are medically necessary. Limit caffeine use after 3pm. Based on the analyzed data will change following settings: EPAP min increased to 15. Pressure changes sent via machine modem. Discussed increasing the tube temperature to help with water accumulation in her tubing. Discussed she may be waking with anxiety due to not enough pressure on her machine. Recommended a full night in lab bilevel titration but she declined at this time. Encouraged her to contact her DME to attempt to place an order for supplies. If she is unable to obtain supplies due to noncompliance she could try ordering them online and pay out of pocket. She could also try a mask liner in the interim. Provided her with resources to view masks and mask liners.  Discussed with weight loss may be able to consider Inspire or an Oral appliance as an alternative treatment for her Obstructive sleep apnea. Will see her back in 3 months. Encouraged her to contact the office with any questions or concerns. Encouraged consistent use of her machine each night, all night. Discussed the importance of treating Obstructive sleep apnea from a physiological standpoint. Instructed not to drive unless had 4 hrs of effective therapy for her AMOS the night before. No driving when drowsy. Did review the risks of under or untreated AMOS including, but not limited to, higher risks of motor vehicle accidents, stroke, heart attacks, and death. She understands and accepts all these risks. 2. Essential hypertension  Assessment & Plan:   Chronic- Stable. Discussed the importance of treating obstructive sleep apnea as part of the management of this disorder. Cont any meds per PCP and other physicians. 3. 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[facility] [address]  Assessment & Plan:   Chronic- Stable. Discussed the importance of treating obstructive sleep apnea as part of the management of this disorder. Cont any meds per PCP and other physicians. 4. Type 2 diabetes mellitus without complication, without long-term current use of insulin (HCC)  Assessment & Plan:  Chronic- Stable. Discussed the importance of treating obstructive sleep apnea as part of the management of this disorder. Cont any meds per PCP and other physicians. 5. Morbidly obese ([facility] [address]  Assessment & Plan:  Chronic-not stable:  Discussed importance of treating obstructive sleep apnea and getting sufficient sleep to assist with weight control. Encouraged her to work on weight loss through diet and exercise. Recommended DASH or Mediterranean diets. Reviewed, analyzed, and documented physiologic data from patient's PAP machine. This information was analyzed to assess complexity and medical decision making in regards to further testing and management.     The primary encounter diagnosis was AMOS (obstructive sleep apnea). Diagnoses of Essential hypertension, 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[facility] [address] Type 2 diabetes mellitus without complication, without long-term current use of insulin ([facility] [address] and Morbidly obese ([facility] [address] were also pertinent to this visit. The chronic medical conditions listed are directly related to the primary diagnosis listed above. The management of the primary diagnosis affects the secondary diagnosis and vice versa. Subjective:     Patient ID: Lucille Day is a 43 y.o. female. Chief Complaint   Patient presents with    Sleep Apnea     Subjective   HPI:    Machine Modem/Download Info:  Compliance (hours/night): 5.3 hrs/night  % of nights >= 4 hrs: 45 %  Download AHI (/hour): 1 /HR   Average IPAP Pressure: 18.5 cmH2O  Average EPAP Pressure: 14.5 cmH2O               AUTO BILEVEL - Settings (ResMed)  IPAP Max: 25 cmH2O  EPAP Min: 14 cmH2O  Pressure Support: [address] presents today for follow-up for sleep apnea. she reports she has not been doing well with her machine. She is needing new supplies and had to stop using her mask because it was leaking into her eyes and was causing her anxiety. Sometimes water will accumulate in he tubing. She tried decreasing the humidity but did not resolve. She would wake in the night and feel like she had to take her mask off and would have an anxiety attack. She attempted to try a different mask but reports she was told by her DME that she could not have supplies due to noncompliance. The pressure on her machine is comfortable. she denies headaches, congestion, nosebleeds, dryness, aerophagia, or drowsiness while driving.      [address]    Fenton - Fenton Sleepiness Score: 12    Current Outpatient Medications   Medication Instructions    albuterol sulfate HFA (PROVENTIL;VENTOLIN;PROAIR) 108 (90 Base) MCG/ACT inhaler TAKE 2 PUFFS BY MOUTH EVERY 6 HOURS AS NEEDED FOR WHEEZE    ammonium lactate (LAC-HYDRIN) 12 % lotion Topical, DAILY    ASPIRIN LOW DOSE 81 MG EC tablet TAKE 1 TABLET BY MOUTH EVERY DAY    Cholecalciferol (VITAMIN D3) 50 MCG (2000 UT) CAPS TAKE 1 CAPSULE BY MOUTH EVERY DAY    citalopram (CELEXA) 40 MG tablet TAKE 1 TABLET BY MOUTH EVERY DAY IN THE MORNING    divalproex (DEPAKOTE ER) 500 mg, Oral, DAILY    Evolocumab 140 mg, SubCUTAneous, EVERY 14 DAYS    ezetimibe (ZETIA) 10 mg, Oral, DAILY    hydrOXYzine HCl (ATARAX) 25 mg, Oral, 3 TIMES DAILY PRN    meloxicam (MOBIC) 15 mg, Oral, DAILY PRN    metFORMIN (GLUCOPHAGE-XR) 1,000 mg, Oral, DAILY WITH BREAKFAST    metoprolol tartrate (LOPRESSOR) 50 mg, Oral, 2 TIMES DAILY    nitroGLYCERIN (NITROSTAT) 0.4 mg, SubLINGual, EVERY 5 MIN PRN, up to max of 3 total doses. If no relief after 1 dose, call 911.      omeprazole (PRILOSEC) 20 MG delayed release capsule TAKE 1 CAPSULE BY MOUTH EVERY DAY    rosuvastatin (CRESTOR) 40 MG tablet TAKE 1 TABLET BY MOUTH EVERY DAY    thiothixene (NAVANE) 2 MG capsule TAKE 1 CAPSULE BY MOUTH EVERY EVENING AT BEDTIME        Electronically signed by JUAN RAMON Cooley on 1/17/2023 at 3:29 PM

## 2023-01-25 DIAGNOSIS — E11.9 TYPE 2 DIABETES MELLITUS WITHOUT COMPLICATION, WITHOUT LONG-TERM CURRENT USE OF INSULIN (HCC): ICD-10-CM

## 2023-01-25 DIAGNOSIS — M79.7 FIBROMYALGIA: ICD-10-CM

## 2023-01-25 DIAGNOSIS — G89.29 OTHER CHRONIC PAIN: ICD-10-CM

## 2023-01-25 RX ORDER — MELOXICAM 15 MG/1
TABLET ORAL
Qty: 90 TABLET | Refills: 0 | OUTPATIENT
Start: 2023-01-25

## 2023-01-25 RX ORDER — METFORMIN HYDROCHLORIDE 500 MG/1
1000 TABLET, EXTENDED RELEASE ORAL
Qty: 180 TABLET | Refills: 0 | OUTPATIENT
Start: 2023-01-25

## 2023-01-26 DIAGNOSIS — K21.9 GASTROESOPHAGEAL REFLUX DISEASE, UNSPECIFIED WHETHER ESOPHAGITIS PRESENT: ICD-10-CM

## 2023-01-26 DIAGNOSIS — R00.0 CHRONIC TACHYCARDIA: ICD-10-CM

## 2023-01-26 DIAGNOSIS — E78.2 MIXED HYPERLIPIDEMIA: ICD-10-CM

## 2023-01-26 RX ORDER — ROSUVASTATIN CALCIUM 40 MG/1
TABLET, COATED ORAL
Qty: 90 TABLET | Refills: 0 | OUTPATIENT
Start: 2023-01-26

## 2023-01-26 RX ORDER — METOPROLOL TARTRATE 50 MG/1
TABLET, FILM COATED ORAL
Qty: 180 TABLET | Refills: 0 | OUTPATIENT
Start: 2023-01-26

## 2023-01-26 RX ORDER — OMEPRAZOLE 20 MG/1
CAPSULE, DELAYED RELEASE ORAL
Qty: 90 CAPSULE | Refills: 0 | OUTPATIENT
Start: 2023-01-26

## 2023-01-27 ENCOUNTER — TELEPHONE (OUTPATIENT)
Dept: CARDIOLOGY CLINIC | Age: 43
End: 2023-01-27

## 2023-01-27 NOTE — TELEPHONE ENCOUNTER
2nd attempt to call pt for monitor results per dkw. Leonela uriarte, Luzma vm.       Aliyah Asencio MA   1/4/2023 11:16 AM EST       Called pt for monitor results per dkw. leonela Soto DO   1/4/2023  7:53 AM EST       Please let patient know monitor looked okay

## 2023-01-27 NOTE — TELEPHONE ENCOUNTER
----- Message from Tomas Dahl RN sent at 1/26/2023  4:41 PM EST -----  Pleae try to call again, if three calls are unsuccessful will need to send a letter and document

## 2023-01-27 NOTE — LETTER
Willam  [address] [address]  Phone: [phone]  Fax: [address], [place]        January 27, 2023     Patient: Mario Infante   YOB: 1980   Date of Visit: 1/27/2023       To Whom It May Concern: Richard Phelps    Several attempts have been made to contact you, but unsuccessful. It is my medical opinion that Richard Phelps your monitor looked okay. If you have any questions or concerns, please don't hesitate to call.     Sincerely,        Edy Horn, DO

## 2023-01-29 ASSESSMENT — ENCOUNTER SYMPTOMS
SHORTNESS OF BREATH: 0
COUGH: 0
DIARRHEA: 0
CONSTIPATION: 0
NAUSEA: 0
WHEEZING: 0
VOMITING: 0
ABDOMINAL PAIN: 0
CHEST TIGHTNESS: 0

## 2023-01-30 ENCOUNTER — OFFICE VISIT (OUTPATIENT)
Dept: FAMILY MEDICINE CLINIC | Age: 43
End: 2023-01-30
Payer: COMMERCIAL

## 2023-01-30 VITALS
SYSTOLIC BLOOD PRESSURE: 110 MMHG | BODY MASS INDEX: 38.27 KG/M2 | WEIGHT: 230 LBS | TEMPERATURE: 97.5 F | DIASTOLIC BLOOD PRESSURE: 70 MMHG

## 2023-01-30 DIAGNOSIS — G89.29 OTHER CHRONIC PAIN: ICD-10-CM

## 2023-01-30 DIAGNOSIS — F43.10 PTSD (POST-TRAUMATIC STRESS DISORDER): ICD-10-CM

## 2023-01-30 DIAGNOSIS — G47.33 OSA (OBSTRUCTIVE SLEEP APNEA): ICD-10-CM

## 2023-01-30 DIAGNOSIS — R76.8 POSITIVE ANA (ANTINUCLEAR ANTIBODY): ICD-10-CM

## 2023-01-30 DIAGNOSIS — Z72.0 CURRENT TOBACCO USE: ICD-10-CM

## 2023-01-30 DIAGNOSIS — R00.0 CHRONIC TACHYCARDIA: ICD-10-CM

## 2023-01-30 DIAGNOSIS — J44.9 COPD WITH ASTHMA (HCC): ICD-10-CM

## 2023-01-30 DIAGNOSIS — F31.9 BIPOLAR 1 DISORDER (HCC): ICD-10-CM

## 2023-01-30 DIAGNOSIS — E55.9 VITAMIN D DEFICIENCY: ICD-10-CM

## 2023-01-30 DIAGNOSIS — K21.9 GASTROESOPHAGEAL REFLUX DISEASE, UNSPECIFIED WHETHER ESOPHAGITIS PRESENT: ICD-10-CM

## 2023-01-30 DIAGNOSIS — M79.7 FIBROMYALGIA: ICD-10-CM

## 2023-01-30 DIAGNOSIS — E11.9 TYPE 2 DIABETES MELLITUS WITHOUT COMPLICATION, WITHOUT LONG-TERM CURRENT USE OF INSULIN (HCC): ICD-10-CM

## 2023-01-30 DIAGNOSIS — F32.89 OTHER DEPRESSION: ICD-10-CM

## 2023-01-30 DIAGNOSIS — E78.2 MIXED HYPERLIPIDEMIA: Primary | ICD-10-CM

## 2023-01-30 DIAGNOSIS — F41.9 ANXIETY: ICD-10-CM

## 2023-01-30 LAB
BILIRUBIN, POC: NORMAL
BLOOD URINE, POC: NORMAL
CHP ED QC CHECK: NORMAL
CLARITY, POC: CLEAR
COLOR, POC: YELLOW
GLUCOSE BLD-MCNC: 155 MG/DL
GLUCOSE URINE, POC: NORMAL
KETONES, POC: NORMAL
LEUKOCYTE EST, POC: NORMAL
NITRITE, POC: NORMAL
PH, POC: 6.5
PROTEIN, POC: NORMAL
SPECIFIC GRAVITY, POC: 1.02
UROBILINOGEN, POC: 0.2

## 2023-01-30 PROCEDURE — 3078F DIAST BP <80 MM HG: CPT | Performed by: CLINICAL NURSE SPECIALIST

## 2023-01-30 PROCEDURE — 99214 OFFICE O/P EST MOD 30 MIN: CPT | Performed by: CLINICAL NURSE SPECIALIST

## 2023-01-30 PROCEDURE — G8427 DOCREV CUR MEDS BY ELIG CLIN: HCPCS | Performed by: CLINICAL NURSE SPECIALIST

## 2023-01-30 PROCEDURE — 81002 URINALYSIS NONAUTO W/O SCOPE: CPT | Performed by: CLINICAL NURSE SPECIALIST

## 2023-01-30 PROCEDURE — 82962 GLUCOSE BLOOD TEST: CPT | Performed by: CLINICAL NURSE SPECIALIST

## 2023-01-30 PROCEDURE — G8417 CALC BMI ABV UP PARAM F/U: HCPCS | Performed by: CLINICAL NURSE SPECIALIST

## 2023-01-30 PROCEDURE — 3046F HEMOGLOBIN A1C LEVEL >9.0%: CPT | Performed by: CLINICAL NURSE SPECIALIST

## 2023-01-30 PROCEDURE — 2022F DILAT RTA XM EVC RTNOPTHY: CPT | Performed by: CLINICAL NURSE SPECIALIST

## 2023-01-30 PROCEDURE — G8482 FLU IMMUNIZE ORDER/ADMIN: HCPCS | Performed by: CLINICAL NURSE SPECIALIST

## 2023-01-30 PROCEDURE — 3074F SYST BP LT 130 MM HG: CPT | Performed by: CLINICAL NURSE SPECIALIST

## 2023-01-30 PROCEDURE — 3023F SPIROM DOC REV: CPT | Performed by: CLINICAL NURSE SPECIALIST

## 2023-01-30 PROCEDURE — 1036F TOBACCO NON-USER: CPT | Performed by: CLINICAL NURSE SPECIALIST

## 2023-01-30 RX ORDER — METOPROLOL TARTRATE 50 MG/1
50 TABLET, FILM COATED ORAL 2 TIMES DAILY
Qty: 180 TABLET | Refills: 1 | Status: SHIPPED | OUTPATIENT
Start: 2023-01-30

## 2023-01-30 RX ORDER — EZETIMIBE 10 MG/1
10 TABLET ORAL DAILY
Qty: 90 TABLET | Refills: 1 | Status: SHIPPED | OUTPATIENT
Start: 2023-01-30

## 2023-01-30 RX ORDER — OMEPRAZOLE 20 MG/1
CAPSULE, DELAYED RELEASE ORAL
Qty: 90 CAPSULE | Refills: 1 | Status: SHIPPED | OUTPATIENT
Start: 2023-01-30

## 2023-01-30 RX ORDER — MELOXICAM 15 MG/1
15 TABLET ORAL DAILY PRN
Qty: 90 TABLET | Refills: 1 | Status: SHIPPED | OUTPATIENT
Start: 2023-01-30

## 2023-01-30 RX ORDER — METFORMIN HYDROCHLORIDE 500 MG/1
1000 TABLET, EXTENDED RELEASE ORAL
Qty: 180 TABLET | Refills: 1 | Status: SHIPPED | OUTPATIENT
Start: 2023-01-30

## 2023-01-30 RX ORDER — ROSUVASTATIN CALCIUM 40 MG/1
TABLET, COATED ORAL
Qty: 90 TABLET | Refills: 1 | Status: SHIPPED | OUTPATIENT
Start: 2023-01-30

## 2023-01-30 ASSESSMENT — PATIENT HEALTH QUESTIONNAIRE - PHQ9
1. LITTLE INTEREST OR PLEASURE IN DOING THINGS: 0
SUM OF ALL RESPONSES TO PHQ9 QUESTIONS 1 & 2: 0
SUM OF ALL RESPONSES TO PHQ QUESTIONS 1-9: 0
2. FEELING DOWN, DEPRESSED OR HOPELESS: 0

## 2023-01-30 ASSESSMENT — ENCOUNTER SYMPTOMS: BLURRED VISION: 0

## 2023-01-30 NOTE — PROGRESS NOTES
SUBJECTIVE:    Patient ID:  Georgie Linares is a 43 y.o. female      Patient is here for a medication check for hyperlipidemia, diabetes, chronic tachycardia, GERD, AMOS, vitamin D deficiency, fibromyalgia and chronic pain. Tachycardia managed with metoprolol twice daily. GERD symptoms are managed with omeprazole. Denies indigestion/heartburn, difficulty swallowing, epigastric pain, nausea, vomiting, diarrhea, constipation or blood in stool. She has a history of anxiety, depression, bipolar and PTSD. She is followed a psychiatrist every 2 to 3 months and sees a counselor twice a week. She is currently taking Celexa, Depakote and hydroxyzine as needed. Currently denies thoughts of self-harm, suicidal or homicidal ideations. States she has been evaluated by cardiology and will be followed annually. She was told to follow-up with pulmonology annually for COPD/asthma and AMOS. AMOS is managed with nightly CPAP usage. She also has a history or alcoholism for 24 years, she has been sober for 4 years. Labs reviewed from 10/21/2022 CBC unremarkable CMP essentially normal, fasting glucose 102, A1c 6.2 total cholesterol 144, triglycerides 160 HDL 33, LDL DL 79, CK 87. She does have a history of positive MATTHEW and was initially evaluated by rheumatology 7/13/2020. Apparently was lost to follow-up during the COVID-19 pandemic. Labs reviewed from 8/5/2020, Anti SSA 3.1 and Anti RNP 1.4. She has re-established with rheumatology, progress note reviewed for 12/6/22     She has had bilateral hip replacements. She is followed by ortho and has an appointment next month. She continues to do physical therapy exercises at home. She is also follow by psychiatry for anxiety, depression, bipolar and PTSD. Denies thoughts of self-harm, suicidal or homicidal ideations or risk-taking behaviors. She is currently on citalopram 40 mg once daily, Depakote 500 mg daily and hydroxyzine.   She is requesting a Depakote level be added to her labs. Hyperlipidemia  This is a chronic problem. The current episode started more than 1 year ago. The problem is controlled. Recent lipid tests were reviewed and are low. Exacerbating diseases include diabetes. Pertinent negatives include no chest pain, focal sensory loss, focal weakness, leg pain, myalgias or shortness of breath. Current antihyperlipidemic treatment includes statins and ezetimibe. The current treatment provides significant improvement of lipids. Risk factors for coronary artery disease include dyslipidemia, diabetes mellitus and obesity. Diabetes  She presents for her follow-up diabetic visit. She has type 2 diabetes mellitus. Her disease course has been stable. Pertinent negatives for hypoglycemia include no headaches or nervousness/anxiousness. Pertinent negatives for diabetes include no blurred vision, no chest pain, no foot paresthesias, no polydipsia, no polyphagia and no polyuria. Symptoms are stable. Risk factors for coronary artery disease include dyslipidemia, diabetes mellitus and obesity. Her weight is stable. She is following a generally healthy diet. Current Outpatient Medications on File Prior to Visit   Medication Sig Dispense Refill    Cholecalciferol (VITAMIN D3) 50 MCG (2000 UT) CAPS TAKE 1 CAPSULE BY MOUTH EVERY DAY 90 capsule 0    ASPIRIN LOW DOSE 81 MG EC tablet TAKE 1 TABLET BY MOUTH EVERY DAY 90 tablet 3    nitroGLYCERIN (NITROSTAT) 0.4 MG SL tablet Place 1 tablet under the tongue every 5 minutes as needed for Chest pain up to max of 3 total doses.  If no relief after 1 dose, call 911. 25 tablet 0    albuterol sulfate HFA (PROVENTIL;VENTOLIN;PROAIR) 108 (90 Base) MCG/ACT inhaler TAKE 2 PUFFS BY MOUTH EVERY 6 HOURS AS NEEDED FOR WHEEZE 6.7 each 1    Evolocumab 140 MG/ML SOAJ Inject 140 mg into the skin every 14 days 2 pen 11    citalopram (CELEXA) 40 MG tablet TAKE 1 TABLET BY MOUTH EVERY DAY IN THE MORNING      [DISCONTINUED] diclofenac sodium (VOLTAREN) 1 % GEL APPLY 2 GRAMS TOPICALLY 2 TIMES DAILY (Patient not taking: Reported on 8/8/2022) 100 g 1    ammonium lactate (LAC-HYDRIN) 12 % lotion Apply topically daily 225 mL 2    thiothixene (NAVANE) 2 MG capsule TAKE 1 CAPSULE BY MOUTH EVERY EVENING AT BEDTIME      divalproex (DEPAKOTE ER) 500 MG extended release tablet Take 500 mg by mouth daily       hydrOXYzine (ATARAX) 25 MG tablet Take 25 mg by mouth 3 times daily as needed for Anxiety        No current facility-administered medications on file prior to visit.       Past Medical History:   Diagnosis Date    Arthritis     Bipolar 1 disorder (HCC)     COPD (chronic obstructive pulmonary disease) ([facility])     Depression     Diabetes mellitus ([facility])     Femoroacetabular impingement of right hip 8/9/2022    Hyperlipidemia     Hypertension     Tachycardia     Tachycardia     Tear of right gluteus medius tendon 8/9/2022     Past Surgical History:   Procedure Laterality Date    ANKLE FUSION Bilateral     ARTHRODESIS Left 12/6/2019    REVISION OF TALONAVICULAR JOINT ARTHRODESIS LEFT FOOT  -SLEEP APNEA- performed by Francine Torres DPM at [address] [place] [address],[facility] Bilateral     ELBOW SURGERY Bilateral     ulnar nerve release    HERNIA REPAIR      HIP ARTHROSCOPY Bilateral     HIP SURGERY Left 6/28/2022    LEFT HIP REVISION ARTHROSCOPY, LYSIS OF ADHESIONS, REMOVAL LOOSE BODY, SYNOVECTOMY, FEMOROACETABULAR IMPINGEMENT DECOMPRESSION AND LABRAL REPAIR, ABDUCTOR REPAIR, ENDOSCOPIC BURSECTOMY  - ELMA BLOCK; LOCAL (ORTHOMIX) performed by Alicia Eli MD at [address] Right 8/9/2022    RIGHT HIP REVISION, ARTHROSCOPY, FEMOROACETABULAR IMPINGEMENT SURGERY, LABRAL REPAIR, ENDOSCOPIC,-BLOCK (PENG), LOCAL (ORTHOMIX)-ROSE AND NEPHEW-SHANIQUA performed by Alicia Eli MD at [address] [place] [address] Bilateral      Family History   Problem Relation Age of Onset    High Blood Pressure Mother Arthritis Mother     Other Father         hypothyroidism    High Blood Pressure Father     Arthritis Father     Asthma Father     Heart Failure Maternal Aunt         22 stents. Heart Failure Maternal Grandmother     Pacemaker Maternal Grandfather     Heart Surgery Paternal Grandmother     Pacemaker Paternal Grandfather      Social History     Socioeconomic History    Marital status:      Spouse name: Not on file    Number of children: Not on file    Years of education: Not on file    Highest education level: Not on file   Occupational History    Not on file   Tobacco Use    Smoking status: Former     Packs/day: 1.00     Years: 27.00     Pack years: 27.00     Types: Cigarettes     Start date: 12     Quit date: 11/19/2019     Years since quitting: 3.2    Smokeless tobacco: Never    Tobacco comments:     started to smoke at 15 / smoked up to 1 ppd / now smoking 3 to 4 cigarettes a day   Vaping Use    Vaping Use: Never used   Substance and Sexual Activity    Alcohol use: Never    Drug use: Never    Sexual activity: Not on file   Other Topics Concern    Not on file   Social History Narrative    Not on file     Social Determinants of Health     Financial Resource Strain: Not on file   Food Insecurity: Not on file   Transportation Needs: Not on file   Physical Activity: Sufficiently Active    Days of Exercise per Week: 4 days    Minutes of Exercise per Session: 60 min   Stress: Not on file   Social Connections: Not on file   Intimate Partner Violence: Not At Risk    Fear of Current or Ex-Partner: No    Emotionally Abused: No    Physically Abused: No    Sexually Abused: No   Housing Stability: Not on file       Review of Systems   Constitutional:  Negative for chills and fever. Eyes:  Negative for blurred vision and visual disturbance. Respiratory:  Negative for cough, chest tightness, shortness of breath and wheezing. Cardiovascular:  Negative for chest pain, palpitations and leg swelling. Gastrointestinal:  Negative for abdominal pain, constipation, diarrhea, nausea and vomiting. Endocrine: Negative for polydipsia, polyphagia and polyuria. Musculoskeletal:  Negative for arthralgias and myalgias. Skin:  Negative for rash. Neurological:  Negative for focal weakness and headaches. Psychiatric/Behavioral:  Negative for dysphoric mood, self-injury, sleep disturbance and suicidal ideas. The patient is not nervous/anxious. OBJECTIVE:    Physical Exam  Vitals and nursing note reviewed. Constitutional:       General: She is not in acute distress. Appearance: Normal appearance. She is well-developed. She is not ill-appearing. HENT:      Head: Normocephalic and atraumatic. Right Ear: External ear normal.      Left Ear: External ear normal.      Nose: Nose normal.      Mouth/Throat:      Mouth: Mucous membranes are moist.   Eyes:      Conjunctiva/sclera: Conjunctivae normal.      Pupils: Pupils are equal, round, and reactive to light. Neck:      Vascular: No carotid bruit. Trachea: No tracheal deviation. Cardiovascular:      Rate and Rhythm: Normal rate and regular rhythm. Heart sounds: Normal heart sounds. Pulmonary:      Effort: Pulmonary effort is normal. No respiratory distress. Breath sounds: Normal breath sounds. No wheezing or rales. Chest:      Chest wall: No tenderness. Abdominal:      General: Bowel sounds are normal. There is no distension. Palpations: Abdomen is soft. There is no mass. Tenderness: There is no abdominal tenderness. Hernia: No hernia is present. Musculoskeletal:         General: Normal range of motion. Cervical back: Normal range of motion and neck supple. Right lower leg: No edema. Left lower leg: No edema. Skin:     General: Skin is warm and dry. Capillary Refill: Capillary refill takes less than 2 seconds. Findings: No rash.    Neurological:      Mental Status: She is alert and oriented to person, place, and time. Psychiatric:         Behavior: Behavior normal.     /70   Temp 97.5 °F (36.4 °C)   Wt 230 lb (104.3 kg)   LMP  (LMP Unknown)   BMI 38.27 kg/m²    BP Readings from Last 3 Encounters:   01/30/23 110/70   01/05/23 (!) 162/80   12/12/22 104/68      Wt Readings from Last 3 Encounters:   01/30/23 230 lb (104.3 kg)   01/05/23 232 lb (105.2 kg)   12/12/22 230 lb 9.6 oz (104.6 kg)       ASSESSMENT & PLAN:    1. Mixed hyperlipidemia  - Stable, continue current regimen  - Reviewed low-cholesterol diet  - CBC with Auto Differential; Future  - Comprehensive Metabolic Panel; Future  - Lipid Panel; Future  - CK; Future  - ezetimibe (ZETIA) 10 MG tablet; Take 1 tablet by mouth daily  Dispense: 90 tablet; Refill: 1  - rosuvastatin (CRESTOR) 40 MG tablet; TAKE 1 TABLET BY MOUTH EVERY DAY  Dispense: 90 tablet; Refill: 1    2. Type 2 diabetes mellitus without complication, without long-term current use of insulin (HCC)  - Stable, continue current regimen  - Reviewed diabetic diet  - CBC with Auto Differential; Future  - Comprehensive Metabolic Panel; Future  - Hemoglobin A1C; Future  - metFORMIN (GLUCOPHAGE-XR) 500 MG extended release tablet; Take 2 tablets by mouth daily (with breakfast)  Dispense: 180 tablet; Refill: 1  - POCT Glucose (155)   - POCT Urinalysis no Micro (small blood)    3. Chronic tachycardia  - Stable, continue current regimen   - metoprolol tartrate (LOPRESSOR) 50 MG tablet; Take 1 tablet by mouth 2 times daily  Dispense: 180 tablet; Refill: 1  - Follow-up with cardiologist as needed/directed    4. Gastroesophageal reflux disease, unspecified whether esophagitis present  - Stable, continue current regimen  - Reviewed GERD precautions  - CBC with Auto Differential; Future  - omeprazole (PRILOSEC) 20 MG delayed release capsule; TAKE 1 CAPSULE BY MOUTH EVERY DAY  Dispense: 90 capsule; Refill: 1    5.  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[facility] [address]  - Stable, continue current  - Follow-up with pulmonologist as needed/directed    6. AMOS (obstructive sleep apnea)  - Stable, continue nightly CPAP usage  - Follow-up with pulmonologist as needed/directed    7. Vitamin D deficiency  - Stable, continue vitamin D3 supplementation  - Last vitamin D 25 level was 43.0     8. Fibromyalgia  - Stable, continue current regimen  - meloxicam (MOBIC) 15 MG tablet; Take 1 tablet by mouth daily as needed for Pain  Dispense: 90 tablet; Refill: 1    9. Other chronic pain  - Stable, continue current regimen   - meloxicam (MOBIC) 15 MG tablet; Take 1 tablet by mouth daily as needed for Pain  Dispense: 90 tablet; Refill: 1    10. Positive MATTHEW (antinuclear antibody)  - Follow up with rheumatology as needed/directed    11. Anxiety  - Stable, continue current regimen (see HPI)   - Follow-up with psychiatrist as needed/directed    12. Other depression  - Stable, continue current regimen (see HPI)   - Follow-up with psychiatrist as needed/directed    13. [facility]  - Stable, continue current regimen (see HPI)   - Follow-up with psychiatrist as needed/directed    14. PTSD (post-traumatic stress disorder)  - Valproic acid level; Future  - Stable, continue current regimen (see HPI)   - Follow-up with psychiatrist as needed/directed    15. Current tobacco use  - Encourage smoking cessation    16. BMI 38.0-38.9,adult  - Encourage continued lifestyle modifications (better food choices, portion control and increasing activity)  - Discuss and encourage [employer]    Continue current treatment plan.     Current Outpatient Medications   Medication Sig Dispense Refill    ezetimibe (ZETIA) 10 MG tablet Take 1 tablet by mouth daily 90 tablet 1    omeprazole (PRILOSEC) 20 MG delayed release capsule TAKE 1 CAPSULE BY MOUTH EVERY DAY 90 capsule 1    rosuvastatin (CRESTOR) 40 MG tablet TAKE 1 TABLET BY MOUTH EVERY DAY 90 tablet 1    metFORMIN (GLUCOPHAGE-XR) 500 MG extended release tablet Take 2 tablets by mouth daily (with breakfast) 180 tablet 1    metoprolol tartrate (LOPRESSOR) 50 MG tablet Take 1 tablet by mouth 2 times daily 180 tablet 1    meloxicam (MOBIC) 15 MG tablet Take 1 tablet by mouth daily as needed for Pain 90 tablet 1    Cholecalciferol (VITAMIN D3) 50 MCG (2000 UT) CAPS TAKE 1 CAPSULE BY MOUTH EVERY DAY 90 capsule 0    ASPIRIN LOW DOSE 81 MG EC tablet TAKE 1 TABLET BY MOUTH EVERY DAY 90 tablet 3    nitroGLYCERIN (NITROSTAT) 0.4 MG SL tablet Place 1 tablet under the tongue every 5 minutes as needed for Chest pain up to max of 3 total doses. If no relief after 1 dose, call 911. 25 tablet 0    albuterol sulfate HFA (PROVENTIL;VENTOLIN;PROAIR) 108 (90 Base) MCG/ACT inhaler TAKE 2 PUFFS BY MOUTH EVERY 6 HOURS AS NEEDED FOR WHEEZE 6.7 each 1    Evolocumab 140 MG/ML SOAJ Inject 140 mg into the skin every 14 days 2 pen 11    citalopram (CELEXA) 40 MG tablet TAKE 1 TABLET BY MOUTH EVERY DAY IN THE MORNING      ammonium lactate (LAC-HYDRIN) 12 % lotion Apply topically daily 225 mL 2    thiothixene (NAVANE) 2 MG capsule TAKE 1 CAPSULE BY MOUTH EVERY EVENING AT BEDTIME      divalproex (DEPAKOTE ER) 500 MG extended release tablet Take 500 mg by mouth daily       hydrOXYzine (ATARAX) 25 MG tablet Take 25 mg by mouth 3 times daily as needed for Anxiety        No current facility-administered medications for this visit. Return in about 6 months (around 7/30/2023), or if symptoms worsen or fail to improve, for lipidemia, diabetes, chronic tachycardia, AMOS, COPD/ASTHMA, vit D, fibro, chronic pain, positive MATTHEW, anxiety, depression, bipolar, PTSD, tobacco use, BMI    Petrona received counseling on the following healthy behaviors: nutrition, exercise, and medication adherence    Discussed use, benefit, and side effects of prescribed medications. Barriers to medication compliance addressed. All patient questions answered. Pt voiced understanding. Call office if experience side effects from medications.       Please note that some or all of this record was generated using voice recognition software. If there are any questions about the content of this document, please contact the author as some errors in transcription may have occurred.

## 2023-01-30 NOTE — PATIENT INSTRUCTIONS
Medications refilled     Reviewed low-cholesterol diet     Continue ezetimibe (Zetia) 10 mg daily and Crestor     Reviewed diabetic diet     Follow-up with specialist as needed/directed (psych, Ortho, podiatry pulmonology, cardiology)      Encourage continued lifestyle modifications (better food choices, portion control and increasing activity)    Follow up with cardiology as needed/directed    Follow up with pulmonology as needed/directed    Follow up with rheumatology as needed/directed     Follow up with psychiatrist as needed/directed     Follow up in 3-6 months, sooner if symptoms worsen or persist    [facility] Locations - No appointment necessary. @ indicates the location is open Saturdays in addition to Monday through Friday. Call your preferred location for test preparation, business hours and other information you need. [employer] accepts [employer]. [facility]     @ [address] [facility] [address] [place] [address]. [address]    Ph: [address]   [address]    Ph: [phone]   @ [address] [place] [address].[facility]    Ph: Lucila [address]    Ph: [phone]  @ [address] [place] [address]   Ph: [phone]  @ [address] [place] [address]. [address] [address]    Ph: [address] [place], [place] [address]   Ph: [phone]    [place]    @ [facility]. [place], [place] [address]    Ph: [phone]  [address] [facility], [address]   Ph: [address] Narayan Vazquez. [place] [address]    [facility]: [address] [facility] Rufina Lara Stoney Roberto    Ph: [phone]   [facility]   [address] [facility]. Stoney Fitzgerald   Ph: [address] [facility]  [address] [place], [place] [address]    Ph: [phone]

## 2023-02-07 ENCOUNTER — TELEPHONE (OUTPATIENT)
Dept: BARIATRICS/WEIGHT MGMT | Age: 43
End: 2023-02-07

## 2023-02-07 NOTE — TELEPHONE ENCOUNTER
Patient was sent Dr. Shannan Cordoba digital bariatric seminar. Patient DOES NOT have BWLS coverage with [employer] (Plan Exclusion) Secondary Caresource (6mo diet) Bariatric benefit form scanned in media.     *Spoke with patient, Info given  Per pt: No HX of WLS, BMI > 35  Pk mailed

## 2023-02-13 ENCOUNTER — OFFICE VISIT (OUTPATIENT)
Dept: ORTHOPEDIC SURGERY | Age: 43
End: 2023-02-13
Payer: COMMERCIAL

## 2023-02-13 VITALS — HEIGHT: 65 IN | BODY MASS INDEX: 38.32 KG/M2 | WEIGHT: 230 LBS

## 2023-02-13 DIAGNOSIS — M54.50 PAIN OF LUMBAR SPINE: ICD-10-CM

## 2023-02-13 DIAGNOSIS — Z98.890 STATUS POST ARTHROSCOPY OF HIP: Primary | ICD-10-CM

## 2023-02-13 DIAGNOSIS — S73.191D TEAR OF RIGHT ACETABULAR LABRUM, SUBSEQUENT ENCOUNTER: ICD-10-CM

## 2023-02-13 DIAGNOSIS — M70.71 ILIOPSOAS BURSITIS OF RIGHT HIP: ICD-10-CM

## 2023-02-13 PROCEDURE — G8427 DOCREV CUR MEDS BY ELIG CLIN: HCPCS | Performed by: ORTHOPAEDIC SURGERY

## 2023-02-13 PROCEDURE — G8482 FLU IMMUNIZE ORDER/ADMIN: HCPCS | Performed by: ORTHOPAEDIC SURGERY

## 2023-02-13 PROCEDURE — 1036F TOBACCO NON-USER: CPT | Performed by: ORTHOPAEDIC SURGERY

## 2023-02-13 PROCEDURE — G8417 CALC BMI ABV UP PARAM F/U: HCPCS | Performed by: ORTHOPAEDIC SURGERY

## 2023-02-13 PROCEDURE — 99213 OFFICE O/P EST LOW 20 MIN: CPT | Performed by: ORTHOPAEDIC SURGERY

## 2023-02-13 NOTE — PROGRESS NOTES
Chief Complaint  Follow-up (Fu right hip)      History of Present Illness:  Jovan Parrish is a pleasant 43 y.o. female who is 6 months or so post right and left hip arthroscopy for ABILIO and abductor repair. She is doing great and reports mild pain with steps and morning stiffness. Otherwise no setbacks and it feels good. Medical History:  Patient's medications, allergies, past medical, surgical, social and family histories were reviewed and updated as appropriate. Pain Assessment  Location of Pain: Hip  Location Modifiers: Right  Severity of Pain: 3  Quality of Pain: Aching  Duration of Pain: A few hours  Frequency of Pain: Intermittent  Date Pain First Started: 01/13/23  Aggravating Factors: Walking (pan excessive in morning)  Limiting Behavior: Yes  Relieving Factors: Rest, Other (Comment), Ice, Heat (meloxicam)  Result of Injury: No  Work-Related Injury: No  Are there other pain locations you wish to document?: No  ROS: Review of systems reviewed from Patient History Form completed today and available in the patient's chart under the Media tab. Pertinent items are noted in HPI  Review of systems reviewed from Patient History Form completed today and available in the patient's chart under the Media tab. Vital Signs:  Ht 5' 5\" (1.651 m)   Wt 230 lb (104.3 kg)   LMP  (LMP Unknown)   BMI 38.27 kg/m²         Neuro: Alert & oriented x 3,  normal,  no focal deficits noted. Normal affect. Eyes: sclera clear  Ears: Normal external ear  Mouth:  No perioral lesions  Pulm: Respirations unlabored and regular  Pulse: Extremities well perfused. 2+ peripheral pulses. Skin: Warm. No ulcerations. Constitutional: The physical examination finds the patient to be well-developed and well-nourished. The patient is alert and oriented x3 and was cooperative throughout the visit.     Hip Examination: bilateral    Skin/Inspection: No skin lesions, cellulitis, or extreme edema in the lower extremities. Standing/Walking: normal gait, negative Trendelenburg sign. Supine/Side Lying Exam: Non tender around the major bony prominences  full range of motion  FADIR Negative  CHACHO Negative  Resisted Abduction  5/5   Resisted Adduction  5/5   Resisted  Flexion  5/5    not tender at greater trochanter    Distal Neurovascular exam is intact (foot sensation, pulses, and motor exam)       Diagnostics:        No new imaging was obtained today        Assessment: Patient is a 43 y.o. female who is doing well 6 months or so post right and left hip arthroscopy for ABILIO and abductor repair. Impression:  Visit Diagnoses         Codes    Status post arthroscopy of hip    -  Primary Z98.890    Iliopsoas bursitis of right hip     M70.71    Tear of right acetabular labrum, subsequent encounter     S73.191D    Pain of lumbar spine     M54.50            Office Procedures:  No orders of the defined types were placed in this encounter. Orders Placed This Encounter   Procedures    Eitan Reid MD, Orthopedic Surgery (Spine), [facility]     Referral Priority:   Routine     Referral Type:   Eval and Treat     Referral Reason:   Specialty Services Required     Referred to Provider:   Laura Carrera MD     Requested Specialty:   Orthopedic Surgery     Number of Visits Requested:   1       Plan:  BODØ has made significant functional gains. She may have intermittent OA symptoms for which I recommend OTC glucosamine and to consider intra-articular durolane by myself. We will monitor in 6 months. We recommend that She continuing with her  home exercise program.     All the patient's questions were answered while in the clinic. The patient is understanding of all instructions and agrees with the plan. Approximately 30 minutes was spent on patient education and coordinating care. Follow up in: Return in about 6 months (around 8/13/2023) for NEW X-RAYS / HIP SCORES.       Sincerely,    Fay Eng Janee Gomes  [address] [facility] [address] [place] [address], [address]  Email: [email or web address]  Office: [phone]    02/13/23  9:18 AM        The encounter with Binta Ramirez was carried out by myself, Dr Janee Gomes, who personally examined the patient and reviewed the plan. This dictation was performed with a verbal recognition program (DRAGON) and it was checked for errors. It is possible that there are still dictated errors within this office note. If so, please bring any errors to my attention for an addendum. All efforts were made to ensure that this office note is accurate.

## 2023-02-28 ENCOUNTER — OFFICE VISIT (OUTPATIENT)
Dept: ORTHOPEDIC SURGERY | Age: 43
End: 2023-02-28

## 2023-02-28 VITALS — BODY MASS INDEX: 38.31 KG/M2 | HEIGHT: 65 IN | WEIGHT: 229.94 LBS

## 2023-02-28 DIAGNOSIS — Z98.890 HX OF DECOMPRESSION OF ULNAR NERVE: ICD-10-CM

## 2023-02-28 DIAGNOSIS — G56.10 MEDIAN NERVE NEURITIS, UNSPECIFIED LATERALITY: ICD-10-CM

## 2023-02-28 DIAGNOSIS — M79.601 PARESTHESIA AND PAIN OF BOTH UPPER EXTREMITIES: ICD-10-CM

## 2023-02-28 DIAGNOSIS — M25.521 RIGHT ELBOW PAIN: ICD-10-CM

## 2023-02-28 DIAGNOSIS — G56.20 ULNAR NEURITIS, UNSPECIFIED LATERALITY: ICD-10-CM

## 2023-02-28 DIAGNOSIS — M25.522 LEFT ELBOW PAIN: ICD-10-CM

## 2023-02-28 DIAGNOSIS — Z98.890 HISTORY OF CARPAL TUNNEL RELEASE OF BOTH WRISTS: ICD-10-CM

## 2023-02-28 DIAGNOSIS — M79.602 PARESTHESIA AND PAIN OF BOTH UPPER EXTREMITIES: ICD-10-CM

## 2023-02-28 DIAGNOSIS — R20.2 PARESTHESIA AND PAIN OF BOTH UPPER EXTREMITIES: ICD-10-CM

## 2023-03-01 DIAGNOSIS — K21.9 GASTROESOPHAGEAL REFLUX DISEASE, UNSPECIFIED WHETHER ESOPHAGITIS PRESENT: ICD-10-CM

## 2023-03-02 ENCOUNTER — OFFICE VISIT (OUTPATIENT)
Dept: ORTHOPEDIC SURGERY | Age: 43
End: 2023-03-02

## 2023-03-02 VITALS — BODY MASS INDEX: 38.31 KG/M2 | HEIGHT: 65 IN | WEIGHT: 229.94 LBS

## 2023-03-02 DIAGNOSIS — M54.50 LUMBAR PAIN: Primary | ICD-10-CM

## 2023-03-02 DIAGNOSIS — M43.02 CERVICAL SPONDYLOLYSIS: ICD-10-CM

## 2023-03-02 RX ORDER — OMEPRAZOLE 20 MG/1
CAPSULE, DELAYED RELEASE ORAL
Qty: 90 CAPSULE | Refills: 1 | OUTPATIENT
Start: 2023-03-02

## 2023-03-02 SDOH — HEALTH STABILITY: PHYSICAL HEALTH: ON AVERAGE, HOW MANY MINUTES DO YOU ENGAGE IN EXERCISE AT THIS LEVEL?: 30 MIN

## 2023-03-02 SDOH — HEALTH STABILITY: PHYSICAL HEALTH: ON AVERAGE, HOW MANY DAYS PER WEEK DO YOU ENGAGE IN MODERATE TO STRENUOUS EXERCISE (LIKE A BRISK WALK)?: 5 DAYS

## 2023-03-02 NOTE — PROGRESS NOTES
New Patient: CERVICAL SPINE    Referring Provider:  Janelle Bustos MD    CHIEF COMPLAINT:    Chief Complaint   Patient presents with    Neck Pain     Neck pain, bilateral arm pain, arm weakness bilateral, sciatica type pain right leg. HISTORY OF PRESENT ILLNESS:   Ms. Cezar Hawley is a pleasant 43 y.o. old female kindly referred by Dr. Shayla Humphreys evaluation of neck pain, low back pain, bilateral arm and leg pain. Her symptoms began insidiously about 4 years ago. They have increased in set time she rates her neck bilateral leg and low back pain 10/10 she rates her bilateral trap pain 9/10. She notes numbness of her arms and legs as well as loss of fine motor control. She denies gait abnormality, saddle anesthesia or bowel or bladder abnormality.     Current/Past Treatment:   Physical Therapy: Yes  Chiropractic: Yes  Injection: Yes  Medications: None    Past Medical History:   Past Medical History:   Diagnosis Date    Arthritis     Bipolar 1 disorder (HCC)     COPD (chronic obstructive pulmonary disease) (HCC)     Depression     Diabetes mellitus ([facility])     Femoroacetabular impingement of right hip 8/9/2022    Hyperlipidemia     Hypertension     Tachycardia     Tachycardia     Tear of right gluteus medius tendon 8/9/2022      Past Surgical History:     Past Surgical History:   Procedure Laterality Date    ANKLE FUSION Bilateral     ARTHRODESIS Left 12/6/2019    REVISION OF TALONAVICULAR JOINT ARTHRODESIS LEFT FOOT  -SLEEP APNEA- performed by Altagracia Loera DPM at [address] [place] [address],[facility] Bilateral     ELBOW SURGERY Bilateral     ulnar nerve release    HERNIA REPAIR      HIP ARTHROSCOPY Bilateral     HIP SURGERY Left 6/28/2022    LEFT HIP REVISION ARTHROSCOPY, LYSIS OF ADHESIONS, REMOVAL LOOSE BODY, SYNOVECTOMY, FEMOROACETABULAR IMPINGEMENT DECOMPRESSION AND LABRAL REPAIR, ABDUCTOR REPAIR, ENDOSCOPIC BURSECTOMY  - ELMA BLOCK; LOCAL (ORTHOMIX) performed by Mark Bedoya MD at [facility] OR    HIP SURGERY Right 8/9/2022    RIGHT HIP REVISION, ARTHROSCOPY, FEMOROACETABULAR IMPINGEMENT SURGERY, LABRAL REPAIR, ENDOSCOPIC,-BLOCK (PENG), LOCAL (ORTHOMIX)-ROSE AND NEPHXIMENA-SHANIQUA performed by Mark Bedoya MD at [address] [place] [address] Bilateral      Current Medications:     Current Outpatient Medications:     ezetimibe (ZETIA) 10 MG tablet, Take 1 tablet by mouth daily, Disp: 90 tablet, Rfl: 1    omeprazole (PRILOSEC) 20 MG delayed release capsule, TAKE 1 CAPSULE BY MOUTH EVERY DAY, Disp: 90 capsule, Rfl: 1    rosuvastatin (CRESTOR) 40 MG tablet, TAKE 1 TABLET BY MOUTH EVERY DAY, Disp: 90 tablet, Rfl: 1    metFORMIN (GLUCOPHAGE-XR) 500 MG extended release tablet, Take 2 tablets by mouth daily (with breakfast), Disp: 180 tablet, Rfl: 1    metoprolol tartrate (LOPRESSOR) 50 MG tablet, Take 1 tablet by mouth 2 times daily, Disp: 180 tablet, Rfl: 1    meloxicam (MOBIC) 15 MG tablet, Take 1 tablet by mouth daily as needed for Pain, Disp: 90 tablet, Rfl: 1    Cholecalciferol (VITAMIN D3) 50 MCG (2000 UT) CAPS, TAKE 1 CAPSULE BY MOUTH EVERY DAY, Disp: 90 capsule, Rfl: 0    ASPIRIN LOW DOSE 81 MG EC tablet, TAKE 1 TABLET BY MOUTH EVERY DAY, Disp: 90 tablet, Rfl: 3    nitroGLYCERIN (NITROSTAT) 0.4 MG SL tablet, Place 1 tablet under the tongue every 5 minutes as needed for Chest pain up to max of 3 total doses.  If no relief after 1 dose, call 911., Disp: 25 tablet, Rfl: 0    albuterol sulfate HFA (PROVENTIL;VENTOLIN;PROAIR) 108 (90 Base) MCG/ACT inhaler, TAKE 2 PUFFS BY MOUTH EVERY 6 HOURS AS NEEDED FOR WHEEZE, Disp: 6.7 each, Rfl: 1    Evolocumab 140 MG/ML SOAJ, Inject 140 mg into the skin every 14 days, Disp: 2 pen, Rfl: 11    citalopram (CELEXA) 40 MG tablet, TAKE 1 TABLET BY MOUTH EVERY DAY IN THE MORNING, Disp: , Rfl:     ammonium lactate (LAC-HYDRIN) 12 % lotion, Apply topically daily, Disp: 225 mL, Rfl: 2    thiothixene (NAVANE) 2 MG capsule, TAKE 1 CAPSULE BY MOUTH EVERY EVENING AT BEDTIME, Disp: , Rfl:     divalproex (DEPAKOTE ER) 500 MG extended release tablet, Take 500 mg by mouth daily , Disp: , Rfl:     hydrOXYzine (ATARAX) 25 MG tablet, Take 25 mg by mouth 3 times daily as needed for Anxiety , Disp: , Rfl:   Allergies:  Cinnamon and Codeine  Social History:    reports that she quit smoking about 3 years ago. Her smoking use included cigarettes. She started smoking about 31 years ago. She has a 27.00 pack-year smoking history. She has never used smokeless tobacco. She reports that she does not drink alcohol and does not use drugs. Family History:   Family History   Problem Relation Age of Onset    High Blood Pressure Mother     Arthritis Mother     Other Father         hypothyroidism    High Blood Pressure Father     Arthritis Father     Asthma Father     Heart Failure Maternal Aunt         22 stents. Heart Failure Maternal Grandmother     Pacemaker Maternal Grandfather     Heart Surgery Paternal Grandmother     Pacemaker Paternal Grandfather        REVIEW OF SYSTEMS: Full ROS noted & scanned   CONSTITUTIONAL: Denies unexplained weight loss, fevers, chills or fatigue  NEUROLOGICAL: Denies unsteady gait or progressive weakness  MUSCULOSKELETAL: Denies joint swelling or redness  PSYCHOLOGICAL: Denies anxiety, depression   SKIN: Denies skin changes, delayed healing, rash, itching   HEMATOLOGIC: Denies easy bleeding or bruising  ENDOCRINE: Denies excessive thirst, urination, heat/cold  RESPIRATORY: Denies current dyspnea, cough  GI: Denies nausea, vomiting, diarrhea   : Denies bowel or bladder issues       PHYSICAL EXAM:    Vitals: Height 5' 5\" (1.651 m), weight 229 lb 15 oz (104.3 kg), last menstrual period 02/19/2023. GENERAL EXAM:  General Apparence: Patient is adequately groomed with no evidence of malnutrition. Orientation: The patient is oriented to time, place and person.    Mood & Affect:The patient's mood and affect are appropriate Vascular: Examination reveals no swelling tenderness in upper or lower extremities. Good capillary refill  Lymphatic: The lymphatic examination bilaterally reveals all areas to be without enlargement or induration  Sensation: Sensation is intact without deficit  Coordination/Balance: Good coordination     CERVICAL EXAMINATION:  Inspection: Local inspection shows no step-off or bruising. Cervical alignment is normal.     Palpation: No evidence of tenderness at the midline, and trapezius. Paraspinal tenderness is present. There is no step-off or paraspinal spasm. Range of Motion: Cervical flexion, extension, and rotation are mildly reduced with pain. Strength: 5/5 bilateral upper extremities   Special Tests:     Treviño's negative bilaterally. Cubital and Carpal tunnel Tinel's negative bilaterally. Skin:There are no rashes, ulcerations or lesions in right & left upper extremities. Reflexes: Bilaterally triceps, biceps and brachioradialis are 2+. Clonus absent bilaterally at the feet. Gait & station: normal, patient ambulates without assistance     Additional Examinations:       RIGHT UPPER EXTREMITY:  Inspection/examination of the right upper extremity does not show any tenderness, deformity or injury. Range of motion is unremarkable. There is no gross instability. There are no rashes, ulcerations or lesions. Strength and tone are normal.  LEFT UPPER EXTREMITY: Inspection/examination of the left upper extremity does not show any tenderness, deformity or injury. Range of motion is unremarkable. There is no gross instability. There are no rashes, ulcerations or lesions. Strength and tone are normal.    Diagnostic Testing:  I reviewed AP and lateral x-rays of her cervical spine were obtained in the office today. Those show mild spondylosis    I reviewed AP and lateral x-rays of the lumbar spine that were obtained in the office today.   Those show mild spondylosis    I reviewed CT images of her chest from 11/14/2022 in the office today. Those show mild spondylosis    I reviewed 2 view x-rays of her pelvis and right hip from 2011 2022 in the office today. Those show degenerative changes    Impression:   Cervical spondylosis  Lumbar degenerative disc disease    Plan:    Discussed treatment options including observation, physical therapy, medications, epidural injections and additional imaging.  She would like to proceed with cervical MRI

## 2023-03-03 ENCOUNTER — TELEPHONE (OUTPATIENT)
Dept: ORTHOPEDIC SURGERY | Age: 43
End: 2023-03-03

## 2023-03-03 NOTE — TELEPHONE ENCOUNTER
Medical Facility Question     Facility Name: [facility] Name: [address] Number: [phone]  Request or Information:  The facility call and stated that the Mri Lumbar Spine without Contrast was denied they stated it was not medically necessary the peer to peer number is [phone]

## 2023-03-05 NOTE — PROGRESS NOTES
Chief Complaint:   Chief Complaint   Patient presents with    Elbow Pain     F/u up left elbow pain. Pt states that the last injections helped her about 90%. Pain has come back x's 4 months. Pain is 10/10          History of Present Illness:       Patient is a 43 y.o. female returns follow up for the above complaint. . The symptoms are worsening over the past 4 months unprompted by any specific injury or event. There are neuritic symptoms   have developed since the last visit. The patient has had no further testing for the problem. Her past history significant for bilateral carpal tunnel releases and cubital tunnel releases in the remote past.      Unfortunately she did not experience predictable therapeutic benefit from those interventions she continues to have neuritic symptoms involving the hands following median and ulnar nerve distribution    She underwent bilateral ultrasound-guided nerve hydrodissections of the ulnar nerve at the cubital tunnel bilaterally and median nerve at the wrist bilaterally 12/20/2019 and 2/20/2020 with therapeutic benefit as performed by myself. She is experiencing increasing neuritic character of pain involving the upper extremities eating into the hands    Neuritic pain localizes to the median greater than ulnar aspects of the hands and is not associated with mechanical symptoms. The patient denies subjective instability about the wrist and admits to new onset weakness of the upper extremity. Pain level 10    The patient denies a pattern of activity related swelling. Treatment to date:  Nerve hydrodissection as aligned above    There is nono prior history of wrist trauma. There is no prior history of autoimmune disease, inflammatory arthropathy, or crystal arthropathy. Prior EMG evaluation in 11/2019 was unremarkable as performed by Dr. Andrew Kim referenced below.        Past Medical History:        Past Medical History:   Diagnosis Date    Arthritis Bipolar 1 disorder ([facility])     COPD (chronic obstructive pulmonary disease) ([facility])     Depression     Diabetes mellitus ([facility] [address]     Femoroacetabular impingement of right hip 8/9/2022    Hyperlipidemia     Hypertension     Tachycardia     Tachycardia     Tear of right gluteus medius tendon 8/9/2022        Present Medications:         Current Outpatient Medications   Medication Sig Dispense Refill    ezetimibe (ZETIA) 10 MG tablet Take 1 tablet by mouth daily 90 tablet 1    omeprazole (PRILOSEC) 20 MG delayed release capsule TAKE 1 CAPSULE BY MOUTH EVERY DAY 90 capsule 1    rosuvastatin (CRESTOR) 40 MG tablet TAKE 1 TABLET BY MOUTH EVERY DAY 90 tablet 1    metFORMIN (GLUCOPHAGE-XR) 500 MG extended release tablet Take 2 tablets by mouth daily (with breakfast) 180 tablet 1    metoprolol tartrate (LOPRESSOR) 50 MG tablet Take 1 tablet by mouth 2 times daily 180 tablet 1    meloxicam (MOBIC) 15 MG tablet Take 1 tablet by mouth daily as needed for Pain 90 tablet 1    Cholecalciferol (VITAMIN D3) 50 MCG (2000 UT) CAPS TAKE 1 CAPSULE BY MOUTH EVERY DAY 90 capsule 0    ASPIRIN LOW DOSE 81 MG EC tablet TAKE 1 TABLET BY MOUTH EVERY DAY 90 tablet 3    nitroGLYCERIN (NITROSTAT) 0.4 MG SL tablet Place 1 tablet under the tongue every 5 minutes as needed for Chest pain up to max of 3 total doses.  If no relief after 1 dose, call 911. 25 tablet 0    albuterol sulfate HFA (PROVENTIL;VENTOLIN;PROAIR) 108 (90 Base) MCG/ACT inhaler TAKE 2 PUFFS BY MOUTH EVERY 6 HOURS AS NEEDED FOR WHEEZE 6.7 each 1    Evolocumab 140 MG/ML SOAJ Inject 140 mg into the skin every 14 days 2 pen 11    citalopram (CELEXA) 40 MG tablet TAKE 1 TABLET BY MOUTH EVERY DAY IN THE MORNING      ammonium lactate (LAC-HYDRIN) 12 % lotion Apply topically daily 225 mL 2    thiothixene (NAVANE) 2 MG capsule TAKE 1 CAPSULE BY MOUTH EVERY EVENING AT BEDTIME      divalproex (DEPAKOTE ER) 500 MG extended release tablet Take 500 mg by mouth daily       hydrOXYzine (ATARAX) 25 MG tablet Take 25 mg by mouth 3 times daily as needed for Anxiety        No current facility-administered medications for this visit. Allergies: Allergies   Allergen Reactions    Cinnamon Itching    Codeine Hives and Itching        Review of Symptoms:    Pertinent items are noted in HPI      Vital Signs: There were no vitals filed for this visit. General Exam:     Constitutional: Patient is adequately groomed with no evidence of malnutrition    Physical Exam: bilateral elbow and bilateral wrist      Primary Exam:    Inspection: No deformity atrophy appreciable effusion      Palpation: Mild tenderness over the cubital tunnel regions bilaterally      Range of Motion: Full range symmetric at the elbow and wrist and full range in forming composite fist      Strength: Normal median and ulnar nerve distributions bilateral hands      Special Tests: Tinel's mildly positive at the elbow bilaterally, Tinel's mildly positive right greater than left wrist      Skin: There are no rashes, ulcerations or lesions. Gait: Nonantalgic      Reflex intac upper     Additional Comments:        Additional Examinations:          Neurologic -Light touch sensation and manual muscle testing is normal C5-C8 . Biceps and triceps reflexes are symmetric and +2. Spurlling sign is negative            Office Imaging Results/Procedures PerformedToday:     X-rays bilateral elbows 3 views:  Impression: Radiocapitellar joint is congruent bilaterally ulnohumeral and radiohumeral joints have a normal radiographic appearance no other soft tissue or osseous abnormalities       Office Procedures:     Orders Placed This Encounter   Procedures    Bird and Harry Heel and Elbow Protector $15     Patient was supplied a Bird and Harry Heel and Elbow Protector. This retail item was supplied to provide functional support and assist in protecting the affected area.       Verbal and written instructions for the use of and application of this item were provided. The patient was educated and fit by a healthcare professional with expert knowledge and specialization in brace application. They were instructed to contact the office immediately should the equipment result in increased pain, decreased sensation, increased swelling or worsening of the condition. XR ELBOW LEFT (MIN 3 VIEWS)     Standing Status:   Future     Number of Occurrences:   1     Standing Expiration Date:   2/28/2024    XR ELBOW RIGHT (MIN 3 VIEWS)     Standing Status:   Future     Number of Occurrences:   1     Standing Expiration Date:   2/28/2024    Finesse Valladares MD (Inpatient and Outpatient EMG), [facility]     Referral Priority:   Routine     Referral Type:   Eval and Treat     Referral Reason:   Specialty Services Required     Referred to Provider:   Rebekah Snider MD     Requested Specialty:   Neurology     Number of Visits Requested:   1    Breg [place] Wrist Brace Short     Patient was prescribed a Breg Universal Short Wrist brace . The bilateral wrists will require stabilization / immobilization from this semi-rigid / rigid orthosis to improve their function. The orthosis will assist in protecting the affected area, provide functional support and facilitate healing. The patient was educated and fit by a healthcare professional with expert knowledge and specialization in brace application while under the direct supervision of the treating physician. Verbal and written instructions for the use of and application of this item were provided. They were instructed to contact the office immediately should the brace result in increased pain, decreased sensation, increased swelling or worsening of the condition. Other Outside Imaging and Testing Personally Reviewed:    XR CERVICAL SPINE (2-3 VIEWS)    Result Date: 3/2/2023  Radiology exam is complete. No Radiologist dictation. Please follow up with ordering provider.      XR LUMBAR SPINE (2-3 VIEWS)    Result Date: 3/2/2023  Radiology exam is complete. No Radiologist dictation. Please follow up with ordering provider. XR ELBOW LEFT (MIN 3 VIEWS)    Result Date: 2/28/2023  Radiology exam is complete. No Radiologist dictation. Please follow up with ordering provider. XR ELBOW RIGHT (MIN 3 VIEWS)    Result Date: 2/28/2023  Radiology exam is complete. No Radiologist dictation. Please follow up with ordering provider. 11/14/2019 EMG bilateral upper extremities-Dr. Ada Sales     Impression:   1. There is no electrodiagnostic evidence of a left median or ulnar neuropathy, peripheral neuropathy or cervical motor radiculopathy in the left upper extremity. 2. There is no electrodiagnostic evidence of a right ulnar or median neuropathy, peripheral neuropathy or cervical motor radiculopathy in the right upper extremity. Cervical MRI 7/19/2020  CONCLUSION:   1. A central/left paracentral protrusion at the C3-4 level as well as a left paracentral    protrusion at the C5-6 level resulting in flattening of the cord at each of these levels. There    is also facet hypertrophy at these levels with abutment of bilateral exiting C4 nerves with    possible compression on the left. 2. Central protrusions at the C4-5 and C6-7 levels contribute to contouring of the cord at each    of these levels. Thank you for the opportunity to provide your interpretation. Wilner Prajapati. Dolores Foster MD       A: AF/ct 07/19/2020 6:17 PM   T: CT 07/18/2020 6:45 PM         Assessment   Impression: . Encounter Diagnoses   Name Primary?     Paresthesia and pain of both upper extremities     Median nerve neuritis, unspecified laterality     Ulnar neuritis, unspecified laterality     History of carpal tunnel release of both wrists     Hx of decompression of ulnar nerve     Left elbow pain     Right elbow pain               Plan:     Recommend repeat EMG bilateral upper extremities  Follow-up MRI cervical spine as ordered by orthopedic spine surgery  Consider repeat ultrasound-guided nerve hydrodissection and/or referral for surgical release-GARRETT Benitez    The nature of the finding, probable diagnosis and likely treatment was thoroughly discussed with the patient. The options, risks, complications, alternative treatment as well as some of the differential diagnosis was discussed. The patient was thoroughly informed and all questions were answered. the patient indicated understanding and satisfaction with the discussion. Orders:        Orders Placed This Encounter   Procedures    Bird and Harry Heel and Elbow Protector $15     Patient was supplied a Bird and Harry Heel and Elbow Protector. This retail item was supplied to provide functional support and assist in protecting the affected area. Verbal and written instructions for the use of and application of this item were provided. The patient was educated and fit by a healthcare professional with expert knowledge and specialization in brace application. They were instructed to contact the office immediately should the equipment result in increased pain, decreased sensation, increased swelling or worsening of the condition. XR ELBOW LEFT (MIN 3 VIEWS)     Standing Status:   Future     Number of Occurrences:   1     Standing Expiration Date:   2/28/2024    XR ELBOW RIGHT (MIN 3 VIEWS)     Standing Status:   Future     Number of Occurrences:   1     Standing Expiration Date:   2/28/2024    Jami Orellana MD (Inpatient and Outpatient EMG), [facility]     Referral Priority:   Routine     Referral Type:   Eval and Treat     Referral Reason:   Specialty Services Required     Referred to Provider:   Casey Patel MD     Requested Specialty:   Neurology     Number of Visits Requested:   1    Breg [place] Wrist Brace Short     Patient was prescribed a Breg Universal Short Wrist brace .   The bilateral wrists will require stabilization / immobilization from this semi-rigid / rigid orthosis to improve their function. The orthosis will assist in protecting the affected area, provide functional support and facilitate healing. The patient was educated and fit by a healthcare professional with expert knowledge and specialization in brace application while under the direct supervision of the treating physician. Verbal and written instructions for the use of and application of this item were provided. They were instructed to contact the office immediately should the brace result in increased pain, decreased sensation, increased swelling or worsening of the condition. Disclaimer: \"This note was dictated with voice recognition software. Though review and correction are routine, we apologize for any errors. \"

## 2023-03-06 ENCOUNTER — TELEPHONE (OUTPATIENT)
Dept: BARIATRICS/WEIGHT MGMT | Age: 43
End: 2023-03-06

## 2023-03-06 ENCOUNTER — TELEPHONE (OUTPATIENT)
Dept: CARDIOLOGY CLINIC | Age: 43
End: 2023-03-06

## 2023-03-06 NOTE — TELEPHONE ENCOUNTER
CARDIAC CLEARANCE     What type of procedure are you having? Left  Ankle Surgery    Which physician is performing your procedure? Aranza Vidal    When is your procedure scheduled for? 05/12    Where are you having this procedure? [address]     Are you taking Blood Thinners? Yes   If so what? (Name/dose/frequesncy)  Aspirin 81 mg   Does the surgeon want you to stop your blood thinner? If so for how long?  Yes - needs DKW opinion    Phone Number and Contact Name for Physicians office:  Argenis Schultz - [phone] - office [phone]  Fax number to send information:  [phone]

## 2023-03-06 NOTE — TELEPHONE ENCOUNTER
Pt will arrive 15 minutes prior and bring completed paperwork, ID & Insurance card. Confirmed location.

## 2023-03-07 ENCOUNTER — OFFICE VISIT (OUTPATIENT)
Dept: BARIATRICS/WEIGHT MGMT | Age: 43
End: 2023-03-07
Payer: COMMERCIAL

## 2023-03-07 ENCOUNTER — TELEPHONE (OUTPATIENT)
Dept: ORTHOPEDIC SURGERY | Age: 43
End: 2023-03-07

## 2023-03-07 VITALS
SYSTOLIC BLOOD PRESSURE: 106 MMHG | RESPIRATION RATE: 18 BRPM | OXYGEN SATURATION: 98 % | DIASTOLIC BLOOD PRESSURE: 60 MMHG | WEIGHT: 234.8 LBS | HEART RATE: 60 BPM | BODY MASS INDEX: 40.08 KG/M2 | HEIGHT: 64 IN

## 2023-03-07 DIAGNOSIS — I10 ESSENTIAL HYPERTENSION: Chronic | ICD-10-CM

## 2023-03-07 DIAGNOSIS — E11.69 DIABETES MELLITUS TYPE 2 IN OBESE (HCC): ICD-10-CM

## 2023-03-07 DIAGNOSIS — K21.9 CHRONIC GERD: ICD-10-CM

## 2023-03-07 DIAGNOSIS — Z01.818 PREOPERATIVE CLEARANCE: ICD-10-CM

## 2023-03-07 DIAGNOSIS — E78.2 MIXED HYPERLIPIDEMIA: Primary | ICD-10-CM

## 2023-03-07 DIAGNOSIS — E66.01 MORBID OBESITY WITH BMI OF 40.0-44.9, ADULT (HCC): ICD-10-CM

## 2023-03-07 DIAGNOSIS — E66.9 DIABETES MELLITUS TYPE 2 IN OBESE (HCC): ICD-10-CM

## 2023-03-07 PROCEDURE — G8482 FLU IMMUNIZE ORDER/ADMIN: HCPCS | Performed by: SURGERY

## 2023-03-07 PROCEDURE — 2022F DILAT RTA XM EVC RTNOPTHY: CPT | Performed by: SURGERY

## 2023-03-07 PROCEDURE — 3078F DIAST BP <80 MM HG: CPT | Performed by: SURGERY

## 2023-03-07 PROCEDURE — 3046F HEMOGLOBIN A1C LEVEL >9.0%: CPT | Performed by: SURGERY

## 2023-03-07 PROCEDURE — G8427 DOCREV CUR MEDS BY ELIG CLIN: HCPCS | Performed by: SURGERY

## 2023-03-07 PROCEDURE — 99205 OFFICE O/P NEW HI 60 MIN: CPT | Performed by: SURGERY

## 2023-03-07 PROCEDURE — 3074F SYST BP LT 130 MM HG: CPT | Performed by: SURGERY

## 2023-03-07 PROCEDURE — G8417 CALC BMI ABV UP PARAM F/U: HCPCS | Performed by: SURGERY

## 2023-03-07 PROCEDURE — 1036F TOBACCO NON-USER: CPT | Performed by: SURGERY

## 2023-03-07 NOTE — PROGRESS NOTES
Ailyn Almodovar is a 43 y.o. female with a date of birth of 1980. Vitals:    03/07/23 1318   BP: 106/60   Pulse: 60   Resp: 18   SpO2: 98%    BMI: Body mass index is 40.3 kg/m². Obesity Classification: Class III    Weight History: Wt Readings from Last 3 Encounters:   03/07/23 234 lb 12.8 oz (106.5 kg)   03/02/23 229 lb 15 oz (104.3 kg)   02/28/23 229 lb 15 oz (104.3 kg)     Patient's lowest adult weight was 125 lbs at age 22. Patient's highest adult weight was 268 lbs at age 36. Patient has participated in the following weight loss programs: intermittent fasting, cleanse, nutrition counseling with RD. Patient has participated in meal replacement/liquid diets. Slimfast  Patient has not participated in weight loss medications. Patient is not lactose intolerant. Patient does have food allergies for cinnamon. Patient does not have Spiritism/cultural food preferences. Patient does tolerate artificial sweeteners. 24 hour recall/food frequency chart: Pt enjoys cooking and identifies her mom as good support system  Breakfast: 8 AM: Banana + honey bunches of oats w/ 2% milk  Snack: 11 AM: 6 PB crackers + cutie orange OR pretzels   Lunch: 12-2 PM: 4 oz salmon + wilson/chay/geronimo/onion/ bun  Snack: None  Dinner: by 730 PM: 8 oz veggie spaghetti w/ turkey/cheese OR homemade white bean and spinach soup OR none after 7:30 PM  Snack: None  Bed around 9-10 PM, wearing BiPap  Drinks throughout the day: water, iced coffee 2-3X/week  Do you drink alcohol? No.     Patient does not meet the criteria for binge eating disorder. Patient does have grazing. Patient does not have night eating. Patient does have a history of emotional eating or eating out of boredom. Physical Activity: stationary bike 15 minutes at at time while watching TV    Surgery  Patient does feel confident in her ability to make these changes. The patient's expectations of post-surgical eating habits realistic.     Patient states she does understand the consequences of not complying with post-op food guidelines.  Patient states she does understands the long term changes in food intake that will be necessary for all occasions after surgery for the rest of her life.      Patient is deemed nutritionally appropriate to proceed.    Goals  Weight: 145 lbs  Health Improvement: less joint pain, increase mobility be able to walk trails, prevent/improve heart disease and DM    Assessment  Nutritional Needs: RMR=(9.99 x 106.5) + (6.25 x 162.6) - (4.92 x 42 y.o.) -161= 1712 kcal x 1.3 (sedentary activity factor)= 2226 kcal - 1000 (for 2 lb weight loss/week)= 1226 kcal.    Plan  Plan/Recommendations: Start presurgical guidelines.    Goals:   -Eat 4-5 times daily  -Avoid high fat and high sugar foods  -Include protein with all meals and snacks  -Avoid carbonation and caffeine  -Avoid calorie containing beverages  -Increase physical activity as tolerated    PES Statement:  Overweight/Obesity related to lack of exercise, sedentary lifestyle, unhealthy eating habits, and unsuccessful diet attempts as evidenced by BMI. Body mass index is 40.3 kg/m².    Will follow up as necessary.    Aurora Hurtado RD, LD

## 2023-03-07 NOTE — PATIENT INSTRUCTIONS
Patient received dietary handouts and education. Goals:   -Eat 4-5 times daily  -Avoid high fat and high sugar foods  -Include protein with all meals and snacks  -Avoid carbonation and caffeine  -Avoid calorie containing beverages  -Increase physical activity as tolerated      -Plan for Laparoscopic sleeve gastrectomy      Pre-operative work up Ordered:    - F/U in 4 weeks. - Nutrition Labs. - Protein Shake Trial.  - Psych Evaluation.   - Cardiac Clearance. - BiPAP Compliance. DONE  - EGD (endoscopy to check your stomach). - Support Group Attendance. - Obtain letter of medical necessity (PCP Letter). - Quit Smoking,  Alcohol, Caffeine and Carbonated Drinks  - Obtain records for Weight History 2 yrs. - Start Regular Exercise and track your activities. - Start Tracking your food Intake and follow dietary guidelines. - Avoid Pregnancy for 2 yrs from date of surgery. (for female patients in childbearing age)          Patient advised that its their responsibility to follow up for studies, referrals and/or labs ordered today.

## 2023-03-07 NOTE — Clinical Note
Greatly appreciate the referral.  Excellent candidate for weight loss. We will keep you posted on Pipe Bartlett progress.

## 2023-03-07 NOTE — Clinical Note
Greatly appreciate the referral.  Excellent candidate for weight loss. We will keep you posted on Karyna Light progress.

## 2023-03-07 NOTE — PROGRESS NOTES
[facility]) Physicians   [employer]  Oren Elizondo MD, [address], [address], [address] [facility]    [place]  [address] . Phone: [phone]  Fax: [phone]       Chief Complaint   Patient presents with    Bariatric, Initial Visit     NP AG BCBS           HPI:    Jasson Zuñiga is a very pleasant 43 y.o. obese female ,   Body mass index is 40.3 kg/m². And multiple medical problems who is presenting for weight loss surgery evaluation and consultation by Dr. Tonie Watkins. Patient has been struggling for several years now with obesity. Patient feels the weight is an obstacle to achieve and perform things in daily living as well risk on health. Tries to diet, and exercise but can't keep the weight off. Patient tried intermittent fasting, cleanse, nutrition counseling with RD. Patient has participated in meal replacement/liquid diets. Slimfast  Patient has not participated in weight loss medications and other regimens, but with no sustainable weight loss. Patient  is very determined to lose weight and be healthy, and is interested in surgical weight loss for future weight loss. .    Otherwise patient denies any nausea, vomiting, fevers, chills, shortness of breath, chest pain, constipation or urinary symptoms.         Obesity related problems Marcheyenne Tejas is dealing with:  Patient Active Problem List    Diagnosis Date Noted    Preoperative clearance 03/07/2023     Priority: Medium    Diabetes mellitus type 2 in obese ([facility] [address] 03/07/2023     Priority: Medium    Mixed hyperlipidemia 10/31/2022     Priority: Medium    Femoroacetabular impingement of right hip 08/09/2022     Priority: Medium    Tear of right gluteus medius tendon 08/09/2022     Priority: Medium    Femoral acetabular impingement 06/28/2022     Priority: Medium    Chronic tachycardia 03/13/2022    Migraine without status migrainosus, not intractable 03/13/2022    Chronic GERD 10/28/2021    Morbid obesity with BMI of 40.0-44.9, adult ([facility]) 10/09/2020    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[facility]) 10/09/2020    History of fibromyalgia 08/04/2020    Positive MATTHEW (antinuclear antibody) 08/04/2020    CS (cervical spondylosis) 08/04/2020    Cervical discogenic pain syndrome 08/04/2020    DDD (degenerative disc disease), cervical 08/04/2020    Herniated cervical disc without myelopathy 08/04/2020    AMOS (obstructive sleep apnea) 11/16/2019    Type 2 diabetes mellitus without complication, without long-term current use of insulin ([facility]) 11/16/2019    Essential hypertension 11/16/2019    Familial hypercholesterolemia 11/16/2019    Bipolar disorder ([facility]) 03/28/2017    Fibromyalgia 03/28/2017    Mixed anxiety and depressive disorder 03/28/2017           Pain Assessment   Denies any abdominal pain     Past Medical History:   Diagnosis Date    Arthritis     Bipolar 1 disorder ([facility])     COPD (chronic obstructive pulmonary disease) ([facility])     Depression     Diabetes mellitus ([facility])     Femoroacetabular impingement of right hip 8/9/2022    Hyperlipidemia     Hypertension     Tachycardia     Tachycardia     Tear of right gluteus medius tendon 8/9/2022     Past Surgical History:   Procedure Laterality Date    ANKLE FUSION Bilateral     ARTHRODESIS Left 12/6/2019    REVISION OF TALONAVICULAR JOINT ARTHRODESIS LEFT FOOT  -SLEEP APNEA- performed by Refugio Willis DPM at [facility] OR    CARPAL TUNNEL RELEASE Bilateral     ELBOW SURGERY Bilateral     ulnar nerve release    HERNIA REPAIR      HIP ARTHROSCOPY Bilateral     HIP SURGERY Left 6/28/2022    LEFT HIP REVISION ARTHROSCOPY, LYSIS OF ADHESIONS, REMOVAL LOOSE BODY, SYNOVECTOMY, FEMOROACETABULAR IMPINGEMENT DECOMPRESSION AND LABRAL REPAIR, ABDUCTOR REPAIR, ENDOSCOPIC BURSECTOMY  - ELMA BLOCK; LOCAL (ORTHOMIX) performed by Kevin Benitez MD at [facility] OR    HIP SURGERY Right 8/9/2022    RIGHT HIP REVISION, ARTHROSCOPY, FEMOROACETABULAR IMPINGEMENT SURGERY, LABRAL REPAIR, ENDOSCOPIC,-BLOCK (PENG), LOCAL (ORTHOMIX)-SMITH  AND NEPHEW-SHANIQUA performed by Racquel Vigil MD at [address] [place] [address] Bilateral      Family History   Problem Relation Age of Onset    High Blood Pressure Mother     Arthritis Mother     Other Father         hypothyroidism    High Blood Pressure Father     Arthritis Father     Asthma Father     Heart Failure Maternal Aunt         22 stents. Heart Failure Maternal Grandmother     Pacemaker Maternal Grandfather     Heart Surgery Paternal Grandmother     Pacemaker Paternal Grandfather      Social History     Tobacco Use    Smoking status: Former     Packs/day: 1.00     Years: 27.00     Pack years: 27.00     Types: Cigarettes     Start date: 1992     Quit date: 11/19/2019     Years since quitting: 3.2    Smokeless tobacco: Never    Tobacco comments:     started to smoke at 15 / smoked up to 1 ppd / now smoking 3 to 4 cigarettes a day   Substance Use Topics    Alcohol use: Never         I counseled the patient on the risks of Smoking, ETOH or Drug use, and importance of completely avoiding them, otherwise patient risks surgery cancellation or post operative serious complications if they start using any. Delora Agent acknowledged, agreed not to use and wants to proceed. Allergies   Allergen Reactions    Cinnamon Itching    Codeine Hives and Itching     Vitals:    03/07/23 1318   BP: 106/60   Pulse: 60   Resp: 18   SpO2: 98%   Weight: 234 lb 12.8 oz (106.5 kg)   Height: 5' 4\" (1.626 m)       Body mass index is 40.3 kg/m².       Current Outpatient Medications:     ezetimibe (ZETIA) 10 MG tablet, Take 1 tablet by mouth daily, Disp: 90 tablet, Rfl: 1    omeprazole (PRILOSEC) 20 MG delayed release capsule, TAKE 1 CAPSULE BY MOUTH EVERY DAY, Disp: 90 capsule, Rfl: 1    rosuvastatin (CRESTOR) 40 MG tablet, TAKE 1 TABLET BY MOUTH EVERY DAY, Disp: 90 tablet, Rfl: 1    metFORMIN (GLUCOPHAGE-XR) 500 MG extended release tablet, Take 2 tablets by mouth daily (with breakfast), Disp: 180 tablet, Rfl: 1    metoprolol tartrate (LOPRESSOR) 50 MG tablet, Take 1 tablet by mouth 2 times daily, Disp: 180 tablet, Rfl: 1    meloxicam (MOBIC) 15 MG tablet, Take 1 tablet by mouth daily as needed for Pain, Disp: 90 tablet, Rfl: 1    Cholecalciferol (VITAMIN D3) 50 MCG (2000 UT) CAPS, TAKE 1 CAPSULE BY MOUTH EVERY DAY, Disp: 90 capsule, Rfl: 0    ASPIRIN LOW DOSE 81 MG EC tablet, TAKE 1 TABLET BY MOUTH EVERY DAY, Disp: 90 tablet, Rfl: 3    nitroGLYCERIN (NITROSTAT) 0.4 MG SL tablet, Place 1 tablet under the tongue every 5 minutes as needed for Chest pain up to max of 3 total doses.  If no relief after 1 dose, call 911., Disp: 25 tablet, Rfl: 0    albuterol sulfate HFA (PROVENTIL;VENTOLIN;PROAIR) 108 (90 Base) MCG/ACT inhaler, TAKE 2 PUFFS BY MOUTH EVERY 6 HOURS AS NEEDED FOR WHEEZE, Disp: 6.7 each, Rfl: 1    Evolocumab 140 MG/ML SOAJ, Inject 140 mg into the skin every 14 days, Disp: 2 pen, Rfl: 11    citalopram (CELEXA) 40 MG tablet, TAKE 1 TABLET BY MOUTH EVERY DAY IN THE MORNING, Disp: , Rfl:     ammonium lactate (LAC-HYDRIN) 12 % lotion, Apply topically daily, Disp: 225 mL, Rfl: 2    thiothixene (NAVANE) 2 MG capsule, TAKE 1 CAPSULE BY MOUTH EVERY EVENING AT BEDTIME, Disp: , Rfl:     divalproex (DEPAKOTE ER) 500 MG extended release tablet, Take 500 mg by mouth daily , Disp: , Rfl:     hydrOXYzine (ATARAX) 25 MG tablet, Take 25 mg by mouth 3 times daily as needed for Anxiety , Disp: , Rfl:       Review of Systems - History obtained from the patient  General ROS: overweight   Psychological ROS: negative  Ophthalmic ROS: negative  Neurological ROS: negative  ENT ROS: negative  Allergy and Immunology ROS: negative  Hematological and Lymphatic ROS: negative  Endocrine ROS: overweight  Breast ROS: negative  Respiratory ROS: negative  Cardiovascular ROS: negative  Gastrointestinal ROS:negative  Genito-Urinary ROS: negative  Musculoskeletal ROS: weight effects on joints  Skin ROS: negative    Physical Exam   Constitutional: Patient is oriented to person, place, and time. Vital signs are normal. Patient  appears well-developed and well-nourished. Patient  is active and cooperative. Non-toxic appearance. No distress. HENT:   Head: Normocephalic and atraumatic. Head is without laceration. Right Ear: External ear normal. No lacerations. No drainage, swelling or tenderness. Left Ear: External ear normal. No lacerations. No drainage, swelling or tenderness. Nose/Mouth/Throat: Patient is wearing mask due to Covid-19 pandemic precautions, following CDC and health authorities guidelines. Eyes: Conjunctivae, EOM and lids are normal. Pupils are equal, round, and reactive to light. Right eye exhibits no discharge. No foreign body present in the right eye. Left eye exhibits no discharge. No foreign body present in the left eye. No scleral icterus. Neck: Trachea normal and normal range of motion. Neck supple. No JVD present. No tracheal tenderness present. Carotid bruit is not present. No rigidity. No tracheal deviation and no edema present. No thyromegaly present. Cardiovascular: Normal rate, regular rhythm, normal heart sounds, intact distal pulses and normal pulses. Pulmonary/Chest: Effort normal and breath sounds normal. No stridor. No respiratory distress. Patient  has no wheezes. Patient has no rales. Patient exhibits no tenderness and no crepitus. Abdominal: Soft. Normal appearance and bowel sounds are normal. Patient exhibits no distension, no abdominal bruit, no ascites and no mass. There is no hepatosplenomegaly. There is no tenderness. There is no rigidity, no rebound, no guarding and no CVA tenderness. No hernia. Hernia confirmed negative in the ventral area. Musculoskeletal: Normal range of motion. Patient exhibits no edema or tenderness.    Lymphadenopathy:        Head (right side): No submental, no submandibular, no preauricular, no posterior auricular and no occipital adenopathy present. Head (left side): No submental, no submandibular, no preauricular, no posterior auricular and no occipital adenopathy present. Patient  has no cervical adenopathy. Right: No supraclavicular adenopathy present. Left: No supraclavicular adenopathy present. Neurological: Patient is alert and oriented to person, place, and time. Patient has normal strength. Coordination and gait normal. GCS eye subscore is 4. GCS verbal subscore is 5. GCS motor subscore is 6. Skin: Skin is warm and dry. No abrasion and no rash noted. Patient  is not diaphoretic. No cyanosis or erythema. Psychiatric: Patient has a normal mood and affect. speech is normal and behavior is normal. Cognition and memory are normal.         Arturo Hill was seen today for bariatric, initial visit. Diagnoses and all orders for this visit:    Mixed hyperlipidemia  -     CBC with Auto Differential; Future  -     Comprehensive Metabolic Panel; Future  -     Hemoglobin A1C; Future  -     Iron and TIBC; Future  -     Lipid Panel; Future  -     TSH with Reflex; Future  -     Vitamin A; Future  -     Vitamin B1, Whole Blood; Future  -     Vitamin B12 & Folate; Future  -     Vitamin D 25 Hydroxy; Future  -     Vitamin E; Future  -     Protime-INR; Future  -     Ambulatory referral to Cardiology    Preoperative clearance  -     CBC with Auto Differential; Future  -     Comprehensive Metabolic Panel; Future  -     Hemoglobin A1C; Future  -     Iron and TIBC; Future  -     Lipid Panel; Future  -     TSH with Reflex; Future  -     Vitamin A; Future  -     Vitamin B1, Whole Blood; Future  -     Vitamin B12 & Folate; Future  -     Vitamin D 25 Hydroxy; Future  -     Vitamin E; Future  -     Protime-INR; Future  -     Ambulatory referral to Cardiology    Diabetes mellitus type 2 in obese ([facility] [address]  -     CBC with Auto Differential; Future  -     Comprehensive Metabolic Panel;  Future  -     Hemoglobin A1C; Future  -     Iron and TIBC; Future  -     Lipid Panel; Future  -     TSH with Reflex; Future  -     Vitamin A; Future  -     Vitamin B1, Whole Blood; Future  -     Vitamin B12 & Folate; Future  -     Vitamin D 25 Hydroxy; Future  -     Vitamin E; Future  -     Protime-INR; Future  -     Ambulatory referral to Cardiology    Essential hypertension  -     CBC with Auto Differential; Future  -     Comprehensive Metabolic Panel; Future  -     Hemoglobin A1C; Future  -     Iron and TIBC; Future  -     Lipid Panel; Future  -     TSH with Reflex; Future  -     Vitamin A; Future  -     Vitamin B1, Whole Blood; Future  -     Vitamin B12 & Folate; Future  -     Vitamin D 25 Hydroxy; Future  -     Vitamin E; Future  -     Protime-INR; Future  -     Ambulatory referral to Cardiology    Chronic GERD  -     CBC with Auto Differential; Future  -     Comprehensive Metabolic Panel; Future  -     Hemoglobin A1C; Future  -     Iron and TIBC; Future  -     Lipid Panel; Future  -     TSH with Reflex; Future  -     Vitamin A; Future  -     Vitamin B1, Whole Blood; Future  -     Vitamin B12 & Folate; Future  -     Vitamin D 25 Hydroxy; Future  -     Vitamin E; Future  -     Protime-INR; Future  -     Ambulatory referral to Cardiology    Morbid obesity with BMI of 40.0-44.9, adult ([facility] [address]  -     CBC with Auto Differential; Future  -     Comprehensive Metabolic Panel; Future  -     Hemoglobin A1C; Future  -     Iron and TIBC; Future  -     Lipid Panel; Future  -     TSH with Reflex; Future  -     Vitamin A; Future  -     Vitamin B1, Whole Blood; Future  -     Vitamin B12 & Folate; Future  -     Vitamin D 25 Hydroxy; Future  -     Vitamin E; Future  -     Protime-INR; Future  -     Ambulatory referral to Cardiology          A/P  Alysa Lyon is a very pleasant 43 y.o. female with Obesity,  Body mass index is 40.3 kg/m². and multiple obesity related co-morbidities. Alysa Lyon is very motivated to lose weight and being more healthy.      We discussed how her weight affects her overall health including:  Patient Active Problem List    Diagnosis Date Noted    Preoperative clearance 03/07/2023     Priority: Medium    Diabetes mellitus type 2 in obese ([address] 03/07/2023     Priority: Medium    Mixed hyperlipidemia 10/31/2022     Priority: Medium    Femoroacetabular impingement of right hip 08/09/2022     Priority: Medium    Tear of right gluteus medius tendon 08/09/2022     Priority: Medium    Femoral acetabular impingement 06/28/2022     Priority: Medium    Chronic tachycardia 03/13/2022    Migraine without status migrainosus, not intractable 03/13/2022    Chronic GERD 10/28/2021    Morbid obesity with BMI of 40.0-44.9, adult ([address] 10/09/2020    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[facility] [address] 10/09/2020    History of fibromyalgia 08/04/2020    Positive MATTHEW (antinuclear antibody) 08/04/2020    CS (cervical spondylosis) 08/04/2020    Cervical discogenic pain syndrome 08/04/2020    DDD (degenerative disc disease), cervical 08/04/2020    Herniated cervical disc without myelopathy 08/04/2020    AMOS (obstructive sleep apnea) 11/16/2019    Type 2 diabetes mellitus without complication, without long-term current use of insulin ([address] 11/16/2019    Essential hypertension 11/16/2019    Familial hypercholesterolemia 11/16/2019    Bipolar disorder ([address] 03/28/2017    Fibromyalgia 03/28/2017    Mixed anxiety and depressive disorder 03/28/2017         The patient underwent extensive dietary counseling with the registered dietitian. I have reviewed, discussed and agree with the dietary plan. Medical weight loss and different surgical options were discussed in details with patient. Higinio Henley is interested in surgical weight loss for future weight loss. Case volume and outcomes data in our program were discussed and reviewed with the patient. Questions and concerns were addressed today. Patient is interested in Laparoscopic Sleeve Gastrectomy, which I believe is an excellent option.   I explained to the patient that surgery does carry a risk specially with the coexisting comorbid conditions the patient have. Surgery as well in obese patiens can carry more risk. Risks including but not limited to; Infection, bleeding, gastric leak or obstruction, persistent nausea, vomiting, or reflux, injury to surrounding structures, risks of anesthesia, stricture, delayed gastric emptying, staple line leak, incisional hernia, malnutrition , vitamin deficiency, neurological complications, paralysis, hair loss, and/ or Conversion to Open surgery may be necessary. Failure to lose or maintain weight loss, Gallstones or Kidney Stones, Deep Venous Thrombosis , pulmonary embolism and / or death. However I do believe the benefits outweighs that risk. Clifford Shields understands the risks and wants to proceed. We will proceed with pre-operative work up labs and studies. Will also petition patient's  insurance for approval for this procedure. I advised the patient that we can't guarantee final insurance approval.      Patient received dietary handouts and education. Patient advised that its their responsibility to follow up for studies, referrals and/or labs ordered today. Also discussed in details the importance of follow up, as well following the recommendations and completing the whole program to improve outcomes when it comes to healthier lifestyle as well weight loss. Patient also advised about risks and benefits being on a strict dietary regimen as well using supplements.  Patient agrees and wants to proceed with weight loss planning     Today's encounter included any number of the following: Bariatric Pre/Post operative work up/protocols, review of labs, imaging, provider notes, outside hospital records, performing examination/evaluation, counseling patient and/or family, ordering medications/tests, placing referrals and communication with referring physicians, coordination of care; discussing dietary plan/recall with the patient as well with registered dietitian and documentation in the EHR. Of note, the above was done during same day of the actual patient encounter. Obesity as a disease is considered a high risk to patients overall health and should therefore be considered a high risk disease state. Advised the patient that not getting there weight under control (weight loss hopefully will help with resolving/improving of the comorbid conditions),  that could increase risk of complications/worsening of those conditions on the long-term. With Covid-19 pandemic, CDC and health authorities did classify obese patients as vulnerable and high risk as well. Which makes weight loss a priority for improvement of their wellbeing and overall health. CDC has issued the following statement as far Obese patients being at Increased Risk of being critically ill from SARS-Cov-2  \"Severe obesity increases the risk of a serious breathing problem called acute respiratory distress syndrome (ARDS), which is a major complication of [id number]-[id number] and can cause difficulties with a doctors ability to provide respiratory support for seriously ill patients. People living with severe obesity can have multiple serious chronic diseases and underlying health conditions that can increase the risk of severe illness from COVID-19. \"      I did explain thoroughly to the patient that compliance with pre- and post op diet and other recommendations are integral part to improve the chances of successful weight loss and also not following it could end with serious health complications. Also stressed to the patient importance of taking the multivitamins as instructed, otherwise risk significant complications. Patient Instructions   Patient received dietary handouts and education.     Goals:   -Eat 4-5 times daily  -Avoid high fat and high sugar foods  -Include protein with all meals and snacks  -Avoid carbonation and caffeine  -Avoid calorie containing beverages  -Increase physical activity as tolerated      -Plan for Laparoscopic sleeve gastrectomy      Pre-operative work up Ordered:    - F/U in 4 weeks. - Nutrition Labs. - Protein Shake Trial.  - Psych Evaluation.   - Cardiac Clearance. - BiPAP Compliance. DONE  - EGD (endoscopy to check your stomach). - Support Group Attendance. - Obtain letter of medical necessity (PCP Letter). - Quit Smoking,  Alcohol, Caffeine and Carbonated Drinks  - Obtain records for Weight History 2 yrs. - Start Regular Exercise and track your activities. - Start Tracking your food Intake and follow dietary guidelines. - Avoid Pregnancy for 2 yrs from date of surgery. (for female patients in childbearing age)          Patient advised that its their responsibility to follow up for studies, referrals and/or labs ordered today. Please note that some or all of this report was generated using voice recognition software. Please notify me in case of any questions about the content of this document, as some errors in transcription may have occurred .

## 2023-03-07 NOTE — TELEPHONE ENCOUNTER
[employer] approved - Janeth 1268 # [id number] - VALID TO 04/01/2023 - CERVICAL SPINE - [place] [facility]           On order you requested [address] denied at this site,   So it had to be re - precerted for a free standing faciliy

## 2023-03-09 ENCOUNTER — HOSPITAL ENCOUNTER (OUTPATIENT)
Dept: ULTRASOUND IMAGING | Age: 43
Discharge: HOME | End: 2023-03-09
Payer: COMMERCIAL

## 2023-03-09 ENCOUNTER — HOSPITAL ENCOUNTER (OUTPATIENT)
Dept: MAMMOGRAPHY | Age: 43
Discharge: HOME | End: 2023-03-09
Payer: COMMERCIAL

## 2023-03-09 VITALS — BODY MASS INDEX: 40.34 KG/M2 | WEIGHT: 235 LBS

## 2023-03-09 DIAGNOSIS — R92.8 ABNORMAL MAMMOGRAM: ICD-10-CM

## 2023-03-09 PROCEDURE — G0279 TOMOSYNTHESIS, MAMMO: HCPCS

## 2023-03-09 PROCEDURE — 76642 ULTRASOUND BREAST LIMITED: CPT

## 2023-03-11 ENCOUNTER — HOSPITAL ENCOUNTER (OUTPATIENT)
Dept: MRI IMAGING | Age: 43
Discharge: HOME | End: 2023-03-11
Payer: COMMERCIAL

## 2023-03-11 DIAGNOSIS — M43.02 CERVICAL SPONDYLOLYSIS: ICD-10-CM

## 2023-03-11 PROCEDURE — 72141 MRI NECK SPINE W/O DYE: CPT

## 2023-03-13 ENCOUNTER — HOSPITAL ENCOUNTER (OUTPATIENT)
Age: 43
Discharge: HOME | End: 2023-03-13
Payer: COMMERCIAL

## 2023-03-13 DIAGNOSIS — E11.69 DIABETES MELLITUS TYPE 2 IN OBESE (HCC): ICD-10-CM

## 2023-03-13 DIAGNOSIS — K21.9 CHRONIC GERD: ICD-10-CM

## 2023-03-13 DIAGNOSIS — I10 ESSENTIAL HYPERTENSION: Chronic | ICD-10-CM

## 2023-03-13 DIAGNOSIS — E66.01 MORBID OBESITY WITH BMI OF 40.0-44.9, ADULT (HCC): ICD-10-CM

## 2023-03-13 DIAGNOSIS — E78.2 MIXED HYPERLIPIDEMIA: ICD-10-CM

## 2023-03-13 DIAGNOSIS — Z01.818 PREOPERATIVE CLEARANCE: ICD-10-CM

## 2023-03-13 DIAGNOSIS — E66.9 DIABETES MELLITUS TYPE 2 IN OBESE (HCC): ICD-10-CM

## 2023-03-13 LAB
A/G RATIO: 1.4 (ref 1.1–2.2)
ALBUMIN SERPL-MCNC: 4 G/DL (ref 3.4–5)
ALP BLD-CCNC: 46 U/L (ref 40–129)
ALT SERPL-CCNC: 21 U/L (ref 10–40)
ANION GAP SERPL CALCULATED.3IONS-SCNC: 11 MMOL/L (ref 3–16)
AST SERPL-CCNC: 19 U/L (ref 15–37)
BASOPHILS ABSOLUTE: 0 K/UL (ref 0–0.2)
BASOPHILS RELATIVE PERCENT: 0.6 %
BILIRUB SERPL-MCNC: <0.2 MG/DL (ref 0–1)
BUN BLDV-MCNC: 15 MG/DL (ref 7–20)
CALCIUM SERPL-MCNC: 8.5 MG/DL (ref 8.3–10.6)
CHLORIDE BLD-SCNC: 103 MMOL/L (ref 99–110)
CHOLESTEROL, TOTAL: 88 MG/DL (ref 0–199)
CO2: 25 MMOL/L (ref 21–32)
CREAT SERPL-MCNC: 0.6 MG/DL (ref 0.6–1.1)
EOSINOPHILS ABSOLUTE: 0.1 K/UL (ref 0–0.6)
EOSINOPHILS RELATIVE PERCENT: 0.9 %
ESTIMATED AVERAGE GLUCOSE: 116.9 MG/DL
GFR SERPL CREATININE-BSD FRML MDRD: >60 ML/MIN/{1.73_M2}
GLUCOSE BLD-MCNC: 102 MG/DL (ref 70–99)
HBA1C MFR BLD: 5.7 %
HCT VFR BLD CALC: 36 % (ref 36–48)
HDLC SERPL-MCNC: 29 MG/DL (ref 40–60)
HEMOGLOBIN: 11.7 G/DL (ref 12–16)
INR BLD: 1.03 (ref 0.87–1.14)
IRON SATURATION: 26 % (ref 15–50)
IRON: 77 UG/DL (ref 37–145)
LDL CHOLESTEROL CALCULATED: 45 MG/DL
LYMPHOCYTES ABSOLUTE: 3.1 K/UL (ref 1–5.1)
LYMPHOCYTES RELATIVE PERCENT: 38.5 %
MCH RBC QN AUTO: 28.4 PG (ref 26–34)
MCHC RBC AUTO-ENTMCNC: 32.5 G/DL (ref 31–36)
MCV RBC AUTO: 87.3 FL (ref 80–100)
MONOCYTES ABSOLUTE: 0.7 K/UL (ref 0–1.3)
MONOCYTES RELATIVE PERCENT: 9.1 %
NEUTROPHILS ABSOLUTE: 4 K/UL (ref 1.7–7.7)
NEUTROPHILS RELATIVE PERCENT: 50.9 %
PDW BLD-RTO: 14.7 % (ref 12.4–15.4)
PLATELET # BLD: 222 K/UL (ref 135–450)
PMV BLD AUTO: 7.5 FL (ref 5–10.5)
POTASSIUM SERPL-SCNC: 4.2 MMOL/L (ref 3.5–5.1)
PROTHROMBIN TIME: 13.4 SEC (ref 11.7–14.5)
RBC # BLD: 4.13 M/UL (ref 4–5.2)
SODIUM BLD-SCNC: 139 MMOL/L (ref 136–145)
TOTAL CK: 103 U/L (ref 26–192)
TOTAL IRON BINDING CAPACITY: 292 UG/DL (ref 260–445)
TOTAL PROTEIN: 6.8 G/DL (ref 6.4–8.2)
TRIGL SERPL-MCNC: 68 MG/DL (ref 0–150)
TSH REFLEX: 1.47 UIU/ML (ref 0.27–4.2)
VITAMIN D 25-HYDROXY: 40.7 NG/ML
VLDLC SERPL CALC-MCNC: 14 MG/DL
WBC # BLD: 7.9 K/UL (ref 4–11)

## 2023-03-13 PROCEDURE — 84425 ASSAY OF VITAMIN B-1: CPT

## 2023-03-13 PROCEDURE — 84590 ASSAY OF VITAMIN A: CPT

## 2023-03-13 PROCEDURE — 83036 HEMOGLOBIN GLYCOSYLATED A1C: CPT

## 2023-03-13 PROCEDURE — 85025 COMPLETE CBC W/AUTO DIFF WBC: CPT

## 2023-03-13 PROCEDURE — 82607 VITAMIN B-12: CPT

## 2023-03-13 PROCEDURE — 80061 LIPID PANEL: CPT

## 2023-03-13 PROCEDURE — 82306 VITAMIN D 25 HYDROXY: CPT

## 2023-03-13 PROCEDURE — 85610 PROTHROMBIN TIME: CPT

## 2023-03-13 PROCEDURE — 83550 IRON BINDING TEST: CPT

## 2023-03-13 PROCEDURE — 82550 ASSAY OF CK (CPK): CPT

## 2023-03-13 PROCEDURE — 82746 ASSAY OF FOLIC ACID SERUM: CPT

## 2023-03-13 PROCEDURE — 84443 ASSAY THYROID STIM HORMONE: CPT

## 2023-03-13 PROCEDURE — 84446 ASSAY OF VITAMIN E: CPT

## 2023-03-13 PROCEDURE — 80053 COMPREHEN METABOLIC PANEL: CPT

## 2023-03-13 PROCEDURE — 36415 COLL VENOUS BLD VENIPUNCTURE: CPT

## 2023-03-13 PROCEDURE — 83540 ASSAY OF IRON: CPT

## 2023-03-13 RX ORDER — ACETAMINOPHEN 160 MG
TABLET,DISINTEGRATING ORAL
Qty: 90 CAPSULE | Refills: 0 | Status: SHIPPED | OUTPATIENT
Start: 2023-03-13

## 2023-03-14 ENCOUNTER — PROCEDURE VISIT (OUTPATIENT)
Dept: NEUROLOGY | Age: 43
End: 2023-03-14
Payer: COMMERCIAL

## 2023-03-14 DIAGNOSIS — R20.0 BILATERAL HAND NUMBNESS: Primary | ICD-10-CM

## 2023-03-14 PROCEDURE — 95886 MUSC TEST DONE W/N TEST COMP: CPT | Performed by: PSYCHIATRY & NEUROLOGY

## 2023-03-14 PROCEDURE — 95911 NRV CNDJ TEST 9-10 STUDIES: CPT | Performed by: PSYCHIATRY & NEUROLOGY

## 2023-03-14 NOTE — PROGRESS NOTES
Pavithra Alcocer M.D. [facility]) Physicians/[facility]  Board Certified in [address], [address] [place] [address]    EMG / NERVE CONDUCTION STUDY      PATIENT:  Pretty Andrew       DATE OF EM23     YOB: 1980       REASON FOR EMG:   Bilateral arm numbness with some elbow pain, rule out ulnar neuropathy      REFERRING PHYSICIAN:  Gerard Palacios MD  [facility]     SUMMARY:   Bilateral median motor and sensory nerve studies were normal.  Bilateral ulnar sensory nerve studies were normal.  The left radial sensory nerve study was normal.  The left ulnar motor nerve study was normal.  The right ulnar motor nerve study had a slowing of conduction velocity across the elbow. Needle EMG of several muscles in both upper extremities was normal.      CLINICAL DIAGNOSIS:  Ulnar mononeuropathy        EMG RESULTS:     This patient has a mild right ulnar nerve lesion at the elbow. The left side is within normal limits at this time. ---------------------------------------------  Pavithra Alcocer M.D.   Electromyographer / Neurologist

## 2023-03-15 ENCOUNTER — OFFICE VISIT (OUTPATIENT)
Dept: CARDIOLOGY CLINIC | Age: 43
End: 2023-03-15
Payer: COMMERCIAL

## 2023-03-15 VITALS
DIASTOLIC BLOOD PRESSURE: 72 MMHG | HEART RATE: 54 BPM | HEIGHT: 65 IN | WEIGHT: 232.7 LBS | SYSTOLIC BLOOD PRESSURE: 118 MMHG | BODY MASS INDEX: 38.77 KG/M2 | OXYGEN SATURATION: 95 %

## 2023-03-15 DIAGNOSIS — Z01.810 PREOP CARDIOVASCULAR EXAM: ICD-10-CM

## 2023-03-15 DIAGNOSIS — I10 ESSENTIAL HYPERTENSION: Primary | ICD-10-CM

## 2023-03-15 DIAGNOSIS — E78.01 FAMILIAL HYPERCHOLESTEROLEMIA: ICD-10-CM

## 2023-03-15 DIAGNOSIS — R00.2 PALPITATION: ICD-10-CM

## 2023-03-15 DIAGNOSIS — E78.2 MIXED HYPERLIPIDEMIA: ICD-10-CM

## 2023-03-15 DIAGNOSIS — Z82.49 FAMILY HISTORY OF HEART DISEASE: ICD-10-CM

## 2023-03-15 DIAGNOSIS — R00.0 TACHYCARDIA: ICD-10-CM

## 2023-03-15 DIAGNOSIS — E66.9 OBESITY, UNSPECIFIED CLASSIFICATION, UNSPECIFIED OBESITY TYPE, UNSPECIFIED WHETHER SERIOUS COMORBIDITY PRESENT: ICD-10-CM

## 2023-03-15 LAB
A-TOCOPHEROL VIT E SERPL-MCNC: 5.5 MG/L (ref 5.5–18)
ANNOTATION COMMENT IMP: NORMAL
BETA+GAMMA TOCOPHEROL SERPL-MCNC: 0.8 MG/L (ref 0–6)
FOLATE SERPL-MCNC: 12.95 NG/ML (ref 4.78–24.2)
RETINYL PALMITATE SERPL-MCNC: <0.02 MG/L (ref 0–0.1)
VIT A SERPL-MCNC: 0.47 MG/L (ref 0.3–1.2)
VIT B12 SERPL-MCNC: 1040 PG/ML (ref 211–911)

## 2023-03-15 PROCEDURE — 1036F TOBACCO NON-USER: CPT | Performed by: INTERNAL MEDICINE

## 2023-03-15 PROCEDURE — G8417 CALC BMI ABV UP PARAM F/U: HCPCS | Performed by: INTERNAL MEDICINE

## 2023-03-15 PROCEDURE — 3074F SYST BP LT 130 MM HG: CPT | Performed by: INTERNAL MEDICINE

## 2023-03-15 PROCEDURE — G8427 DOCREV CUR MEDS BY ELIG CLIN: HCPCS | Performed by: INTERNAL MEDICINE

## 2023-03-15 PROCEDURE — 3078F DIAST BP <80 MM HG: CPT | Performed by: INTERNAL MEDICINE

## 2023-03-15 PROCEDURE — G8482 FLU IMMUNIZE ORDER/ADMIN: HCPCS | Performed by: INTERNAL MEDICINE

## 2023-03-15 PROCEDURE — 99214 OFFICE O/P EST MOD 30 MIN: CPT | Performed by: INTERNAL MEDICINE

## 2023-03-15 ASSESSMENT — ENCOUNTER SYMPTOMS
CHEST TIGHTNESS: 0
NAUSEA: 1
PHOTOPHOBIA: 0
ABDOMINAL DISTENTION: 0
COUGH: 0
ABDOMINAL PAIN: 0
SHORTNESS OF BREATH: 0
BLOOD IN STOOL: 0

## 2023-03-15 NOTE — LETTER
[facility] CARDIOLOGY[address]  Dept: [phone]  Dept Fax: [phone]      March 15, 2023    [address] [facility]  : 1980  DOS: 3/15/2023    To Whom it May Concern:    Shad Huff has been evaluated for cardiac clearance. Based on diagnostic testing including ekg, Shda Huff is considered at a low risk for moderate risk  surgery. There is no further cardiac testing that could be done to lower the risk. Please let my office know if you have any questions or concerns.           Reji Grewal, [address] and [place]

## 2023-03-15 NOTE — PROGRESS NOTES
Weight: 232 lb 11.2 oz (105.6 kg)     Gen Alert, cooperative, no distress Heart  Regular rate and rhythm, no murmur   Head Normocephalic, atraumatic, no abnormalities Abd  Soft, NT, +BS, no mass, no OM   Eyes PERRLA, conj/corn clear Ext  Ext nl, AT, no C/C, no edema   Nose Nares normal, no drain age, Non-tender Pulse 2+ and symmetric   Throat Lips, mucosa, tongue normal Skin Color/text/turg nl, no rash/lesions   Neck S/S, TM, NT, no bruit Psych Nl mood and affect   Lung CTA-B, unlabored, no DTP     Ch wall NT, no deform       LABS and Imaging     Relevant and available CV data reviewed  Echo/MRI: 4/2021    Cath: 2017  Last Angiogram: Lt & Rt heart cath: '17  FINDINGS:  Pulmonary capillary wedge pressure 7, PA pressure 32/16, mean  pressure of 21. Right atrial pressure of 9. Right  ventricular pressure of 18. Oxygen saturation throughout the  right heart system was around 68%. Qp:Qs is calculated to be  1. Cardiac output by Farhad method was 5.7 with cardiac index  of 2.84. FINDINGS:  Left main 0% stenosis. LAD large, 0% stenosis. Diagonal is  small, 0% stenosis. Left circumflex is codominant artery, 0% stenosis. Obtuse marginal is small. Right coronary artery codominant artery with 0% stenosis. LV angiogram shows LV ejection fraction 55% with LVEDP of 19 mmHg. 1/5/23- cta- unreamarkable bone spurs in spine  Holter none  EKG personally interpreted: sinus rhythm, anterior t wave inversion   Stress:4/2019   Summary  Normal myocardial perfusion study. Normal LV size and systolic function. Moderate Risk  Moderate Complexity/Medical Decision Making  Outside/Care everywhere records Reviewed  Labs Reviewed  Prior Imaging, ekg, cath, echo reviewed when available  Medications reviewed  Old Notes reviewed  ASSESSMENT AND PLAN   Preop exam  - ankle surgery  - bariatric surgery  Plna  - may proceed at low risk for  moderate risk surgery    2.  Obesity  - new problem  Plan  - planning on bariatric

## 2023-03-15 NOTE — LETTER
March 20, 2023      Marilee Hussein, JUAN RAMON - CNP  [address] [facility] - THE HEART [facility] [address] [place] [address]      Patient: Ilan Don   MR Number: [id number]   YOB: 1980   Date of Visit: 3/15/2023       Dear Marilee Hussein:    Thank you for referring Brittni Armenta to me for evaluation/treatment. Below are the relevant portions of my assessment and plan of care. If you have questions, please do not hesitate to call me. I look forward to following Hector Billingsley along with you.     Sincerely,        Olimpia Barlow, DO

## 2023-03-16 ENCOUNTER — OFFICE VISIT (OUTPATIENT)
Dept: ORTHOPEDIC SURGERY | Age: 43
End: 2023-03-16
Payer: COMMERCIAL

## 2023-03-16 VITALS — BODY MASS INDEX: 38.65 KG/M2 | HEIGHT: 65 IN | WEIGHT: 232 LBS

## 2023-03-16 DIAGNOSIS — M47.812 CERVICAL SPONDYLOSIS: Primary | ICD-10-CM

## 2023-03-16 PROCEDURE — G8417 CALC BMI ABV UP PARAM F/U: HCPCS | Performed by: ORTHOPAEDIC SURGERY

## 2023-03-16 PROCEDURE — G8482 FLU IMMUNIZE ORDER/ADMIN: HCPCS | Performed by: ORTHOPAEDIC SURGERY

## 2023-03-16 PROCEDURE — 99212 OFFICE O/P EST SF 10 MIN: CPT | Performed by: ORTHOPAEDIC SURGERY

## 2023-03-16 PROCEDURE — G8427 DOCREV CUR MEDS BY ELIG CLIN: HCPCS | Performed by: ORTHOPAEDIC SURGERY

## 2023-03-16 PROCEDURE — 1036F TOBACCO NON-USER: CPT | Performed by: ORTHOPAEDIC SURGERY

## 2023-03-16 NOTE — PROGRESS NOTES
New Patient: CERVICAL SPINE    Referring Provider:  No ref. provider found    CHIEF COMPLAINT:    Chief Complaint   Patient presents with    Neck Pain     MRI RESULTS       HISTORY OF PRESENT ILLNESS:   Ms. Tania Emmanuel is a pleasant 43 y.o. old female kindly referred by Dr. Bertrand Bile evaluation of neck pain, low back pain, bilateral arm and leg pain. Her symptoms began insidiously about 4 years ago. They have increased in set time she rates her neck bilateral leg and low back pain 10/10 she rates her bilateral trap pain 9/10. She notes numbness of her arms and legs as well as loss of fine motor control. She denies gait abnormality, saddle anesthesia or bowel or bladder abnormality.     Current/Past Treatment:   Physical Therapy: Yes  Chiropractic: Yes  Injection: Yes  Medications: None    Past Medical History:   Past Medical History:   Diagnosis Date    Arthritis     Bipolar 1 disorder (HCC)     COPD (chronic obstructive pulmonary disease) (HCC)     Depression     Diabetes mellitus (HCC)     Femoroacetabular impingement of right hip 8/9/2022    Hyperlipidemia     Hypertension     Tachycardia     Tachycardia     Tear of right gluteus medius tendon 8/9/2022      Past Surgical History:     Past Surgical History:   Procedure Laterality Date    ANKLE FUSION Bilateral     ARTHRODESIS Left 12/6/2019    REVISION OF TALONAVICULAR JOINT ARTHRODESIS LEFT FOOT  -SLEEP APNEA- performed by Darrick Mohan DPM at [address] [place] [address] Bilateral     ELBOW SURGERY Bilateral     ulnar nerve release    HERNIA REPAIR      HIP ARTHROSCOPY Bilateral     HIP SURGERY Left 6/28/2022    LEFT HIP REVISION ARTHROSCOPY, LYSIS OF ADHESIONS, REMOVAL LOOSE BODY, SYNOVECTOMY, FEMOROACETABULAR IMPINGEMENT DECOMPRESSION AND LABRAL REPAIR, ABDUCTOR REPAIR, ENDOSCOPIC BURSECTOMY  - ELMA BLOCK; LOCAL (ORTHOMIX) performed by Corina Ramos MD at [address] Right 8/9/2022    RIGHT HIP REVISION, ARTHROSCOPY, FEMOROACETABULAR IMPINGEMENT SURGERY, LABRAL REPAIR, ENDOSCOPIC,-BLOCK (PENG), LOCAL (ORTHOMIX)-ROSE AND NEPHEW-SHANIQUA performed by Felipe Hamlin MD at [facility] Bilateral      Current Medications:     Current Outpatient Medications:     Cholecalciferol (VITAMIN D3) 50 MCG (2000 UT) CAPS, TAKE 1 CAPSULE BY MOUTH EVERY DAY, Disp: 90 capsule, Rfl: 0    ezetimibe (ZETIA) 10 MG tablet, Take 1 tablet by mouth daily, Disp: 90 tablet, Rfl: 1    omeprazole (PRILOSEC) 20 MG delayed release capsule, TAKE 1 CAPSULE BY MOUTH EVERY DAY, Disp: 90 capsule, Rfl: 1    rosuvastatin (CRESTOR) 40 MG tablet, TAKE 1 TABLET BY MOUTH EVERY DAY, Disp: 90 tablet, Rfl: 1    metFORMIN (GLUCOPHAGE-XR) 500 MG extended release tablet, Take 2 tablets by mouth daily (with breakfast), Disp: 180 tablet, Rfl: 1    metoprolol tartrate (LOPRESSOR) 50 MG tablet, Take 1 tablet by mouth 2 times daily, Disp: 180 tablet, Rfl: 1    meloxicam (MOBIC) 15 MG tablet, Take 1 tablet by mouth daily as needed for Pain, Disp: 90 tablet, Rfl: 1    ASPIRIN LOW DOSE 81 MG EC tablet, TAKE 1 TABLET BY MOUTH EVERY DAY, Disp: 90 tablet, Rfl: 3    nitroGLYCERIN (NITROSTAT) 0.4 MG SL tablet, Place 1 tablet under the tongue every 5 minutes as needed for Chest pain up to max of 3 total doses.  If no relief after 1 dose, call 911., Disp: 25 tablet, Rfl: 0    albuterol sulfate HFA (PROVENTIL;VENTOLIN;PROAIR) 108 (90 Base) MCG/ACT inhaler, TAKE 2 PUFFS BY MOUTH EVERY 6 HOURS AS NEEDED FOR WHEEZE, Disp: 6.7 each, Rfl: 1    Evolocumab 140 MG/ML SOAJ, Inject 140 mg into the skin every 14 days, Disp: 2 pen, Rfl: 11    citalopram (CELEXA) 40 MG tablet, TAKE 1 TABLET BY MOUTH EVERY DAY IN THE MORNING, Disp: , Rfl:     ammonium lactate (LAC-HYDRIN) 12 % lotion, Apply topically daily, Disp: 225 mL, Rfl: 2    thiothixene (NAVANE) 2 MG capsule, TAKE 1 CAPSULE BY MOUTH EVERY EVENING AT BEDTIME, Disp: , Rfl:     divalproex (DEPAKOTE ER) 500 MG extended release tablet, Take 500 mg by mouth daily , Disp: , Rfl:     hydrOXYzine (ATARAX) 25 MG tablet, Take 25 mg by mouth 3 times daily as needed for Anxiety , Disp: , Rfl:   Allergies:  Adhesive tape, Cinnamon, and Codeine  Social History:    reports that she quit smoking about 3 years ago. Her smoking use included cigarettes. She started smoking about 31 years ago. She has a 27.00 pack-year smoking history. She has never used smokeless tobacco. She reports that she does not drink alcohol and does not use drugs. Family History:   Family History   Problem Relation Age of Onset    High Blood Pressure Mother     Arthritis Mother     Other Father         hypothyroidism    High Blood Pressure Father     Arthritis Father     Asthma Father     Heart Failure Maternal Grandmother     Pacemaker Maternal Grandfather     Heart Surgery Paternal Grandmother     Pacemaker Paternal Grandfather     Ovarian Cancer Maternal Aunt     Heart Failure Maternal Aunt         22 stents. REVIEW OF SYSTEMS: Full ROS noted & scanned   CONSTITUTIONAL: Denies unexplained weight loss, fevers, chills or fatigue  NEUROLOGICAL: Denies unsteady gait or progressive weakness  MUSCULOSKELETAL: Denies joint swelling or redness  PSYCHOLOGICAL: Denies anxiety, depression   SKIN: Denies skin changes, delayed healing, rash, itching   HEMATOLOGIC: Denies easy bleeding or bruising  ENDOCRINE: Denies excessive thirst, urination, heat/cold  RESPIRATORY: Denies current dyspnea, cough  GI: Denies nausea, vomiting, diarrhea   : Denies bowel or bladder issues       PHYSICAL EXAM:    Vitals: Height 5' 5\" (1.651 m), weight 232 lb (105.2 kg), last menstrual period 02/19/2023. GENERAL EXAM:  General Apparence: Patient is adequately groomed with no evidence of malnutrition. Orientation: The patient is oriented to time, place and person.    Mood & Affect:The patient's mood and affect are appropriate   Vascular: Examination reveals no swelling tenderness in upper or lower extremities. Good capillary refill  Lymphatic: The lymphatic examination bilaterally reveals all areas to be without enlargement or induration  Sensation: Sensation is intact without deficit  Coordination/Balance: Good coordination     CERVICAL EXAMINATION:  Inspection: Local inspection shows no step-off or bruising. Cervical alignment is normal.     Palpation: No evidence of tenderness at the midline, and trapezius. Paraspinal tenderness is present. There is no step-off or paraspinal spasm. Range of Motion: Cervical flexion, extension, and rotation are mildly reduced with pain. Strength: 5/5 bilateral upper extremities   Special Tests:     Treviño's negative bilaterally. Cubital and Carpal tunnel Tinel's negative bilaterally. Skin:There are no rashes, ulcerations or lesions in right & left upper extremities. Reflexes: Bilaterally triceps, biceps and brachioradialis are 2+. Clonus absent bilaterally at the feet. Gait & station: normal, patient ambulates without assistance     Additional Examinations:       RIGHT UPPER EXTREMITY:  Inspection/examination of the right upper extremity does not show any tenderness, deformity or injury. Range of motion is unremarkable. There is no gross instability. There are no rashes, ulcerations or lesions. Strength and tone are normal.  LEFT UPPER EXTREMITY: Inspection/examination of the left upper extremity does not show any tenderness, deformity or injury. Range of motion is unremarkable. There is no gross instability. There are no rashes, ulcerations or lesions. Strength and tone are normal.    Diagnostic Testing:  I reviewed AP and lateral x-rays of her cervical spine were obtained in the office today. Those show mild spondylosis    I reviewed AP and lateral x-rays of the lumbar spine that were obtained during her last office visit.   Those show mild spondylosis    I reviewed CT images of her chest from 11/14/2022 in the office today. Those show mild spondylosis    I reviewed 2 view x-rays of her pelvis and right hip from 2011 2022 in the office today. Those show degenerative changes    Reviewed MRI images of her cervical spine from 3/11/2023 in the office today. Those show a small left paracentral disc herniation C3-C4 without significant spinal cord or nerve compression    Impression:   Cervical spondylosis  Lumbar degenerative disc disease    Plan:    Discussed treatment options including observation, physical therapy, medications, epidural injections and additional imaging.  She would like to proceed with observation

## 2023-03-17 LAB — VIT B1 BLD-MCNC: 124 NMOL/L (ref 70–180)

## 2023-03-21 ENCOUNTER — OFFICE VISIT (OUTPATIENT)
Dept: ORTHOPEDIC SURGERY | Age: 43
End: 2023-03-21
Payer: COMMERCIAL

## 2023-03-21 VITALS — BODY MASS INDEX: 38.65 KG/M2 | HEIGHT: 65 IN | WEIGHT: 232 LBS

## 2023-03-21 DIAGNOSIS — G56.22 ULNAR NEURITIS, LEFT: ICD-10-CM

## 2023-03-21 PROCEDURE — 1036F TOBACCO NON-USER: CPT | Performed by: INTERNAL MEDICINE

## 2023-03-21 PROCEDURE — 99213 OFFICE O/P EST LOW 20 MIN: CPT | Performed by: INTERNAL MEDICINE

## 2023-03-21 PROCEDURE — G8427 DOCREV CUR MEDS BY ELIG CLIN: HCPCS | Performed by: INTERNAL MEDICINE

## 2023-03-21 PROCEDURE — G8417 CALC BMI ABV UP PARAM F/U: HCPCS | Performed by: INTERNAL MEDICINE

## 2023-03-21 PROCEDURE — 64450 NJX AA&/STRD OTHER PN/BRANCH: CPT | Performed by: INTERNAL MEDICINE

## 2023-03-21 PROCEDURE — G8482 FLU IMMUNIZE ORDER/ADMIN: HCPCS | Performed by: INTERNAL MEDICINE

## 2023-03-24 RX ORDER — LIDOCAINE HYDROCHLORIDE 10 MG/ML
2 INJECTION, SOLUTION INFILTRATION; PERINEURAL ONCE
Status: COMPLETED | OUTPATIENT
Start: 2023-03-24 | End: 2023-03-24

## 2023-03-24 RX ADMIN — LIDOCAINE HYDROCHLORIDE 2 ML: 10 INJECTION, SOLUTION INFILTRATION; PERINEURAL at 09:45

## 2023-03-24 NOTE — PROGRESS NOTES
3/22/23 4:41 PM    Dextrose Injection 5%      NDC: 1660-4302-29    Lot Number: [id number]    Body Part: L elbow
Results/Procedures PerformedToday:        Office Procedures:     Orders Placed This Encounter   Procedures    US GUIDED NEEDLE PLACEMENT     Standing Status:   Future     Number of Occurrences:   1     Standing Expiration Date:   3/21/2024     Order Specific Question:   Reason for exam:     Answer:   ulnar nerve hydro    KY INJECTION AA&/STRD OTHER PERIPHERAL NERVE/BRANCH     Ultrasound-guided hydrodissection ulnar nerve-left elbow  Logic ultrasound  12 MHz       Patient positioned as above. A sterile prep was performed. The linear transducer was placed in short axis over the ulnar nerve at the level of the FDS aponeurosis just distal to the cubital tunnel. The nerve was hyrodissected with approximately 6 cc of injectate consiting of 10:1 mix of 5% dextrose: xylocaine. The nerve was hydrodissected circumferenctially. Using the lift technique the ulnar nerve was evaluated just proxiaal to this at the level of the cubital tunnel. Fluid from the inejectate was noted about the nerve at this level. A separate hydrodissection was then performed at this level using 8 cc of injectate mixture. The nerve was visualized to hydrodissect and assume a more spherical morpholgy consistent with soft tissue release. The needle was withdrawn , bandaid to puncture wound and ace warp compression applied.      Ultrasound guided hydrodissection-ulnar nerve right elbow       Other Outside Imaging and Testing Personally Reviewed:    US GUIDED NEEDLE PLACEMENT    Result Date: 3/21/2023  Radiology result is complete; follow up with provider / physician office for radiology results         PATIENT:  Samira Michel        DATE OF EM23      YOB: 1980        REASON FOR EMG:   Bilateral arm numbness with some elbow pain, rule out ulnar neuropathy        REFERRING PHYSICIAN:  Ivanna Toro MD  [facility] [address]      SUMMARY:   Bilateral median motor and sensory

## 2023-04-04 ENCOUNTER — OFFICE VISIT (OUTPATIENT)
Dept: ORTHOPEDIC SURGERY | Age: 43
End: 2023-04-04

## 2023-04-04 VITALS — BODY MASS INDEX: 38.64 KG/M2 | HEIGHT: 65 IN | WEIGHT: 231.92 LBS

## 2023-04-04 DIAGNOSIS — G56.21 ULNAR NEURITIS, RIGHT: Primary | ICD-10-CM

## 2023-04-04 RX ORDER — LIDOCAINE HYDROCHLORIDE 10 MG/ML
5 INJECTION, SOLUTION INFILTRATION; PERINEURAL ONCE
Status: COMPLETED | OUTPATIENT
Start: 2023-04-04 | End: 2023-04-04

## 2023-04-04 RX ADMIN — LIDOCAINE HYDROCHLORIDE 5 ML: 10 INJECTION, SOLUTION INFILTRATION; PERINEURAL at 15:41

## 2023-04-06 ENCOUNTER — OFFICE VISIT (OUTPATIENT)
Dept: BARIATRICS/WEIGHT MGMT | Age: 43
End: 2023-04-06
Payer: COMMERCIAL

## 2023-04-06 VITALS
OXYGEN SATURATION: 97 % | HEART RATE: 58 BPM | RESPIRATION RATE: 18 BRPM | SYSTOLIC BLOOD PRESSURE: 112 MMHG | BODY MASS INDEX: 39.5 KG/M2 | WEIGHT: 231.4 LBS | DIASTOLIC BLOOD PRESSURE: 68 MMHG | HEIGHT: 64 IN

## 2023-04-06 DIAGNOSIS — E66.9 DIABETES MELLITUS TYPE 2 IN OBESE (HCC): ICD-10-CM

## 2023-04-06 DIAGNOSIS — E78.2 MIXED HYPERLIPIDEMIA: ICD-10-CM

## 2023-04-06 DIAGNOSIS — E66.01 MORBID OBESITY WITH BMI OF 40.0-44.9, ADULT (HCC): ICD-10-CM

## 2023-04-06 DIAGNOSIS — G47.33 OSA (OBSTRUCTIVE SLEEP APNEA): Primary | Chronic | ICD-10-CM

## 2023-04-06 DIAGNOSIS — I10 ESSENTIAL HYPERTENSION: Chronic | ICD-10-CM

## 2023-04-06 DIAGNOSIS — K21.9 CHRONIC GERD: ICD-10-CM

## 2023-04-06 DIAGNOSIS — E11.69 DIABETES MELLITUS TYPE 2 IN OBESE (HCC): ICD-10-CM

## 2023-04-06 PROBLEM — Z01.818 PREOPERATIVE CLEARANCE: Status: RESOLVED | Noted: 2023-03-07 | Resolved: 2023-04-06

## 2023-04-06 PROCEDURE — 1036F TOBACCO NON-USER: CPT | Performed by: SURGERY

## 2023-04-06 PROCEDURE — 3074F SYST BP LT 130 MM HG: CPT | Performed by: SURGERY

## 2023-04-06 PROCEDURE — G8417 CALC BMI ABV UP PARAM F/U: HCPCS | Performed by: SURGERY

## 2023-04-06 PROCEDURE — G8427 DOCREV CUR MEDS BY ELIG CLIN: HCPCS | Performed by: SURGERY

## 2023-04-06 PROCEDURE — 3044F HG A1C LEVEL LT 7.0%: CPT | Performed by: SURGERY

## 2023-04-06 PROCEDURE — 99214 OFFICE O/P EST MOD 30 MIN: CPT | Performed by: SURGERY

## 2023-04-06 PROCEDURE — 3078F DIAST BP <80 MM HG: CPT | Performed by: SURGERY

## 2023-04-06 PROCEDURE — 2022F DILAT RTA XM EVC RTNOPTHY: CPT | Performed by: SURGERY

## 2023-04-06 NOTE — PATIENT INSTRUCTIONS
Patient received dietary handouts and education.       Plan/Recommendations:   - Continue current eating patterns including protein and plants with all meals and snacks  - Switch to decaf coffee  - Measure and decrease portions of high fat foods - 1/4 avocado, 1-2 Tbsp PB and olive oil

## 2023-04-06 NOTE — PROGRESS NOTES
Deatrice Persons lost 3.4 lbs over 1 month. Pt is allergic to cinnamon. Wake up 6-7 AM  Breakfast: 8 AM: Banana + honey bunches of oats w/ unsweetened almond milk  Snack: 11 AM: PB crackers OR PB + celery OR cucumbers + avocado  OR None  Lunch: 12-2 PM: 1/2 sandwich w/ cheese/ avocado/mushroom/spinach   Snack: Handful Granola OR none  Dinner: 6 PM: Baked chix w/ roasted potatoes/olives/kale   Snack: None  Bed around 9 PM, wearing BiPap    Is pt consuming smaller portions? Reading labels, choosing low sugar items, switched to monkfruit/splenda sweetener    Is pt consuming at least 64 oz of fluids per day?  3-4 bottles  water    Is pt consuming carbonated, caffeinated, or sugary beverages? iced coffee w/ sf flavors 1-2X/week    Has pt sampled Unjury and/or Nectar protein? Yes likes strawberry sorbet, lemonade made w/ water     Has patient attended a support group?  Scheduled  5/22 zoom    Exercise: Line dancing class today, and stationary bike at home not using yet    Plan/Recommendations:   - Continue current eating patterns including protein and plants with all meals and snacks  - Switch to decaf coffee  - Measure and decrease portions of high fat foods - 1/4 avocado, 1-2 Tbsp PB and olive oil    Handouts: LMN sample    Linda Mariscal, RD, LD
and/or labs ordered today.

## 2023-04-07 ENCOUNTER — TELEPHONE (OUTPATIENT)
Dept: CARDIOLOGY CLINIC | Age: 43
End: 2023-04-07

## 2023-04-07 NOTE — TELEPHONE ENCOUNTER
Pt called to request that dkw write her a letter of medical necessarily, for weight lost surgery. Please fax the letter to:      Moris Hussein  Phone:  [phone]  Fax:  [phone]    Please advise

## 2023-04-09 NOTE — PROGRESS NOTES
4/4/23 2:14 PM    Dextrose Injection 5%      NDC: 5265-9052-06    Lot Number: [id number]    Body Part: R elbow
Cari Celaya MD.      Disclaimer: \"This note was dictated with voice recognition software. Though review and correction are routine, we apologize for any errors. \"

## 2023-04-13 PROBLEM — E66.812 CLASS 2 OBESITY IN ADULT: Status: ACTIVE | Noted: 2023-04-13

## 2023-04-13 PROBLEM — E66.9 CLASS 2 OBESITY IN ADULT: Status: ACTIVE | Noted: 2023-04-13

## 2023-04-13 PROBLEM — I47.10 SVT (SUPRAVENTRICULAR TACHYCARDIA): Status: ACTIVE | Noted: 2023-04-13

## 2023-04-13 PROBLEM — I47.1 SVT (SUPRAVENTRICULAR TACHYCARDIA) (HCC): Status: ACTIVE | Noted: 2023-04-13

## 2023-04-19 ENCOUNTER — OFFICE VISIT (OUTPATIENT)
Dept: CARDIOLOGY CLINIC | Age: 43
End: 2023-04-19
Payer: COMMERCIAL

## 2023-04-19 VITALS
BODY MASS INDEX: 39.06 KG/M2 | WEIGHT: 228.8 LBS | SYSTOLIC BLOOD PRESSURE: 122 MMHG | OXYGEN SATURATION: 96 % | HEIGHT: 64 IN | DIASTOLIC BLOOD PRESSURE: 80 MMHG | HEART RATE: 48 BPM

## 2023-04-19 DIAGNOSIS — I10 ESSENTIAL HYPERTENSION: Primary | Chronic | ICD-10-CM

## 2023-04-19 DIAGNOSIS — I47.1 SVT (SUPRAVENTRICULAR TACHYCARDIA) (HCC): ICD-10-CM

## 2023-04-19 DIAGNOSIS — E78.2 MIXED HYPERLIPIDEMIA: ICD-10-CM

## 2023-04-19 DIAGNOSIS — G47.33 OSA TREATED WITH BIPAP: ICD-10-CM

## 2023-04-19 DIAGNOSIS — E66.01 CLASS 2 SEVERE OBESITY WITH SERIOUS COMORBIDITY AND BODY MASS INDEX (BMI) OF 39.0 TO 39.9 IN ADULT, UNSPECIFIED OBESITY TYPE (HCC): ICD-10-CM

## 2023-04-19 PROCEDURE — 99205 OFFICE O/P NEW HI 60 MIN: CPT | Performed by: INTERNAL MEDICINE

## 2023-04-19 PROCEDURE — G8417 CALC BMI ABV UP PARAM F/U: HCPCS | Performed by: INTERNAL MEDICINE

## 2023-04-19 PROCEDURE — G8427 DOCREV CUR MEDS BY ELIG CLIN: HCPCS | Performed by: INTERNAL MEDICINE

## 2023-04-19 PROCEDURE — 3074F SYST BP LT 130 MM HG: CPT | Performed by: INTERNAL MEDICINE

## 2023-04-19 PROCEDURE — 3079F DIAST BP 80-89 MM HG: CPT | Performed by: INTERNAL MEDICINE

## 2023-04-19 PROCEDURE — 1036F TOBACCO NON-USER: CPT | Performed by: INTERNAL MEDICINE

## 2023-04-19 PROCEDURE — 93000 ELECTROCARDIOGRAM COMPLETE: CPT | Performed by: INTERNAL MEDICINE

## 2023-04-19 NOTE — PATIENT INSTRUCTIONS
ABLATION INFORMATION    Our  will be contacting you with a date and time for your procedure    The morning of your ablation you will need to check in at the registration desk in the main lobby. PRE-PROCEDURE INSTRUCTIONS -   -Do not eat or drink anything after midnight the night before your ablation.  -You will need to have a responsible adult to drive you home.  -Your nurse will provide you with discharge instructions.  -You will need to hold your toprol  for 2 days before procedure.   -You may take all of your other medications with a sip of water. You will be scheduled for a 6-8 week follow up with cardiology. This will be done prior to your discharge instructions. If you have any questions regarding the procedure itself or medications, please call [phone] and ask to speak to an EP nurse.

## 2023-04-22 ASSESSMENT — SLEEP AND FATIGUE QUESTIONNAIRES
HOW LIKELY ARE YOU TO NOD OFF OR FALL ASLEEP WHILE LYING DOWN TO REST IN THE AFTERNOON WHEN CIRCUMSTANCES PERMIT: HIGH CHANCE OF DOZING
HOW LIKELY ARE YOU TO NOD OFF OR FALL ASLEEP WHILE SITTING AND READING: HIGH CHANCE OF DOZING
HOW LIKELY ARE YOU TO NOD OFF OR FALL ASLEEP IN A CAR, WHILE STOPPED FOR A FEW MINUTES IN TRAFFIC: SLIGHT CHANCE OF DOZING
HOW LIKELY ARE YOU TO NOD OFF OR FALL ASLEEP WHILE WATCHING TV: 1
HOW LIKELY ARE YOU TO NOD OFF OR FALL ASLEEP WHILE SITTING INACTIVE IN A PUBLIC PLACE: SLIGHT CHANCE OF DOZING
HOW LIKELY ARE YOU TO NOD OFF OR FALL ASLEEP WHILE WATCHING TV: SLIGHT CHANCE OF DOZING
HOW LIKELY ARE YOU TO NOD OFF OR FALL ASLEEP WHILE SITTING AND TALKING TO SOMEONE: WOULD NEVER DOZE
HOW LIKELY ARE YOU TO NOD OFF OR FALL ASLEEP WHILE SITTING QUIETLY AFTER LUNCH WITHOUT ALCOHOL: MODERATE CHANCE OF DOZING
HOW LIKELY ARE YOU TO NOD OFF OR FALL ASLEEP WHEN YOU ARE A PASSENGER IN A CAR FOR AN HOUR WITHOUT A BREAK: HIGH CHANCE OF DOZING

## 2023-04-24 ASSESSMENT — SLEEP AND FATIGUE QUESTIONNAIRES
HOW LIKELY ARE YOU TO NOD OFF OR FALL ASLEEP IN A CAR, WHILE STOPPED FOR A FEW MINUTES IN TRAFFIC: 1
HOW LIKELY ARE YOU TO NOD OFF OR FALL ASLEEP WHILE SITTING INACTIVE IN A PUBLIC PLACE: 1
HOW LIKELY ARE YOU TO NOD OFF OR FALL ASLEEP WHILE WATCHING TV: 1
HOW LIKELY ARE YOU TO NOD OFF OR FALL ASLEEP WHEN YOU ARE A PASSENGER IN A CAR FOR AN HOUR WITHOUT A BREAK: 3
HOW LIKELY ARE YOU TO NOD OFF OR FALL ASLEEP WHILE SITTING AND TALKING TO SOMEONE: 0
HOW LIKELY ARE YOU TO NOD OFF OR FALL ASLEEP WHILE SITTING QUIETLY AFTER LUNCH WITHOUT ALCOHOL: 2
ESS TOTAL SCORE: 14
HOW LIKELY ARE YOU TO NOD OFF OR FALL ASLEEP WHILE SITTING AND READING: 3
HOW LIKELY ARE YOU TO NOD OFF OR FALL ASLEEP WHILE LYING DOWN TO REST IN THE AFTERNOON WHEN CIRCUMSTANCES PERMIT: 3

## 2023-04-25 ENCOUNTER — TELEMEDICINE (OUTPATIENT)
Dept: PULMONOLOGY | Age: 43
End: 2023-04-25
Payer: COMMERCIAL

## 2023-04-25 ENCOUNTER — TELEPHONE (OUTPATIENT)
Dept: CARDIOLOGY CLINIC | Age: 43
End: 2023-04-25

## 2023-04-25 DIAGNOSIS — G47.33 OSA TREATED WITH BIPAP: Primary | ICD-10-CM

## 2023-04-25 DIAGNOSIS — I47.1 SVT (SUPRAVENTRICULAR TACHYCARDIA) (HCC): ICD-10-CM

## 2023-04-25 DIAGNOSIS — E11.9 TYPE 2 DIABETES MELLITUS WITHOUT COMPLICATION, WITHOUT LONG-TERM CURRENT USE OF INSULIN (HCC): Chronic | ICD-10-CM

## 2023-04-25 DIAGNOSIS — J44.9 COPD WITH ASTHMA (HCC): Chronic | ICD-10-CM

## 2023-04-25 DIAGNOSIS — I10 ESSENTIAL HYPERTENSION: Chronic | ICD-10-CM

## 2023-04-25 DIAGNOSIS — E66.01 CLASS 2 SEVERE OBESITY WITH SERIOUS COMORBIDITY AND BODY MASS INDEX (BMI) OF 39.0 TO 39.9 IN ADULT, UNSPECIFIED OBESITY TYPE (HCC): ICD-10-CM

## 2023-04-25 PROCEDURE — G8427 DOCREV CUR MEDS BY ELIG CLIN: HCPCS | Performed by: NURSE PRACTITIONER

## 2023-04-25 PROCEDURE — 2022F DILAT RTA XM EVC RTNOPTHY: CPT | Performed by: NURSE PRACTITIONER

## 2023-04-25 PROCEDURE — 99214 OFFICE O/P EST MOD 30 MIN: CPT | Performed by: NURSE PRACTITIONER

## 2023-04-25 PROCEDURE — 3044F HG A1C LEVEL LT 7.0%: CPT | Performed by: NURSE PRACTITIONER

## 2023-04-25 ASSESSMENT — SLEEP AND FATIGUE QUESTIONNAIRES
HOW LIKELY ARE YOU TO NOD OFF OR FALL ASLEEP WHILE SITTING INACTIVE IN A PUBLIC PLACE: 1
HOW LIKELY ARE YOU TO NOD OFF OR FALL ASLEEP WHILE SITTING AND TALKING TO SOMEONE: 0
HOW LIKELY ARE YOU TO NOD OFF OR FALL ASLEEP WHILE LYING DOWN TO REST IN THE AFTERNOON WHEN CIRCUMSTANCES PERMIT: 3
HOW LIKELY ARE YOU TO NOD OFF OR FALL ASLEEP WHILE SITTING AND READING: 3
HOW LIKELY ARE YOU TO NOD OFF OR FALL ASLEEP WHILE SITTING QUIETLY AFTER LUNCH WITHOUT ALCOHOL: 2
ESS TOTAL SCORE: 14
HOW LIKELY ARE YOU TO NOD OFF OR FALL ASLEEP WHEN YOU ARE A PASSENGER IN A CAR FOR AN HOUR WITHOUT A BREAK: 3
HOW LIKELY ARE YOU TO NOD OFF OR FALL ASLEEP WHILE WATCHING TV: 1
HOW LIKELY ARE YOU TO NOD OFF OR FALL ASLEEP IN A CAR, WHILE STOPPED FOR A FEW MINUTES IN TRAFFIC: 1

## 2023-04-25 NOTE — ASSESSMENT & PLAN NOTE
Chronic-Stable: Reviewed and analyzed results of physiologic download from patient's machine and reviewed with patient. Supplies and parts as needed for her machine. These are medically necessary. Limit caffeine use after 3pm. Based on the analyzed data will change following settings: EPAP min increased to 17. Changes sent via machine modem. Will trial pressure increase for nocturia and daytime symptoms. Discussed if no improvement will need to consider an in lab titration. Discussed absolutely no driving when drowsy. Encouraged her to notify her surgeon and anesthesiologists of sleep apnea diagnosis and take her machine with her for upcoming surgeries. Will see her back in 8 weeks. Encouraged her to contact the office with any questions or concerns.

## 2023-04-25 NOTE — PROGRESS NOTES
Constantino Zapata CNP  Kindra Crookamy PETERSON [address] [facility], [address] [facility]  P- [phone]   [address] [facility]  [address], [address] [place] [address] [phone]     Video Visit- Follow up      Assessment/Plan:      1. AMOS treated with BiPAP  Assessment & Plan:   Chronic-Stable: Reviewed and analyzed results of physiologic download from patient's machine and reviewed with patient. Supplies and parts as needed for her machine. These are medically necessary. Limit caffeine use after 3pm. Based on the analyzed data will change following settings: EPAP min increased to 17. Changes sent via machine modem. Will trial pressure increase for nocturia and daytime symptoms. Discussed if no improvement will need to consider an in lab titration. Discussed absolutely no driving when drowsy. Encouraged her to notify her surgeon and anesthesiologists of sleep apnea diagnosis and take her machine with her for upcoming surgeries. Will see her back in 8 weeks. Encouraged her to contact the office with any questions or concerns. 2. SVT (supraventricular tachycardia) (HCC)  Assessment & Plan:  Chronic- Stable. Discussed the importance of treating obstructive sleep apnea as part of the management of this disorder. Cont any meds per PCP and other physicians. Planning for cardiac ablation. 3. Essential hypertension  Assessment & Plan:   Chronic- Stable. Discussed the importance of treating obstructive sleep apnea as part of the management of this disorder. Cont any meds per PCP and other physicians. 4. 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[facility] [address]  Assessment & Plan:  Chronic- Stable. Discussed the importance of treating obstructive sleep apnea as part of the management of this disorder. Cont any meds per PCP and other physicians. 5. Type 2 diabetes mellitus without complication, without long-term current use of insulin (HCC)  Assessment & Plan:  Chronic- Stable.   Discussed the importance

## 2023-04-25 NOTE — TELEPHONE ENCOUNTER
It takes 5-7 business days for our  to contact her.  I will forward to Betsey but please let her know

## 2023-04-25 NOTE — TELEPHONE ENCOUNTER
Pt is asking if we are getting close to scheduling her for her Ablation. Wants to know if we need anything from her. She was told it would be a couple days and now that it has passed that is why she is asking. Please advise.

## 2023-05-02 ENCOUNTER — OFFICE VISIT (OUTPATIENT)
Dept: ORTHOPEDIC SURGERY | Age: 43
End: 2023-05-02

## 2023-05-02 VITALS — HEIGHT: 64 IN | BODY MASS INDEX: 39.07 KG/M2 | WEIGHT: 228.84 LBS

## 2023-05-02 DIAGNOSIS — G56.22 ULNAR NEURITIS, LEFT: Primary | ICD-10-CM

## 2023-05-02 DIAGNOSIS — G56.21 ULNAR NEURITIS, RIGHT: ICD-10-CM

## 2023-05-04 ENCOUNTER — OFFICE VISIT (OUTPATIENT)
Dept: BARIATRICS/WEIGHT MGMT | Age: 43
End: 2023-05-04
Payer: COMMERCIAL

## 2023-05-04 VITALS
HEART RATE: 61 BPM | DIASTOLIC BLOOD PRESSURE: 70 MMHG | WEIGHT: 228.8 LBS | BODY MASS INDEX: 38.12 KG/M2 | RESPIRATION RATE: 18 BRPM | HEIGHT: 65 IN | OXYGEN SATURATION: 95 % | SYSTOLIC BLOOD PRESSURE: 115 MMHG

## 2023-05-04 DIAGNOSIS — E66.01 MORBID OBESITY WITH BMI OF 40.0-44.9, ADULT (HCC): ICD-10-CM

## 2023-05-04 DIAGNOSIS — I10 ESSENTIAL HYPERTENSION: Chronic | ICD-10-CM

## 2023-05-04 DIAGNOSIS — E78.2 MIXED HYPERLIPIDEMIA: ICD-10-CM

## 2023-05-04 DIAGNOSIS — K21.9 CHRONIC GERD: ICD-10-CM

## 2023-05-04 DIAGNOSIS — E11.69 DIABETES MELLITUS TYPE 2 IN OBESE (HCC): ICD-10-CM

## 2023-05-04 DIAGNOSIS — E66.9 DIABETES MELLITUS TYPE 2 IN OBESE (HCC): ICD-10-CM

## 2023-05-04 DIAGNOSIS — E78.01 FAMILIAL HYPERCHOLESTEROLEMIA: ICD-10-CM

## 2023-05-04 DIAGNOSIS — E11.9 TYPE 2 DIABETES MELLITUS WITHOUT COMPLICATION, WITHOUT LONG-TERM CURRENT USE OF INSULIN (HCC): Primary | Chronic | ICD-10-CM

## 2023-05-04 DIAGNOSIS — G47.33 OSA TREATED WITH BIPAP: ICD-10-CM

## 2023-05-04 PROCEDURE — 3078F DIAST BP <80 MM HG: CPT | Performed by: SURGERY

## 2023-05-04 PROCEDURE — 1036F TOBACCO NON-USER: CPT | Performed by: SURGERY

## 2023-05-04 PROCEDURE — 2022F DILAT RTA XM EVC RTNOPTHY: CPT | Performed by: SURGERY

## 2023-05-04 PROCEDURE — 3044F HG A1C LEVEL LT 7.0%: CPT | Performed by: SURGERY

## 2023-05-04 PROCEDURE — G8427 DOCREV CUR MEDS BY ELIG CLIN: HCPCS | Performed by: SURGERY

## 2023-05-04 PROCEDURE — G8417 CALC BMI ABV UP PARAM F/U: HCPCS | Performed by: SURGERY

## 2023-05-04 PROCEDURE — 3074F SYST BP LT 130 MM HG: CPT | Performed by: SURGERY

## 2023-05-04 PROCEDURE — 99214 OFFICE O/P EST MOD 30 MIN: CPT | Performed by: SURGERY

## 2023-05-05 NOTE — PROGRESS NOTES
Link Duvall lost 3.4 lbs over 1 month. Pt is allergic to cinnamon. Wake up 6-7 AM  Breakfast: 8 AM: protein shake and banana  Lunch: 12-2 PM: arbys roast beef (no bun)  usually with a side of veggies  Snack: handful protein granola and pack of PB crax  Dinner: 6 PM:   kale with veggie pasta and walnuts  Snack: None  Bed around 9 PM, wearing BiPap    Is pt consuming smaller portions? Reading labels, choosing low sugar items, switched to monkfruit/splenda sweetener    Is pt consuming at least 64 oz of fluids per day?  4 bottles  water    Is pt consuming carbonated, caffeinated, or sugary beverages? iced coffee w/ sf flavors 1-2X/week  Hot tea with honey tea. Has pt sampled Unjury and/or Nectar protein? Yes multiple flavors    Has patient attended a support group? Scheduled  5/22 zoom    Exercise: stationary bike at home not using.  Pt has ankle pain which limits mobility    Plan/Recommendations:   - Continue current eating patterns including protein and plants with all meals and snacks  - Switch to decaf coffee    Handouts: no    Linn Yang, MS, RD, LD
underwent extensive dietary counseling with registered dietician. I have reviewed, discussed and agree with the dietary plan. Patient is trying hard to keep good dietary and behavior modifications. Patient is monitoring portion sizes, food choices and liquid calories. Patient is trying to exercise regularly as much as possible. Obesity as a disease is considered a high risk to patients overall health and should therefore be considered a high risk disease state. Advised the patient that not getting there weight under control, that could increase risk of complications/worsening of those conditions on the long-term. (Goal of weight loss surgery is to alleviate/control some of those co-morbidities)    Now with Covid-19 pandemic, CDC and health authorities does classify obese patients as vulnerable and high risk as well. Which makes weight loss a priority for improvement of their wellbeing and overall health. I encouraged the patient to continue exercise and keeping healthy eating habits. Discussed pre-op labs and work up till now. Also counseled the patient extensively on Surgery. I did explain thoroughly to the patient that compliance with pre- and post op diet and other recommendations are integral part to improve the chances of successful weight loss and also not following it could end with serious health complications. Some strategies discussed today include but not limited to : 30/30/30 minutes rule, food diary, avoid fast food and packing/planing ahead, & increasing exercise. Also stressed to the patient importance of taking the multivitamins as instructed, otherwise risk significant complications.       The visit today, included any number of the following: Bariatric Preoperative work up/protocols, review of labs, imaging, provider notes, outside hospital records, performing examination/evaluation, counseling patient and/or family, ordering medications/tests, placing referrals and

## 2023-05-06 NOTE — PROGRESS NOTES
Chief Complaint:   Chief Complaint   Patient presents with    Elbow Pain     bilat, R elbow doing well, about 80% improved since ulnar nerve hydro, L elbow is still hurting, only about 20% improved at this point, pain with reaching out and turning steering wheel, doing nerve glides daily          History of Present Illness:       Patient is a 43 y.o. female returns follow up for the above complaint. The patient was last seen approximately 1 monthsago. The symptoms are improving since the last visit. The patient has had no further testing for the problem.       Status post ultrasound-guided ulnar nerve hydrodissection right 4/4/2023 and left 3/21/2023    She estimates 80% improvement on the right and diminishing improvement but overall increased at 25% on the left    Pain levels 2/10 on the right 8/10 on the left    Symptoms continue follow in ulnar nerve distribution 1 symptomatic    She continues on ulnar nerve glide exercises     Past Medical History:        Past Medical History:   Diagnosis Date    Arthritis     Bipolar 1 disorder (HCC)     COPD (chronic obstructive pulmonary disease) ([facility]     Depression     Diabetes mellitus ([facility]     Femoroacetabular impingement of right hip 08/09/2022    Hyperlipidemia     Hypertension     Sleep apnea     uses CPAP    Tachycardia     Tachycardia     Tear of right gluteus medius tendon 08/09/2022        Present Medications:         Current Outpatient Medications   Medication Sig Dispense Refill    Cholecalciferol (VITAMIN D3) 50 MCG (2000 UT) CAPS TAKE 1 CAPSULE BY MOUTH EVERY DAY 90 capsule 0    ezetimibe (ZETIA) 10 MG tablet Take 1 tablet by mouth daily 90 tablet 1    omeprazole (PRILOSEC) 20 MG delayed release capsule TAKE 1 CAPSULE BY MOUTH EVERY DAY 90 capsule 1    rosuvastatin (CRESTOR) 40 MG tablet TAKE 1 TABLET BY MOUTH EVERY DAY 90 tablet 1    metFORMIN (GLUCOPHAGE-XR) 500 MG extended release tablet Take 2 tablets by mouth daily (with breakfast) 180 tablet 1

## 2023-05-09 ENCOUNTER — OFFICE VISIT (OUTPATIENT)
Dept: FAMILY MEDICINE CLINIC | Age: 43
End: 2023-05-09
Payer: COMMERCIAL

## 2023-05-09 VITALS
RESPIRATION RATE: 18 BRPM | DIASTOLIC BLOOD PRESSURE: 70 MMHG | HEART RATE: 61 BPM | WEIGHT: 233 LBS | BODY MASS INDEX: 38.82 KG/M2 | OXYGEN SATURATION: 98 % | HEIGHT: 65 IN | TEMPERATURE: 98.1 F | SYSTOLIC BLOOD PRESSURE: 130 MMHG

## 2023-05-09 DIAGNOSIS — F10.21 HISTORY OF ALCOHOLISM (HCC): ICD-10-CM

## 2023-05-09 DIAGNOSIS — G89.29 CHRONIC PAIN OF LEFT ANKLE: ICD-10-CM

## 2023-05-09 DIAGNOSIS — E78.2 MIXED HYPERLIPIDEMIA: ICD-10-CM

## 2023-05-09 DIAGNOSIS — R00.0 CHRONIC TACHYCARDIA: ICD-10-CM

## 2023-05-09 DIAGNOSIS — M25.572 CHRONIC PAIN OF LEFT ANKLE: ICD-10-CM

## 2023-05-09 DIAGNOSIS — E11.9 TYPE 2 DIABETES MELLITUS WITHOUT COMPLICATION, WITHOUT LONG-TERM CURRENT USE OF INSULIN (HCC): ICD-10-CM

## 2023-05-09 DIAGNOSIS — F31.9 BIPOLAR 1 DISORDER (HCC): ICD-10-CM

## 2023-05-09 DIAGNOSIS — J44.9 COPD WITH ASTHMA (HCC): ICD-10-CM

## 2023-05-09 DIAGNOSIS — K21.9 GASTROESOPHAGEAL REFLUX DISEASE, UNSPECIFIED WHETHER ESOPHAGITIS PRESENT: ICD-10-CM

## 2023-05-09 DIAGNOSIS — Z01.818 PREOP EXAMINATION: Primary | ICD-10-CM

## 2023-05-09 PROBLEM — E88.89 HOFFA'S SYNDROME (HCC): Status: ACTIVE | Noted: 2023-05-09

## 2023-05-09 PROCEDURE — G8417 CALC BMI ABV UP PARAM F/U: HCPCS | Performed by: FAMILY MEDICINE

## 2023-05-09 PROCEDURE — 99215 OFFICE O/P EST HI 40 MIN: CPT | Performed by: FAMILY MEDICINE

## 2023-05-09 PROCEDURE — 2022F DILAT RTA XM EVC RTNOPTHY: CPT | Performed by: FAMILY MEDICINE

## 2023-05-09 PROCEDURE — 3074F SYST BP LT 130 MM HG: CPT | Performed by: FAMILY MEDICINE

## 2023-05-09 PROCEDURE — G8427 DOCREV CUR MEDS BY ELIG CLIN: HCPCS | Performed by: FAMILY MEDICINE

## 2023-05-09 PROCEDURE — 3023F SPIROM DOC REV: CPT | Performed by: FAMILY MEDICINE

## 2023-05-09 PROCEDURE — 3078F DIAST BP <80 MM HG: CPT | Performed by: FAMILY MEDICINE

## 2023-05-09 NOTE — PROGRESS NOTES
Preoperative Consultation      Nicolette Salas  YOB: 1980    Date of Service:  5/9/2023    Vitals:    05/09/23 1615   BP: 130/70   Pulse: 61   Resp: 18   Temp: 98.1 °F (36.7 °C)   SpO2: 98%   Weight: 233 lb (105.7 kg)   Height: 5' 5\" (1.651 m)      Wt Readings from Last 2 Encounters:   05/09/23 233 lb (105.7 kg)   05/04/23 228 lb 12.8 oz (103.8 kg)     BP Readings from Last 3 Encounters:   05/09/23 130/70   05/04/23 115/70   04/19/23 122/80        Chief Complaint   Patient presents with    Pre-op Exam     05/26/2023     Allergies   Allergen Reactions    Adhesive Tape Rash    Cinnamon Itching    Codeine Hives and Itching     No outpatient medications have been marked as taking for the 5/9/23 encounter (Office Visit) with Mony Manzanares MD.       This patient presents to the office today for a preoperative consultation at the request of surgeon, Dr. Allyson Nino, who plans on performing left ankle fusion on May 26 at [facility] outpatient surgery. The current problem began 1 year ago, and symptoms have been worsening with time. Conservative therapy: N/A.     Planned anesthesia: General   Known anesthesia problems: None   Bleeding risk: No recent or remote history of abnormal bleeding  Personal or FH of DVT/PE: No    Patient objection to receiving blood products: No    Patient Active Problem List   Diagnosis    AMOS treated with BiPAP    Type 2 diabetes mellitus without complication, without long-term current use of insulin (HCC)    Essential hypertension    Familial hypercholesterolemia    History of fibromyalgia    Positive MATTHEW (antinuclear antibody)    CS (cervical spondylosis)    Cervical discogenic pain syndrome    DDD (degenerative disc disease), cervical    Herniated cervical disc without myelopathy    Bipolar disorder (HCC)    Fibromyalgia    Mixed anxiety and depressive disorder    Morbid obesity with BMI of 40.0-44.9, adult ([address]    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[facility]    Chronic GERD    Chronic tachycardia

## 2023-05-12 ENCOUNTER — ANESTHESIA EVENT (OUTPATIENT)
Dept: ENDOSCOPY | Age: 43
End: 2023-05-12
Payer: COMMERCIAL

## 2023-05-12 ENCOUNTER — HOSPITAL ENCOUNTER (OUTPATIENT)
Age: 43
Setting detail: OUTPATIENT SURGERY
Discharge: HOME | End: 2023-05-12
Attending: SURGERY | Admitting: SURGERY
Payer: COMMERCIAL

## 2023-05-12 ENCOUNTER — ANESTHESIA (OUTPATIENT)
Dept: ENDOSCOPY | Age: 43
End: 2023-05-12
Payer: COMMERCIAL

## 2023-05-12 VITALS
RESPIRATION RATE: 18 BRPM | TEMPERATURE: 98 F | HEIGHT: 65 IN | BODY MASS INDEX: 37.82 KG/M2 | WEIGHT: 227 LBS | DIASTOLIC BLOOD PRESSURE: 71 MMHG | OXYGEN SATURATION: 99 % | SYSTOLIC BLOOD PRESSURE: 118 MMHG | HEART RATE: 71 BPM

## 2023-05-12 DIAGNOSIS — K21.9 GASTROESOPHAGEAL REFLUX DISEASE, UNSPECIFIED WHETHER ESOPHAGITIS PRESENT: ICD-10-CM

## 2023-05-12 LAB
GLUCOSE BLD-MCNC: 95 MG/DL (ref 70–99)
HCG UR QL: NEGATIVE
PERFORMED ON: NORMAL

## 2023-05-12 PROCEDURE — 88305 TISSUE EXAM BY PATHOLOGIST: CPT

## 2023-05-12 PROCEDURE — 43239 EGD BIOPSY SINGLE/MULTIPLE: CPT | Performed by: SURGERY

## 2023-05-12 PROCEDURE — 7100000011 HC PHASE II RECOVERY - ADDTL 15 MIN: Performed by: SURGERY

## 2023-05-12 PROCEDURE — 3700000000 HC ANESTHESIA ATTENDED CARE: Performed by: SURGERY

## 2023-05-12 PROCEDURE — 6360000002 HC RX W HCPCS: Performed by: NURSE ANESTHETIST, CERTIFIED REGISTERED

## 2023-05-12 PROCEDURE — 2709999900 HC NON-CHARGEABLE SUPPLY: Performed by: SURGERY

## 2023-05-12 PROCEDURE — 84703 CHORIONIC GONADOTROPIN ASSAY: CPT

## 2023-05-12 PROCEDURE — 3609012400 HC EGD TRANSORAL BIOPSY SINGLE/MULTIPLE: Performed by: SURGERY

## 2023-05-12 PROCEDURE — 7100000010 HC PHASE II RECOVERY - FIRST 15 MIN: Performed by: SURGERY

## 2023-05-12 PROCEDURE — 2580000003 HC RX 258: Performed by: ANESTHESIOLOGY

## 2023-05-12 PROCEDURE — 2500000003 HC RX 250 WO HCPCS: Performed by: NURSE ANESTHETIST, CERTIFIED REGISTERED

## 2023-05-12 RX ORDER — LIDOCAINE HYDROCHLORIDE 20 MG/ML
INJECTION, SOLUTION INFILTRATION; PERINEURAL PRN
Status: DISCONTINUED | OUTPATIENT
Start: 2023-05-12 | End: 2023-05-12 | Stop reason: SDUPTHER

## 2023-05-12 RX ORDER — SODIUM CHLORIDE 9 MG/ML
INJECTION, SOLUTION INTRAVENOUS CONTINUOUS
Status: DISCONTINUED | OUTPATIENT
Start: 2023-05-12 | End: 2023-05-12 | Stop reason: HOSPADM

## 2023-05-12 RX ORDER — PROPOFOL 10 MG/ML
INJECTION, EMULSION INTRAVENOUS PRN
Status: DISCONTINUED | OUTPATIENT
Start: 2023-05-12 | End: 2023-05-12 | Stop reason: SDUPTHER

## 2023-05-12 RX ADMIN — PROPOFOL 250 MG: 10 INJECTION, EMULSION INTRAVENOUS at 08:19

## 2023-05-12 RX ADMIN — LIDOCAINE HYDROCHLORIDE 100 MG: 20 INJECTION, SOLUTION INFILTRATION; PERINEURAL at 08:19

## 2023-05-12 RX ADMIN — SODIUM CHLORIDE: 9 INJECTION, SOLUTION INTRAVENOUS at 06:43

## 2023-05-12 ASSESSMENT — PAIN - FUNCTIONAL ASSESSMENT: PAIN_FUNCTIONAL_ASSESSMENT: 0-10

## 2023-05-12 ASSESSMENT — COPD QUESTIONNAIRES: CAT_SEVERITY: MODERATE

## 2023-05-12 NOTE — ANESTHESIA POSTPROCEDURE EVALUATION
Department of Anesthesiology  Postprocedure Note    Patient: Dariela Adam  MRN: [id number]  YOB: 1980  Date of evaluation: 5/12/2023      Procedure Summary     Date: 05/12/23 Room / Location: [address]    Anesthesia Start: 4544 Anesthesia Stop: 1473    Procedure: EGD BIOPSY (Abdomen) Diagnosis:       Gastroesophageal reflux disease, unspecified whether esophagitis present      (Gastroesophageal reflux disease, unspecified whether esophagitis present [K21.9])    Surgeons: Tereza Jones MD Responsible Provider: Lachelle Ridley MD    Anesthesia Type: MAC ASA Status: 3          Anesthesia Type: MAC    Kevin Phase I: Kevin Score: 10    Kevin Phase II: Kevin Score: 9      Anesthesia Post Evaluation    Patient location during evaluation: PACU  Patient participation: complete - patient participated  Level of consciousness: awake  Airway patency: patent  Nausea & Vomiting: no nausea and no vomiting  Complications: no  Cardiovascular status: blood pressure returned to baseline  Respiratory status: acceptable  Hydration status: stable

## 2023-05-12 NOTE — DISCHARGE INSTRUCTIONS
ENDOSCOPY DISCHARGE INSTRUCTIONS    You may experience some lightheadedness for the next several hours. Plan on quiet relaxation for the rest of today. A responsible adult needs to stay with you today. Because of the medications you received today-do not drive,operate machinery,or sign any contractual agreement for the next 24 hours. Do not drink any alcoholic beverages or take any unprescribed medications tonight. Eat bland food and avoid anything greasy or spicy initially-progress to your normal diet gradually. Diet restrictions as instructed. If you have any of the following problems, notify your physician or return to the hospital emergency room : fever, chills, excessive bleeding, excessive vomiting, difficulty swallowing, uncontrolled pain, increased abdominal distention, shortness of breath or any other problems. If you have a sore throat, you may use lozenges or salt water gargles. Please call Dr. Tanya Ross office in 5 business days for biopsy results [phone]. ANESTHESIA DISCHARGE INSTRUCTIONS    Wear your seatbelt home. You are under the influence of drugs-do not drink alcohol,drive,operate machinery,or make any important decisions or sign any legal documentsfor 24 hours  Children should not ride [employer] or play on gym sets  for 24 hours after surgery. A responsible adult needs to be with you for 24 hours. You may experience lightheadedness,dizziness,or sleepiness following surgery. Rest at home today- increase activity as tolerated. Progress slowly to a regular diet unless your physician has instructed you otherwise. Drink plenty of water. If nausea becomes a problem call your physician. Coughing,sore throat,and muscle aches are other side effects of anesthesia,and should improve with time. Do not drive,operate machinery while taking narcotics.

## 2023-05-12 NOTE — PROGRESS NOTES
Pt received into [facility] from [facility]. Phase II. Pt drowsy, yet oriented. Room air. Vss. Pt stable. Report obtained. Appears comfortable. Will monitor.

## 2023-05-12 NOTE — PROGRESS NOTES
D/c instructions given and signed. Verbalized understanding. D/c'd per w/c. Vss. Pt stable. Accomp by mother.

## 2023-05-12 NOTE — H&P
Comment: irregular due to IUD  SpO2 99%   BMI 37.77 kg/m²  I      Physical Exam   Vitals Reviewed   Constitutional: Patient is oriented to person, place, and time. Patient appears well-developed and well-nourished. Patient is active and cooperative. Non-toxic appearance. No distress. HENT:   Head: Normocephalic and atraumatic. Head is without abrasion and without laceration. Hair is normal.   Right Ear: External ear normal. No lacerations. No drainage, swelling . Left Ear: External ear normal. No lacerations. No drainage, swelling. Nose: Nose normal. No nose lacerations or nasal deformity. Eyes: Conjunctivae, EOM and lids are normal. Right eye exhibits no discharge. No foreign body present in the right eye. Left eye exhibits no discharge. No foreign body present in the left eye. No scleral icterus. Neck: Trachea normal and normal range of motion. No JVD present. Pulmonary/Chest: Effort normal. No accessory muscle usage or stridor. No apnea. No respiratory distress. Cardiovascular: Normal rate and no JVD. Abdominal: Normal appearance. Patient exhibits no distension. Musculoskeletal: Normal range of motion. Patient exhibits no edema. Neurological: Patient is alert and oriented to person, place, and time. Patient has normal strength. GCS eye subscore is 4. GCS verbal subscore is 5. GCS motor subscore is 6. Skin: Skin is warm and dry. No abrasion and no rash noted. Patient is not diaphoretic. No cyanosis or erythema. Psychiatric: Patient has a normal mood and affect.  Speech is normal and behavior is normal. Cognition and memory are normal.       DATA:  CBC:   Lab Results   Component Value Date/Time    WBC 7.9 03/13/2023 07:30 AM    RBC 4.13 03/13/2023 07:30 AM    HGB 11.7 03/13/2023 07:30 AM    HCT 36.0 03/13/2023 07:30 AM    MCV 87.3 03/13/2023 07:30 AM    MCH 28.4 03/13/2023 07:30 AM    MCHC 32.5 03/13/2023 07:30 AM    RDW 14.7 03/13/2023 07:30 AM     03/13/2023 07:30 AM    MPV 7.5

## 2023-05-12 NOTE — ANESTHESIA PRE PROCEDURE
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       Past Medical History:        Diagnosis Date    Arthritis     Bipolar 1 disorder ([facility]     COPD (chronic obstructive pulmonary disease) ([facility]     Depression     Diabetes mellitus ([facility]     Femoroacetabular impingement of right hip 08/09/2022    Hyperlipidemia     Hypertension     Sleep apnea     uses CPAP    Tachycardia     Tachycardia     Tear of right gluteus medius tendon 08/09/2022       Past Surgical History:        Procedure Laterality Date    ANKLE FUSION Bilateral     ARTHRODESIS Left 12/6/2019    REVISION OF TALONAVICULAR JOINT ARTHRODESIS LEFT FOOT  -SLEEP APNEA- performed by Lamberto Reynolds DPM at [facility] Bilateral     ELBOW SURGERY Bilateral     ulnar nerve release    HERNIA REPAIR      HIP ARTHROSCOPY Bilateral     HIP SURGERY Left 6/28/2022    LEFT HIP REVISION ARTHROSCOPY, LYSIS OF ADHESIONS, REMOVAL LOOSE BODY, SYNOVECTOMY, FEMOROACETABULAR IMPINGEMENT DECOMPRESSION AND LABRAL REPAIR, ABDUCTOR REPAIR, ENDOSCOPIC BURSECTOMY  - PENG BLOCK; LOCAL ([facility]) performed by Kalyani Coombs MD at [address] [place] [address] Right 8/9/2022    RIGHT HIP REVISION, ARTHROSCOPY, FEMOROACETABULAR IMPINGEMENT SURGERY, LABRAL REPAIR, ENDOSCOPIC,-BLOCK (PENG), LOCAL (ORTHOMIX)-ROSE AND NEPHEW-SHANIQUA performed by Kalyani Coombs MD at [facility] Bilateral        Social History:    Social History     Tobacco Use    Smoking status: Former     Packs/day: 1.00     Years: 27.00     Pack years: 27.00     Types: Cigarettes     Start date: 1992     Quit date:

## 2023-05-22 ENCOUNTER — TELEPHONE (OUTPATIENT)
Dept: CARDIOLOGY CLINIC | Age: 43
End: 2023-05-22

## 2023-05-22 NOTE — TELEPHONE ENCOUNTER
Pt is asking for clearance letter to go to Dr CL[facility]. Pt having sx this Friday 5/26/23.  Please fax to [phone]

## 2023-05-22 NOTE — TELEPHONE ENCOUNTER
Harjinder Rapp called to request a copy of the Pt EKG on 04/19  Please fax to:  [phone]. Pt is having surgery this week. Please send asap. Please advise.   Thank you

## 2023-05-23 NOTE — PROGRESS NOTES
Anupam Og    Age 43 y.o.    female    1980    MRN [id number]    5/26/2023  Arrival Time_____________  OR Time____________168 Min     Procedure(s):  TALONAVICULAR JOINT ARTHRODESIS LEFT FOOT NOTE: SCIATIC NERVE BLOCK                      General    Surgeon(s):  Della Kiran, [place]       Phone [phone] ([place])     [address]  Cell         Work  _____________________________________________________________________  _____________________________________________________________________  _____________________________________________________________________  _____________________________________________________________________  _____________________________________________________________________    PCP _____________________________ Phone_________________     H&P__________________Bringing      Chart            Epic   DOS      Called________  EKG__________________Bringing      Chart            Epic   DOS      Called________  LAB__________________ Bringing      Chart            Epic   DOS      Called________  Cardiac Clearance_______Bringing      Chart            Epic      DOS      Called________    Cardiologist________________________ Phone___________________________    ? Alevism concerns / Waiver on Chart            [employer]________________  ? Pre-op Instructions Given [place]          _________________________________  ? Directions to Surgery Center                          _________________________________  ? Transportation Home_______________      __________________________________  ?  Crutches/Walker__________________        __________________________________    ________Pre-op Orders   _______Transcribed    _______Wt.  ________Pharmacy          _______SCD  ______VTE     ______TED Ulus Glenna  _______  Surgery Consent    _______  Anesthesia Consent         COVID DATE______________LOCATION________________ RESULT__________

## 2023-05-23 NOTE — PROGRESS NOTES
Date and time of surgery :   5/26/23 at 1100           Arrival Time:  900     Bring Picture ID and insurance card. Please wear simple, loose fitting clothing to the hospital.   Patsy Alexis not bring valuables (money, credit cards, checkbooks, etc.)   Do not wear any makeup (including  eye makeup) and no nail polish or artificial nails on your fingers or toes. DO NOT wear any jewelry or piercings on day of surgery. All body piercing jewelry must be removed. If you have dentures, they will be removed before going to the OR; we will provide you a container. If you wear contact lenses or glasses, they will be removed; please bring a case for them. Shower the evening before or morning of surgery with antibacterial soap. Nothing to eat or drink after midnight the day before surgery. You may brush your teeth and gargle the morning of surgery. DO NOT SWALLOW WATER. Take your Metoprolol the morning of your surgery with a sip of water. Aspirin, Ibuprofen, Advil, Naproxen, Vitamin E and other Anti-inflammatory products and supplements should be stopped for 5 -7days before surgery or as directed by your physician. Last dose of Aspirin and Meloxicam 5/19/23. Do not smoke or drink any alcoholic beverages 24 hours prior to surgery. This includes NA Beer. Refrain from the usage of any recreational drugs, including non-prescribed prescription drugs. You MUST plan for a responsible adult to stay on site while you are here and take you home after your surgery. You will not be allowed to leave alone or drive yourself home. It is strongly suggested someone stay with you the first 24 hrs. Your surgery will be cancelled if you do not have a ride home. To help prevent infection, change your sheets the night before surgery. If you  have a Living Will and Durable Power of  for Healthcare, please bring in a copy. Notify your Surgeon if you develop any illness between now and time of surgery.  Cough, cold, fever, sore

## 2023-05-24 DIAGNOSIS — E78.2 MIXED HYPERLIPIDEMIA: ICD-10-CM

## 2023-05-24 RX ORDER — ROSUVASTATIN CALCIUM 40 MG/1
TABLET, COATED ORAL
Qty: 90 TABLET | Refills: 1 | OUTPATIENT
Start: 2023-05-24

## 2023-05-25 ENCOUNTER — ANESTHESIA EVENT (OUTPATIENT)
Dept: OPERATING ROOM | Age: 43
End: 2023-05-25
Payer: COMMERCIAL

## 2023-05-26 ENCOUNTER — HOSPITAL ENCOUNTER (OUTPATIENT)
Age: 43
Setting detail: OUTPATIENT SURGERY
Discharge: HOME | End: 2023-05-26
Attending: PODIATRIST | Admitting: PODIATRIST
Payer: COMMERCIAL

## 2023-05-26 ENCOUNTER — ANESTHESIA (OUTPATIENT)
Dept: OPERATING ROOM | Age: 43
End: 2023-05-26
Payer: COMMERCIAL

## 2023-05-26 VITALS
TEMPERATURE: 97.2 F | RESPIRATION RATE: 25 BRPM | HEIGHT: 65 IN | WEIGHT: 226 LBS | SYSTOLIC BLOOD PRESSURE: 118 MMHG | HEART RATE: 85 BPM | DIASTOLIC BLOOD PRESSURE: 72 MMHG | OXYGEN SATURATION: 100 % | BODY MASS INDEX: 37.65 KG/M2

## 2023-05-26 DIAGNOSIS — M19.072 PRIMARY OSTEOARTHRITIS OF LEFT FOOT: Primary | ICD-10-CM

## 2023-05-26 LAB
GLUCOSE BLD-MCNC: 104 MG/DL (ref 70–99)
GLUCOSE BLD-MCNC: 132 MG/DL (ref 70–99)
HCG UR QL: NEGATIVE
PERFORMED ON: ABNORMAL
PERFORMED ON: ABNORMAL

## 2023-05-26 PROCEDURE — 84703 CHORIONIC GONADOTROPIN ASSAY: CPT

## 2023-05-26 PROCEDURE — 2500000003 HC RX 250 WO HCPCS: Performed by: PODIATRIST

## 2023-05-26 PROCEDURE — 3700000001 HC ADD 15 MINUTES (ANESTHESIA): Performed by: PODIATRIST

## 2023-05-26 PROCEDURE — 7100000011 HC PHASE II RECOVERY - ADDTL 15 MIN: Performed by: PODIATRIST

## 2023-05-26 PROCEDURE — 3700000000 HC ANESTHESIA ATTENDED CARE: Performed by: PODIATRIST

## 2023-05-26 PROCEDURE — 2500000003 HC RX 250 WO HCPCS

## 2023-05-26 PROCEDURE — 2780000010 HC IMPLANT OTHER: Performed by: PODIATRIST

## 2023-05-26 PROCEDURE — 7100000000 HC PACU RECOVERY - FIRST 15 MIN: Performed by: PODIATRIST

## 2023-05-26 PROCEDURE — 2580000003 HC RX 258: Performed by: ANESTHESIOLOGY

## 2023-05-26 PROCEDURE — 64445 NJX AA&/STRD SCIATIC NRV IMG: CPT | Performed by: ANESTHESIOLOGY

## 2023-05-26 PROCEDURE — 7100000001 HC PACU RECOVERY - ADDTL 15 MIN: Performed by: PODIATRIST

## 2023-05-26 PROCEDURE — 3600000014 HC SURGERY LEVEL 4 ADDTL 15MIN: Performed by: PODIATRIST

## 2023-05-26 PROCEDURE — 6360000002 HC RX W HCPCS

## 2023-05-26 PROCEDURE — 2720000010 HC SURG SUPPLY STERILE: Performed by: PODIATRIST

## 2023-05-26 PROCEDURE — 2709999900 HC NON-CHARGEABLE SUPPLY: Performed by: PODIATRIST

## 2023-05-26 PROCEDURE — C1713 ANCHOR/SCREW BN/BN,TIS/BN: HCPCS | Performed by: PODIATRIST

## 2023-05-26 PROCEDURE — 3600000004 HC SURGERY LEVEL 4 BASE: Performed by: PODIATRIST

## 2023-05-26 PROCEDURE — A4217 STERILE WATER/SALINE, 500 ML: HCPCS | Performed by: PODIATRIST

## 2023-05-26 PROCEDURE — 7100000010 HC PHASE II RECOVERY - FIRST 15 MIN: Performed by: PODIATRIST

## 2023-05-26 PROCEDURE — 6360000002 HC RX W HCPCS: Performed by: PODIATRIST

## 2023-05-26 PROCEDURE — 2580000003 HC RX 258: Performed by: PODIATRIST

## 2023-05-26 DEVICE — GRAFT BONE TIB INJ 1.5CC ALLOGRFT AUGMENT: Type: IMPLANTABLE DEVICE | Site: FOOT | Status: FUNCTIONAL

## 2023-05-26 DEVICE — IMPLANTABLE DEVICE: Type: IMPLANTABLE DEVICE | Site: FOOT | Status: FUNCTIONAL

## 2023-05-26 DEVICE — THE DYNAFORCE™  MOTOBAND™ MAX IMPLANT SYSTEM IS A BONE PLATING SYSTEM INDICATED FOR STABILIZATION AND FIXATION OF FRESH FRACTURES, REVISION PROCEDURES, JOINT FUSION AND RECONSTRUCTION OF SMALL BONES OF THE HAND, FEET, WRIST, ANKLES, FINGERS AND TOES.  IT CONSIST OF PLATES AND SCREWS. CERTAIN PLATES IN THE SYSTEM ARE COMPATIBLE WITH THE MOTOCLIP/DYNAFORCE SUPERELASTIC FIXATION SYSTEM.
Type: IMPLANTABLE DEVICE | Site: FOOT | Status: FUNCTIONAL
Brand: DYNAFORCE MOTOBAND MAX

## 2023-05-26 RX ORDER — MIDAZOLAM HYDROCHLORIDE 1 MG/ML
INJECTION INTRAMUSCULAR; INTRAVENOUS
Status: DISCONTINUED
Start: 2023-05-26 | End: 2023-05-26 | Stop reason: HOSPADM

## 2023-05-26 RX ORDER — ONDANSETRON 2 MG/ML
4 INJECTION INTRAMUSCULAR; INTRAVENOUS EVERY 10 MIN PRN
Status: DISCONTINUED | OUTPATIENT
Start: 2023-05-26 | End: 2023-05-26 | Stop reason: HOSPADM

## 2023-05-26 RX ORDER — DIPHENHYDRAMINE HYDROCHLORIDE 50 MG/ML
INJECTION INTRAMUSCULAR; INTRAVENOUS PRN
Status: DISCONTINUED | OUTPATIENT
Start: 2023-05-26 | End: 2023-05-26 | Stop reason: SDUPTHER

## 2023-05-26 RX ORDER — SODIUM CHLORIDE 0.9 % (FLUSH) 0.9 %
5-40 SYRINGE (ML) INJECTION PRN
Status: DISCONTINUED | OUTPATIENT
Start: 2023-05-26 | End: 2023-05-26 | Stop reason: HOSPADM

## 2023-05-26 RX ORDER — MIDAZOLAM HYDROCHLORIDE 1 MG/ML
1 INJECTION INTRAMUSCULAR; INTRAVENOUS EVERY 5 MIN PRN
Status: DISCONTINUED | OUTPATIENT
Start: 2023-05-26 | End: 2023-05-26 | Stop reason: HOSPADM

## 2023-05-26 RX ORDER — SODIUM CHLORIDE 9 MG/ML
INJECTION, SOLUTION INTRAVENOUS PRN
Status: DISCONTINUED | OUTPATIENT
Start: 2023-05-26 | End: 2023-05-26 | Stop reason: HOSPADM

## 2023-05-26 RX ORDER — SODIUM CHLORIDE, SODIUM LACTATE, POTASSIUM CHLORIDE, CALCIUM CHLORIDE 600; 310; 30; 20 MG/100ML; MG/100ML; MG/100ML; MG/100ML
INJECTION, SOLUTION INTRAVENOUS CONTINUOUS
Status: DISCONTINUED | OUTPATIENT
Start: 2023-05-26 | End: 2023-05-26 | Stop reason: HOSPADM

## 2023-05-26 RX ORDER — MEPERIDINE HYDROCHLORIDE 50 MG/ML
12.5 INJECTION INTRAMUSCULAR; INTRAVENOUS; SUBCUTANEOUS EVERY 5 MIN PRN
Status: DISCONTINUED | OUTPATIENT
Start: 2023-05-26 | End: 2023-05-26 | Stop reason: HOSPADM

## 2023-05-26 RX ORDER — SODIUM CHLORIDE 0.9 % (FLUSH) 0.9 %
5-40 SYRINGE (ML) INJECTION EVERY 12 HOURS SCHEDULED
Status: DISCONTINUED | OUTPATIENT
Start: 2023-05-26 | End: 2023-05-26 | Stop reason: HOSPADM

## 2023-05-26 RX ORDER — MAGNESIUM HYDROXIDE 1200 MG/15ML
LIQUID ORAL CONTINUOUS PRN
Status: COMPLETED | OUTPATIENT
Start: 2023-05-26 | End: 2023-05-26

## 2023-05-26 RX ORDER — HYDRALAZINE HYDROCHLORIDE 20 MG/ML
5 INJECTION INTRAMUSCULAR; INTRAVENOUS
Status: DISCONTINUED | OUTPATIENT
Start: 2023-05-26 | End: 2023-05-26 | Stop reason: HOSPADM

## 2023-05-26 RX ORDER — PHENYLEPHRINE HCL IN 0.9% NACL 1 MG/10 ML
SYRINGE (ML) INTRAVENOUS PRN
Status: DISCONTINUED | OUTPATIENT
Start: 2023-05-26 | End: 2023-05-26 | Stop reason: SDUPTHER

## 2023-05-26 RX ORDER — PROPOFOL 10 MG/ML
INJECTION, EMULSION INTRAVENOUS PRN
Status: DISCONTINUED | OUTPATIENT
Start: 2023-05-26 | End: 2023-05-26 | Stop reason: SDUPTHER

## 2023-05-26 RX ORDER — LIDOCAINE HYDROCHLORIDE 10 MG/ML
0.3 INJECTION, SOLUTION EPIDURAL; INFILTRATION; INTRACAUDAL; PERINEURAL
Status: DISCONTINUED | OUTPATIENT
Start: 2023-05-26 | End: 2023-05-26 | Stop reason: HOSPADM

## 2023-05-26 RX ORDER — FENTANYL CITRATE 50 UG/ML
INJECTION, SOLUTION INTRAMUSCULAR; INTRAVENOUS PRN
Status: DISCONTINUED | OUTPATIENT
Start: 2023-05-26 | End: 2023-05-26 | Stop reason: SDUPTHER

## 2023-05-26 RX ORDER — ONDANSETRON 2 MG/ML
INJECTION INTRAMUSCULAR; INTRAVENOUS PRN
Status: DISCONTINUED | OUTPATIENT
Start: 2023-05-26 | End: 2023-05-26 | Stop reason: SDUPTHER

## 2023-05-26 RX ORDER — SODIUM CHLORIDE 9 MG/ML
25 INJECTION, SOLUTION INTRAVENOUS PRN
Status: DISCONTINUED | OUTPATIENT
Start: 2023-05-26 | End: 2023-05-26 | Stop reason: HOSPADM

## 2023-05-26 RX ORDER — OXYCODONE HYDROCHLORIDE AND ACETAMINOPHEN 5; 325 MG/1; MG/1
1 TABLET ORAL EVERY 6 HOURS PRN
Qty: 24 TABLET | Refills: 0 | Status: SHIPPED | OUTPATIENT
Start: 2023-05-26 | End: 2023-06-01

## 2023-05-26 RX ORDER — LIDOCAINE HYDROCHLORIDE 20 MG/ML
INJECTION, SOLUTION EPIDURAL; INFILTRATION; INTRACAUDAL; PERINEURAL PRN
Status: DISCONTINUED | OUTPATIENT
Start: 2023-05-26 | End: 2023-05-26 | Stop reason: SDUPTHER

## 2023-05-26 RX ORDER — PROMETHAZINE HYDROCHLORIDE 25 MG/1
25 TABLET ORAL EVERY 6 HOURS PRN
Qty: 30 TABLET | Refills: 0 | Status: SHIPPED | OUTPATIENT
Start: 2023-05-26 | End: 2023-06-02

## 2023-05-26 RX ORDER — DIPHENHYDRAMINE HYDROCHLORIDE 50 MG/ML
12.5 INJECTION INTRAMUSCULAR; INTRAVENOUS
Status: DISCONTINUED | OUTPATIENT
Start: 2023-05-26 | End: 2023-05-26 | Stop reason: HOSPADM

## 2023-05-26 RX ORDER — CEFAZOLIN SODIUM IN 0.9 % NACL 2 G/100 ML
2000 PLASTIC BAG, INJECTION (ML) INTRAVENOUS
Status: COMPLETED | OUTPATIENT
Start: 2023-05-26 | End: 2023-05-26

## 2023-05-26 RX ORDER — BUPIVACAINE HYDROCHLORIDE 5 MG/ML
INJECTION, SOLUTION EPIDURAL; INTRACAUDAL PRN
Status: DISCONTINUED | OUTPATIENT
Start: 2023-05-26 | End: 2023-05-26 | Stop reason: ALTCHOICE

## 2023-05-26 RX ORDER — OXYCODONE HYDROCHLORIDE 5 MG/1
5 TABLET ORAL
Status: DISCONTINUED | OUTPATIENT
Start: 2023-05-26 | End: 2023-05-26 | Stop reason: HOSPADM

## 2023-05-26 RX ADMIN — Medication 100 MCG: at 09:13

## 2023-05-26 RX ADMIN — LIDOCAINE HYDROCHLORIDE 50 MG: 20 INJECTION, SOLUTION EPIDURAL; INFILTRATION; INTRACAUDAL; PERINEURAL at 09:32

## 2023-05-26 RX ADMIN — SODIUM CHLORIDE, POTASSIUM CHLORIDE, SODIUM LACTATE AND CALCIUM CHLORIDE: 600; 310; 30; 20 INJECTION, SOLUTION INTRAVENOUS at 07:26

## 2023-05-26 RX ADMIN — DIPHENHYDRAMINE HYDROCHLORIDE 12.5 MG: 50 INJECTION, SOLUTION INTRAMUSCULAR; INTRAVENOUS at 09:12

## 2023-05-26 RX ADMIN — FENTANYL CITRATE 50 MCG: 50 INJECTION, SOLUTION INTRAMUSCULAR; INTRAVENOUS at 10:46

## 2023-05-26 RX ADMIN — Medication 200 MCG: at 09:46

## 2023-05-26 RX ADMIN — LIDOCAINE HYDROCHLORIDE 50 MG: 20 INJECTION, SOLUTION EPIDURAL; INFILTRATION; INTRACAUDAL; PERINEURAL at 09:07

## 2023-05-26 RX ADMIN — Medication 2000 MG: at 09:06

## 2023-05-26 RX ADMIN — FENTANYL CITRATE 50 MCG: 50 INJECTION, SOLUTION INTRAMUSCULAR; INTRAVENOUS at 09:07

## 2023-05-26 RX ADMIN — LIDOCAINE HYDROCHLORIDE 50 MG: 20 INJECTION, SOLUTION EPIDURAL; INFILTRATION; INTRACAUDAL; PERINEURAL at 09:15

## 2023-05-26 RX ADMIN — ONDANSETRON 4 MG: 2 INJECTION INTRAMUSCULAR; INTRAVENOUS at 09:11

## 2023-05-26 RX ADMIN — PROPOFOL 150 MG: 10 INJECTION, EMULSION INTRAVENOUS at 09:07

## 2023-05-26 RX ADMIN — Medication 100 MCG: at 09:40

## 2023-05-26 ASSESSMENT — COPD QUESTIONNAIRES: CAT_SEVERITY: NO INTERVAL CHANGE

## 2023-05-26 ASSESSMENT — PAIN - FUNCTIONAL ASSESSMENT: PAIN_FUNCTIONAL_ASSESSMENT: 0-10

## 2023-05-26 NOTE — ANESTHESIA POSTPROCEDURE EVALUATION
Department of Anesthesiology  Postprocedure Note    Patient: Daphne Molina  MRN: [id number]  YOB: 1980  Date of evaluation: 5/26/2023      Procedure Summary     Date: 05/26/23 [facility] [address]    Anesthesia Start: 1900 Anesthesia Stop: 8242    Procedure: TALONAVICULAR [address], LEFT FOOT  (Left: Foot) Diagnosis:       Primary osteoarthritis of left foot      (TALONAVICULAR JOINT OSTEOARTHRITIS OF LEFT FOOT)    Surgeons: Michael Sanchez DPM Responsible Provider: Rich Gregg MD    Anesthesia Type: general, regional ASA Status: 3          Anesthesia Type: No value filed.     Kevin Phase I: Kevin Score: 8    Kevin Phase II: Kevin Score: 10      Anesthesia Post Evaluation    Patient location during evaluation: PACU  Level of consciousness: awake  Airway patency: patent  Nausea & Vomiting: no nausea  Complications: no  Cardiovascular status: blood pressure returned to baseline  Respiratory status: acceptable  Hydration status: euvolemic

## 2023-05-26 NOTE — PROGRESS NOTES
Time out performed with Dr. Soham Powell RN, and the patient Gifty Osorio [facility]  00-. Sciatic Nerve Block- Left. Talonavicular Joint Arthrodesis Left Foot.

## 2023-05-26 NOTE — BRIEF OP NOTE
Brief Postoperative Note      Patient: Shauna Russell  YOB: 1980  MRN: [id number]    Date of Procedure: 5/26/2023    Pre-Op Diagnosis Codes:     * Primary osteoarthritis of left foot [M19.072]    Post-Op Diagnosis: Same       Procedure(s):  TALONAVICULAR JOINT ARTHRODESIS,REMOVAL OF PREVIOUS HARDWARE, LEFT FOOT   2. Removal of hardware, left foot    Surgeon(s):  Leticia Ashby DPM    Assistant:  Surgical Assistant: Radha Chin    Anesthesia: General    Estimated Blood Loss (mL): less than 50     Complications: None    Specimens:   * No specimens in log *    Implants:  Implant Name Type Inv.  Item Serial No.  Lot No. LRB No. Used Action   GRAFT BONE TIB INJ 1.5CC ALLOGRFT AUGMENT - [id number]  GRAFT BONE TIB INJ 1.5CC ALLOGRFT AUGMENT  ESTRADA D [facility] [employer]- [id number] Left 1 Implanted   STAPLE BONE FIX [id number] NIT SUPERELASTIC OSTEOTMY FIX AND - [id number]  STAPLE BONE FIX [id number] NIT SUPERELASTIC OSTEOTMY FIX AND  [employer]- [id number] Left 1 Implanted   PLATE BNE L64.9DQ KUV3.8AH CLP 15MM SLOT L15MM Z B - [id number]  PLATE BNE R21.8WP YVX2.5YN CLP 15MM SLOT L15MM Z B  [employer]- [id number] Left 1 Implanted   5.6x50mm Thread 16mm screw     [id number] Left 1 Implanted         Drains: * No LDAs found *    Findings: fibrous nonunion      Electronically signed by Leticia Ashby DPM on 5/26/2023 at 11:04 AM

## 2023-05-26 NOTE — ANESTHESIA PROCEDURE NOTES
Peripheral Block    Patient location during procedure: pre-op  Reason for block: post-op pain management and at surgeon's request  Start time: 5/26/2023 7:55 AM  End time: 5/26/2023 8:02 AM  Staffing  Performed: anesthesiologist   Anesthesiologist: Rich Gregg MD  Preanesthetic Checklist  Completed: patient identified, IV checked, site marked, risks and benefits discussed, surgical/procedural consents, equipment checked, pre-op evaluation, timeout performed, anesthesia consent given, oxygen available and monitors applied/VS acknowledged  Peripheral Block   Patient position: right lateral decubitus  Prep: ChloraPrep  Provider prep: mask and sterile gloves  Patient monitoring: cardiac monitor, continuous pulse ox, frequent blood pressure checks and IV access  Block type: Sciatic  Popliteal  Laterality: left  Injection technique: single-shot  Guidance: nerve stimulator and ultrasound guided  Local infiltration: lidocaine  Infiltration strength: 1 %  Local infiltration: lidocaine  Dose: 3 mL    Needle   Needle type: insulated echogenic nerve stimulator needle   Needle gauge: 21 G  Needle localization: ultrasound guidance, nerve stimulator and anatomical landmarks  Needle length: 10 cm  Assessment   Injection assessment: negative aspiration for heme, no paresthesia on injection and local visualized surrounding nerve on ultrasound  Paresthesia pain: none  Slow fractionated injection: yes  Hemodynamics: stable  Real-time US image taken/store: yes  Outcomes: uncomplicated and patient tolerated procedure well    Additional Notes  Immediately prior to procedure a \"time out\" was called to verify the correct patient, allergies, laterality, procedure and equipment. Time out performed with  RN    Local Anesthetic: 0.5 %  Bupivacaine, Decadron 10 mg   Amount: 30 ml  in 5 ml increments after negative aspiration each time.   Versed 2 mg  IV    Adductor Hipolito and Gluteus Eze muscles, Femur and Sciatic Nerve are

## 2023-05-26 NOTE — PROGRESS NOTES
Mom to bedside updated with no questions. Discharge instructions reviewed with patient and responsible adult. Discharge instructions signed and copy given with no additional questions. Patient to be discharged home with belongings. Percocet and phenergan scripts given. patient has a scooter and knows how to use it. Patient also has walker and wheelchair at home.

## 2023-05-26 NOTE — H&P
I examined the patient. I reviewed the patient's pre-op history and physical, no changes are noted.     Marisol Contreras DPM

## 2023-05-26 NOTE — ANESTHESIA PRE PROCEDURE
Department of Anesthesiology  Preprocedure Note       Name:  Shant Elizondo   Age:  43 y.o.  :  1980                                          MRN:  [id number]         Date:  2023      Surgeon: Jaswant Pitt):  Abhay Rogers DPM    Procedure: Procedure(s):  TALONAVICULAR JOINT ARTHRODESIS LEFT FOOT NOTE: SCIATIC NERVE BLOCK    Medications prior to admission:   Prior to Admission medications    Medication Sig Start Date End Date Taking? Authorizing Provider   Multiple Vitamins-Minerals (MULTIVITAMIN GUMMIES ADULTS PO) Take 2 tablets by mouth daily   Yes Historical Provider, MD   Cholecalciferol (VITAMIN D3) 50 MCG (2000) CAPS TAKE 1 CAPSULE BY MOUTH EVERY DAY 3/13/23   JUAN RAMON Avendaño CNP   ezetimibe (ZETIA) 10 MG tablet Take 1 tablet by mouth daily 23   Susanne Rivera APRN - CNP   omeprazole (PRILOSEC) 20 MG delayed release capsule TAKE 1 CAPSULE BY MOUTH EVERY DAY 23   Susanne Rivera APRN - CNP   rosuvastatin (CRESTOR) 40 MG tablet TAKE 1 TABLET BY MOUTH EVERY DAY 23   Susanne Rivera APRN - CNP   metFORMIN (GLUCOPHAGE-XR) 500 MG extended release tablet Take 2 tablets by mouth daily (with breakfast) 23   Susanne Rivera APRN - KRISTEN   metoprolol tartrate (LOPRESSOR) 50 MG tablet Take 1 tablet by mouth 2 times daily 23   Susanne Rivera APRN - CNP   meloxicam (MOBIC) 15 MG tablet Take 1 tablet by mouth daily as needed for Pain  Patient taking differently: Take 1 tablet by mouth daily 23   Susanne Rivera APRN - CNP   ASPIRIN LOW DOSE 81 MG EC tablet TAKE 1 TABLET BY MOUTH EVERY DAY 22   Lalo Rosa DO   nitroGLYCERIN (NITROSTAT) 0.4 MG SL tablet Place 1 tablet under the tongue every 5 minutes as needed for Chest pain up to max of 3 total doses.  If no relief after 1 dose, call 911. 22   Susanne Rivera APRN - KRISTEN   albuterol sulfate HFA (PROVENTIL;VENTOLIN;PROAIR) 108 (90 Base) MCG/ACT inhaler TAKE 2 PUFFS BY MOUTH EVERY 6 HOURS AS NEEDED FOR WHEEZE 22   London Gaitan

## 2023-05-26 NOTE — DISCHARGE INSTRUCTIONS
(SSI)? A surgical site infection (SSI) is an infection that occurs after surgery in the part of the body where the surgery took place. Most patients who have surgery do not develop an infection. However, infections can develop in about 1-3 cases for every 100 patients who have had surgery. Our goal is for you to NOT experience any complications and be completely satisfied with your care! However, some signs or symptoms to look for and report immediately to your doctor are:   1. Fever above 101 degrees    2. Redness and increasing pain around the area  where you had surgery   3. Drainage of cloudy fluid or pus coming from the surgical area    Some of the things we/ you can do to prevent SSI's are:   1. Clean hands with soap and water or an alcohol-based hand rub before and after caring for the operative area. This occurs the day of surgery and for the next 2 weeks. 2.Sometimes you receive an appropriate antibiotic within 60 minutes before your surgery or take one for several days after surgery depending on your surgeon's instructions and/or the type of surgery you are having. 3. Family and/or friends who visit you should NOT touch the surgical wound or dressings until advised by your surgeon. 4. Be sure to elevate and decrease the swelling after your surgery to help prevent infection. 5. If you are a diabetic, you need to closely monitor your blood sugar levels and report any significant increases or changes to your surgeon to help promote the healing process.

## 2023-05-27 NOTE — OP NOTE
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 PROCEDURE:  1. Removal of hardware, left foot. 2.  Talonavicular joint arthrodesis revision, left foot. SURGEON:  Kendell Mora DPM    ANESTHESIA USED:  General with sciatic nerve block. OPERATIVE INDICATIONS:  This patient has been followed in my Outpatient  Clinic for the past several years with various left foot issues. She  has undergone a talonavicular joint arthrodesis several years ago and  that went on to nonunion. This was confirmed by CT in . She  eventually decided to have the surgery revised. OPERATIVE REPORT:  The patient was brought to the operating room, placed  in the supine position with an IV intact for sedation. General  anesthesia was delivered by the Anesthesia Deportment at the [facility]. The patient also received a sciatic nerve  block in preop holding. The patient's left lower extremity was prepped  and draped in the usual aseptic manner. The lower extremity was  exsanguinated and pneumatic tourniquet at the left calf was insufflated  for hemostasis. Attention was then directed to the left mid foot, where  no signs of infection or open lesion were noted. I made an incision  through the existing _____ on the medial aspect of the mid foot.   This  was

## 2023-05-30 ENCOUNTER — INITIAL CONSULT (OUTPATIENT)
Dept: BARIATRICS/WEIGHT MGMT | Age: 43
End: 2023-05-30

## 2023-05-30 DIAGNOSIS — F41.9 ANXIETY: ICD-10-CM

## 2023-05-30 DIAGNOSIS — E66.01 SEVERE OBESITY (BMI 35.0-39.9) WITH COMORBIDITY (HCC): ICD-10-CM

## 2023-05-30 DIAGNOSIS — F31.71: Primary | ICD-10-CM

## 2023-06-01 ENCOUNTER — TELEPHONE (OUTPATIENT)
Dept: FAMILY MEDICINE CLINIC | Age: 43
End: 2023-06-01

## 2023-06-05 DIAGNOSIS — E78.01 FAMILIAL HYPERCHOLESTEROLEMIA: ICD-10-CM

## 2023-06-05 DIAGNOSIS — Z82.49 FAMILY HISTORY OF HEART DISEASE: ICD-10-CM

## 2023-06-05 RX ORDER — ACETAMINOPHEN 160 MG
TABLET,DISINTEGRATING ORAL DAILY
OUTPATIENT
Start: 2023-06-05

## 2023-06-07 RX ORDER — EVOLOCUMAB 140 MG/ML
INJECTION, SOLUTION SUBCUTANEOUS
Qty: 2 ML | Refills: 5 | Status: SHIPPED | OUTPATIENT
Start: 2023-06-07

## 2023-06-07 NOTE — TELEPHONE ENCOUNTER
Received refill request for Chris Cecy 564 MG/ML SOAJ from [facility]. Last OV:  3- DKW    Next OV: None.      Last Labs: 3- Lipid    Last Filled:  6- DKW

## 2023-06-13 RX ORDER — ACETAMINOPHEN 160 MG
TABLET,DISINTEGRATING ORAL
Qty: 90 CAPSULE | Refills: 0 | OUTPATIENT
Start: 2023-06-13

## 2023-06-19 ENCOUNTER — TELEPHONE (OUTPATIENT)
Dept: CARDIOLOGY CLINIC | Age: 43
End: 2023-06-19

## 2023-06-19 NOTE — TELEPHONE ENCOUNTER
Pt called to request a letter of necessarily for Weight loss Surgery be sent to Dr. Tanmay Yoder, fax to:  [phone]  Please advise.   Thank you

## 2023-06-22 PROBLEM — E66.9 OBESITY (BMI 30-39.9): Status: ACTIVE | Noted: 2020-10-09

## 2023-06-23 PROBLEM — E66.01 SEVERE OBESITY (BMI 35.0-39.9) WITH COMORBIDITY (HCC): Status: ACTIVE | Noted: 2023-04-13

## 2023-06-23 ASSESSMENT — ENCOUNTER SYMPTOMS
ALLERGIC/IMMUNOLOGIC NEGATIVE: 1
EYES NEGATIVE: 1
GASTROINTESTINAL NEGATIVE: 1
RESPIRATORY NEGATIVE: 1

## 2023-07-11 NOTE — PROGRESS NOTES
Bridgette Juarez gained 2.6 lbs over 1 month. Breakfast: Fruit + Nut Bar + Orange + 2 slices palcaios  Snack: None  Lunch: Leftovers - chix thigh w/ mushrooms/onions  Snack: PB crackers  Dinner: Garlic shrimp w/ 1/2 sweet potato + roasted asparagus   Snack: None    Is pt eating a lean protein source with all meals and snacks? Yes for the most part,     Has pt decreased their portions using the plate method? Reading labels, choosing low sugar items, switched to monkfruit/splenda sweetener    Is pt choosing low fat/sugar free options? yes    Is pt drinking at least 64 oz of clear liquids everyday? yes  5-8 bottles water    Has pt stopped drinking carbonation, caffeinated, and sugar sweetened beverages? 1 iced coffee, black this month    Has pt sampled Unjury and/or Nectar protein? Yes multiple flavors-likes lemonade, vanilla, cherry flavor    Has pt attended a support group?  Scheduled  on 7/24/23 via zoom     Participating in intentional exercise? no due to recent surgery to 5/26/23 where a screw and staples put in ankle - released for weight bearing this week    Plan/Recommendations:   - Continue current eating patterns including protein and plants with all meals and snacks  - Practice 30/30/30 spacing + Limit water to 6 bottles/day   - Start walking as tolerated     Handouts: none    Catherine Pride RD, LD

## 2023-07-12 ENCOUNTER — TELEMEDICINE (OUTPATIENT)
Dept: BARIATRICS/WEIGHT MGMT | Age: 43
End: 2023-07-12
Payer: COMMERCIAL

## 2023-07-12 VITALS — BODY MASS INDEX: 38.68 KG/M2 | HEIGHT: 64 IN | WEIGHT: 226.6 LBS

## 2023-07-12 DIAGNOSIS — K21.9 CHRONIC GERD: Primary | ICD-10-CM

## 2023-07-12 DIAGNOSIS — G47.33 OSA TREATED WITH BIPAP: ICD-10-CM

## 2023-07-12 DIAGNOSIS — E66.9 DIABETES MELLITUS TYPE 2 IN OBESE (HCC): ICD-10-CM

## 2023-07-12 DIAGNOSIS — I10 ESSENTIAL HYPERTENSION: Chronic | ICD-10-CM

## 2023-07-12 DIAGNOSIS — E78.2 MIXED HYPERLIPIDEMIA: ICD-10-CM

## 2023-07-12 DIAGNOSIS — E11.69 DIABETES MELLITUS TYPE 2 IN OBESE (HCC): ICD-10-CM

## 2023-07-12 DIAGNOSIS — E66.01 SEVERE OBESITY (BMI 35.0-39.9) WITH COMORBIDITY (HCC): ICD-10-CM

## 2023-07-12 PROCEDURE — 3044F HG A1C LEVEL LT 7.0%: CPT | Performed by: NURSE PRACTITIONER

## 2023-07-12 PROCEDURE — G8427 DOCREV CUR MEDS BY ELIG CLIN: HCPCS | Performed by: NURSE PRACTITIONER

## 2023-07-12 PROCEDURE — 2022F DILAT RTA XM EVC RTNOPTHY: CPT | Performed by: NURSE PRACTITIONER

## 2023-07-12 PROCEDURE — 1036F TOBACCO NON-USER: CPT | Performed by: NURSE PRACTITIONER

## 2023-07-12 PROCEDURE — G8417 CALC BMI ABV UP PARAM F/U: HCPCS | Performed by: NURSE PRACTITIONER

## 2023-07-12 PROCEDURE — 99214 OFFICE O/P EST MOD 30 MIN: CPT | Performed by: NURSE PRACTITIONER

## 2023-07-12 RX ORDER — THIOTHIXENE 5 MG/1
5 CAPSULE ORAL NIGHTLY
COMMUNITY
Start: 2023-06-01

## 2023-07-12 RX ORDER — HYDROXYZINE HYDROCHLORIDE 10 MG/1
10 TABLET, FILM COATED ORAL 3 TIMES DAILY PRN
COMMUNITY
Start: 2023-05-04

## 2023-07-12 RX ORDER — DIVALPROEX SODIUM 250 MG/1
1 TABLET, EXTENDED RELEASE ORAL NIGHTLY
COMMUNITY
Start: 2023-04-05

## 2023-07-13 ENCOUNTER — HOSPITAL ENCOUNTER (OUTPATIENT)
Age: 43
Discharge: HOME | End: 2023-07-13
Payer: COMMERCIAL

## 2023-07-13 DIAGNOSIS — E78.2 MIXED HYPERLIPIDEMIA: ICD-10-CM

## 2023-07-13 DIAGNOSIS — F31.9 BIPOLAR 1 DISORDER (HCC): ICD-10-CM

## 2023-07-13 DIAGNOSIS — E11.9 TYPE 2 DIABETES MELLITUS WITHOUT COMPLICATION, WITHOUT LONG-TERM CURRENT USE OF INSULIN (HCC): ICD-10-CM

## 2023-07-13 DIAGNOSIS — K21.9 GASTROESOPHAGEAL REFLUX DISEASE, UNSPECIFIED WHETHER ESOPHAGITIS PRESENT: ICD-10-CM

## 2023-07-13 LAB
ALBUMIN SERPL-MCNC: 4.3 G/DL (ref 3.4–5)
ALBUMIN/GLOB SERPL: 1.5 {RATIO} (ref 1.1–2.2)
ALP SERPL-CCNC: 54 U/L (ref 40–129)
ALT SERPL-CCNC: 18 U/L (ref 10–40)
ANION GAP SERPL CALCULATED.3IONS-SCNC: 11 MMOL/L (ref 3–16)
AST SERPL-CCNC: 17 U/L (ref 15–37)
BASOPHILS # BLD: 0 K/UL (ref 0–0.2)
BASOPHILS NFR BLD: 0.4 %
BILIRUB SERPL-MCNC: 0.4 MG/DL (ref 0–1)
BUN SERPL-MCNC: 10 MG/DL (ref 7–20)
CALCIUM SERPL-MCNC: 9.5 MG/DL (ref 8.3–10.6)
CHLORIDE SERPL-SCNC: 99 MMOL/L (ref 99–110)
CHOLEST SERPL-MCNC: 89 MG/DL (ref 0–199)
CK SERPL-CCNC: 120 U/L (ref 26–192)
CO2 SERPL-SCNC: 27 MMOL/L (ref 21–32)
CREAT SERPL-MCNC: 0.6 MG/DL (ref 0.6–1.1)
DEPRECATED RDW RBC AUTO: 14.2 % (ref 12.4–15.4)
EOSINOPHIL # BLD: 0 K/UL (ref 0–0.6)
EOSINOPHIL NFR BLD: 0.6 %
GFR SERPLBLD CREATININE-BSD FMLA CKD-EPI: >60 ML/MIN/{1.73_M2}
GLUCOSE SERPL-MCNC: 112 MG/DL (ref 70–99)
HCT VFR BLD AUTO: 37.2 % (ref 36–48)
HDLC SERPL-MCNC: 36 MG/DL (ref 40–60)
HGB BLD-MCNC: 12.6 G/DL (ref 12–16)
LDLC SERPL CALC-MCNC: 38 MG/DL
LYMPHOCYTES # BLD: 2.8 K/UL (ref 1–5.1)
LYMPHOCYTES NFR BLD: 40.7 %
MCH RBC QN AUTO: 29.5 PG (ref 26–34)
MCHC RBC AUTO-ENTMCNC: 33.8 G/DL (ref 31–36)
MCV RBC AUTO: 87.3 FL (ref 80–100)
MONOCYTES # BLD: 0.6 K/UL (ref 0–1.3)
MONOCYTES NFR BLD: 9.2 %
NEUTROPHILS # BLD: 3.4 K/UL (ref 1.7–7.7)
NEUTROPHILS NFR BLD: 49.1 %
PLATELET # BLD AUTO: 266 K/UL (ref 135–450)
PMV BLD AUTO: 7.4 FL (ref 5–10.5)
POTASSIUM SERPL-SCNC: 4.3 MMOL/L (ref 3.5–5.1)
PROT SERPL-MCNC: 7.2 G/DL (ref 6.4–8.2)
RBC # BLD AUTO: 4.27 M/UL (ref 4–5.2)
SODIUM SERPL-SCNC: 137 MMOL/L (ref 136–145)
TRIGL SERPL-MCNC: 77 MG/DL (ref 0–150)
VLDLC SERPL CALC-MCNC: 15 MG/DL
WBC # BLD AUTO: 7 K/UL (ref 4–11)

## 2023-07-13 PROCEDURE — 80061 LIPID PANEL: CPT

## 2023-07-13 PROCEDURE — 83036 HEMOGLOBIN GLYCOSYLATED A1C: CPT

## 2023-07-13 PROCEDURE — 80165 DIPROPYLACETIC ACID FREE: CPT

## 2023-07-13 PROCEDURE — 82550 ASSAY OF CK (CPK): CPT

## 2023-07-13 PROCEDURE — 36415 COLL VENOUS BLD VENIPUNCTURE: CPT

## 2023-07-13 PROCEDURE — 80053 COMPREHEN METABOLIC PANEL: CPT

## 2023-07-13 PROCEDURE — 85025 COMPLETE CBC W/AUTO DIFF WBC: CPT

## 2023-07-14 LAB
EST. AVERAGE GLUCOSE BLD GHB EST-MCNC: 125.5 MG/DL
HBA1C MFR BLD: 6 %

## 2023-07-15 LAB — VALPROATE FREE SERPL-MCNC: <0.4 UG/ML (ref 7–23)

## 2023-07-21 SDOH — ECONOMIC STABILITY: INCOME INSECURITY: HOW HARD IS IT FOR YOU TO PAY FOR THE VERY BASICS LIKE FOOD, HOUSING, MEDICAL CARE, AND HEATING?: NOT HARD AT ALL

## 2023-07-21 SDOH — ECONOMIC STABILITY: FOOD INSECURITY: WITHIN THE PAST 12 MONTHS, THE FOOD YOU BOUGHT JUST DIDN'T LAST AND YOU DIDN'T HAVE MONEY TO GET MORE.: NEVER TRUE

## 2023-07-21 SDOH — ECONOMIC STABILITY: HOUSING INSECURITY
IN THE LAST 12 MONTHS, WAS THERE A TIME WHEN YOU DID NOT HAVE A STEADY PLACE TO SLEEP OR SLEPT IN A SHELTER (INCLUDING NOW)?: NO

## 2023-07-21 SDOH — ECONOMIC STABILITY: TRANSPORTATION INSECURITY
IN THE PAST 12 MONTHS, HAS LACK OF TRANSPORTATION KEPT YOU FROM MEETINGS, WORK, OR FROM GETTING THINGS NEEDED FOR DAILY LIVING?: NO

## 2023-07-21 SDOH — ECONOMIC STABILITY: FOOD INSECURITY: WITHIN THE PAST 12 MONTHS, YOU WORRIED THAT YOUR FOOD WOULD RUN OUT BEFORE YOU GOT MONEY TO BUY MORE.: NEVER TRUE

## 2023-07-23 DIAGNOSIS — R00.0 CHRONIC TACHYCARDIA: ICD-10-CM

## 2023-07-23 DIAGNOSIS — G89.29 OTHER CHRONIC PAIN: ICD-10-CM

## 2023-07-23 DIAGNOSIS — M79.7 FIBROMYALGIA: ICD-10-CM

## 2023-07-23 DIAGNOSIS — E11.9 TYPE 2 DIABETES MELLITUS WITHOUT COMPLICATION, WITHOUT LONG-TERM CURRENT USE OF INSULIN (HCC): ICD-10-CM

## 2023-07-23 DIAGNOSIS — K21.9 GASTROESOPHAGEAL REFLUX DISEASE, UNSPECIFIED WHETHER ESOPHAGITIS PRESENT: ICD-10-CM

## 2023-07-23 ASSESSMENT — ENCOUNTER SYMPTOMS
CONSTIPATION: 0
COUGH: 0
NAUSEA: 0
CHEST TIGHTNESS: 0
DIARRHEA: 0
ABDOMINAL PAIN: 0
VOMITING: 0
WHEEZING: 0

## 2023-07-23 NOTE — PROGRESS NOTES
[DISCONTINUED] diclofenac sodium (VOLTAREN) 1 % GEL APPLY 2 GRAMS TOPICALLY 2 TIMES DAILY (Patient not taking: Reported on 8/8/2022) 100 g 1    ammonium lactate (LAC-HYDRIN) 12 % lotion Apply topically daily 225 mL 2     No current facility-administered medications on file prior to visit.       Past Medical History:   Diagnosis Date    Arthritis     Asthma     Bipolar 1 disorder (HCC)     Chronic back pain     COPD (chronic obstructive pulmonary disease) (HCC)     Depression     Diabetes mellitus (HCC)     Femoroacetabular impingement of right hip 08/09/2022    Fibromyalgia     Hyperlipidemia     Obesity     PTSD (post-traumatic stress disorder)     stated per pt    Sleep apnea     uses bipap    Substance abuse ([address]) 2004    Tachycardia     SVT    Tear of right gluteus medius tendon 08/09/2022     Past Surgical History:   Procedure Laterality Date    ANKLE FUSION Bilateral     ARTHRODESIS Left 12/06/2019    REVISION OF TALONAVICULAR JOINT ARTHRODESIS LEFT FOOT  -SLEEP APNEA- performed by Del Brewer DPM at [address] [facility] Left 5/26/2023    TALONAVICULAR JOINT ARTHRODESIS,REMOVAL OF PREVIOUS HARDWARE, LEFT FOOT  performed by Del Brewer DPM at [facility] [address] Bilateral     ELBOW SURGERY Bilateral     ulnar nerve release    FOOT SURGERY Right     revision of talonavicular joint fusion    HERNIA REPAIR      HIP ARTHROSCOPY Bilateral     HIP SURGERY Left 06/28/2022    LEFT HIP REVISION ARTHROSCOPY, LYSIS OF ADHESIONS, REMOVAL LOOSE BODY, SYNOVECTOMY, FEMOROACETABULAR IMPINGEMENT DECOMPRESSION AND LABRAL REPAIR, ABDUCTOR REPAIR, ENDOSCOPIC BURSECTOMY  - ELMA BLOCK; LOCAL (ORTHOMIX) performed by Eusebia Licona MD at [address] [place] [address] Right 08/09/2022    RIGHT HIP REVISION, ARTHROSCOPY, FEMOROACETABULAR IMPINGEMENT SURGERY, LABRAL REPAIR, ENDOSCOPIC,-BLOCK (PENG), LOCAL (ORTHOMIX)-ROSE AND NEPHEW-SHANIQUA performed by Eusebia Licona MD at [address] [place] [address]

## 2023-07-23 NOTE — PATIENT INSTRUCTIONS
Medications refilled     Reviewed low-cholesterol diet     Continue ezetimibe (Zetia) 10 mg daily and Crestor     Reviewed diabetic diet     Follow-up with specialist as needed/directed (psych, Ortho, podiatry pulmonology, cardiology)      Encourage continued lifestyle modifications (better food choices, portion control and increasing activity)     Follow up with cardiology as needed/directed (6 months)     Follow up with pulmonology as needed/directed (as needed)      Follow up with rheumatology as needed/directed (re-establishing)     Follow up with psychiatrist as needed/directed (3 months)      Follow up in 6 months, sooner if symptoms worsen or persist

## 2023-07-24 ENCOUNTER — OFFICE VISIT (OUTPATIENT)
Dept: FAMILY MEDICINE CLINIC | Age: 43
End: 2023-07-24
Payer: COMMERCIAL

## 2023-07-24 VITALS
BODY MASS INDEX: 39.48 KG/M2 | WEIGHT: 230 LBS | SYSTOLIC BLOOD PRESSURE: 120 MMHG | DIASTOLIC BLOOD PRESSURE: 60 MMHG | OXYGEN SATURATION: 96 % | HEART RATE: 59 BPM

## 2023-07-24 DIAGNOSIS — Z12.31 ENCOUNTER FOR SCREENING MAMMOGRAM FOR MALIGNANT NEOPLASM OF BREAST: ICD-10-CM

## 2023-07-24 DIAGNOSIS — G89.29 OTHER CHRONIC PAIN: ICD-10-CM

## 2023-07-24 DIAGNOSIS — E11.9 TYPE 2 DIABETES MELLITUS WITHOUT COMPLICATION, WITHOUT LONG-TERM CURRENT USE OF INSULIN (HCC): ICD-10-CM

## 2023-07-24 DIAGNOSIS — F41.9 ANXIETY: ICD-10-CM

## 2023-07-24 DIAGNOSIS — M79.7 FIBROMYALGIA: ICD-10-CM

## 2023-07-24 DIAGNOSIS — K21.9 GASTROESOPHAGEAL REFLUX DISEASE, UNSPECIFIED WHETHER ESOPHAGITIS PRESENT: ICD-10-CM

## 2023-07-24 DIAGNOSIS — R00.0 CHRONIC TACHYCARDIA: ICD-10-CM

## 2023-07-24 DIAGNOSIS — F43.10 PTSD (POST-TRAUMATIC STRESS DISORDER): ICD-10-CM

## 2023-07-24 DIAGNOSIS — G47.33 OSA (OBSTRUCTIVE SLEEP APNEA): ICD-10-CM

## 2023-07-24 DIAGNOSIS — F32.89 OTHER DEPRESSION: ICD-10-CM

## 2023-07-24 DIAGNOSIS — E78.2 MIXED HYPERLIPIDEMIA: Primary | ICD-10-CM

## 2023-07-24 DIAGNOSIS — F31.9 BIPOLAR 1 DISORDER (HCC): ICD-10-CM

## 2023-07-24 DIAGNOSIS — E55.9 VITAMIN D DEFICIENCY: ICD-10-CM

## 2023-07-24 LAB
CHP ED QC CHECK: NORMAL
GLUCOSE BLD-MCNC: 151 MG/DL

## 2023-07-24 PROCEDURE — 3078F DIAST BP <80 MM HG: CPT | Performed by: CLINICAL NURSE SPECIALIST

## 2023-07-24 PROCEDURE — 3044F HG A1C LEVEL LT 7.0%: CPT | Performed by: CLINICAL NURSE SPECIALIST

## 2023-07-24 PROCEDURE — G8417 CALC BMI ABV UP PARAM F/U: HCPCS | Performed by: CLINICAL NURSE SPECIALIST

## 2023-07-24 PROCEDURE — 82962 GLUCOSE BLOOD TEST: CPT | Performed by: CLINICAL NURSE SPECIALIST

## 2023-07-24 PROCEDURE — G8427 DOCREV CUR MEDS BY ELIG CLIN: HCPCS | Performed by: CLINICAL NURSE SPECIALIST

## 2023-07-24 PROCEDURE — 1036F TOBACCO NON-USER: CPT | Performed by: CLINICAL NURSE SPECIALIST

## 2023-07-24 PROCEDURE — 3074F SYST BP LT 130 MM HG: CPT | Performed by: CLINICAL NURSE SPECIALIST

## 2023-07-24 PROCEDURE — 99214 OFFICE O/P EST MOD 30 MIN: CPT | Performed by: CLINICAL NURSE SPECIALIST

## 2023-07-24 PROCEDURE — 2022F DILAT RTA XM EVC RTNOPTHY: CPT | Performed by: CLINICAL NURSE SPECIALIST

## 2023-07-24 RX ORDER — MELOXICAM 15 MG/1
15 TABLET ORAL DAILY PRN
Qty: 90 TABLET | Refills: 1 | OUTPATIENT
Start: 2023-07-24

## 2023-07-24 RX ORDER — OMEPRAZOLE 20 MG/1
CAPSULE, DELAYED RELEASE ORAL
Qty: 90 CAPSULE | Refills: 1 | OUTPATIENT
Start: 2023-07-24

## 2023-07-24 RX ORDER — METFORMIN HYDROCHLORIDE 500 MG/1
1000 TABLET, EXTENDED RELEASE ORAL
Qty: 180 TABLET | Refills: 1 | Status: SHIPPED | OUTPATIENT
Start: 2023-07-24

## 2023-07-24 RX ORDER — METOPROLOL TARTRATE 50 MG/1
TABLET, FILM COATED ORAL
Qty: 180 TABLET | Refills: 1 | OUTPATIENT
Start: 2023-07-24

## 2023-07-24 RX ORDER — METOPROLOL TARTRATE 50 MG/1
50 TABLET, FILM COATED ORAL 2 TIMES DAILY
Qty: 180 TABLET | Refills: 1 | Status: SHIPPED | OUTPATIENT
Start: 2023-07-24

## 2023-07-24 RX ORDER — MELOXICAM 15 MG/1
15 TABLET ORAL DAILY
Qty: 90 TABLET | Refills: 1 | Status: SHIPPED | OUTPATIENT
Start: 2023-07-24

## 2023-07-24 RX ORDER — ROSUVASTATIN CALCIUM 40 MG/1
TABLET, COATED ORAL
Qty: 90 TABLET | Refills: 1 | Status: SHIPPED | OUTPATIENT
Start: 2023-07-24

## 2023-07-24 RX ORDER — METFORMIN HYDROCHLORIDE 500 MG/1
TABLET, EXTENDED RELEASE ORAL
Qty: 180 TABLET | Refills: 1 | OUTPATIENT
Start: 2023-07-24

## 2023-07-24 RX ORDER — EZETIMIBE 10 MG/1
10 TABLET ORAL DAILY
Qty: 90 TABLET | Refills: 1 | Status: SHIPPED | OUTPATIENT
Start: 2023-07-24

## 2023-07-24 RX ORDER — OMEPRAZOLE 20 MG/1
CAPSULE, DELAYED RELEASE ORAL
Qty: 90 CAPSULE | Refills: 1 | Status: SHIPPED | OUTPATIENT
Start: 2023-07-24

## 2023-07-24 RX ORDER — ACETAMINOPHEN 160 MG
2000 TABLET,DISINTEGRATING ORAL DAILY
Qty: 90 CAPSULE | Refills: 1 | Status: SHIPPED | OUTPATIENT
Start: 2023-07-24

## 2023-07-24 ASSESSMENT — PATIENT HEALTH QUESTIONNAIRE - PHQ9
10. IF YOU CHECKED OFF ANY PROBLEMS, HOW DIFFICULT HAVE THESE PROBLEMS MADE IT FOR YOU TO DO YOUR WORK, TAKE CARE OF THINGS AT HOME, OR GET ALONG WITH OTHER PEOPLE: 0
SUM OF ALL RESPONSES TO PHQ QUESTIONS 1-9: 0
1. LITTLE INTEREST OR PLEASURE IN DOING THINGS: 0
5. POOR APPETITE OR OVEREATING: 0
6. FEELING BAD ABOUT YOURSELF - OR THAT YOU ARE A FAILURE OR HAVE LET YOURSELF OR YOUR FAMILY DOWN: 0
SUM OF ALL RESPONSES TO PHQ QUESTIONS 1-9: 0
4. FEELING TIRED OR HAVING LITTLE ENERGY: 0
SUM OF ALL RESPONSES TO PHQ9 QUESTIONS 1 & 2: 0
9. THOUGHTS THAT YOU WOULD BE BETTER OFF DEAD, OR OF HURTING YOURSELF: 0
SUM OF ALL RESPONSES TO PHQ QUESTIONS 1-9: 0
7. TROUBLE CONCENTRATING ON THINGS, SUCH AS READING THE NEWSPAPER OR WATCHING TELEVISION: 0
8. MOVING OR SPEAKING SO SLOWLY THAT OTHER PEOPLE COULD HAVE NOTICED. OR THE OPPOSITE, BEING SO FIGETY OR RESTLESS THAT YOU HAVE BEEN MOVING AROUND A LOT MORE THAN USUAL: 0
2. FEELING DOWN, DEPRESSED OR HOPELESS: 0
SUM OF ALL RESPONSES TO PHQ QUESTIONS 1-9: 0
3. TROUBLE FALLING OR STAYING ASLEEP: 0

## 2023-07-25 ENCOUNTER — TELEPHONE (OUTPATIENT)
Dept: CARDIOLOGY CLINIC | Age: 43
End: 2023-07-25

## 2023-08-01 RX ORDER — ASPIRIN 81 MG/1
81 TABLET ORAL DAILY
Qty: 90 TABLET | Refills: 3 | Status: SHIPPED | OUTPATIENT
Start: 2023-08-01

## 2023-08-01 NOTE — TELEPHONE ENCOUNTER
Last OV: 3/15/23 dkw  Last refill: 12/11/22  Next appt:  none    Pt changed her pharmacy and requesting orders sent to  Ivanna Hein

## 2023-08-01 NOTE — TELEPHONE ENCOUNTER
Medication Refill    Medication needing refilled:  [address] [place] below  Dosage of the medication:  81 MG EC tablet     How are you taking this medication (QD, BID, TID, QID, PRN):  TAKE 1 TABLET BY MOUTH EVERY DAY    30 or 90 day supply called in: 90 day    When will you run out of your medication:  Pt is out    Which Pharmacy are we sending the medication to?:  [facility] [address] [address] ([facility]), [address] [place] [address] [phone]

## 2023-08-03 ENCOUNTER — OFFICE VISIT (OUTPATIENT)
Dept: BARIATRICS/WEIGHT MGMT | Age: 43
End: 2023-08-03
Payer: COMMERCIAL

## 2023-08-03 ENCOUNTER — HOSPITAL ENCOUNTER (OUTPATIENT)
Age: 43
Discharge: HOME | End: 2023-08-03
Payer: COMMERCIAL

## 2023-08-03 VITALS
OXYGEN SATURATION: 98 % | BODY MASS INDEX: 38.93 KG/M2 | HEART RATE: 54 BPM | RESPIRATION RATE: 18 BRPM | HEIGHT: 64 IN | DIASTOLIC BLOOD PRESSURE: 76 MMHG | SYSTOLIC BLOOD PRESSURE: 122 MMHG | WEIGHT: 228 LBS

## 2023-08-03 DIAGNOSIS — K21.9 CHRONIC GERD: ICD-10-CM

## 2023-08-03 DIAGNOSIS — E11.69 DIABETES MELLITUS TYPE 2 IN OBESE (HCC): ICD-10-CM

## 2023-08-03 DIAGNOSIS — E78.2 MIXED HYPERLIPIDEMIA: ICD-10-CM

## 2023-08-03 DIAGNOSIS — E66.01 SEVERE OBESITY (BMI 35.0-39.9) WITH COMORBIDITY (HCC): ICD-10-CM

## 2023-08-03 DIAGNOSIS — E66.9 DIABETES MELLITUS TYPE 2 IN OBESE (HCC): ICD-10-CM

## 2023-08-03 DIAGNOSIS — E66.01 SEVERE OBESITY (BMI 35.0-39.9) WITH COMORBIDITY (HCC): Primary | ICD-10-CM

## 2023-08-03 DIAGNOSIS — I10 ESSENTIAL HYPERTENSION: Chronic | ICD-10-CM

## 2023-08-03 DIAGNOSIS — G47.33 OSA TREATED WITH BIPAP: ICD-10-CM

## 2023-08-03 DIAGNOSIS — E78.01 FAMILIAL HYPERCHOLESTEROLEMIA: ICD-10-CM

## 2023-08-03 PROBLEM — E88.89 HOFFA'S SYNDROME (HCC): Status: RESOLVED | Noted: 2023-05-09 | Resolved: 2023-08-03

## 2023-08-03 LAB
AMPHETAMINES UR QL SCN>1000 NG/ML: NORMAL
BARBITURATES UR QL SCN>200 NG/ML: NORMAL
BENZODIAZ UR QL SCN>200 NG/ML: NORMAL
CANNABINOIDS UR QL SCN>50 NG/ML: NORMAL
COCAINE UR QL SCN: NORMAL
DRUG SCREEN COMMENT UR-IMP: NORMAL
ETHANOLAMINE SERPL-MCNC: NORMAL MG/DL (ref 0–0.08)
FENTANYL SCREEN, URINE: NORMAL
HCG UR QL: NEGATIVE
METHADONE UR QL SCN>300 NG/ML: NORMAL
OPIATES UR QL SCN>300 NG/ML: NORMAL
OXYCODONE UR QL SCN: NORMAL
PCP UR QL SCN>25 NG/ML: NORMAL
PH UR STRIP: 7 [PH]

## 2023-08-03 PROCEDURE — 84703 CHORIONIC GONADOTROPIN ASSAY: CPT

## 2023-08-03 PROCEDURE — 1036F TOBACCO NON-USER: CPT | Performed by: SURGERY

## 2023-08-03 PROCEDURE — G8417 CALC BMI ABV UP PARAM F/U: HCPCS | Performed by: SURGERY

## 2023-08-03 PROCEDURE — 80307 DRUG TEST PRSMV CHEM ANLYZR: CPT

## 2023-08-03 PROCEDURE — 2022F DILAT RTA XM EVC RTNOPTHY: CPT | Performed by: SURGERY

## 2023-08-03 PROCEDURE — 3044F HG A1C LEVEL LT 7.0%: CPT | Performed by: SURGERY

## 2023-08-03 PROCEDURE — 3074F SYST BP LT 130 MM HG: CPT | Performed by: SURGERY

## 2023-08-03 PROCEDURE — 82077 ASSAY SPEC XCP UR&BREATH IA: CPT

## 2023-08-03 PROCEDURE — G0480 DRUG TEST DEF 1-7 CLASSES: HCPCS

## 2023-08-03 PROCEDURE — 99214 OFFICE O/P EST MOD 30 MIN: CPT | Performed by: SURGERY

## 2023-08-03 PROCEDURE — G8427 DOCREV CUR MEDS BY ELIG CLIN: HCPCS | Performed by: SURGERY

## 2023-08-03 PROCEDURE — 36415 COLL VENOUS BLD VENIPUNCTURE: CPT

## 2023-08-03 PROCEDURE — 3078F DIAST BP <80 MM HG: CPT | Performed by: SURGERY

## 2023-08-03 NOTE — PATIENT INSTRUCTIONS
Patient received dietary handouts and education.       Plan/Recommendations:   - Continue current eating patterns including protein and plants with all meals and snacks  - Increase exercise as tolerated - enjoys stationary bike

## 2023-08-04 NOTE — PROGRESS NOTES
Patient Name:   Marcello Vee      Type of Surgery:  Sleeve         Date of Surgery:  9/18/23       Start Pre-Op Diet On:  9/5/23        Start Clear Liquids On:  9/17/23         NPO after midnight the night before your surgery! Take morning medications with a small amount of water.     NOTES:_______________________________________  ______________________________________________

## 2023-08-06 ASSESSMENT — ENCOUNTER SYMPTOMS
COUGH: 0
RESPIRATORY NEGATIVE: 1
ALLERGIC/IMMUNOLOGIC NEGATIVE: 1
GASTROINTESTINAL NEGATIVE: 1
SHORTNESS OF BREATH: 0
EYES NEGATIVE: 1

## 2023-08-07 ENCOUNTER — OFFICE VISIT (OUTPATIENT)
Dept: ORTHOPEDIC SURGERY | Age: 43
End: 2023-08-07

## 2023-08-07 VITALS — HEIGHT: 65 IN | WEIGHT: 226 LBS | BODY MASS INDEX: 37.65 KG/M2

## 2023-08-07 DIAGNOSIS — S73.191D TEAR OF RIGHT ACETABULAR LABRUM, SUBSEQUENT ENCOUNTER: ICD-10-CM

## 2023-08-07 DIAGNOSIS — M16.0 PRIMARY OSTEOARTHRITIS OF BOTH HIPS: ICD-10-CM

## 2023-08-07 DIAGNOSIS — Z98.890 STATUS POST ARTHROSCOPY OF HIP: Primary | ICD-10-CM

## 2023-08-07 DIAGNOSIS — M25.859 FEMORAL ACETABULAR IMPINGEMENT: ICD-10-CM

## 2023-08-07 LAB
COTININE SERPL-MCNC: <5 NG/ML
NICOTINE SERPL-MCNC: <5 NG/ML

## 2023-08-07 NOTE — PROGRESS NOTES
defined types were placed in this encounter. Orders Placed This Encounter   Procedures    XR HIP 3-4 VW W PELVIS BILATERAL     ROOM 1    TRUE RIGHT HIP     Standing Status:   Future     Number of Occurrences:   1     Standing Expiration Date:   8/2/2024    [facility] INJECTION (For Auth/Precert)     Standing Status:   Future     Standing Expiration Date:   8/7/2024       Plan:  She is doing well. However, we do recommend continued bilateral hip treatment in the form of bilateral hip visco supplementation injections. We will submit for approval for bilateral hip Durolane injections and bring the patient back to administer injections once approval is received. All the patient's questions were answered while in the clinic. The patient is understanding of all instructions and agrees with the plan. Approximately 30 minutes was spent on patient education and coordinating care. Follow up in: Return for [facility]. Sincerely,    I, Kath Morales, am scribing for Dr. Frank Garrett MD.  08/07/23 9:01 AM Kath Morales. The physical examination was performed between the patient and Dr. Frank Garrett MD.  All counseling during the appointment was performed between the patient and the provider. Frank Garrett MD [address] [place] [address], [address] [place] [address], [id number]  Email: [email or web address]  Office: [phone]    08/07/23  9:01 AM        The encounter with Yulia Correia was carried out by myself, Dr Santiago Flaherty, who personally examined the patient and reviewed the plan. This dictation was performed with a verbal recognition program (DRAGON) and it was checked for errors. It is possible that there are still dictated errors within this office note. If so, please bring any errors to my attention for an addendum.

## 2023-08-09 ENCOUNTER — TELEPHONE (OUTPATIENT)
Dept: CARDIOLOGY CLINIC | Age: 43
End: 2023-08-09

## 2023-08-09 NOTE — TELEPHONE ENCOUNTER
I tried to call pt, no answer and no VM. Letter will be at the  in FF. If the letter needs a signature DKW is at the [facility] office today.

## 2023-08-09 NOTE — TELEPHONE ENCOUNTER
Pt needs copy of letter dated 07/25/2023 from   [address] Can it be printed for the pt to ? Please advise.

## 2023-08-10 ENCOUNTER — OFFICE VISIT (OUTPATIENT)
Dept: BARIATRICS/WEIGHT MGMT | Age: 43
End: 2023-08-10
Payer: COMMERCIAL

## 2023-08-10 ENCOUNTER — TELEPHONE (OUTPATIENT)
Dept: BARIATRICS/WEIGHT MGMT | Age: 43
End: 2023-08-10

## 2023-08-10 VITALS — WEIGHT: 230 LBS | HEIGHT: 64 IN | BODY MASS INDEX: 39.27 KG/M2

## 2023-08-10 DIAGNOSIS — E78.2 MIXED HYPERLIPIDEMIA: ICD-10-CM

## 2023-08-10 DIAGNOSIS — E11.69 DIABETES MELLITUS TYPE 2 IN OBESE (HCC): ICD-10-CM

## 2023-08-10 DIAGNOSIS — K21.9 CHRONIC GERD: Primary | ICD-10-CM

## 2023-08-10 DIAGNOSIS — G47.33 OSA TREATED WITH BIPAP: ICD-10-CM

## 2023-08-10 DIAGNOSIS — E66.9 DIABETES MELLITUS TYPE 2 IN OBESE (HCC): ICD-10-CM

## 2023-08-10 DIAGNOSIS — I10 ESSENTIAL HYPERTENSION: Chronic | ICD-10-CM

## 2023-08-10 DIAGNOSIS — E66.01 SEVERE OBESITY (BMI 35.0-39.9) WITH COMORBIDITY (HCC): ICD-10-CM

## 2023-08-10 PROCEDURE — G8417 CALC BMI ABV UP PARAM F/U: HCPCS | Performed by: NURSE PRACTITIONER

## 2023-08-10 PROCEDURE — 1036F TOBACCO NON-USER: CPT | Performed by: NURSE PRACTITIONER

## 2023-08-10 PROCEDURE — G8427 DOCREV CUR MEDS BY ELIG CLIN: HCPCS | Performed by: NURSE PRACTITIONER

## 2023-08-10 PROCEDURE — 3044F HG A1C LEVEL LT 7.0%: CPT | Performed by: NURSE PRACTITIONER

## 2023-08-10 PROCEDURE — 99214 OFFICE O/P EST MOD 30 MIN: CPT | Performed by: NURSE PRACTITIONER

## 2023-08-10 PROCEDURE — 2022F DILAT RTA XM EVC RTNOPTHY: CPT | Performed by: NURSE PRACTITIONER

## 2023-08-10 RX ORDER — DIVALPROEX SODIUM 500 MG/1
500 TABLET, EXTENDED RELEASE ORAL NIGHTLY
COMMUNITY
Start: 2023-07-19

## 2023-08-10 NOTE — TELEPHONE ENCOUNTER
Nancy approval submitted for sleeve  CPT 79100  DOS 9/18/23  Will come back as an exclusion    File with [facility] as secondary

## 2023-08-21 ENCOUNTER — OFFICE VISIT (OUTPATIENT)
Dept: ORTHOPEDIC SURGERY | Age: 43
End: 2023-08-21

## 2023-08-21 VITALS — BODY MASS INDEX: 37.99 KG/M2 | HEIGHT: 65 IN | WEIGHT: 228 LBS

## 2023-08-21 DIAGNOSIS — M16.0 PRIMARY OSTEOARTHRITIS OF BOTH HIPS: Primary | ICD-10-CM

## 2023-08-21 RX ORDER — ROPIVACAINE HYDROCHLORIDE 5 MG/ML
10 INJECTION, SOLUTION EPIDURAL; INFILTRATION; PERINEURAL ONCE
Status: COMPLETED | OUTPATIENT
Start: 2023-08-21 | End: 2023-08-21

## 2023-08-21 RX ADMIN — ROPIVACAINE HYDROCHLORIDE 10 ML: 5 INJECTION, SOLUTION EPIDURAL; INFILTRATION; PERINEURAL at 14:32

## 2023-08-21 RX ADMIN — ROPIVACAINE HYDROCHLORIDE 10 ML: 5 INJECTION, SOLUTION EPIDURAL; INFILTRATION; PERINEURAL at 14:31

## 2023-08-21 NOTE — PROGRESS NOTES
8/21/23  2:25 PM        NDC: 54715-491-11   -   Ropivacaine 0.5 %    LOT: [id number]    COMMENT: BILATERAL HIP DUROLANE INJECTIONS

## 2023-08-30 ENCOUNTER — OFFICE VISIT (OUTPATIENT)
Dept: FAMILY MEDICINE CLINIC | Age: 43
End: 2023-08-30

## 2023-08-30 VITALS
WEIGHT: 235 LBS | OXYGEN SATURATION: 99 % | BODY MASS INDEX: 39.15 KG/M2 | SYSTOLIC BLOOD PRESSURE: 136 MMHG | DIASTOLIC BLOOD PRESSURE: 80 MMHG | HEART RATE: 67 BPM | RESPIRATION RATE: 12 BRPM | TEMPERATURE: 98.5 F | HEIGHT: 65 IN

## 2023-08-30 DIAGNOSIS — R00.0 CHRONIC TACHYCARDIA: ICD-10-CM

## 2023-08-30 DIAGNOSIS — E78.2 MIXED HYPERLIPIDEMIA: ICD-10-CM

## 2023-08-30 DIAGNOSIS — F31.9 BIPOLAR 1 DISORDER (HCC): ICD-10-CM

## 2023-08-30 DIAGNOSIS — J44.9 COPD WITH ASTHMA (HCC): ICD-10-CM

## 2023-08-30 DIAGNOSIS — Z01.818 PREOP EXAMINATION: Primary | ICD-10-CM

## 2023-08-30 DIAGNOSIS — K21.9 GASTROESOPHAGEAL REFLUX DISEASE, UNSPECIFIED WHETHER ESOPHAGITIS PRESENT: ICD-10-CM

## 2023-08-30 DIAGNOSIS — G43.909 MIGRAINE WITHOUT STATUS MIGRAINOSUS, NOT INTRACTABLE, UNSPECIFIED MIGRAINE TYPE: ICD-10-CM

## 2023-08-30 DIAGNOSIS — I10 ESSENTIAL HYPERTENSION: ICD-10-CM

## 2023-08-30 DIAGNOSIS — G47.33 OSA (OBSTRUCTIVE SLEEP APNEA): ICD-10-CM

## 2023-08-30 DIAGNOSIS — E11.9 TYPE 2 DIABETES MELLITUS WITHOUT COMPLICATION, WITHOUT LONG-TERM CURRENT USE OF INSULIN (HCC): ICD-10-CM

## 2023-09-01 ENCOUNTER — HOSPITAL ENCOUNTER (OUTPATIENT)
Dept: CARDIAC CATH/INVASIVE PROCEDURES | Age: 43
Discharge: HOME | End: 2023-09-01
Attending: INTERNAL MEDICINE | Admitting: INTERNAL MEDICINE
Payer: COMMERCIAL

## 2023-09-01 ENCOUNTER — ANESTHESIA EVENT (OUTPATIENT)
Dept: CARDIAC CATH/INVASIVE PROCEDURES | Age: 43
End: 2023-09-01
Payer: COMMERCIAL

## 2023-09-01 ENCOUNTER — ANESTHESIA (OUTPATIENT)
Dept: CARDIAC CATH/INVASIVE PROCEDURES | Age: 43
End: 2023-09-01
Payer: COMMERCIAL

## 2023-09-01 VITALS
HEIGHT: 64 IN | DIASTOLIC BLOOD PRESSURE: 84 MMHG | WEIGHT: 230 LBS | RESPIRATION RATE: 16 BRPM | HEART RATE: 86 BPM | BODY MASS INDEX: 39.27 KG/M2 | SYSTOLIC BLOOD PRESSURE: 122 MMHG | TEMPERATURE: 97 F | OXYGEN SATURATION: 95 %

## 2023-09-01 PROBLEM — I47.19 AVNRT (AV NODAL RE-ENTRY TACHYCARDIA): Status: ACTIVE | Noted: 2023-04-13

## 2023-09-01 LAB
ANION GAP SERPL CALCULATED.3IONS-SCNC: 9 MMOL/L (ref 3–16)
BUN SERPL-MCNC: 18 MG/DL (ref 7–20)
CALCIUM SERPL-MCNC: 9.4 MG/DL (ref 8.3–10.6)
CHLORIDE SERPL-SCNC: 99 MMOL/L (ref 99–110)
CO2 SERPL-SCNC: 27 MMOL/L (ref 21–32)
CREAT SERPL-MCNC: 0.7 MG/DL (ref 0.6–1.1)
DEPRECATED RDW RBC AUTO: 14.4 % (ref 12.4–15.4)
EKG ATRIAL RATE: 63 BPM
EKG DIAGNOSIS: NORMAL
EKG P AXIS: 44 DEGREES
EKG P-R INTERVAL: 138 MS
EKG Q-T INTERVAL: 418 MS
EKG QRS DURATION: 94 MS
EKG QTC CALCULATION (BAZETT): 427 MS
EKG R AXIS: 5 DEGREES
EKG T AXIS: 23 DEGREES
EKG VENTRICULAR RATE: 63 BPM
GFR SERPLBLD CREATININE-BSD FMLA CKD-EPI: >60 ML/MIN/{1.73_M2}
GLUCOSE BLD-MCNC: 98 MG/DL (ref 70–99)
GLUCOSE SERPL-MCNC: 97 MG/DL (ref 70–99)
HCT VFR BLD AUTO: 37.2 % (ref 36–48)
HGB BLD-MCNC: 12.2 G/DL (ref 12–16)
MCH RBC QN AUTO: 28.5 PG (ref 26–34)
MCHC RBC AUTO-ENTMCNC: 32.9 G/DL (ref 31–36)
MCV RBC AUTO: 86.6 FL (ref 80–100)
PERFORMED ON: NORMAL
PLATELET # BLD AUTO: 271 K/UL (ref 135–450)
PMV BLD AUTO: 6.8 FL (ref 5–10.5)
POTASSIUM SERPL-SCNC: 4.2 MMOL/L (ref 3.5–5.1)
RBC # BLD AUTO: 4.29 M/UL (ref 4–5.2)
SODIUM SERPL-SCNC: 135 MMOL/L (ref 136–145)
WBC # BLD AUTO: 8.1 K/UL (ref 4–11)

## 2023-09-01 PROCEDURE — 3700000000 HC ANESTHESIA ATTENDED CARE

## 2023-09-01 PROCEDURE — C1893 INTRO/SHEATH, FIXED,NON-PEEL: HCPCS

## 2023-09-01 PROCEDURE — 6360000002 HC RX W HCPCS

## 2023-09-01 PROCEDURE — 2500000003 HC RX 250 WO HCPCS: Performed by: NURSE ANESTHETIST, CERTIFIED REGISTERED

## 2023-09-01 PROCEDURE — C9399 UNCLASSIFIED DRUGS OR BIOLOG: HCPCS

## 2023-09-01 PROCEDURE — 93623 PRGRMD STIMJ&PACG IV RX NFS: CPT | Performed by: INTERNAL MEDICINE

## 2023-09-01 PROCEDURE — 93653 COMPRE EP EVAL TX SVT: CPT | Performed by: INTERNAL MEDICINE

## 2023-09-01 PROCEDURE — 93653 COMPRE EP EVAL TX SVT: CPT

## 2023-09-01 PROCEDURE — C1732 CATH, EP, DIAG/ABL, 3D/VECT: HCPCS

## 2023-09-01 PROCEDURE — C9399 UNCLASSIFIED DRUGS OR BIOLOG: HCPCS | Performed by: NURSE ANESTHETIST, CERTIFIED REGISTERED

## 2023-09-01 PROCEDURE — 2500000003 HC RX 250 WO HCPCS

## 2023-09-01 PROCEDURE — 85027 COMPLETE CBC AUTOMATED: CPT

## 2023-09-01 PROCEDURE — 3700000001 HC ADD 15 MINUTES (ANESTHESIA)

## 2023-09-01 PROCEDURE — 2580000003 HC RX 258

## 2023-09-01 PROCEDURE — 7100000011 HC PHASE II RECOVERY - ADDTL 15 MIN

## 2023-09-01 PROCEDURE — C1730 CATH, EP, 19 OR FEW ELECT: HCPCS

## 2023-09-01 PROCEDURE — 7100000001 HC PACU RECOVERY - ADDTL 15 MIN

## 2023-09-01 PROCEDURE — 93623 PRGRMD STIMJ&PACG IV RX NFS: CPT

## 2023-09-01 PROCEDURE — 80048 BASIC METABOLIC PNL TOTAL CA: CPT

## 2023-09-01 PROCEDURE — 2709999900 HC NON-CHARGEABLE SUPPLY

## 2023-09-01 PROCEDURE — C1894 INTRO/SHEATH, NON-LASER: HCPCS

## 2023-09-01 PROCEDURE — 7100000010 HC PHASE II RECOVERY - FIRST 15 MIN

## 2023-09-01 PROCEDURE — 2580000003 HC RX 258: Performed by: NURSE ANESTHETIST, CERTIFIED REGISTERED

## 2023-09-01 PROCEDURE — 7100000000 HC PACU RECOVERY - FIRST 15 MIN

## 2023-09-01 PROCEDURE — 36415 COLL VENOUS BLD VENIPUNCTURE: CPT

## 2023-09-01 PROCEDURE — C1769 GUIDE WIRE: HCPCS

## 2023-09-01 PROCEDURE — 6360000002 HC RX W HCPCS: Performed by: NURSE ANESTHETIST, CERTIFIED REGISTERED

## 2023-09-01 PROCEDURE — 6370000000 HC RX 637 (ALT 250 FOR IP): Performed by: ANESTHESIOLOGY

## 2023-09-01 PROCEDURE — 2720000010 HC SURG SUPPLY STERILE

## 2023-09-01 RX ORDER — SODIUM CHLORIDE 9 MG/ML
INJECTION, SOLUTION INTRAVENOUS CONTINUOUS PRN
Status: DISCONTINUED | OUTPATIENT
Start: 2023-09-01 | End: 2023-09-01 | Stop reason: SDUPTHER

## 2023-09-01 RX ORDER — LABETALOL HYDROCHLORIDE 5 MG/ML
10 INJECTION, SOLUTION INTRAVENOUS
Status: DISCONTINUED | OUTPATIENT
Start: 2023-09-01 | End: 2023-09-01 | Stop reason: HOSPADM

## 2023-09-01 RX ORDER — PROPOFOL 10 MG/ML
INJECTION, EMULSION INTRAVENOUS PRN
Status: DISCONTINUED | OUTPATIENT
Start: 2023-09-01 | End: 2023-09-01 | Stop reason: SDUPTHER

## 2023-09-01 RX ORDER — LIDOCAINE HYDROCHLORIDE 20 MG/ML
INJECTION, SOLUTION EPIDURAL; INFILTRATION; INTRACAUDAL; PERINEURAL PRN
Status: DISCONTINUED | OUTPATIENT
Start: 2023-09-01 | End: 2023-09-01 | Stop reason: SDUPTHER

## 2023-09-01 RX ORDER — ONDANSETRON 2 MG/ML
INJECTION INTRAMUSCULAR; INTRAVENOUS PRN
Status: DISCONTINUED | OUTPATIENT
Start: 2023-09-01 | End: 2023-09-01 | Stop reason: SDUPTHER

## 2023-09-01 RX ORDER — DEXAMETHASONE SODIUM PHOSPHATE 4 MG/ML
INJECTION, SOLUTION INTRA-ARTICULAR; INTRALESIONAL; INTRAMUSCULAR; INTRAVENOUS; SOFT TISSUE PRN
Status: DISCONTINUED | OUTPATIENT
Start: 2023-09-01 | End: 2023-09-01 | Stop reason: SDUPTHER

## 2023-09-01 RX ORDER — HYDRALAZINE HYDROCHLORIDE 20 MG/ML
10 INJECTION INTRAMUSCULAR; INTRAVENOUS
Status: DISCONTINUED | OUTPATIENT
Start: 2023-09-01 | End: 2023-09-01 | Stop reason: HOSPADM

## 2023-09-01 RX ORDER — DIPHENHYDRAMINE HYDROCHLORIDE 50 MG/ML
INJECTION INTRAMUSCULAR; INTRAVENOUS
Status: COMPLETED
Start: 2023-09-01 | End: 2023-09-01

## 2023-09-01 RX ORDER — SODIUM CHLORIDE 0.9 % (FLUSH) 0.9 %
5-40 SYRINGE (ML) INJECTION PRN
Status: DISCONTINUED | OUTPATIENT
Start: 2023-09-01 | End: 2023-09-01 | Stop reason: HOSPADM

## 2023-09-01 RX ORDER — OXYCODONE HYDROCHLORIDE 5 MG/1
5 TABLET ORAL
Status: COMPLETED | OUTPATIENT
Start: 2023-09-01 | End: 2023-09-01

## 2023-09-01 RX ORDER — FENTANYL CITRATE 50 UG/ML
INJECTION, SOLUTION INTRAMUSCULAR; INTRAVENOUS PRN
Status: DISCONTINUED | OUTPATIENT
Start: 2023-09-01 | End: 2023-09-01 | Stop reason: SDUPTHER

## 2023-09-01 RX ORDER — MEPERIDINE HYDROCHLORIDE 25 MG/ML
12.5 INJECTION INTRAMUSCULAR; INTRAVENOUS; SUBCUTANEOUS EVERY 5 MIN PRN
Status: DISCONTINUED | OUTPATIENT
Start: 2023-09-01 | End: 2023-09-01 | Stop reason: HOSPADM

## 2023-09-01 RX ORDER — ACETAMINOPHEN 325 MG/1
650 TABLET ORAL EVERY 4 HOURS PRN
Status: DISCONTINUED | OUTPATIENT
Start: 2023-09-01 | End: 2023-09-01 | Stop reason: HOSPADM

## 2023-09-01 RX ORDER — PHENYLEPHRINE HCL IN 0.9% NACL 1 MG/10 ML
SYRINGE (ML) INTRAVENOUS PRN
Status: DISCONTINUED | OUTPATIENT
Start: 2023-09-01 | End: 2023-09-01 | Stop reason: SDUPTHER

## 2023-09-01 RX ORDER — GLYCOPYRROLATE 0.2 MG/ML
INJECTION INTRAMUSCULAR; INTRAVENOUS PRN
Status: DISCONTINUED | OUTPATIENT
Start: 2023-09-01 | End: 2023-09-01 | Stop reason: SDUPTHER

## 2023-09-01 RX ORDER — SODIUM CHLORIDE 9 MG/ML
INJECTION, SOLUTION INTRAVENOUS PRN
Status: DISCONTINUED | OUTPATIENT
Start: 2023-09-01 | End: 2023-09-01 | Stop reason: HOSPADM

## 2023-09-01 RX ORDER — SUCCINYLCHOLINE/SOD CL,ISO/PF 100 MG/5ML
SYRINGE (ML) INTRAVENOUS PRN
Status: DISCONTINUED | OUTPATIENT
Start: 2023-09-01 | End: 2023-09-01 | Stop reason: SDUPTHER

## 2023-09-01 RX ORDER — SODIUM CHLORIDE 0.9 % (FLUSH) 0.9 %
5-40 SYRINGE (ML) INJECTION EVERY 12 HOURS SCHEDULED
Status: DISCONTINUED | OUTPATIENT
Start: 2023-09-01 | End: 2023-09-01 | Stop reason: HOSPADM

## 2023-09-01 RX ORDER — ONDANSETRON 2 MG/ML
4 INJECTION INTRAMUSCULAR; INTRAVENOUS
Status: DISCONTINUED | OUTPATIENT
Start: 2023-09-01 | End: 2023-09-01 | Stop reason: HOSPADM

## 2023-09-01 RX ORDER — DIPHENHYDRAMINE HYDROCHLORIDE 50 MG/ML
12.5 INJECTION INTRAMUSCULAR; INTRAVENOUS ONCE
Status: COMPLETED | OUTPATIENT
Start: 2023-09-01 | End: 2023-09-01

## 2023-09-01 RX ORDER — VECURONIUM BROMIDE 1 MG/ML
INJECTION, POWDER, LYOPHILIZED, FOR SOLUTION INTRAVENOUS PRN
Status: DISCONTINUED | OUTPATIENT
Start: 2023-09-01 | End: 2023-09-01 | Stop reason: SDUPTHER

## 2023-09-01 RX ORDER — HYDROMORPHONE HYDROCHLORIDE 2 MG/ML
0.5 INJECTION, SOLUTION INTRAMUSCULAR; INTRAVENOUS; SUBCUTANEOUS EVERY 5 MIN PRN
Status: DISCONTINUED | OUTPATIENT
Start: 2023-09-01 | End: 2023-09-01 | Stop reason: HOSPADM

## 2023-09-01 RX ADMIN — DEXAMETHASONE SODIUM PHOSPHATE 8 MG: 4 INJECTION, SOLUTION INTRAMUSCULAR; INTRAVENOUS at 07:53

## 2023-09-01 RX ADMIN — Medication 30 MG: at 07:52

## 2023-09-01 RX ADMIN — SUGAMMADEX 200 MG: 100 INJECTION, SOLUTION INTRAVENOUS at 09:55

## 2023-09-01 RX ADMIN — Medication 200 MCG: at 08:19

## 2023-09-01 RX ADMIN — GLYCOPYRROLATE 0.2 MG: 0.2 INJECTION, SOLUTION INTRAMUSCULAR; INTRAVENOUS at 08:01

## 2023-09-01 RX ADMIN — Medication 200 MCG: at 08:01

## 2023-09-01 RX ADMIN — VECURONIUM BROMIDE 5 MG: 1 INJECTION, POWDER, LYOPHILIZED, FOR SOLUTION INTRAVENOUS at 09:03

## 2023-09-01 RX ADMIN — DIPHENHYDRAMINE HYDROCHLORIDE 12.5 MG: 50 INJECTION, SOLUTION INTRAMUSCULAR; INTRAVENOUS at 11:57

## 2023-09-01 RX ADMIN — Medication 170 MG: at 07:46

## 2023-09-01 RX ADMIN — SODIUM CHLORIDE: 9 INJECTION, SOLUTION INTRAVENOUS at 08:13

## 2023-09-01 RX ADMIN — LIDOCAINE HYDROCHLORIDE 100 MG: 20 INJECTION, SOLUTION EPIDURAL; INFILTRATION; INTRACAUDAL; PERINEURAL at 07:46

## 2023-09-01 RX ADMIN — OXYCODONE HYDROCHLORIDE 5 MG: 5 TABLET ORAL at 14:09

## 2023-09-01 RX ADMIN — PHENYLEPHRINE HYDROCHLORIDE 50 MCG/MIN: 10 INJECTION INTRAVENOUS at 08:03

## 2023-09-01 RX ADMIN — SODIUM CHLORIDE: 9 INJECTION, SOLUTION INTRAVENOUS at 07:38

## 2023-09-01 RX ADMIN — DIPHENHYDRAMINE HYDROCHLORIDE 12.5 MG: 50 INJECTION INTRAMUSCULAR; INTRAVENOUS at 11:57

## 2023-09-01 RX ADMIN — VECURONIUM BROMIDE 10 MG: 1 INJECTION, POWDER, LYOPHILIZED, FOR SOLUTION INTRAVENOUS at 08:15

## 2023-09-01 RX ADMIN — Medication 200 MCG: at 08:33

## 2023-09-01 RX ADMIN — PROPOFOL 200 MG: 10 INJECTION, EMULSION INTRAVENOUS at 07:46

## 2023-09-01 RX ADMIN — ISOPROTERENOL HYDROCHLORIDE 2 MCG/MIN: 0.2 INJECTION, SOLUTION INTRAMUSCULAR; INTRAVENOUS at 08:28

## 2023-09-01 RX ADMIN — FENTANYL CITRATE 50 MCG: 50 INJECTION, SOLUTION INTRAMUSCULAR; INTRAVENOUS at 10:11

## 2023-09-01 RX ADMIN — FENTANYL CITRATE 50 MCG: 50 INJECTION, SOLUTION INTRAMUSCULAR; INTRAVENOUS at 07:46

## 2023-09-01 RX ADMIN — ONDANSETRON 4 MG: 2 INJECTION INTRAMUSCULAR; INTRAVENOUS at 07:53

## 2023-09-01 RX ADMIN — Medication 200 MCG: at 09:48

## 2023-09-01 ASSESSMENT — PAIN DESCRIPTION - DESCRIPTORS: DESCRIPTORS: STABBING

## 2023-09-01 ASSESSMENT — PAIN DESCRIPTION - LOCATION: LOCATION: GROIN

## 2023-09-01 ASSESSMENT — COPD QUESTIONNAIRES: CAT_SEVERITY: MILD

## 2023-09-01 ASSESSMENT — PAIN SCALES - GENERAL: PAINLEVEL_OUTOF10: 7

## 2023-09-01 ASSESSMENT — PAIN DESCRIPTION - ORIENTATION: ORIENTATION: RIGHT;LEFT

## 2023-09-01 ASSESSMENT — LIFESTYLE VARIABLES: SMOKING_STATUS: 0

## 2023-09-01 NOTE — ANESTHESIA PRE PROCEDURE
Induction: intravenous. MIPS: Postoperative opioids intended and Prophylactic antiemetics administered. Anesthetic plan and risks discussed with patient. Plan discussed with CRNA.                     Oneil Patel MD   9/1/2023

## 2023-09-01 NOTE — PROCEDURES
[facility]     Electrophysiology Procedure Note       Date of Procedure: 9/1/2023  Patient's Name: Lilian Garcia  YOB: 1980   Medical Record Number: [id number]  Procedure Performed by: Faiza Owen MD    Procedure performed:    Comprehensive electrophysiological study with ablation of SVT, AV John re-entry   Attempted induction of arrhythmia after IV drug infusion. Three-dimensional electroanatomic mapping of the right atrium  Left atrial recording and mapping via coronary sinus     Indications for procedure: SVT, tachycardia   Lilian Garcia 43 y.o. female  with recurrent episodes of palpitation. Details of Procedure: The risks, benefits and alternatives of the ablation procedure were discussed with the patient. The risks including, but not limited to, the risks of bleeding, infection, radiation exposure, injury to vascular, cardiac and surrounding structures (including pneumothorax), stroke, cardiac perforation, tamponade, need for emergent open heart surgery, need for pacemaker implantation, myocardial infarction and death were discussed in detail. The patient opted to proceed with the ablation. Written informed consent was signed and placed in the chart. The patient was brought to the electrophysiology lab in a fasting nonsedated state. Anesthesia team provided sedation. Ultrasound was used for femoral venous access. The femoral vein was identified with real time visualization of needle passage to the venous lumen. We gained access to right femoral vein. two 8F, and one 6F sheaths used and were placed in the right femoral vein using modified Seldinger technique and ultrasound guidance. A decapolar catheter was advanced into the coronary sinus under fluoroscopy so the distal poles were in the coronary sinus for left atrial recording and mapping. Two quadripolar catheters were advanced into the RV and His position.  Baseline measurements and EP study by

## 2023-09-01 NOTE — DISCHARGE INSTRUCTIONS
ABLATION DISCHARGE INSTRUCTIONS:    Care of your puncture site:  Remove bandage 24 hours after the procedure. May shower in 24 hours but do not sit in a bathtub/pool of water for 5 days or until the wound is healed. Inspect the site daily and gently clean using soap and water while standing in the shower. Dry thoroughly and apply a Band-Aid that covers the entire site. Do not apply powder or lotion. Normal Observations:  Soreness or tenderness which may last one week. Mild oozing from the incision site. Possible bruising that could last 2 weeks. Activity:  You may resume driving 24 hours following the procedure. You may resume normal activity in 3 days or after the wound heals. Avoid lifting more than 10 pounds for 3 days or until the wound heals. Avoid strenuous exercise or activity for 1 week. Nutrition:  Regular diet  Drink at least 8 to 10 glasses of decaffeinated, non-alcoholic fluid for the next 24 hours to flush the x-ray dye used for your angiogram out of your body. Call your doctor immediately if your condition worsens, for any other concerns, for a follow-up appointment or if you experience any of the following:  Significant bleeding that does not stop after 10 minutes of applying firm pressure on the puncture site. Increased swelling on the groin or leg. Unusual pain, numbness, or tingling of the groin or down the leg. Any signs of infection such as: redness, yellow drainage at the site, swelling or pain. ANESTHESIA DISCHARGE INSTRUCTIONS    Wear your seatbelt home. You are under the influence of drugs-do not drink alcohol,drive,operate machinery,or make any important decisions or sign any legal documentsfor 24 hours  A responsible adult needs to be with you for 24 hours. You may experience lightheadedness,dizziness,or sleepiness following surgery. Rest at home today- increase activity as tolerated.   Progress slowly to a regular diet unless your physician has instructed you

## 2023-09-01 NOTE — PROGRESS NOTES
PRE-PROCEDURE    DATE: 9/1/2023 ARRIVAL TO CATH LAB: 6:56 AM    ADMIT SOURCE: Outpatient    ID & ALLERGY BAND: On    CONSENT: Yes    NPO SINCE: Midnight    LABS/PREGNANCY TEST: Yes    PULSES: Right DP 2+  Right PT 2+  Left DP 2+  Left PT 2+    IV SITE : Started in left arm.  with fluids infusing at kvo 6:56 AM     EKG RHYTHM: Normal Sinus Rhythm

## 2023-09-01 NOTE — PROGRESS NOTES
Taught pt and pt's mother discharge instructions. Pt and pt's mother verbalize understanding of instructions. Pt dressed with min assist and devan activity well.

## 2023-09-01 NOTE — PROGRESS NOTES
Pt devan crackers without c/o nausea. Pt rates pain as \"7\". Pt given oral pain med, see MAR. Right groin dressing remains clean, dry, and intact. Pt voiding per bedpan.

## 2023-09-01 NOTE — ANESTHESIA POSTPROCEDURE EVALUATION
Department of Anesthesiology  Postprocedure Note    Patient: Sherry Spann  MRN: [id number]  YOB: 1980  Date of evaluation: 9/1/2023      Procedure Summary     Date: 09/01/23 Room / Location: [facility] Cath Lab    Anesthesia Start: 6249 Anesthesia Stop: 8497    Procedure: ABLATION Diagnosis: Supraventricular tachycardia    Scheduled Providers: Keiry Bolden MD Responsible Provider: Keiry Bolden MD    Anesthesia Type: general ASA Status: 3          Anesthesia Type: No value filed.     Kevin Phase I: Kevin Score: 9    Kevin Phase II:        Anesthesia Post Evaluation    Patient location during evaluation: PACU  Patient participation: complete - patient participated  Level of consciousness: awake and alert  Airway patency: patent  Nausea & Vomiting: no vomiting and no nausea  Complications: no  Cardiovascular status: hemodynamically stable  Respiratory status: acceptable  Hydration status: stable  Pain management: adequate

## 2023-09-01 NOTE — H&P
[facility]   Electrophysiology Consultation   Date: 9/1/2023  Reason for Consultation: Palpitation:   Dr Mireya Harrsi     CC: Palpitation   HPI: Guerline Corbett is a 43 y.o. female  with past medical history of idiopathic tachycardia HLD, HTN,DM and asthma. On 02/08/2023 she went to [facility] ER with palpitations, dizziness, chest pain and SOB through to be due to anxiety. EKG showed possible junctional tachycardia? (Tracing is not available to me) She was treated with IVF with resolution of tachycardia and hypotension. CTA PE - negative     [facility] 12/21/2022 to 01/19/2023 - high Heart rate 134 bpm, <1% PAC and PVC. symptoms of chest pain during sinus rhythm    She saw Dr. Nayan Mckay 03/20/2023 for pre-op clearance for bariatric surgery. She was referred to EP to discuss possible AVRNT ablation. [place] presents today to discuss her tachycardia. She states she has been having episodes for 9 years. 1st episode was in the setting of Pneumonia She gets weak and Shakey and cant see. She  terminates these episodes with bearing down. This episodes have become more frequent, last 5 minutes to 5 hours. She does not smoke or drink. No family members with SVT that she is aware of. Assessment and plan:   - Palpitation, narrow complex Tachycardia   ECG from [facility] is not available, but reported narrow complex tachycardia with heart rates of 130s. Ekg Today: sinus rhythm. DD include SVT including AVNRT/AT/AVRT       Continues having symptoms of palpitation despite taking metoprolol. Continue lopressor 50 mg BID       Cardiac CTA 01/05/2023 - normal coronaries    Stress test 2021 - normal     Diagnostic options and treatment options including EP study and ablation discussed with the patient. The risks, benefits and alternatives of the ablation procedure were discussed with the patient.  The risks including, but not limited to, the risks of bleeding, infection, radiation exposure, injury to Medication Sig Start Date End Date Taking? Authorizing Provider   divalproex (DEPAKOTE ER) 500 MG extended release tablet Take 1 tablet by mouth nightly at bedtime. 7/19/23   Historical Provider, MD   aspirin (ASPIRIN LOW DOSE) 81 MG EC tablet Take 1 tablet by mouth daily 8/1/23 Neal Fall, DO   Cholecalciferol (VITAMIN D3) 50 MCG (2000 UT) CAPS Take 1 capsule by mouth daily 7/24/23   JUAN RAMON Willis CNP   meloxicam (MOBIC) 15 MG tablet Take 1 tablet by mouth daily 7/24/23   JUAN RAMON Willis CNP   metoprolol tartrate (LOPRESSOR) 50 MG tablet Take 1 tablet by mouth 2 times daily 7/24/23   JUAN RAMON Willis CNP   metFORMIN (GLUCOPHAGE-XR) 500 MG extended release tablet Take 2 tablets by mouth daily (with breakfast) 7/24/23   JUAN RAMON Willis CNP   rosuvastatin (CRESTOR) 40 MG tablet TAKE 1 TABLET BY MOUTH EVERY DAY 7/24/23   JUAN RAMON Willis CNP   omeprazole (PRILOSEC) 20 MG delayed release capsule TAKE 1 CAPSULE BY MOUTH EVERY DAY 7/24/23   JUAN RAMON Willis CNP   ezetimibe (ZETIA) 10 MG tablet Take 1 tablet by mouth daily 7/24/23   JUAN RAMON Willis CNP   hydrOXYzine HCl (ATARAX) 10 MG tablet Take 1 tablet by mouth 3 times daily as needed 5/4/23   Historical Provider, MD   thiothixene (NAVANE) 5 MG capsule Take 1 capsule by mouth nightly 6/1/23   Historical Provider, MD Millard Cassi 002 MG/ML SOAJ ADMINISTER 1 ML UNDER THE SKIN EVERY 14 DAYS 6/7/23 Neal Fall, DO   Multiple Vitamins-Minerals (MULTIVITAMIN GUMMIES ADULTS PO) Take 2 tablets by mouth daily    Historical Provider, MD   nitroGLYCERIN (NITROSTAT) 0.4 MG SL tablet Place 1 tablet under the tongue every 5 minutes as needed for Chest pain up to max of 3 total doses.  If no relief after 1 dose, call 911. 11/14/22   JUAN RAMON Willis CNP   albuterol sulfate HFA (PROVENTIL;VENTOLIN;PROAIR) 108 (90 Base) MCG/ACT inhaler TAKE 2 PUFFS BY MOUTH EVERY 6 HOURS AS NEEDED FOR WHEEZE 9/7/22   Jhonathan Durán MD   citalopram

## 2023-09-01 NOTE — PROGRESS NOTES
Pt arrived to bay 13 via cart from PACU. Alert and oriented. Right groin dressing clean and dry. Pt oriented to room, call light, and POC. Pt's mother at bedside.

## 2023-09-05 NOTE — PROGRESS NOTES
Name_______________________________________Printed:____________________  Date and time of surgery__9/18  1200______________________Arrival Time:___1000_____________   1. The instructions given regarding when and if a patient needs to stop oral intake prior to surgery varies. Follow the specific instructions you were given                  ___Nothing to eat or to drink after Midnight the night before.                   ____Carbo loading or instructions will be given to select patients-if you have been given those instructions -please do the following                           The evening before your surgery after dinner before midnight drink 40 ounces of gatorade. If you are diabetic use sugar free. The morning of surgery drink 40 ounces of water. This needs to be finished 3 hours prior to your surgery start time. 2. Take the following pills with a small sip of water on the morning of surgery__none_________________________________________________                  Do not take blood pressure medications ending in pril or sartan the wilfrido prior to surgery or the morning of surgery. Dr Ye Nicolas patient are not to take any medications the AM of surgery. 3. Aspirin, Ibuprofen, Advil, Naproxen, Vitamin E and other Anti-inflammatory products and supplements should be stopped for 5 -7days before surgery or as directed by your physician. 4. Check with your Doctor regarding stopping Plavix, Coumadin,Eliquis, Lovenox,Effient,Pradaxa,Xarelto, Fragmin or other blood thinners and follow their instructions. 5. Do not smoke, and do not drink any alcoholic beverages 24 hours prior to surgery. This includes NA Beer. Refrain from the usage of any recreational drugs. 6. You may brush your teeth and gargle the morning of surgery. DO NOT SWALLOW WATER   7. You MUST make arrangements for a responsible adult to stay on site while you are here and take you home after your surgery.  You will not be allowed to leave alone or drive

## 2023-09-18 ENCOUNTER — HOSPITAL ENCOUNTER (INPATIENT)
Age: 43
LOS: 1 days | Discharge: HOME | DRG: 621 | End: 2023-09-19
Attending: SURGERY | Admitting: SURGERY
Payer: COMMERCIAL

## 2023-09-18 ENCOUNTER — ANESTHESIA (OUTPATIENT)
Dept: OPERATING ROOM | Age: 43
DRG: 621 | End: 2023-09-18
Payer: COMMERCIAL

## 2023-09-18 ENCOUNTER — ANESTHESIA EVENT (OUTPATIENT)
Dept: OPERATING ROOM | Age: 43
DRG: 621 | End: 2023-09-18
Payer: COMMERCIAL

## 2023-09-18 DIAGNOSIS — Z98.84 S/P LAPAROSCOPIC SLEEVE GASTRECTOMY: Primary | ICD-10-CM

## 2023-09-18 DIAGNOSIS — E66.01 MORBID OBESITY (HCC): ICD-10-CM

## 2023-09-18 PROBLEM — E88.810 METABOLIC SYNDROME: Status: ACTIVE | Noted: 2023-09-18

## 2023-09-18 PROBLEM — E88.81 METABOLIC SYNDROME: Status: ACTIVE | Noted: 2023-09-18

## 2023-09-18 LAB
ABO + RH BLD: NORMAL
BLD GP AB SCN SERPL QL: NORMAL
GLUCOSE BLD-MCNC: 134 MG/DL (ref 70–99)
GLUCOSE BLD-MCNC: 89 MG/DL (ref 70–99)
HCG UR QL: NEGATIVE
PERFORMED ON: ABNORMAL
PERFORMED ON: NORMAL

## 2023-09-18 PROCEDURE — 1200000000 HC SEMI PRIVATE

## 2023-09-18 PROCEDURE — 6370000000 HC RX 637 (ALT 250 FOR IP): Performed by: SURGERY

## 2023-09-18 PROCEDURE — 3700000000 HC ANESTHESIA ATTENDED CARE: Performed by: SURGERY

## 2023-09-18 PROCEDURE — 6360000002 HC RX W HCPCS: Performed by: SURGERY

## 2023-09-18 PROCEDURE — C9399 UNCLASSIFIED DRUGS OR BIOLOG: HCPCS | Performed by: NURSE ANESTHETIST, CERTIFIED REGISTERED

## 2023-09-18 PROCEDURE — 2580000003 HC RX 258: Performed by: SURGERY

## 2023-09-18 PROCEDURE — 3700000001 HC ADD 15 MINUTES (ANESTHESIA): Performed by: SURGERY

## 2023-09-18 PROCEDURE — 43775 LAP SLEEVE GASTRECTOMY: CPT | Performed by: SURGERY

## 2023-09-18 PROCEDURE — 2709999900 HC NON-CHARGEABLE SUPPLY: Performed by: SURGERY

## 2023-09-18 PROCEDURE — 0DB64Z3 EXCISION OF STOMACH, PERCUTANEOUS ENDOSCOPIC APPROACH, VERTICAL: ICD-10-PCS | Performed by: SURGERY

## 2023-09-18 PROCEDURE — 86901 BLOOD TYPING SEROLOGIC RH(D): CPT

## 2023-09-18 PROCEDURE — 3600000015 HC SURGERY LEVEL 5 ADDTL 15MIN: Performed by: SURGERY

## 2023-09-18 PROCEDURE — 2500000003 HC RX 250 WO HCPCS: Performed by: NURSE ANESTHETIST, CERTIFIED REGISTERED

## 2023-09-18 PROCEDURE — 36415 COLL VENOUS BLD VENIPUNCTURE: CPT

## 2023-09-18 PROCEDURE — 6360000002 HC RX W HCPCS: Performed by: ANESTHESIOLOGY

## 2023-09-18 PROCEDURE — 2720000010 HC SURG SUPPLY STERILE: Performed by: SURGERY

## 2023-09-18 PROCEDURE — 86850 RBC ANTIBODY SCREEN: CPT

## 2023-09-18 PROCEDURE — 6360000002 HC RX W HCPCS: Performed by: NURSE ANESTHETIST, CERTIFIED REGISTERED

## 2023-09-18 PROCEDURE — 7100000000 HC PACU RECOVERY - FIRST 15 MIN: Performed by: SURGERY

## 2023-09-18 PROCEDURE — A4217 STERILE WATER/SALINE, 500 ML: HCPCS | Performed by: SURGERY

## 2023-09-18 PROCEDURE — C9113 INJ PANTOPRAZOLE SODIUM, VIA: HCPCS | Performed by: SURGERY

## 2023-09-18 PROCEDURE — 88307 TISSUE EXAM BY PATHOLOGIST: CPT

## 2023-09-18 PROCEDURE — 2500000003 HC RX 250 WO HCPCS: Performed by: SURGERY

## 2023-09-18 PROCEDURE — 3600000005 HC SURGERY LEVEL 5 BASE: Performed by: SURGERY

## 2023-09-18 PROCEDURE — 2580000003 HC RX 258

## 2023-09-18 PROCEDURE — 7100000001 HC PACU RECOVERY - ADDTL 15 MIN: Performed by: SURGERY

## 2023-09-18 PROCEDURE — P9045 ALBUMIN (HUMAN), 5%, 250 ML: HCPCS | Performed by: NURSE ANESTHETIST, CERTIFIED REGISTERED

## 2023-09-18 PROCEDURE — C9145 HC RX W HCPCS: HCPCS | Performed by: SURGERY

## 2023-09-18 PROCEDURE — 86900 BLOOD TYPING SEROLOGIC ABO: CPT

## 2023-09-18 PROCEDURE — 84703 CHORIONIC GONADOTROPIN ASSAY: CPT

## 2023-09-18 RX ORDER — METHYLENE BLUE 10 MG/ML
INJECTION INTRAVENOUS
Status: COMPLETED | OUTPATIENT
Start: 2023-09-18 | End: 2023-09-18

## 2023-09-18 RX ORDER — FAMOTIDINE 10 MG/ML
INJECTION, SOLUTION INTRAVENOUS PRN
Status: DISCONTINUED | OUTPATIENT
Start: 2023-09-18 | End: 2023-09-18 | Stop reason: SDUPTHER

## 2023-09-18 RX ORDER — ENOXAPARIN SODIUM 100 MG/ML
30 INJECTION SUBCUTANEOUS ONCE
Status: COMPLETED | OUTPATIENT
Start: 2023-09-18 | End: 2023-09-18

## 2023-09-18 RX ORDER — DIPHENHYDRAMINE HYDROCHLORIDE 50 MG/ML
12.5 INJECTION INTRAMUSCULAR; INTRAVENOUS EVERY 6 HOURS PRN
Status: DISCONTINUED | OUTPATIENT
Start: 2023-09-18 | End: 2023-09-19 | Stop reason: HOSPADM

## 2023-09-18 RX ORDER — MAGNESIUM SULFATE HEPTAHYDRATE 500 MG/ML
INJECTION, SOLUTION INTRAMUSCULAR; INTRAVENOUS PRN
Status: DISCONTINUED | OUTPATIENT
Start: 2023-09-18 | End: 2023-09-18 | Stop reason: SDUPTHER

## 2023-09-18 RX ORDER — SODIUM CHLORIDE 0.9 % (FLUSH) 0.9 %
5-40 SYRINGE (ML) INJECTION EVERY 12 HOURS SCHEDULED
Status: DISCONTINUED | OUTPATIENT
Start: 2023-09-18 | End: 2023-09-19 | Stop reason: HOSPADM

## 2023-09-18 RX ORDER — SODIUM CHLORIDE 0.9 % (FLUSH) 0.9 %
5-40 SYRINGE (ML) INJECTION PRN
Status: DISCONTINUED | OUTPATIENT
Start: 2023-09-18 | End: 2023-09-19 | Stop reason: HOSPADM

## 2023-09-18 RX ORDER — LIDOCAINE HYDROCHLORIDE 10 MG/ML
1 INJECTION, SOLUTION EPIDURAL; INFILTRATION; INTRACAUDAL; PERINEURAL
Status: DISCONTINUED | OUTPATIENT
Start: 2023-09-18 | End: 2023-09-18 | Stop reason: HOSPADM

## 2023-09-18 RX ORDER — NALOXONE HYDROCHLORIDE 0.4 MG/ML
INJECTION, SOLUTION INTRAMUSCULAR; INTRAVENOUS; SUBCUTANEOUS PRN
Status: DISCONTINUED | OUTPATIENT
Start: 2023-09-18 | End: 2023-09-19

## 2023-09-18 RX ORDER — HYDROMORPHONE HYDROCHLORIDE 2 MG/ML
0.25 INJECTION, SOLUTION INTRAMUSCULAR; INTRAVENOUS; SUBCUTANEOUS EVERY 5 MIN PRN
Status: DISCONTINUED | OUTPATIENT
Start: 2023-09-18 | End: 2023-09-18 | Stop reason: HOSPADM

## 2023-09-18 RX ORDER — KETAMINE HYDROCHLORIDE 10 MG/ML
INJECTION INTRAMUSCULAR; INTRAVENOUS PRN
Status: DISCONTINUED | OUTPATIENT
Start: 2023-09-18 | End: 2023-09-18 | Stop reason: SDUPTHER

## 2023-09-18 RX ORDER — SODIUM CHLORIDE 0.9 % (FLUSH) 0.9 %
5-40 SYRINGE (ML) INJECTION EVERY 12 HOURS SCHEDULED
Status: DISCONTINUED | OUTPATIENT
Start: 2023-09-18 | End: 2023-09-18 | Stop reason: HOSPADM

## 2023-09-18 RX ORDER — BUPIVACAINE HYDROCHLORIDE 2.5 MG/ML
INJECTION, SOLUTION EPIDURAL; INFILTRATION; INTRACAUDAL
Status: COMPLETED | OUTPATIENT
Start: 2023-09-18 | End: 2023-09-18

## 2023-09-18 RX ORDER — LIDOCAINE HYDROCHLORIDE 20 MG/ML
INJECTION, SOLUTION INFILTRATION; PERINEURAL PRN
Status: DISCONTINUED | OUTPATIENT
Start: 2023-09-18 | End: 2023-09-18 | Stop reason: SDUPTHER

## 2023-09-18 RX ORDER — ACETAMINOPHEN 160 MG/5ML
650 LIQUID ORAL ONCE
Status: COMPLETED | OUTPATIENT
Start: 2023-09-18 | End: 2023-09-18

## 2023-09-18 RX ORDER — SODIUM CHLORIDE 9 MG/ML
INJECTION, SOLUTION INTRAVENOUS CONTINUOUS
Status: DISCONTINUED | OUTPATIENT
Start: 2023-09-18 | End: 2023-09-18 | Stop reason: HOSPADM

## 2023-09-18 RX ORDER — ROCURONIUM BROMIDE 10 MG/ML
INJECTION, SOLUTION INTRAVENOUS PRN
Status: DISCONTINUED | OUTPATIENT
Start: 2023-09-18 | End: 2023-09-18 | Stop reason: SDUPTHER

## 2023-09-18 RX ORDER — SODIUM CHLORIDE 9 MG/ML
INJECTION, SOLUTION INTRAVENOUS PRN
Status: DISCONTINUED | OUTPATIENT
Start: 2023-09-18 | End: 2023-09-18 | Stop reason: HOSPADM

## 2023-09-18 RX ORDER — MAGNESIUM HYDROXIDE 1200 MG/15ML
LIQUID ORAL CONTINUOUS PRN
Status: COMPLETED | OUTPATIENT
Start: 2023-09-18 | End: 2023-09-18

## 2023-09-18 RX ORDER — ALBUMIN, HUMAN INJ 5% 5 %
SOLUTION INTRAVENOUS PRN
Status: DISCONTINUED | OUTPATIENT
Start: 2023-09-18 | End: 2023-09-18 | Stop reason: SDUPTHER

## 2023-09-18 RX ORDER — SCOLOPAMINE TRANSDERMAL SYSTEM 1 MG/1
1 PATCH, EXTENDED RELEASE TRANSDERMAL ONCE
Status: DISCONTINUED | OUTPATIENT
Start: 2023-09-18 | End: 2023-09-18

## 2023-09-18 RX ORDER — DIPHENHYDRAMINE HYDROCHLORIDE 50 MG/ML
INJECTION INTRAMUSCULAR; INTRAVENOUS PRN
Status: DISCONTINUED | OUTPATIENT
Start: 2023-09-18 | End: 2023-09-18 | Stop reason: SDUPTHER

## 2023-09-18 RX ORDER — SODIUM CHLORIDE 0.9 % (FLUSH) 0.9 %
5-40 SYRINGE (ML) INJECTION PRN
Status: DISCONTINUED | OUTPATIENT
Start: 2023-09-18 | End: 2023-09-18 | Stop reason: HOSPADM

## 2023-09-18 RX ORDER — PROPOFOL 10 MG/ML
INJECTION, EMULSION INTRAVENOUS PRN
Status: DISCONTINUED | OUTPATIENT
Start: 2023-09-18 | End: 2023-09-18 | Stop reason: SDUPTHER

## 2023-09-18 RX ORDER — SUCCINYLCHOLINE CHLORIDE 20 MG/ML
INJECTION INTRAMUSCULAR; INTRAVENOUS PRN
Status: DISCONTINUED | OUTPATIENT
Start: 2023-09-18 | End: 2023-09-18 | Stop reason: SDUPTHER

## 2023-09-18 RX ORDER — HYDRALAZINE HYDROCHLORIDE 20 MG/ML
10 INJECTION INTRAMUSCULAR; INTRAVENOUS
Status: DISCONTINUED | OUTPATIENT
Start: 2023-09-18 | End: 2023-09-19 | Stop reason: HOSPADM

## 2023-09-18 RX ORDER — METOCLOPRAMIDE HYDROCHLORIDE 5 MG/ML
10 INJECTION INTRAMUSCULAR; INTRAVENOUS EVERY 6 HOURS PRN
Status: DISCONTINUED | OUTPATIENT
Start: 2023-09-18 | End: 2023-09-19 | Stop reason: HOSPADM

## 2023-09-18 RX ORDER — LABETALOL HYDROCHLORIDE 5 MG/ML
5 INJECTION, SOLUTION INTRAVENOUS
Status: DISCONTINUED | OUTPATIENT
Start: 2023-09-18 | End: 2023-09-18 | Stop reason: HOSPADM

## 2023-09-18 RX ORDER — CEFAZOLIN 2 G/1
INJECTION, POWDER, FOR SOLUTION INTRAMUSCULAR; INTRAVENOUS
Status: DISCONTINUED
Start: 2023-09-18 | End: 2023-09-18

## 2023-09-18 RX ORDER — LABETALOL HYDROCHLORIDE 5 MG/ML
10 INJECTION, SOLUTION INTRAVENOUS
Status: DISCONTINUED | OUTPATIENT
Start: 2023-09-18 | End: 2023-09-19 | Stop reason: HOSPADM

## 2023-09-18 RX ORDER — ONDANSETRON 2 MG/ML
4 INJECTION INTRAMUSCULAR; INTRAVENOUS
Status: COMPLETED | OUTPATIENT
Start: 2023-09-18 | End: 2023-09-18

## 2023-09-18 RX ORDER — ONDANSETRON 2 MG/ML
INJECTION INTRAMUSCULAR; INTRAVENOUS PRN
Status: DISCONTINUED | OUTPATIENT
Start: 2023-09-18 | End: 2023-09-18 | Stop reason: SDUPTHER

## 2023-09-18 RX ORDER — METOPROLOL TARTRATE 5 MG/5ML
2.5 INJECTION INTRAVENOUS EVERY 6 HOURS
Status: DISCONTINUED | OUTPATIENT
Start: 2023-09-18 | End: 2023-09-19

## 2023-09-18 RX ORDER — MIDAZOLAM HYDROCHLORIDE 1 MG/ML
INJECTION INTRAMUSCULAR; INTRAVENOUS PRN
Status: DISCONTINUED | OUTPATIENT
Start: 2023-09-18 | End: 2023-09-18 | Stop reason: SDUPTHER

## 2023-09-18 RX ORDER — HYDROMORPHONE HYDROCHLORIDE 2 MG/ML
0.5 INJECTION, SOLUTION INTRAMUSCULAR; INTRAVENOUS; SUBCUTANEOUS EVERY 5 MIN PRN
Status: DISCONTINUED | OUTPATIENT
Start: 2023-09-18 | End: 2023-09-18 | Stop reason: HOSPADM

## 2023-09-18 RX ORDER — SODIUM CHLORIDE, SODIUM LACTATE, POTASSIUM CHLORIDE, CALCIUM CHLORIDE 600; 310; 30; 20 MG/100ML; MG/100ML; MG/100ML; MG/100ML
INJECTION, SOLUTION INTRAVENOUS CONTINUOUS
Status: DISCONTINUED | OUTPATIENT
Start: 2023-09-18 | End: 2023-09-19 | Stop reason: HOSPADM

## 2023-09-18 RX ORDER — ONDANSETRON 2 MG/ML
4 INJECTION INTRAMUSCULAR; INTRAVENOUS EVERY 6 HOURS PRN
Status: DISCONTINUED | OUTPATIENT
Start: 2023-09-18 | End: 2023-09-19 | Stop reason: HOSPADM

## 2023-09-18 RX ORDER — FENTANYL CITRATE 50 UG/ML
INJECTION, SOLUTION INTRAMUSCULAR; INTRAVENOUS PRN
Status: DISCONTINUED | OUTPATIENT
Start: 2023-09-18 | End: 2023-09-18 | Stop reason: SDUPTHER

## 2023-09-18 RX ORDER — SODIUM CHLORIDE 9 MG/ML
INJECTION, SOLUTION INTRAVENOUS
Status: COMPLETED
Start: 2023-09-18 | End: 2023-09-18

## 2023-09-18 RX ORDER — GLYCOPYRROLATE 0.2 MG/ML
INJECTION INTRAMUSCULAR; INTRAVENOUS PRN
Status: DISCONTINUED | OUTPATIENT
Start: 2023-09-18 | End: 2023-09-18 | Stop reason: SDUPTHER

## 2023-09-18 RX ORDER — ENOXAPARIN SODIUM 100 MG/ML
30 INJECTION SUBCUTANEOUS EVERY 12 HOURS
Status: DISCONTINUED | OUTPATIENT
Start: 2023-09-19 | End: 2023-09-19 | Stop reason: HOSPADM

## 2023-09-18 RX ORDER — SODIUM CHLORIDE 9 MG/ML
INJECTION, SOLUTION INTRAVENOUS PRN
Status: DISCONTINUED | OUTPATIENT
Start: 2023-09-18 | End: 2023-09-19 | Stop reason: HOSPADM

## 2023-09-18 RX ORDER — LIDOCAINE HYDROCHLORIDE 10 MG/ML
0.5 INJECTION, SOLUTION EPIDURAL; INFILTRATION; INTRACAUDAL; PERINEURAL ONCE
Status: DISCONTINUED | OUTPATIENT
Start: 2023-09-18 | End: 2023-09-18 | Stop reason: HOSPADM

## 2023-09-18 RX ORDER — SCOLOPAMINE TRANSDERMAL SYSTEM 1 MG/1
1 PATCH, EXTENDED RELEASE TRANSDERMAL
Status: DISCONTINUED | OUTPATIENT
Start: 2023-09-21 | End: 2023-09-19 | Stop reason: HOSPADM

## 2023-09-18 RX ORDER — ALBUTEROL SULFATE 90 UG/1
2 AEROSOL, METERED RESPIRATORY (INHALATION) EVERY 6 HOURS PRN
Status: DISCONTINUED | OUTPATIENT
Start: 2023-09-18 | End: 2023-09-19 | Stop reason: HOSPADM

## 2023-09-18 RX ORDER — LIDOCAINE 4 G/G
1 PATCH TOPICAL DAILY
Status: DISCONTINUED | OUTPATIENT
Start: 2023-09-18 | End: 2023-09-19 | Stop reason: HOSPADM

## 2023-09-18 RX ORDER — PROMETHAZINE HYDROCHLORIDE 25 MG/ML
6.25 INJECTION, SOLUTION INTRAMUSCULAR; INTRAVENOUS EVERY 6 HOURS PRN
Status: DISCONTINUED | OUTPATIENT
Start: 2023-09-18 | End: 2023-09-19 | Stop reason: HOSPADM

## 2023-09-18 RX ORDER — HYOSCYAMINE SULFATE 0.5 MG/ML
250 INJECTION, SOLUTION SUBCUTANEOUS EVERY 4 HOURS PRN
Status: DISCONTINUED | OUTPATIENT
Start: 2023-09-18 | End: 2023-09-19 | Stop reason: HOSPADM

## 2023-09-18 RX ORDER — HYDROMORPHONE HYDROCHLORIDE 1 MG/ML
0.5 INJECTION, SOLUTION INTRAMUSCULAR; INTRAVENOUS; SUBCUTANEOUS
Status: DISCONTINUED | OUTPATIENT
Start: 2023-09-18 | End: 2023-09-19 | Stop reason: HOSPADM

## 2023-09-18 RX ORDER — ONDANSETRON 4 MG/1
4 TABLET, ORALLY DISINTEGRATING ORAL EVERY 8 HOURS PRN
Status: DISCONTINUED | OUTPATIENT
Start: 2023-09-18 | End: 2023-09-19 | Stop reason: HOSPADM

## 2023-09-18 RX ORDER — SODIUM CHLORIDE 9 MG/ML
INJECTION, SOLUTION INTRAVENOUS CONTINUOUS
Status: DISCONTINUED | OUTPATIENT
Start: 2023-09-18 | End: 2023-09-18

## 2023-09-18 RX ORDER — DEXAMETHASONE SODIUM PHOSPHATE 4 MG/ML
INJECTION, SOLUTION INTRA-ARTICULAR; INTRALESIONAL; INTRAMUSCULAR; INTRAVENOUS; SOFT TISSUE PRN
Status: DISCONTINUED | OUTPATIENT
Start: 2023-09-18 | End: 2023-09-18 | Stop reason: SDUPTHER

## 2023-09-18 RX ADMIN — PHENYLEPHRINE HYDROCHLORIDE 100 MCG: 10 INJECTION INTRAVENOUS at 13:25

## 2023-09-18 RX ADMIN — MAGNESIUM SULFATE HEPTAHYDRATE 1 G: 500 INJECTION, SOLUTION INTRAMUSCULAR; INTRAVENOUS at 12:45

## 2023-09-18 RX ADMIN — CEFAZOLIN 2000 MG: 2 INJECTION, POWDER, FOR SOLUTION INTRAMUSCULAR; INTRAVENOUS at 12:35

## 2023-09-18 RX ADMIN — ALBUMIN (HUMAN) 250 ML: 12.5 INJECTION, SOLUTION INTRAVENOUS at 12:48

## 2023-09-18 RX ADMIN — SODIUM CHLORIDE: 9 INJECTION, SOLUTION INTRAVENOUS at 11:05

## 2023-09-18 RX ADMIN — PHENYLEPHRINE HYDROCHLORIDE 100 MCG: 10 INJECTION INTRAVENOUS at 13:15

## 2023-09-18 RX ADMIN — ONDANSETRON 4 MG: 2 INJECTION INTRAMUSCULAR; INTRAVENOUS at 16:09

## 2023-09-18 RX ADMIN — DIPHENHYDRAMINE HYDROCHLORIDE 12.5 MG: 50 INJECTION, SOLUTION INTRAMUSCULAR; INTRAVENOUS at 12:38

## 2023-09-18 RX ADMIN — DEXAMETHASONE SODIUM PHOSPHATE 4 MG: 4 INJECTION, SOLUTION INTRAMUSCULAR; INTRAVENOUS at 12:54

## 2023-09-18 RX ADMIN — METOCLOPRAMIDE 10 MG: 5 INJECTION, SOLUTION INTRAMUSCULAR; INTRAVENOUS at 18:36

## 2023-09-18 RX ADMIN — ACETAMINOPHEN 650 MG: 650 SOLUTION ORAL at 11:04

## 2023-09-18 RX ADMIN — Medication: at 14:44

## 2023-09-18 RX ADMIN — ONDANSETRON 4 MG: 2 INJECTION INTRAMUSCULAR; INTRAVENOUS at 12:58

## 2023-09-18 RX ADMIN — FAMOTIDINE 20 MG: 10 INJECTION INTRAVENOUS at 12:08

## 2023-09-18 RX ADMIN — FENTANYL CITRATE 50 MCG: 50 INJECTION, SOLUTION INTRAMUSCULAR; INTRAVENOUS at 12:55

## 2023-09-18 RX ADMIN — LIDOCAINE HYDROCHLORIDE 100 MG: 20 INJECTION, SOLUTION INFILTRATION; PERINEURAL at 12:41

## 2023-09-18 RX ADMIN — PHENYLEPHRINE HYDROCHLORIDE 100 MCG: 10 INJECTION INTRAVENOUS at 12:45

## 2023-09-18 RX ADMIN — ROCURONIUM BROMIDE 50 MG: 10 INJECTION, SOLUTION INTRAVENOUS at 12:41

## 2023-09-18 RX ADMIN — KETAMINE HYDROCHLORIDE 15 MG: 10 INJECTION, SOLUTION INTRAMUSCULAR; INTRAVENOUS at 12:51

## 2023-09-18 RX ADMIN — PHENYLEPHRINE HYDROCHLORIDE 100 MCG: 10 INJECTION INTRAVENOUS at 13:10

## 2023-09-18 RX ADMIN — APREPITANT 32 MG: 32 EMULSION INTRAVENOUS at 11:04

## 2023-09-18 RX ADMIN — GLYCOPYRROLATE 0.2 MG: 0.2 INJECTION, SOLUTION INTRAMUSCULAR; INTRAVENOUS at 12:39

## 2023-09-18 RX ADMIN — MIDAZOLAM 2 MG: 1 INJECTION INTRAMUSCULAR; INTRAVENOUS at 12:35

## 2023-09-18 RX ADMIN — ENOXAPARIN SODIUM 30 MG: 100 INJECTION SUBCUTANEOUS at 11:04

## 2023-09-18 RX ADMIN — SODIUM CHLORIDE: 9 INJECTION, SOLUTION INTRAVENOUS at 13:35

## 2023-09-18 RX ADMIN — PHENYLEPHRINE HYDROCHLORIDE 100 MCG: 10 INJECTION INTRAVENOUS at 12:50

## 2023-09-18 RX ADMIN — SUCCINYLCHOLINE CHLORIDE 120 MG: 20 INJECTION, SOLUTION INTRAMUSCULAR; INTRAVENOUS at 13:57

## 2023-09-18 RX ADMIN — KETAMINE HYDROCHLORIDE 15 MG: 10 INJECTION, SOLUTION INTRAMUSCULAR; INTRAVENOUS at 13:05

## 2023-09-18 RX ADMIN — ALBUMIN (HUMAN) 250 ML: 12.5 INJECTION, SOLUTION INTRAVENOUS at 13:10

## 2023-09-18 RX ADMIN — PROPOFOL 200 MG: 10 INJECTION, EMULSION INTRAVENOUS at 12:41

## 2023-09-18 RX ADMIN — SODIUM CHLORIDE, POTASSIUM CHLORIDE, SODIUM LACTATE AND CALCIUM CHLORIDE: 600; 310; 30; 20 INJECTION, SOLUTION INTRAVENOUS at 14:51

## 2023-09-18 RX ADMIN — KETAMINE HYDROCHLORIDE 20 MG: 10 INJECTION, SOLUTION INTRAMUSCULAR; INTRAVENOUS at 13:20

## 2023-09-18 RX ADMIN — SUGAMMADEX 300 MG: 100 INJECTION, SOLUTION INTRAVENOUS at 13:45

## 2023-09-18 RX ADMIN — PHENYLEPHRINE HYDROCHLORIDE 100 MCG: 10 INJECTION INTRAVENOUS at 12:55

## 2023-09-18 RX ADMIN — FENTANYL CITRATE 50 MCG: 50 INJECTION, SOLUTION INTRAMUSCULAR; INTRAVENOUS at 12:41

## 2023-09-18 RX ADMIN — PHENYLEPHRINE HYDROCHLORIDE 100 MCG: 10 INJECTION INTRAVENOUS at 13:00

## 2023-09-18 RX ADMIN — SODIUM CHLORIDE, PRESERVATIVE FREE 40 MG: 5 INJECTION INTRAVENOUS at 18:36

## 2023-09-18 RX ADMIN — METOPROLOL TARTRATE 2.5 MG: 1 INJECTION, SOLUTION INTRAVENOUS at 18:36

## 2023-09-18 ASSESSMENT — PAIN DESCRIPTION - PAIN TYPE: TYPE: SURGICAL PAIN

## 2023-09-18 ASSESSMENT — PAIN DESCRIPTION - LOCATION: LOCATION: ABDOMEN

## 2023-09-18 ASSESSMENT — PAIN SCALES - GENERAL: PAINLEVEL_OUTOF10: 8

## 2023-09-18 ASSESSMENT — PAIN DESCRIPTION - DESCRIPTORS: DESCRIPTORS: STABBING

## 2023-09-18 ASSESSMENT — PAIN - FUNCTIONAL ASSESSMENT: PAIN_FUNCTIONAL_ASSESSMENT: 0-10

## 2023-09-18 NOTE — PROGRESS NOTES
Patient admitted to PACU via bed, arouses tos timuli, moves ext to command. Respirations adeq on 8L o2 per simple mask spo2 96%. Skin warm and dry with good color. Abd soft. Skin glue to 5 sites dry and intact. Will continue to monitor.

## 2023-09-18 NOTE — DISCHARGE INSTRUCTIONS
addition, keep wearing your abdominal binder at a minimum when you are up and about to help with support. You may also get over-the-counter Icy Hot or Salon Pas lidocaine patches to help with pain. Vitamins (Fusion)  When starting the Fusion multivitamin take one per day. Calcium chews do not start until after 6 week post-op visit. Phase I Diet  Please reference the Preoperative Class diet instructions and the Bariatric Surgery Dietary Discharge Instructions handout reviewed in the hospital. Joann Rose will be on a clear liquid diet for postop day one and two. Your goal is to get in 48-64oz of fluids daily. Refer to the Clear Liquids List for a list of acceptable clear liquids. Phase II Diet  Please reference the Preoperative Class diet instructions, the Bariatric Surgery Dietary Discharge Instructions handout and the Ideal Meal Process handout reviewed in the hospital. You will start your full liquid diet on postop day three. Your priority is to still get in 48-64oz of fluids daily, but you can start back on two of your protein shakes per day. Your goal is 60-80 grams of protein daily. When drinking your protein shakes you will not be able to drink them as fast as you could before surgery. However, we do not want you to drink your shakes for longer than 30 minutes, so that you are able to get back to your fluids. On post-op day five you can start eating approved full liquid and pureed/blenderized foods if you are meeting your fluid and protein shake goals. Food Diary  Please make sure you are keeping a food diary of everything you eat and drink starting on postop day three and bring it with you to your postop visits. Incentive Spirometer  You will continue to use your incentive spirometer (breathing device given to you in hospital) for the first two weeks after surgery to help prevent pneumonia and to open your lungs completely. You will use it ten times per hour while awake.      Dermabond Topical Skin

## 2023-09-18 NOTE — ANESTHESIA POSTPROCEDURE EVALUATION
Department of Anesthesiology  Postprocedure Note    Patient: Maura Sandoval  MRN: [id number]  YOB: 1980  Date of evaluation: 9/18/2023      Procedure Summary     Date: 09/18/23 Room / Location: [address]    Anesthesia Start: 1236 Anesthesia Stop: 1418    Procedure: LAPAROSCOPIC SLEEVE GASTRECTOMY (Abdomen) Diagnosis:       Morbid obesity ([address])      (Morbid obesity ([address]) [E66.01])    Surgeons: Fang Jenkins MD Responsible Provider: Gentry Grewal MD    Anesthesia Type: general ASA Status: 3          Anesthesia Type: No value filed.     Kevin Phase I: Kevin Score: 8    Kevin Phase II:        Anesthesia Post Evaluation    Patient location during evaluation: PACU  Patient participation: complete - patient participated  Level of consciousness: awake  Airway patency: patent  Nausea & Vomiting: no vomiting  Complications: no  Cardiovascular status: hemodynamically stable  Respiratory status: acceptable  Hydration status: euvolemic  Multimodal analgesia pain management approach

## 2023-09-18 NOTE — PROGRESS NOTES
Pt from PACU 1800. Voided after arrival. Gait was steady. Medicated for nausea with reglan. Call light in easy reach with spouse at bedside.

## 2023-09-18 NOTE — ANESTHESIA PRE PROCEDURE
Department of Anesthesiology  Preprocedure Note       Name:  Jp Hdz   Age:  43 y.o.  :  1980                                          MRN:  [id number]         Date:  2023      Surgeon: Carey De La Rosa): Ada Anguiano MD    Procedure: Procedure(s):  LAPAROSCOPIC SLEEVE GASTRECTOMY AND POSSIBLE OTHER INDICATED PROCEDURES    Medications prior to admission:   Prior to Admission medications    Medication Sig Start Date End Date Taking? Authorizing Provider   divalproex (DEPAKOTE ER) 500 MG extended release tablet Take 1 tablet by mouth nightly at bedtime.  23   Historical Provider, MD   aspirin (ASPIRIN LOW DOSE) 81 MG EC tablet Take 1 tablet by mouth daily 23   Mendoza Atwood,    Cholecalciferol (VITAMIN D3) 50 MCG (2000) CAPS Take 1 capsule by mouth daily 23   JUAN RAMON Gold CNP   meloxicam (MOBIC) 15 MG tablet Take 1 tablet by mouth daily 23   JUAN RAMON Gold CNP   metoprolol tartrate (LOPRESSOR) 50 MG tablet Take 1 tablet by mouth 2 times daily 23   JUAN RAMON Gold CNP   metFORMIN (GLUCOPHAGE-XR) 500 MG extended release tablet Take 2 tablets by mouth daily (with breakfast) 23   JUAN RAMON Gold CNP   rosuvastatin (CRESTOR) 40 MG tablet TAKE 1 TABLET BY MOUTH EVERY DAY 23   JUAN RAMON Gold CNP   omeprazole (PRILOSEC) 20 MG delayed release capsule TAKE 1 CAPSULE BY MOUTH EVERY DAY 23   JUAN RAMON Gold CNP   ezetimibe (ZETIA) 10 MG tablet Take 1 tablet by mouth daily 23   JUAN RAMON Gold CNP   hydrOXYzine HCl (ATARAX) 10 MG tablet Take 1 tablet by mouth 3 times daily as needed 23   Historical Provider, MD   thiothixene (NAVANE) 5 MG capsule Take 1 capsule by mouth nightly 23   Historical Provider, MD DANUTA BURRELL 048 MG/ML SOAJ ADMINISTER 1 ML UNDER THE SKIN EVERY 14 DAYS 23   Mendoza Atwood,    Multiple Vitamins-Minerals (MULTIVITAMIN GUMMIES ADULTS PO) Take 2 tablets by mouth daily    Historical

## 2023-09-18 NOTE — PROGRESS NOTES
Pt doing well. Pain controlled with PCA. Pt resting comfortably. Pt abd incisions remain unchanged from previous assessment. Waiting for room availability.

## 2023-09-19 ENCOUNTER — APPOINTMENT (OUTPATIENT)
Dept: GENERAL RADIOLOGY | Age: 43
DRG: 621 | End: 2023-09-19
Attending: SURGERY
Payer: COMMERCIAL

## 2023-09-19 VITALS
WEIGHT: 218 LBS | HEIGHT: 65 IN | RESPIRATION RATE: 16 BRPM | HEART RATE: 66 BPM | SYSTOLIC BLOOD PRESSURE: 100 MMHG | OXYGEN SATURATION: 93 % | BODY MASS INDEX: 36.32 KG/M2 | TEMPERATURE: 97.7 F | DIASTOLIC BLOOD PRESSURE: 58 MMHG

## 2023-09-19 LAB
ANION GAP SERPL CALCULATED.3IONS-SCNC: 14 MMOL/L (ref 3–16)
BUN SERPL-MCNC: 7 MG/DL (ref 7–20)
CALCIUM SERPL-MCNC: 8.9 MG/DL (ref 8.3–10.6)
CHLORIDE SERPL-SCNC: 100 MMOL/L (ref 99–110)
CO2 SERPL-SCNC: 22 MMOL/L (ref 21–32)
CREAT SERPL-MCNC: 0.6 MG/DL (ref 0.6–1.1)
DEPRECATED RDW RBC AUTO: 13.2 % (ref 12.4–15.4)
GFR SERPLBLD CREATININE-BSD FMLA CKD-EPI: >60 ML/MIN/{1.73_M2}
GLUCOSE SERPL-MCNC: 120 MG/DL (ref 70–99)
HCT VFR BLD AUTO: 35.5 % (ref 36–48)
HGB BLD-MCNC: 11.7 G/DL (ref 12–16)
MCH RBC QN AUTO: 28.3 PG (ref 26–34)
MCHC RBC AUTO-ENTMCNC: 33.1 G/DL (ref 31–36)
MCV RBC AUTO: 85.8 FL (ref 80–100)
PLATELET # BLD AUTO: 228 K/UL (ref 135–450)
PMV BLD AUTO: 7.7 FL (ref 5–10.5)
POTASSIUM SERPL-SCNC: 4.1 MMOL/L (ref 3.5–5.1)
RBC # BLD AUTO: 4.14 M/UL (ref 4–5.2)
SODIUM SERPL-SCNC: 136 MMOL/L (ref 136–145)
WBC # BLD AUTO: 14.4 K/UL (ref 4–11)

## 2023-09-19 PROCEDURE — 99024 POSTOP FOLLOW-UP VISIT: CPT | Performed by: NURSE PRACTITIONER

## 2023-09-19 PROCEDURE — 6360000002 HC RX W HCPCS: Performed by: NURSE PRACTITIONER

## 2023-09-19 PROCEDURE — 80048 BASIC METABOLIC PNL TOTAL CA: CPT

## 2023-09-19 PROCEDURE — APPSS180 APP SPLIT SHARED TIME > 60 MINUTES: Performed by: NURSE PRACTITIONER

## 2023-09-19 PROCEDURE — 6360000002 HC RX W HCPCS: Performed by: SURGERY

## 2023-09-19 PROCEDURE — C9113 INJ PANTOPRAZOLE SODIUM, VIA: HCPCS | Performed by: SURGERY

## 2023-09-19 PROCEDURE — 36415 COLL VENOUS BLD VENIPUNCTURE: CPT

## 2023-09-19 PROCEDURE — 6360000004 HC RX CONTRAST MEDICATION

## 2023-09-19 PROCEDURE — 6370000000 HC RX 637 (ALT 250 FOR IP): Performed by: NURSE PRACTITIONER

## 2023-09-19 PROCEDURE — 2500000003 HC RX 250 WO HCPCS: Performed by: SURGERY

## 2023-09-19 PROCEDURE — 6370000000 HC RX 637 (ALT 250 FOR IP): Performed by: SURGERY

## 2023-09-19 PROCEDURE — 94150 VITAL CAPACITY TEST: CPT

## 2023-09-19 PROCEDURE — 2580000003 HC RX 258: Performed by: SURGERY

## 2023-09-19 PROCEDURE — 74240 X-RAY XM UPR GI TRC 1CNTRST: CPT

## 2023-09-19 PROCEDURE — 85027 COMPLETE CBC AUTOMATED: CPT

## 2023-09-19 RX ORDER — METOPROLOL TARTRATE 50 MG/1
50 TABLET, FILM COATED ORAL 2 TIMES DAILY
Status: DISCONTINUED | OUTPATIENT
Start: 2023-09-19 | End: 2023-09-19 | Stop reason: HOSPADM

## 2023-09-19 RX ORDER — OXYCODONE HYDROCHLORIDE AND ACETAMINOPHEN 5; 325 MG/1; MG/1
2 TABLET ORAL EVERY 4 HOURS PRN
Status: DISCONTINUED | OUTPATIENT
Start: 2023-09-19 | End: 2023-09-19 | Stop reason: HOSPADM

## 2023-09-19 RX ORDER — PROMETHAZINE HYDROCHLORIDE 6.25 MG/5ML
12.5 SYRUP ORAL EVERY 6 HOURS PRN
Status: DISCONTINUED | OUTPATIENT
Start: 2023-09-19 | End: 2023-09-19 | Stop reason: HOSPADM

## 2023-09-19 RX ORDER — ONDANSETRON 8 MG/1
8 TABLET, ORALLY DISINTEGRATING ORAL EVERY 8 HOURS PRN
Qty: 30 TABLET | Refills: 0 | Status: SHIPPED | OUTPATIENT
Start: 2023-09-19

## 2023-09-19 RX ORDER — ONDANSETRON 8 MG/1
8 TABLET, ORALLY DISINTEGRATING ORAL EVERY 8 HOURS PRN
Qty: 30 TABLET | Refills: 1 | Status: SHIPPED | OUTPATIENT
Start: 2023-09-19

## 2023-09-19 RX ORDER — OXYCODONE HYDROCHLORIDE AND ACETAMINOPHEN 5; 325 MG/1; MG/1
1 TABLET ORAL EVERY 6 HOURS PRN
Qty: 28 TABLET | Refills: 0 | Status: SHIPPED | OUTPATIENT
Start: 2023-09-19 | End: 2023-09-26

## 2023-09-19 RX ORDER — OXYCODONE HYDROCHLORIDE AND ACETAMINOPHEN 5; 325 MG/1; MG/1
1 TABLET ORAL EVERY 4 HOURS PRN
Status: DISCONTINUED | OUTPATIENT
Start: 2023-09-19 | End: 2023-09-19 | Stop reason: HOSPADM

## 2023-09-19 RX ORDER — KETOROLAC TROMETHAMINE 30 MG/ML
15 INJECTION, SOLUTION INTRAMUSCULAR; INTRAVENOUS EVERY 6 HOURS
Status: COMPLETED | OUTPATIENT
Start: 2023-09-19 | End: 2023-09-19

## 2023-09-19 RX ADMIN — METOPROLOL TARTRATE 50 MG: 50 TABLET, FILM COATED ORAL at 10:40

## 2023-09-19 RX ADMIN — OXYCODONE HYDROCHLORIDE AND ACETAMINOPHEN 2 TABLET: 5; 325 TABLET ORAL at 10:33

## 2023-09-19 RX ADMIN — DIPHENHYDRAMINE HYDROCHLORIDE 12.5 MG: 50 INJECTION INTRAMUSCULAR; INTRAVENOUS at 10:38

## 2023-09-19 RX ADMIN — ENOXAPARIN SODIUM 30 MG: 100 INJECTION SUBCUTANEOUS at 05:07

## 2023-09-19 RX ADMIN — KETOROLAC TROMETHAMINE 15 MG: 30 INJECTION, SOLUTION INTRAMUSCULAR; INTRAVENOUS at 10:36

## 2023-09-19 RX ADMIN — SODIUM CHLORIDE, PRESERVATIVE FREE 40 MG: 5 INJECTION INTRAVENOUS at 07:41

## 2023-09-19 RX ADMIN — IOHEXOL 50 ML: 350 INJECTION, SOLUTION INTRAVENOUS at 08:14

## 2023-09-19 RX ADMIN — METOPROLOL TARTRATE 2.5 MG: 1 INJECTION, SOLUTION INTRAVENOUS at 06:32

## 2023-09-19 RX ADMIN — ONDANSETRON 4 MG: 2 INJECTION INTRAMUSCULAR; INTRAVENOUS at 00:00

## 2023-09-19 ASSESSMENT — PAIN DESCRIPTION - DESCRIPTORS
DESCRIPTORS: ACHING
DESCRIPTORS: ACHING

## 2023-09-19 ASSESSMENT — PAIN DESCRIPTION - ORIENTATION
ORIENTATION: RIGHT
ORIENTATION: RIGHT

## 2023-09-19 ASSESSMENT — PAIN SCALES - GENERAL
PAINLEVEL_OUTOF10: 7
PAINLEVEL_OUTOF10: 7

## 2023-09-19 ASSESSMENT — PAIN DESCRIPTION - LOCATION
LOCATION: ABDOMEN
LOCATION: ABDOMEN

## 2023-09-19 NOTE — PROGRESS NOTES
09/18/23 2010   Treatment   Treatment Type IS  (patient asleep and leave IS in room)   Oxygen Therapy/Pulse Ox   End Tidal CO2 27 (%)

## 2023-09-19 NOTE — PROGRESS NOTES
CLINICAL PHARMACY NOTE: MEDS TO BEDS    Total # of Prescriptions Filled: 2   The following medications were delivered to the patient:  OXYCODONE - ACETAMINOPHEN 5  ONDANSETRON 8MG    Additional Documentation:  Delivered to patients room = signed  Mathew Lai to be delivered per  [facility].

## 2023-09-19 NOTE — PROGRESS NOTES
09/19/23 0011   Incentive Spirometry Tx   Treatment Effort Initial   Predicted Volume 570   Achieved Volume (mL) 2000 mL

## 2023-09-19 NOTE — PROGRESS NOTES
Pt resting awake in bed. States nausea has improved and is now intermittent. Denies need for nausea med at this time. Ambulated in halls several times overnight. Lap sites WDL. Pt is axox4 and able to answer questions and follow commands throughout assessment. Call light in easy reach.

## 2023-09-19 NOTE — PROGRESS NOTES
Spoke with NP Akilah Fuentes via telephone regarding mild hypotension 89/50. Pt is asymptomatic, alert and awake ambulating without c/o dizziness. Last bp 100/58 HR 66. No new orders at this time. Call light in easy reach.

## 2023-09-19 NOTE — PLAN OF CARE
Shift assessment complete. VSS. Medications administered per order. Received PRN for nausea. Pt ambulating in room and in hallway overnight. Remained NPO. Declined SCDs. Abdominal binder in place. Lap sites approximated. Ice pack in place. The care plan and education has been reviewed and mutually agreed upon with the patient. Family member at bedside. Patient remains free from falls. All fall precautions in place. Bed in lowest position. Will monitor.      Problem: Chronic Conditions and Co-morbidities  Goal: Patient's chronic conditions and co-morbidity symptoms are monitored and maintained or improved  Outcome: Progressing     Problem: Discharge Planning  Goal: Discharge to home or other facility with appropriate resources  Outcome: Progressing     Problem: Pain  Goal: Verbalizes/displays adequate comfort level or baseline comfort level  Outcome: Progressing     Problem: ABCDS Injury Assessment  Goal: Absence of physical injury  Outcome: Progressing

## 2023-09-19 NOTE — PROGRESS NOTES
Incentive Spirometry education and demonstration completed by Respiratory Therapy Yes      Response to education: Excellent     Teaching Time: 10 minutes    Minimum Predicted Vital Capacity - 570 mL. Patient's Actual Vital Capacity - 2000   mL. Turning over to Nursing for routine follow-up Yes.     Comments:     Electronically signed by Umair Florez RCP on 9/19/2023 at 3:26 AM

## 2023-09-19 NOTE — DISCHARGE SUMMARY
current use of insulin ([address]) 11/16/2019    Essential hypertension 11/16/2019    Familial hypercholesterolemia 11/16/2019    Bipolar disorder ([address]) 03/28/2017    Fibromyalgia 03/28/2017    Mixed anxiety and depressive disorder 03/28/2017

## 2023-09-20 ENCOUNTER — CARE COORDINATION (OUTPATIENT)
Dept: CASE MANAGEMENT | Age: 43
End: 2023-09-20

## 2023-09-20 DIAGNOSIS — Z98.84 S/P LAPAROSCOPIC SLEEVE GASTRECTOMY: Primary | ICD-10-CM

## 2023-09-20 NOTE — CARE COORDINATION
[facility] Care Transitions Initial Follow Up Call    Call within 2 business days of discharge: Yes    Patient Current Location:  Home: [address] [place] [address] [address]  [address] [id number]    Care Transition Nurse contacted the patient by telephone to perform post hospital discharge assessment. Verified name and  with patient as identifiers. Provided introduction to self, and explanation of the Care Transition Nurse role. Patient: Annita Narvaez Patient : 1980   MRN: [id number]  Reason for Admission: lap sleeve gastrectomy  Discharge Date: 23 RARS: Readmission Risk Score: 6.4      Last Discharge [address] [facility]       Date Complaint Diagnosis Description Type Department Provider    23  S/P laparoscopic sleeve gastrectomy . .. Admission (Discharged) Gabino Marinelli MD            Was this an external facility discharge? No Discharge Facility:     Challenges to be reviewed by the provider   Additional needs identified to be addressed with provider: No  none               Method of communication with provider: none. Patient reports that she is having a lot of pain, level \"10\". She is hesitant to take pain medication because she is a recovering alcoholic. She did take one percocet this morning, but refuses to take every 6 hours as prescribed. 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 Discussed discharge instructions and reviewed medications, 1111F completed. CTN unable to find any appointments designated as hospital follow up. There were many availabilities, just nothing for a TCM visit. CTN unable to locate a pool for routing messages to [facility]. CTN routed message to [facility] to request assistance with scheduling the TCM appointment and to inquire about pool for the  in that office. She will follow up with surgeon 10/5/23.   CTN will continue with

## 2023-09-25 ENCOUNTER — TELEPHONE (OUTPATIENT)
Dept: BARIATRICS/WEIGHT MGMT | Age: 43
End: 2023-09-25

## 2023-09-25 ENCOUNTER — OFFICE VISIT (OUTPATIENT)
Dept: FAMILY MEDICINE CLINIC | Age: 43
End: 2023-09-25

## 2023-09-25 VITALS
WEIGHT: 208.4 LBS | SYSTOLIC BLOOD PRESSURE: 110 MMHG | HEART RATE: 63 BPM | DIASTOLIC BLOOD PRESSURE: 60 MMHG | HEIGHT: 65 IN | BODY MASS INDEX: 34.72 KG/M2 | OXYGEN SATURATION: 95 %

## 2023-09-25 DIAGNOSIS — Z09 HOSPITAL DISCHARGE FOLLOW-UP: Primary | ICD-10-CM

## 2023-09-25 DIAGNOSIS — E78.2 MIXED HYPERLIPIDEMIA: ICD-10-CM

## 2023-09-25 DIAGNOSIS — G47.33 OSA (OBSTRUCTIVE SLEEP APNEA): ICD-10-CM

## 2023-09-25 DIAGNOSIS — E66.01 MORBID OBESITY (HCC): ICD-10-CM

## 2023-09-25 DIAGNOSIS — E11.9 TYPE 2 DIABETES MELLITUS WITHOUT COMPLICATION, WITHOUT LONG-TERM CURRENT USE OF INSULIN (HCC): ICD-10-CM

## 2023-09-25 NOTE — TELEPHONE ENCOUNTER
Surgery Type: Sleeve Gastrectomy    Surgery Date: 9/18/2023    Surgeon: Dr. Tomasa Eisenmenger    The patient was contacted to follow up on their recent bariatric surgery. The following topics were reviewed:    [x] Hydration is Adequate 16.9 oz bottle water 4x day           [x]Patient is getting at least 48-64 oz of fluids a day, not including protein shakes. [x]Consuming Adequate Protein         [x]Consuming 2 protein shakes a day         [x]Consuming equal to 60-80 grams of protein a day   Mixing protein powder with 8 ounces of  skim milk . [x] Food intake is  appropriate     [x] Adequately pureeing foods, so that there are no chunks left. [x] Taking in 1-2 oz at a time  [x] Pt is eating 3 times per day  [x] Following the 30-30-30 rule. - Patient is eating pureed food over 30 minute timeframe.    - Patient is  fluids out from food by 30 minutes. [] Reminded patient to keep food diary to bring to their 2 week follow up appointment. [x] Pain relief techniques utilized  [x] Taking pain medication as prescribed  [] Utilizing Lidoderm patches (if prescribed)  []Taking Tylenol instead of prescription pain medication  [x] Wearing abdominal binder  [x] Using ice for incisional pain    [x] Activity is appropriate  [x] Walking 10 minutes out of every hour  [x] Avoiding heavy lifting (>10lbs)  [] Utilizing their incentive spirometer   [x]  is not using 10 times per hour while awake-instructed pt on importance of using IS for lung health. Also can cough/deep breath every hour to exercise lungs    [x] Issues with Nausea/Vomiting/Reflux- some nausea after protein shakes  [x] Using Zofran PRN for nausea/vomiting   [x]Taking Prilosec for reflux    [x] Issues with Constipation         [x] Pt has had a BM since surgery              [x] Pt does not feel constipated  [] Tried Colace  [] Tried Miralax          [x] Multivitamin capsule started    All questions and concerns addressed. Patient doing well.   Received flu shot

## 2023-09-25 NOTE — TELEPHONE ENCOUNTER
I attempted to contact the patient to follow up with them regarding their recent surgery. I have left a voicemail for the patient to return our call. If they call back, please forward call (ext [phone]) or send message to me. Thank you.

## 2023-09-25 NOTE — TELEPHONE ENCOUNTER
Looks like patient is doing well with her intake. Next follow can be at two week post-op visit.   Thank you,  Tiki Donaldson, [place]-C

## 2023-09-25 NOTE — PROGRESS NOTES
Post-Discharge Transitional Care  Follow Up      Modesto Winston   YOB: 1980    Date of Office Visit:  9/25/2023  Date of Hospital Admission: 9/18/23  Date of Hospital Discharge: 9/19/23  Risk of hospital readmission (high >=14%. Medium >=10%) :Readmission Risk Score: 6.4      Care management risk score Rising risk (score 2-5) and Complex Care (Scores >=6): No Risk Score On File     Non face to face  following discharge, date last encounter closed (first attempt may have been earlier): 09/20/2023    Call initiated 2 business days of discharge: Yes    ASSESSMENT/PLAN:   Hospital discharge follow-up  Morbid obesity ([address])  Type 2 diabetes mellitus without complication, without long-term current use of insulin (HCC)  Mixed hyperlipidemia  AMOS (obstructive sleep apnea)      Medical Decision Making: moderate complexity  No follow-ups on file. Subjective:   HPI:  Follow up of Hospital patient is here today for hospital follow-up for morbid obesity and status post laparoscopic sleeve gastrectomy and history of diabetes and AMOS. Patient is done well with recent gastrectomy. Reports she initially she had some trouble with constipation but that has since resolved. She is able to keep food down. Denies any dysphagia or nausea or vomiting. No further constipation or any diarrhea. Patient weighed 228 pounds at her preop visit about a month ago. She is at 208 pounds now. Patient is wondering if she should be continuing with her current medications for diabetes and hypertension. She does continue on her meds for now. She does have some labs pending and has a medication checkup coming up in December. Denies any episodes of hypoglycemia. problems/diagnosis(es):     Inpatient course: Discharge summary reviewed- see chart.     Interval history/Current status: Doing well postsurgery    Patient Active Problem List   Diagnosis    AMOS treated with BiPAP    Type 2 diabetes mellitus without

## 2023-09-27 ENCOUNTER — CARE COORDINATION (OUTPATIENT)
Dept: CASE MANAGEMENT | Age: 43
End: 2023-09-27

## 2023-09-27 ENCOUNTER — TELEPHONE (OUTPATIENT)
Dept: CARDIOLOGY CLINIC | Age: 43
End: 2023-09-27

## 2023-09-27 NOTE — CARE COORDINATION
[facility] Care Transitions Follow Up Call    Patient Current Location: [address] [place] [address] Transition Nurse contacted the patient by telephone to follow up after admission. Verified name and  with patient as identifiers. Patient: Nimco Bryan  Patient : 1980   MRN: [id number]  Reason for Admission: s/p laparoscopic sleeve gastrectomy  Discharge Date: 23 RARS: Readmission Risk Score: 6.4      Needs to be reviewed by the provider   Additional needs identified to be addressed with provider: No  none             Method of communication with provider: none. Pt states she's doing well. Reports pain is well managed with 1 percocet before bed only. Reports tolerating puree diet and fluids well. States she's having regular bowel movements and denies n/v. Reports taking medications as prescribed. Incisions clean and dry; denies s/s of infection. Surgery follow up appt 10/5. Pt denies questions or concerns at this time. Addressed changes since last contact:  none  Discussed follow-up appointments. If no appointment was previously scheduled, appointment scheduling offered: Yes. Is follow up appointment scheduled within 7 days of discharge? Yes. Follow Up  Future Appointments   Date Time Provider [address]   2023 10:20 AM JUAN RAMON John KW SLEEP MED [facility]   10/3/2023  3:00 PM MAMMOGRAPHY [facility] ROOM 3 [facility] [employer]   10/3/2023  3:30 PM [facility] 1 [facility] MOB [facility]   10/5/2023 10:30 AM Cait Wang MD HEALTHY WT [facility]   2023  2:45 PM Carmen Dennis MD FF Ortho [facility]   2023  2:30 PM JUAN RAMON Guzmán CNP  Cardio [facility]   2024  4:40 PM JUAN RAMON Burgos CNP  FAM MED [place] - DYD     Non-[facility] follow up appointment(s):      Care Transition Nurse reviewed discharge instructions, medical action plan, and red flags with patient and discussed any barriers to care and/or understanding of plan of care after discharge.  Discussed appropriate site of care based

## 2023-09-27 NOTE — TELEPHONE ENCOUNTER
Requesting last office note and most recent LIPID panel for renewing Repatha PA. Please fax  [phone].

## 2023-09-29 ENCOUNTER — TELEPHONE (OUTPATIENT)
Dept: CARDIOLOGY CLINIC | Age: 43
End: 2023-09-29

## 2023-10-03 ENCOUNTER — HOSPITAL ENCOUNTER (OUTPATIENT)
Dept: MAMMOGRAPHY | Age: 43
Discharge: HOME | End: 2023-10-03
Payer: COMMERCIAL

## 2023-10-03 DIAGNOSIS — R92.8 ABNORMAL MAMMOGRAM: ICD-10-CM

## 2023-10-03 PROCEDURE — G0279 TOMOSYNTHESIS, MAMMO: HCPCS

## 2023-10-04 ENCOUNTER — CARE COORDINATION (OUTPATIENT)
Dept: CASE MANAGEMENT | Age: 43
End: 2023-10-04

## 2023-10-04 LAB
CREATININE URINE: 270.8 MG/DL
PROTEIN, URINE, RANDOM: 17.1 MG/DL (ref 0–9.9)
PROTEIN/CREAT RATIO URINE RAN: 0.06 RATIO

## 2023-10-04 NOTE — CARE COORDINATION
Care Transitions Follow Up Call    Patient Current Location:  Home: [address] [place] [address] [address]  [address] [id number]    Care Transition Nurse contacted the patient by telephone. Verified name and  with patient as identifiers. Patient: John Vora  Patient : 1980   MRN: [id number]  Reason for Admission: Lap sleeve gastrectomy  Discharge Date: 23 RARS: Readmission Risk Score: 6.4      Needs to be reviewed by the provider   Additional needs identified to be addressed with provider: No  none             Method of communication with provider: none. Patient reports that she is doing well, reports pain has decreased considerably and she will follow up with surgeon tomorrow. She denies any questions or concerns this time. CTN will continue with outreach follow up calls. Addressed changes since last contact:  none  Discussed follow-up appointments. If no appointment was previously scheduled, appointment scheduling offered: No.   Is follow up appointment scheduled within 7 days of discharge? No.    Follow Up  Future Appointments   Date Time Provider [facility] [address]   10/5/2023 10:30 AM Francheska Harrison MD HEALTHY WT [facility]   2023  2:45 PM Ollie Webber MD FF Ortho [facility]   2023  2:30 PM JUAN RAMON Zepeda CNP FF Cardio [facility]   2023  3:00 PM Larwence Halsted, APRN KW SLEEP MED [facility]   2024  4:40 PM JUAN RAMON Kenny CNP  FAM MED [place] - DYD     External follow up appointment(s):     Care Transition Nurse reviewed red flags with patient and discussed any barriers to care and/or understanding of plan of care after discharge. Discussed appropriate site of care based on symptoms and resources available to patient including: PCP  Urgent care clinics  When to call 911. The patient agrees to contact the PCP office for questions related to their healthcare.          Offered patient enrollment in the Remote Patient Monitoring (RPM) program for in-home monitoring: Patient is not

## 2023-10-05 ENCOUNTER — OFFICE VISIT (OUTPATIENT)
Dept: BARIATRICS/WEIGHT MGMT | Age: 43
End: 2023-10-05

## 2023-10-05 VITALS
HEIGHT: 64 IN | DIASTOLIC BLOOD PRESSURE: 72 MMHG | BODY MASS INDEX: 35.99 KG/M2 | SYSTOLIC BLOOD PRESSURE: 112 MMHG | OXYGEN SATURATION: 97 % | WEIGHT: 210.8 LBS | HEART RATE: 50 BPM

## 2023-10-05 DIAGNOSIS — Z98.84 S/P LAPAROSCOPIC SLEEVE GASTRECTOMY: Primary | ICD-10-CM

## 2023-10-05 LAB
A/G RATIO: 1.32 RATIO
ABNORMAL PROTEIN BAND 1: NOT DETECTED G/DL
ALBUMIN SERPL-MCNC: 3.92 G/DL (ref 3.4–5.1)
ALPHA 2: 0.81 G/DL (ref 0.4–1.1)
ALPHA-1-GLOBULIN: 0.3 G/DL (ref 0.1–0.4)
ALT SERPL-CCNC: 44 U/L
AST SERPL-CCNC: 33 U/L
BACTERIA, URINE: ABNORMAL /HPF
BASOPHILS # BLD: 0.3 %
BASOPHILS ABSOLUTE: 0 X10(3)/MCL (ref 0–0.1)
BETA GLOBULIN: 0.73 G/DL (ref 0.6–1.4)
BILIRUBIN, URINE: NEGATIVE
C4 COMPLEMENT: 34 MG/DL (ref 10–40)
CLARITY: CLEAR
COLOR: YELLOW
CREAT SERPL-MCNC: 0.81 MG/DL (ref 0.51–1.3)
EGFR (CKD-EPI): 92 ML/MIN/1.73 M2
EOSINOPHIL # BLD: 1.1 %
EOSINOPHILS ABSOLUTE: 0.1 X10(3)/MCL (ref 0–0.5)
GAMMA GLOBULIN: 1.13 G/DL (ref 0.5–1.7)
GLUCOSE URINE: NEGATIVE MG/DL
HB: SOURCE: ABNORMAL
HCT VFR BLD CALC: 36.7 % (ref 34–45)
HEMOGLOBIN: 12 G/DL (ref 11.2–15.7)
IMMATURE CELLS ABSOLUTE COUNT: 0 X10(3)/MCL (ref 0–0.1)
IMMATURE GRANULOCYTES: 0.2 %
KETONES, URINE: NEGATIVE MG/DL
LEUKOCYTE ESTERASE, URINE: NEGATIVE
LYMPHOCYTES # BLD: 47.7 %
LYMPHOCYTES ABSOLUTE: 3 X10(3)/MCL (ref 1.2–3.9)
MCH RBC QN AUTO: 28.6 PG (ref 26–34)
MCHC RBC AUTO-ENTMCNC: 32.7 G/DL (ref 30.7–35.5)
MCV RBC AUTO: 87.6 FL (ref 80–100)
MONOCYTES # BLD: 6.7 %
MONOCYTES ABSOLUTE: 0.4 X10(3)/MCL (ref 0.3–0.9)
MUCUS, URINE: ABNORMAL /LPF
NEUTROPHILS ABSOLUTE: 2.8 X10(3)/MCL (ref 1.6–6.1)
NEUTROPHILS: 44 %
NITRITE, URINE: NEGATIVE
PDW BLD-RTO: 13.2 %
PH, URINE: 6 PH (ref 5–8)
PLATELET # BLD: 278 X10(3)/MCL (ref 155–369)
PMV BLD AUTO: 10.2 FL (ref 8.8–12.5)
PROTEIN PATTERN: NORMAL
PROTEIN, URINE: ABNORMAL MG/DL
RBC # BLD: 4.19 X10(6)/MCL (ref 3.9–5.2)
RBC URINE: 2 /HPF (ref 0–3)
RBC URINE: NEGATIVE
RHEUMATOID FACTOR: <10 IU/ML
SPECIFIC GRAVITY UA: 1.03 NO UNITS (ref 1–1.03)
SQUAMOUS CELLS: ABNORMAL /LPF
TOTAL PROTEIN: 6.9 G/DL (ref 6–8)
URINE TYPE: ABNORMAL
UROBILINOGEN, URINE: NORMAL MG/DL
WBC # BLD: 6.3 X10(3)/MCL (ref 3.7–10.3)
WBC URINE: 3 /HPF (ref 0–4)

## 2023-10-05 PROCEDURE — 99024 POSTOP FOLLOW-UP VISIT: CPT | Performed by: SURGERY

## 2023-10-05 NOTE — PATIENT INSTRUCTIONS
Patient received dietary handouts and education. Goals:   - Advance to phase 3 diet at 3 weeks post op.    - Continue to use 2 shakes/day until 3 weeks post-op and always track 60 grams portein/ay  - Set work schedule to allow for 30/30/30 spacing

## 2023-10-05 NOTE — PROGRESS NOTES
[facility]) Physicians   [employer]  Eli Ghotra MD, [address] [facility], [address] [place] [id number] . Phone: [phone]  Fax: [phone]       The patient is a 43 y.o. female who returns today for follow up. Ainsley Hinse is s/p     Laparoscopic sleeve gastrectomy    We discussed how her weight affects her overall health including:  Patient Active Problem List   Diagnosis    AMOS treated with BiPAP    Type 2 diabetes mellitus without complication, without long-term current use of insulin (HCC)    Essential hypertension    Familial hypercholesterolemia    History of fibromyalgia    Positive MATTHEW (antinuclear antibody)    CS (cervical spondylosis)    Cervical discogenic pain syndrome    DDD (degenerative disc disease), cervical    Herniated cervical disc without myelopathy    Bipolar disorder (HCC)    Fibromyalgia    Mixed anxiety and depressive disorder    Obesity (BMI 30-39. 9)    COPD with asthma    Chronic GERD    Chronic tachycardia    Migraine without status migrainosus, not intractable    Femoral acetabular impingement    Femoroacetabular impingement of right hip    Tear of right gluteus medius tendon    Mixed hyperlipidemia    Diabetes mellitus type 2 in obese (HCC)    AVNRT (AV kavin re-entry tachycardia)    Severe obesity (BMI 35.0-39. 9) with comorbidity ([address])    History of alcoholism ([address])    Metabolic syndrome    S/P laparoscopic sleeve gastrectomy        Vitals:    10/05/23 1031   BP: 112/72   Pulse: 50   SpO2: 97%   Weight: 210 lb 12.8 oz (95.6 kg)   Height: 5' 4\" (1.626 m)       Lab Results   Component Value Date/Time    WBC 6.3 10/04/2023 04:08 PM    RBC 4.19 10/04/2023 04:08 PM    HGB 12.0 10/04/2023 04:08 PM    HCT 36.7 10/04/2023 04:08 PM    MCV 87.6 10/04/2023 04:08 PM    MCH 28.6 10/04/2023 04:08 PM    MCHC 32.7 10/04/2023 04:08 PM    MPV 10.2 10/04/2023 04:08 PM    NEUTOPHILPCT 49.1 07/13/2023 09:52 AM    LYMPHOPCT 40.7 07/13/2023 09:52 AM

## 2023-10-05 NOTE — PROGRESS NOTES
Dietary Assessment Note      Vitals:   Vitals:    10/05/23 1031   BP: 112/72   Pulse: 50   SpO2: 97%   Weight: 210 lb 12.8 oz (95.6 kg)   Height: 5' 4\" (1.626 m)    Patient lost 19.2 lbs over 6 weeks. Total Weight Loss: 24 lbs    Labs reviewed: labs are reviewed, up to date and normal    Protein intake: 60-80 grams/day     Fluid intake: 48-64 oz/day water, decaf coffe, Gatorade zero    Multivitamin/mineral intake: Fusion capsule MVI w/ Iron    Calcium intake: no    Other: Vitamin D3 and biotin    Exercise:  walking    Nutrition Assessment: 2 weeks post-op visit. 2-3 Protein shakes made w/ 1% milk or water - not for past few days  Eating pureed foods 3-4 times/day: refried beans, applesauce, CC, yogurt, tuna, ground beef w/ green beans     Amount able to eat per sittin-2 oz    Following 30/30/30 rule: Eating over 20-30 minutes. Waits 30 minutes between eating and drinking. Food Intolerances/issues: none    Client Concerns: hungry, not always eating or drinking enough when working     Goals:   - Advance to phase 3 diet at 3 weeks post op.    - Continue to use 2 shakes/day until 3 weeks post-op and always track 60 grams portein/ay  - Set work schedule to allow for 30/30/30 spacing    Plan: Follow up at 6 weeks post op and as needed    Johnathon Hinson RD, LD

## 2023-10-11 ENCOUNTER — CARE COORDINATION (OUTPATIENT)
Dept: CASE MANAGEMENT | Age: 43
End: 2023-10-11

## 2023-11-02 ENCOUNTER — OFFICE VISIT (OUTPATIENT)
Dept: BARIATRICS/WEIGHT MGMT | Age: 43
End: 2023-11-02

## 2023-11-02 VITALS
DIASTOLIC BLOOD PRESSURE: 65 MMHG | RESPIRATION RATE: 12 BRPM | BODY MASS INDEX: 35.1 KG/M2 | OXYGEN SATURATION: 98 % | HEIGHT: 64 IN | HEART RATE: 49 BPM | SYSTOLIC BLOOD PRESSURE: 114 MMHG | WEIGHT: 205.6 LBS

## 2023-11-02 DIAGNOSIS — Z98.84 S/P LAPAROSCOPIC SLEEVE GASTRECTOMY: Primary | ICD-10-CM

## 2023-11-02 PROCEDURE — 99024 POSTOP FOLLOW-UP VISIT: CPT | Performed by: SURGERY

## 2023-11-02 NOTE — PATIENT INSTRUCTIONS
Patient received dietary handouts and education.     Goals:   - Advance to phase 4 diet   - Start Fusion Calcium soft chews 2/day at separate times

## 2023-11-03 ENCOUNTER — OFFICE VISIT (OUTPATIENT)
Dept: FAMILY MEDICINE CLINIC | Age: 43
End: 2023-11-03
Payer: COMMERCIAL

## 2023-11-03 ENCOUNTER — TELEPHONE (OUTPATIENT)
Dept: CARDIOLOGY CLINIC | Age: 43
End: 2023-11-03

## 2023-11-03 VITALS
WEIGHT: 204 LBS | BODY MASS INDEX: 35.02 KG/M2 | DIASTOLIC BLOOD PRESSURE: 64 MMHG | SYSTOLIC BLOOD PRESSURE: 116 MMHG | HEART RATE: 55 BPM | OXYGEN SATURATION: 95 %

## 2023-11-03 DIAGNOSIS — G89.29 OTHER CHRONIC PAIN: ICD-10-CM

## 2023-11-03 DIAGNOSIS — K21.9 GASTROESOPHAGEAL REFLUX DISEASE, UNSPECIFIED WHETHER ESOPHAGITIS PRESENT: ICD-10-CM

## 2023-11-03 DIAGNOSIS — M79.7 FIBROMYALGIA: ICD-10-CM

## 2023-11-03 DIAGNOSIS — Z98.84 S/P BARIATRIC SURGERY: Primary | ICD-10-CM

## 2023-11-03 PROCEDURE — 1036F TOBACCO NON-USER: CPT | Performed by: CLINICAL NURSE SPECIALIST

## 2023-11-03 PROCEDURE — 3074F SYST BP LT 130 MM HG: CPT | Performed by: CLINICAL NURSE SPECIALIST

## 2023-11-03 PROCEDURE — 99213 OFFICE O/P EST LOW 20 MIN: CPT | Performed by: CLINICAL NURSE SPECIALIST

## 2023-11-03 PROCEDURE — G8427 DOCREV CUR MEDS BY ELIG CLIN: HCPCS | Performed by: CLINICAL NURSE SPECIALIST

## 2023-11-03 PROCEDURE — G8417 CALC BMI ABV UP PARAM F/U: HCPCS | Performed by: CLINICAL NURSE SPECIALIST

## 2023-11-03 PROCEDURE — 3078F DIAST BP <80 MM HG: CPT | Performed by: CLINICAL NURSE SPECIALIST

## 2023-11-03 PROCEDURE — G8482 FLU IMMUNIZE ORDER/ADMIN: HCPCS | Performed by: CLINICAL NURSE SPECIALIST

## 2023-11-03 RX ORDER — METOPROLOL TARTRATE 50 MG/1
50 TABLET, FILM COATED ORAL 2 TIMES DAILY
Qty: 180 TABLET | Refills: 1 | Status: CANCELLED | OUTPATIENT
Start: 2023-11-03

## 2023-11-03 RX ORDER — OMEPRAZOLE 20 MG/1
CAPSULE, DELAYED RELEASE ORAL
Qty: 90 CAPSULE | Refills: 1 | Status: CANCELLED | OUTPATIENT
Start: 2023-11-03

## 2023-11-03 RX ORDER — METFORMIN HYDROCHLORIDE 500 MG/1
1000 TABLET, EXTENDED RELEASE ORAL
Qty: 180 TABLET | Refills: 1 | Status: CANCELLED | OUTPATIENT
Start: 2023-11-03

## 2023-11-03 RX ORDER — ROSUVASTATIN CALCIUM 40 MG/1
TABLET, COATED ORAL
Qty: 90 TABLET | Refills: 1 | Status: CANCELLED | OUTPATIENT
Start: 2023-11-03

## 2023-11-03 RX ORDER — EZETIMIBE 10 MG/1
10 TABLET ORAL DAILY
Qty: 90 TABLET | Refills: 1 | Status: CANCELLED | OUTPATIENT
Start: 2023-11-03

## 2023-11-03 RX ORDER — ACETAMINOPHEN 160 MG
2000 TABLET,DISINTEGRATING ORAL DAILY
Qty: 90 CAPSULE | Refills: 1 | Status: CANCELLED | OUTPATIENT
Start: 2023-11-03

## 2023-11-03 RX ORDER — GABAPENTIN 100 MG/1
100 CAPSULE ORAL 2 TIMES DAILY
Qty: 60 CAPSULE | Refills: 1 | Status: SHIPPED | OUTPATIENT
Start: 2023-11-03 | End: 2024-01-02

## 2023-11-03 ASSESSMENT — PATIENT HEALTH QUESTIONNAIRE - PHQ9
SUM OF ALL RESPONSES TO PHQ QUESTIONS 1-9: 0
2. FEELING DOWN, DEPRESSED OR HOPELESS: 0
SUM OF ALL RESPONSES TO PHQ QUESTIONS 1-9: 0
9. THOUGHTS THAT YOU WOULD BE BETTER OFF DEAD, OR OF HURTING YOURSELF: 0
6. FEELING BAD ABOUT YOURSELF - OR THAT YOU ARE A FAILURE OR HAVE LET YOURSELF OR YOUR FAMILY DOWN: 0
7. TROUBLE CONCENTRATING ON THINGS, SUCH AS READING THE NEWSPAPER OR WATCHING TELEVISION: 0
5. POOR APPETITE OR OVEREATING: 0
SUM OF ALL RESPONSES TO PHQ QUESTIONS 1-9: 0
SUM OF ALL RESPONSES TO PHQ QUESTIONS 1-9: 0
3. TROUBLE FALLING OR STAYING ASLEEP: 0
SUM OF ALL RESPONSES TO PHQ9 QUESTIONS 1 & 2: 0
8. MOVING OR SPEAKING SO SLOWLY THAT OTHER PEOPLE COULD HAVE NOTICED. OR THE OPPOSITE, BEING SO FIGETY OR RESTLESS THAT YOU HAVE BEEN MOVING AROUND A LOT MORE THAN USUAL: 0
4. FEELING TIRED OR HAVING LITTLE ENERGY: 0
1. LITTLE INTEREST OR PLEASURE IN DOING THINGS: 0
10. IF YOU CHECKED OFF ANY PROBLEMS, HOW DIFFICULT HAVE THESE PROBLEMS MADE IT FOR YOU TO DO YOUR WORK, TAKE CARE OF THINGS AT HOME, OR GET ALONG WITH OTHER PEOPLE: 0

## 2023-11-03 ASSESSMENT — ENCOUNTER SYMPTOMS
CHEST TIGHTNESS: 0
ABDOMINAL PAIN: 0
WHEEZING: 0
NAUSEA: 0
DIARRHEA: 0
VOMITING: 0
CONSTIPATION: 0
COUGH: 0

## 2023-11-03 ASSESSMENT — COLUMBIA-SUICIDE SEVERITY RATING SCALE - C-SSRS
3. HAVE YOU BEEN THINKING ABOUT HOW YOU MIGHT KILL YOURSELF?: NO
5. HAVE YOU STARTED TO WORK OUT OR WORKED OUT THE DETAILS OF HOW TO KILL YOURSELF? DO YOU INTEND TO CARRY OUT THIS PLAN?: NO
7. DID THIS OCCUR IN THE LAST THREE MONTHS: NO
4. HAVE YOU HAD THESE THOUGHTS AND HAD SOME INTENTION OF ACTING ON THEM?: NO

## 2023-11-03 NOTE — TELEPHONE ENCOUNTER
CARDIAC CLEARANCE     What type of procedure are you having? Left ankle  Which physician is performing your procedure? Dr Christofer Rodriguez    When is your procedure scheduled for?  12/8/23  Where are you having this procedure? [address]   Are you taking Blood Thinners? Aspirin   If so what? (Name/dose/frequesncy)     Does the surgeon want you to stop your blood thinner? If so for how long?  1 week prior    Phone Number and Contact Name for Physicians office: Renato Bain [phone]    Fax number to send information:

## 2023-11-03 NOTE — PROGRESS NOTES
capsule Take 1 capsule by mouth 2 times daily for 60 days. Intended supply: 90 days 60 capsule 1    ondansetron (ZOFRAN-ODT) 8 MG TBDP disintegrating tablet Place 1 tablet under the tongue every 8 hours as needed for Nausea or Vomiting 30 tablet 0    divalproex (DEPAKOTE ER) 500 MG extended release tablet Take 1 tablet by mouth nightly at bedtime. aspirin (ASPIRIN LOW DOSE) 81 MG EC tablet Take 1 tablet by mouth daily 90 tablet 3    Cholecalciferol (VITAMIN D3) 50 MCG (2000 UT) CAPS Take 1 capsule by mouth daily 90 capsule 1    metoprolol tartrate (LOPRESSOR) 50 MG tablet Take 1 tablet by mouth 2 times daily 180 tablet 1    metFORMIN (GLUCOPHAGE-XR) 500 MG extended release tablet Take 2 tablets by mouth daily (with breakfast) 180 tablet 1    rosuvastatin (CRESTOR) 40 MG tablet TAKE 1 TABLET BY MOUTH EVERY DAY 90 tablet 1    omeprazole (PRILOSEC) 20 MG delayed release capsule TAKE 1 CAPSULE BY MOUTH EVERY DAY 90 capsule 1    ezetimibe (ZETIA) 10 MG tablet Take 1 tablet by mouth daily 90 tablet 1    hydrOXYzine HCl (ATARAX) 10 MG tablet Take 1 tablet by mouth 3 times daily as needed      thiothixene (NAVANE) 5 MG capsule Take 1 capsule by mouth nightly      REPATHA SURECLICK 513 MG/ML SOAJ ADMINISTER 1 ML UNDER THE SKIN EVERY 14 DAYS 2 mL 5    nitroGLYCERIN (NITROSTAT) 0.4 MG SL tablet Place 1 tablet under the tongue every 5 minutes as needed for Chest pain up to max of 3 total doses. If no relief after 1 dose, call 911. 25 tablet 0    albuterol sulfate HFA (PROVENTIL;VENTOLIN;PROAIR) 108 (90 Base) MCG/ACT inhaler TAKE 2 PUFFS BY MOUTH EVERY 6 HOURS AS NEEDED FOR WHEEZE 6.7 each 1    citalopram (CELEXA) 40 MG tablet TAKE 1 TABLET BY MOUTH EVERY DAY IN THE MORNING      ammonium lactate (LAC-HYDRIN) 12 % lotion Apply topically daily 225 mL 2     No current facility-administered medications for this visit.       Return in about 1 month (around 12/3/2023), or if symptoms worsen or fail to improve, for s/p bariatric

## 2023-11-04 PROBLEM — G89.29 OTHER CHRONIC PAIN: Status: ACTIVE | Noted: 2017-03-28

## 2023-11-04 NOTE — PATIENT INSTRUCTIONS
Trial of gabapentin 100 mg twice daily, start with 100 mg nightly for 4 to 7 days then increase to twice daily    Can supplement with acetaminophen as needed/directed not to exceed more than 3000 mg in a 24-hour.     Continue current treatment plan for hyperlipidemia and diabetes    Follow-up and 1 to 3 months, sooner if symptoms worsen or persist

## 2023-11-06 NOTE — PROGRESS NOTES
[facility], [place]   Electrophysiology  SELIN Brooke  Attending EP: Dr. Tamar Manjarrez    Date: 2023  I had the privilege of visiting Marcie Ernandez in the office. Chief Complaint:   Chief Complaint   Patient presents with    Follow-up    Pain     Pain starts in chest radiates down left arm, since ablation has happened 2-3 times     History of Present Illness: History obtained from patient and medical record. Marcie Ernandez is 37 y.o. female with a past medical history of obesity, idiopathic tachycardia, HLD, HTN, DM, and asthma. 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 She is doing well. She still occasionally has chest pains that originate in her back. She states they are very mild in nature and brief. She works a very stressful job. She works as a counselor in a drug&alcohol treatment program. She had weight loss surgery and is down 45 lbs. Denies having chest pain, palpitations, shortness of breath, orthopnea/PND, cough, or dizziness at the time of this visit. With regard to medication therapy the patient has been compliant with prescribed regimen. They have tolerated therapy to date. Allergies: Allergies   Allergen Reactions    Cinnamon Itching and Hives    Codeine Hives and Itching     Ok Percocet    Adhesive Tape Rash    Other      Anesthesia causes itching     Home Medications:  Prior to Visit Medications    Medication Sig Taking? Authorizing Provider   Krishna Horan 047 MG/ML SOAJ ADMINISTER 1 ML UNDER THE SKIN EVERY 14 DAYS Yes Vonda Spaulding DO   Handicap Placard MISC by Does not apply route  on 2028 Yes Jefferson Klinefelter, APRN - CNP   gabapentin (NEURONTIN) 100 MG capsule Take 2 capsules by mouth 2 times daily for 60 days.  Intended supply: 90 days Yes Kassandra Lama APRN - CNP   ondansetron (ZOFRAN-ODT) 8 MG TBDP disintegrating tablet Place 1 tablet under the tongue

## 2023-11-13 ENCOUNTER — TELEPHONE (OUTPATIENT)
Dept: FAMILY MEDICINE CLINIC | Age: 43
End: 2023-11-13

## 2023-11-13 DIAGNOSIS — E11.9 TYPE 2 DIABETES MELLITUS WITHOUT COMPLICATION, WITHOUT LONG-TERM CURRENT USE OF INSULIN (HCC): Primary | ICD-10-CM

## 2023-11-13 DIAGNOSIS — M79.7 FIBROMYALGIA: ICD-10-CM

## 2023-11-13 DIAGNOSIS — G89.29 OTHER CHRONIC PAIN: ICD-10-CM

## 2023-11-13 NOTE — TELEPHONE ENCOUNTER
Pt is requesting higher dose of the following medication, says current dosage is not working for her. Please advise.     gabapentin (NEURONTIN) 100 MG capsule [[id number]]     Dose: 100 mg Route: Oral Frequency: 2 TIMES DAILY   Dispense Quantity: 60 capsule Refills: 1        LAST OV: 11/03/23  NEXT OV: 01/22/24  LAST FILLED: 11/03/23

## 2023-11-16 DIAGNOSIS — E78.01 FAMILIAL HYPERCHOLESTEROLEMIA: ICD-10-CM

## 2023-11-16 DIAGNOSIS — Z82.49 FAMILY HISTORY OF HEART DISEASE: ICD-10-CM

## 2023-11-17 DIAGNOSIS — Z82.49 FAMILY HISTORY OF HEART DISEASE: ICD-10-CM

## 2023-11-17 DIAGNOSIS — E78.01 FAMILIAL HYPERCHOLESTEROLEMIA: ICD-10-CM

## 2023-11-17 RX ORDER — EVOLOCUMAB 140 MG/ML
INJECTION, SOLUTION SUBCUTANEOUS
Qty: 2 ML | Refills: 5 | OUTPATIENT
Start: 2023-11-17

## 2023-11-17 RX ORDER — GABAPENTIN 100 MG/1
200 CAPSULE ORAL 2 TIMES DAILY
Qty: 120 CAPSULE | Refills: 1 | Status: SHIPPED | OUTPATIENT
Start: 2023-11-17 | End: 2024-01-16

## 2023-11-17 NOTE — TELEPHONE ENCOUNTER
Patient in with  for an office visit. Discussed gabapentin 200 mg twice daily, which was sent to the pharmacy.   Thanks

## 2023-11-17 NOTE — TELEPHONE ENCOUNTER
Last OV: 23 RMM  Next OV: 23 NPSR  Last Labs: Lipid 23   Last EK23  Last Fill:   REPATHA SURECLICK 169 MG/ML SOAJ 2 mL 5 2023     Sig: ADMINISTER 1 ML UNDER THE SKIN EVERY 14 DAYS    Sent to pharmacy as: Repatha SureClick 286 MG/ML Subcutaneous Solution Auto-injector (Evolocumab)    E-Prescribing Status: Receipt confirmed by pharmacy (2023  1:15 PM EDT)

## 2023-11-20 ENCOUNTER — OFFICE VISIT (OUTPATIENT)
Dept: ORTHOPEDIC SURGERY | Age: 43
End: 2023-11-20
Payer: COMMERCIAL

## 2023-11-20 VITALS — BODY MASS INDEX: 33.82 KG/M2 | HEIGHT: 65 IN | WEIGHT: 203 LBS

## 2023-11-20 DIAGNOSIS — S73.191D TEAR OF RIGHT ACETABULAR LABRUM, SUBSEQUENT ENCOUNTER: ICD-10-CM

## 2023-11-20 DIAGNOSIS — Z98.890 STATUS POST ARTHROSCOPY OF HIP: ICD-10-CM

## 2023-11-20 DIAGNOSIS — M16.0 PRIMARY OSTEOARTHRITIS OF BOTH HIPS: Primary | ICD-10-CM

## 2023-11-20 PROCEDURE — G8417 CALC BMI ABV UP PARAM F/U: HCPCS | Performed by: ORTHOPAEDIC SURGERY

## 2023-11-20 PROCEDURE — G8482 FLU IMMUNIZE ORDER/ADMIN: HCPCS | Performed by: ORTHOPAEDIC SURGERY

## 2023-11-20 PROCEDURE — 99214 OFFICE O/P EST MOD 30 MIN: CPT | Performed by: ORTHOPAEDIC SURGERY

## 2023-11-20 PROCEDURE — G8427 DOCREV CUR MEDS BY ELIG CLIN: HCPCS | Performed by: ORTHOPAEDIC SURGERY

## 2023-11-20 PROCEDURE — 1036F TOBACCO NON-USER: CPT | Performed by: ORTHOPAEDIC SURGERY

## 2023-11-20 RX ORDER — EVOLOCUMAB 140 MG/ML
INJECTION, SOLUTION SUBCUTANEOUS
Qty: 2 ML | Refills: 5 | Status: SHIPPED | OUTPATIENT
Start: 2023-11-20

## 2023-11-21 DIAGNOSIS — Z82.49 FAMILY HISTORY OF HEART DISEASE: ICD-10-CM

## 2023-11-21 DIAGNOSIS — E78.01 FAMILIAL HYPERCHOLESTEROLEMIA: ICD-10-CM

## 2023-11-21 NOTE — TELEPHONE ENCOUNTER
Lov 12/12/2022 Dr Ruff   Labs  Lipid 7/13/2023  Lrf 11/20/2023 disp 1+5  Appt 12/6/2023 NPSR different pharmacy

## 2023-11-22 ENCOUNTER — OFFICE VISIT (OUTPATIENT)
Dept: FAMILY MEDICINE CLINIC | Age: 43
End: 2023-11-22

## 2023-11-22 VITALS
HEART RATE: 65 BPM | RESPIRATION RATE: 12 BRPM | SYSTOLIC BLOOD PRESSURE: 134 MMHG | WEIGHT: 206 LBS | BODY MASS INDEX: 34.32 KG/M2 | DIASTOLIC BLOOD PRESSURE: 72 MMHG | OXYGEN SATURATION: 98 % | HEIGHT: 65 IN

## 2023-11-22 DIAGNOSIS — F31.9 BIPOLAR 1 DISORDER (HCC): ICD-10-CM

## 2023-11-22 DIAGNOSIS — M25.572 CHRONIC PAIN OF LEFT ANKLE: ICD-10-CM

## 2023-11-22 DIAGNOSIS — R00.0 CHRONIC TACHYCARDIA: ICD-10-CM

## 2023-11-22 DIAGNOSIS — G43.909 MIGRAINE WITHOUT STATUS MIGRAINOSUS, NOT INTRACTABLE, UNSPECIFIED MIGRAINE TYPE: ICD-10-CM

## 2023-11-22 DIAGNOSIS — G47.33 OSA (OBSTRUCTIVE SLEEP APNEA): ICD-10-CM

## 2023-11-22 DIAGNOSIS — Z72.0 CURRENT TOBACCO USE: ICD-10-CM

## 2023-11-22 DIAGNOSIS — E11.9 TYPE 2 DIABETES MELLITUS WITHOUT COMPLICATION, WITHOUT LONG-TERM CURRENT USE OF INSULIN (HCC): ICD-10-CM

## 2023-11-22 DIAGNOSIS — G89.29 CHRONIC PAIN OF LEFT ANKLE: ICD-10-CM

## 2023-11-22 DIAGNOSIS — I10 ESSENTIAL HYPERTENSION: ICD-10-CM

## 2023-11-22 DIAGNOSIS — K21.9 GASTROESOPHAGEAL REFLUX DISEASE, UNSPECIFIED WHETHER ESOPHAGITIS PRESENT: ICD-10-CM

## 2023-11-22 DIAGNOSIS — Z01.818 PREOP EXAMINATION: Primary | ICD-10-CM

## 2023-11-22 DIAGNOSIS — J44.89 COPD WITH ASTHMA: ICD-10-CM

## 2023-11-27 ENCOUNTER — TELEPHONE (OUTPATIENT)
Dept: FAMILY MEDICINE CLINIC | Age: 43
End: 2023-11-27

## 2023-11-27 RX ORDER — EVOLOCUMAB 140 MG/ML
INJECTION, SOLUTION SUBCUTANEOUS
Qty: 2 ML | Refills: 5 | Status: SHIPPED | OUTPATIENT
Start: 2023-11-27

## 2023-11-27 NOTE — TELEPHONE ENCOUNTER
Pt states when she recently saw Dr. Baylee Tinajero for preop visit, she asked about getting a handicap placard. Dr. Baylee Tinajero told her to request it from [address] [place] [address]. She states the person who checked her out from her visit that day was going to request it (I did not see a note in the record). She is asking for handicap placard. Please advise.

## 2023-12-02 ENCOUNTER — HOSPITAL ENCOUNTER (OUTPATIENT)
Age: 43
Discharge: HOME | End: 2023-12-02
Payer: COMMERCIAL

## 2023-12-02 DIAGNOSIS — E78.2 MIXED HYPERLIPIDEMIA: ICD-10-CM

## 2023-12-02 DIAGNOSIS — K21.9 GASTROESOPHAGEAL REFLUX DISEASE, UNSPECIFIED WHETHER ESOPHAGITIS PRESENT: ICD-10-CM

## 2023-12-02 DIAGNOSIS — E55.9 VITAMIN D DEFICIENCY: ICD-10-CM

## 2023-12-02 DIAGNOSIS — E11.9 TYPE 2 DIABETES MELLITUS WITHOUT COMPLICATION, WITHOUT LONG-TERM CURRENT USE OF INSULIN (HCC): ICD-10-CM

## 2023-12-02 LAB
25(OH)D3 SERPL-MCNC: 74.1 NG/ML
ALBUMIN SERPL-MCNC: 4.5 G/DL (ref 3.4–5)
ALBUMIN/GLOB SERPL: 1.7 {RATIO} (ref 1.1–2.2)
ALP SERPL-CCNC: 49 U/L (ref 40–129)
ALT SERPL-CCNC: 24 U/L (ref 10–40)
ANION GAP SERPL CALCULATED.3IONS-SCNC: 9 MMOL/L (ref 3–16)
AST SERPL-CCNC: 19 U/L (ref 15–37)
BASOPHILS # BLD: 0.1 K/UL (ref 0–0.2)
BASOPHILS NFR BLD: 1.1 %
BILIRUB SERPL-MCNC: 0.3 MG/DL (ref 0–1)
BUN SERPL-MCNC: 15 MG/DL (ref 7–20)
CALCIUM SERPL-MCNC: 9.1 MG/DL (ref 8.3–10.6)
CHLORIDE SERPL-SCNC: 102 MMOL/L (ref 99–110)
CHOLEST SERPL-MCNC: 80 MG/DL (ref 0–199)
CK SERPL-CCNC: 98 U/L (ref 26–192)
CO2 SERPL-SCNC: 28 MMOL/L (ref 21–32)
CREAT SERPL-MCNC: 0.5 MG/DL (ref 0.6–1.1)
DEPRECATED RDW RBC AUTO: 14 % (ref 12.4–15.4)
EOSINOPHIL # BLD: 0 K/UL (ref 0–0.6)
EOSINOPHIL NFR BLD: 0.6 %
GFR SERPLBLD CREATININE-BSD FMLA CKD-EPI: >60 ML/MIN/{1.73_M2}
GLUCOSE SERPL-MCNC: 81 MG/DL (ref 70–99)
HCT VFR BLD AUTO: 37.9 % (ref 36–48)
HDLC SERPL-MCNC: 42 MG/DL (ref 40–60)
HGB BLD-MCNC: 12.5 G/DL (ref 12–16)
LDLC SERPL CALC-MCNC: 28 MG/DL
LYMPHOCYTES # BLD: 3 K/UL (ref 1–5.1)
LYMPHOCYTES NFR BLD: 47.6 %
MCH RBC QN AUTO: 28.8 PG (ref 26–34)
MCHC RBC AUTO-ENTMCNC: 32.9 G/DL (ref 31–36)
MCV RBC AUTO: 87.4 FL (ref 80–100)
MONOCYTES # BLD: 0.5 K/UL (ref 0–1.3)
MONOCYTES NFR BLD: 7.9 %
NEUTROPHILS # BLD: 2.7 K/UL (ref 1.7–7.7)
NEUTROPHILS NFR BLD: 42.8 %
PLATELET # BLD AUTO: 233 K/UL (ref 135–450)
PMV BLD AUTO: 7.9 FL (ref 5–10.5)
POTASSIUM SERPL-SCNC: 4.2 MMOL/L (ref 3.5–5.1)
PROT SERPL-MCNC: 7.1 G/DL (ref 6.4–8.2)
RBC # BLD AUTO: 4.33 M/UL (ref 4–5.2)
SODIUM SERPL-SCNC: 139 MMOL/L (ref 136–145)
TRIGL SERPL-MCNC: 50 MG/DL (ref 0–150)
TSH SERPL DL<=0.005 MIU/L-ACNC: 1.27 UIU/ML (ref 0.27–4.2)
VLDLC SERPL CALC-MCNC: 10 MG/DL
WBC # BLD AUTO: 6.3 K/UL (ref 4–11)

## 2023-12-02 PROCEDURE — 82306 VITAMIN D 25 HYDROXY: CPT

## 2023-12-02 PROCEDURE — 36415 COLL VENOUS BLD VENIPUNCTURE: CPT

## 2023-12-02 PROCEDURE — 80053 COMPREHEN METABOLIC PANEL: CPT

## 2023-12-02 PROCEDURE — 83036 HEMOGLOBIN GLYCOSYLATED A1C: CPT

## 2023-12-02 PROCEDURE — 85025 COMPLETE CBC W/AUTO DIFF WBC: CPT

## 2023-12-02 PROCEDURE — 82550 ASSAY OF CK (CPK): CPT

## 2023-12-02 PROCEDURE — 84443 ASSAY THYROID STIM HORMONE: CPT

## 2023-12-02 PROCEDURE — 80061 LIPID PANEL: CPT

## 2023-12-04 LAB
EST. AVERAGE GLUCOSE BLD GHB EST-MCNC: 122.6 MG/DL
HBA1C MFR BLD: 5.9 %

## 2023-12-05 ENCOUNTER — HOSPITAL ENCOUNTER (OUTPATIENT)
Dept: PHYSICAL THERAPY | Age: 43
Setting detail: THERAPIES SERIES
Discharge: HOME | End: 2023-12-05
Attending: ORTHOPAEDIC SURGERY
Payer: COMMERCIAL

## 2023-12-05 DIAGNOSIS — R29.898 WEAKNESS OF BOTH HIPS: Primary | ICD-10-CM

## 2023-12-05 DIAGNOSIS — Z74.09 IMPAIRED FUNCTIONAL MOBILITY, BALANCE, GAIT, AND ENDURANCE: ICD-10-CM

## 2023-12-05 DIAGNOSIS — M25.552 BILATERAL HIP PAIN: ICD-10-CM

## 2023-12-05 DIAGNOSIS — M25.551 BILATERAL HIP PAIN: ICD-10-CM

## 2023-12-05 PROCEDURE — 97110 THERAPEUTIC EXERCISES: CPT

## 2023-12-05 PROCEDURE — 20560 NDL INSJ W/O NJX 1 OR 2 MUSC: CPT

## 2023-12-05 PROCEDURE — 97162 PT EVAL MOD COMPLEX 30 MIN: CPT

## 2023-12-06 ENCOUNTER — OFFICE VISIT (OUTPATIENT)
Dept: CARDIOLOGY CLINIC | Age: 43
End: 2023-12-06
Payer: COMMERCIAL

## 2023-12-06 VITALS
HEART RATE: 64 BPM | HEIGHT: 65 IN | BODY MASS INDEX: 34.42 KG/M2 | OXYGEN SATURATION: 99 % | SYSTOLIC BLOOD PRESSURE: 112 MMHG | WEIGHT: 206.6 LBS | DIASTOLIC BLOOD PRESSURE: 68 MMHG

## 2023-12-06 DIAGNOSIS — G47.33 OSA TREATED WITH BIPAP: ICD-10-CM

## 2023-12-06 DIAGNOSIS — I10 ESSENTIAL HYPERTENSION: Chronic | ICD-10-CM

## 2023-12-06 DIAGNOSIS — I47.19 AVNRT (AV NODAL RE-ENTRY TACHYCARDIA): Primary | ICD-10-CM

## 2023-12-06 DIAGNOSIS — E78.01 FAMILIAL HYPERCHOLESTEROLEMIA: ICD-10-CM

## 2023-12-06 DIAGNOSIS — E66.9 OBESITY (BMI 30-39.9): ICD-10-CM

## 2023-12-06 PROCEDURE — 3074F SYST BP LT 130 MM HG: CPT | Performed by: NURSE PRACTITIONER

## 2023-12-06 PROCEDURE — G8427 DOCREV CUR MEDS BY ELIG CLIN: HCPCS | Performed by: NURSE PRACTITIONER

## 2023-12-06 PROCEDURE — 1036F TOBACCO NON-USER: CPT | Performed by: NURSE PRACTITIONER

## 2023-12-06 PROCEDURE — G8417 CALC BMI ABV UP PARAM F/U: HCPCS | Performed by: NURSE PRACTITIONER

## 2023-12-06 PROCEDURE — 99214 OFFICE O/P EST MOD 30 MIN: CPT | Performed by: NURSE PRACTITIONER

## 2023-12-06 PROCEDURE — 3078F DIAST BP <80 MM HG: CPT | Performed by: NURSE PRACTITIONER

## 2023-12-06 PROCEDURE — 93000 ELECTROCARDIOGRAM COMPLETE: CPT | Performed by: NURSE PRACTITIONER

## 2023-12-06 PROCEDURE — G8482 FLU IMMUNIZE ORDER/ADMIN: HCPCS | Performed by: NURSE PRACTITIONER

## 2023-12-06 NOTE — FLOWSHEET NOTE
minutes without an increase in symptoms above 3/10  [] Progressing: [] Met: [] Not Met: [] Adjusted  5. Patient will be able to reduce pain <2/10 on average with HEP, ADLs and work tasks for >48 hours. [] Progressing: [] Met: [] Not Met: [] Adjusted    Overall Progression Towards Functional goals/ Treatment Progress Update:  [] Patient is progressing as expected towards functional goals listed. [] Progression is slowed due to complexities/Impairments listed. [] Progression has been slowed due to co-morbidities. [x] Plan just implemented, too soon (<30days) to assess goals progression   [] Goals require adjustment due to lack of progress  [] Patient is not progressing as expected and requires additional follow up with physician  [] Other:     CHARGE CAPTURE     PT CHARGE GRID   CPT Code (TIMED) minutes # CPT Code (UNTIMED) #     Therex (37387)  10 1  EVAL:MODERATE (04653 - Typically 30 minutes face-to-face) 1    Neuromusc. Re-ed (42479)    Re-Eval (29784)     Manual ([id number])    Estim Unattended (30453)     Ther. Act (34305)    [facility]. Traction ([id number])     Gait (90684)    Dry Needle 1-2 muscle (48611) 1    Aquatic Therex (86199)    Dry Needle 3+ muscle (79027)     Iontophoresis (80875)    VASO (05130)     Ultrasound (73717)    Group Therapy (95389)     Estim Attended (22610)    Canalith Repositioning (68390)     Other:    Other: Total Timed Code Tx Minutes 10 1  1     Total Treatment Minutes 60        Charge Justification:  (63760) THERAPEUTIC EXERCISE - Provided verbal/tactile cueing for activities related to strengthening, flexibility, endurance, ROM performed to prevent loss of range of motion, maintain or improve muscular strength or increase flexibility, following either an injury or surgery.    (49260) [facility] - Reviewed/Progressed HEP activities related to strengthening, flexibility, endurance, ROM performed to prevent loss of range of motion, maintain or improve muscular strength or increase

## 2023-12-07 ENCOUNTER — HOSPITAL ENCOUNTER (EMERGENCY)
Age: 43
Discharge: HOME | End: 2023-12-07
Attending: EMERGENCY MEDICINE
Payer: COMMERCIAL

## 2023-12-07 ENCOUNTER — APPOINTMENT (OUTPATIENT)
Dept: GENERAL RADIOLOGY | Age: 43
End: 2023-12-07
Payer: COMMERCIAL

## 2023-12-07 VITALS
SYSTOLIC BLOOD PRESSURE: 107 MMHG | TEMPERATURE: 97.9 F | RESPIRATION RATE: 15 BRPM | BODY MASS INDEX: 33.78 KG/M2 | DIASTOLIC BLOOD PRESSURE: 63 MMHG | HEART RATE: 52 BPM | OXYGEN SATURATION: 99 % | WEIGHT: 203 LBS

## 2023-12-07 DIAGNOSIS — R07.9 NONSPECIFIC CHEST PAIN: Primary | ICD-10-CM

## 2023-12-07 LAB
ANION GAP SERPL CALCULATED.3IONS-SCNC: 7 MMOL/L (ref 3–16)
BASOPHILS # BLD: 0 K/UL (ref 0–0.2)
BASOPHILS NFR BLD: 0.5 %
BUN SERPL-MCNC: 15 MG/DL (ref 7–20)
CALCIUM SERPL-MCNC: 9 MG/DL (ref 8.3–10.6)
CHLORIDE SERPL-SCNC: 101 MMOL/L (ref 99–110)
CO2 SERPL-SCNC: 29 MMOL/L (ref 21–32)
CREAT SERPL-MCNC: 0.5 MG/DL (ref 0.6–1.1)
D DIMER: 0.4 UG/ML FEU (ref 0–0.6)
DEPRECATED RDW RBC AUTO: 14.3 % (ref 12.4–15.4)
EKG ATRIAL RATE: 61 BPM
EKG DIAGNOSIS: NORMAL
EKG P AXIS: 49 DEGREES
EKG P-R INTERVAL: 134 MS
EKG Q-T INTERVAL: 422 MS
EKG QRS DURATION: 86 MS
EKG QTC CALCULATION (BAZETT): 424 MS
EKG R AXIS: 9 DEGREES
EKG T AXIS: -10 DEGREES
EKG VENTRICULAR RATE: 61 BPM
EOSINOPHIL # BLD: 0 K/UL (ref 0–0.6)
EOSINOPHIL NFR BLD: 0.7 %
GFR SERPLBLD CREATININE-BSD FMLA CKD-EPI: >60 ML/MIN/{1.73_M2}
GLUCOSE SERPL-MCNC: 100 MG/DL (ref 70–99)
HCG SERPL QL: NEGATIVE
HCT VFR BLD AUTO: 35.5 % (ref 36–48)
HGB BLD-MCNC: 12 G/DL (ref 12–16)
LYMPHOCYTES # BLD: 2.7 K/UL (ref 1–5.1)
LYMPHOCYTES NFR BLD: 42.8 %
MCH RBC QN AUTO: 29.3 PG (ref 26–34)
MCHC RBC AUTO-ENTMCNC: 33.6 G/DL (ref 31–36)
MCV RBC AUTO: 87.2 FL (ref 80–100)
MONOCYTES # BLD: 0.6 K/UL (ref 0–1.3)
MONOCYTES NFR BLD: 9.1 %
NEUTROPHILS # BLD: 2.9 K/UL (ref 1.7–7.7)
NEUTROPHILS NFR BLD: 46.9 %
PLATELET # BLD AUTO: 212 K/UL (ref 135–450)
PMV BLD AUTO: 7.3 FL (ref 5–10.5)
POTASSIUM SERPL-SCNC: 4.1 MMOL/L (ref 3.5–5.1)
RBC # BLD AUTO: 4.08 M/UL (ref 4–5.2)
SODIUM SERPL-SCNC: 137 MMOL/L (ref 136–145)
TROPONIN, HIGH SENSITIVITY: <6 NG/L (ref 0–14)
TROPONIN, HIGH SENSITIVITY: <6 NG/L (ref 0–14)
VALPROATE SERPL-MCNC: 41 UG/ML (ref 50–100)
WBC # BLD AUTO: 6.3 K/UL (ref 4–11)

## 2023-12-07 PROCEDURE — 96375 TX/PRO/DX INJ NEW DRUG ADDON: CPT

## 2023-12-07 PROCEDURE — 96374 THER/PROPH/DIAG INJ IV PUSH: CPT

## 2023-12-07 PROCEDURE — 99285 EMERGENCY DEPT VISIT HI MDM: CPT

## 2023-12-07 PROCEDURE — 93306 TTE W/DOPPLER COMPLETE: CPT

## 2023-12-07 PROCEDURE — 93005 ELECTROCARDIOGRAM TRACING: CPT | Performed by: EMERGENCY MEDICINE

## 2023-12-07 PROCEDURE — 84703 CHORIONIC GONADOTROPIN ASSAY: CPT

## 2023-12-07 PROCEDURE — 80048 BASIC METABOLIC PNL TOTAL CA: CPT

## 2023-12-07 PROCEDURE — 85379 FIBRIN DEGRADATION QUANT: CPT

## 2023-12-07 PROCEDURE — 71045 X-RAY EXAM CHEST 1 VIEW: CPT

## 2023-12-07 PROCEDURE — 99222 1ST HOSP IP/OBS MODERATE 55: CPT | Performed by: STUDENT IN AN ORGANIZED HEALTH CARE EDUCATION/TRAINING PROGRAM

## 2023-12-07 PROCEDURE — 80164 ASSAY DIPROPYLACETIC ACD TOT: CPT

## 2023-12-07 PROCEDURE — 6370000000 HC RX 637 (ALT 250 FOR IP): Performed by: EMERGENCY MEDICINE

## 2023-12-07 PROCEDURE — 84484 ASSAY OF TROPONIN QUANT: CPT

## 2023-12-07 PROCEDURE — 6360000002 HC RX W HCPCS: Performed by: EMERGENCY MEDICINE

## 2023-12-07 PROCEDURE — 85025 COMPLETE CBC W/AUTO DIFF WBC: CPT

## 2023-12-07 PROCEDURE — 93010 ELECTROCARDIOGRAM REPORT: CPT | Performed by: INTERNAL MEDICINE

## 2023-12-07 PROCEDURE — 2580000003 HC RX 258: Performed by: EMERGENCY MEDICINE

## 2023-12-07 RX ORDER — FAMOTIDINE 20 MG/1
20 TABLET, FILM COATED ORAL 2 TIMES DAILY
Qty: 20 TABLET | Refills: 0 | Status: SHIPPED | OUTPATIENT
Start: 2023-12-07 | End: 2023-12-08

## 2023-12-07 RX ORDER — INDOMETHACIN 50 MG/1
50 CAPSULE ORAL 2 TIMES DAILY WITH MEALS
Qty: 20 CAPSULE | Refills: 0 | Status: SHIPPED | OUTPATIENT
Start: 2023-12-07 | End: 2023-12-17

## 2023-12-07 RX ORDER — KETOROLAC TROMETHAMINE 15 MG/ML
15 INJECTION, SOLUTION INTRAMUSCULAR; INTRAVENOUS ONCE
Status: COMPLETED | OUTPATIENT
Start: 2023-12-07 | End: 2023-12-07

## 2023-12-07 RX ORDER — ASPIRIN 81 MG/1
243 TABLET, CHEWABLE ORAL ONCE
Status: COMPLETED | OUTPATIENT
Start: 2023-12-07 | End: 2023-12-07

## 2023-12-07 RX ORDER — MORPHINE SULFATE 4 MG/ML
4 INJECTION, SOLUTION INTRAMUSCULAR; INTRAVENOUS
Status: DISCONTINUED | OUTPATIENT
Start: 2023-12-07 | End: 2023-12-07 | Stop reason: HOSPADM

## 2023-12-07 RX ORDER — ONDANSETRON 2 MG/ML
4 INJECTION INTRAMUSCULAR; INTRAVENOUS ONCE
Status: COMPLETED | OUTPATIENT
Start: 2023-12-07 | End: 2023-12-07

## 2023-12-07 RX ORDER — COLCHICINE 0.6 MG/1
0.6 TABLET ORAL DAILY
Qty: 30 TABLET | Refills: 1 | Status: SHIPPED | OUTPATIENT
Start: 2023-12-07 | End: 2024-02-05

## 2023-12-07 RX ORDER — 0.9 % SODIUM CHLORIDE 0.9 %
1000 INTRAVENOUS SOLUTION INTRAVENOUS ONCE
Status: COMPLETED | OUTPATIENT
Start: 2023-12-07 | End: 2023-12-07

## 2023-12-07 RX ADMIN — KETOROLAC TROMETHAMINE 15 MG: 15 INJECTION, SOLUTION INTRAMUSCULAR; INTRAVENOUS at 10:45

## 2023-12-07 RX ADMIN — ONDANSETRON 4 MG: 2 INJECTION INTRAMUSCULAR; INTRAVENOUS at 07:50

## 2023-12-07 RX ADMIN — SODIUM CHLORIDE 1000 ML: 9 INJECTION, SOLUTION INTRAVENOUS at 07:52

## 2023-12-07 RX ADMIN — MORPHINE SULFATE 4 MG: 4 INJECTION, SOLUTION INTRAMUSCULAR; INTRAVENOUS at 07:51

## 2023-12-07 RX ADMIN — ASPIRIN 243 MG: 81 TABLET, CHEWABLE ORAL at 07:51

## 2023-12-07 ASSESSMENT — PAIN DESCRIPTION - LOCATION: LOCATION: CHEST

## 2023-12-07 ASSESSMENT — PAIN - FUNCTIONAL ASSESSMENT: PAIN_FUNCTIONAL_ASSESSMENT: 0-10

## 2023-12-07 ASSESSMENT — PAIN SCALES - GENERAL
PAINLEVEL_OUTOF10: 9
PAINLEVEL_OUTOF10: 8

## 2023-12-07 NOTE — ED PROVIDER NOTES
EMERGENCY DEPARTMENT PROVIDER NOTE    Patient Identification  Pt Name: Corinne Carey  MRN: [id number]  [address] 1980  Date of evaluation: 12/7/2023  Provider: Deuce Tyson DO  PCP: JUAN RAMON Treviño - KRISTEN    Chief Complaint  Chest Pain (Pt presents with c/o left sided CP with sudden onset while sitting at work, endorses dizziness and SOB. Significant past cardiac hx, recent ablation. )      HPI  (History provided by patient)  This is a 37 y.o. female with pertinent past medical history of anxiety, chronic pain syndrome, GERD, diabetes, AVNRT status post ablation 2 months ago who was brought in by self for central chest pain which began around 0500 this morning. Patient describes a feeling as though she was \"kicked\" in the center of her chest with consistent pressure afterwards. Pain occasionally radiates into her left shoulder and arm. Nothing clearly makes the symptoms any better or worse. Patient reports similar but less severe pain ongoing off and on over the past several years. Associated with lightheadedness and shortness of breath. She took 81 mg aspirin just prior to arrival this morning.       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

## 2023-12-07 NOTE — ED NOTES
Patient discharged with discharge instructions and medications reviewed. Patient verbalized understanding of instructions and follow up. Discharged per this RN.        Elmira Lamas RN  12/07/23 3680

## 2023-12-07 NOTE — ED TRIAGE NOTES
Pt presents with c/o left sided CP with sudden onset while sitting at work, endorses dizziness and SOB. Significant past cardiac hx, recent ablation.

## 2023-12-07 NOTE — CONSULTS
[facility]    2023    John Vora (:  1980) is a 37 y.o. female    Referring Provider: JUAN RAMON Kenny CNP    HISTORY:  43F hx ablation for AVNRT 2023, HTN, HLD, DM presenting with chest pain. Of note - she had normal LHC in 2017 and Coronary CTA 2023. She saw NP Say Branch in EP clinic yesterday and has been having some chest pain, but it worsened today and she came to ER. She reports central, sharp chest discomfort that radiates to her back. It is much worse with deep breathing, and better with positional changes. She feels significantly worse if she leans far forward or lays straight back. She at times has felt dizzy and short of breath but not directly correlating with chest pain. No recent fevers, chills, N/V. In ER chest pain improved with morphine. Her troponin was negative x2. D-dimer WNL. EKG stable from yesterday. Other workup also unrevealing    REVIEW OF SYSTEMS:  A complete review of systems was reviewed and is negative except as noted in the history of present illness. Prior to Visit Medications    Medication Sig Taking? Authorizing Provider   Jemal Corbin 123 MG/ML SOAJ ADMINISTER 1 ML UNDER THE SKIN EVERY 14 DAYS  Roxann Chairez DO   Handicap Placard MISC by Does not apply route  on 2028  JUAN RAMON Kenny CNP   gabapentin (NEURONTIN) 100 MG capsule Take 2 capsules by mouth 2 times daily for 60 days. Intended supply: 90 days  Kassandra Wren APRN - CNP   ondansetron (ZOFRAN-ODT) 8 MG TBDP disintegrating tablet Place 1 tablet under the tongue every 8 hours as needed for Nausea or Vomiting  JUAN RAMON Hernandez CNP   divalproex (DEPAKOTE ER) 500 MG extended release tablet Take 1 tablet by mouth nightly at bedtime.   Provider, MD Jocelyn   aspirin (ASPIRIN LOW DOSE) 81 MG EC tablet Take 1 tablet by mouth daily  Roxann Chairez DO   Cholecalciferol (VITAMIN D3) 50 MCG ( UT) CAPS Take 1 capsule by mouth daily  [place]

## 2023-12-08 ENCOUNTER — CARE COORDINATION (OUTPATIENT)
Dept: CARE COORDINATION | Age: 43
End: 2023-12-08

## 2023-12-08 ENCOUNTER — HOSPITAL ENCOUNTER (EMERGENCY)
Age: 43
Discharge: HOME | End: 2023-12-08
Payer: COMMERCIAL

## 2023-12-08 ENCOUNTER — TELEPHONE (OUTPATIENT)
Dept: FAMILY MEDICINE CLINIC | Age: 43
End: 2023-12-08

## 2023-12-08 ENCOUNTER — TELEPHONE (OUTPATIENT)
Dept: CARDIOLOGY CLINIC | Age: 43
End: 2023-12-08

## 2023-12-08 VITALS
RESPIRATION RATE: 18 BRPM | TEMPERATURE: 98.2 F | HEART RATE: 62 BPM | SYSTOLIC BLOOD PRESSURE: 138 MMHG | DIASTOLIC BLOOD PRESSURE: 79 MMHG | OXYGEN SATURATION: 99 %

## 2023-12-08 DIAGNOSIS — Z71.1 FEARED CONDITION NOT DEMONSTRATED: Primary | ICD-10-CM

## 2023-12-08 LAB
ALBUMIN SERPL-MCNC: 4.4 G/DL (ref 3.4–5)
ALBUMIN/GLOB SERPL: 1.7 {RATIO} (ref 1.1–2.2)
ALP SERPL-CCNC: 50 U/L (ref 40–129)
ALT SERPL-CCNC: 22 U/L (ref 10–40)
ANION GAP SERPL CALCULATED.3IONS-SCNC: 4 MMOL/L (ref 3–16)
AST SERPL-CCNC: 19 U/L (ref 15–37)
BASOPHILS # BLD: 0 K/UL (ref 0–0.2)
BASOPHILS NFR BLD: 0.6 %
BILIRUB SERPL-MCNC: <0.2 MG/DL (ref 0–1)
BUN SERPL-MCNC: 12 MG/DL (ref 7–20)
CALCIUM SERPL-MCNC: 9.4 MG/DL (ref 8.3–10.6)
CHLORIDE SERPL-SCNC: 102 MMOL/L (ref 99–110)
CO2 SERPL-SCNC: 30 MMOL/L (ref 21–32)
CREAT SERPL-MCNC: 0.6 MG/DL (ref 0.6–1.1)
DEPRECATED RDW RBC AUTO: 13.8 % (ref 12.4–15.4)
EOSINOPHIL # BLD: 0 K/UL (ref 0–0.6)
EOSINOPHIL NFR BLD: 0.8 %
GFR SERPLBLD CREATININE-BSD FMLA CKD-EPI: >60 ML/MIN/{1.73_M2}
GLUCOSE SERPL-MCNC: 125 MG/DL (ref 70–99)
HCG SERPL QL: NEGATIVE
HCT VFR BLD AUTO: 37.1 % (ref 36–48)
HGB BLD-MCNC: 12.1 G/DL (ref 12–16)
LYMPHOCYTES # BLD: 3.1 K/UL (ref 1–5.1)
LYMPHOCYTES NFR BLD: 51.4 %
MCH RBC QN AUTO: 28.5 PG (ref 26–34)
MCHC RBC AUTO-ENTMCNC: 32.6 G/DL (ref 31–36)
MCV RBC AUTO: 87.4 FL (ref 80–100)
MONOCYTES # BLD: 0.3 K/UL (ref 0–1.3)
MONOCYTES NFR BLD: 5.6 %
NEUTROPHILS # BLD: 2.5 K/UL (ref 1.7–7.7)
NEUTROPHILS NFR BLD: 41.6 %
PLATELET # BLD AUTO: 212 K/UL (ref 135–450)
PMV BLD AUTO: 7.6 FL (ref 5–10.5)
POTASSIUM SERPL-SCNC: 4 MMOL/L (ref 3.5–5.1)
PROT SERPL-MCNC: 7 G/DL (ref 6.4–8.2)
RBC # BLD AUTO: 4.24 M/UL (ref 4–5.2)
SODIUM SERPL-SCNC: 136 MMOL/L (ref 136–145)
WBC # BLD AUTO: 6 K/UL (ref 4–11)

## 2023-12-08 PROCEDURE — 84703 CHORIONIC GONADOTROPIN ASSAY: CPT

## 2023-12-08 PROCEDURE — 99283 EMERGENCY DEPT VISIT LOW MDM: CPT

## 2023-12-08 PROCEDURE — 80053 COMPREHEN METABOLIC PANEL: CPT

## 2023-12-08 PROCEDURE — 85025 COMPLETE CBC W/AUTO DIFF WBC: CPT

## 2023-12-08 ASSESSMENT — ENCOUNTER SYMPTOMS
SHORTNESS OF BREATH: 0
RHINORRHEA: 0
ABDOMINAL PAIN: 1
DIARRHEA: 0
VOMITING: 0
WHEEZING: 0
NAUSEA: 0
COUGH: 0

## 2023-12-08 NOTE — CARE COORDINATION
CC ED FU CALL 1 ATTEMPT, JORDEN. Attempted to reach patient by phone. No answer. Left brief message with name, contact number and asked for return call back. Waiting for patient response at this time. Will update notes when callback is received. Will route to primary RN FRANKO BARBER

## 2023-12-08 NOTE — TELEPHONE ENCOUNTER
Pt called stating she was diagnosis at the hospital for pericarditis and a ulcer in the stomach. Patient is experiencing severe stomach pain and wanted to know what to do. Pt was advised per LPN to go to the ER and give us a call if she have any questions or other concerns.

## 2023-12-08 NOTE — CARE COORDINATION
[facility] ED Follow up Call    Reason for ED visit:  Chest pressure  Status:     improved    Did you call your PCP prior to going to the ED?  no    Did you receive a discharge instructions from the Emergency Room? yes  Review of Instructions:     Understands what to report/when to return?:  yes   Understands discharge instructions?:  yes   Following discharge instructions?:  yes    Are there any new complaints of pain? no  New Pain Meds? na   Constipation prophylaxis needed?  na    If you have a wound is the dressing clean, dry, and intact? na  Understands wound care regimen? na    Are there any other complaints/concerns that you wish to tell your provider? Will call to schedule hosp ed fu with pcp    FU appts/Provider:    Future Appointments   Date Time Provider [address]   12/18/2023  4:00 PM Sherry Cortez, PT [facility] PT [facility]   12/20/2023  4:45 PM Gaston Sandy PT [facility]   12/26/2023  5:15 PM Edy Maguire, PT [facility]   12/29/2023  4:00 PM Sherry Cortez, PT [facility] PT [facility]   1/5/2024  4:00 PM Sherry Cortez, PT [facility]   1/16/2024  2:45 PM Estrella Xiao MD [facility]   1/22/2024  4:40 PM JUAN RAMON Ponce CNP [facility] [place] [facility]   2/5/2024  2:30 PM Marleny Kinsey DO [facility]   2/13/2024  2:40 PM JUAN RAMON Greenberg SLEEP [facility]   6/6/2024  2:30 PM JUAN RAMON Brown CNP [facility]           New Medications?:   yes     Medication Reconciliation by phone - yes  Understands Medications? yes  Taking Medications? yes  Can you swallow your pills?  yes    Any further needs in the home i.e. Equipment?  declined    Link to services in community?: declined

## 2023-12-08 NOTE — TELEPHONE ENCOUNTER
[facility] states pt was in ER recently and given indomethacin and colchicine and asking if she still has cardiac clearance and would she need to stop these. Please place letter in epic addressing this.  Any questions please call Ivette.

## 2023-12-08 NOTE — ED PROVIDER NOTES
[facility]        Pt Name: Latesha Lopez  MRN: [id number]  [address] 1980  Date of evaluation: 12/8/2023  Provider: Elsa Hollis PA-C  PCP: JUAN RAMON Michel CNP  Note Started: 11:36 AM EST 12/8/23      LEV. I have evaluated this patient. CHIEF COMPLAINT       Chief Complaint   Patient presents with    Abdominal Pain     Patient in with complaints of abdominal pain. States her hgb was low. She was told to come in today by pcp        HISTORY OF PRESENT ILLNESS: 1 or more Elements     History From: patient   Limitations to history : None    Latesha Lopez is a 37 y.o. female who presents for evaluation of concern for possible upper GI bleed. Patient was seen in the ER yesterday and diagnosed with suspected pericarditis and started on indomethacin and colchicine. Patient did talk to her PCP this morning for follow-up appointment and they stated because her stool is dark and she was started on an NSAID with recent gastric bypass surgery she need to be reevaluated. She does report mild upper abdominal pain. No nausea vomiting diarrhea. No dizziness/lightheadedness, weakness, visual disturbances or syncope. No chest pain or shortness of breath. She has no other complaints or concerns at this time. Nursing Notes were all reviewed and agreed with or any disagreements were addressed in the HPI. REVIEW OF SYSTEMS :      Review of Systems   Constitutional:  Negative for appetite change, chills and fever. HENT:  Negative for congestion and rhinorrhea. Respiratory:  Negative for cough, shortness of breath and wheezing. Cardiovascular:  Negative for chest pain. Gastrointestinal:  Positive for abdominal pain. Negative for diarrhea, nausea and vomiting. Blood in stool: ? Orly Moralez Genitourinary:  Negative for difficulty urinating, dysuria and hematuria. Musculoskeletal:  Negative for neck pain and neck stiffness. Record. FINAL IMPRESSION      1.  Feared condition not demonstrated          DISPOSITION/PLAN     DISPOSITION Decision To Discharge 12/08/2023 12:36:48 PM      PATIENT REFERRED TO:  JUAN RAMON Hernandez CNP  [facility] [address]  [phone]    Schedule an appointment as soon as possible for a visit       [facility] Emergency Department  [address] [place] [address]  Go to   If symptoms worsen      DISCHARGE MEDICATIONS:  New Prescriptions    No medications on file       DISCONTINUED MEDICATIONS:  Discontinued Medications    No medications on file              (Please note that portions of this note were completed with a voice recognition program.  Efforts were made to edit the dictations but occasionally words are mis-transcribed.)    Andres Sandoval PA-C (electronically signed)           Donald Wren PA-C  12/08/23 3007

## 2023-12-08 NOTE — TELEPHONE ENCOUNTER
She was seen in the ER yesterday 12/7/23 for chest pain (discharged on the indomethacin & colchicine) and also today 12/8/23 for abd pain / \"low Hgb\" (12.1 in the ER, indomethacin d/c'd). She saw Rodolfo Delacruz CNP 12/6/23. Foot surgery is scheduled 12/15/23. Attempted to call the surgery center to let them know this will be addressed next week but they are closed now.

## 2023-12-08 NOTE — DISCHARGE INSTRUCTIONS
Your hemoglobin is normal.  If you are worried about the NSAIDs you can discontinue the indomethacin. Please follow-up with your PCP for GI referral if dark stools continue.

## 2023-12-11 ENCOUNTER — TELEPHONE (OUTPATIENT)
Dept: CARDIOLOGY CLINIC | Age: 43
End: 2023-12-11

## 2023-12-11 NOTE — TELEPHONE ENCOUNTER
[facility] states they are needing clearance for pt's procedure on Friday 12/15/23. Asking if pt can hold Indocin 3 days prior. Place letter in [place]. Ivette can be reached at [phone] ok to leave message.

## 2023-12-11 NOTE — TELEPHONE ENCOUNTER
Surgery center called about follow-up for cardiac clearance for PT's surgery this Friday, 12/15/23. Please call and advise.

## 2023-12-13 ENCOUNTER — TELEPHONE (OUTPATIENT)
Dept: PULMONOLOGY | Age: 43
End: 2023-12-13

## 2023-12-13 NOTE — TELEPHONE ENCOUNTER
Pt called back to get scheduled for cardiac clearance appt, pt was offered 8/14 at 8:00 am pt declined do to working until 2 PM, pt stated they would  have to come at 3 pm and on Friday they can come time. Its there a date and time pt can get scheduled with DKW after 3 pm 12/14 or anytime 12/15?

## 2023-12-13 NOTE — TELEPHONE ENCOUNTER
Attempted  several times to phone patient to schedule below appointment, only getting a busy signal.

## 2023-12-13 NOTE — TELEPHONE ENCOUNTER
Pt called in regards to the pressure changes Quentin Bernstein made after her last appt. Pt had asked Quentin Bernstein to lower pressures but is now wanting to see if she can raise them slightly. Pt stated she feels like she can't breathe and is struggling to get air in. Informed pt we could take a look at data from her machine and see if pressure adjustments needs to be made. Please advise pt.      Ph. [phone]

## 2023-12-15 ENCOUNTER — OFFICE VISIT (OUTPATIENT)
Dept: FAMILY MEDICINE CLINIC | Age: 43
End: 2023-12-15

## 2023-12-15 ENCOUNTER — HOSPITAL ENCOUNTER (OUTPATIENT)
Age: 43
Discharge: HOME | End: 2023-12-15
Payer: COMMERCIAL

## 2023-12-15 ENCOUNTER — OFFICE VISIT (OUTPATIENT)
Dept: CARDIOLOGY CLINIC | Age: 43
End: 2023-12-15
Payer: COMMERCIAL

## 2023-12-15 VITALS
DIASTOLIC BLOOD PRESSURE: 80 MMHG | WEIGHT: 206.2 LBS | OXYGEN SATURATION: 99 % | HEART RATE: 52 BPM | BODY MASS INDEX: 34.35 KG/M2 | SYSTOLIC BLOOD PRESSURE: 124 MMHG | HEIGHT: 65 IN

## 2023-12-15 VITALS
HEIGHT: 65 IN | SYSTOLIC BLOOD PRESSURE: 86 MMHG | WEIGHT: 205 LBS | DIASTOLIC BLOOD PRESSURE: 54 MMHG | OXYGEN SATURATION: 95 % | BODY MASS INDEX: 34.16 KG/M2 | HEART RATE: 73 BPM

## 2023-12-15 DIAGNOSIS — G43.909 MIGRAINE WITHOUT STATUS MIGRAINOSUS, NOT INTRACTABLE, UNSPECIFIED MIGRAINE TYPE: ICD-10-CM

## 2023-12-15 DIAGNOSIS — G47.33 OSA (OBSTRUCTIVE SLEEP APNEA): ICD-10-CM

## 2023-12-15 DIAGNOSIS — J44.89 COPD WITH ASTHMA: ICD-10-CM

## 2023-12-15 DIAGNOSIS — I31.9 PERICARDITIS, UNSPECIFIED CHRONICITY, UNSPECIFIED TYPE: ICD-10-CM

## 2023-12-15 DIAGNOSIS — G47.33 OSA TREATED WITH BIPAP: ICD-10-CM

## 2023-12-15 DIAGNOSIS — M25.572 CHRONIC PAIN OF LEFT ANKLE: ICD-10-CM

## 2023-12-15 DIAGNOSIS — G89.29 CHRONIC PAIN OF LEFT ANKLE: ICD-10-CM

## 2023-12-15 DIAGNOSIS — I47.19 AVNRT (AV NODAL RE-ENTRY TACHYCARDIA): Primary | ICD-10-CM

## 2023-12-15 DIAGNOSIS — E78.01 FAMILIAL HYPERCHOLESTEROLEMIA: ICD-10-CM

## 2023-12-15 DIAGNOSIS — I10 ESSENTIAL HYPERTENSION: ICD-10-CM

## 2023-12-15 DIAGNOSIS — I30.9 ACUTE PERICARDITIS ASSOCIATED WITH OTHER DISEASE: ICD-10-CM

## 2023-12-15 DIAGNOSIS — F31.9 BIPOLAR 1 DISORDER (HCC): ICD-10-CM

## 2023-12-15 DIAGNOSIS — Z01.810 PREOP CARDIOVASCULAR EXAM: ICD-10-CM

## 2023-12-15 DIAGNOSIS — K21.9 GASTROESOPHAGEAL REFLUX DISEASE, UNSPECIFIED WHETHER ESOPHAGITIS PRESENT: ICD-10-CM

## 2023-12-15 DIAGNOSIS — E66.9 OBESITY (BMI 30-39.9): ICD-10-CM

## 2023-12-15 DIAGNOSIS — Z01.818 PRE-OP EXAM: Primary | ICD-10-CM

## 2023-12-15 DIAGNOSIS — E11.9 TYPE 2 DIABETES MELLITUS WITHOUT COMPLICATION, WITHOUT LONG-TERM CURRENT USE OF INSULIN (HCC): ICD-10-CM

## 2023-12-15 PROCEDURE — G8417 CALC BMI ABV UP PARAM F/U: HCPCS | Performed by: INTERNAL MEDICINE

## 2023-12-15 PROCEDURE — 3078F DIAST BP <80 MM HG: CPT | Performed by: INTERNAL MEDICINE

## 2023-12-15 PROCEDURE — G8427 DOCREV CUR MEDS BY ELIG CLIN: HCPCS | Performed by: INTERNAL MEDICINE

## 2023-12-15 PROCEDURE — 3074F SYST BP LT 130 MM HG: CPT | Performed by: INTERNAL MEDICINE

## 2023-12-15 PROCEDURE — 85652 RBC SED RATE AUTOMATED: CPT

## 2023-12-15 PROCEDURE — G8482 FLU IMMUNIZE ORDER/ADMIN: HCPCS | Performed by: INTERNAL MEDICINE

## 2023-12-15 PROCEDURE — 1036F TOBACCO NON-USER: CPT | Performed by: INTERNAL MEDICINE

## 2023-12-15 PROCEDURE — 36415 COLL VENOUS BLD VENIPUNCTURE: CPT

## 2023-12-15 PROCEDURE — 86140 C-REACTIVE PROTEIN: CPT

## 2023-12-15 PROCEDURE — 99214 OFFICE O/P EST MOD 30 MIN: CPT | Performed by: INTERNAL MEDICINE

## 2023-12-15 RX ORDER — LORAZEPAM 1 MG/1
1 TABLET ORAL SEE ADMIN INSTRUCTIONS
Qty: 2 TABLET | Refills: 0 | Status: SHIPPED | OUTPATIENT
Start: 2023-12-15 | End: 2024-11-28

## 2023-12-15 NOTE — PROGRESS NOTES
Please call patient, let him know I have reviewed report from Hematology, it is recommended to recheck CBC in 1 month from last blood work.  CBC has been ordered, please advise patient to go to lab first week of december, no need to fast,   asthma    Gastroesophageal reflux disease    Chronic tachycardia    Migraine without status migrainosus, not intractable    Femoral acetabular impingement    Femoroacetabular impingement of right hip    Tear of right gluteus medius tendon    Mixed hyperlipidemia    Diabetes mellitus type 2 in obese (HCC)    AVNRT (AV kavin re-entry tachycardia)    Severe obesity (BMI 35.0-39. 9) with comorbidity ([address])    History of alcoholism ([address])    Metabolic syndrome    S/P laparoscopic sleeve gastrectomy       Past Medical History:   Diagnosis Date    Anesthesia complication     itching    Anxiety     Arthritis     Asthma     Bipolar 1 disorder ([facility])     Bipolar disorder ([facility])     Chronic back pain     COPD (chronic obstructive pulmonary disease) ([facility])     Depression     Diabetes mellitus ([address])     Femoroacetabular impingement of right hip 08/09/2022    Fibromyalgia     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 [facility] Left 05/26/2023    TALONAVICULAR JOINT ARTHRODESIS,REMOVAL OF PREVIOUS HARDWARE, LEFT FOOT  performed by Del Brewer DPM at [facility] [address] Bilateral     ELBOW SURGERY Bilateral     ulnar nerve release    FOOT SURGERY Right     revision of talonavicular joint fusion    HERNIA REPAIR      HIP ARTHROSCOPY Bilateral     HIP SURGERY Left 06/28/2022    LEFT HIP REVISION ARTHROSCOPY, LYSIS OF ADHESIONS, REMOVAL LOOSE BODY, SYNOVECTOMY, FEMOROACETABULAR

## 2023-12-15 NOTE — PATIENT INSTRUCTIONS
Labs today- sed rate crp  Cardiac MRI- ativan one hour prior, take second pill with you    Continue colchicine, stop indocin

## 2023-12-15 NOTE — PROGRESS NOTES
[facility]  12/15/23  Referring: Dr. Wren    REASON FOR CONSULT/CHIEF COMPLAINT/HPI     Reason for visit/ Chief complaint  Follow up  Pre-operative   HPI Petrona Guzman is a 43 y.o. here for clearance for orthopedic surgery (tarsle tunnel release) Her left foot falls to sleep when she is walking , has numbness nad tingling. General anesthesia for 1 1/2 hours.  She has a history of LHC in , no lesions. Coronary cta in January was normal. History of SVT (ablation) Hyperlipidemia, Htn, DM, asthma.     Family hx of heart disease, grandmother  during heart surgery, m-aunt has multiple stents.     Former smoker (quit 2 yrs ago).     She had Covid twice, once prior to vaccines and once about 2 months ago after having vaccines and boosters.         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  On  she had an ablation of AVNRT ( Dr Bravo)  On  she had a sleeve gastrectomy ( Dr Lechuga)- down 43 pounds since  Evaluated in ER  for \"constant\" left sided  sharp chest pain that radiates to her back. This came on suddenly, she was sitting at her desk. This was associated with left arm numbness and tingling, hot flashes. This was also associated with dizziness when she sat up. When she layed back and closed her eyes the room was spinning.Evaluated with echo  and treated with indocin colchicine ( Dr Chaudhary)  Evaluated in the ER  for concern of UGI bleed. (12.37.1) Evaluated, was discharged    Today she states she did not have a virus or cough at the time she went to the ER for chest pain. This pain felt different than her back pain. She has not had any since. Walked three times around [place] without stopping, also walked up a flight of stairs without stopping. No chest pain shortness of breath palpitations or dizziness.  No edema.           Patient is adherent with medications and is tolerating them well without side effects

## 2023-12-16 LAB
CRP SERPL-MCNC: <3 MG/L (ref 0–5.1)
ERYTHROCYTE [SEDIMENTATION RATE] IN BLOOD BY WESTERGREN METHOD: 3 MM/HR (ref 0–20)

## 2023-12-20 ENCOUNTER — HOSPITAL ENCOUNTER (OUTPATIENT)
Dept: PHYSICAL THERAPY | Age: 43
Setting detail: THERAPIES SERIES
End: 2023-12-20
Attending: ORTHOPAEDIC SURGERY
Payer: COMMERCIAL

## 2023-12-23 DIAGNOSIS — M79.7 FIBROMYALGIA: ICD-10-CM

## 2023-12-23 DIAGNOSIS — G89.29 OTHER CHRONIC PAIN: ICD-10-CM

## 2023-12-26 ENCOUNTER — APPOINTMENT (OUTPATIENT)
Dept: PHYSICAL THERAPY | Age: 43
End: 2023-12-26
Attending: ORTHOPAEDIC SURGERY
Payer: COMMERCIAL

## 2023-12-27 RX ORDER — GABAPENTIN 100 MG/1
200 CAPSULE ORAL 2 TIMES DAILY
Qty: 120 CAPSULE | Refills: 1 | OUTPATIENT
Start: 2023-12-27

## 2023-12-29 ENCOUNTER — APPOINTMENT (OUTPATIENT)
Dept: PHYSICAL THERAPY | Age: 43
End: 2023-12-29
Attending: ORTHOPAEDIC SURGERY
Payer: COMMERCIAL

## 2024-01-03 ENCOUNTER — TELEPHONE (OUTPATIENT)
Dept: PULMONOLOGY | Age: 44
End: 2024-01-03

## 2024-01-03 NOTE — TELEPHONE ENCOUNTER
Called patient and informed per Nataliya.  
Called patient and informed per Nataliya; she was concerned about this pressure being too low based on her previous changes. She is willing to try it tonight and see how it feels. She mentioned needing a study due to the weight loss and not being able to find a pressure that doesn't feel like she's being blown away or suffocating. Do you feel she needs a study?  
Download attached.  
I can increase the pressure some if she feels the 9 is too low. May need a sleep study but usually I  recommend holding off on the the sleep studies until weight has stabilized or she is at goal and she continues to have issues.  
Pt called in stating that she had her pressure changes recently and now feels that there is too much pressure. Pt stated that she had recent weight loss surgery and feels it could be due to that. Pt would like to know if she needs another sleep study or what her next steps are.     [phone]  
To Dr. Gerardo CORONA
Will decrease pressure. EPAP min decreased to 9, PS to 3. Changes sent via machine modeSnackFeed. Please have her contact the office if issues do not resolve.  
Continue Regimen: clindamycin 1 % lotion - apply pea size amount to face qhs\\n\\nBPO\\n\\nEpiduo qhs
Detail Level: Zone
Render In Strict Bullet Format?: No

## 2024-01-09 ENCOUNTER — HOSPITAL ENCOUNTER (OUTPATIENT)
Age: 44
Setting detail: OUTPATIENT SURGERY
Discharge: HOME | End: 2024-01-09
Attending: ANESTHESIOLOGY | Admitting: ANESTHESIOLOGY
Payer: COMMERCIAL

## 2024-01-09 ENCOUNTER — APPOINTMENT (OUTPATIENT)
Dept: GENERAL RADIOLOGY | Age: 44
End: 2024-01-09
Attending: ANESTHESIOLOGY
Payer: COMMERCIAL

## 2024-01-09 ENCOUNTER — TELEPHONE (OUTPATIENT)
Dept: FAMILY MEDICINE CLINIC | Age: 44
End: 2024-01-09

## 2024-01-09 VITALS
TEMPERATURE: 97.4 F | SYSTOLIC BLOOD PRESSURE: 122 MMHG | BODY MASS INDEX: 33.32 KG/M2 | DIASTOLIC BLOOD PRESSURE: 80 MMHG | HEIGHT: 65 IN | RESPIRATION RATE: 16 BRPM | WEIGHT: 200 LBS | OXYGEN SATURATION: 98 % | HEART RATE: 57 BPM

## 2024-01-09 LAB
GLUCOSE BLD-MCNC: 96 MG/DL (ref 70–99)
HCG UR QL: NEGATIVE
PERFORMED ON: NORMAL

## 2024-01-09 PROCEDURE — 99152 MOD SED SAME PHYS/QHP 5/>YRS: CPT | Performed by: ANESTHESIOLOGY

## 2024-01-09 PROCEDURE — 84703 CHORIONIC GONADOTROPIN ASSAY: CPT

## 2024-01-09 PROCEDURE — 3610000054 HC PAIN LEVEL 3 BASE (NON-OR): Performed by: ANESTHESIOLOGY

## 2024-01-09 PROCEDURE — 2500000003 HC RX 250 WO HCPCS: Performed by: ANESTHESIOLOGY

## 2024-01-09 PROCEDURE — 2709999900 HC NON-CHARGEABLE SUPPLY: Performed by: ANESTHESIOLOGY

## 2024-01-09 PROCEDURE — 6360000002 HC RX W HCPCS: Performed by: ANESTHESIOLOGY

## 2024-01-09 RX ORDER — MIDAZOLAM HYDROCHLORIDE 1 MG/ML
INJECTION INTRAMUSCULAR; INTRAVENOUS
Status: COMPLETED | OUTPATIENT
Start: 2024-01-09 | End: 2024-01-09

## 2024-01-09 RX ORDER — FENTANYL CITRATE 50 UG/ML
INJECTION, SOLUTION INTRAMUSCULAR; INTRAVENOUS
Status: COMPLETED | OUTPATIENT
Start: 2024-01-09 | End: 2024-01-09

## 2024-01-09 RX ORDER — LIDOCAINE HYDROCHLORIDE 10 MG/ML
INJECTION, SOLUTION EPIDURAL; INFILTRATION; INTRACAUDAL; PERINEURAL
Status: COMPLETED | OUTPATIENT
Start: 2024-01-09 | End: 2024-01-09

## 2024-01-09 RX ORDER — METHYLPREDNISOLONE ACETATE 80 MG/ML
INJECTION, SUSPENSION INTRA-ARTICULAR; INTRALESIONAL; INTRAMUSCULAR; SOFT TISSUE
Status: COMPLETED | OUTPATIENT
Start: 2024-01-09 | End: 2024-01-09

## 2024-01-09 ASSESSMENT — PAIN DESCRIPTION - DESCRIPTORS: DESCRIPTORS: ACHING

## 2024-01-09 NOTE — H&P
Update History & Physical    The patient's History and Physical of December 21, 2023 was reviewed with the patient and I examined the patient. There was no change. The surgical site was confirmed by the patient and me.       Plan: The risks, benefits, expected outcome, and alternative to the recommended procedure have been discussed with the patient. Patient understands and wants to proceed with the procedure.     Electronically signed by MIO CONTRERAS MD on 1/9/2024 at 10:13 AM

## 2024-01-09 NOTE — OP NOTE
Breanna Walker made aware of patients blood work  Will repeat blood work as per provider        Betzy Sahni RN  06/05/22 [id number] Operative Note      Patient: Petrona Guzman  YOB: 1980  MRN: [id number]    Date of Procedure: 1/9/2024    Pre-Op Diagnosis Codes:     * Pain in right hip [M25.551]    Post-Op Diagnosis: Same       Procedure(s):  RIGHT TROCHANTERIC HIP INJECTION WITH FLUOROSCOPY    Surgeon(s):  Faizan Tim MD    Assistant:   * No surgical staff found *    Anesthesia: IV Sedation    Estimated Blood Loss (mL): Minimal    Complications: None    Specimens:   * No specimens in log *    Implants:  * No implants in log *      Drains: * No LDAs found *    Findings:         Detailed Description of Procedure:   Procedure Performed: Left intra-articular Hip injection - with Steroid under Ultrasound Guidance, with IV sedation      Preoperative Diagnosis:      Left Hip pain                    M16.10      Postoperative diagnosis:  The same      Procedure in Detail:   The patient's chart was reviewed. After obtaining written informed consent, the patient was placed in the supine position with the leg slightly flexed, monitors attached.     PROCEDURE: MEDICAL NECESSITY: This patient has chronic pain that has failed to respond to conservative measures as outlined in the original history and last physical exam.   The patient's symptoms include Pain and disability of a moderate to severe degree with intermittent refereed pain. The goals of treatment are to 1) achieve optimal pain control, recognizing that a pain-free state may not be achievable; 2) minimize adverse outcomes; 3) enhance functional abilities, and physical and psychological well-being; and 4) enhance the quality of life for patients with chronic pain.  The patient has documented pathology through radiographic imaging, the patient has the same or similar procedure in the past which resulted in significant improvement more than 50% reduction in the pain, as well improvement in their Activity of daily living and quality of life, and this would be the goal for the first

## 2024-01-09 NOTE — DISCHARGE INSTRUCTIONS
1. Keep injection /surgical site dry until the band-aid/ dressing is removed. Please remove band-aids from the injection site 12-24 hours after the procedure.     2. If the injection site is sore, you may apply an ice pack to that area for twenty minutes every two hours for the initial twenty four hours. Do not use heat.    3. Occasionally you may notice slight increase in pain after the procedure. This should start to improve  within the next twenty four to forty eight hours.    4. Remember, it may take as long as forty eight hours before you notice a gradual improvement in your pain and other symptoms.    5. You may continue to take your pain medication as needed.    6. DO NOT stop any other previously prescribed medications. You may resume blood thinning medications the day after the procedure.     7. In general, you should avoid any prolonged standing, walking, or repeated bending or heavy use of your upper extremities for twenty four to forty eight hours but may resume light      Normal activity when you leave the facility     8. If you are currently going to physical therapy, continue to do so unless your doctor instructs you not to.    9. If you develop any other symptoms such fever, rash, unusual drainage from the site, severe headache or weakness please call [phone].    10. Other: If you had sedation ; Do not drink alcohol or sign any legal or financial contracts for 24 hours. Also do not drive or operate other types of machinery         for 24 hours. You should have a responsible adult available to assist you for the rest of the day. Side effects include lightheadedness, dizziness and sleepiness.    11. Side effects of steroids can include: (these may last a few days then will go away on their own)           - facial flushing           - hot flashes           - nervous energy and difficulty sleeping at night           - muscle spasm or twitching           - fluid retention            - elevated blood

## 2024-01-09 NOTE — BRIEF OP NOTE
Brief Postoperative Note      Patient: Petrona Guzman  YOB: 1980  MRN: [id number]    Date of Procedure: 1/9/2024    Pre-Op Diagnosis Codes:     * Pain in right hip [M25.551]    Post-Op Diagnosis: Same       Procedure(s):  RIGHT TROCHANTERIC HIP INJECTION WITH FLUOROSCOPY    Surgeon(s):  Mio Tim MD    Assistant:  * No surgical staff found *    Anesthesia: IV Sedation    Estimated Blood Loss (mL): Minimal    Complications: None    Specimens:   * No specimens in log *    Implants:  * No implants in log *      Drains: * No LDAs found *    Findings:       Electronically signed by MIO TIM MD on 1/9/2024 at 10:14 AM

## 2024-01-09 NOTE — PROGRESS NOTES
PATIENT REACHED   YES_X___NO____    PREOP INSTRUCTIONS LEFT ON VM NUMBER_______________      DATE__1/9/24_______ TIME__0915_______ARRIVAL_0815_______PLACE_[address]___________  NOTHING TO EAT OR DRINK  AFTER MIDNIGHT THE EVENING PRIOR OR AS INSTRUCTED BY YOUR DR.  YOU NEED A RESPONSIBLE ADULT AGE 18 OR OLDER TO DRIVE YOU HOME  PLEASE BRING INSURANCE CARD.PICTURE ID AND COMPLETE LIST OF MEDS  WEAR LOOSE COMFORTABLE CLOTHING  FOLLOW ANY INSTRUCTIONS YOUR D[facility] OFFICE HAS GIVEN YOU,INCLUDING WHAT MEDICATIONS TO TAKE THE AM OF PROCEDURE AND WHEN AND IF YOU NEED TO STOP ANY BLOOD THINNERS. IF YOU HAVE QUESTIONS REGARDING THIS CALL THE OFFICE  THE GOAL BLOOD SUGAR THE AM OF PROCEDURE  OR LESS ABOVE THAT THE PROCEDURE MAY BE CANCELLED  ANY QUESTIONS CALL YOUR DOCTOR.ALSO,PLEASE READ THE INSTRUCTION PACKET FROM YOUR DR IF YOU RECEIVED ONE.  SPINE INTERVENTION NUMBER [phone]      OTHER___________________________________      VISITOR POLICY(subject to change)    Current policy is 2 visitors per patient. No children. Masks are required.

## 2024-01-09 NOTE — TELEPHONE ENCOUNTER
Marilyn Mcallister, nurse  with [facility], wanted to leave her contact info for future office visits.  If needed, please call [phone], ext [phone].  
Noted  
negative

## 2024-01-09 NOTE — PROGRESS NOTES
IV discontinued, catheter intact, and dressing applied.    Procedural dressing dry and intact.    Bilateral lower extremities equal in strength.    Discharge instructions reviewed with patient or responsible adult, signed and copy given.  All home medications have been reviewed.  All questions answered and patient or responsible adult verbalized understanding.  PAIN LEVEL AT DISCHARGE __0___

## 2024-01-14 PROBLEM — Z01.810 PREOP CARDIOVASCULAR EXAM: Status: RESOLVED | Noted: 2022-06-16 | Resolved: 2024-01-14

## 2024-01-16 ENCOUNTER — APPOINTMENT (OUTPATIENT)
Dept: GENERAL RADIOLOGY | Age: 44
End: 2024-01-16
Attending: ANESTHESIOLOGY
Payer: COMMERCIAL

## 2024-01-16 ENCOUNTER — HOSPITAL ENCOUNTER (OUTPATIENT)
Age: 44
Setting detail: OUTPATIENT SURGERY
Discharge: HOME | End: 2024-01-16
Attending: ANESTHESIOLOGY | Admitting: ANESTHESIOLOGY
Payer: COMMERCIAL

## 2024-01-16 VITALS
HEART RATE: 68 BPM | RESPIRATION RATE: 16 BRPM | WEIGHT: 200 LBS | TEMPERATURE: 97 F | HEIGHT: 65 IN | OXYGEN SATURATION: 100 % | DIASTOLIC BLOOD PRESSURE: 73 MMHG | BODY MASS INDEX: 33.32 KG/M2 | SYSTOLIC BLOOD PRESSURE: 110 MMHG

## 2024-01-16 LAB
GLUCOSE BLD-MCNC: 100 MG/DL (ref 70–99)
HCG UR QL: NEGATIVE
PERFORMED ON: ABNORMAL

## 2024-01-16 PROCEDURE — 84703 CHORIONIC GONADOTROPIN ASSAY: CPT

## 2024-01-16 PROCEDURE — 3610000054 HC PAIN LEVEL 3 BASE (NON-OR): Performed by: ANESTHESIOLOGY

## 2024-01-16 PROCEDURE — 2500000003 HC RX 250 WO HCPCS: Performed by: ANESTHESIOLOGY

## 2024-01-16 PROCEDURE — 6360000002 HC RX W HCPCS: Performed by: ANESTHESIOLOGY

## 2024-01-16 PROCEDURE — 2709999900 HC NON-CHARGEABLE SUPPLY: Performed by: ANESTHESIOLOGY

## 2024-01-16 PROCEDURE — 99152 MOD SED SAME PHYS/QHP 5/>YRS: CPT | Performed by: ANESTHESIOLOGY

## 2024-01-16 RX ORDER — METHYLPREDNISOLONE ACETATE 80 MG/ML
INJECTION, SUSPENSION INTRA-ARTICULAR; INTRALESIONAL; INTRAMUSCULAR; SOFT TISSUE
Status: COMPLETED | OUTPATIENT
Start: 2024-01-16 | End: 2024-01-16

## 2024-01-16 RX ORDER — LIDOCAINE HYDROCHLORIDE 10 MG/ML
INJECTION, SOLUTION EPIDURAL; INFILTRATION; INTRACAUDAL; PERINEURAL
Status: COMPLETED | OUTPATIENT
Start: 2024-01-16 | End: 2024-01-16

## 2024-01-16 RX ORDER — MIDAZOLAM HYDROCHLORIDE 1 MG/ML
INJECTION INTRAMUSCULAR; INTRAVENOUS
Status: COMPLETED | OUTPATIENT
Start: 2024-01-16 | End: 2024-01-16

## 2024-01-16 RX ORDER — BUPIVACAINE HYDROCHLORIDE 5 MG/ML
INJECTION, SOLUTION EPIDURAL; INTRACAUDAL
Status: COMPLETED | OUTPATIENT
Start: 2024-01-16 | End: 2024-01-16

## 2024-01-16 RX ORDER — FENTANYL CITRATE 50 UG/ML
INJECTION, SOLUTION INTRAMUSCULAR; INTRAVENOUS
Status: COMPLETED | OUTPATIENT
Start: 2024-01-16 | End: 2024-01-16

## 2024-01-16 ASSESSMENT — PAIN SCALES - GENERAL
PAINLEVEL_OUTOF10: 7
PAINLEVEL_OUTOF10: 7

## 2024-01-16 ASSESSMENT — PAIN DESCRIPTION - PAIN TYPE
TYPE: CHRONIC PAIN
TYPE: CHRONIC PAIN

## 2024-01-16 ASSESSMENT — PAIN DESCRIPTION - ORIENTATION
ORIENTATION: LEFT;INNER
ORIENTATION: LEFT;INNER

## 2024-01-16 ASSESSMENT — PAIN DESCRIPTION - DESCRIPTORS
DESCRIPTORS: SHARP
DESCRIPTORS: SHARP

## 2024-01-16 ASSESSMENT — PAIN DESCRIPTION - LOCATION
LOCATION: HIP
LOCATION: HIP

## 2024-01-16 NOTE — PROGRESS NOTES
IV discontinued, catheter intact, and dressing applied.    Procedural dressing dry and intact.    Bilateral lower extremities equal in strength.    Discharge instructions reviewed with patient or responsible adult, signed and copy given.  All home medications have been reviewed.  All questions answered and patient or responsible adult verbalized understanding.  PAIN LEVEL AT DISCHARGE __7___

## 2024-01-16 NOTE — PROGRESS NOTES
Brought to phase 2 recovery via chair. Does have one band aid in place to the front of the left groin area. Which is cdi. States pain level 8/10 at present. Is awake and alert.

## 2024-01-16 NOTE — H&P
Update History & Physical    The patient's History and Physical of December 21, 2023 was reviewed with the patient and I examined the patient. There was no change. The surgical site was confirmed by the patient and me.       Plan: The risks, benefits, expected outcome, and alternative to the recommended procedure have been discussed with the patient. Patient understands and wants to proceed with the procedure.     Electronically signed by MIO CONTRERAS MD on 1/16/2024 at 9:10 AM

## 2024-01-16 NOTE — DISCHARGE INSTRUCTIONS
1. Keep injection /surgical site dry until the band-aid/ dressing is removed. Please remove band-aids from the injection site 12-24 hours after the procedure.     2. If the injection site is sore, you may apply an ice pack to that area for twenty minutes every two hours for the initial twenty four hours. Do not use heat.    3. Occasionally you may notice slight increase in pain after the procedure. This should start to improve  within the next twenty four to forty eight hours.    4. Remember, it may take as long as forty eight hours before you notice a gradual improvement in your pain and other symptoms.    5. You may continue to take your pain medication as needed.    6. DO NOT stop any other previously prescribed medications. You may resume blood thinning medications the day after the procedure.     7. In general, you should avoid any prolonged standing, walking, or repeated bending or heavy use of your upper extremities for twenty four to forty eight hours but may resume light      Normal activity when you leave the facility     8. If you are currently going to physical therapy, continue to do so unless your doctor instructs you not to.    9. If you develop any other symptoms such fever, rash, unusual drainage from the site, severe headache or weakness please call [phone].    10. Other: If you had sedation ; Do not drink alcohol or sign any legal or financial contracts for 24 hours. Also do not drive or        Or operate other types of machinery for 24 hours. You should have a responsible adult available to assist you for the rest of the day.     11. Side effects of steroids can include: (these may last a few days then will go away on their own)           - facial flushing           - hot flashes           - nervous energy and difficulty sleeping at night           - muscle spasm or twitching           - fluid retention            - elevated blood sugar levels in diabetics      12. Please follow up with the

## 2024-01-16 NOTE — BRIEF OP NOTE
Brief Postoperative Note      Patient: Petrona Guzman  YOB: 1980  MRN: [id number]    Date of Procedure: 1/16/2024    Pre-Op Diagnosis Codes:     * Pain in left hip [M25.552]    Post-Op Diagnosis: Same       Procedure(s):  LEFT TROCHANTERIC HIP INJECTION WITH FLUOROSCOPY    Surgeon(s):  Mio Tim MD    Assistant:  * No surgical staff found *    Anesthesia: IV Sedation    Estimated Blood Loss (mL): Minimal    Complications: None    Specimens:   * No specimens in log *    Implants:  * No implants in log *      Drains: * No LDAs found *    Findings:       Electronically signed by MIO TIM MD on 1/16/2024 at 9:19 AM

## 2024-01-16 NOTE — OP NOTE
Operative Note      Patient: Petrona Guzman  YOB: 1980  MRN: [id number]    Date of Procedure: 1/16/2024    Pre-Op Diagnosis Codes:     * Pain in left hip [M25.552]    Post-Op Diagnosis: Same       Procedure(s):  LEFT TROCHANTERIC HIP INJECTION WITH FLUOROSCOPY    Surgeon(s):  Faizan Tim MD    Assistant:   * No surgical staff found *    Anesthesia: IV Sedation    Estimated Blood Loss (mL): Minimal    Complications: None    Specimens:   * No specimens in log *    Implants:  * No implants in log *      Drains: * No LDAs found *    Findings:         Detailed Description of Procedure:   MEDICAL NECESSITY:   This patient has chronic pain that has failed to respond to conservative measures as outlined in the original history and physical exam documented on the patient's chart.   Radiographic studies, history and physical examination are consistent with hip arthritis. There are no contraindications to the procedure, the patient is agreeable in proceeding, and the patient has not had surgery in the area being tested, nor a strong radicular component. The goals of the procedure/treatment are to: 1) Confirm the etiology of the patient's source of pain by temporarily blocking the joint;   2) Providing options to achieve optimal pain control, recognizing that a pain-free state may not be achievable;   3) Minimize adverse outcomes;   4) Enhance functional abilities, and physical and psychological well-being; and   5) Enhance the quality of life for patients with chronic pain        PROCEDURE NOTE:  After obtaining written informed consent patient was taken to the procedure room.   Pre-procedure blood pressure and pulse were stable and recorded in patients clinic chart.       PROCEDURE IN DETAIL:    Procedure in Detail:   The patient's chart was reviewed. After obtaining written informed consent, the patient had a 20 g IV placed, the patient was placed in the supine position with the leg slightly flexed.

## 2024-01-21 PROBLEM — J41.8 MIXED SIMPLE AND MUCOPURULENT CHRONIC BRONCHITIS (HCC): Status: ACTIVE | Noted: 2024-01-21

## 2024-01-22 ENCOUNTER — OFFICE VISIT (OUTPATIENT)
Dept: FAMILY MEDICINE CLINIC | Age: 44
End: 2024-01-22

## 2024-01-22 ENCOUNTER — HOSPITAL ENCOUNTER (OUTPATIENT)
Dept: CT IMAGING | Age: 44
Discharge: HOME | End: 2024-01-22
Payer: COMMERCIAL

## 2024-01-22 ENCOUNTER — TELEPHONE (OUTPATIENT)
Dept: CARDIOLOGY CLINIC | Age: 44
End: 2024-01-22

## 2024-01-22 VITALS
HEART RATE: 59 BPM | BODY MASS INDEX: 34.28 KG/M2 | SYSTOLIC BLOOD PRESSURE: 126 MMHG | WEIGHT: 206 LBS | OXYGEN SATURATION: 97 % | DIASTOLIC BLOOD PRESSURE: 72 MMHG

## 2024-01-22 DIAGNOSIS — E55.9 VITAMIN D DEFICIENCY: ICD-10-CM

## 2024-01-22 DIAGNOSIS — F32.89 OTHER DEPRESSION: ICD-10-CM

## 2024-01-22 DIAGNOSIS — G89.29 OTHER CHRONIC PAIN: ICD-10-CM

## 2024-01-22 DIAGNOSIS — R51.9 WORST HEADACHE OF LIFE: ICD-10-CM

## 2024-01-22 DIAGNOSIS — G47.33 OSA (OBSTRUCTIVE SLEEP APNEA): ICD-10-CM

## 2024-01-22 DIAGNOSIS — K21.9 GASTROESOPHAGEAL REFLUX DISEASE, UNSPECIFIED WHETHER ESOPHAGITIS PRESENT: ICD-10-CM

## 2024-01-22 DIAGNOSIS — F10.21 HISTORY OF ALCOHOLISM (HCC): ICD-10-CM

## 2024-01-22 DIAGNOSIS — F31.9 BIPOLAR 1 DISORDER (HCC): ICD-10-CM

## 2024-01-22 DIAGNOSIS — F41.9 ANXIETY: ICD-10-CM

## 2024-01-22 DIAGNOSIS — R51.9 NEW ONSET HEADACHE: ICD-10-CM

## 2024-01-22 DIAGNOSIS — E11.9 TYPE 2 DIABETES MELLITUS WITHOUT COMPLICATION, WITHOUT LONG-TERM CURRENT USE OF INSULIN (HCC): ICD-10-CM

## 2024-01-22 DIAGNOSIS — F43.10 PTSD (POST-TRAUMATIC STRESS DISORDER): ICD-10-CM

## 2024-01-22 DIAGNOSIS — M79.7 FIBROMYALGIA: ICD-10-CM

## 2024-01-22 DIAGNOSIS — E78.2 MIXED HYPERLIPIDEMIA: Primary | ICD-10-CM

## 2024-01-22 DIAGNOSIS — R00.0 CHRONIC TACHYCARDIA: ICD-10-CM

## 2024-01-22 PROCEDURE — 70450 CT HEAD/BRAIN W/O DYE: CPT

## 2024-01-22 RX ORDER — METOPROLOL TARTRATE 50 MG/1
50 TABLET, FILM COATED ORAL 2 TIMES DAILY
Qty: 180 TABLET | Refills: 1 | Status: SHIPPED | OUTPATIENT
Start: 2024-01-22

## 2024-01-22 RX ORDER — GABAPENTIN 300 MG/1
300 CAPSULE ORAL 2 TIMES DAILY
Qty: 180 CAPSULE | Refills: 1 | Status: SHIPPED | OUTPATIENT
Start: 2024-01-22 | End: 2024-07-20

## 2024-01-22 RX ORDER — EZETIMIBE 10 MG/1
10 TABLET ORAL DAILY
Qty: 90 TABLET | Refills: 1 | Status: SHIPPED | OUTPATIENT
Start: 2024-01-22

## 2024-01-22 RX ORDER — ACETAMINOPHEN 160 MG
2000 TABLET,DISINTEGRATING ORAL DAILY
Qty: 90 CAPSULE | Refills: 1 | Status: SHIPPED | OUTPATIENT
Start: 2024-01-22

## 2024-01-22 RX ORDER — ROSUVASTATIN CALCIUM 40 MG/1
TABLET, COATED ORAL
Qty: 90 TABLET | Refills: 1 | Status: SHIPPED | OUTPATIENT
Start: 2024-01-22

## 2024-01-22 RX ORDER — METFORMIN HYDROCHLORIDE 500 MG/1
1000 TABLET, EXTENDED RELEASE ORAL
Qty: 180 TABLET | Refills: 1 | Status: SHIPPED | OUTPATIENT
Start: 2024-01-22

## 2024-01-22 RX ORDER — OMEPRAZOLE 20 MG/1
CAPSULE, DELAYED RELEASE ORAL
Qty: 90 CAPSULE | Refills: 1 | Status: SHIPPED | OUTPATIENT
Start: 2024-01-22

## 2024-01-22 ASSESSMENT — PATIENT HEALTH QUESTIONNAIRE - PHQ9
SUM OF ALL RESPONSES TO PHQ QUESTIONS 1-9: 2
SUM OF ALL RESPONSES TO PHQ QUESTIONS 1-9: 2
2. FEELING DOWN, DEPRESSED OR HOPELESS: 1
SUM OF ALL RESPONSES TO PHQ QUESTIONS 1-9: 2
SUM OF ALL RESPONSES TO PHQ QUESTIONS 1-9: 2
SUM OF ALL RESPONSES TO PHQ9 QUESTIONS 1 & 2: 2
1. LITTLE INTEREST OR PLEASURE IN DOING THINGS: 1

## 2024-01-22 ASSESSMENT — ENCOUNTER SYMPTOMS
SORE THROAT: 0
EYE REDNESS: 0
EYE WATERING: 0
BLURRED VISION: 1
VISUAL CHANGE: 0
EYE PAIN: 0
RHINORRHEA: 0

## 2024-01-22 NOTE — PROGRESS NOTES
(Site: Left Upper Arm, Position: Sitting, Cuff Size: Large Adult)   Pulse 59   Wt 93.4 kg (206 lb)   LMP  (LMP Unknown)   SpO2 97%   BMI 34.28 kg/m²    BP Readings from Last 3 Encounters:   01/22/24 126/72   01/16/24 110/73   01/09/24 122/80      Wt Readings from Last 3 Encounters:   01/22/24 93.4 kg (206 lb)   01/16/24 90.7 kg (200 lb)   01/09/24 90.7 kg (200 lb)       ASSESSMENT & PLAN:    1. Mixed hyperlipidemia  - Stable, continue current regimen  - Reviewed low-cholesterol diet  - ezetimibe (ZETIA) 10 MG tablet; Take 1 tablet by mouth daily  Dispense: 90 tablet; Refill: 1  - rosuvastatin (CRESTOR) 40 MG tablet; TAKE 1 TABLET BY MOUTH EVERY DAY  Dispense: 90 tablet; Refill: 1  - CBC with Auto Differential; Future  - Comprehensive Metabolic Panel; Future  - Lipid Panel; Future  - CK; Future  - TSH with Reflex; Future    2. Type 2 diabetes mellitus without complication, without long-term current use of insulin (HCC)  - Stable, continue current regimen  - Reviewed diabetic diet  - metFORMIN (GLUCOPHAGE-XR) 500 MG extended release tablet; Take 2 tablets by mouth daily (with breakfast)  Dispense: 180 tablet; Refill: 1  - Hemoglobin A1C; Future    3. Chronic tachycardia  - Stable, continue current regimen  - metoprolol tartrate (LOPRESSOR) 50 MG tablet; Take 1 tablet by mouth 2 times daily  Dispense: 180 tablet; Refill: 1  - Follow-up with cardiologist as needed/directed    4. Gastroesophageal reflux disease, unspecified whether esophagitis present  - Stable, continue current regimen  - Reviewed GERD precautions  - omeprazole (PRILOSEC) 20 MG delayed release capsule; TAKE 1 CAPSULE BY MOUTH EVERY DAY  Dispense: 90 capsule; Refill: 1    5. AMOS (obstructive sleep apnea)  - Stable, continue nightly CPAP usage    6. Other chronic pain  - Increase gabapentin from 200 mg to 300 mg twice daily  - gabapentin (NEURONTIN) 300 MG capsule; Take 1 capsule by mouth 2 times daily for 180 days. Intended supply: 90 days

## 2024-01-22 NOTE — PATIENT INSTRUCTIONS
Stat CT of the head for the worst headache of her life    Keep a headache journal including timing, associated symptoms, treatments tried and response to treatments    To the ER for worsening headache, vision changes, dizziness or weakness    Medications refilled and fasting labs ordered     Reviewed low-cholesterol diet     Continue ezetimibe (Zetia) 10 mg daily and Crestor     Reviewed diabetic diet    Increase gabapentin from 200 mg to 300 mg twice daily      Follow-up with specialist as needed/directed (psych, Ortho, podiatry pulmonology, cardiology)      Encourage continued lifestyle modifications (better food choices, portion control and increasing activity)     Follow up with cardiology as needed/directed (6 months)     Follow up with pulmonology as needed/directed (as needed)      Follow up with rheumatology as needed/directed (re-establishing)     Follow up with psychiatrist as needed/directed (3 months)      Follow up in 6 months, sooner if symptoms worsen or persist

## 2024-01-22 NOTE — TELEPHONE ENCOUNTER
Rosalinda from [facility] Prior auth called to report that pts orders for a cardiac mri have been approved and the prior auth is good for 30 days. Pt is scheduled for 1/25 for cardiac mri. Ref number is [id number]. If any questions best number is [phone].

## 2024-01-23 ENCOUNTER — TELEPHONE (OUTPATIENT)
Dept: FAMILY MEDICINE CLINIC | Age: 44
End: 2024-01-23

## 2024-01-23 NOTE — TELEPHONE ENCOUNTER
[facility] Medical Benefits Management states CT scan of head w/o contrast has been denied, did not meet criteria.  [facility] Guidelines Site of Care was used to make decision.  To speak to physician about determination, please call [phone].

## 2024-01-25 ENCOUNTER — HOSPITAL ENCOUNTER (OUTPATIENT)
Dept: MRI IMAGING | Age: 44
Discharge: HOME | End: 2024-01-25
Attending: INTERNAL MEDICINE
Payer: COMMERCIAL

## 2024-01-25 DIAGNOSIS — I30.9 ACUTE PERICARDITIS ASSOCIATED WITH OTHER DISEASE: ICD-10-CM

## 2024-01-25 PROBLEM — I31.9 PERICARDITIS: Status: ACTIVE | Noted: 2024-01-25

## 2024-01-25 PROCEDURE — 75565 CARD MRI VELOC FLOW MAPPING: CPT | Performed by: INTERNAL MEDICINE

## 2024-01-25 PROCEDURE — 2580000003 HC RX 258: Performed by: INTERNAL MEDICINE

## 2024-01-25 PROCEDURE — A9585 GADOBUTROL INJECTION: HCPCS | Performed by: INTERNAL MEDICINE

## 2024-01-25 PROCEDURE — 75561 CARDIAC MRI FOR MORPH W/DYE: CPT | Performed by: INTERNAL MEDICINE

## 2024-01-25 PROCEDURE — 6360000004 HC RX CONTRAST MEDICATION: Performed by: INTERNAL MEDICINE

## 2024-01-25 PROCEDURE — A4216 STERILE WATER/SALINE, 10 ML: HCPCS | Performed by: INTERNAL MEDICINE

## 2024-01-25 PROCEDURE — 75565 CARD MRI VELOC FLOW MAPPING: CPT

## 2024-01-25 RX ORDER — SODIUM CHLORIDE 9 MG/ML
10 INJECTION INTRAVENOUS PRN
Status: DISCONTINUED | OUTPATIENT
Start: 2024-01-25 | End: 2024-01-26 | Stop reason: HOSPADM

## 2024-01-25 RX ORDER — GADOBUTROL 604.72 MG/ML
15 INJECTION INTRAVENOUS
Status: COMPLETED | OUTPATIENT
Start: 2024-01-25 | End: 2024-01-25

## 2024-01-25 RX ADMIN — SODIUM CHLORIDE 10 ML: 9 INJECTION, SOLUTION INTRAMUSCULAR; INTRAVENOUS; SUBCUTANEOUS at 08:51

## 2024-01-25 RX ADMIN — GADOBUTROL 15 ML: 604.72 INJECTION INTRAVENOUS at 08:52

## 2024-01-26 NOTE — PROGRESS NOTES
down 39 pounds since    4. Heterozygous Familial hypercholesterolemia  - 3/13/23  tc 88 hdl 29 ldl 45 tri 68  -12/2/23--  tc 80 hdl 42 ldl 28  tri 50  - stable  Plan  - continue zetia 10 mg daily, crestor 40 daily,  repatha q 2 weeks  - recommend daughter get her lipids checked    5.AMOS  -treated with cpap  Plan  -recommended continue cpap nightly    6.Diabetes  -12/2/23- A1c 5.9  -stable  Plan  -continue glucophage    7. Sjogren /fibromyalgia  -followed by Dr Dia  Plan  - continue to follow up with Dr Dia    8 bipolar/depression  - worsening depression/anxiety  Plan  - being treated by psychiatry/psychologist  - psychiatry every 3 months, counseling weekly    Patient counseled on lifestyle modification, diet, and exercise.    Follow Up:   6 months    Dr. Waqar Ruff DO  Cardiologist [facility]    Scribe's Attestation: This note was scribed in the presence of Dr. Waqar Ruff DO by Ivy Lauren RN        Physician Attestation  The scribe for and in the presence of me (Chadwick Ruff DO).  The scribe Ivy Lauren RN   may have prepopulated components of this note with my historical  intellectual property under my direct supervision.  Any additions to this intellectual property were performed in my presence and at my direction.  Furthermore, the content and accuracy of this note have been reviewed by me (Chadwick Ruff DO).  2/5/2024 3:27 PM

## 2024-01-26 NOTE — PATIENT INSTRUCTIONS
Keep up the good work  Be sure your daughter gets checked for high cholesterol  May stop colchicine

## 2024-01-30 ENCOUNTER — TELEPHONE (OUTPATIENT)
Dept: CARDIOLOGY CLINIC | Age: 44
End: 2024-01-30

## 2024-01-30 RX ORDER — COLCHICINE 0.6 MG/1
0.3 TABLET ORAL DAILY
Qty: 30 TABLET | Refills: 1 | Status: SHIPPED | OUTPATIENT
Start: 2024-01-30 | End: 2024-02-05 | Stop reason: ALTCHOICE

## 2024-01-30 NOTE — TELEPHONE ENCOUNTER
Call  goes straight to a busy signal. Sent MY Chart message    Per DKW:    Dkw reviewed mri and it was normal   he recommends cutting colchicine in 1/2 to 0.3 mg daily  Attempted to call patient but did not get an answer, can you please let patient know, thanks

## 2024-01-30 NOTE — TELEPHONE ENCOUNTER
----- Message from Ivy Lauren RN sent at 1/30/2024 11:11 AM EST -----  Nikhil reviewed mri and it was normal   he recommends cutting colchicine in 1/2 to 0.3 mg daily  Attempted to call patient but did not get an answer, can you please let patient know, thanks

## 2024-02-01 ENCOUNTER — OFFICE VISIT (OUTPATIENT)
Dept: BARIATRICS/WEIGHT MGMT | Age: 44
End: 2024-02-01
Payer: COMMERCIAL

## 2024-02-01 VITALS
OXYGEN SATURATION: 98 % | WEIGHT: 206 LBS | HEART RATE: 67 BPM | SYSTOLIC BLOOD PRESSURE: 118 MMHG | DIASTOLIC BLOOD PRESSURE: 70 MMHG | HEIGHT: 64 IN | RESPIRATION RATE: 18 BRPM | BODY MASS INDEX: 35.17 KG/M2

## 2024-02-01 DIAGNOSIS — E66.9 OBESITY (BMI 30-39.9): Primary | ICD-10-CM

## 2024-02-01 DIAGNOSIS — E78.2 MIXED HYPERLIPIDEMIA: ICD-10-CM

## 2024-02-01 DIAGNOSIS — I10 ESSENTIAL HYPERTENSION: Chronic | ICD-10-CM

## 2024-02-01 DIAGNOSIS — Z98.84 S/P LAPAROSCOPIC SLEEVE GASTRECTOMY: ICD-10-CM

## 2024-02-01 DIAGNOSIS — E66.01 SEVERE OBESITY (BMI 35.0-39.9) WITH COMORBIDITY (HCC): ICD-10-CM

## 2024-02-01 DIAGNOSIS — E11.69 DIABETES MELLITUS TYPE 2 IN OBESE (HCC): ICD-10-CM

## 2024-02-01 DIAGNOSIS — E66.9 DIABETES MELLITUS TYPE 2 IN OBESE (HCC): ICD-10-CM

## 2024-02-01 PROCEDURE — 2022F DILAT RTA XM EVC RTNOPTHY: CPT | Performed by: SURGERY

## 2024-02-01 PROCEDURE — 3074F SYST BP LT 130 MM HG: CPT | Performed by: SURGERY

## 2024-02-01 PROCEDURE — 3078F DIAST BP <80 MM HG: CPT | Performed by: SURGERY

## 2024-02-01 PROCEDURE — 3046F HEMOGLOBIN A1C LEVEL >9.0%: CPT | Performed by: SURGERY

## 2024-02-01 PROCEDURE — G8427 DOCREV CUR MEDS BY ELIG CLIN: HCPCS | Performed by: SURGERY

## 2024-02-01 PROCEDURE — 1036F TOBACCO NON-USER: CPT | Performed by: SURGERY

## 2024-02-01 PROCEDURE — G8482 FLU IMMUNIZE ORDER/ADMIN: HCPCS | Performed by: SURGERY

## 2024-02-01 PROCEDURE — 99214 OFFICE O/P EST MOD 30 MIN: CPT | Performed by: SURGERY

## 2024-02-01 PROCEDURE — G8417 CALC BMI ABV UP PARAM F/U: HCPCS | Performed by: SURGERY

## 2024-02-01 NOTE — PROGRESS NOTES
Dietary Assessment Note      Vitals:   Vitals:    24 1331   BP: 118/70   Pulse: 67   Resp: 18   SpO2: 98%   Weight: 93.4 kg (206 lb)   Height: 1.626 m (5' 4\")    Patient gained 0.4 lbs over 2 moa.    Total Weight Loss: 28.8 lbs    Labs reviewed:    Latest Reference Range & Units 23 11:44   Glucose, Random 70 - 99 mg/dL 125 (H)   (H): Data is abnormally high    Protein intake: 60-80 grams/day     Fluid intake: 48-64 oz/day water + CL    Multivitamin/mineral intake: yes Fusion with Fe    Calcium intake: yes 2 Calcium chews    Other: biotin    Exercise:  VR-30 min- full body movement,  rowing - Pt reports 2 recent ankle surgeries limiting movement     Nutrition Assessment: 17 wk post-op visit.   Breakfast: thin bagel + lf cream cheese OR fruit + sausage/palacios + 1 egg  Snack: string cheese + coffee w/ sf creamer & monkfruit  Lunch: progresso lite soup OR celery PB   Snack: fruit OR PB crax OR beef jerky OR nuts OR string cheese + popcorn  Dinner: 1.5 ribs + mac n cheese + corn + baked beans  Snack: none    Amount able to eat per sittin.25 cups    Following  rule: yes    Food Intolerances/issues: none    Client Concerns: none    Goals:   - Limit water intake to 100 oz per day  - Increase exercise as physically able    Plan: F/U per provider    CALLY VILLELA, MS, RD, LD  
with serious health complications.   Some strategies discussed today include but not limited to : 30/30/30 minutes rule, food diary, avoid fast food and packing/planing ahead, & increasing exercise.    Also stressed to the patient importance of taking the multivitamins as instructed, otherwise risk significant complications.    Obesity as a disease is considered a high risk to patients overall health and should therefore be considered a high risk disease state.   Advised the patient that not getting there weight under control, which hopefully would help with getting some of the comorbidities under control. That could increase risk of complications/worsening of those conditions on the long-term.  During Covid-19 pandemic, CDC and health authorities does classify obese patients as vulnerable and high risk as well.  Which makes weight loss a priority for improvement of their wellbeing and overall health.     We discussed how her excess weight affects her overall health and importance of weight loss, healthy diet and active lifestyle to alleviate those co morbid conditions, otherwise risk deterioration.       - RTC in 2 months           Patient advised that its their responsibility to follow up for care, studies, referrals and/or labs ordered today.       Please note that some or all of this report was generated using voice recognition software. Please notify me in case of any questions about the content of this document, as some errors in transcription may have occurred .

## 2024-02-05 ENCOUNTER — OFFICE VISIT (OUTPATIENT)
Dept: CARDIOLOGY CLINIC | Age: 44
End: 2024-02-05
Payer: COMMERCIAL

## 2024-02-05 VITALS
HEIGHT: 64 IN | BODY MASS INDEX: 35.17 KG/M2 | OXYGEN SATURATION: 96 % | WEIGHT: 206 LBS | SYSTOLIC BLOOD PRESSURE: 100 MMHG | HEART RATE: 64 BPM | DIASTOLIC BLOOD PRESSURE: 70 MMHG

## 2024-02-05 DIAGNOSIS — E66.9 OBESITY (BMI 30-39.9): ICD-10-CM

## 2024-02-05 DIAGNOSIS — I10 ESSENTIAL HYPERTENSION: ICD-10-CM

## 2024-02-05 DIAGNOSIS — E78.01 FAMILIAL HYPERCHOLESTEROLEMIA: ICD-10-CM

## 2024-02-05 DIAGNOSIS — I47.19 AVNRT (AV NODAL RE-ENTRY TACHYCARDIA): Primary | ICD-10-CM

## 2024-02-05 DIAGNOSIS — G47.33 OSA TREATED WITH BIPAP: ICD-10-CM

## 2024-02-05 DIAGNOSIS — E78.2 MIXED HYPERLIPIDEMIA: ICD-10-CM

## 2024-02-05 DIAGNOSIS — R00.0 CHRONIC TACHYCARDIA: ICD-10-CM

## 2024-02-05 PROCEDURE — G8482 FLU IMMUNIZE ORDER/ADMIN: HCPCS | Performed by: INTERNAL MEDICINE

## 2024-02-05 PROCEDURE — G8427 DOCREV CUR MEDS BY ELIG CLIN: HCPCS | Performed by: INTERNAL MEDICINE

## 2024-02-05 PROCEDURE — 1036F TOBACCO NON-USER: CPT | Performed by: INTERNAL MEDICINE

## 2024-02-05 PROCEDURE — 3074F SYST BP LT 130 MM HG: CPT | Performed by: INTERNAL MEDICINE

## 2024-02-05 PROCEDURE — 3078F DIAST BP <80 MM HG: CPT | Performed by: INTERNAL MEDICINE

## 2024-02-05 PROCEDURE — G8417 CALC BMI ABV UP PARAM F/U: HCPCS | Performed by: INTERNAL MEDICINE

## 2024-02-05 PROCEDURE — 99214 OFFICE O/P EST MOD 30 MIN: CPT | Performed by: INTERNAL MEDICINE

## 2024-02-05 RX ORDER — EZETIMIBE 10 MG/1
10 TABLET ORAL DAILY
Qty: 90 TABLET | Refills: 3 | Status: SHIPPED | OUTPATIENT
Start: 2024-02-05

## 2024-02-05 RX ORDER — ROSUVASTATIN CALCIUM 40 MG/1
TABLET, COATED ORAL
Qty: 90 TABLET | Refills: 3 | Status: SHIPPED | OUTPATIENT
Start: 2024-02-05

## 2024-02-05 RX ORDER — NITROGLYCERIN 0.4 MG/1
0.4 TABLET SUBLINGUAL EVERY 5 MIN PRN
Qty: 25 TABLET | Refills: 0 | Status: SHIPPED | OUTPATIENT
Start: 2024-02-05

## 2024-02-05 RX ORDER — METOPROLOL TARTRATE 50 MG/1
50 TABLET, FILM COATED ORAL 2 TIMES DAILY
Qty: 180 TABLET | Refills: 1 | Status: SHIPPED | OUTPATIENT
Start: 2024-02-05

## 2024-02-13 ENCOUNTER — TELEMEDICINE (OUTPATIENT)
Dept: PULMONOLOGY | Age: 44
End: 2024-02-13
Payer: COMMERCIAL

## 2024-02-13 DIAGNOSIS — E66.9 OBESITY (BMI 30-39.9): ICD-10-CM

## 2024-02-13 DIAGNOSIS — E11.9 TYPE 2 DIABETES MELLITUS WITHOUT COMPLICATION, WITHOUT LONG-TERM CURRENT USE OF INSULIN (HCC): Chronic | ICD-10-CM

## 2024-02-13 DIAGNOSIS — J44.9 CHRONIC OBSTRUCTIVE PULMONARY DISEASE, UNSPECIFIED COPD TYPE (HCC): ICD-10-CM

## 2024-02-13 DIAGNOSIS — G47.33 OSA TREATED WITH BIPAP: Primary | ICD-10-CM

## 2024-02-13 PROBLEM — I10 ESSENTIAL HYPERTENSION: Chronic | Status: RESOLVED | Noted: 2019-11-16 | Resolved: 2024-02-13

## 2024-02-13 PROCEDURE — 3046F HEMOGLOBIN A1C LEVEL >9.0%: CPT | Performed by: NURSE PRACTITIONER

## 2024-02-13 PROCEDURE — 2022F DILAT RTA XM EVC RTNOPTHY: CPT | Performed by: NURSE PRACTITIONER

## 2024-02-13 PROCEDURE — G8427 DOCREV CUR MEDS BY ELIG CLIN: HCPCS | Performed by: NURSE PRACTITIONER

## 2024-02-13 PROCEDURE — 99214 OFFICE O/P EST MOD 30 MIN: CPT | Performed by: NURSE PRACTITIONER

## 2024-02-13 NOTE — PROGRESS NOTES
Mikael Velez CNP  Kindra Olviier CNP [place]  [address]  [address]  [place]  [address]  P- [phone]   Danny  [address]  [address]  [place] [address]  F- [phone]     Video Visit- Follow up      Assessment/Plan:      1. AMOS treated with BiPAP  Assessment & Plan:  Chronic-with progression/exacerbation: Reviewed and analyzed results of physiologic download from patient's machine and reviewed with patient.  Supplies and parts as needed for her machine.  These are medically necessary.  Limit caffeine use after 3pm. Based on the analyzed data will change following settings: EPAP min increased to 9, Humidity increased to 5. Changes sent via machine modem. Will trial pressure increase for daytime symptoms. Encouraged her to use her machine as much as possible. Discussed adjusting the moisture settings on her machine for dryness.  Discussed considering a titration sleep study but she declines at this time. Encouraged her to keep follow up appointments with psychiatry. Will see her back in 6-8 weeks. Encouraged her to contact the office with any questions or concerns.    Encouraged consistent use of her machine each night, all night.  Discussed the importance of treating AMOS from a physiological standpoint.  Instructed not to drive unless had 4 hrs of effective therapy for her AMOS the night before.  No driving when sleepy.  Did review the risks of under or untreated AMOS including, but not limited to, higher risks of motor vehicle accidents, stroke, heart attacks, and death.  She understands and accepts all these risks.    2. Chronic obstructive pulmonary disease, unspecified COPD type (HCC)  Assessment & Plan:  Chronic- Stable.  Discussed the importance of treating obstructive sleep apnea as part of the management of this disorder.  Cont any meds per PCP and other physicians.    3. Type 2 diabetes mellitus without complication, without long-term current use of insulin

## 2024-02-13 NOTE — ASSESSMENT & PLAN NOTE
Chronic- Stable.  Discussed the importance of treating obstructive sleep apnea as part of the management of this disorder.  Cont any meds per PCP and other physicians.

## 2024-02-13 NOTE — ASSESSMENT & PLAN NOTE
Chronic-not stable:  Discussed importance of treating obstructive sleep apnea and getting sufficient sleep to assist with weight control.  Discussed weight gain and/or weight loss may require adjustments to machine settings. Encouraged her to work on weight loss through diet and exercise.

## 2024-02-13 NOTE — ASSESSMENT & PLAN NOTE
Chronic-with progression/exacerbation: Reviewed and analyzed results of physiologic download from patient's machine and reviewed with patient.  Supplies and parts as needed for her machine.  These are medically necessary.  Limit caffeine use after 3pm. Based on the analyzed data will change following settings: EPAP min increased to 9, Humidity increased to 5. Changes sent via machine modem. Will trial pressure increase for daytime symptoms. Encouraged her to use her machine as much as possible. Discussed adjusting the moisture settings on her machine for dryness.  Discussed considering a titration sleep study but she declines at this time. Encouraged her to keep follow up appointments with psychiatry. Will see her back in 6-8 weeks. Encouraged her to contact the office with any questions or concerns.    Encouraged consistent use of her machine each night, all night.  Discussed the importance of treating AMOS from a physiological standpoint.  Instructed not to drive unless had 4 hrs of effective therapy for her AMOS the night before.  No driving when sleepy.  Did review the risks of under or untreated AMOS including, but not limited to, higher risks of motor vehicle accidents, stroke, heart attacks, and death.  She understands and accepts all these risks.

## 2024-02-13 NOTE — PROGRESS NOTES
Diagnosis: [x] AMOS (G47.33) [] CSA (G47.31) [] Apnea (G47.30)   Length of Need: [x] 15 Months [] 99 Months [] Other:   Machine (PRAKASH!): [] Respironics Dream Station      Auto [] ResMed AirSense     Auto [] Other:     []  CPAP () [] Bilevel ()   Mode: [] Auto [] Spontaneous    Mode: [] Auto [] Spontaneous            Comfort Settings:      Humidifier: [] Heated ()        [x] Water chamber replacement ()/ 1 per 6 months        Mask:   [x] Nasal () /1 per 3 months [] Full Face () /1 per 3 months   [x] Patient choice -Size and fit mask [] Patient Choice - Size and fit mask   [] Dispense: [] Dispense:   [x] Headgear () / 1 per 3 months [] Headgear () / 1 per 3 months   [x] Replacement Nasal Cushion ()/2 per month [] Interface Replacement ()/1 per month   [] Replacement Nasal Pillows ()/2 per month         Tubing: [x] Heated ()/1 per 3 months    [] Standard ()/1 per 3 months [] Other:           Filters: [x] Non-disposable ()/1 per 6 months     [x] Ultra-Fine, Disposable ()/2 per month        Miscellaneous: [] Chin Strap ()/ 1 per 6 months [] O2 bleed-in:        LPM   [] Oxymetry on CPAP/Bilevel []  Other:         Start Order Date: 02/13/24    MEDICAL JUSTIFICATION:  I, the undersigned, certify that the above prescribed supplies are medically necessary for this patient’s wellbeing.  In my opinion, the supplies are both reasonable and necessary in reference to accepted standards of medicalpractice in treatment of this patient’s condition.    ISHA LOVETT NP    NPI: [id number]       Order Signed Date: 02/13/24  [facility]  Pulmonary, Sleep, and Critical Care    Pulmonary, Sleep, and Critical Care  [facility] [address]                          [address] [place] [address] [address]  [place] [address]                                    [place] [address]  Phone: [phone]    Fax:

## 2024-03-05 NOTE — TELEPHONE ENCOUNTER
Lov 2/5/2024  Labs 12/15/2023  C-Reactive Protein, Sedimentation Rate   Lrf 2/5/2024 Nitrostat 0.4 mg sl disp 25+0  Appt 8/5/2024 please advise thanks

## 2024-03-18 RX ORDER — NITROGLYCERIN 0.4 MG/1
TABLET SUBLINGUAL
Qty: 25 TABLET | Refills: 3 | OUTPATIENT
Start: 2024-03-18

## 2024-03-20 ENCOUNTER — OFFICE VISIT (OUTPATIENT)
Dept: FAMILY MEDICINE CLINIC | Age: 44
End: 2024-03-20
Payer: COMMERCIAL

## 2024-03-20 VITALS
SYSTOLIC BLOOD PRESSURE: 138 MMHG | HEIGHT: 64 IN | BODY MASS INDEX: 36.37 KG/M2 | OXYGEN SATURATION: 96 % | WEIGHT: 213 LBS | HEART RATE: 109 BPM | DIASTOLIC BLOOD PRESSURE: 72 MMHG

## 2024-03-20 DIAGNOSIS — J44.89 COPD WITH ASTHMA: ICD-10-CM

## 2024-03-20 DIAGNOSIS — J06.9 VIRAL URI: Primary | ICD-10-CM

## 2024-03-20 LAB
FLUAV RNA UPPER RESP QL NAA+PROBE: NEGATIVE
FLUBV AG NPH QL: NEGATIVE

## 2024-03-20 PROCEDURE — 1036F TOBACCO NON-USER: CPT | Performed by: FAMILY MEDICINE

## 2024-03-20 PROCEDURE — G8417 CALC BMI ABV UP PARAM F/U: HCPCS | Performed by: FAMILY MEDICINE

## 2024-03-20 PROCEDURE — 99213 OFFICE O/P EST LOW 20 MIN: CPT | Performed by: FAMILY MEDICINE

## 2024-03-20 PROCEDURE — G8427 DOCREV CUR MEDS BY ELIG CLIN: HCPCS | Performed by: FAMILY MEDICINE

## 2024-03-20 PROCEDURE — 3023F SPIROM DOC REV: CPT | Performed by: FAMILY MEDICINE

## 2024-03-20 PROCEDURE — G8482 FLU IMMUNIZE ORDER/ADMIN: HCPCS | Performed by: FAMILY MEDICINE

## 2024-03-20 RX ORDER — BENZONATATE 100 MG/1
100-200 CAPSULE ORAL 3 TIMES DAILY PRN
Qty: 20 CAPSULE | Refills: 0 | Status: SHIPPED | OUTPATIENT
Start: 2024-03-20 | End: 2024-03-27

## 2024-03-20 RX ORDER — FLUTICASONE PROPIONATE 50 MCG
1 SPRAY, SUSPENSION (ML) NASAL 2 TIMES DAILY PRN
Qty: 32 G | Refills: 0 | Status: SHIPPED | OUTPATIENT
Start: 2024-03-20

## 2024-03-21 LAB — SARS-COV-2 N GENE RESP QL NAA+PROBE: DETECTED

## 2024-03-23 NOTE — PROGRESS NOTES
Subjective:       History was provided by the patient.  Petrona Guzman is a 43 y.o. female who presents for evaluation of symptoms of a URI. Symptoms include myalgias, nasal blockage, post nasal drip, sinus and nasal congestion, and sore throat. Onset of symptoms was 3 days ago, gradually worsening since that time. Associated symptoms include low grade fever and fatigue .  She is not drinking much. Evaluation to date: none. Treatment to date: Acetaminophen  Patient's medications, allergies, past medical, surgical, social and family histories were reviewed and updated as appropriate.    Review of Systems    Negative          Objective:      /72 (Site: Left Upper Arm, Position: Sitting, Cuff Size: Medium Adult)   Pulse (!) 109   Ht 1.626 m (5' 4\")   Wt 96.6 kg (213 lb)   SpO2 96%   BMI 36.56 kg/m²   General appearance: alert, appears stated age, and cooperative  Ears: normal TM's and external ear canals both ears  Throat:   lips, mucosa, and tongue normal; teeth and gums normal  Neck: Movable  Lungs: clear to auscultation bilaterally  Heart: regular rate and rhythm  Abdomen: soft, non-tender; bowel sounds normal; no masses,  no organomegaly  Skin: Skin color, texture, turgor normal. No rashes or lesions           Rapid flu-negative  COVID-19 test-pending  Assessment:      viral upper respiratory illness      Plan:      Discussed dx and tx of URIs  Suggested symptomatic OTC remedies.  Nasal saline sprays for congestion.  Flonase and Tessalon Perles per orders.  RTC prn.

## 2024-04-02 ENCOUNTER — TELEMEDICINE (OUTPATIENT)
Dept: PULMONOLOGY | Age: 44
End: 2024-04-02
Payer: COMMERCIAL

## 2024-04-02 VITALS — WEIGHT: 200 LBS | BODY MASS INDEX: 33.32 KG/M2 | HEIGHT: 65 IN

## 2024-04-02 DIAGNOSIS — E66.9 OBESITY (BMI 30-39.9): ICD-10-CM

## 2024-04-02 DIAGNOSIS — G47.33 OSA TREATED WITH BIPAP: Primary | ICD-10-CM

## 2024-04-02 DIAGNOSIS — E11.9 TYPE 2 DIABETES MELLITUS WITHOUT COMPLICATION, WITHOUT LONG-TERM CURRENT USE OF INSULIN (HCC): Chronic | ICD-10-CM

## 2024-04-02 PROCEDURE — 3046F HEMOGLOBIN A1C LEVEL >9.0%: CPT | Performed by: NURSE PRACTITIONER

## 2024-04-02 PROCEDURE — G8427 DOCREV CUR MEDS BY ELIG CLIN: HCPCS | Performed by: NURSE PRACTITIONER

## 2024-04-02 PROCEDURE — 2022F DILAT RTA XM EVC RTNOPTHY: CPT | Performed by: NURSE PRACTITIONER

## 2024-04-02 PROCEDURE — 99214 OFFICE O/P EST MOD 30 MIN: CPT | Performed by: NURSE PRACTITIONER

## 2024-04-02 PROCEDURE — G2211 COMPLEX E/M VISIT ADD ON: HCPCS | Performed by: NURSE PRACTITIONER

## 2024-04-02 ASSESSMENT — SLEEP AND FATIGUE QUESTIONNAIRES
HOW LIKELY ARE YOU TO NOD OFF OR FALL ASLEEP IN A CAR, WHILE STOPPED FOR A FEW MINUTES IN TRAFFIC: MODERATE CHANCE OF DOZING
HOW LIKELY ARE YOU TO NOD OFF OR FALL ASLEEP WHILE SITTING INACTIVE IN A PUBLIC PLACE: MODERATE CHANCE OF DOZING
HOW LIKELY ARE YOU TO NOD OFF OR FALL ASLEEP WHILE SITTING AND READING: HIGH CHANCE OF DOZING
ESS TOTAL SCORE: 18
HOW LIKELY ARE YOU TO NOD OFF OR FALL ASLEEP WHEN YOU ARE A PASSENGER IN A CAR FOR AN HOUR WITHOUT A BREAK: HIGH CHANCE OF DOZING
HOW LIKELY ARE YOU TO NOD OFF OR FALL ASLEEP WHILE SITTING QUIETLY AFTER LUNCH WITHOUT ALCOHOL: MODERATE CHANCE OF DOZING
HOW LIKELY ARE YOU TO NOD OFF OR FALL ASLEEP WHILE SITTING AND TALKING TO SOMEONE: SLIGHT CHANCE OF DOZING
HOW LIKELY ARE YOU TO NOD OFF OR FALL ASLEEP WHILE WATCHING TV: MODERATE CHANCE OF DOZING
HOW LIKELY ARE YOU TO NOD OFF OR FALL ASLEEP WHILE LYING DOWN TO REST IN THE AFTERNOON WHEN CIRCUMSTANCES PERMIT: HIGH CHANCE OF DOZING

## 2024-04-02 NOTE — PROGRESS NOTES
Diagnosis: [x] AMOS (G47.33) [] CSA (G47.31) [] Apnea (G47.30)   Length of Need: [x] 15 Months [] 99 Months [] Other:   Machine (PRAKASH!): [] Respironics Dream Station      Auto [] ResMed AirSense     Auto [] Other:     []  CPAP () [] Bilevel ()   Mode: [] Auto [] Spontaneous    Mode: [] Auto [] Spontaneous            Comfort Settings:      Humidifier: [] Heated ()        [x] Water chamber replacement ()/ 1 per 6 months        Mask:   [x] Nasal () /1 per 3 months [] Full Face () /1 per 3 months   [x] Patient choice -Size and fit mask [] Patient Choice - Size and fit mask   [] Dispense: [] Dispense:   [x] Headgear () / 1 per 3 months [] Headgear () / 1 per 3 months   [x] Replacement Nasal Cushion ()/2 per month [] Interface Replacement ()/1 per month   [] Replacement Nasal Pillows ()/2 per month         Tubing: [x] Heated ()/1 per 3 months    [] Standard ()/1 per 3 months [] Other:           Filters: [x] Non-disposable ()/1 per 6 months     [x] Ultra-Fine, Disposable ()/2 per month        Miscellaneous: [] Chin Strap ()/ 1 per 6 months [] O2 bleed-in:        LPM   [] Oxymetry on CPAP/Bilevel []  Other:         Start Order Date: 04/02/24    MEDICAL JUSTIFICATION:  I, the undersigned, certify that the above prescribed supplies are medically necessary for this patient’s wellbeing.  In my opinion, the supplies are both reasonable and necessary in reference to accepted standards of medicalpractice in treatment of this patient’s condition.    ISHA LOVETT NP    NPI: [id number]       Order Signed Date: 04/02/24  [facility]  Pulmonary, Sleep, and Critical Care    Pulmonary, Sleep, and Critical Care  [facility] [address]                          [address] [place] [address] [address]  [place] [address]                                    [place] [address]  Phone: [phone]    Fax:

## 2024-04-02 NOTE — ASSESSMENT & PLAN NOTE
Chronic-with progression/exacerbation:  Reviewed and analyzed results of physiologic download from patient's machine and reviewed with patient.  Supplies and parts as needed for her machine.  These are medically necessary.  Limit caffeine use after 3pm. Based on the analyzed data will continue with current settings. Encouraged her to restart her machine and to use it as much as possible. Will see her back in 3-6 months. Encouraged her to contact the office with any questions or concerns.    Encouraged consistent use of her machine each night, all night.  Discussed the importance of treating AMOS from a physiological standpoint.  Instructed not to drive unless had 4 hrs of effective therapy for her AMOS the night before.  No driving when sleepy.  Did review the risks of under or untreated AMOS including, but not limited to, higher risks of motor vehicle accidents, stroke, heart attacks, and death.  She understands and accepts all these risks.

## 2024-04-02 NOTE — PROGRESS NOTES
certified to deliver care in the state where the patient is located as indicated above. If you are not or unsure, please re-schedule the visit: Yes, I confirm.     Electronically signed by JUAN RAMON Gonzalez on 4/2/2024 at 3:10 PM

## 2024-04-23 ENCOUNTER — OFFICE VISIT (OUTPATIENT)
Dept: BARIATRICS/WEIGHT MGMT | Age: 44
End: 2024-04-23
Payer: COMMERCIAL

## 2024-04-23 VITALS
WEIGHT: 216 LBS | SYSTOLIC BLOOD PRESSURE: 105 MMHG | DIASTOLIC BLOOD PRESSURE: 72 MMHG | BODY MASS INDEX: 36.88 KG/M2 | HEART RATE: 62 BPM | HEIGHT: 64 IN

## 2024-04-23 DIAGNOSIS — E78.01 FAMILIAL HYPERCHOLESTEROLEMIA: ICD-10-CM

## 2024-04-23 DIAGNOSIS — E66.9 OBESITY (BMI 30-39.9): Primary | ICD-10-CM

## 2024-04-23 DIAGNOSIS — E11.9 TYPE 2 DIABETES MELLITUS WITHOUT COMPLICATION, WITHOUT LONG-TERM CURRENT USE OF INSULIN (HCC): Chronic | ICD-10-CM

## 2024-04-23 DIAGNOSIS — Z98.84 S/P LAPAROSCOPIC SLEEVE GASTRECTOMY: ICD-10-CM

## 2024-04-23 PROCEDURE — 99214 OFFICE O/P EST MOD 30 MIN: CPT | Performed by: SURGERY

## 2024-04-23 PROCEDURE — G8427 DOCREV CUR MEDS BY ELIG CLIN: HCPCS | Performed by: SURGERY

## 2024-04-23 PROCEDURE — 3046F HEMOGLOBIN A1C LEVEL >9.0%: CPT | Performed by: SURGERY

## 2024-04-23 PROCEDURE — 1036F TOBACCO NON-USER: CPT | Performed by: SURGERY

## 2024-04-23 PROCEDURE — G8417 CALC BMI ABV UP PARAM F/U: HCPCS | Performed by: SURGERY

## 2024-04-23 PROCEDURE — 2022F DILAT RTA XM EVC RTNOPTHY: CPT | Performed by: SURGERY

## 2024-04-23 NOTE — PROGRESS NOTES
Dietary Assessment Note      Vitals:   Vitals:    24 1416   BP: 105/72   Pulse: 62   Weight: 98 kg (216 lb)   Height: 1.626 m (5' 4\")    Patient gained 10 lbs over ~2.5 mos.    Total Weight Loss: 18.8 lbs    Labs reviewed: no lab studies available for review at time of visit    Protein intake: 60-80 grams/day     Fluid intake: 48-64 oz/day    Multivitamin/mineral intake: yes Fusion with Fe    Calcium intake: yes - to purchase more today    Other: biotin    Exercise: yes Oculus 3X/week     Nutrition Assessment: 6 mos post-op visit. 6 hour sleep. Sometimes grazes throughout day, while busy at work. 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 She states she know what do to, but is stretched thin at times.  B: 5 blackberries + sf yogurt + pinch granola  S: handful nuts OR apple + PB togo cup OR none if busy at work  L: chx + brocc   S: popcorn  D: darling salad   S: sf popsicle sometimes.   Does not eat after 7pm    Amount able to eat per sittin/2 cup veg + 1/2 cup protein + 1/4 cup starch     Following 30 rule: yes, sometimes doesn't set timers, but is mindful to wait after eating     Food Intolerances/issues:  shrimp tastes weird     Client Concerns: upset with wt gain, but also has sense of self awareness     Handout: 1200 POMP    Goals:   - Continue diet plan  - Exercise as able    Plan: f/u per provider     CALLY VLILELA, MS, RD, LD  
 Iron and TIBC; Future  -     Lipid Panel; Future  -     TSH with Reflex; Future  -     Vitamin A; Future  -     Vitamin B1, Whole Blood; Future  -     Vitamin B12 & Folate; Future  -     Vitamin D 25 Hydroxy; Future  -     Vitamin E; Future  -     Protime-INR; Future    S/P laparoscopic sleeve gastrectomy  -     CBC with Auto Differential; Future  -     Comprehensive Metabolic Panel; Future  -     Hemoglobin A1C; Future  -     Iron and TIBC; Future  -     Lipid Panel; Future  -     TSH with Reflex; Future  -     Vitamin A; Future  -     Vitamin B1, Whole Blood; Future  -     Vitamin B12 & Folate; Future  -     Vitamin D 25 Hydroxy; Future  -     Vitamin E; Future  -     Protime-INR; Future    Familial hypercholesterolemia  -     CBC with Auto Differential; Future  -     Comprehensive Metabolic Panel; Future  -     Hemoglobin A1C; Future  -     Iron and TIBC; Future  -     Lipid Panel; Future  -     TSH with Reflex; Future  -     Vitamin A; Future  -     Vitamin B1, Whole Blood; Future  -     Vitamin B12 & Folate; Future  -     Vitamin D 25 Hydroxy; Future  -     Vitamin E; Future  -     Protime-INR; Future    Type 2 diabetes mellitus without complication, without long-term current use of insulin (HCC)  -     CBC with Auto Differential; Future  -     Comprehensive Metabolic Panel; Future  -     Hemoglobin A1C; Future  -     Iron and TIBC; Future  -     Lipid Panel; Future  -     TSH with Reflex; Future  -     Vitamin A; Future  -     Vitamin B1, Whole Blood; Future  -     Vitamin B12 & Folate; Future  -     Vitamin D 25 Hydroxy; Future  -     Vitamin E; Future  -     Protime-INR; Future          Petrona Guzman is 43 y.o. female , now with Body mass index is 37.08 kg/m².  s/p Sleeve gastrectomy, has gained 10 lbs since last visit, total of 19 lbs weight loss. The patient underwent dietary counseling. I have reviewed, discussed and agree with the dietary plan by the registered dietitian. Patient is trying hard to

## 2024-05-07 PROBLEM — R00.0 CHRONIC TACHYCARDIA: Status: RESOLVED | Noted: 2022-03-13 | Resolved: 2024-05-07

## 2024-05-07 PROBLEM — I31.9 PERICARDITIS: Status: RESOLVED | Noted: 2024-01-25 | Resolved: 2024-05-07

## 2024-05-07 PROBLEM — E88.810 METABOLIC SYNDROME: Status: RESOLVED | Noted: 2023-09-18 | Resolved: 2024-05-07

## 2024-05-17 ENCOUNTER — HOSPITAL ENCOUNTER (OUTPATIENT)
Dept: ULTRASOUND IMAGING | Age: 44
Discharge: HOME | End: 2024-05-17
Payer: COMMERCIAL

## 2024-05-17 ENCOUNTER — HOSPITAL ENCOUNTER (OUTPATIENT)
Dept: WOMENS IMAGING | Age: 44
Discharge: HOME | End: 2024-05-17
Payer: COMMERCIAL

## 2024-05-17 VITALS — BODY MASS INDEX: 33.99 KG/M2 | HEIGHT: 65 IN | WEIGHT: 204 LBS

## 2024-05-17 DIAGNOSIS — R92.8 ABNORMAL MAMMOGRAM: ICD-10-CM

## 2024-05-17 PROCEDURE — G0279 TOMOSYNTHESIS, MAMMO: HCPCS

## 2024-06-05 ENCOUNTER — OFFICE VISIT (OUTPATIENT)
Dept: ORTHOPEDIC SURGERY | Age: 44
End: 2024-06-05

## 2024-06-05 DIAGNOSIS — G56.12 NEURITIS OF LEFT MEDIAN NERVE: ICD-10-CM

## 2024-06-05 DIAGNOSIS — Z90.3 HISTORY OF SLEEVE GASTRECTOMY: ICD-10-CM

## 2024-06-05 DIAGNOSIS — M50.30 DDD (DEGENERATIVE DISC DISEASE), CERVICAL: ICD-10-CM

## 2024-06-05 DIAGNOSIS — Z98.890 HISTORY OF BILATERAL CARPAL TUNNEL RELEASE: ICD-10-CM

## 2024-06-05 DIAGNOSIS — G56.11 NEURITIS OF RIGHT MEDIAN NERVE: Primary | ICD-10-CM

## 2024-06-05 PROBLEM — M79.7 PRIMARY FIBROMYALGIA SYNDROME: Status: ACTIVE | Noted: 2023-10-04

## 2024-06-05 PROBLEM — R91.8 MULTIPLE NODULES OF LUNG: Status: ACTIVE | Noted: 2017-06-22

## 2024-06-05 PROBLEM — M25.50 MULTIPLE JOINT PAIN: Status: ACTIVE | Noted: 2024-06-05

## 2024-06-05 PROBLEM — M15.9 POLYOSTEOARTHRITIS, UNSPECIFIED: Status: ACTIVE | Noted: 2023-10-04

## 2024-06-05 PROBLEM — L70.9 ACNE: Status: ACTIVE | Noted: 2024-06-05

## 2024-06-05 PROBLEM — M25.579 ANKLE PAIN: Status: ACTIVE | Noted: 2024-06-05

## 2024-06-05 PROBLEM — N97.9 INFERTILITY, FEMALE: Status: ACTIVE | Noted: 2024-06-05

## 2024-06-05 PROBLEM — F43.10 POSTTRAUMATIC STRESS DISORDER: Status: ACTIVE | Noted: 2017-10-24

## 2024-06-05 PROBLEM — R07.2 PRECORDIAL PAIN: Status: ACTIVE | Noted: 2017-06-29

## 2024-06-05 PROBLEM — E55.9 VITAMIN D DEFICIENCY: Status: ACTIVE | Noted: 2017-12-04

## 2024-06-05 PROBLEM — M35.01 SJOGREN SYNDROME WITH KERATOCONJUNCTIVITIS (HCC): Status: ACTIVE | Noted: 2023-10-04

## 2024-06-05 PROBLEM — M79.10 MUSCLE PAIN: Status: ACTIVE | Noted: 2018-06-05

## 2024-06-05 PROBLEM — N92.0 MENORRHAGIA: Status: ACTIVE | Noted: 2017-11-30

## 2024-06-05 PROBLEM — H93.19 TINNITUS: Status: ACTIVE | Noted: 2024-06-05

## 2024-06-05 PROBLEM — M35.01 SICCA SYNDROME WITH KERATOCONJUNCTIVITIS (HCC): Status: ACTIVE | Noted: 2023-10-04

## 2024-06-05 PROBLEM — F20.9 SCHIZOPHRENIA (HCC): Status: ACTIVE | Noted: 2017-03-28

## 2024-06-05 PROBLEM — M54.50 LOW BACK PAIN: Status: ACTIVE | Noted: 2024-06-05

## 2024-06-05 PROBLEM — M47.812 CERVICAL SPONDYLOSIS: Status: ACTIVE | Noted: 2017-03-28

## 2024-06-05 PROBLEM — R73.01 IMPAIRED FASTING GLUCOSE: Status: ACTIVE | Noted: 2024-06-05

## 2024-06-05 PROBLEM — F17.200 TOBACCO DEPENDENCE SYNDROME: Status: ACTIVE | Noted: 2024-06-05

## 2024-06-05 PROBLEM — M54.2 NECK PAIN: Status: ACTIVE | Noted: 2024-06-05

## 2024-06-05 PROBLEM — M25.552 LEFT HIP PAIN: Status: ACTIVE | Noted: 2017-06-12

## 2024-06-05 PROBLEM — R42 DIZZINESS: Status: ACTIVE | Noted: 2024-06-05

## 2024-06-05 PROBLEM — Z79.899 OTHER LONG TERM (CURRENT) DRUG THERAPY: Status: ACTIVE | Noted: 2017-03-28

## 2024-06-05 PROBLEM — R00.2 PALPITATIONS: Status: ACTIVE | Noted: 2017-08-17

## 2024-06-05 PROBLEM — R07.89 OTHER CHEST PAIN: Status: ACTIVE | Noted: 2024-01-06

## 2024-06-05 PROBLEM — M12.572: Status: ACTIVE | Noted: 2017-12-05

## 2024-06-05 PROBLEM — I95.9 HYPOTENSION, UNSPECIFIED: Status: ACTIVE | Noted: 2023-02-08

## 2024-06-05 PROBLEM — F41.9 ANXIETY: Status: ACTIVE | Noted: 2024-06-05

## 2024-06-05 PROBLEM — J18.9 PNEUMONIA: Status: ACTIVE | Noted: 2017-06-18

## 2024-06-05 PROBLEM — M35.00 SJOGREN'S DISEASE (HCC): Status: ACTIVE | Noted: 2022-11-22

## 2024-06-05 PROBLEM — R87.611 ATYPICAL SQUAMOUS CELLS CANNOT EXCLUDE HIGH GRADE SQUAMOUS INTRAEPITHELIAL LESION ON CYTOLOGIC SMEAR OF CERVIX (ASC-H): Status: ACTIVE | Noted: 2018-05-18

## 2024-06-05 PROBLEM — G47.33 OSA (OBSTRUCTIVE SLEEP APNEA): Status: ACTIVE | Noted: 2018-12-26

## 2024-06-05 PROBLEM — E78.5 HYPERLIPIDEMIA: Status: ACTIVE | Noted: 2024-06-05

## 2024-06-05 PROBLEM — J44.9 CHRONIC OBSTRUCTIVE LUNG DISEASE (HCC): Status: ACTIVE | Noted: 2017-10-24

## 2024-06-05 PROBLEM — F41.1 GENERALIZED ANXIETY DISORDER: Status: ACTIVE | Noted: 2023-02-08

## 2024-06-05 PROBLEM — J06.9 UPPER RESPIRATORY INFECTION: Status: ACTIVE | Noted: 2024-06-05

## 2024-06-05 PROBLEM — M47.816 LUMBAR SPONDYLOSIS: Status: ACTIVE | Noted: 2017-03-28

## 2024-06-05 PROBLEM — G57.52 TARSAL TUNNEL SYNDROME, LEFT LOWER LIMB: Status: ACTIVE | Noted: 2023-12-22

## 2024-06-05 PROBLEM — S29.019A: Status: ACTIVE | Noted: 2024-05-04

## 2024-06-05 PROBLEM — I10 HTN (HYPERTENSION), BENIGN: Status: ACTIVE | Noted: 2017-10-12

## 2024-06-05 PROBLEM — G57.52 TARSAL TUNNEL SYNDROME, LEFT: Status: ACTIVE | Noted: 2023-12-22

## 2024-06-05 PROBLEM — R06.02 SOBOE (SHORTNESS OF BREATH ON EXERTION): Status: ACTIVE | Noted: 2017-06-29

## 2024-06-05 PROBLEM — H16.229 SICCA SYNDROME WITH KERATOCONJUNCTIVITIS: Status: ACTIVE | Noted: 2023-10-04

## 2024-06-05 PROBLEM — I47.10 PAROXYSMAL SUPRAVENTRICULAR TACHYCARDIA (HCC): Status: ACTIVE | Noted: 2017-06-29

## 2024-06-05 RX ORDER — METHOCARBAMOL 750 MG/1
750 TABLET, FILM COATED ORAL 3 TIMES DAILY PRN
COMMUNITY
Start: 2024-05-04

## 2024-06-05 RX ORDER — ACETAMINOPHEN 500 MG
500 TABLET ORAL EVERY 6 HOURS PRN
COMMUNITY
Start: 2024-05-04

## 2024-06-05 RX ORDER — ARIPIPRAZOLE 15 MG/1
15 TABLET ORAL DAILY
COMMUNITY
Start: 2024-05-14

## 2024-06-05 RX ORDER — LAMOTRIGINE 100 MG/1
100 TABLET ORAL DAILY
COMMUNITY
Start: 2024-05-31

## 2024-06-05 RX ORDER — LIDOCAINE 50 MG/G
1 PATCH TOPICAL EVERY 24 HOURS
COMMUNITY
Start: 2024-05-04

## 2024-06-05 NOTE — PROGRESS NOTES
Chief Complaint:   Chief Complaint   Patient presents with    Wrist Pain          History of Present Illness:       Patient is a 43 y.o. female presents with the above complaint. The symptoms began worsening 2 monthsago and started without an injury and she attributes this to excessive typing and writing activity. The patient describes a stiffness and neuritic quality of pain that does radiate into the hands bilaterally.  The symptoms are near constant  and are are worsening since the onset.      History of ultrasound-guided ulnar nerve hydrodissection bilaterally-right 4/4/2023 left 3/21/2023  History of ultrasound-guided median nerve hydrodissection bilaterally at the wrist-right 11/20/2019 left-12/4/2019    Her past history significant for bilateral carpal tunnel releases and cubital tunnel releases in the remote past.     Pain localizes in a median nerve distribution and is not associated with mechanical symptoms.  The patient denies subjective instability about the wrist and denies new onset weakness of the upper extremity.    Patient's prior workup is included EMG bilateral upper extremities 2019 in addition to MRI of the cervical spine outlined below.  She denies any myelopathic symptoms at this time.    Pain level 8    The patient denies a pattern of activity related swelling.      Treatment to date: wrist splint which is partially effective    There is nono prior history of wrist trauma.    There is no prior history of autoimmune disease, inflammatory arthropathy, or crystal arthropathy.      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

## 2024-06-07 DIAGNOSIS — Z82.49 FAMILY HISTORY OF HEART DISEASE: ICD-10-CM

## 2024-06-07 DIAGNOSIS — E78.01 FAMILIAL HYPERCHOLESTEROLEMIA: ICD-10-CM

## 2024-06-07 RX ORDER — EVOLOCUMAB 140 MG/ML
INJECTION, SOLUTION SUBCUTANEOUS
Qty: 2 ML | Refills: 5 | Status: SHIPPED | OUTPATIENT
Start: 2024-06-07

## 2024-06-07 NOTE — TELEPHONE ENCOUNTER
Last ov:24 DKW   Next ov:24 DKW  Last EK23  Last labs:23  Last filled:   Disp Refills Start End    REPATHA SURECLICK 140 MG/ML SOAJ 2 mL 5 2023 --    Sig: ADMINISTER 1 ML UNDER THE SKIN EVERY 14 DAYS    Sent to pharmacy as: Repatha SureClick 140 MG/ML Subcutaneous Solution Auto-injector (Evolocumab)    E-Prescribing Status: Receipt confirmed by pharmacy (2023  5:16 PM EST)

## 2024-07-02 PROBLEM — H93.19 TINNITUS: Status: RESOLVED | Noted: 2024-06-05 | Resolved: 2024-07-02

## 2024-07-02 PROBLEM — M50.20 CERVICAL DISCOGENIC PAIN SYNDROME: Status: RESOLVED | Noted: 2020-08-04 | Resolved: 2024-07-02

## 2024-07-02 PROBLEM — M54.50 LOW BACK PAIN: Status: RESOLVED | Noted: 2024-06-05 | Resolved: 2024-07-02

## 2024-07-02 PROBLEM — R42 DIZZINESS: Status: RESOLVED | Noted: 2024-06-05 | Resolved: 2024-07-02

## 2024-07-02 PROBLEM — I95.9 HYPOTENSION, UNSPECIFIED: Status: RESOLVED | Noted: 2023-02-08 | Resolved: 2024-07-02

## 2024-07-02 PROBLEM — J18.9 PNEUMONIA: Status: RESOLVED | Noted: 2017-06-18 | Resolved: 2024-07-02

## 2024-07-02 PROBLEM — F17.200 TOBACCO DEPENDENCE SYNDROME: Status: RESOLVED | Noted: 2024-06-05 | Resolved: 2024-07-02

## 2024-07-02 PROBLEM — R07.89 OTHER CHEST PAIN: Status: RESOLVED | Noted: 2024-01-06 | Resolved: 2024-07-02

## 2024-07-02 PROBLEM — M25.552 LEFT HIP PAIN: Status: RESOLVED | Noted: 2017-06-12 | Resolved: 2024-07-02

## 2024-07-02 PROBLEM — J06.9 UPPER RESPIRATORY INFECTION: Status: RESOLVED | Noted: 2024-06-05 | Resolved: 2024-07-02

## 2024-07-02 PROBLEM — M79.10 MUSCLE PAIN: Status: RESOLVED | Noted: 2018-06-05 | Resolved: 2024-07-02

## 2024-07-02 PROBLEM — M50.30 CERVICAL DISCOGENIC PAIN SYNDROME: Status: RESOLVED | Noted: 2020-08-04 | Resolved: 2024-07-02

## 2024-07-02 PROBLEM — M54.2 NECK PAIN: Status: RESOLVED | Noted: 2024-06-05 | Resolved: 2024-07-02

## 2024-07-02 NOTE — PROGRESS NOTES
MD Jocelyn   methocarbamol (ROBAXIN) 750 MG tablet Take 1 tablet by mouth 3 times daily as needed Yes Jocelyn Jessica MD   lidocaine (LIDODERM) 5 % Place 1 patch onto the skin every 24 hours Yes Jocelyn Jessica MD   nitroGLYCERIN (NITROSTAT) 0.4 MG SL tablet Place 1 tablet under the tongue every 5 minutes as needed for Chest pain up to max of 3 total doses. If no relief after 1 dose, call 911. Yes Chadwick Ruff DO   ezetimibe (ZETIA) 10 MG tablet Take 1 tablet by mouth daily Yes Chadwick Ruff DO   metoprolol tartrate (LOPRESSOR) 50 MG tablet Take 1 tablet by mouth 2 times daily Yes Chadwick Ruff DO   rosuvastatin (CRESTOR) 40 MG tablet TAKE 1 TABLET BY MOUTH EVERY DAY Yes Chadwick Ruff DO   Cholecalciferol (VITAMIN D3) 50 MCG (2000) CAPS Take 1 capsule by mouth daily Yes Kassandra Wren APRN - CNP   metFORMIN (GLUCOPHAGE-XR) 500 MG extended release tablet Take 2 tablets by mouth daily (with breakfast) Yes Kassandra Wren APRN - CNP   omeprazole (PRILOSEC) 20 MG delayed release capsule TAKE 1 CAPSULE BY MOUTH EVERY DAY Yes Kassandra Wren APRN - CNP   gabapentin (NEURONTIN) 300 MG capsule Take 1 capsule by mouth 2 times daily for 180 days. Intended supply: 90 days Yes Kassandra Wren APRN - CNP   LORazepam (ATIVAN) 1 MG tablet Take 1 tablet by mouth See Admin Instructions for 349 days. Take one one hour prior test, take the other one with you to test Yes Chadwick Ruff DO   CALCIUM PO Take by mouth in the morning, at noon, and at bedtime Chews Yes Jocelyn Jessica MD   BIOTIN PO Take by mouth daily Yes Jocelyn Jessica MD   Handicap Placard MISC by Does not apply route  on 2028 Yes Kassandra Wren APRN - CNP   aspirin (ASPIRIN LOW DOSE) 81 MG EC tablet Take 1 tablet by mouth daily Yes Chadwick Ruff DO   hydrOXYzine HCl (ATARAX) 10 MG tablet Take 1 tablet by mouth 3 times daily as needed Yes Jocelyn Jessica MD   albuterol sulfate HFA (PROVENTIL;VENTOLIN;PROAIR) 108 (90

## 2024-07-05 ENCOUNTER — OFFICE VISIT (OUTPATIENT)
Dept: CARDIOLOGY CLINIC | Age: 44
End: 2024-07-05
Payer: COMMERCIAL

## 2024-07-05 VITALS
BODY MASS INDEX: 36.19 KG/M2 | DIASTOLIC BLOOD PRESSURE: 64 MMHG | HEART RATE: 60 BPM | HEIGHT: 65 IN | WEIGHT: 217.2 LBS | SYSTOLIC BLOOD PRESSURE: 100 MMHG | OXYGEN SATURATION: 98 %

## 2024-07-05 DIAGNOSIS — R00.0 CHRONIC TACHYCARDIA: ICD-10-CM

## 2024-07-05 DIAGNOSIS — I10 HTN (HYPERTENSION), BENIGN: ICD-10-CM

## 2024-07-05 DIAGNOSIS — E66.9 OBESITY (BMI 30-39.9): ICD-10-CM

## 2024-07-05 DIAGNOSIS — I47.10 PAROXYSMAL SUPRAVENTRICULAR TACHYCARDIA (HCC): ICD-10-CM

## 2024-07-05 DIAGNOSIS — I47.19 AVNRT (AV NODAL RE-ENTRY TACHYCARDIA) (HCC): Primary | ICD-10-CM

## 2024-07-05 DIAGNOSIS — G47.33 OSA (OBSTRUCTIVE SLEEP APNEA): ICD-10-CM

## 2024-07-05 PROCEDURE — 3078F DIAST BP <80 MM HG: CPT | Performed by: NURSE PRACTITIONER

## 2024-07-05 PROCEDURE — G8427 DOCREV CUR MEDS BY ELIG CLIN: HCPCS | Performed by: NURSE PRACTITIONER

## 2024-07-05 PROCEDURE — G8417 CALC BMI ABV UP PARAM F/U: HCPCS | Performed by: NURSE PRACTITIONER

## 2024-07-05 PROCEDURE — 3074F SYST BP LT 130 MM HG: CPT | Performed by: NURSE PRACTITIONER

## 2024-07-05 PROCEDURE — 93000 ELECTROCARDIOGRAM COMPLETE: CPT | Performed by: NURSE PRACTITIONER

## 2024-07-05 PROCEDURE — 1036F TOBACCO NON-USER: CPT | Performed by: NURSE PRACTITIONER

## 2024-07-05 PROCEDURE — 99214 OFFICE O/P EST MOD 30 MIN: CPT | Performed by: NURSE PRACTITIONER

## 2024-07-05 RX ORDER — METOPROLOL TARTRATE 50 MG/1
25 TABLET, FILM COATED ORAL 2 TIMES DAILY
Qty: 180 TABLET | Refills: 1 | Status: SHIPPED
Start: 2024-07-05

## 2024-07-06 ENCOUNTER — HOSPITAL ENCOUNTER (OUTPATIENT)
Age: 44
Discharge: HOME | End: 2024-07-06
Payer: COMMERCIAL

## 2024-07-06 DIAGNOSIS — Z98.84 S/P LAPAROSCOPIC SLEEVE GASTRECTOMY: ICD-10-CM

## 2024-07-06 DIAGNOSIS — E11.9 TYPE 2 DIABETES MELLITUS WITHOUT COMPLICATION, WITHOUT LONG-TERM CURRENT USE OF INSULIN (HCC): Chronic | ICD-10-CM

## 2024-07-06 DIAGNOSIS — E78.01 FAMILIAL HYPERCHOLESTEROLEMIA: ICD-10-CM

## 2024-07-06 DIAGNOSIS — E66.9 OBESITY (BMI 30-39.9): ICD-10-CM

## 2024-07-06 LAB
25(OH)D3 SERPL-MCNC: 64.2 NG/ML
ALBUMIN SERPL-MCNC: 4.6 G/DL (ref 3.4–5)
ALBUMIN/GLOB SERPL: 1.5 {RATIO} (ref 1.1–2.2)
ALP SERPL-CCNC: 66 U/L (ref 40–129)
ALT SERPL-CCNC: 27 U/L (ref 10–40)
ANION GAP SERPL CALCULATED.3IONS-SCNC: 11 MMOL/L (ref 3–16)
AST SERPL-CCNC: 22 U/L (ref 15–37)
BASOPHILS # BLD: 0 K/UL (ref 0–0.2)
BASOPHILS NFR BLD: 0.7 %
BILIRUB SERPL-MCNC: 0.3 MG/DL (ref 0–1)
BUN SERPL-MCNC: 9 MG/DL (ref 7–20)
CALCIUM SERPL-MCNC: 9.8 MG/DL (ref 8.3–10.6)
CHLORIDE SERPL-SCNC: 102 MMOL/L (ref 99–110)
CHOLEST SERPL-MCNC: 93 MG/DL (ref 0–199)
CK SERPL-CCNC: 139 U/L (ref 26–192)
CO2 SERPL-SCNC: 29 MMOL/L (ref 21–32)
CREAT SERPL-MCNC: 0.7 MG/DL (ref 0.6–1.1)
DEPRECATED RDW RBC AUTO: 13.8 % (ref 12.4–15.4)
EOSINOPHIL # BLD: 0 K/UL (ref 0–0.6)
EOSINOPHIL NFR BLD: 0.7 %
FOLATE SERPL-MCNC: >20 NG/ML (ref 4.78–24.2)
GFR SERPLBLD CREATININE-BSD FMLA CKD-EPI: >90 ML/MIN/{1.73_M2}
GLUCOSE SERPL-MCNC: 109 MG/DL (ref 70–99)
HCT VFR BLD AUTO: 38.8 % (ref 36–48)
HDLC SERPL-MCNC: 47 MG/DL (ref 40–60)
HGB BLD-MCNC: 12.7 G/DL (ref 12–16)
INR PPP: 0.89 (ref 0.85–1.15)
IRON SATN MFR SERPL: 29 % (ref 15–50)
IRON SERPL-MCNC: 85 UG/DL (ref 37–145)
LDLC SERPL CALC-MCNC: 36 MG/DL
LYMPHOCYTES # BLD: 2.1 K/UL (ref 1–5.1)
LYMPHOCYTES NFR BLD: 35 %
MCH RBC QN AUTO: 28.7 PG (ref 26–34)
MCHC RBC AUTO-ENTMCNC: 32.6 G/DL (ref 31–36)
MCV RBC AUTO: 88 FL (ref 80–100)
MONOCYTES # BLD: 0.5 K/UL (ref 0–1.3)
MONOCYTES NFR BLD: 8 %
NEUTROPHILS # BLD: 3.3 K/UL (ref 1.7–7.7)
NEUTROPHILS NFR BLD: 55.6 %
PLATELET # BLD AUTO: 322 K/UL (ref 135–450)
PMV BLD AUTO: 7.3 FL (ref 5–10.5)
POTASSIUM SERPL-SCNC: 4.1 MMOL/L (ref 3.5–5.1)
PROT SERPL-MCNC: 7.6 G/DL (ref 6.4–8.2)
PROTHROMBIN TIME: 12.3 SEC (ref 11.9–14.9)
RBC # BLD AUTO: 4.41 M/UL (ref 4–5.2)
SODIUM SERPL-SCNC: 142 MMOL/L (ref 136–145)
TIBC SERPL-MCNC: 298 UG/DL (ref 260–445)
TRIGL SERPL-MCNC: 48 MG/DL (ref 0–150)
TSH SERPL DL<=0.005 MIU/L-ACNC: 1.4 UIU/ML (ref 0.27–4.2)
VIT B12 SERPL-MCNC: 902 PG/ML (ref 211–911)
VLDLC SERPL CALC-MCNC: 10 MG/DL
WBC # BLD AUTO: 5.9 K/UL (ref 4–11)

## 2024-07-06 PROCEDURE — 85025 COMPLETE CBC W/AUTO DIFF WBC: CPT

## 2024-07-06 PROCEDURE — 84446 ASSAY OF VITAMIN E: CPT

## 2024-07-06 PROCEDURE — 80061 LIPID PANEL: CPT

## 2024-07-06 PROCEDURE — 84443 ASSAY THYROID STIM HORMONE: CPT

## 2024-07-06 PROCEDURE — 84425 ASSAY OF VITAMIN B-1: CPT

## 2024-07-06 PROCEDURE — 83036 HEMOGLOBIN GLYCOSYLATED A1C: CPT

## 2024-07-06 PROCEDURE — 85610 PROTHROMBIN TIME: CPT

## 2024-07-06 PROCEDURE — 84590 ASSAY OF VITAMIN A: CPT

## 2024-07-06 PROCEDURE — 82306 VITAMIN D 25 HYDROXY: CPT

## 2024-07-06 PROCEDURE — 36415 COLL VENOUS BLD VENIPUNCTURE: CPT

## 2024-07-06 PROCEDURE — 83540 ASSAY OF IRON: CPT

## 2024-07-06 PROCEDURE — 83550 IRON BINDING TEST: CPT

## 2024-07-06 PROCEDURE — 80053 COMPREHEN METABOLIC PANEL: CPT

## 2024-07-06 PROCEDURE — 82550 ASSAY OF CK (CPK): CPT

## 2024-07-06 PROCEDURE — 82607 VITAMIN B-12: CPT

## 2024-07-06 PROCEDURE — 82746 ASSAY OF FOLIC ACID SERUM: CPT

## 2024-07-07 LAB
EST. AVERAGE GLUCOSE BLD GHB EST-MCNC: 125.5 MG/DL
HBA1C MFR BLD: 6 %

## 2024-07-08 ENCOUNTER — OFFICE VISIT (OUTPATIENT)
Dept: ORTHOPEDIC SURGERY | Age: 44
End: 2024-07-08
Payer: COMMERCIAL

## 2024-07-08 VITALS — HEIGHT: 65 IN | BODY MASS INDEX: 36.15 KG/M2 | WEIGHT: 217 LBS

## 2024-07-08 DIAGNOSIS — S73.191D TEAR OF RIGHT ACETABULAR LABRUM, SUBSEQUENT ENCOUNTER: ICD-10-CM

## 2024-07-08 DIAGNOSIS — Z98.890 STATUS POST ARTHROSCOPY OF HIP: Primary | ICD-10-CM

## 2024-07-08 DIAGNOSIS — S73.192D TEAR OF LEFT ACETABULAR LABRUM, SUBSEQUENT ENCOUNTER: ICD-10-CM

## 2024-07-08 DIAGNOSIS — M16.0 PRIMARY OSTEOARTHRITIS OF BOTH HIPS: ICD-10-CM

## 2024-07-08 LAB
A-TOCOPHEROL VIT E SERPL-MCNC: 6.8 MG/L (ref 5.5–18)
ANNOTATION COMMENT IMP: NORMAL
BETA+GAMMA TOCOPHEROL SERPL-MCNC: 1.2 MG/L (ref 0–6)
RETINYL PALMITATE SERPL-MCNC: <0.02 MG/L (ref 0–0.1)
VIT A SERPL-MCNC: 0.74 MG/L (ref 0.3–1.2)

## 2024-07-08 PROCEDURE — 1036F TOBACCO NON-USER: CPT

## 2024-07-08 PROCEDURE — G8427 DOCREV CUR MEDS BY ELIG CLIN: HCPCS

## 2024-07-08 PROCEDURE — G8417 CALC BMI ABV UP PARAM F/U: HCPCS

## 2024-07-08 PROCEDURE — 99213 OFFICE O/P EST LOW 20 MIN: CPT

## 2024-07-09 ENCOUNTER — OFFICE VISIT (OUTPATIENT)
Dept: BARIATRICS/WEIGHT MGMT | Age: 44
End: 2024-07-09
Payer: COMMERCIAL

## 2024-07-09 VITALS
SYSTOLIC BLOOD PRESSURE: 110 MMHG | OXYGEN SATURATION: 98 % | HEART RATE: 80 BPM | HEIGHT: 64 IN | WEIGHT: 218 LBS | BODY MASS INDEX: 37.22 KG/M2 | DIASTOLIC BLOOD PRESSURE: 70 MMHG | RESPIRATION RATE: 16 BRPM

## 2024-07-09 DIAGNOSIS — Z98.84 S/P LAPAROSCOPIC SLEEVE GASTRECTOMY: ICD-10-CM

## 2024-07-09 DIAGNOSIS — E66.9 OBESITY (BMI 30-39.9): ICD-10-CM

## 2024-07-09 DIAGNOSIS — E78.2 MIXED HYPERLIPIDEMIA: ICD-10-CM

## 2024-07-09 DIAGNOSIS — E11.9 TYPE 2 DIABETES MELLITUS WITHOUT COMPLICATION, WITHOUT LONG-TERM CURRENT USE OF INSULIN (HCC): Primary | Chronic | ICD-10-CM

## 2024-07-09 PROCEDURE — 3074F SYST BP LT 130 MM HG: CPT | Performed by: SURGERY

## 2024-07-09 PROCEDURE — 2022F DILAT RTA XM EVC RTNOPTHY: CPT | Performed by: SURGERY

## 2024-07-09 PROCEDURE — G8417 CALC BMI ABV UP PARAM F/U: HCPCS | Performed by: SURGERY

## 2024-07-09 PROCEDURE — 99214 OFFICE O/P EST MOD 30 MIN: CPT | Performed by: SURGERY

## 2024-07-09 PROCEDURE — 3078F DIAST BP <80 MM HG: CPT | Performed by: SURGERY

## 2024-07-09 PROCEDURE — 3044F HG A1C LEVEL LT 7.0%: CPT | Performed by: SURGERY

## 2024-07-09 PROCEDURE — 1036F TOBACCO NON-USER: CPT | Performed by: SURGERY

## 2024-07-09 PROCEDURE — G8427 DOCREV CUR MEDS BY ELIG CLIN: HCPCS | Performed by: SURGERY

## 2024-07-09 NOTE — PROGRESS NOTES
Chief Complaint  Hip Pain (BILATERAL HIP)      History of Present Illness:  Petrona Guzman is a pleasant 43 y.o. female with prior history of right hip revision arthroscopy (DOS: 8/9/22), ABILIO, labral repair, abductor repair.  She also underwent left hip revision arthroscopy, acetabuloplasty, and abductor repair (DOS: 6/28/22). She has had recurrent bilateral hip pain ongoing for the past year that is worsening.     At last office visit she elected to undergo bilateral ablation with Dr. Tim. However, she was unable to have this completed due to insurance restrictions and age. She is here today inquiring about surgical options.     She is unable to take oral NSAIDs due to gastric sleeve. She Unfortunately had only minimal relief with bilateral durolane injections.      Medical History:  Patient's medications, allergies, past medical, surgical, social and family histories were reviewed and updated as appropriate.    Pain Assessment  Location of Pain: Hip  Location Modifiers: Right, Left  Severity of Pain: 8  Quality of Pain: Sharp, Other (Comment) (RADIATING/BURNING)  Frequency of Pain: Constant  Aggravating Factors: Walking, Standing, Other (Comment) (SITTING/LAYING)  Limiting Behavior: Yes  Result of Injury: No  Work-Related Injury: No  ROS: Review of systems reviewed from Patient History Form completed today and available in the patient's chart under the Media tab.      Pertinent items are noted in HPI  Review of systems reviewed from Patient History Form completed today and available in the patient's chart under the Media tab.       Vital Signs:  Ht 1.651 m (5' 5\")   Wt 98.4 kg (217 lb)   BMI 36.11 kg/m²         Neuro: Alert & oriented x 3,  normal,  no focal deficits noted. Normal affect.  Eyes: sclera clear  Ears: Normal external ear  Mouth:  No perioral lesions  Pulm: Respirations unlabored and regular  Pulse: Extremities well perfused. 2+ peripheral pulses.  Skin: Warm. No

## 2024-07-09 NOTE — PROGRESS NOTES
Dietary Assessment Note      Vitals:   Vitals:    24 1236   BP: 110/70   Site: Left Upper Arm   Position: Sitting   Cuff Size: Large Adult   Pulse: 80   Resp: 16   SpO2: 98%   Weight: 98.9 kg (218 lb)   Height: 1.626 m (5' 4\")    Patient gained 2 lbs over ~2.5 mos.    Total Weight Loss: 16.8 lbs    Labs reviewed:    Latest Reference Range & Units 24 07:55   Hemoglobin A1C, (Calculated) See comment % 6.0       Protein intake: 60-80 grams/day     Fluid intake: 48-64 oz/day     Multivitamin/mineral intake: yes Fusion with Fe - to purchase more      Calcium intake: yes - to purchase more today     Other: biotin    Exercise: Oculus VR 3X/week upper body - limited movement with hips, needs double hip replacement. R hip getting replaced next month     Nutrition Assessment: 8 mos post-op visit. Pt states she just switched medications because it was causing wt gain. Doing better this visit with grazing.   B: CC + peaches  L: leftovers - steak + sauteed mushrooms+brussels sprouts   D: meat + veg + 1/3 sweet potato/ mashed potatoes  S: fruit    Amount able to eat per sittin/2 cup veg + 1/2 cup protein + 1/4 cup starch     Following 30 rule: yes     Food Intolerances/issues:  shrimp/lobster taste weird     Client Concerns: wt gain    Goals:   - Continue diet   - Exercise as physically able, focus on upper body    Plan: f/u per provider    CALLY VILLELA, MS, RD, LD  
[facility]   [facility]  Dani Lechuga MD, FACS, [address], [address]    [facility] [address] .  Phone: [phone]  Fax: [phone]            Chief Complaint   Patient presents with    Bariatrics Post Op Follow Up     S/p sleeve 9/18/23           HPI:    Petrona Guzman is a very pleasant 43 y.o.  female , Body mass index is 37.42 kg/m².. And multiple medical problems who is presenting for bariatric follow up care.   Petrona is s/p laparoscopic sleeve gastrectomy by me 9/2023. Initial Weight: 235 lbs, Weight Loss: 17 lbs.   Comes today to the clinic without any complaints. Patient denies any nausea, vomiting, fevers, chills, shortness of breath, chest pain, constipation or urinary symptoms. Denies any heartburn nor dysphagia.  Patient informed us today that they are taking the multivitamins as instructed.  Patient denies any tingling, weakness,  numbness nor any neurological symptoms.  Petrona is feeling very well, and is very active. Patient is very pleased with the weight loss and resolution of co-morbid conditions.      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 
no...

## 2024-07-10 ENCOUNTER — OFFICE VISIT (OUTPATIENT)
Dept: ORTHOPEDIC SURGERY | Age: 44
End: 2024-07-10

## 2024-07-10 VITALS — WEIGHT: 218 LBS | HEIGHT: 64 IN | BODY MASS INDEX: 37.22 KG/M2

## 2024-07-10 DIAGNOSIS — M50.30 DDD (DEGENERATIVE DISC DISEASE), CERVICAL: ICD-10-CM

## 2024-07-10 DIAGNOSIS — Z98.890 HISTORY OF BILATERAL CARPAL TUNNEL RELEASE: ICD-10-CM

## 2024-07-10 DIAGNOSIS — G56.11 NEURITIS OF RIGHT MEDIAN NERVE: Primary | ICD-10-CM

## 2024-07-10 DIAGNOSIS — Z01.818 PREOPERATIVE CLEARANCE: ICD-10-CM

## 2024-07-10 DIAGNOSIS — Z90.3 HISTORY OF SLEEVE GASTRECTOMY: ICD-10-CM

## 2024-07-10 DIAGNOSIS — G56.12 NEURITIS OF LEFT MEDIAN NERVE: ICD-10-CM

## 2024-07-10 DIAGNOSIS — M16.0 PRIMARY OSTEOARTHRITIS OF BOTH HIPS: Primary | ICD-10-CM

## 2024-07-10 ASSESSMENT — ENCOUNTER SYMPTOMS
ABDOMINAL PAIN: 0
NAUSEA: 0
CONSTIPATION: 0
COUGH: 0
CHEST TIGHTNESS: 0
WHEEZING: 0
VOMITING: 0
SHORTNESS OF BREATH: 0
DIARRHEA: 0

## 2024-07-10 NOTE — CARE COORDINATION
Care Transitions Follow Up Call    Patient Current Location:  Home: [address] [place] [address] [id number]  [place] [address]    Care Transition Nurse contacted the patient by telephone. Verified name and  with patient as identifiers. Patient: Lilian Garcia  Patient : 1980   MRN: [id number]  Reason for Admission: Lap sleeve gastrectomy  Discharge Date: 23 RARS: Readmission Risk Score: 6.4      Needs to be reviewed by the provider   Additional needs identified to be addressed with provider: No  none             Method of communication with provider: none. Final outreach call:  Patient reports that she is doing very well. She followed up with surgeon last week and denies any questions or concerns at this time. CTN will resolve episode & remain available. Follow Up  Future Appointments   Date Time Provider [address]   2023  1:45 PM Ed Webber MD HEALTHY WT [facility]   2023  2:45 PM Alonzo Chandler MD FF Ortho [facility]   2023  2:30 PM JUAN RAMON Galeana CNP FF Cardio [facility]   2023  3:00 PM JUAN RAMON Montgomery SLEEP MED [facility]   2024  4:40 PM JUAN RAMON Bragg CNP  FAM MED [place] [facility]D     External follow up appointment(s):     Care Transition Nurse reviewed red flags with patient and discussed any barriers to care and/or understanding of plan of care after discharge. Discussed appropriate site of care based on symptoms and resources available to patient including: PCP  Urgent care clinics  When to call 911. The patient agrees to contact the PCP office for questions related to their healthcare.        Offered patient enrollment in the Remote Patient Monitoring (RPM) program for in-home monitoring: Patient is not eligible for RPM program.     Care Transitions Subsequent and Final Call    Subsequent and Final Calls  Do you have any ongoing symptoms?: No  Have your medications changed?: No  Do you have any questions related to your medications?: No  Do you currently
Quality 226: Preventive Care And Screening: Tobacco Use: Screening And Cessation Intervention: Patient screened for tobacco use and is an ex/non-smoker
Detail Level: Detailed
Quality 130: Documentation Of Current Medications In The Medical Record: Current Medications Documented
Quality 431: Preventive Care And Screening: Unhealthy Alcohol Use - Screening: Patient not identified as an unhealthy alcohol user when screened for unhealthy alcohol use using a systematic screening method
normal

## 2024-07-10 NOTE — PROGRESS NOTES
Chief Complaint:   Chief Complaint   Patient presents with    Wrist Pain     Bilateral Wrists - Medial Nerve [place] dissection today.          History of Present Illness:       Patient is a 43 y.o. female returns follow up for the above complaint. The patient was last seen approximately 1 monthsago. The symptoms show no change since the last visit. The patient has had no further testing for the problem.      History of ultrasound-guided ulnar nerve hydrodissection bilaterally-right 4/4/2023 left 3/21/2023  History of ultrasound-guided median nerve hydrodissection bilaterally at the wrist-right 11/20/2019 left-12/4/2019    Symptoms show no appreciable change.  Pain levels 8/10    Carpal tunnel symptoms are equally problematic    She denies any progressive weakness of the hand (S) in the interim     Past Medical History:        Past Medical History:   Diagnosis Date    Anesthesia complication     itching    Anxiety     Arthritis     Asthma     Bipolar 1 disorder ([facility])     Bipolar disorder ([facility])     Chronic back pain     COPD (chronic obstructive pulmonary disease) ([facility])     Depression     Diabetes mellitus (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)     patient states has tendancies    Sleep apnea     uses bipap    Substance abuse ([facility]) 2004    Tachycardia     SVT    Tear of right gluteus medius tendon 08/09/2022        Present Medications:         Current Outpatient Medications   Medication Sig Dispense Refill    Cholecalciferol (VITAMIN D3) 50 MCG (2000 UT) CAPS Take 1 capsule by mouth daily 90 capsule 1    gabapentin (NEURONTIN) 300 MG capsule Take 1 capsule by mouth 2 times daily for 180 days. Intended supply: 90 days 180 capsule 1    metFORMIN (GLUCOPHAGE-XR) 500 MG extended release tablet Take 2 tablets by mouth daily (with

## 2024-07-10 NOTE — PROGRESS NOTES
7/10/24 1:02 PM    Dextrose Injection 5%      NDC: 8073-7278-41    Lot Number: [id number]    Body Part: Bilat wrists

## 2024-07-11 ENCOUNTER — OFFICE VISIT (OUTPATIENT)
Dept: FAMILY MEDICINE CLINIC | Age: 44
End: 2024-07-11
Payer: COMMERCIAL

## 2024-07-11 ENCOUNTER — TELEPHONE (OUTPATIENT)
Dept: ORTHOPEDIC SURGERY | Age: 44
End: 2024-07-11

## 2024-07-11 VITALS
DIASTOLIC BLOOD PRESSURE: 70 MMHG | SYSTOLIC BLOOD PRESSURE: 120 MMHG | BODY MASS INDEX: 37.93 KG/M2 | WEIGHT: 221 LBS | OXYGEN SATURATION: 95 % | HEART RATE: 85 BPM

## 2024-07-11 DIAGNOSIS — F32.89 OTHER DEPRESSION: ICD-10-CM

## 2024-07-11 DIAGNOSIS — E78.2 MIXED HYPERLIPIDEMIA: Primary | ICD-10-CM

## 2024-07-11 DIAGNOSIS — F10.21 HISTORY OF ALCOHOLISM (HCC): ICD-10-CM

## 2024-07-11 DIAGNOSIS — F43.10 PTSD (POST-TRAUMATIC STRESS DISORDER): ICD-10-CM

## 2024-07-11 DIAGNOSIS — M35.01 SJOGREN SYNDROME WITH KERATOCONJUNCTIVITIS (HCC): ICD-10-CM

## 2024-07-11 DIAGNOSIS — Z98.84 S/P BARIATRIC SURGERY: ICD-10-CM

## 2024-07-11 DIAGNOSIS — E55.9 VITAMIN D DEFICIENCY: ICD-10-CM

## 2024-07-11 DIAGNOSIS — F41.9 ANXIETY: ICD-10-CM

## 2024-07-11 DIAGNOSIS — G89.29 OTHER CHRONIC PAIN: ICD-10-CM

## 2024-07-11 DIAGNOSIS — F31.9 BIPOLAR 1 DISORDER (HCC): ICD-10-CM

## 2024-07-11 DIAGNOSIS — R00.0 CHRONIC TACHYCARDIA: ICD-10-CM

## 2024-07-11 DIAGNOSIS — G47.33 OSA (OBSTRUCTIVE SLEEP APNEA): ICD-10-CM

## 2024-07-11 DIAGNOSIS — F20.89 OTHER SCHIZOPHRENIA (HCC): ICD-10-CM

## 2024-07-11 DIAGNOSIS — E11.9 TYPE 2 DIABETES MELLITUS WITHOUT COMPLICATION, WITHOUT LONG-TERM CURRENT USE OF INSULIN (HCC): ICD-10-CM

## 2024-07-11 DIAGNOSIS — K21.9 GASTROESOPHAGEAL REFLUX DISEASE, UNSPECIFIED WHETHER ESOPHAGITIS PRESENT: ICD-10-CM

## 2024-07-11 DIAGNOSIS — J44.89 COPD WITH ASTHMA (HCC): ICD-10-CM

## 2024-07-11 DIAGNOSIS — M79.7 FIBROMYALGIA: ICD-10-CM

## 2024-07-11 LAB — VIT B1 BLD-MCNC: 160 NMOL/L (ref 70–180)

## 2024-07-11 PROCEDURE — 3023F SPIROM DOC REV: CPT | Performed by: CLINICAL NURSE SPECIALIST

## 2024-07-11 PROCEDURE — 3044F HG A1C LEVEL LT 7.0%: CPT | Performed by: CLINICAL NURSE SPECIALIST

## 2024-07-11 PROCEDURE — 1036F TOBACCO NON-USER: CPT | Performed by: CLINICAL NURSE SPECIALIST

## 2024-07-11 PROCEDURE — G8417 CALC BMI ABV UP PARAM F/U: HCPCS | Performed by: CLINICAL NURSE SPECIALIST

## 2024-07-11 PROCEDURE — G8427 DOCREV CUR MEDS BY ELIG CLIN: HCPCS | Performed by: CLINICAL NURSE SPECIALIST

## 2024-07-11 PROCEDURE — 99214 OFFICE O/P EST MOD 30 MIN: CPT | Performed by: CLINICAL NURSE SPECIALIST

## 2024-07-11 PROCEDURE — 3074F SYST BP LT 130 MM HG: CPT | Performed by: CLINICAL NURSE SPECIALIST

## 2024-07-11 PROCEDURE — 3078F DIAST BP <80 MM HG: CPT | Performed by: CLINICAL NURSE SPECIALIST

## 2024-07-11 PROCEDURE — 2022F DILAT RTA XM EVC RTNOPTHY: CPT | Performed by: CLINICAL NURSE SPECIALIST

## 2024-07-11 RX ORDER — ACETAMINOPHEN 160 MG
2000 TABLET,DISINTEGRATING ORAL DAILY
Qty: 90 CAPSULE | Refills: 1 | Status: SHIPPED | OUTPATIENT
Start: 2024-07-11

## 2024-07-11 RX ORDER — METFORMIN HYDROCHLORIDE 500 MG/1
1000 TABLET, EXTENDED RELEASE ORAL
Qty: 180 TABLET | Refills: 1 | Status: SHIPPED | OUTPATIENT
Start: 2024-07-11

## 2024-07-11 RX ORDER — OMEPRAZOLE 20 MG/1
CAPSULE, DELAYED RELEASE ORAL
Qty: 90 CAPSULE | Refills: 1 | Status: CANCELLED | OUTPATIENT
Start: 2024-07-11

## 2024-07-11 RX ORDER — GABAPENTIN 300 MG/1
300 CAPSULE ORAL 2 TIMES DAILY
Qty: 180 CAPSULE | Refills: 1 | Status: SHIPPED | OUTPATIENT
Start: 2024-07-11 | End: 2025-01-07

## 2024-07-11 ASSESSMENT — PATIENT HEALTH QUESTIONNAIRE - PHQ9
8. MOVING OR SPEAKING SO SLOWLY THAT OTHER PEOPLE COULD HAVE NOTICED. OR THE OPPOSITE, BEING SO FIGETY OR RESTLESS THAT YOU HAVE BEEN MOVING AROUND A LOT MORE THAN USUAL: NOT AT ALL
SUM OF ALL RESPONSES TO PHQ9 QUESTIONS 1 & 2: 0
5. POOR APPETITE OR OVEREATING: NOT AT ALL
9. THOUGHTS THAT YOU WOULD BE BETTER OFF DEAD, OR OF HURTING YOURSELF: NOT AT ALL
SUM OF ALL RESPONSES TO PHQ QUESTIONS 1-9: 0
10. IF YOU CHECKED OFF ANY PROBLEMS, HOW DIFFICULT HAVE THESE PROBLEMS MADE IT FOR YOU TO DO YOUR WORK, TAKE CARE OF THINGS AT HOME, OR GET ALONG WITH OTHER PEOPLE: NOT DIFFICULT AT ALL
3. TROUBLE FALLING OR STAYING ASLEEP: NOT AT ALL
7. TROUBLE CONCENTRATING ON THINGS, SUCH AS READING THE NEWSPAPER OR WATCHING TELEVISION: NOT AT ALL
SUM OF ALL RESPONSES TO PHQ QUESTIONS 1-9: 0
4. FEELING TIRED OR HAVING LITTLE ENERGY: NOT AT ALL
1. LITTLE INTEREST OR PLEASURE IN DOING THINGS: NOT AT ALL
2. FEELING DOWN, DEPRESSED OR HOPELESS: NOT AT ALL
6. FEELING BAD ABOUT YOURSELF - OR THAT YOU ARE A FAILURE OR HAVE LET YOURSELF OR YOUR FAMILY DOWN: NOT AT ALL
SUM OF ALL RESPONSES TO PHQ QUESTIONS 1-9: 0
SUM OF ALL RESPONSES TO PHQ QUESTIONS 1-9: 0

## 2024-07-11 NOTE — PROGRESS NOTES
a chronic problem. The current episode started more than 1 year ago. The problem is controlled. Recent lipid tests were reviewed and are normal. Exacerbating diseases include diabetes. Pertinent negatives include no chest pain, focal sensory loss, focal weakness, leg pain, myalgias or shortness of breath. Current antihyperlipidemic treatment includes ezetimibe and statins. The current treatment provides significant improvement of lipids. Risk factors for coronary artery disease include dyslipidemia and diabetes mellitus.   Diabetes  She presents for her follow-up diabetic visit. Pertinent negatives for hypoglycemia include no headaches or nervousness/anxiousness. Pertinent negatives for diabetes include no chest pain, no foot paresthesias, no polydipsia, no polyphagia and no polyuria. Risk factors for coronary artery disease include dyslipidemia and diabetes mellitus. Current diabetic treatment includes diet. She is following a generally healthy diet.       Current Outpatient Medications on File Prior to Visit   Medication Sig Dispense Refill    REPATHA SURECLICK 140 MG/ML SOAJ ADMINISTER 1 ML UNDER THE SKIN EVERY 14 DAYS 2 mL 5    ARIPiprazole (ABILIFY) 15 MG tablet Take 1 tablet by mouth daily      lamoTRIgine (LAMICTAL) 100 MG tablet Take 1 tablet by mouth daily      methocarbamol (ROBAXIN) 750 MG tablet Take 1 tablet by mouth 3 times daily as needed      lidocaine (LIDODERM) 5 % Place 1 patch onto the skin every 24 hours      nitroGLYCERIN (NITROSTAT) 0.4 MG SL tablet Place 1 tablet under the tongue every 5 minutes as needed for Chest pain up to max of 3 total doses. If no relief after 1 dose, call 911. 25 tablet 0    ezetimibe (ZETIA) 10 MG tablet Take 1 tablet by mouth daily 90 tablet 3    rosuvastatin (CRESTOR) 40 MG tablet TAKE 1 TABLET BY MOUTH EVERY DAY 90 tablet 3    LORazepam (ATIVAN) 1 MG tablet Take 1 tablet by mouth See Admin Instructions for 349 days. Take one one hour prior test, take the other one

## 2024-07-11 NOTE — TELEPHONE ENCOUNTER
General Question     Subject: [facility]   Patient and /or Facility Request: Petrona Guzman   Contact Number: [phone]     PATIENT CALLING NOT SURE WHEN SHE NEEDS TO START PHYSICAL THERAPY     PATIENT WOULD LIKE TO KNOW IF SHE NEEDS TO START P.T PER TO [facility] OR AFTER MER       [facility]IENT [facility] 8[facility]       PLEASE GIVE PATIENT A CALL AT THE ABOVE NUMBER

## 2024-07-11 NOTE — PATIENT INSTRUCTIONS
Medications refilled and fasting labs ordered     Reviewed low-cholesterol diet     Continue ezetimibe (Zetia) 10 mg daily and Crestor     Reviewed diabetic diet     Increase gabapentin from 200 mg to 300 mg twice daily      Follow-up with specialist as needed/directed (psych, Ortho, podiatry pulmonology, cardiology)      Encourage continued lifestyle modifications (better food choices, portion control and increasing activity)     Follow up with cardiology as needed/directed (6 months)     Follow up with pulmonology as needed/directed (as needed)      Follow up with rheumatology as needed/directed (re-establishing)     Follow up with psychiatrist as needed/directed (3 months)      Follow up in 6 months, sooner if symptoms worsen or persist

## 2024-07-12 RX ORDER — LIDOCAINE HYDROCHLORIDE 10 MG/ML
1 INJECTION, SOLUTION INFILTRATION; PERINEURAL ONCE
Status: SHIPPED | OUTPATIENT
Start: 2024-07-12

## 2024-07-15 ENCOUNTER — OFFICE VISIT (OUTPATIENT)
Dept: FAMILY MEDICINE CLINIC | Age: 44
End: 2024-07-15
Payer: COMMERCIAL

## 2024-07-15 ENCOUNTER — TELEPHONE (OUTPATIENT)
Dept: CARDIOLOGY CLINIC | Age: 44
End: 2024-07-15

## 2024-07-15 VITALS
SYSTOLIC BLOOD PRESSURE: 128 MMHG | HEIGHT: 64 IN | WEIGHT: 212 LBS | BODY MASS INDEX: 36.19 KG/M2 | OXYGEN SATURATION: 100 % | HEART RATE: 83 BPM | DIASTOLIC BLOOD PRESSURE: 72 MMHG

## 2024-07-15 DIAGNOSIS — F43.10 PTSD (POST-TRAUMATIC STRESS DISORDER): ICD-10-CM

## 2024-07-15 DIAGNOSIS — K21.9 GASTROESOPHAGEAL REFLUX DISEASE, UNSPECIFIED WHETHER ESOPHAGITIS PRESENT: ICD-10-CM

## 2024-07-15 DIAGNOSIS — Z01.818 PREOP EXAMINATION: Primary | ICD-10-CM

## 2024-07-15 DIAGNOSIS — G47.33 OSA (OBSTRUCTIVE SLEEP APNEA): ICD-10-CM

## 2024-07-15 DIAGNOSIS — Z72.0 CURRENT TOBACCO USE: ICD-10-CM

## 2024-07-15 DIAGNOSIS — G43.909 MIGRAINE WITHOUT STATUS MIGRAINOSUS, NOT INTRACTABLE, UNSPECIFIED MIGRAINE TYPE: ICD-10-CM

## 2024-07-15 DIAGNOSIS — M16.0 PRIMARY OSTEOARTHRITIS OF BOTH HIPS: ICD-10-CM

## 2024-07-15 DIAGNOSIS — M79.7 FIBROMYALGIA: ICD-10-CM

## 2024-07-15 DIAGNOSIS — E11.9 TYPE 2 DIABETES MELLITUS WITHOUT COMPLICATION, WITHOUT LONG-TERM CURRENT USE OF INSULIN (HCC): ICD-10-CM

## 2024-07-15 DIAGNOSIS — J44.89 COPD WITH ASTHMA (HCC): ICD-10-CM

## 2024-07-15 DIAGNOSIS — F31.9 BIPOLAR 1 DISORDER (HCC): ICD-10-CM

## 2024-07-15 DIAGNOSIS — I10 ESSENTIAL HYPERTENSION: ICD-10-CM

## 2024-07-15 DIAGNOSIS — M35.01 SJOGREN SYNDROME WITH KERATOCONJUNCTIVITIS (HCC): ICD-10-CM

## 2024-07-15 PROCEDURE — 3023F SPIROM DOC REV: CPT | Performed by: FAMILY MEDICINE

## 2024-07-15 PROCEDURE — G8417 CALC BMI ABV UP PARAM F/U: HCPCS | Performed by: FAMILY MEDICINE

## 2024-07-15 PROCEDURE — 3074F SYST BP LT 130 MM HG: CPT | Performed by: FAMILY MEDICINE

## 2024-07-15 PROCEDURE — 3078F DIAST BP <80 MM HG: CPT | Performed by: FAMILY MEDICINE

## 2024-07-15 PROCEDURE — G8427 DOCREV CUR MEDS BY ELIG CLIN: HCPCS | Performed by: FAMILY MEDICINE

## 2024-07-15 PROCEDURE — 2022F DILAT RTA XM EVC RTNOPTHY: CPT | Performed by: FAMILY MEDICINE

## 2024-07-15 PROCEDURE — 99215 OFFICE O/P EST HI 40 MIN: CPT | Performed by: FAMILY MEDICINE

## 2024-07-15 NOTE — PROGRESS NOTES
Preoperative Consultation      Petrona Guzman  YOB: 1980    Date of Service:  7/15/2024    Vitals:    07/15/24 1538   BP: 128/72   Site: Left Upper Arm   Position: Sitting   Cuff Size: Medium Adult   Pulse: 83   SpO2: 100%   Weight: 96.2 kg (212 lb)   Height: 1.626 m (5' 4\")      Wt Readings from Last 2 Encounters:   07/15/24 96.2 kg (212 lb)   07/11/24 100.2 kg (221 lb)     BP Readings from Last 3 Encounters:   07/15/24 128/72   07/11/24 120/70   07/09/24 110/70        Chief Complaint   Patient presents with    Pre-op Exam     Allergies   Allergen Reactions    Cinnamon Hives, Itching and Other (See Comments)    Codeine Hives, Itching and Other (See Comments)     Ok Percocet    Adhesive Tape Rash and Other (See Comments)    Other      Anesthesia causes itching     No outpatient medications have been marked as taking for the 7/15/24 encounter (Office Visit) with Paxton Weinberg MD.       This patient presents to the office today for a preoperative consultation at the request of surgeon, Dr. Benitez, who plans on performing R hip replacement on August 9 at [facility].  The current problem began 6 months ago, and symptoms have been worsening with time.  Conservative therapy: N/A.    Planned anesthesia: General   Known anesthesia problems: itching   Bleeding risk: No recent or remote history of abnormal bleeding  Personal or FH of DVT/PE: No    Patient objection to receiving blood products: No    Patient Active Problem List   Diagnosis    AMOS (obstructive sleep apnea)    Type 2 diabetes mellitus without complication, without long-term current use of insulin (HCC)    HTN (hypertension), benign    Familial hypercholesterolemia    History of fibromyalgia    Positive MATTHEW (antinuclear antibody)    Cervical spondylosis    DDD (degenerative disc disease), cervical    Herniated cervical disc without myelopathy    Bipolar disorder (HCC)    Other chronic pain    Mixed anxiety and  Quality 226: Preventive Care And Screening: Tobacco Use: Screening And Cessation Intervention: Patient screened for tobacco use and is an ex/non-smoker Detail Level: Detailed Quality 47: Advance Care Plan: Advance Care Planning discussed and documented; advance care plan or surrogate decision maker documented in the medical record.

## 2024-07-15 NOTE — TELEPHONE ENCOUNTER
CARDIAC CLEARANCE     What type of procedure are you having?  Right Hip Replacement    Which physician is performing your procedure?  Dr Benitez    When is your procedure scheduled for?  Aug 9th    Where are you having this procedure?  [facility]    Are you taking Blood Thinners?Aspirin   If so what? (Name/dose/frequesncy)     Does the surgeon want you to stop your blood thinner?  If so for how long?    Phone Number and Contact Name for Physicians office:  [phone]    Fax number to send information:  Epic

## 2024-07-16 ENCOUNTER — HOSPITAL ENCOUNTER (OUTPATIENT)
Age: 44
Discharge: HOME | End: 2024-07-16
Payer: COMMERCIAL

## 2024-07-16 LAB
25(OH)D3 SERPL-MCNC: 58.2 NG/ML
ALBUMIN SERPL-MCNC: 4.8 G/DL (ref 3.4–5)
ALBUMIN/GLOB SERPL: 1.7 {RATIO} (ref 1.1–2.2)
ALP SERPL-CCNC: 65 U/L (ref 40–129)
ALT SERPL-CCNC: 32 U/L (ref 10–40)
ANION GAP SERPL CALCULATED.3IONS-SCNC: 12 MMOL/L (ref 3–16)
APTT BLD: 29.8 SEC (ref 22.1–36.4)
AST SERPL-CCNC: 26 U/L (ref 15–37)
BASOPHILS # BLD: 0 K/UL (ref 0–0.2)
BASOPHILS NFR BLD: 0.4 %
BILIRUB SERPL-MCNC: <0.2 MG/DL (ref 0–1)
BILIRUB UR QL STRIP.AUTO: NEGATIVE
BUN SERPL-MCNC: 10 MG/DL (ref 7–20)
CALCIUM SERPL-MCNC: 9.9 MG/DL (ref 8.3–10.6)
CHLORIDE SERPL-SCNC: 104 MMOL/L (ref 99–110)
CLARITY UR: CLEAR
CO2 SERPL-SCNC: 25 MMOL/L (ref 21–32)
COLOR UR: ABNORMAL
CREAT SERPL-MCNC: 0.7 MG/DL (ref 0.6–1.1)
DEPRECATED RDW RBC AUTO: 13.4 % (ref 12.4–15.4)
EOSINOPHIL # BLD: 0 K/UL (ref 0–0.6)
EOSINOPHIL NFR BLD: 0.3 %
GFR SERPLBLD CREATININE-BSD FMLA CKD-EPI: >90 ML/MIN/{1.73_M2}
GLUCOSE SERPL-MCNC: 94 MG/DL (ref 70–99)
GLUCOSE UR STRIP.AUTO-MCNC: NEGATIVE MG/DL
HCT VFR BLD AUTO: 35.8 % (ref 36–48)
HGB BLD-MCNC: 12.2 G/DL (ref 12–16)
HGB UR QL STRIP.AUTO: NEGATIVE
INR PPP: 0.94 (ref 0.85–1.15)
KETONES UR STRIP.AUTO-MCNC: NEGATIVE MG/DL
LEUKOCYTE ESTERASE UR QL STRIP.AUTO: NEGATIVE
LYMPHOCYTES # BLD: 2.3 K/UL (ref 1–5.1)
LYMPHOCYTES NFR BLD: 24.1 %
MCH RBC QN AUTO: 29.6 PG (ref 26–34)
MCHC RBC AUTO-ENTMCNC: 34.2 G/DL (ref 31–36)
MCV RBC AUTO: 86.8 FL (ref 80–100)
MONOCYTES # BLD: 0.6 K/UL (ref 0–1.3)
MONOCYTES NFR BLD: 6.4 %
NEUTROPHILS # BLD: 6.5 K/UL (ref 1.7–7.7)
NEUTROPHILS NFR BLD: 68.8 %
NITRITE UR QL STRIP.AUTO: NEGATIVE
PH UR STRIP.AUTO: 8 [PH] (ref 5–8)
PLATELET # BLD AUTO: 257 K/UL (ref 135–450)
PMV BLD AUTO: 7 FL (ref 5–10.5)
POTASSIUM SERPL-SCNC: 3.8 MMOL/L (ref 3.5–5.1)
PROT SERPL-MCNC: 7.7 G/DL (ref 6.4–8.2)
PROT UR STRIP.AUTO-MCNC: NEGATIVE MG/DL
PROTHROMBIN TIME: 12.8 SEC (ref 11.9–14.9)
RBC # BLD AUTO: 4.13 M/UL (ref 4–5.2)
SODIUM SERPL-SCNC: 141 MMOL/L (ref 136–145)
SP GR UR STRIP.AUTO: 1.02 (ref 1–1.03)
UA COMPLETE W REFLEX CULTURE PNL UR: ABNORMAL
UA DIPSTICK W REFLEX MICRO PNL UR: ABNORMAL
URN SPEC COLLECT METH UR: ABNORMAL
UROBILINOGEN UR STRIP-ACNC: 0.2 E.U./DL
WBC # BLD AUTO: 9.5 K/UL (ref 4–11)

## 2024-07-16 PROCEDURE — 82306 VITAMIN D 25 HYDROXY: CPT

## 2024-07-16 PROCEDURE — 85730 THROMBOPLASTIN TIME PARTIAL: CPT

## 2024-07-16 PROCEDURE — 85025 COMPLETE CBC W/AUTO DIFF WBC: CPT

## 2024-07-16 PROCEDURE — 87081 CULTURE SCREEN ONLY: CPT

## 2024-07-16 PROCEDURE — 80053 COMPREHEN METABOLIC PANEL: CPT

## 2024-07-16 PROCEDURE — 85610 PROTHROMBIN TIME: CPT

## 2024-07-16 PROCEDURE — 36415 COLL VENOUS BLD VENIPUNCTURE: CPT

## 2024-07-16 PROCEDURE — 83036 HEMOGLOBIN GLYCOSYLATED A1C: CPT

## 2024-07-16 PROCEDURE — 81003 URINALYSIS AUTO W/O SCOPE: CPT

## 2024-07-17 ENCOUNTER — PREP FOR PROCEDURE (OUTPATIENT)
Dept: ORTHOPEDIC SURGERY | Age: 44
End: 2024-07-17

## 2024-07-17 DIAGNOSIS — M16.0 PRIMARY OSTEOARTHRITIS OF BOTH HIPS: Primary | ICD-10-CM

## 2024-07-17 DIAGNOSIS — Z98.890 STATUS POST ARTHROSCOPY OF HIP: ICD-10-CM

## 2024-07-17 DIAGNOSIS — M16.11 PRIMARY OSTEOARTHRITIS OF RIGHT HIP: ICD-10-CM

## 2024-07-17 DIAGNOSIS — Z98.890 S/P HIP ARTHROSCOPY: ICD-10-CM

## 2024-07-17 LAB
EST. AVERAGE GLUCOSE BLD GHB EST-MCNC: 128.4 MG/DL
HBA1C MFR BLD: 6.1 %

## 2024-07-17 NOTE — TELEPHONE ENCOUNTER
Please let her know her letter of clearance was sent to the surgeon, she should continue her beta blockers during the surgery time

## 2024-07-19 ENCOUNTER — CARE COORDINATION (OUTPATIENT)
Dept: CARE COORDINATION | Age: 44
End: 2024-07-19

## 2024-07-19 LAB — MRSA SPEC QL CULT: NORMAL

## 2024-07-19 NOTE — CARE COORDINATION
Received CC referral from PCP. Attempted to reach pt today and she was UTR. Left VM message with contact information and will make another outreach at a later date/time.

## 2024-07-23 ENCOUNTER — HOSPITAL ENCOUNTER (OUTPATIENT)
Dept: PHYSICAL THERAPY | Age: 44
Setting detail: THERAPIES SERIES
Discharge: HOME | End: 2024-07-23
Payer: COMMERCIAL

## 2024-07-23 DIAGNOSIS — R29.898 DECREASED STRENGTH OF LOWER EXTREMITY: ICD-10-CM

## 2024-07-23 DIAGNOSIS — R26.2 DIFFICULTY WALKING: Primary | ICD-10-CM

## 2024-07-23 PROCEDURE — 97162 PT EVAL MOD COMPLEX 30 MIN: CPT

## 2024-07-23 PROCEDURE — 97530 THERAPEUTIC ACTIVITIES: CPT

## 2024-07-23 NOTE — PLAN OF CARE
[facility] [address], [place] [address] office: [phone] fax: [phone]    Physical Therapy Initial Evaluation Certification      Dear Kevin Benitez MD,    We had the pleasure of evaluating the following patient for physical therapy services at [facility].  A summary of our findings can be found in the initial assessment below.  This includes our plan of care.  If you have any questions or concerns regarding these findings, please do not hesitate to contact me at the office phone number listed above.  Thank you for the referral.     Physician Signature:_______________________________Date:__________________  By signing above (or electronic signature), therapist’s plan is approved by physician       Physical Therapy: TREATMENT/PROGRESS NOTE   Patient: Petrona Guzman (43 y.o. female)   Examination Date: 2024   :  1980 MRN: [id number]   Visit #: 1   Insurance Allowable Auth Needed   60 []Yes    [x]No    Insurance: Payor: CIGNA / Plan: CIGNA / Product Type: *No Product type* /   Insurance ID: [id number] - (Commercial)  [facility]if [facility]   Treatment Diagnosis: -    ICD-10-CM    1. Difficulty walking  R26.2       2. Decreased strength of lower extremity  R29.898          Medical Diagnosis:  Primary osteoarthritis of both hips [M16.0]   Referring Physician: Kevin Benitez MD  PCP: Kassandra Wren APRN - CNP       Plan of care signed (Y/N):     Date of Patient follow up with Physician:      Progress Report/POC: EVAL today  POC update due: (10 visits /OR AUTH LIMITS, whichever is less) - 2024                                             Precautions/ Contra-indications:           Latex allergy:  NO  Pacemaker:    NO  Contraindications for Manipulation: NA  Date of Surgery: Scheduled for   Other:    Red Flags:  None    C-SSRS Triggered by Intake

## 2024-07-24 RX ORDER — ACETAMINOPHEN 325 MG/1
1000 TABLET ORAL ONCE
Status: CANCELLED | OUTPATIENT
Start: 2024-08-09 | End: 2024-07-24

## 2024-07-24 RX ORDER — DEXAMETHASONE SODIUM PHOSPHATE 10 MG/ML
10 INJECTION, SOLUTION INTRAMUSCULAR; INTRAVENOUS ONCE
Status: CANCELLED | OUTPATIENT
Start: 2024-08-09 | End: 2024-07-24

## 2024-07-24 RX ORDER — SODIUM CHLORIDE 9 MG/ML
INJECTION, SOLUTION INTRAVENOUS PRN
Status: CANCELLED | OUTPATIENT
Start: 2024-08-09

## 2024-07-24 RX ORDER — SODIUM CHLORIDE 0.9 % (FLUSH) 0.9 %
5-40 SYRINGE (ML) INJECTION EVERY 12 HOURS SCHEDULED
Status: CANCELLED | OUTPATIENT
Start: 2024-08-09

## 2024-07-24 RX ORDER — CELECOXIB 200 MG/1
400 CAPSULE ORAL ONCE
Status: CANCELLED | OUTPATIENT
Start: 2024-08-09 | End: 2024-07-24

## 2024-07-24 RX ORDER — OXYCODONE HCL 10 MG/1
10 TABLET, FILM COATED, EXTENDED RELEASE ORAL ONCE
Status: CANCELLED | OUTPATIENT
Start: 2024-08-09 | End: 2024-07-24

## 2024-07-24 RX ORDER — GABAPENTIN 300 MG/1
300 CAPSULE ORAL ONCE
Status: CANCELLED | OUTPATIENT
Start: 2024-08-09 | End: 2024-07-24

## 2024-07-24 RX ORDER — SODIUM CHLORIDE, SODIUM LACTATE, POTASSIUM CHLORIDE, CALCIUM CHLORIDE 600; 310; 30; 20 MG/100ML; MG/100ML; MG/100ML; MG/100ML
INJECTION, SOLUTION INTRAVENOUS CONTINUOUS
Status: CANCELLED | OUTPATIENT
Start: 2024-08-09

## 2024-07-24 RX ORDER — SODIUM CHLORIDE 0.9 % (FLUSH) 0.9 %
5-40 SYRINGE (ML) INJECTION PRN
Status: CANCELLED | OUTPATIENT
Start: 2024-08-09

## 2024-07-29 ENCOUNTER — TELEPHONE (OUTPATIENT)
Dept: ORTHOPEDIC SURGERY | Age: 44
End: 2024-07-29

## 2024-07-29 NOTE — TELEPHONE ENCOUNTER
Orthopedic Nurse Navigator Summary    Patient Name:  Petrona Guzman  Anticipated Date of Surgery:  08/09/24  Attended Pre-op Education Class:  Video sent to patient email 07/19/24  PCP: Kassandra Wren CNP  Date of PCP visit for H&P: 07/15/24  Is patient in a Pain Management program:  Review of Medical history reveals history of: Diabetes, COPD, Bipolar, Schizophrenia, HTN, Hyperlipidemia, Depression, Anxiety, H/O alcohol abuse, AVNRT, PSVT, Lumbar spondylosis, Migraines, AMOS- bipap, Sjogrens disease, Vitamin D Deficiency, H/O gastric sleeve, H/O itching after anesthesia    Critical Lab Values  - Hemoglobin (g/dL):  Date: 07/16/24 Value 12.2  - Hematocrit(%): Date: 07/16/24  Value 35.8  - HgbA1C:  Date: 07/16/24  Value 6.1  - Albumin:  Date: 07/16/24  Value 4.8  - BUN:  Date: 07/16/24  Value 10  - Creatinine:  Date: 07/16/24  Value 0.7    07/16/24 MRSA swab- negative    Coronary Artery Disease/HTN/CHF history: Yes  Does the patient see a Cardiologist: Chadwick Ruff DO  Date of most recent cardiac appt: 07/17/24- cleared  On any anticoagulation:  Aspirin 81 mg QD    Diabetes History:  Yes  Most recent HgbA1C: 6.1  Pulmonary:  COPD/Emphysema/Use of home oxygen: COPD  Alcohol use: No    BMI greater than 40 at time of scheduling:  No  Additional medical concerns:  Additional recommendations for above concerns:  Attended Pre-Hab program:    Anticipated Discharge Disposition:  Home with OPT  Who will be with patient at home following discharge:  Her mom will bring her to surgery, and help PO.  Her  will be helping also.  Equipment patient already has:  walker  Bedroom on first or second floor:  first  Bathroom on first or second floor:  first  Weight bearing status:  wbat  Pre-op ambulatory status: walker  Number of entry steps:  8- she states there are 4 steps up and then 4 steps down to get inside her home  Caregiver assistance:  full time    Jo Cazares RN  Date: 07/29/24

## 2024-07-31 ENCOUNTER — OFFICE VISIT (OUTPATIENT)
Dept: ORTHOPEDIC SURGERY | Age: 44
End: 2024-07-31
Payer: COMMERCIAL

## 2024-07-31 VITALS — BODY MASS INDEX: 36.19 KG/M2 | HEIGHT: 64 IN | WEIGHT: 212 LBS

## 2024-07-31 DIAGNOSIS — G56.11 NEURITIS OF RIGHT MEDIAN NERVE: Primary | ICD-10-CM

## 2024-07-31 DIAGNOSIS — G56.22 NEURITIS OF LEFT ULNAR NERVE: ICD-10-CM

## 2024-07-31 DIAGNOSIS — G56.12 NEURITIS OF LEFT MEDIAN NERVE: ICD-10-CM

## 2024-07-31 DIAGNOSIS — G56.21 NEURITIS OF RIGHT ULNAR NERVE: ICD-10-CM

## 2024-07-31 PROCEDURE — G8427 DOCREV CUR MEDS BY ELIG CLIN: HCPCS | Performed by: INTERNAL MEDICINE

## 2024-07-31 PROCEDURE — 1036F TOBACCO NON-USER: CPT | Performed by: INTERNAL MEDICINE

## 2024-07-31 PROCEDURE — G8417 CALC BMI ABV UP PARAM F/U: HCPCS | Performed by: INTERNAL MEDICINE

## 2024-07-31 PROCEDURE — 99213 OFFICE O/P EST LOW 20 MIN: CPT | Performed by: INTERNAL MEDICINE

## 2024-07-31 NOTE — PROGRESS NOTES
intellectual property were performed in my presence and at my direction.  Furthermore, the content and accuracy of this note have been reviewed by me (Chadwick Ruff DO).  8/5/2024 2:47 PM

## 2024-07-31 NOTE — PROGRESS NOTES
Chief Complaint:   Chief Complaint   Patient presents with    Wrist Pain     Bilateral Wrist - Nerve hydrodissection follow up. She's still having some numbness and tingling in her ring and pinky finger. She's doing the exercises but sometimes they are painful.          History of Present Illness:       Patient is a 43 y.o. female returns follow up for the above complaint. The patient was last seen approximately 3 weeksago. The symptoms are improving since the last visit. The patient has had no further testing for the problem.      Status post ultrasound guided median nerve hydrodissection bilateral wrists 7/10/2024    Greater than 50% improvement in median nerve distribution of neuritic symptoms compared to preprocedure.    Neuritic symptoms primarily localized to the ulnar nerve distribution of the hands bilaterally.    Significant for cubital tunnel syndrome status post surgical release and history of ultrasound-guided nerve hydrodissection for management of recurrence of symptoms.  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

## 2024-08-01 ENCOUNTER — OFFICE VISIT (OUTPATIENT)
Dept: ORTHOPEDIC SURGERY | Age: 44
End: 2024-08-01
Payer: COMMERCIAL

## 2024-08-01 VITALS — WEIGHT: 212 LBS | HEIGHT: 64 IN | BODY MASS INDEX: 36.19 KG/M2

## 2024-08-01 DIAGNOSIS — M16.11 PRIMARY OSTEOARTHRITIS OF RIGHT HIP: Primary | ICD-10-CM

## 2024-08-01 PROCEDURE — 1036F TOBACCO NON-USER: CPT

## 2024-08-01 PROCEDURE — 99213 OFFICE O/P EST LOW 20 MIN: CPT

## 2024-08-01 PROCEDURE — G8417 CALC BMI ABV UP PARAM F/U: HCPCS

## 2024-08-01 PROCEDURE — G8427 DOCREV CUR MEDS BY ELIG CLIN: HCPCS

## 2024-08-01 RX ORDER — PANTOPRAZOLE SODIUM 20 MG/1
20 TABLET, DELAYED RELEASE ORAL DAILY
Qty: 30 TABLET | Refills: 3 | Status: SHIPPED | OUTPATIENT
Start: 2024-08-01

## 2024-08-01 RX ORDER — NAPROXEN 500 MG/1
500 TABLET ORAL 2 TIMES DAILY WITH MEALS
Qty: 28 TABLET | Refills: 0 | Status: SHIPPED | OUTPATIENT
Start: 2024-08-01 | End: 2024-08-15

## 2024-08-01 RX ORDER — CYCLOBENZAPRINE HCL 10 MG
10 TABLET ORAL 3 TIMES DAILY PRN
Qty: 21 TABLET | Refills: 0 | Status: SHIPPED | OUTPATIENT
Start: 2024-08-01 | End: 2024-08-08

## 2024-08-01 RX ORDER — SENNOSIDES 8.6 MG
1 TABLET ORAL 2 TIMES DAILY
Qty: 14 TABLET | Refills: 0 | Status: SHIPPED | OUTPATIENT
Start: 2024-08-01 | End: 2024-08-08

## 2024-08-01 RX ORDER — ASPIRIN 81 MG/1
81 TABLET ORAL 2 TIMES DAILY
Qty: 56 TABLET | Refills: 0 | Status: SHIPPED | OUTPATIENT
Start: 2024-08-01 | End: 2024-08-02

## 2024-08-01 RX ORDER — CEPHALEXIN 500 MG/1
500 CAPSULE ORAL 4 TIMES DAILY
Qty: 12 CAPSULE | Refills: 0 | Status: SHIPPED | OUTPATIENT
Start: 2024-08-01 | End: 2024-08-04

## 2024-08-01 RX ORDER — HYDROCODONE BITARTRATE AND ACETAMINOPHEN 5; 325 MG/1; MG/1
1 TABLET ORAL EVERY 4 HOURS PRN
Qty: 20 TABLET | Refills: 0 | Status: SHIPPED | OUTPATIENT
Start: 2024-08-01 | End: 2024-08-06

## 2024-08-02 ENCOUNTER — CARE COORDINATION (OUTPATIENT)
Dept: CARE COORDINATION | Age: 44
End: 2024-08-02

## 2024-08-02 RX ORDER — PILOCARPINE HYDROCHLORIDE 5 MG/1
5 TABLET, FILM COATED ORAL 4 TIMES DAILY
COMMUNITY
Start: 2024-07-08 | End: 2024-08-02

## 2024-08-02 RX ORDER — BUPROPION HYDROCHLORIDE 75 MG/1
TABLET ORAL
COMMUNITY
Start: 2024-07-30

## 2024-08-02 NOTE — PROGRESS NOTES
Chief Complaint  Hip Pain (Preop right DAYAMI)      History of Present Illness:  Petrona Guzman is a pleasant 43 y.o. female her for pre operative clearance visit. She is scheduled for right DAYAMI, DAA, 8/9/24.  prior history of right hip revision arthroscopy (DOS: 8/9/22), ABILIO, labral repair, abductor repair.  She also underwent left hip revision arthroscopy, acetabuloplasty, and abductor repair (DOS: 6/28/22). She has had recurrent bilateral hip pain ongoing for the past year that is worsening.     She has met with her pcp who cleared her for surgery. She obtained all necessary pre operative labs and we will review these together today to determine if patient is an operative candidate.       Medical History:  Patient's medications, allergies, past medical, surgical, social and family histories were reviewed and updated as appropriate.    Pain Assessment  Location of Pain: Hip  Location Modifiers: Right  Severity of Pain: 9  Quality of Pain: Sharp, Aching, Throbbing, Popping, Locking  Frequency of Pain: Constant  Aggravating Factors: Standing, Other (Comment) (sitting/laying down)  Limiting Behavior: Yes  Result of Injury: No  Work-Related Injury: No  ROS: Review of systems reviewed from Patient History Form completed today and available in the patient's chart under the Media tab.      Pertinent items are noted in HPI  Review of systems reviewed from Patient History Form completed today and available in the patient's chart under the Media tab.       Vital Signs:  Ht 1.626 m (5' 4\")   Wt 96.2 kg (212 lb)   BMI 36.39 kg/m²         Neuro: Alert & oriented x 3,  normal,  no focal deficits noted. Normal affect.  Eyes: sclera clear  Ears: Normal external ear  Mouth:  No perioral lesions  Pulm: Respirations unlabored and regular  Pulse: Extremities well perfused. 2+ peripheral pulses.  Skin: Warm. No ulcerations.      Constitutional: The physical examination finds the patient to be well-developed and well-nourished.

## 2024-08-02 NOTE — CARE COORDINATION
Quickly f/u with patient today as she was in the car. She stated she was going to have hip surgery one week from today and was wondering if ACM could help her find a raised toilet seat. ACM looked online and informed her about different options ranging from $18-$100 OOP, from [employer], [employer], [employer], etc. Pt stated she would start at [employer] to see what they offer in store and will let ACM know if she needs any additional assistance. FRANKO thanked her for taking the call today and will f/u with pt again after her surgery. She agreed with this plan.

## 2024-08-05 ENCOUNTER — OFFICE VISIT (OUTPATIENT)
Dept: CARDIOLOGY CLINIC | Age: 44
End: 2024-08-05
Payer: COMMERCIAL

## 2024-08-05 VITALS
SYSTOLIC BLOOD PRESSURE: 100 MMHG | BODY MASS INDEX: 36.15 KG/M2 | HEIGHT: 65 IN | DIASTOLIC BLOOD PRESSURE: 66 MMHG | OXYGEN SATURATION: 100 % | HEART RATE: 85 BPM | WEIGHT: 217 LBS

## 2024-08-05 DIAGNOSIS — E11.9 TYPE 2 DIABETES MELLITUS WITHOUT COMPLICATION, WITHOUT LONG-TERM CURRENT USE OF INSULIN (HCC): Chronic | ICD-10-CM

## 2024-08-05 DIAGNOSIS — E78.2 MIXED HYPERLIPIDEMIA: ICD-10-CM

## 2024-08-05 DIAGNOSIS — E78.01 FAMILIAL HYPERCHOLESTEROLEMIA: ICD-10-CM

## 2024-08-05 DIAGNOSIS — M35.00 SJOGREN'S SYNDROME, WITH UNSPECIFIED ORGAN INVOLVEMENT (HCC): ICD-10-CM

## 2024-08-05 DIAGNOSIS — Z82.49 FAMILY HISTORY OF HEART DISEASE: ICD-10-CM

## 2024-08-05 DIAGNOSIS — I10 HTN (HYPERTENSION), BENIGN: ICD-10-CM

## 2024-08-05 DIAGNOSIS — E66.9 OBESITY (BMI 30-39.9): ICD-10-CM

## 2024-08-05 DIAGNOSIS — I47.19 AVNRT (AV NODAL RE-ENTRY TACHYCARDIA) (HCC): Primary | ICD-10-CM

## 2024-08-05 DIAGNOSIS — G47.33 OSA (OBSTRUCTIVE SLEEP APNEA): ICD-10-CM

## 2024-08-05 PROCEDURE — 99214 OFFICE O/P EST MOD 30 MIN: CPT | Performed by: INTERNAL MEDICINE

## 2024-08-05 PROCEDURE — 3078F DIAST BP <80 MM HG: CPT | Performed by: INTERNAL MEDICINE

## 2024-08-05 PROCEDURE — 3074F SYST BP LT 130 MM HG: CPT | Performed by: INTERNAL MEDICINE

## 2024-08-05 PROCEDURE — G8417 CALC BMI ABV UP PARAM F/U: HCPCS | Performed by: INTERNAL MEDICINE

## 2024-08-05 PROCEDURE — G8427 DOCREV CUR MEDS BY ELIG CLIN: HCPCS | Performed by: INTERNAL MEDICINE

## 2024-08-05 PROCEDURE — 3044F HG A1C LEVEL LT 7.0%: CPT | Performed by: INTERNAL MEDICINE

## 2024-08-05 PROCEDURE — 2022F DILAT RTA XM EVC RTNOPTHY: CPT | Performed by: INTERNAL MEDICINE

## 2024-08-05 PROCEDURE — 1036F TOBACCO NON-USER: CPT | Performed by: INTERNAL MEDICINE

## 2024-08-05 RX ORDER — EVOLOCUMAB 140 MG/ML
INJECTION, SOLUTION SUBCUTANEOUS
Qty: 2 ML | Refills: 5 | Status: SHIPPED | OUTPATIENT
Start: 2024-08-05

## 2024-08-05 RX ORDER — ASPIRIN 81 MG/1
81 TABLET ORAL DAILY
Qty: 90 TABLET | Refills: 3 | Status: SHIPPED | OUTPATIENT
Start: 2024-08-05

## 2024-08-05 RX ORDER — EZETIMIBE 10 MG/1
10 TABLET ORAL DAILY
Qty: 90 TABLET | Refills: 3 | Status: SHIPPED | OUTPATIENT
Start: 2024-08-05

## 2024-08-05 RX ORDER — ROSUVASTATIN CALCIUM 40 MG/1
TABLET, COATED ORAL
Qty: 90 TABLET | Refills: 3 | Status: SHIPPED | OUTPATIENT
Start: 2024-08-05

## 2024-08-09 ENCOUNTER — ANESTHESIA EVENT (OUTPATIENT)
Dept: OPERATING ROOM | Age: 44
End: 2024-08-09
Payer: COMMERCIAL

## 2024-08-09 ENCOUNTER — APPOINTMENT (OUTPATIENT)
Dept: GENERAL RADIOLOGY | Age: 44
End: 2024-08-09
Attending: ORTHOPAEDIC SURGERY
Payer: COMMERCIAL

## 2024-08-09 ENCOUNTER — ANESTHESIA (OUTPATIENT)
Dept: OPERATING ROOM | Age: 44
End: 2024-08-09
Payer: COMMERCIAL

## 2024-08-09 ENCOUNTER — HOSPITAL ENCOUNTER (OUTPATIENT)
Age: 44
Setting detail: OBSERVATION
Discharge: HOME | End: 2024-08-10
Attending: ORTHOPAEDIC SURGERY | Admitting: ORTHOPAEDIC SURGERY
Payer: COMMERCIAL

## 2024-08-09 PROBLEM — Z96.641 STATUS POST TOTAL HIP REPLACEMENT, RIGHT: Status: ACTIVE | Noted: 2024-08-09

## 2024-08-09 LAB
ABO + RH BLD: NORMAL
ANION GAP SERPL CALCULATED.3IONS-SCNC: 13 MMOL/L (ref 3–16)
BLD GP AB SCN SERPL QL: NORMAL
BUN SERPL-MCNC: 11 MG/DL (ref 7–20)
CALCIUM SERPL-MCNC: 8.6 MG/DL (ref 8.3–10.6)
CHLORIDE SERPL-SCNC: 102 MMOL/L (ref 99–110)
CO2 SERPL-SCNC: 22 MMOL/L (ref 21–32)
CREAT SERPL-MCNC: 0.7 MG/DL (ref 0.6–1.1)
GFR SERPLBLD CREATININE-BSD FMLA CKD-EPI: >90 ML/MIN/{1.73_M2}
GLUCOSE BLD-MCNC: 105 MG/DL (ref 70–99)
GLUCOSE BLD-MCNC: 133 MG/DL (ref 70–99)
GLUCOSE BLD-MCNC: 211 MG/DL (ref 70–99)
GLUCOSE SERPL-MCNC: 149 MG/DL (ref 70–99)
HCG UR QL: NEGATIVE
HCT VFR BLD AUTO: 38.7 % (ref 36–48)
HGB BLD-MCNC: 12.4 G/DL (ref 12–16)
PERFORMED ON: ABNORMAL
POTASSIUM SERPL-SCNC: 4.6 MMOL/L (ref 3.5–5.1)
SODIUM SERPL-SCNC: 137 MMOL/L (ref 136–145)

## 2024-08-09 PROCEDURE — 2580000003 HC RX 258: Performed by: ANESTHESIOLOGY

## 2024-08-09 PROCEDURE — 6360000002 HC RX W HCPCS: Performed by: ORTHOPAEDIC SURGERY

## 2024-08-09 PROCEDURE — 2500000003 HC RX 250 WO HCPCS: Performed by: NURSE ANESTHETIST, CERTIFIED REGISTERED

## 2024-08-09 PROCEDURE — 2580000003 HC RX 258: Performed by: ORTHOPAEDIC SURGERY

## 2024-08-09 PROCEDURE — 84703 CHORIONIC GONADOTROPIN ASSAY: CPT

## 2024-08-09 PROCEDURE — 2580000003 HC RX 258: Performed by: NURSE ANESTHETIST, CERTIFIED REGISTERED

## 2024-08-09 PROCEDURE — 85018 HEMOGLOBIN: CPT

## 2024-08-09 PROCEDURE — 6370000000 HC RX 637 (ALT 250 FOR IP): Performed by: NURSE PRACTITIONER

## 2024-08-09 PROCEDURE — A4217 STERILE WATER/SALINE, 500 ML: HCPCS | Performed by: ORTHOPAEDIC SURGERY

## 2024-08-09 PROCEDURE — 2700000000 HC OXYGEN THERAPY PER DAY

## 2024-08-09 PROCEDURE — 6370000000 HC RX 637 (ALT 250 FOR IP)

## 2024-08-09 PROCEDURE — 94660 CPAP INITIATION&MGMT: CPT

## 2024-08-09 PROCEDURE — 2580000003 HC RX 258

## 2024-08-09 PROCEDURE — 73501 X-RAY EXAM HIP UNI 1 VIEW: CPT

## 2024-08-09 PROCEDURE — 94761 N-INVAS EAR/PLS OXIMETRY MLT: CPT

## 2024-08-09 PROCEDURE — 86900 BLOOD TYPING SEROLOGIC ABO: CPT

## 2024-08-09 PROCEDURE — 6370000000 HC RX 637 (ALT 250 FOR IP): Performed by: ORTHOPAEDIC SURGERY

## 2024-08-09 PROCEDURE — 86901 BLOOD TYPING SEROLOGIC RH(D): CPT

## 2024-08-09 PROCEDURE — 2500000003 HC RX 250 WO HCPCS: Performed by: ORTHOPAEDIC SURGERY

## 2024-08-09 PROCEDURE — 86850 RBC ANTIBODY SCREEN: CPT

## 2024-08-09 PROCEDURE — 73502 X-RAY EXAM HIP UNI 2-3 VIEWS: CPT

## 2024-08-09 PROCEDURE — 83036 HEMOGLOBIN GLYCOSYLATED A1C: CPT

## 2024-08-09 PROCEDURE — 36415 COLL VENOUS BLD VENIPUNCTURE: CPT

## 2024-08-09 PROCEDURE — 6360000002 HC RX W HCPCS: Performed by: ANESTHESIOLOGY

## 2024-08-09 PROCEDURE — 6360000002 HC RX W HCPCS: Performed by: NURSE ANESTHETIST, CERTIFIED REGISTERED

## 2024-08-09 PROCEDURE — 80048 BASIC METABOLIC PNL TOTAL CA: CPT

## 2024-08-09 PROCEDURE — 6370000000 HC RX 637 (ALT 250 FOR IP): Performed by: INTERNAL MEDICINE

## 2024-08-09 PROCEDURE — 64447 NJX AA&/STRD FEMORAL NRV IMG: CPT | Performed by: ANESTHESIOLOGY

## 2024-08-09 PROCEDURE — 85014 HEMATOCRIT: CPT

## 2024-08-09 PROCEDURE — G0378 HOSPITAL OBSERVATION PER HR: HCPCS

## 2024-08-09 PROCEDURE — 6360000002 HC RX W HCPCS

## 2024-08-09 RX ORDER — SODIUM CHLORIDE 0.9 % (FLUSH) 0.9 %
5-40 SYRINGE (ML) INJECTION EVERY 12 HOURS SCHEDULED
Status: DISCONTINUED | OUTPATIENT
Start: 2024-08-09 | End: 2024-08-09 | Stop reason: HOSPADM

## 2024-08-09 RX ORDER — ROPIVACAINE HYDROCHLORIDE 5 MG/ML
INJECTION, SOLUTION EPIDURAL; INFILTRATION; PERINEURAL PRN
Status: DISCONTINUED | OUTPATIENT
Start: 2024-08-09 | End: 2024-08-09 | Stop reason: SDUPTHER

## 2024-08-09 RX ORDER — SODIUM CHLORIDE 0.9 % (FLUSH) 0.9 %
5-40 SYRINGE (ML) INJECTION EVERY 12 HOURS SCHEDULED
Status: DISCONTINUED | OUTPATIENT
Start: 2024-08-09 | End: 2024-08-10 | Stop reason: HOSPADM

## 2024-08-09 RX ORDER — LIDOCAINE HYDROCHLORIDE 20 MG/ML
INJECTION, SOLUTION INFILTRATION; PERINEURAL PRN
Status: DISCONTINUED | OUTPATIENT
Start: 2024-08-09 | End: 2024-08-09 | Stop reason: SDUPTHER

## 2024-08-09 RX ORDER — HYDROMORPHONE HYDROCHLORIDE 2 MG/ML
INJECTION, SOLUTION INTRAMUSCULAR; INTRAVENOUS; SUBCUTANEOUS PRN
Status: DISCONTINUED | OUTPATIENT
Start: 2024-08-09 | End: 2024-08-09 | Stop reason: SDUPTHER

## 2024-08-09 RX ORDER — SODIUM CHLORIDE 9 MG/ML
INJECTION, SOLUTION INTRAVENOUS CONTINUOUS
Status: DISCONTINUED | OUTPATIENT
Start: 2024-08-09 | End: 2024-08-10 | Stop reason: HOSPADM

## 2024-08-09 RX ORDER — CITALOPRAM 40 MG/1
40 TABLET ORAL EVERY MORNING
Status: DISCONTINUED | OUTPATIENT
Start: 2024-08-09 | End: 2024-08-10 | Stop reason: HOSPADM

## 2024-08-09 RX ORDER — EZETIMIBE 10 MG/1
10 TABLET ORAL DAILY
Status: DISCONTINUED | OUTPATIENT
Start: 2024-08-10 | End: 2024-08-10 | Stop reason: HOSPADM

## 2024-08-09 RX ORDER — BUPROPION HYDROCHLORIDE 75 MG/1
75 TABLET ORAL 2 TIMES DAILY
Status: DISCONTINUED | OUTPATIENT
Start: 2024-08-09 | End: 2024-08-09

## 2024-08-09 RX ORDER — OXYCODONE HYDROCHLORIDE 5 MG/1
10 TABLET ORAL EVERY 4 HOURS PRN
Status: DISCONTINUED | OUTPATIENT
Start: 2024-08-09 | End: 2024-08-10 | Stop reason: HOSPADM

## 2024-08-09 RX ORDER — ACETAMINOPHEN 325 MG/1
650 TABLET ORAL EVERY 6 HOURS
Status: DISCONTINUED | OUTPATIENT
Start: 2024-08-09 | End: 2024-08-10 | Stop reason: HOSPADM

## 2024-08-09 RX ORDER — GABAPENTIN 300 MG/1
300 CAPSULE ORAL ONCE
Status: COMPLETED | OUTPATIENT
Start: 2024-08-09 | End: 2024-08-09

## 2024-08-09 RX ORDER — SODIUM CHLORIDE 0.9 % (FLUSH) 0.9 %
5-40 SYRINGE (ML) INJECTION PRN
Status: DISCONTINUED | OUTPATIENT
Start: 2024-08-09 | End: 2024-08-10 | Stop reason: HOSPADM

## 2024-08-09 RX ORDER — FENTANYL CITRATE 50 UG/ML
25 INJECTION, SOLUTION INTRAMUSCULAR; INTRAVENOUS EVERY 5 MIN PRN
Status: DISCONTINUED | OUTPATIENT
Start: 2024-08-09 | End: 2024-08-09 | Stop reason: HOSPADM

## 2024-08-09 RX ORDER — DEXAMETHASONE SODIUM PHOSPHATE 10 MG/ML
10 INJECTION, SOLUTION INTRAMUSCULAR; INTRAVENOUS ONCE
Status: COMPLETED | OUTPATIENT
Start: 2024-08-09 | End: 2024-08-09

## 2024-08-09 RX ORDER — ONDANSETRON 2 MG/ML
4 INJECTION INTRAMUSCULAR; INTRAVENOUS EVERY 6 HOURS PRN
Status: DISCONTINUED | OUTPATIENT
Start: 2024-08-09 | End: 2024-08-10 | Stop reason: HOSPADM

## 2024-08-09 RX ORDER — ARIPIPRAZOLE 5 MG/1
15 TABLET ORAL DAILY
Status: DISCONTINUED | OUTPATIENT
Start: 2024-08-09 | End: 2024-08-10 | Stop reason: HOSPADM

## 2024-08-09 RX ORDER — BUPROPION HYDROCHLORIDE 75 MG/1
75 TABLET ORAL 2 TIMES DAILY
Status: DISCONTINUED | OUTPATIENT
Start: 2024-08-10 | End: 2024-08-10 | Stop reason: HOSPADM

## 2024-08-09 RX ORDER — LAMOTRIGINE 100 MG/1
100 TABLET ORAL 2 TIMES DAILY
Status: DISCONTINUED | OUTPATIENT
Start: 2024-08-09 | End: 2024-08-10 | Stop reason: HOSPADM

## 2024-08-09 RX ORDER — LABETALOL HYDROCHLORIDE 5 MG/ML
10 INJECTION, SOLUTION INTRAVENOUS
Status: DISCONTINUED | OUTPATIENT
Start: 2024-08-09 | End: 2024-08-09 | Stop reason: HOSPADM

## 2024-08-09 RX ORDER — PROCHLORPERAZINE EDISYLATE 5 MG/ML
5 INJECTION INTRAMUSCULAR; INTRAVENOUS
Status: DISCONTINUED | OUTPATIENT
Start: 2024-08-09 | End: 2024-08-09 | Stop reason: HOSPADM

## 2024-08-09 RX ORDER — VITAMIN B COMPLEX
2000 TABLET ORAL DAILY
Status: DISCONTINUED | OUTPATIENT
Start: 2024-08-10 | End: 2024-08-10 | Stop reason: HOSPADM

## 2024-08-09 RX ORDER — SODIUM CHLORIDE 9 MG/ML
INJECTION, SOLUTION INTRAVENOUS PRN
Status: DISCONTINUED | OUTPATIENT
Start: 2024-08-09 | End: 2024-08-09 | Stop reason: HOSPADM

## 2024-08-09 RX ORDER — DEXTROSE MONOHYDRATE 100 MG/ML
INJECTION, SOLUTION INTRAVENOUS CONTINUOUS PRN
Status: DISCONTINUED | OUTPATIENT
Start: 2024-08-09 | End: 2024-08-10 | Stop reason: HOSPADM

## 2024-08-09 RX ORDER — DEXAMETHASONE SODIUM PHOSPHATE 4 MG/ML
INJECTION, SOLUTION INTRA-ARTICULAR; INTRALESIONAL; INTRAMUSCULAR; INTRAVENOUS; SOFT TISSUE PRN
Status: DISCONTINUED | OUTPATIENT
Start: 2024-08-09 | End: 2024-08-09 | Stop reason: SDUPTHER

## 2024-08-09 RX ORDER — METHOCARBAMOL 100 MG/ML
INJECTION, SOLUTION INTRAMUSCULAR; INTRAVENOUS PRN
Status: DISCONTINUED | OUTPATIENT
Start: 2024-08-09 | End: 2024-08-09 | Stop reason: SDUPTHER

## 2024-08-09 RX ORDER — SODIUM CHLORIDE, SODIUM LACTATE, POTASSIUM CHLORIDE, CALCIUM CHLORIDE 600; 310; 30; 20 MG/100ML; MG/100ML; MG/100ML; MG/100ML
INJECTION, SOLUTION INTRAVENOUS CONTINUOUS
Status: DISCONTINUED | OUTPATIENT
Start: 2024-08-09 | End: 2024-08-09 | Stop reason: HOSPADM

## 2024-08-09 RX ORDER — ROPIVACAINE HYDROCHLORIDE 5 MG/ML
INJECTION, SOLUTION EPIDURAL; INFILTRATION; PERINEURAL
Status: COMPLETED
Start: 2024-08-09 | End: 2024-08-09

## 2024-08-09 RX ORDER — ROSUVASTATIN CALCIUM 20 MG/1
40 TABLET, COATED ORAL DAILY
Status: DISCONTINUED | OUTPATIENT
Start: 2024-08-10 | End: 2024-08-10 | Stop reason: HOSPADM

## 2024-08-09 RX ORDER — GABAPENTIN 300 MG/1
300 CAPSULE ORAL 2 TIMES DAILY
Status: DISCONTINUED | OUTPATIENT
Start: 2024-08-09 | End: 2024-08-10 | Stop reason: HOSPADM

## 2024-08-09 RX ORDER — MIDAZOLAM HYDROCHLORIDE 1 MG/ML
INJECTION INTRAMUSCULAR; INTRAVENOUS PRN
Status: DISCONTINUED | OUTPATIENT
Start: 2024-08-09 | End: 2024-08-09 | Stop reason: SDUPTHER

## 2024-08-09 RX ORDER — MAGNESIUM HYDROXIDE 1200 MG/15ML
LIQUID ORAL CONTINUOUS PRN
Status: DISCONTINUED | OUTPATIENT
Start: 2024-08-09 | End: 2024-08-09 | Stop reason: HOSPADM

## 2024-08-09 RX ORDER — SODIUM CHLORIDE 0.9 % (FLUSH) 0.9 %
5-40 SYRINGE (ML) INJECTION PRN
Status: DISCONTINUED | OUTPATIENT
Start: 2024-08-09 | End: 2024-08-09 | Stop reason: HOSPADM

## 2024-08-09 RX ORDER — OXYCODONE HCL 10 MG/1
10 TABLET, FILM COATED, EXTENDED RELEASE ORAL ONCE
Status: COMPLETED | OUTPATIENT
Start: 2024-08-09 | End: 2024-08-09

## 2024-08-09 RX ORDER — SODIUM CHLORIDE, SODIUM LACTATE, POTASSIUM CHLORIDE, CALCIUM CHLORIDE 600; 310; 30; 20 MG/100ML; MG/100ML; MG/100ML; MG/100ML
INJECTION, SOLUTION INTRAVENOUS CONTINUOUS PRN
Status: DISCONTINUED | OUTPATIENT
Start: 2024-08-09 | End: 2024-08-09 | Stop reason: SDUPTHER

## 2024-08-09 RX ORDER — KETAMINE HCL IN NACL, ISO-OSM 20 MG/2 ML
SYRINGE (ML) INJECTION PRN
Status: DISCONTINUED | OUTPATIENT
Start: 2024-08-09 | End: 2024-08-09 | Stop reason: SDUPTHER

## 2024-08-09 RX ORDER — NALOXONE HYDROCHLORIDE 0.4 MG/ML
INJECTION, SOLUTION INTRAMUSCULAR; INTRAVENOUS; SUBCUTANEOUS PRN
Status: DISCONTINUED | OUTPATIENT
Start: 2024-08-09 | End: 2024-08-09 | Stop reason: HOSPADM

## 2024-08-09 RX ORDER — ALBUTEROL SULFATE 2.5 MG/3ML
2.5 SOLUTION RESPIRATORY (INHALATION) EVERY 4 HOURS PRN
Status: DISCONTINUED | OUTPATIENT
Start: 2024-08-09 | End: 2024-08-10 | Stop reason: HOSPADM

## 2024-08-09 RX ORDER — MIDAZOLAM HYDROCHLORIDE 1 MG/ML
INJECTION INTRAMUSCULAR; INTRAVENOUS
Status: COMPLETED
Start: 2024-08-09 | End: 2024-08-09

## 2024-08-09 RX ORDER — HYDROXYZINE HYDROCHLORIDE 10 MG/1
10 TABLET, FILM COATED ORAL 3 TIMES DAILY PRN
Status: DISCONTINUED | OUTPATIENT
Start: 2024-08-09 | End: 2024-08-10 | Stop reason: HOSPADM

## 2024-08-09 RX ORDER — CYCLOBENZAPRINE HCL 10 MG
10 TABLET ORAL EVERY 12 HOURS PRN
Status: DISCONTINUED | OUTPATIENT
Start: 2024-08-09 | End: 2024-08-10 | Stop reason: HOSPADM

## 2024-08-09 RX ORDER — SODIUM CHLORIDE 9 MG/ML
INJECTION, SOLUTION INTRAVENOUS PRN
Status: DISCONTINUED | OUTPATIENT
Start: 2024-08-09 | End: 2024-08-10 | Stop reason: HOSPADM

## 2024-08-09 RX ORDER — GLYCOPYRROLATE 0.2 MG/ML
INJECTION INTRAMUSCULAR; INTRAVENOUS PRN
Status: DISCONTINUED | OUTPATIENT
Start: 2024-08-09 | End: 2024-08-09 | Stop reason: SDUPTHER

## 2024-08-09 RX ORDER — ARIPIPRAZOLE 5 MG/1
15 TABLET ORAL DAILY
Status: DISCONTINUED | OUTPATIENT
Start: 2024-08-10 | End: 2024-08-09

## 2024-08-09 RX ORDER — PROPOFOL 10 MG/ML
INJECTION, EMULSION INTRAVENOUS PRN
Status: DISCONTINUED | OUTPATIENT
Start: 2024-08-09 | End: 2024-08-09 | Stop reason: SDUPTHER

## 2024-08-09 RX ORDER — HYDROMORPHONE HYDROCHLORIDE 1 MG/ML
0.5 INJECTION, SOLUTION INTRAMUSCULAR; INTRAVENOUS; SUBCUTANEOUS EVERY 5 MIN PRN
Status: DISCONTINUED | OUTPATIENT
Start: 2024-08-09 | End: 2024-08-09 | Stop reason: HOSPADM

## 2024-08-09 RX ORDER — LIDOCAINE HYDROCHLORIDE 10 MG/ML
1 INJECTION, SOLUTION EPIDURAL; INFILTRATION; INTRACAUDAL; PERINEURAL
Status: DISCONTINUED | OUTPATIENT
Start: 2024-08-09 | End: 2024-08-09 | Stop reason: HOSPADM

## 2024-08-09 RX ORDER — MORPHINE SULFATE 2 MG/ML
2 INJECTION, SOLUTION INTRAMUSCULAR; INTRAVENOUS
Status: DISCONTINUED | OUTPATIENT
Start: 2024-08-09 | End: 2024-08-10 | Stop reason: HOSPADM

## 2024-08-09 RX ORDER — CELECOXIB 200 MG/1
400 CAPSULE ORAL ONCE
Status: COMPLETED | OUTPATIENT
Start: 2024-08-09 | End: 2024-08-09

## 2024-08-09 RX ORDER — FENTANYL CITRATE 50 UG/ML
INJECTION, SOLUTION INTRAMUSCULAR; INTRAVENOUS
Status: COMPLETED
Start: 2024-08-09 | End: 2024-08-09

## 2024-08-09 RX ORDER — OXYCODONE HYDROCHLORIDE 5 MG/1
5 TABLET ORAL EVERY 4 HOURS PRN
Status: DISCONTINUED | OUTPATIENT
Start: 2024-08-09 | End: 2024-08-10 | Stop reason: HOSPADM

## 2024-08-09 RX ORDER — PANTOPRAZOLE SODIUM 20 MG/1
20 TABLET, DELAYED RELEASE ORAL DAILY
Status: DISCONTINUED | OUTPATIENT
Start: 2024-08-10 | End: 2024-08-10 | Stop reason: HOSPADM

## 2024-08-09 RX ORDER — CITALOPRAM 40 MG/1
40 TABLET ORAL EVERY MORNING
Status: DISCONTINUED | OUTPATIENT
Start: 2024-08-10 | End: 2024-08-09

## 2024-08-09 RX ORDER — ONDANSETRON 4 MG/1
4 TABLET, ORALLY DISINTEGRATING ORAL EVERY 8 HOURS PRN
Status: DISCONTINUED | OUTPATIENT
Start: 2024-08-09 | End: 2024-08-10 | Stop reason: HOSPADM

## 2024-08-09 RX ORDER — ONDANSETRON 2 MG/ML
INJECTION INTRAMUSCULAR; INTRAVENOUS PRN
Status: DISCONTINUED | OUTPATIENT
Start: 2024-08-09 | End: 2024-08-09 | Stop reason: SDUPTHER

## 2024-08-09 RX ORDER — SENNA AND DOCUSATE SODIUM 50; 8.6 MG/1; MG/1
1 TABLET, FILM COATED ORAL 2 TIMES DAILY
Status: DISCONTINUED | OUTPATIENT
Start: 2024-08-09 | End: 2024-08-10 | Stop reason: HOSPADM

## 2024-08-09 RX ORDER — ONDANSETRON 2 MG/ML
4 INJECTION INTRAMUSCULAR; INTRAVENOUS
Status: DISCONTINUED | OUTPATIENT
Start: 2024-08-09 | End: 2024-08-09 | Stop reason: HOSPADM

## 2024-08-09 RX ORDER — INSULIN LISPRO 100 [IU]/ML
0-4 INJECTION, SOLUTION INTRAVENOUS; SUBCUTANEOUS EVERY 4 HOURS
Status: DISCONTINUED | OUTPATIENT
Start: 2024-08-09 | End: 2024-08-10 | Stop reason: HOSPADM

## 2024-08-09 RX ORDER — MORPHINE SULFATE 2 MG/ML
4 INJECTION, SOLUTION INTRAMUSCULAR; INTRAVENOUS
Status: DISCONTINUED | OUTPATIENT
Start: 2024-08-09 | End: 2024-08-10 | Stop reason: HOSPADM

## 2024-08-09 RX ORDER — ASPIRIN 81 MG/1
81 TABLET ORAL 2 TIMES DAILY
Status: DISCONTINUED | OUTPATIENT
Start: 2024-08-09 | End: 2024-08-10 | Stop reason: HOSPADM

## 2024-08-09 RX ORDER — NAPROXEN 250 MG/1
500 TABLET ORAL 2 TIMES DAILY WITH MEALS
Status: DISCONTINUED | OUTPATIENT
Start: 2024-08-09 | End: 2024-08-10 | Stop reason: HOSPADM

## 2024-08-09 RX ORDER — ACETAMINOPHEN 325 MG/1
1000 TABLET ORAL ONCE
Status: COMPLETED | OUTPATIENT
Start: 2024-08-09 | End: 2024-08-09

## 2024-08-09 RX ORDER — HYDRALAZINE HYDROCHLORIDE 20 MG/ML
10 INJECTION INTRAMUSCULAR; INTRAVENOUS
Status: DISCONTINUED | OUTPATIENT
Start: 2024-08-09 | End: 2024-08-09 | Stop reason: HOSPADM

## 2024-08-09 RX ORDER — KETOROLAC TROMETHAMINE 30 MG/ML
INJECTION, SOLUTION INTRAMUSCULAR; INTRAVENOUS PRN
Status: DISCONTINUED | OUTPATIENT
Start: 2024-08-09 | End: 2024-08-09 | Stop reason: SDUPTHER

## 2024-08-09 RX ORDER — FENTANYL CITRATE 50 UG/ML
INJECTION, SOLUTION INTRAMUSCULAR; INTRAVENOUS PRN
Status: DISCONTINUED | OUTPATIENT
Start: 2024-08-09 | End: 2024-08-09 | Stop reason: SDUPTHER

## 2024-08-09 RX ORDER — GLUCAGON 1 MG/ML
1 KIT INJECTION PRN
Status: DISCONTINUED | OUTPATIENT
Start: 2024-08-09 | End: 2024-08-10 | Stop reason: HOSPADM

## 2024-08-09 RX ORDER — ROCURONIUM BROMIDE 10 MG/ML
INJECTION, SOLUTION INTRAVENOUS PRN
Status: DISCONTINUED | OUTPATIENT
Start: 2024-08-09 | End: 2024-08-09 | Stop reason: SDUPTHER

## 2024-08-09 RX ADMIN — DEXMEDETOMIDINE HYDROCHLORIDE 2 MCG: 100 INJECTION, SOLUTION INTRAVENOUS at 13:04

## 2024-08-09 RX ADMIN — FENTANYL CITRATE 50 MCG: 50 INJECTION, SOLUTION INTRAMUSCULAR; INTRAVENOUS at 11:15

## 2024-08-09 RX ADMIN — METHOCARBAMOL 500 MG: 100 INJECTION INTRAMUSCULAR; INTRAVENOUS at 13:59

## 2024-08-09 RX ADMIN — ROPIVACAINE HYDROCHLORIDE 30 ML: 5 INJECTION, SOLUTION EPIDURAL; INFILTRATION; PERINEURAL at 10:15

## 2024-08-09 RX ADMIN — GABAPENTIN 300 MG: 300 CAPSULE ORAL at 21:17

## 2024-08-09 RX ADMIN — Medication 20 MG: at 11:40

## 2024-08-09 RX ADMIN — SODIUM CHLORIDE, SODIUM LACTATE, POTASSIUM CHLORIDE, AND CALCIUM CHLORIDE: .6; .31; .03; .02 INJECTION, SOLUTION INTRAVENOUS at 12:00

## 2024-08-09 RX ADMIN — HYDROMORPHONE HYDROCHLORIDE 0.5 MG: 2 INJECTION, SOLUTION INTRAMUSCULAR; INTRAVENOUS; SUBCUTANEOUS at 13:54

## 2024-08-09 RX ADMIN — WATER 2000 MG: 1 INJECTION INTRAMUSCULAR; INTRAVENOUS; SUBCUTANEOUS at 11:41

## 2024-08-09 RX ADMIN — OXYCODONE HYDROCHLORIDE 10 MG: 5 TABLET ORAL at 19:50

## 2024-08-09 RX ADMIN — ROCURONIUM BROMIDE 30 MG: 10 INJECTION, SOLUTION INTRAVENOUS at 13:07

## 2024-08-09 RX ADMIN — ROCURONIUM BROMIDE 70 MG: 10 INJECTION, SOLUTION INTRAVENOUS at 11:27

## 2024-08-09 RX ADMIN — HYDROMORPHONE HYDROCHLORIDE 0.5 MG: 2 INJECTION, SOLUTION INTRAMUSCULAR; INTRAVENOUS; SUBCUTANEOUS at 13:07

## 2024-08-09 RX ADMIN — HYDROMORPHONE HYDROCHLORIDE 0.5 MG: 2 INJECTION, SOLUTION INTRAMUSCULAR; INTRAVENOUS; SUBCUTANEOUS at 13:35

## 2024-08-09 RX ADMIN — CITALOPRAM 40 MG: 40 TABLET, FILM COATED ORAL at 22:00

## 2024-08-09 RX ADMIN — ARIPIPRAZOLE 15 MG: 5 TABLET ORAL at 22:00

## 2024-08-09 RX ADMIN — OXYCODONE HYDROCHLORIDE 10 MG: 10 TABLET, FILM COATED, EXTENDED RELEASE ORAL at 09:41

## 2024-08-09 RX ADMIN — FENTANYL CITRATE 100 MCG: 50 INJECTION, SOLUTION INTRAMUSCULAR; INTRAVENOUS at 10:10

## 2024-08-09 RX ADMIN — MIDAZOLAM HYDROCHLORIDE 2 MG: 2 INJECTION, SOLUTION INTRAMUSCULAR; INTRAVENOUS at 10:10

## 2024-08-09 RX ADMIN — Medication 20 MG: at 12:59

## 2024-08-09 RX ADMIN — ASPIRIN 81 MG: 81 TABLET, COATED ORAL at 19:50

## 2024-08-09 RX ADMIN — ACETAMINOPHEN 975 MG: 325 TABLET ORAL at 09:40

## 2024-08-09 RX ADMIN — DEXAMETHASONE SODIUM PHOSPHATE 10 MG: 10 INJECTION, SOLUTION INTRAMUSCULAR; INTRAVENOUS at 09:38

## 2024-08-09 RX ADMIN — FENTANYL CITRATE 50 MCG: 50 INJECTION, SOLUTION INTRAMUSCULAR; INTRAVENOUS at 13:59

## 2024-08-09 RX ADMIN — SENNOSIDES AND DOCUSATE SODIUM 1 TABLET: 50; 8.6 TABLET ORAL at 19:51

## 2024-08-09 RX ADMIN — GABAPENTIN 300 MG: 300 CAPSULE ORAL at 09:40

## 2024-08-09 RX ADMIN — KETOROLAC TROMETHAMINE 30 MG: 30 INJECTION, SOLUTION INTRAMUSCULAR; INTRAVENOUS at 13:45

## 2024-08-09 RX ADMIN — SUGAMMADEX 200 MG: 100 INJECTION, SOLUTION INTRAVENOUS at 14:24

## 2024-08-09 RX ADMIN — PROPOFOL 130 MG: 10 INJECTION, EMULSION INTRAVENOUS at 11:27

## 2024-08-09 RX ADMIN — ACETAMINOPHEN 650 MG: 325 TABLET ORAL at 19:50

## 2024-08-09 RX ADMIN — METHOCARBAMOL 500 MG: 100 INJECTION INTRAMUSCULAR; INTRAVENOUS at 13:06

## 2024-08-09 RX ADMIN — MORPHINE SULFATE 4 MG: 2 INJECTION, SOLUTION INTRAMUSCULAR; INTRAVENOUS at 21:17

## 2024-08-09 RX ADMIN — DEXMEDETOMIDINE HYDROCHLORIDE 4 MCG: 100 INJECTION, SOLUTION INTRAVENOUS at 11:34

## 2024-08-09 RX ADMIN — HYDROMORPHONE HYDROCHLORIDE 0.5 MG: 2 INJECTION, SOLUTION INTRAMUSCULAR; INTRAVENOUS; SUBCUTANEOUS at 14:32

## 2024-08-09 RX ADMIN — SODIUM CHLORIDE, SODIUM LACTATE, POTASSIUM CHLORIDE, AND CALCIUM CHLORIDE: .6; .31; .03; .02 INJECTION, SOLUTION INTRAVENOUS at 11:13

## 2024-08-09 RX ADMIN — SODIUM CHLORIDE, POTASSIUM CHLORIDE, SODIUM LACTATE AND CALCIUM CHLORIDE: 600; 310; 30; 20 INJECTION, SOLUTION INTRAVENOUS at 09:30

## 2024-08-09 RX ADMIN — ROCURONIUM BROMIDE 30 MG: 10 INJECTION, SOLUTION INTRAVENOUS at 12:14

## 2024-08-09 RX ADMIN — LAMOTRIGINE 100 MG: 100 TABLET ORAL at 21:17

## 2024-08-09 RX ADMIN — LIDOCAINE HYDROCHLORIDE 100 MG: 20 INJECTION, SOLUTION INFILTRATION; PERINEURAL at 11:27

## 2024-08-09 RX ADMIN — GLYCOPYRROLATE 0.2 MG: 0.2 INJECTION INTRAMUSCULAR; INTRAVENOUS at 11:41

## 2024-08-09 RX ADMIN — TRANEXAMIC ACID 1000 MG: 100 INJECTION, SOLUTION INTRAVENOUS at 12:15

## 2024-08-09 RX ADMIN — CELECOXIB 400 MG: 200 CAPSULE ORAL at 09:38

## 2024-08-09 RX ADMIN — DEXAMETHASONE SODIUM PHOSPHATE 8 MG: 4 INJECTION INTRA-ARTICULAR; INTRALESIONAL; INTRAMUSCULAR; INTRAVENOUS; SOFT TISSUE at 11:37

## 2024-08-09 RX ADMIN — ONDANSETRON 4 MG: 2 INJECTION INTRAMUSCULAR; INTRAVENOUS at 11:37

## 2024-08-09 RX ADMIN — DEXMEDETOMIDINE HYDROCHLORIDE 4 MCG: 100 INJECTION, SOLUTION INTRAVENOUS at 11:16

## 2024-08-09 RX ADMIN — WATER 2000 MG: 1 INJECTION INTRAMUSCULAR; INTRAVENOUS; SUBCUTANEOUS at 19:51

## 2024-08-09 RX ADMIN — BUPROPION HYDROCHLORIDE 75 MG: 75 TABLET, FILM COATED ORAL at 21:17

## 2024-08-09 ASSESSMENT — PAIN DESCRIPTION - DESCRIPTORS
DESCRIPTORS: ACHING
DESCRIPTORS: ACHING;THROBBING
DESCRIPTORS: ACHING;THROBBING

## 2024-08-09 ASSESSMENT — PAIN - FUNCTIONAL ASSESSMENT
PAIN_FUNCTIONAL_ASSESSMENT: 0-10
PAIN_FUNCTIONAL_ASSESSMENT: NONE - DENIES PAIN
PAIN_FUNCTIONAL_ASSESSMENT: PREVENTS OR INTERFERES SOME ACTIVE ACTIVITIES AND ADLS
PAIN_FUNCTIONAL_ASSESSMENT: PREVENTS OR INTERFERES SOME ACTIVE ACTIVITIES AND ADLS
PAIN_FUNCTIONAL_ASSESSMENT: 0-10

## 2024-08-09 ASSESSMENT — PAIN DESCRIPTION - FREQUENCY
FREQUENCY: CONTINUOUS
FREQUENCY: CONTINUOUS

## 2024-08-09 ASSESSMENT — PAIN SCALES - GENERAL
PAINLEVEL_OUTOF10: 9
PAINLEVEL_OUTOF10: 8
PAINLEVEL_OUTOF10: 4
PAINLEVEL_OUTOF10: 8
PAINLEVEL_OUTOF10: 9
PAINLEVEL_OUTOF10: 9

## 2024-08-09 ASSESSMENT — PAIN DESCRIPTION - LOCATION
LOCATION: HIP

## 2024-08-09 ASSESSMENT — PAIN DESCRIPTION - PAIN TYPE
TYPE: SURGICAL PAIN
TYPE: SURGICAL PAIN

## 2024-08-09 ASSESSMENT — PAIN DESCRIPTION - ORIENTATION
ORIENTATION: RIGHT

## 2024-08-09 ASSESSMENT — PAIN DESCRIPTION - ONSET
ONSET: ON-GOING
ONSET: ON-GOING

## 2024-08-09 ASSESSMENT — LIFESTYLE VARIABLES: SMOKING_STATUS: 0

## 2024-08-09 NOTE — ANESTHESIA PROCEDURE NOTES
Peripheral Block    Patient location during procedure: pre-op  Reason for block: procedure for pain, post-op pain management and at surgeon's request  Start time: 8/9/2024 10:10 AM  End time: 8/9/2024 10:15 AM  Staffing  Performed: anesthesiologist   Performed by: Aurora Shaw DO  Authorized by: Aurora Shaw DO    Preanesthetic Checklist  Completed: patient identified, IV checked, site marked, risks and benefits discussed, surgical/procedural consents, equipment checked, pre-op evaluation, timeout performed, anesthesia consent given, oxygen available, monitors applied/VS acknowledged, fire risk safety assessment completed and verbalized and blood product R/B/A discussed and consented  Peripheral Block   Patient position: supine  Prep: ChloraPrep  Patient monitoring: cardiac monitor, continuous pulse ox, continuous capnometry, frequent blood pressure checks, oxygen and IV access  Block type: PENG  Laterality: right  Injection technique: single-shot  Guidance: ultrasound guided    Needle   Needle gauge: 22 G  Needle localization: anatomical landmarks and ultrasound guidance  Assessment   Injection assessment: negative aspiration for heme, no paresthesia on injection, local visualized surrounding nerve on ultrasound and no intravascular symptoms  Paresthesia pain: none  Slow fractionated injection: yes  Hemodynamics: stable  Outcomes: uncomplicated and patient tolerated procedure well    Additional Notes  30 mL 0.5% Ropivacaine injected in 5 mL increments following negative aspiration. Tip of needle in view at all times. Pt tolerated the procedure well.

## 2024-08-09 NOTE — H&P
H&P Update     An electronic and hard copy history and physical was reviewed in the patient's chart.  Date of Surgery Update: August 9, 2024  Petrona Guzman was seen and examined.  Patient identified by surgeon; surgical site was confirmed by patient and surgeon.  ?  Signed By: Sincerely,    Kevin Benitez MD [facility]  Orthopaedic Surgeon - Hip Preservation & Sports Medicine   [facility] [address], [address], [address]  Email: [email or web address]  Office: [phone]    08/09/24  9:27 AM     ?    ?  ?

## 2024-08-09 NOTE — CONSULTS
Hospital Medicine Consult History & Physical    Date of Service: Pt seen/examined in consultation on 8/9/2024  at the request of Kevin Benitez MD for medical management      Chief Complaint: Right hip pain    Presenting Admission History:   43 y.o. female with past medical history of diabetes mellitus, hyperlipidemia, GERD, COPD/asthma, AMOS on CPAP, fibromyalgia, chronic pain syndrome, Sjogren's syndrome, depression and anxiety, bipolar disorder, schizophrenia and alcohol use disorder who was admitted and underwent right total hip replacement by orthopedics.  Patient currently states that her pain is not well-controlled.  Patient denies any fever or chills.  Patient has not had a bowel movement yet.  Patient offers no other complaints at this time.  Hospitalist service was consulted for medical management and resuming home meds.    Assessment  Right hip arthritis s/p total hip replacement  Type 2 diabetes mellitus.  Hyperlipidemia.  GERD.  Asthma without exacerbation.  AMOS on CPAP.  Fibromyalgia.  Chronic pain syndrome.  Sjogren's syndrome.  Chronic depression/anxiety/bipolar disorder and schizophrenia    Plan:  Continue postoperative surgical care per primary team.  Resume home meds Abilify, Wellbutrin, Celexa, Zetia, gabapentin and Lamictal.  Patient is okay to be back on aspirin.  Continue CPAP for AMOS.  Resume rest of home medication as appropriate.  DVT prophylaxis per primary team.  CODE STATUS: Total support           Physical Exam Performed:  /69   Pulse 97   Temp 98.1 °F (36.7 °C) (Oral)   Resp 18   Ht 1.651 m (5' 5\")   Wt 99.2 kg (218 lb 12.8 oz)   LMP  (LMP Unknown) Comment: Has IUD  SpO2 97%   BMI 36.41 kg/m²   General appearance:  No apparent distress, appears stated age and cooperative.  HEENT:  Pupils equal, round, and reactive to light. Conjunctivae/corneas clear.  Respiratory:  Normal respiratory effort. Clear to auscultation, bilaterally without

## 2024-08-09 NOTE — OP NOTE
position and shown to be secured.  The femoral support hook was removed and the hip was brought into reduction again.  Fluoroscopic views showed similar limb length and offset relative to the trial reduction.  The joint shuck on the operative table was satisfactory.  The hip was brought through range of motion after removing the limb from the spar and brought to flexion and shown to be stable.  External  rotation with the hip extended was satisfactory and there was no evidence of component impingement.    The checkpoint on the femur was removed as per correct counts, and as well the tracking pins on the opposite ASIS/iliac crest.    The area was thoroughly irrigated again.  The aquamantis was used to verify adequacy of hemostasis by treating the capsular structures.  An injection of orthopedic analgesic mixture was carefully infiltrated with careful aspiration around the capsular area to improve postoperative analgesia.  A solution of dilute chlorhexidine was placed in the wound and allowed to sit for 2-3 minutes to aid in bacterial control.  It was then removed with suction and cleansed again with pulsatile lavage.   The capsule was then approximated with new #2 ethibond traction stitches.    The tensor fascia susan fascia was then closed with running #2 Strata Fix suture.  Skin was closed with 2-0 Vicryl and 3-0 Monocryl. Steri strips were applied  and allowed to dry, then a therabond were used to seal the wound.      The patient was then removed from the operative table, awakened and taken to recovery room in stable condition having tolerated the procedure well.  The patient's foot was noted to be warm and pink color removal of the traction boot.  No significant skin lesions were noted either on the feet.    All needle and sponge counts matched the initial count per circulating and scrub personnel x2 prior to terminating this case.    Jayleen Hutton PA-C assisted me during this surgery. Due to the intricate and

## 2024-08-09 NOTE — DISCHARGE INSTRUCTIONS
Dr. Kevin Benitez MD [facility]  Hip Preservation & Sports Medicine Surgeon   [facility]          POST-OPERATIVE INSTRUCTIONS:     Apply ice (over your dressing) for 4-6 weeks after surgery to help reduce swelling.    This can be applied to the affected area 20 minutes out of every hour while you are awake.     Leave your water proof bandage in place for 7 days. After 7 days you may change to a new, waterproof bandage. Change sooner if becomes saturated.     Please take all of your post-operative medications as prescribed.  Please do not alter how you take these medications without contacting the physician.  If you have any questions and/or concerns about your post-operative medications, please call our office.    Please do not take any additional Tylenol while you are taking the Norco.  This medication already contains Tylenol and taking additional Tylenol may cause liver damage.    It is okay to use Tylenol once you are no longer taking the Norco.    It is common to feel drowsy while taking pain medication.  Do not drink alcohol or drive while taking pain medication.    Nausea is also a common side effect of using pain medication.  If this occurs, try taking your medication with food.  Also drink plenty of water while taking the pain medication. You may use an over the counter anti-nausea as needed.  If nausea becomes severe, please contact the office and a prescription for Zofran may be prescribed.    To optimize your pain control, you can take your pain medication as prescribed for 2 days, then gradually taper off over the next day as tolerable.  If you feel your pain is not well controlled or begins to worsen following your first post-operative visit, please contact our office.   You will also need take a daily aspirin.  This will help prevent blood clots.       Please keep your dressings/wounds dry at all times.  Do not swim in pools, lakes, etc. until instructed to do so by your physician that

## 2024-08-09 NOTE — ANESTHESIA POSTPROCEDURE EVALUATION
Department of Anesthesiology  Postprocedure Note    Patient: Petrona Guzman  MRN: [id number]  YOB: 1980  Date of evaluation: 8/9/2024    Procedure Summary       Date: 08/09/24 Room / Location: [place] [id number] / [facility]    Anesthesia Start: 1119 Anesthesia Stop: 1443    Procedure: RIGHT TOTAL HIP REPLACEMENT DIRECT ANTERIOR APPROACH, SHANIQUA LACHELLE ROBOTIC ASSISTED HIP REPLACEMENT (Right: Hip) Diagnosis:       Primary osteoarthritis of right hip      S/P hip arthroscopy      (Primary osteoarthritis of right hip [M16.11])      (S/P hip arthroscopy [Z98.890])    Surgeons: Kevin Benitez MD Responsible Provider: Aurora Shaw DO    Anesthesia Type: general, regional ASA Status: 3            Anesthesia Type: No value filed.    Kevin Phase I: Kevin Score: 7    Kevin Phase II:      Anesthesia Post Evaluation    Patient location during evaluation: PACU  Patient participation: complete - patient participated  Level of consciousness: awake  Pain score: 0  Nausea & Vomiting: no nausea and no vomiting  Cardiovascular status: blood pressure returned to baseline  Respiratory status: acceptable  Hydration status: euvolemic  Pain management: adequate    No notable events documented.

## 2024-08-09 NOTE — ANESTHESIA PRE PROCEDURE
at [facility]   • HIP SURGERY Left 2024    LEFT TROCHANTERIC HIP INJECTION WITH FLUOROSCOPY performed by Faizan Tim MD at [facility]   • SLEEVE GASTRECTOMY N/A 2023    LAPAROSCOPIC SLEEVE GASTRECTOMY performed by Dani Lechuga MD at [facility]   • TONSILLECTOMY AND ADENOIDECTOMY     • UPPER GASTROINTESTINAL ENDOSCOPY N/A 2023    EGD BIOPSY performed by Dani Lechuga MD at [facility]   • WRIST GANGLION EXCISION Bilateral        Social History:    Social History     Tobacco Use   • Smoking status: Former     Current packs/day: 0.00     Average packs/day: 1 pack/day for 27.9 years (27.9 ttl pk-yrs)     Types: Cigarettes     Start date: 1992     Quit date: 2019     Years since quittin.7   • Smokeless tobacco: Never   Substance Use Topics   • Alcohol use: Not Currently                                Counseling given: Not Answered      Vital Signs (Current):   Vitals:    24 1513 24 0845   BP:  131/89   Pulse:  78   Resp:  18   Temp:  97.3 °F (36.3 °C)   TempSrc:  Temporal   SpO2:  98%   Weight: 98.9 kg (218 lb) 99.2 kg (218 lb 12.8 oz)   Height: 1.651 m (5' 5\") 1.651 m (5' 5\")                                              BP Readings from Last 3 Encounters:   24 131/89   24 100/66   07/15/24 128/72       NPO Status: Time of last liquid consumption:                         Time of last solid consumption:                         Date of last liquid consumption: 24                        Date of last solid food consumption: 24    BMI:   Wt Readings from Last 3 Encounters:   24 99.2 kg (218 lb 12.8 oz)   24 98.4 kg (217 lb)   24 96.2 kg (212 lb)     Body mass index is 36.41 kg/m².    CBC:   Lab Results   Component Value Date/Time    WBC 9.5 2024 01:50 PM    RBC 4.13 2024 01:50 PM    HGB 12.2 2024 01:50 PM    HCT 35.8 2024 01:50 PM    MCV 86.8 2024 01:50 PM    RDW 13.4 2024 01:50 PM    PLT

## 2024-08-10 VITALS
HEIGHT: 65 IN | WEIGHT: 218.8 LBS | RESPIRATION RATE: 15 BRPM | HEART RATE: 109 BPM | SYSTOLIC BLOOD PRESSURE: 114 MMHG | BODY MASS INDEX: 36.46 KG/M2 | OXYGEN SATURATION: 96 % | TEMPERATURE: 97.6 F | DIASTOLIC BLOOD PRESSURE: 68 MMHG

## 2024-08-10 LAB
ANION GAP SERPL CALCULATED.3IONS-SCNC: 11 MMOL/L (ref 3–16)
BUN SERPL-MCNC: 11 MG/DL (ref 7–20)
CALCIUM SERPL-MCNC: 8.6 MG/DL (ref 8.3–10.6)
CHLORIDE SERPL-SCNC: 103 MMOL/L (ref 99–110)
CO2 SERPL-SCNC: 26 MMOL/L (ref 21–32)
CREAT SERPL-MCNC: 0.7 MG/DL (ref 0.6–1.1)
DEPRECATED RDW RBC AUTO: 13.7 % (ref 12.4–15.4)
EST. AVERAGE GLUCOSE BLD GHB EST-MCNC: 128.4 MG/DL
GFR SERPLBLD CREATININE-BSD FMLA CKD-EPI: >90 ML/MIN/{1.73_M2}
GLUCOSE BLD-MCNC: 144 MG/DL (ref 70–99)
GLUCOSE BLD-MCNC: 211 MG/DL (ref 70–99)
GLUCOSE SERPL-MCNC: 149 MG/DL (ref 70–99)
HBA1C MFR BLD: 6.1 %
HCT VFR BLD AUTO: 32.6 % (ref 36–48)
HGB BLD-MCNC: 10.9 G/DL (ref 12–16)
MCH RBC QN AUTO: 29.3 PG (ref 26–34)
MCHC RBC AUTO-ENTMCNC: 33.4 G/DL (ref 31–36)
MCV RBC AUTO: 88 FL (ref 80–100)
PERFORMED ON: ABNORMAL
PERFORMED ON: ABNORMAL
PLATELET # BLD AUTO: 240 K/UL (ref 135–450)
PMV BLD AUTO: 6.7 FL (ref 5–10.5)
POTASSIUM SERPL-SCNC: 4.3 MMOL/L (ref 3.5–5.1)
RBC # BLD AUTO: 3.7 M/UL (ref 4–5.2)
SODIUM SERPL-SCNC: 140 MMOL/L (ref 136–145)
WBC # BLD AUTO: 16.6 K/UL (ref 4–11)

## 2024-08-10 PROCEDURE — 97116 GAIT TRAINING THERAPY: CPT

## 2024-08-10 PROCEDURE — G0378 HOSPITAL OBSERVATION PER HR: HCPCS

## 2024-08-10 PROCEDURE — 6370000000 HC RX 637 (ALT 250 FOR IP): Performed by: NURSE PRACTITIONER

## 2024-08-10 PROCEDURE — 97162 PT EVAL MOD COMPLEX 30 MIN: CPT

## 2024-08-10 PROCEDURE — 6360000002 HC RX W HCPCS

## 2024-08-10 PROCEDURE — 6370000000 HC RX 637 (ALT 250 FOR IP): Performed by: INTERNAL MEDICINE

## 2024-08-10 PROCEDURE — 97535 SELF CARE MNGMENT TRAINING: CPT

## 2024-08-10 PROCEDURE — 97530 THERAPEUTIC ACTIVITIES: CPT

## 2024-08-10 PROCEDURE — 97166 OT EVAL MOD COMPLEX 45 MIN: CPT

## 2024-08-10 PROCEDURE — 36415 COLL VENOUS BLD VENIPUNCTURE: CPT

## 2024-08-10 PROCEDURE — 94761 N-INVAS EAR/PLS OXIMETRY MLT: CPT

## 2024-08-10 PROCEDURE — 2700000000 HC OXYGEN THERAPY PER DAY

## 2024-08-10 PROCEDURE — 85027 COMPLETE CBC AUTOMATED: CPT

## 2024-08-10 PROCEDURE — 6370000000 HC RX 637 (ALT 250 FOR IP)

## 2024-08-10 PROCEDURE — 2580000003 HC RX 258

## 2024-08-10 PROCEDURE — 80048 BASIC METABOLIC PNL TOTAL CA: CPT

## 2024-08-10 PROCEDURE — 94660 CPAP INITIATION&MGMT: CPT

## 2024-08-10 RX ADMIN — ROSUVASTATIN CALCIUM 40 MG: 20 TABLET, COATED ORAL at 08:11

## 2024-08-10 RX ADMIN — NAPROXEN 500 MG: 250 TABLET ORAL at 08:11

## 2024-08-10 RX ADMIN — ACETAMINOPHEN 650 MG: 325 TABLET ORAL at 00:40

## 2024-08-10 RX ADMIN — MORPHINE SULFATE 4 MG: 2 INJECTION, SOLUTION INTRAMUSCULAR; INTRAVENOUS at 01:53

## 2024-08-10 RX ADMIN — ACETAMINOPHEN 650 MG: 325 TABLET ORAL at 12:52

## 2024-08-10 RX ADMIN — OXYCODONE HYDROCHLORIDE 10 MG: 5 TABLET ORAL at 06:44

## 2024-08-10 RX ADMIN — OXYCODONE HYDROCHLORIDE 10 MG: 5 TABLET ORAL at 00:40

## 2024-08-10 RX ADMIN — ARIPIPRAZOLE 15 MG: 5 TABLET ORAL at 08:11

## 2024-08-10 RX ADMIN — SENNOSIDES AND DOCUSATE SODIUM 1 TABLET: 50; 8.6 TABLET ORAL at 08:16

## 2024-08-10 RX ADMIN — SODIUM CHLORIDE, PRESERVATIVE FREE 10 ML: 5 INJECTION INTRAVENOUS at 08:20

## 2024-08-10 RX ADMIN — INSULIN LISPRO 1 UNITS: 100 INJECTION, SOLUTION INTRAVENOUS; SUBCUTANEOUS at 12:52

## 2024-08-10 RX ADMIN — Medication 2000 UNITS: at 08:11

## 2024-08-10 RX ADMIN — WATER 2000 MG: 1 INJECTION INTRAMUSCULAR; INTRAVENOUS; SUBCUTANEOUS at 04:13

## 2024-08-10 RX ADMIN — PANTOPRAZOLE SODIUM 20 MG: 20 TABLET, DELAYED RELEASE ORAL at 06:44

## 2024-08-10 RX ADMIN — LAMOTRIGINE 100 MG: 100 TABLET ORAL at 08:11

## 2024-08-10 RX ADMIN — CITALOPRAM 40 MG: 40 TABLET, FILM COATED ORAL at 08:11

## 2024-08-10 RX ADMIN — EZETIMIBE 10 MG: 10 TABLET ORAL at 08:11

## 2024-08-10 RX ADMIN — ACETAMINOPHEN 650 MG: 325 TABLET ORAL at 06:44

## 2024-08-10 RX ADMIN — BUPROPION HYDROCHLORIDE 75 MG: 75 TABLET, FILM COATED ORAL at 09:13

## 2024-08-10 RX ADMIN — GABAPENTIN 300 MG: 300 CAPSULE ORAL at 08:11

## 2024-08-10 RX ADMIN — OXYCODONE HYDROCHLORIDE 5 MG: 5 TABLET ORAL at 13:00

## 2024-08-10 RX ADMIN — CYCLOBENZAPRINE 10 MG: 10 TABLET, FILM COATED ORAL at 00:40

## 2024-08-10 RX ADMIN — ASPIRIN 81 MG: 81 TABLET, COATED ORAL at 08:11

## 2024-08-10 ASSESSMENT — PAIN - FUNCTIONAL ASSESSMENT
PAIN_FUNCTIONAL_ASSESSMENT: PREVENTS OR INTERFERES SOME ACTIVE ACTIVITIES AND ADLS
PAIN_FUNCTIONAL_ASSESSMENT: ACTIVITIES ARE NOT PREVENTED
PAIN_FUNCTIONAL_ASSESSMENT: ACTIVITIES ARE NOT PREVENTED

## 2024-08-10 ASSESSMENT — PAIN DESCRIPTION - LOCATION
LOCATION: HIP

## 2024-08-10 ASSESSMENT — PAIN DESCRIPTION - PAIN TYPE
TYPE: SURGICAL PAIN
TYPE: SURGICAL PAIN;ACUTE PAIN
TYPE: SURGICAL PAIN
TYPE: ACUTE PAIN;SURGICAL PAIN

## 2024-08-10 ASSESSMENT — PAIN DESCRIPTION - ORIENTATION
ORIENTATION: RIGHT

## 2024-08-10 ASSESSMENT — PAIN DESCRIPTION - FREQUENCY
FREQUENCY: CONTINUOUS

## 2024-08-10 ASSESSMENT — PAIN DESCRIPTION - DESCRIPTORS
DESCRIPTORS: ACHING

## 2024-08-10 ASSESSMENT — PAIN SCALES - GENERAL
PAINLEVEL_OUTOF10: 8
PAINLEVEL_OUTOF10: 6
PAINLEVEL_OUTOF10: 7
PAINLEVEL_OUTOF10: 0
PAINLEVEL_OUTOF10: 8
PAINLEVEL_OUTOF10: 6
PAINLEVEL_OUTOF10: 7
PAINLEVEL_OUTOF10: 6

## 2024-08-10 ASSESSMENT — PAIN DESCRIPTION - ONSET
ONSET: ON-GOING

## 2024-08-10 NOTE — DISCHARGE SUMMARY
Discharge Summary  Total Hip Arthroplasty    Date:  8/10/24    Petrona Guzman  [id number]    Discharge Date:  8/10/24    Admitting Physician  Kevin Benitez MD     Discharge Physician:  John Dinh MD     Admission Diagnoses:  Right Hip Osteoarthritis    Discharge Diagnoses:  Right Hip Ostearthritis    Treatments:  Right DAYAMI     Hospital Course:   This is a 43 y.o. female with a history of right hip DJD that failed conservative treatment.  The patient elected to undergo DAYAMI after discussing the risks, benefits, and alternatives to surgery.  Total hip arthroplasty was performed without complication.  Post op their diet was advanced as tolerated.  Pain was controlled with a combination of IV and PO meds.  DVT prophylaxis was with a combination of SCDs and ASA. On POD #1 the patient participated in physical therapy and made appropriate progress.  After evaluation by the orthopaedic surgeon and the physical therapist, discharge was allowed on POD #1.  The patient was medically stable during hospitalization.    Consults:   Medicine     Significant Diagnostic Studies:  none     Disposition:  home     Meds:  See Med Reconciliation    Patient Instructions:   Activity: WBAT, anterior hip precautions  Diet: regular diet  Wound Care: keep dressing clean and dry  DVT Prophylaxis: ASA    Follow-up with Physical Therapy and Surgeon as currently scheduled    John Dinh MD  Orthopedic Surgery Sports Medicine Fellow

## 2024-08-10 NOTE — PLAN OF CARE
Problem: Chronic Conditions and Co-morbidities  Goal: Patient's chronic conditions and co-morbidity symptoms are monitored and maintained or improved  Outcome: Progressing     Pt chronic conditions managed through MAR    Problem: Discharge Planning  Goal: Discharge to home or other facility with appropriate resources  Outcome: Progressing     Collaboration with social work to meet discharge needs    Problem: Safety - Adult  Goal: Free from fall injury  8/10/2024 0731 by Laura Connelly RN  Outcome: Progressing     Standard precautions in place, bed locked and in lowest position, call light and bedside table within reach, gripper socks applied, room floor free of clutter, pt ambulated with GB and RW, bed/chair alarm on.    Problem: Pain  Goal: Verbalizes/displays adequate comfort level or baseline comfort level  8/10/2024 0731 by Laura Connelly RN  Outcome: Progressing  8/10/2024 0510 by Ann Whatley RN  Outcome: Progressing  Note: Pt taking PO and IV pain medication for hip pain.     Pt pain managed through MAR    Problem: ABCDS Injury Assessment  Goal: Absence of physical injury  Outcome: Progressing     Pt assessed frequently for injury risk    Problem: Musculoskeletal - Adult  Goal: Return mobility to safest level of function  8/10/2024 0731 by Laura Connelly RN  Outcome: Progressing    Goal: Maintain proper alignment of affected body part  Outcome: Progressing  Goal: Return ADL status to a safe level of function  Outcome: Progressing     Collaboration with PT/OT to build pt's ADL capabilities, pt ambulating with GB and RW    Problem: Genitourinary - Adult  Goal: Absence of urinary retention  8/10/2024 0731 by Laura Connelly RN  Outcome: Progressing     Pt voiding with BRP     Problem: Infection - Adult  Goal: Absence of infection at discharge  8/10/2024 0731 by Laura Connelly RN  Outcome: Progressing    Pt dressing and vitals assessed for s/s infection

## 2024-08-10 NOTE — PROGRESS NOTES
[facility] PRE-SURGICAL TESTING INSTRUCTIONS                      PRIOR TO PROCEDURE DATE:    1. PLEASE FOLLOW ANY INSTRUCTIONS GIVEN TO YOU PER YOUR SURGEON.      2. Arrange for someone to drive you home and be with you for the first 24 hours after discharge for your safety after your procedure for which you received sedation. Ensure it is someone we can share information with regarding your discharge.     NOTE: At this time ONLY 2 ADULTS may accompany you   One person ENCOURAGED to stay at hospital entire time if outpatient surgery      3. You must contact your surgeon for instructions IF:  You are taking any blood thinners, aspirin, anti-inflammatory or vitamins.  There is a change in your physical condition such as a cold, fever, rash, cuts, sores, or any other infection, especially near your surgical site.    4. Do not drink alcohol the day before or day of your procedure.  Do not use any recreational marijuana at least 24 hours or street drugs (heroin, cocaine) at minimum 5 days prior to your procedure.     5. A Pre-Surgical History and Physical MUST be completed WITHIN 30 DAYS OR LESS prior to your procedure.by your Physician or an Urgent Care        THE DAY OF YOUR PROCEDURE:  1.  Follow instructions for ARRIVAL TIME as DIRECTED BY YOUR SURGEON.     2. Enter the MAIN entrance from [facility] and follow the signs to the free Parking Garage or  Parking (offered free of charge 7 am-5pm).      3. Enter the Main Entrance of the hospital (do not enter from the lower level of the parking garage). Upon entrance, check in with the  at the surgical information desk on your LEFT.   Bring your insurance card and photo ID to register      4. DO NOT EAT ANYTHING 8 hours prior to arrival for surgery.  You may have up to 8 ounces of water 4 hours prior to your arrival for surgery.   NOTE: ALL Gastric, Bariatric & Bowel surgery patients - you MUST follow your surgeon's instructions regarding 
    V2.0    [facility] [place]      Name:  Petrona Guzman /Age/Sex: 1980  (43 y.o. female)   MRN & CSN:  [id number] & [id number] Encounter Date/Time: 8/10/2024 9:49 AM EDT   Location:  5519/5519-01 PCP: Kassandra Wern APRN - CNP     Attending:Kevin Benitez MD       Hospital Day: 2    Assessment and Recommendations   Petrona Guzman is a 43 y.o. female with pmh of diabetes mellitus, hyperlipidemia, GERD, COPD/asthma, AMOS on CPAP, fibromyalgia, chronic pain syndrome, Sjogren's syndrome, depression and anxiety, bipolar disorder, schizophrenia and alcohol use disorder  who presented with for planned right DAYAMI. Hospitalist service consulted for medical management      Plan:   Right hip arthritis- S/P DAYAMI. Ortho primary  Depression/bipolar/schizophrenia- Continue home mediacations  DM- SSI  COPD/asthma- without exacerbation. Nebulizers PRN      Diet ADULT DIET; Regular   DVT Prophylaxis [] Lovenox, []  Heparin, [] SCDs, [] Ambulation,  [] Eliquis, [] Xarelto  [] Coumadin   Code Status Full Code   Disposition Per primary   Surrogate Decision Maker/ POA  Per EMR     Personally reviewed Lab Studies and Imaging   8/10/2024  CBC, BMP, POTC glucose      Imaging that was interpreted personally includes pelvis xray and results demonstrated right DAYAMI without evidence of complication    On 8/10/2024 drugs that require monitoring for toxicity include insulin and the method of monitoring was POCT glucose to evaluate for hypoglycemia.           Subjective:     Chief Complaint: right hip pain    Petrona Guzman is a 43 y.o. female who presents with a past medical history as detailed above.     On 8/10/2024 patient is resting comfortably. Pain controlled      Review of Systems:      Pertinent positives and negatives discussed in HPI    Objective:     Intake/Output Summary (Last 24 hours) at 8/10/2024 0949  Last data filed at 8/10/2024 0820  Gross per 24 hour   Intake 2367 ml   Output 100 ml   Net 2267 ml    
1010 time out done for preop R hip block  O2@3-5 liters per cannula applied by mask due to sleep apnea  IV meds given per anesthesia  Block completed and tolerated well by pt  No pain  No nausea  
4 Eyes Skin Assessment     NAME:  Petrona Guzman  YOB: 1980  MEDICAL RECORD NUMBER:  [id number]    The patient is being assessed for  Admission    I agree that at least one RN has performed a thorough Head to Toe Skin Assessment on the patient. ALL assessment sites listed below have been assessed.      Areas assessed by both nurses:    Head, Face, Ears, Shoulders, Back, Chest, Arms, Elbows, Hands, Sacrum. Buttock, Coccyx, Ischium, Legs. Feet and Heels, and Under Medical Devices         Does the Patient have a Wound? No noted wound(s)       Jean Prevention initiated by RN: No  Wound Care Orders initiated by RN: No    Pressure Injury (Stage 3,4, Unstageable, DTI, NWPT, and Complex wounds) if present, place Wound referral order by RN under : No    New Ostomies, if present place, Ostomy referral order under : No     Nurse 1 eSignature: Electronically signed by Luisa Ye RN on 8/9/24 at 6:47 PM EDT    **SHARE this note so that the co-signing nurse can place an eSignature**    Nurse 2 eSignature: Electronically signed by Caridad Lind RN on 8/9/24 at 7:32 PM EDT   
Ancef  2grams to ORwith pt.  
Bedside report done with Caridad. Pt in bed, mother at the bedside. Incision CDI, GAYLE drain intact. Pt reminded to not cross her legs and educated hip precautions. Pt has many home medications that she needs overnight. A secure message was sent to the hospitalist. Bed alarm on, wheels locked, bed in lowest position, side rails up 2/4, nonskid socks on, call light and bedside table in reach.   
Changed to NC at 5 L will monitor SpO2 while waiting for transport.  
Cpap with belongings  
Department of Orthopedic Surgery  Joint Service  Fellow Progress Note        Subjective:    POD1 s/p R DAYAMI with Dr Benitez.  Doing well and reports mild pain on the lateral hip.  No other complaints    Vitals  VITALS:  BP (!) 98/59   Pulse 83   Temp 98 °F (36.7 °C) (Oral)   Resp 12   Ht 1.651 m (5' 5\")   Wt 99.2 kg (218 lb 12.8 oz)   LMP  (LMP Unknown) Comment: Has IUD  SpO2 95%   BMI 36.41 kg/m²     PHYSICAL EXAM:  AAOx3  NAD  Nonlabored breathing    GAYLE c/d/I with intact suction  Intact motor TA/EHL/FHL/GS  Intact sensation S/S/SPN/DPN/T  2+ DP pulse  Capillary refill <2seconds  Compartments soft and compressible      LABS:    HgB:    Lab Results   Component Value Date/Time    HGB 10.9 08/10/2024 05:39 AM   Anemia due to acute blood loss from surgery    ASSESSMENT AND PLAN:    Post operative day 1 status post right total hip arthroplasty    1:  Weight bearing as tolerated  2:  Deep venous thrombosis prophylaxis - ASA 81 bid  3:  Continue physical therapy  4:  D/C Plan:  Home Health  5:  Plan for DC after PT today  
Dinner ordered. Cheeseburger on white bun, french fries, fresh fruit and cranberry juice  
Discharge instructions reviewed with pt. All questions answered at this time. IV removed without complications. Pain being managed with medications per MAR. Patient has own rolling walker in car. Patient's mom at bedside and will be taking pt home.   
Dr Benitez here to see patient.  Patient tearful wants to see her mom and does not want to stay.  Explained we don't have visitors but will try to get her mom back when it is not so busy.  
Dr Cali at bedside to evaluate resp status. Patient becomes apneic and SpO2 drops to 70-80%. Awakens easily and breathes when instructed. To continue to monitor. No reversal meds at this time.  Patient states she is in pain. Understands we can not given additional pain medication r/t resp status.  
Eating chips and candy while waiting for meal.  
Glucose 211  
Jayleen Htuton notified patient will probably not be awake to work with PT/OT. Currently not breathing well enough and on 10L of O2. Reports patient adamantly wants to go home. Patient awakened and she reports she wants to go home. Explained may not be safe to go home.   
Lab at bedside for blood draw   
Occupational Therapy  Facility/Department: [facility] ORTHO/NEURO  Occupational Therapy Initial Assessment/tx/discharge    Name: Petrona Guzman  : 1980  MRN: [id number]  Date of Service: 8/10/2024    Discharge Recommendations:  24 hour supervision or assist (initially)  OT Equipment Recommendations  Equipment Needed: No       Patient Diagnosis(es): R THR  Past Medical History:  has a past medical history of Anesthesia complication, Anxiety, Arthritis, Asthma, Bipolar 1 disorder (HCC), Bipolar disorder (HCC), Chronic back pain, COPD (chronic obstructive pulmonary disease) (HCC), Depression, Diabetes mellitus (HCC), Femoroacetabular impingement of right hip, Fibromyalgia, 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  Past Surgical History:  has a past surgical history that includes Ankle Fusion (Bilateral); Hip arthroscopy (Bilateral); hernia repair; Tonsillectomy and adenoidectomy; Elbow surgery (Bilateral); Wrist ganglion excision (Bilateral); Carpal tunnel release (Bilateral); arthrodesis (Left, 2019); hip surgery (Left, 2022); hip surgery (Right, 2022); Upper gastrointestinal endoscopy (N/A, 2023); Foot surgery (Bilateral); arthrodesis (Left, 2023); Sleeve Gastrectomy (N/A, 2023); ablation of dysrhythmic focus; hip surgery (Right, 2024); and hip surgery (Left, 2024).           Assessment   Assessment: Pt admitted with R hip OA, s/p R THR.  She is functioning slightly below baseline level, SBA/S with ADLs and functional transfers/mobility with rolling walker.  Prior to admission, pt resided with  and was basically indep with ADLs, indep with mobility, driving, and working.  At discharge, she will stay with her mother in 1 level home until can manage in her apt 
PACU Transfer Note    Vitals:    08/09/24 1800   BP: 117/64   Pulse: (!) 103   Resp: 16   Temp: 97.6 °F (36.4 °C)   SpO2: 98%       In: 2117 [P.O.:342; I.V.:1715]  Out: 100     Pain assessment:  none  Pain Level: 0    Report given to Receiving unit RN.    8/9/2024 6:07 PM      
PT/OT here to see patient. On BiPAP and 10L. Unable to work with patient r/t drowsy and oxygenation.  
Patient admitted to room 5519 from PACU; Family present; VSS; oriented to room and call light; all belongings within reach.     
Patient has meds at home:    Norco 5/325mg 1 q 4 hours prn start tonight  Flexeril 10mg 1 TID X 7 days start tonight  Naprosyn 500mg  1 BID x 14 days start tonight  ASA 81mg 1 BID Start tomorrow  Senokot 8.6 mg 1 BID x 7 days  start tomorrow  Keflex 500 mg cap every 6 hours for 3 days start tonight  Protonix 20mg 1 daily start tomorrow  
Physical Therapy/Occupational Therapy  Attempt    PT/OT attempted to see pt in PACU for therapy. Pt remains lethargic and on 10L O2 and bipap, resting . Pt unsafe to complete evaluations this date. PT/OT will follow up in AM.    Christin Lemons PT, DPT, NCS, CSRS   DEVIN Morfin, OTR/L [id number]     
Pt arrived asleep with periods of apnea, Cpap brought to bedside report from crna completed 2 mg of Dilaudid and 200 mcg Fentanyl in OR plus nerve block preop   
Report given.  
SpO2 continues to drop to 70s. But not as frequent.  
Total Joint Same Day Readiness Screen  PAT Questionnaire    Does patient have at least one day of 24 hr assist of capable caregiver at d/c?  [x] Yes = 0  [] No = 2      Was patient using an assistive device to walk prior to surgery?  [x] No = 0  [] Yes = 1    How many steps do you have to get to the floor where you plan to initially sleep and use the restroom? (At least 1/2 bath)  [] 0-2 steps= 0 [x] 3+ steps = 1    Has patient fallen in the last 3 months? If yes, how many times?  [x] 0 falls = 0 [] 1 fall = 1     [] 2+ falls = 2    Does patient have a hx of post-op nausea/vomiting?  [x] No = 0 [] Yes = 2    Other Factors    Age  [x] <70 = 0 [] 71-79 = 1  [] 80+ = 2    BMI  [] <30 = 0       [x]31-39 = 1    [] >40 = 2    Co-morbidities  [] 0 = 0           [] 1-2 = 1       [x] 3+ = 2    Sleep apnea  [] No = 0         [x] Yes = 1    Hx of prolonged emergence from general anesthesia  [x] No = 0         [] Yes = 1      Score: 5      Interpretation:  Red (10-16): Low probability of safe same day discharge  Yellow (6-9): Moderate probability of safe same day discharge  Green (0-5): High probability of safe same day discharge    Score completed by:  Matilda Coles OTR/MARYANNE [id number]  
Total Joint video emailed & reviewed to patient by MARGOTH Osorio. Miriam instructions reviewed to use x 5 days preop.  AMOS screening done. TJ book, IS instructions, TJ video link, and fall contract placed on chart for DOS.  
Waiting for transportation. Holding SpO2 >93% on 5L per NC.  
history, pt with improved alertness when up and walking, RN notified.  Subjective  Subjective: \"I've had a lot of surgeries.  My mom and  have helped me with this before.\"         Social/Functional History  Social/Functional History  Lives With: Spouse  Type of Home: Apartment  Home Layout: One level (does laundry at mother's house)  Home Access:  (4 steps in with railing-not sturdy, 4 steps down to apt with railing)  Bathroom Shower/Tub: Tub/Shower unit  Bathroom Toilet: Standard  Bathroom Equipment: Toilet raiser  Bathroom Accessibility: Walker accessible  Home Equipment: Walker - Rolling, Walker - 4-Wheeled, Cane  Has the patient had two or more falls in the past year or any fall with injury in the past year?: No  ADL Assistance: Needs assistance (A donning socks, occ shoes)  Homemaking Assistance:  (pt cooks,  cleans, mother does laundry)  Ambulation Assistance: Independent (without AD)  Transfer Assistance: Independent  Active : Yes  Occupation: Full time employment  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  2 NURIA without HR to enter, one level, half bath.  Vision/Hearing  Vision  Vision: Impaired  Vision Exceptions: Wears glasses for reading  Hearing  Hearing: Within functional limits    Cognition   Orientation  Overall Orientation Status: Within Functional Limits  Cognition  Overall Cognitive Status: WFL     Objective   Temp: 98 °F (36.7 °C)  Pulse: 83  Heart Rate Source: Monitor  Respirations: 12  SpO2: 95 %  O2 Device: PAP (positive airway pressure)  BP: (!) 98/59  MAP (Calculated): 72  BP Location: Left upper arm  BP Method: Automatic  Patient Position: Semi fowlers     Observation/Palpation  Observation: pain pump intact throughout session        AROM RLE (degrees)  RLE AROM: WFL  AROM LLE

## 2024-08-10 NOTE — PLAN OF CARE
Problem: Safety - Adult  Goal: Free from fall injury  Outcome: Progressing  Note: Pt will remain free of falls for the duration of the shift. Pt is A/O x4  and uses the call light appropriately for needs.  Pt is SBA with RW and GB. Bed alarm on, wheels locked, bed in lowest position, side rails up 2/4, nonskid socks on, call light and bedside table in reach.      Problem: Pain  Goal: Verbalizes/displays adequate comfort level or baseline comfort level  Outcome: Progressing  Note: Pt taking PO and IV pain medication for hip pain.     Problem: Musculoskeletal - Adult  Goal: Return mobility to safest level of function  Outcome: Progressing  Note: Pt has a steady gait with RW and GB     Problem: Genitourinary - Adult  Goal: Absence of urinary retention  Outcome: Progressing  Note: Pt voiding adequately.      Problem: Infection - Adult  Goal: Absence of infection at discharge  Outcome: Progressing  Note: Dressing CDI.

## 2024-08-12 ENCOUNTER — OFFICE VISIT (OUTPATIENT)
Dept: ORTHOPEDIC SURGERY | Age: 44
End: 2024-08-12

## 2024-08-12 ENCOUNTER — CARE COORDINATION (OUTPATIENT)
Dept: CASE MANAGEMENT | Age: 44
End: 2024-08-12

## 2024-08-12 VITALS — BODY MASS INDEX: 36.44 KG/M2 | WEIGHT: 218.7 LBS | HEIGHT: 65 IN

## 2024-08-12 DIAGNOSIS — M16.11 PRIMARY OSTEOARTHRITIS OF RIGHT HIP: ICD-10-CM

## 2024-08-12 DIAGNOSIS — Z96.641 STATUS POST HIP REPLACEMENT, RIGHT: Primary | ICD-10-CM

## 2024-08-12 DIAGNOSIS — M16.12 OSTEOARTHRITIS OF LEFT HIP, UNSPECIFIED OSTEOARTHRITIS TYPE: ICD-10-CM

## 2024-08-12 PROCEDURE — 99024 POSTOP FOLLOW-UP VISIT: CPT

## 2024-08-12 RX ORDER — RIVAROXABAN 10 MG/1
10 TABLET, FILM COATED ORAL
Qty: 14 TABLET | Refills: 0 | Status: SHIPPED | OUTPATIENT
Start: 2024-08-12

## 2024-08-12 NOTE — CARE COORDINATION
Care Transitions Note    Initial Call - Call within 2 business days of discharge: Yes    Patient Current Location:  Home: [address]  [place] [address]    LPN Care Coordinator contacted the patient by telephone to perform post hospital discharge assessment, verified name and  as identifiers. Provided introduction to self, and explanation of the LPN Care Coordinator role.     Patient: Petrona Guzman    Patient : 1980   MRN: [id number]    Reason for Admission: s/p (R) THR  Discharge Date: 8/10/24  RURS: Readmission Risk Score: 6.4      Last Discharge Facility       Date Complaint Diagnosis Description Type Department Provider    24   Admission (Discharged) [facility] 5T Kevin Benitez MD            Was this an external facility discharge? No    Additional needs identified to be addressed with provider   No needs identified             Method of communication with provider: none.    Patients top risk factors for readmission: functional physical ability and medical condition-s/p THR    Interventions to address risk factors:   Education: post op care  Review of patient management of conditions/medications: full med rec  DME: walker, rollator  Community Resources: OP PT next week    Care Summary Note: LPN CC spoke with patient. States she is doing ok. Pain 7/10, but is manageable with medications. Some swelling present to RLE. Patient is using compression stockings, elevating RLE, using ice. Incisions CDI. Denies problems with fever/chills, N/V/D, SOB, CP, drainage, redness, warmth. Appetite good. Denies problems with bowels or bladder. Full med rec completed. Unable to perform 1111f at this time. Scheduled for OP PT next week, currently ambulating ok with walker. Also has a rollator. Post op today, next post op . Denies needs.     Thank you,   Farheen Cary, LPN Care Coordinator   [facility]  Remote Patient Monitoring, Care Transitions, Ambulatory Care

## 2024-08-12 NOTE — PROGRESS NOTES
History of Present Illness:  Petrona Guzman is a pleasant 43 y.o. female who presents for a post operative visit. She is 3 days out following a right total hip arthroplasty, direct anterior approach. Overall She is doing okay and feels that their pain is well controlled with current pain medications. She has been compliant with the weight bearing instructions and anterior precautions. She plans to do physical therapy at the [facility].     She denies fevers, chills, numbness, tingling, and shortness of breath.    Medical History:  Patient's medications, allergies, past medical, surgical, social and family histories were reviewed and updated as appropriate.    No notes on file    Review of Systems  A 14 point review of systems was completed by the patient and is available in the media section of the scanned medical record and was reviewed on 8/12/2024.      Vital Signs:  There were no vitals filed for this visit.    General/Appearance: Alert and oriented and in no apparent distress.    Skin:  There are no skin lesions, cellulitis, or extreme edema. The patient has warm and well-perfused Bilateral lower  extremities with brisk capillary refill.      Hip  Exam: right    Inspection: Hip incision(s)  are clean, dry and intact and well approximated. The GAYLE dressing  is still in place. Mild ecchymosis and swelling are present as can be expected. There is no erythema, drainage or other signs of infection    Palpation:  No crepitus to gentle motion / circumduction of the hip    Active Range of Motion: Deferred    Passive Range of Motion: 0-30, limber, painful    Strength:  Deferred    Special Tests:  Deferred.    Neurovascular: Sensation to light touch is intact, no motor deficits, palpable radial pulses 2+    Radiology:     Plain radiographs of the right hip comprising 2 views (AP Pelvis, Brunson View and False Profile view of the right hip) were obtained and reviewed in the office: Shows postsurgical changes

## 2024-08-14 ENCOUNTER — CARE COORDINATION (OUTPATIENT)
Dept: CASE MANAGEMENT | Age: 44
End: 2024-08-14

## 2024-08-14 DIAGNOSIS — Z01.818 PREOPERATIVE CLEARANCE: ICD-10-CM

## 2024-08-14 DIAGNOSIS — M16.12 OSTEOARTHRITIS OF LEFT HIP, UNSPECIFIED OSTEOARTHRITIS TYPE: Primary | ICD-10-CM

## 2024-08-14 NOTE — CARE COORDINATION
Attempted to reach patient for transitions of care follow up. Unable to reach patient.    Outreach Attempts:   2ND CTC attempt to reach Pt regarding recent hospital discharge.  CTC left voice recording with call back number requesting a call back.    CTN will forward patient on to ACRUMA Alonso, with PCP office, closing out CTN episode.    Patient: Petrona Guzman    Patient : 1980   MRN: [id number]     Reason for Admission: S/P Total Hip Arthroplasty, right  Discharge Date: 8/10/24    RURS: Readmission Risk Score: 6.4    Last Discharge Facility       Date Complaint Diagnosis Description Type Department Provider    24   Admission (Discharged) [facility] Kevin Portillo MD            Was this an external facility discharge? No    Follow Up Appointment:   Patient does not have a follow up appointment scheduled at time of call.  Unable to reach patient, will forward a message to PCP pool.  Future Appointments         Provider Specialty Dept Phone    2024 8:00 AM Kevin Benitez MD Orthopedic Surgery [phone]    2024 9:00 AM Bakari Maguire, PT Physical Therapy [phone]    2024 3:00 PM Geneva Quiñones MD Bariatrics [phone]    2024 2:15 PM Kevin Benitez MD Orthopedic Surgery [phone]    2024 8:00 AM Kevin Benitez MD Orthopedic Surgery [phone]    10/1/2024 3:00 PM Nataliya Velez APRN Pulmonology [phone]    10/14/2024 8:00 AM Kevin Benitez MD Orthopedic Surgery [phone]    2024 12:45 PM Dani Lechuga MD Bariatrics [phone]    2025 2:15 PM Kassandra Wren APRN - [facility] Family Medicine [phone]            No further follow-up call indicated     Thank You,    Susan Sabillon RN  Care Transition Coordinator  Contact Number:[phone]

## 2024-08-19 ENCOUNTER — TELEPHONE (OUTPATIENT)
Dept: ORTHOPEDIC SURGERY | Age: 44
End: 2024-08-19

## 2024-08-20 ENCOUNTER — PREP FOR PROCEDURE (OUTPATIENT)
Dept: ORTHOPEDIC SURGERY | Age: 44
End: 2024-08-20

## 2024-08-20 DIAGNOSIS — M16.12 PRIMARY OSTEOARTHRITIS OF LEFT HIP: ICD-10-CM

## 2024-08-22 ENCOUNTER — CARE COORDINATION (OUTPATIENT)
Dept: CARE COORDINATION | Age: 44
End: 2024-08-22

## 2024-08-22 NOTE — CARE COORDINATION
Ambulatory Care Coordination Note     2024 12:02 PM     Patient Current Location:  Home: [address]  [place] [address]     ACM contacted the patient by telephone. Verified name and  with patient as identifiers.         ACM: Velia Alonso RN     Challenges to be reviewed by the provider   Additional needs identified to be addressed with provider No  none               Method of communication with provider: none.    Care Summary Note: ACM f/u with patient today. She said she has a little pain since her surgery, but she is healing well. She is using a walker to ambulate at this time and will go to her Post Op appt on Monday, , followed by her first appointment with PT. We scheduled pt pre-op appointment with Dr. Weinberg for her next hip surgery scheduled at the end of September. Pt stated she has no additional needs, questions, or concerns at this time and will call ACM if that changes.     Offered patient enrollment in the Remote Patient Monitoring (RPM) program for in-home monitoring: Deferred at this time because will be having surgery again soon; will discuss at next outreach.     Assessments Completed:   No changes since last call    Medications Reviewed:   Patient denies any changes with medications and reports taking all medications as prescribed.    Advance Care Planning:   Not reviewed during this call     Care Planning:   Not completed during this call    PCP/Specialist follow up:   Future Appointments         Provider Specialty Dept Phone    2024 8:00 AM Kevin Benitez MD Orthopedic Surgery [phone]    2024 9:00 AM Bakari Maguire, PT Physical Therapy [phone]    9/3/2024 3:45 PM Paxton Weinberg MD Family Medicine [phone]    2024 3:00 PM Geneva Quiñones MD Bariatrics [phone]    2024 2:15 PM Kevin Benitez MD Orthopedic Surgery [phone]    2024 8:00 AM Kevin Benitez MD Orthopedic Surgery [phone]    10/1/2024 3:00 PM Rosie

## 2024-08-23 ENCOUNTER — TELEPHONE (OUTPATIENT)
Dept: ORTHOPEDIC SURGERY | Age: 44
End: 2024-08-23

## 2024-08-23 NOTE — TELEPHONE ENCOUNTER
PATIENT CALLED IN REQUESTING TO HAVE RETURN TO WORK NOTE PLACED IN HER CHART DATED FOR MONDAY 8-26-24    PATIENT CAN BE REACHED [phone]

## 2024-08-23 NOTE — TELEPHONE ENCOUNTER
Patient has 2 week follow up appointment on 8/26/24.  Work note can be discussed and given at that time.  No need to call patient today.

## 2024-08-26 ENCOUNTER — OFFICE VISIT (OUTPATIENT)
Dept: ORTHOPEDIC SURGERY | Age: 44
End: 2024-08-26

## 2024-08-26 ENCOUNTER — HOSPITAL ENCOUNTER (OUTPATIENT)
Dept: PHYSICAL THERAPY | Age: 44
Setting detail: THERAPIES SERIES
Discharge: HOME | End: 2024-08-26
Payer: COMMERCIAL

## 2024-08-26 VITALS — WEIGHT: 218 LBS | HEIGHT: 65 IN | BODY MASS INDEX: 36.32 KG/M2

## 2024-08-26 DIAGNOSIS — R29.898 WEAKNESS OF RIGHT HIP: ICD-10-CM

## 2024-08-26 DIAGNOSIS — Z96.641 HISTORY OF TOTAL RIGHT HIP REPLACEMENT: ICD-10-CM

## 2024-08-26 DIAGNOSIS — M25.551 PAIN OF RIGHT HIP: Primary | ICD-10-CM

## 2024-08-26 DIAGNOSIS — M16.11 PRIMARY OSTEOARTHRITIS OF RIGHT HIP: ICD-10-CM

## 2024-08-26 DIAGNOSIS — Z96.641 STATUS POST HIP REPLACEMENT, RIGHT: Primary | ICD-10-CM

## 2024-08-26 PROCEDURE — 97110 THERAPEUTIC EXERCISES: CPT

## 2024-08-26 PROCEDURE — 99024 POSTOP FOLLOW-UP VISIT: CPT | Performed by: ORTHOPAEDIC SURGERY

## 2024-08-26 PROCEDURE — 97164 PT RE-EVAL EST PLAN CARE: CPT

## 2024-08-26 NOTE — PLAN OF CARE
[facility] - Outpatient Rehabilitation and Therapy [address], [place] [address] office: [phone] fax: [phone]    Physical Therapy Re-Certification Plan of Care    Dear Kevin Benitez MD  ,    We had the pleasure of treating the following patient for physical therapy services at [facility]. A summary of our findings can be found in the updated assessment below.  This includes our plan of care.  If you have any questions or concerns regarding these findings, please do not hesitate to contact me at the office phone number checked above.  Thank you for the referral.     Physician Signature:________________________________Date:__________________  By signing above (or electronic signature), therapist's plan is approved by physician      Functional Outcome: WOMAC = 56/96 = 58% dys    Petrona Guzman 1980 returns from [facility] on 24. She is performing exceptionally well, and is ambulating without AD at this time. She will have left hip replaced on 24. She went home and did not perform home therapy, and stuck with exercises recommended by MD. She has been using ice machine for relief at home. Her long-term goal is to walk with her mom (about 3-4 miles). She wants to be able to stand for 2 hours to cook.    Balance SLS R = 7sec  Tandem R = 10sec    LE MMT:   Right:   Hip Flexion:   NT  Hip aBduction:  NT  Hip IR:   4+  Hip ER:   4  Knee Extension:  4+  Knee Flexion:   5    34deg IR L PROM  38deg IR R PROM    Overall Response to Treatment:  Patient is responding well to treatment and improvement is noted with regards to goals    Total Visits: 2     Recommendation:    [x] Continue PT 2x / wk for 4 weeks  [] Hold PT, pending MD visit   [] Discharge to [facility]. Follow up with PT or MD PRN.     Physical Therapy: TREATMENT/PROGRESS NOTE   Patient: Petrona Guzman (43 y.o. female)   Examination Date: 2024   :  1980 MRN:

## 2024-08-26 NOTE — PROGRESS NOTES
History of Present Illness:  Petrona Guzman is a pleasant 43 y.o. female who presents for a post operative visit. She is 14 days out following a right DAYAMI. Overall She is doing okay and feels that their pain is well controlled with current pain medications. She has been compliant with the weight bearing instructions and anterior precautions. She has been in physical therapy at our [place] office. She is no longer taking pain medication and feels great.    She denies fevers, chills, numbness, tingling, and shortness of breath.    Medical History:  Patient's medications, allergies, past medical, surgical, social and family histories were reviewed and updated as appropriate.    No notes on file    Review of Systems  A 14 point review of systems was completed by the patient and is available in the media section of the scanned medical record and was reviewed on 8/26/2024.      Vital Signs:  There were no vitals filed for this visit.    General/Appearance: Alert and oriented and in no apparent distress.    Skin:  There are no skin lesions, cellulitis, or extreme edema. The patient has warm and well-perfused Bilateral lower  extremities with brisk capillary refill.      Hip  Exam: RIGHT    Inspection: Hip incision(s) are clean, dry, and healed. The GAYLE dressing has now been removed. Mild ecchymosis and swelling are present as can be expected. There is no erythema, drainage or other signs of infection    Palpation:  No crepitus to gentle motion / circumduction of the hip    Active Range of Motion: Deferred    Passive Range of Motion: 0-100 flexion, rotation deferred    Strength:  Deferred    Special Tests:  Good sit to stand, steady gait    Neurovascular: Sensation to light touch is intact, no motor deficits, palpable radial pulses 2+    Radiology:     Plain radiographs of the right hip comprising 2 views (AP Pelvis, Brunson View and False Profile view of the right hip) were obtained and reviewed in the office: Shows

## 2024-08-27 DIAGNOSIS — R00.0 CHRONIC TACHYCARDIA: ICD-10-CM

## 2024-08-27 RX ORDER — ACETAMINOPHEN 325 MG/1
1000 TABLET ORAL ONCE
Status: CANCELLED | OUTPATIENT
Start: 2024-09-27 | End: 2024-08-27

## 2024-08-27 RX ORDER — SODIUM CHLORIDE 0.9 % (FLUSH) 0.9 %
5-40 SYRINGE (ML) INJECTION EVERY 12 HOURS SCHEDULED
Status: CANCELLED | OUTPATIENT
Start: 2024-09-27

## 2024-08-27 RX ORDER — SODIUM CHLORIDE, SODIUM LACTATE, POTASSIUM CHLORIDE, CALCIUM CHLORIDE 600; 310; 30; 20 MG/100ML; MG/100ML; MG/100ML; MG/100ML
INJECTION, SOLUTION INTRAVENOUS CONTINUOUS
Status: CANCELLED | OUTPATIENT
Start: 2024-09-27

## 2024-08-27 RX ORDER — SODIUM CHLORIDE 9 MG/ML
INJECTION, SOLUTION INTRAVENOUS PRN
Status: CANCELLED | OUTPATIENT
Start: 2024-09-27

## 2024-08-27 RX ORDER — OXYCODONE HCL 10 MG/1
10 TABLET, FILM COATED, EXTENDED RELEASE ORAL ONCE
Status: CANCELLED | OUTPATIENT
Start: 2024-09-27 | End: 2024-08-27

## 2024-08-27 RX ORDER — GABAPENTIN 300 MG/1
300 CAPSULE ORAL ONCE
Status: CANCELLED | OUTPATIENT
Start: 2024-09-27 | End: 2024-08-27

## 2024-08-27 RX ORDER — SODIUM CHLORIDE 0.9 % (FLUSH) 0.9 %
5-40 SYRINGE (ML) INJECTION PRN
Status: CANCELLED | OUTPATIENT
Start: 2024-09-27

## 2024-08-27 RX ORDER — CELECOXIB 200 MG/1
400 CAPSULE ORAL ONCE
Status: CANCELLED | OUTPATIENT
Start: 2024-09-27 | End: 2024-08-27

## 2024-08-27 RX ORDER — DEXAMETHASONE SODIUM PHOSPHATE 10 MG/ML
10 INJECTION, SOLUTION INTRAMUSCULAR; INTRAVENOUS ONCE
Status: CANCELLED | OUTPATIENT
Start: 2024-09-27 | End: 2024-08-27

## 2024-08-28 RX ORDER — METOPROLOL TARTRATE 50 MG
50 TABLET ORAL 2 TIMES DAILY
Qty: 180 TABLET | Refills: 1 | OUTPATIENT
Start: 2024-08-28

## 2024-09-03 ENCOUNTER — TELEPHONE (OUTPATIENT)
Dept: CARDIOLOGY CLINIC | Age: 44
End: 2024-09-03

## 2024-09-03 ENCOUNTER — OFFICE VISIT (OUTPATIENT)
Dept: FAMILY MEDICINE CLINIC | Age: 44
End: 2024-09-03

## 2024-09-03 VITALS
SYSTOLIC BLOOD PRESSURE: 134 MMHG | WEIGHT: 220 LBS | DIASTOLIC BLOOD PRESSURE: 70 MMHG | HEIGHT: 65 IN | BODY MASS INDEX: 36.65 KG/M2 | HEART RATE: 72 BPM

## 2024-09-03 DIAGNOSIS — K21.9 GASTROESOPHAGEAL REFLUX DISEASE, UNSPECIFIED WHETHER ESOPHAGITIS PRESENT: ICD-10-CM

## 2024-09-03 DIAGNOSIS — G47.33 OSA (OBSTRUCTIVE SLEEP APNEA): ICD-10-CM

## 2024-09-03 DIAGNOSIS — Z87.891 FORMER SMOKER: ICD-10-CM

## 2024-09-03 DIAGNOSIS — I10 ESSENTIAL HYPERTENSION: ICD-10-CM

## 2024-09-03 DIAGNOSIS — M35.01 SJOGREN SYNDROME WITH KERATOCONJUNCTIVITIS (HCC): ICD-10-CM

## 2024-09-03 DIAGNOSIS — E11.9 TYPE 2 DIABETES MELLITUS WITHOUT COMPLICATION, WITHOUT LONG-TERM CURRENT USE OF INSULIN (HCC): ICD-10-CM

## 2024-09-03 DIAGNOSIS — F31.9 BIPOLAR 1 DISORDER (HCC): ICD-10-CM

## 2024-09-03 DIAGNOSIS — Z01.818 PREOP EXAMINATION: Primary | ICD-10-CM

## 2024-09-03 DIAGNOSIS — M16.10 PRIMARY OSTEOARTHRITIS OF HIP, UNSPECIFIED LATERALITY: ICD-10-CM

## 2024-09-03 DIAGNOSIS — J44.89 COPD WITH ASTHMA (HCC): ICD-10-CM

## 2024-09-03 DIAGNOSIS — E78.2 MIXED HYPERLIPIDEMIA: ICD-10-CM

## 2024-09-03 PROBLEM — Z72.0 CURRENT TOBACCO USE: Status: ACTIVE | Noted: 2024-09-03

## 2024-09-03 SDOH — ECONOMIC STABILITY: FOOD INSECURITY: WITHIN THE PAST 12 MONTHS, THE FOOD YOU BOUGHT JUST DIDN'T LAST AND YOU DIDN'T HAVE MONEY TO GET MORE.: NEVER TRUE

## 2024-09-03 SDOH — ECONOMIC STABILITY: INCOME INSECURITY: HOW HARD IS IT FOR YOU TO PAY FOR THE VERY BASICS LIKE FOOD, HOUSING, MEDICAL CARE, AND HEATING?: NOT HARD AT ALL

## 2024-09-03 SDOH — ECONOMIC STABILITY: FOOD INSECURITY: WITHIN THE PAST 12 MONTHS, YOU WORRIED THAT YOUR FOOD WOULD RUN OUT BEFORE YOU GOT MONEY TO BUY MORE.: NEVER TRUE

## 2024-09-03 NOTE — PROGRESS NOTES
long-acting metoprolol in beta-blocker-naïve patients on the day of surgery, and in the absence of dose titration is associated with an overall increase in mortality.  Beta-blockers should be started days to weeks prior to surgery and titrated to pulse < 70.  4. Deep vein thrombosis prophylaxis: regimen to be chosen by surgical team  5. No contraindications to planned surgery but patient will have cardiac clearance from cardiology( Dr. Ruff)          Addendum-reviewed patient's chart including current medications and recent blood work and last cardiology follow-up and workup

## 2024-09-03 NOTE — TELEPHONE ENCOUNTER
CARDIAC CLEARANCE     What type of procedure are you having?  Left Hip Replacement    Which physician is performing your procedure?  Al[facility]    When is your procedure scheduled for?  9/27/24    Where are you having this procedure?  [facility]    Are you taking Blood Thinners?    If so what? (Name/dose/frequesncy) Aspirin     Does the surgeon want you to stop your blood thinner?  If so for how long? Not sure    Phone Number and Contact Name for Physicians office:  [phone]    Fax number to send information:  Epic

## 2024-09-04 ENCOUNTER — TELEMEDICINE (OUTPATIENT)
Dept: BARIATRICS/WEIGHT MGMT | Age: 44
End: 2024-09-04
Payer: COMMERCIAL

## 2024-09-04 ENCOUNTER — APPOINTMENT (OUTPATIENT)
Dept: PHYSICAL THERAPY | Age: 44
End: 2024-09-04
Payer: COMMERCIAL

## 2024-09-04 DIAGNOSIS — Z98.84 S/P LAPAROSCOPIC SLEEVE GASTRECTOMY: ICD-10-CM

## 2024-09-04 DIAGNOSIS — E66.9 CLASS 2 OBESITY: Primary | ICD-10-CM

## 2024-09-04 DIAGNOSIS — Z71.3 DIETARY COUNSELING AND SURVEILLANCE: ICD-10-CM

## 2024-09-04 PROCEDURE — G8427 DOCREV CUR MEDS BY ELIG CLIN: HCPCS | Performed by: FAMILY MEDICINE

## 2024-09-04 PROCEDURE — G2211 COMPLEX E/M VISIT ADD ON: HCPCS | Performed by: FAMILY MEDICINE

## 2024-09-04 PROCEDURE — 99214 OFFICE O/P EST MOD 30 MIN: CPT | Performed by: FAMILY MEDICINE

## 2024-09-04 NOTE — PROGRESS NOTES
Patient: Petrona Guzman     Encounter Date: 9/4/2024    YOB: 1980               Age: 43 y.o.        Patient identification was verified at the start of the visit.         9/3/2024     4:03 PM   Patient-Reported Vitals   Patient-Reported Weight 120   Patient-Reported Height 5'5   Patient-Reported Systolic 135 mmHg   Patient-Reported Diastolic 70 mmHg   Patient-Reported Temperature 98.7         BP Readings from Last 1 Encounters:   09/03/24 134/70       BMI Readings from Last 1 Encounters:   09/03/24 36.61 kg/m²       Pulse Readings from Last 1 Encounters:   09/03/24 72                                             Wt Readings from Last 3 Encounters:   09/03/24 99.8 kg (220 lb)   08/26/24 98.9 kg (218 lb)   08/12/24 99.2 kg (218 lb 11.1 oz)        Chief Complaint   Patient presents with    Weight Management     F/u MW       HPI:    43 y.o. female presents to [facility] care via video visit. She was referred by Dr. Lechuga for medical weight management. The patient's medical history is significant for class II obesity s/p laparoscopic sleeve gastrectomy by Dr. Lechuga in 9/2023. The patient has a long-standing history of obesity which started gradually. The problem is moderate.  The patient has been gaining weight.  Risk factors include annual weight gain of >2 lbs (1 kg)/ year and sedentary lifestyle. Aggravating factors include poor diet and lack of physical activity. The patient has tried various diet/exercise plans which have been ineffective in the long-run. she is motivated to weight to help improve her overall health.     Initial presurgical weight: 235 pounds  Net weight loss to date: 15 pounds       Left hip replacement scheduled for the end of this month     HbA1c 6.1      When did you become overweight?  [] Childhood   [] Teens   [x] Adulthood   [] Pregnancy   [] Menopause    Triggers for weight gain?   [] Stress   [] Illness   [] Medications   [] Travel  []Injury     [] Nightshift work   []

## 2024-09-05 ASSESSMENT — ENCOUNTER SYMPTOMS
NAUSEA: 0
DIARRHEA: 0
WHEEZING: 0
VOMITING: 0
PHOTOPHOBIA: 0
SHORTNESS OF BREATH: 0
ABDOMINAL DISTENTION: 0
CHOKING: 0
APNEA: 0
COUGH: 0
CHEST TIGHTNESS: 0
BLOOD IN STOOL: 0
ABDOMINAL PAIN: 0
EYE PAIN: 0
CONSTIPATION: 0

## 2024-09-06 ENCOUNTER — APPOINTMENT (OUTPATIENT)
Dept: PHYSICAL THERAPY | Age: 44
End: 2024-09-06
Payer: COMMERCIAL

## 2024-09-10 ENCOUNTER — APPOINTMENT (OUTPATIENT)
Dept: PHYSICAL THERAPY | Age: 44
End: 2024-09-10
Payer: COMMERCIAL

## 2024-09-11 ENCOUNTER — TELEPHONE (OUTPATIENT)
Dept: ORTHOPEDIC SURGERY | Age: 44
End: 2024-09-11

## 2024-09-11 NOTE — TELEPHONE ENCOUNTER
Orthopedic Nurse Navigator Summary    Patient Name: Petrona Guzman   Anticipated Date of Surgery:  09/27/24  Attended Pre-op Education Class:  Video sent to patient email 08/29/24  PCP: Kassandra Wren CNP  Date of PCP visit for H&P: 09/03/24  Is patient in a Pain Management program:  Review of Medical history reveals history of: Diabetes, Bipolar, Schizophrenia, Vitamin D deficiency, Anxiety, Depression, H/O alcohol abuse, Lumbar spondylosis, Migraines, AMOS- bipap, Sjogrens disease, H/O gastric sleeve, H/O itching after anesthesia    Critical Lab Values- left message that there are new labs that need completed    - Hemoglobin (g/dL):  Date: Value   - Hematocrit(%): Date:   Value   - HgbA1C:  Date: 09/17/24 Value 5.6  - Albumin:  Date: 09/17/24  Value 4.5  - BUN:  Date: 09/17/24  Value 6  - Creatinine:  Date: 09/17/24  Value 0.7    09/17/24 MRSA swab- in process    Coronary Artery Disease/HTN/CHF history: Yes  Does the patient see a Cardiologist: Chadwick Ruff MD  Date of most recent cardiac appt: 07/17/24  On any anticoagulation:  Aspirin 81 mg QD    Diabetes History:  Yes  Most recent HgbA1C: 6.1  Pulmonary:  COPD/Emphysema/Use of home oxygen: Patient has COPD  Alcohol use: No    BMI greater than 40 at time of scheduling:    Additional medical concerns:  Additional recommendations for above concerns:  Attended Pre-Hab program:    Anticipated Discharge Disposition:  Home with OPT  Who will be with patient at home following discharge:  Her mom will bring her to surgery and then her  will help  Equipment patient already has:  walker  Bedroom on first or second floor:  first  Bathroom on first or second floor:  first  Weight bearing status:  wbat  Pre-op ambulatory status: painful ambulation  Number of entry steps:  8- 4 steps up then 4 down to get in to home  Caregiver assistance:  full time    Jo Cazares RN  Date:   09/11/24

## 2024-09-12 ENCOUNTER — HOSPITAL ENCOUNTER (OUTPATIENT)
Dept: PHYSICAL THERAPY | Age: 44
Setting detail: THERAPIES SERIES
End: 2024-09-12
Payer: COMMERCIAL

## 2024-09-17 ENCOUNTER — HOSPITAL ENCOUNTER (OUTPATIENT)
Age: 44
Discharge: HOME | End: 2024-09-17
Payer: COMMERCIAL

## 2024-09-17 ENCOUNTER — APPOINTMENT (OUTPATIENT)
Dept: PHYSICAL THERAPY | Age: 44
End: 2024-09-17
Payer: COMMERCIAL

## 2024-09-17 DIAGNOSIS — Z01.818 PREOPERATIVE CLEARANCE: ICD-10-CM

## 2024-09-17 DIAGNOSIS — M16.12 PRIMARY OSTEOARTHRITIS OF LEFT HIP: Primary | ICD-10-CM

## 2024-09-17 LAB
25(OH)D3 SERPL-MCNC: 59.3 NG/ML
ALBUMIN SERPL-MCNC: 4.5 G/DL (ref 3.4–5)
ALBUMIN/GLOB SERPL: 1.6 {RATIO} (ref 1.1–2.2)
ALP SERPL-CCNC: 89 U/L (ref 40–129)
ALT SERPL-CCNC: 24 U/L (ref 10–40)
ANION GAP SERPL CALCULATED.3IONS-SCNC: 10 MMOL/L (ref 3–16)
APTT BLD: 28.4 SEC (ref 22.1–36.4)
AST SERPL-CCNC: 26 U/L (ref 15–37)
BACTERIA URNS QL MICRO: ABNORMAL /HPF
BASOPHILS # BLD: 0 K/UL (ref 0–0.2)
BASOPHILS NFR BLD: 0.4 %
BILIRUB SERPL-MCNC: <0.2 MG/DL (ref 0–1)
BILIRUB UR QL STRIP.AUTO: NEGATIVE
BUN SERPL-MCNC: 6 MG/DL (ref 7–20)
CALCIUM SERPL-MCNC: 9.7 MG/DL (ref 8.3–10.6)
CHLORIDE SERPL-SCNC: 100 MMOL/L (ref 99–110)
CLARITY UR: CLEAR
CO2 SERPL-SCNC: 26 MMOL/L (ref 21–32)
COLOR UR: YELLOW
CREAT SERPL-MCNC: 0.7 MG/DL (ref 0.6–1.1)
DEPRECATED RDW RBC AUTO: 14.7 % (ref 12.4–15.4)
EOSINOPHIL # BLD: 0.1 K/UL (ref 0–0.6)
EOSINOPHIL NFR BLD: 0.7 %
EPI CELLS #/AREA URNS AUTO: 2 /HPF (ref 0–5)
GFR SERPLBLD CREATININE-BSD FMLA CKD-EPI: >90 ML/MIN/{1.73_M2}
GLUCOSE SERPL-MCNC: 83 MG/DL (ref 70–99)
GLUCOSE UR STRIP.AUTO-MCNC: NEGATIVE MG/DL
HCT VFR BLD AUTO: 35.8 % (ref 36–48)
HGB BLD-MCNC: 11.7 G/DL (ref 12–16)
HGB UR QL STRIP.AUTO: NEGATIVE
HYALINE CASTS #/AREA URNS AUTO: 0 /LPF (ref 0–8)
INR PPP: 0.92 (ref 0.85–1.15)
KETONES UR STRIP.AUTO-MCNC: NEGATIVE MG/DL
LEUKOCYTE ESTERASE UR QL STRIP.AUTO: ABNORMAL
LYMPHOCYTES # BLD: 2.2 K/UL (ref 1–5.1)
LYMPHOCYTES NFR BLD: 26.6 %
MCH RBC QN AUTO: 28.7 PG (ref 26–34)
MCHC RBC AUTO-ENTMCNC: 32.6 G/DL (ref 31–36)
MCV RBC AUTO: 88.1 FL (ref 80–100)
MONOCYTES # BLD: 0.7 K/UL (ref 0–1.3)
MONOCYTES NFR BLD: 8.9 %
NEUTROPHILS # BLD: 5.1 K/UL (ref 1.7–7.7)
NEUTROPHILS NFR BLD: 63.4 %
NITRITE UR QL STRIP.AUTO: NEGATIVE
PH UR STRIP.AUTO: 7.5 [PH] (ref 5–8)
PLATELET # BLD AUTO: 368 K/UL (ref 135–450)
PMV BLD AUTO: 6.6 FL (ref 5–10.5)
POTASSIUM SERPL-SCNC: 3.9 MMOL/L (ref 3.5–5.1)
PROT SERPL-MCNC: 7.3 G/DL (ref 6.4–8.2)
PROT UR STRIP.AUTO-MCNC: NEGATIVE MG/DL
PROTHROMBIN TIME: 12.5 SEC (ref 11.9–14.9)
RBC # BLD AUTO: 4.06 M/UL (ref 4–5.2)
RBC CLUMPS #/AREA URNS AUTO: 5 /HPF (ref 0–4)
SODIUM SERPL-SCNC: 136 MMOL/L (ref 136–145)
SP GR UR STRIP.AUTO: 1.01 (ref 1–1.03)
UA COMPLETE W REFLEX CULTURE PNL UR: ABNORMAL
UA DIPSTICK W REFLEX MICRO PNL UR: YES
URN SPEC COLLECT METH UR: ABNORMAL
UROBILINOGEN UR STRIP-ACNC: 0.2 E.U./DL
WBC # BLD AUTO: 8.1 K/UL (ref 4–11)
WBC #/AREA URNS AUTO: 2 /HPF (ref 0–5)

## 2024-09-17 PROCEDURE — 83036 HEMOGLOBIN GLYCOSYLATED A1C: CPT

## 2024-09-17 PROCEDURE — 80053 COMPREHEN METABOLIC PANEL: CPT

## 2024-09-17 PROCEDURE — 85730 THROMBOPLASTIN TIME PARTIAL: CPT

## 2024-09-17 PROCEDURE — 36415 COLL VENOUS BLD VENIPUNCTURE: CPT

## 2024-09-17 PROCEDURE — 82306 VITAMIN D 25 HYDROXY: CPT

## 2024-09-17 PROCEDURE — 85025 COMPLETE CBC W/AUTO DIFF WBC: CPT

## 2024-09-17 PROCEDURE — 87081 CULTURE SCREEN ONLY: CPT

## 2024-09-17 PROCEDURE — 81001 URINALYSIS AUTO W/SCOPE: CPT

## 2024-09-17 PROCEDURE — 85610 PROTHROMBIN TIME: CPT

## 2024-09-18 LAB
EST. AVERAGE GLUCOSE BLD GHB EST-MCNC: 114 MG/DL
HBA1C MFR BLD: 5.6 %

## 2024-09-19 ENCOUNTER — OFFICE VISIT (OUTPATIENT)
Dept: ORTHOPEDIC SURGERY | Age: 44
End: 2024-09-19

## 2024-09-19 VITALS — WEIGHT: 220 LBS | HEIGHT: 65 IN | BODY MASS INDEX: 36.65 KG/M2

## 2024-09-19 DIAGNOSIS — M16.12 PRIMARY OSTEOARTHRITIS OF LEFT HIP: Primary | ICD-10-CM

## 2024-09-19 PROCEDURE — 99024 POSTOP FOLLOW-UP VISIT: CPT | Performed by: ORTHOPAEDIC SURGERY

## 2024-09-19 RX ORDER — CYCLOBENZAPRINE HCL 10 MG
10 TABLET ORAL 3 TIMES DAILY PRN
Qty: 21 TABLET | Refills: 0 | Status: SHIPPED | OUTPATIENT
Start: 2024-09-19 | End: 2024-09-26

## 2024-09-19 RX ORDER — CEPHALEXIN 500 MG/1
500 CAPSULE ORAL 4 TIMES DAILY
Qty: 12 CAPSULE | Refills: 0 | Status: SHIPPED | OUTPATIENT
Start: 2024-09-19 | End: 2024-09-22

## 2024-09-19 RX ORDER — NAPROXEN 500 MG/1
500 TABLET ORAL 2 TIMES DAILY WITH MEALS
Qty: 28 TABLET | Refills: 0 | Status: SHIPPED | OUTPATIENT
Start: 2024-09-19 | End: 2024-10-03

## 2024-09-19 RX ORDER — ASPIRIN 81 MG/1
81 TABLET ORAL 2 TIMES DAILY
Qty: 56 TABLET | Refills: 0 | Status: SHIPPED | OUTPATIENT
Start: 2024-09-19 | End: 2024-10-17

## 2024-09-19 RX ORDER — SENNOSIDES 8.6 MG
1 TABLET ORAL 2 TIMES DAILY
Qty: 14 TABLET | Refills: 0 | Status: SHIPPED | OUTPATIENT
Start: 2024-09-19 | End: 2024-09-26

## 2024-09-19 RX ORDER — HYDROCODONE BITARTRATE AND ACETAMINOPHEN 5; 325 MG/1; MG/1
1 TABLET ORAL EVERY 4 HOURS PRN
Qty: 20 TABLET | Refills: 0 | Status: SHIPPED | OUTPATIENT
Start: 2024-09-19 | End: 2024-09-24

## 2024-09-20 ENCOUNTER — APPOINTMENT (OUTPATIENT)
Dept: PHYSICAL THERAPY | Age: 44
End: 2024-09-20
Payer: COMMERCIAL

## 2024-09-20 LAB — MRSA SPEC QL CULT: NORMAL

## 2024-09-20 NOTE — PROGRESS NOTES
9/20/24 @ 9662 [facility] video emailed 8/29 & reviewed to patient by Jo DELGADILLO on 9/18. Francesiclens instructions reviewed to use x 5 days preop. Pt has AMOS and will bring BiPap on DOS. TJ book, IS instructions, TJ video link, and fall contract placed on chart for DOS. MD

## 2024-09-20 NOTE — PROGRESS NOTES
[facility] Same Day Readiness Screen  PAT Questionnaire    Does patient have at least one day of 24 hr assist of capable caregiver at d/c?  [x] Yes = 0  [] No = 2      Was patient using an assistive device to walk prior to surgery?  [x] No = 0  [] Yes = 1    How many steps do you have to get to the floor where you plan to initially sleep and use the restroom? (At least 1/2 bath)  [] 0-2 steps= 0 [x] 3+ steps = 1    Has patient fallen in the last 3 months? If yes, how many times?  [x] 0 falls = 0 [] 1 fall = 1     [] 2+ falls = 2    Does patient have a hx of post-op nausea/vomiting?  [x] No = 0 [] Yes = 2    Other Factors    Age  [x] <70 = 0 [] 71-79 = 1  [] 80+ = 2    BMI  [] <30 = 0       [x]31-39 = 1    [] >40 = 2    Co-morbidities  [] 0 = 0           [] 1-2 = 1       [x] 3+ = 2    Sleep apnea  [] No = 0         [x] Yes = 1    Hx of prolonged emergence from general anesthesia  [x] No = 0         [] Yes = 1      Score: 5      Interpretation:  Red (10-16): Low probability of safe same day discharge  Yellow (6-9): Moderate probability of safe same day discharge  Green (0-5): High probability of safe same day discharge    Score completed by:  Laura Nieves PT  [id number]

## 2024-09-20 NOTE — PROGRESS NOTES
[facility] PRE-SURGICAL TESTING INSTRUCTIONS                      PRIOR TO PROCEDURE DATE:    1. PLEASE FOLLOW ANY INSTRUCTIONS GIVEN TO YOU PER YOUR SURGEON.      2. Arrange for someone to drive you home and be with you for the first 24 hours after discharge for your safety after your procedure for which you received sedation. Ensure it is someone we can share information with regarding your discharge.     NOTE: At this time ONLY 2 ADULTS may accompany you   One person ENCOURAGED to stay at hospital entire time if outpatient surgery      3. You must contact your surgeon for instructions IF:  You are taking any blood thinners, aspirin, anti-inflammatory or vitamins.  There is a change in your physical condition such as a cold, fever, rash, cuts, sores, or any other infection, especially near your surgical site.    4. Do not drink alcohol the day before or day of your procedure.  Do not use any recreational marijuana at least 24 hours or street drugs (heroin, cocaine) at minimum 5 days prior to your procedure.     5. A Pre-Surgical History and Physical MUST be completed WITHIN 30 DAYS OR LESS prior to your procedure.by your Physician or an Urgent Care        THE DAY OF YOUR PROCEDURE:  1.  Follow instructions for ARRIVAL TIME as DIRECTED BY YOUR SURGEON.     2. Enter the MAIN entrance from [facility] and follow the signs to the free Parking Garage or  Parking (offered free of charge 7 am-5pm).      3. Enter the Main Entrance of the hospital (do not enter from the lower level of the parking garage). Upon entrance, check in with the  at the surgical information desk on your LEFT.   Bring your insurance card and photo ID to register      4. DO NOT EAT ANYTHING 8 hours prior to arrival for surgery.  You may have up to 8 ounces of water 4 hours prior to your arrival for surgery.   NOTE: ALL Gastric, Bariatric & Bowel surgery patients - you MUST follow your surgeon's instructions regarding  you on the day of your procedure.    10. If you use oxygen at home, please bring your oxygen tank with you to hospital..     11. We recommend that valuable personal belongings such as cash, cell phones, e-tablets, or jewelry, be left at home during your stay. The hospital will not be responsible for valuables that are not secured in the hospital safe. However, if your insurance requires a co-pay, you may want to bring a method of payment, i.e., Check or credit card, if you wish to pay your co-pay the day of surgery.      12. If you are to stay overnight, you may bring a bag with personal items. Please have any large items you may need brought in by your family after your arrival to your hospital room.    13. If you have a Living Will or Durable Power of , please bring a copy on the day of your procedure.     How we keep you safe and work to prevent surgical site infections:   1. Health care workers should always check your ID bracelet to verify your name and birth date. You will be asked many times to state your name, date of birth, and allergies.    2. Health care workers should always clean their hands with soap or alcohol gel before providing care to you. It is okay to ask anyone if they cleaned their hands before they touch you.    3. You will be actively involved in verifying the type of procedure you are having and ensuring the correct surgical site. This will be confirmed multiple times prior to your procedure. Do NOT enoch your surgery site UNLESS instructed to by your surgeon.     4. When you are in the operating room, your surgical site will be cleansed with a special soap, and in most cases, you will be given an antibiotic before the surgery begins.      What to expect AFTER your procedure?  1. Immediately following your procedure, your will be taken to the PACU for the first phase of your recovery.  Your nurse will help you recover from any potential side effects of anesthesia, such as extreme

## 2024-09-23 ENCOUNTER — OFFICE VISIT (OUTPATIENT)
Dept: ORTHOPEDIC SURGERY | Age: 44
End: 2024-09-23

## 2024-09-23 VITALS — WEIGHT: 220 LBS | BODY MASS INDEX: 36.65 KG/M2 | HEIGHT: 65 IN

## 2024-09-23 DIAGNOSIS — Z96.641 STATUS POST HIP REPLACEMENT, RIGHT: Primary | ICD-10-CM

## 2024-09-23 DIAGNOSIS — M16.11 PRIMARY OSTEOARTHRITIS OF RIGHT HIP: ICD-10-CM

## 2024-09-23 PROCEDURE — 99024 POSTOP FOLLOW-UP VISIT: CPT | Performed by: ORTHOPAEDIC SURGERY

## 2024-09-23 NOTE — PROGRESS NOTES
9/23/24 @ 8428 Email sent to Schedulers to clarify wording listed in EPIC scheduled procedure & Electronic procedural consent. MD

## 2024-09-24 ENCOUNTER — TELEPHONE (OUTPATIENT)
Dept: CARDIOLOGY CLINIC | Age: 44
End: 2024-09-24

## 2024-09-24 ENCOUNTER — HOSPITAL ENCOUNTER (OUTPATIENT)
Dept: PHYSICAL THERAPY | Age: 44
Setting detail: THERAPIES SERIES
Discharge: HOME | End: 2024-09-24
Payer: COMMERCIAL

## 2024-09-24 PROCEDURE — 97110 THERAPEUTIC EXERCISES: CPT

## 2024-09-24 PROCEDURE — 97530 THERAPEUTIC ACTIVITIES: CPT

## 2024-09-24 RX ORDER — NAPROXEN 500 MG/1
500 TABLET ORAL 2 TIMES DAILY WITH MEALS
Qty: 28 TABLET | OUTPATIENT
Start: 2024-09-24

## 2024-09-26 ENCOUNTER — ANESTHESIA EVENT (OUTPATIENT)
Dept: OPERATING ROOM | Age: 44
End: 2024-09-26
Payer: COMMERCIAL

## 2024-09-26 RX ORDER — NAPROXEN 250 MG/1
500 TABLET ORAL 2 TIMES DAILY WITH MEALS
Status: CANCELLED | OUTPATIENT
Start: 2024-09-27 | End: 2024-10-04

## 2024-09-26 RX ORDER — SODIUM CHLORIDE 0.9 % (FLUSH) 0.9 %
5-40 SYRINGE (ML) INJECTION PRN
Status: CANCELLED | OUTPATIENT
Start: 2024-09-26

## 2024-09-26 RX ORDER — ONDANSETRON 2 MG/ML
4 INJECTION INTRAMUSCULAR; INTRAVENOUS EVERY 6 HOURS PRN
Status: CANCELLED | OUTPATIENT
Start: 2024-09-26

## 2024-09-26 RX ORDER — SODIUM CHLORIDE 0.9 % (FLUSH) 0.9 %
5-40 SYRINGE (ML) INJECTION EVERY 12 HOURS SCHEDULED
Status: CANCELLED | OUTPATIENT
Start: 2024-09-26

## 2024-09-26 RX ORDER — OXYCODONE HYDROCHLORIDE 5 MG/1
10 TABLET ORAL EVERY 4 HOURS PRN
Status: CANCELLED | OUTPATIENT
Start: 2024-09-26

## 2024-09-26 RX ORDER — SODIUM CHLORIDE 9 MG/ML
INJECTION, SOLUTION INTRAVENOUS PRN
Status: CANCELLED | OUTPATIENT
Start: 2024-09-26

## 2024-09-26 RX ORDER — OXYCODONE HYDROCHLORIDE 5 MG/1
5 TABLET ORAL EVERY 4 HOURS PRN
Status: CANCELLED | OUTPATIENT
Start: 2024-09-26

## 2024-09-26 RX ORDER — ACETAMINOPHEN 325 MG/1
650 TABLET ORAL EVERY 6 HOURS
Status: CANCELLED | OUTPATIENT
Start: 2024-09-26

## 2024-09-26 RX ORDER — SENNA AND DOCUSATE SODIUM 50; 8.6 MG/1; MG/1
1 TABLET, FILM COATED ORAL 2 TIMES DAILY
Status: CANCELLED | OUTPATIENT
Start: 2024-09-26

## 2024-09-26 RX ORDER — MORPHINE SULFATE 4 MG/ML
4 INJECTION INTRAVENOUS
Status: CANCELLED | OUTPATIENT
Start: 2024-09-26

## 2024-09-26 RX ORDER — CYCLOBENZAPRINE HCL 10 MG
10 TABLET ORAL 3 TIMES DAILY PRN
Status: CANCELLED | OUTPATIENT
Start: 2024-09-26

## 2024-09-26 RX ORDER — MORPHINE SULFATE 4 MG/ML
2 INJECTION INTRAVENOUS
Status: CANCELLED | OUTPATIENT
Start: 2024-09-26

## 2024-09-26 RX ORDER — SODIUM CHLORIDE 9 MG/ML
INJECTION, SOLUTION INTRAVENOUS CONTINUOUS
Status: CANCELLED | OUTPATIENT
Start: 2024-09-26

## 2024-09-26 RX ORDER — ONDANSETRON 4 MG/1
4 TABLET, ORALLY DISINTEGRATING ORAL EVERY 8 HOURS PRN
Status: CANCELLED | OUTPATIENT
Start: 2024-09-26

## 2024-09-26 RX ORDER — ASPIRIN 81 MG/1
81 TABLET ORAL 2 TIMES DAILY
Status: CANCELLED | OUTPATIENT
Start: 2024-09-26

## 2024-09-27 ENCOUNTER — APPOINTMENT (OUTPATIENT)
Dept: GENERAL RADIOLOGY | Age: 44
End: 2024-09-27
Attending: ORTHOPAEDIC SURGERY
Payer: COMMERCIAL

## 2024-09-27 ENCOUNTER — HOSPITAL ENCOUNTER (OUTPATIENT)
Age: 44
Setting detail: OUTPATIENT SURGERY
Discharge: HOME | End: 2024-09-27
Attending: ORTHOPAEDIC SURGERY | Admitting: ORTHOPAEDIC SURGERY
Payer: COMMERCIAL

## 2024-09-27 ENCOUNTER — ANESTHESIA (OUTPATIENT)
Dept: OPERATING ROOM | Age: 44
End: 2024-09-27
Payer: COMMERCIAL

## 2024-09-27 VITALS
HEIGHT: 65 IN | HEART RATE: 88 BPM | OXYGEN SATURATION: 94 % | BODY MASS INDEX: 37.09 KG/M2 | WEIGHT: 222.6 LBS | TEMPERATURE: 97.7 F | SYSTOLIC BLOOD PRESSURE: 108 MMHG | RESPIRATION RATE: 14 BRPM | DIASTOLIC BLOOD PRESSURE: 66 MMHG

## 2024-09-27 LAB
ABO + RH BLD: NORMAL
ANION GAP SERPL CALCULATED.3IONS-SCNC: 11 MMOL/L (ref 3–16)
BLD GP AB SCN SERPL QL: NORMAL
BUN SERPL-MCNC: 8 MG/DL (ref 7–20)
CALCIUM SERPL-MCNC: 9 MG/DL (ref 8.3–10.6)
CHLORIDE SERPL-SCNC: 102 MMOL/L (ref 99–110)
CO2 SERPL-SCNC: 26 MMOL/L (ref 21–32)
CREAT SERPL-MCNC: 0.8 MG/DL (ref 0.6–1.1)
GFR SERPLBLD CREATININE-BSD FMLA CKD-EPI: >90 ML/MIN/{1.73_M2}
GLUCOSE BLD-MCNC: 110 MG/DL (ref 70–99)
GLUCOSE SERPL-MCNC: 138 MG/DL (ref 70–99)
HCG UR QL: NEGATIVE
HCT VFR BLD AUTO: 36.2 % (ref 36–48)
HGB BLD-MCNC: 11.5 G/DL (ref 12–16)
PERFORMED ON: ABNORMAL
POTASSIUM SERPL-SCNC: 4.7 MMOL/L (ref 3.5–5.1)
SODIUM SERPL-SCNC: 139 MMOL/L (ref 136–145)

## 2024-09-27 PROCEDURE — 6360000002 HC RX W HCPCS: Performed by: ORTHOPAEDIC SURGERY

## 2024-09-27 PROCEDURE — 2580000003 HC RX 258: Performed by: ANESTHESIOLOGY

## 2024-09-27 PROCEDURE — C1776 JOINT DEVICE (IMPLANTABLE): HCPCS | Performed by: ORTHOPAEDIC SURGERY

## 2024-09-27 PROCEDURE — 85018 HEMOGLOBIN: CPT

## 2024-09-27 PROCEDURE — 85014 HEMATOCRIT: CPT

## 2024-09-27 PROCEDURE — A4217 STERILE WATER/SALINE, 500 ML: HCPCS | Performed by: ORTHOPAEDIC SURGERY

## 2024-09-27 PROCEDURE — 3700000001 HC ADD 15 MINUTES (ANESTHESIA): Performed by: ORTHOPAEDIC SURGERY

## 2024-09-27 PROCEDURE — 97530 THERAPEUTIC ACTIVITIES: CPT

## 2024-09-27 PROCEDURE — 2709999900 HC NON-CHARGEABLE SUPPLY: Performed by: ORTHOPAEDIC SURGERY

## 2024-09-27 PROCEDURE — 80048 BASIC METABOLIC PNL TOTAL CA: CPT

## 2024-09-27 PROCEDURE — 64447 NJX AA&/STRD FEMORAL NRV IMG: CPT | Performed by: ANESTHESIOLOGY

## 2024-09-27 PROCEDURE — 2580000003 HC RX 258: Performed by: ORTHOPAEDIC SURGERY

## 2024-09-27 PROCEDURE — 86901 BLOOD TYPING SEROLOGIC RH(D): CPT

## 2024-09-27 PROCEDURE — 7100000000 HC PACU RECOVERY - FIRST 15 MIN: Performed by: ORTHOPAEDIC SURGERY

## 2024-09-27 PROCEDURE — 3600000004 HC SURGERY LEVEL 4 BASE: Performed by: ORTHOPAEDIC SURGERY

## 2024-09-27 PROCEDURE — 3600000014 HC SURGERY LEVEL 4 ADDTL 15MIN: Performed by: ORTHOPAEDIC SURGERY

## 2024-09-27 PROCEDURE — 97165 OT EVAL LOW COMPLEX 30 MIN: CPT

## 2024-09-27 PROCEDURE — 7100000010 HC PHASE II RECOVERY - FIRST 15 MIN: Performed by: ORTHOPAEDIC SURGERY

## 2024-09-27 PROCEDURE — 97116 GAIT TRAINING THERAPY: CPT

## 2024-09-27 PROCEDURE — 7100000011 HC PHASE II RECOVERY - ADDTL 15 MIN: Performed by: ORTHOPAEDIC SURGERY

## 2024-09-27 PROCEDURE — 3700000000 HC ANESTHESIA ATTENDED CARE: Performed by: ORTHOPAEDIC SURGERY

## 2024-09-27 PROCEDURE — 84703 CHORIONIC GONADOTROPIN ASSAY: CPT

## 2024-09-27 PROCEDURE — 86900 BLOOD TYPING SEROLOGIC ABO: CPT

## 2024-09-27 PROCEDURE — 6360000002 HC RX W HCPCS

## 2024-09-27 PROCEDURE — 2500000003 HC RX 250 WO HCPCS: Performed by: ORTHOPAEDIC SURGERY

## 2024-09-27 PROCEDURE — 86850 RBC ANTIBODY SCREEN: CPT

## 2024-09-27 PROCEDURE — 6370000000 HC RX 637 (ALT 250 FOR IP): Performed by: ORTHOPAEDIC SURGERY

## 2024-09-27 PROCEDURE — 2500000003 HC RX 250 WO HCPCS: Performed by: ANESTHESIOLOGY

## 2024-09-27 PROCEDURE — 97535 SELF CARE MNGMENT TRAINING: CPT

## 2024-09-27 PROCEDURE — 6360000002 HC RX W HCPCS: Performed by: ANESTHESIOLOGY

## 2024-09-27 PROCEDURE — 97161 PT EVAL LOW COMPLEX 20 MIN: CPT

## 2024-09-27 PROCEDURE — 73502 X-RAY EXAM HIP UNI 2-3 VIEWS: CPT

## 2024-09-27 PROCEDURE — 72170 X-RAY EXAM OF PELVIS: CPT

## 2024-09-27 PROCEDURE — 7100000001 HC PACU RECOVERY - ADDTL 15 MIN: Performed by: ORTHOPAEDIC SURGERY

## 2024-09-27 PROCEDURE — 2720000010 HC SURG SUPPLY STERILE: Performed by: ORTHOPAEDIC SURGERY

## 2024-09-27 PROCEDURE — C1769 GUIDE WIRE: HCPCS | Performed by: ORTHOPAEDIC SURGERY

## 2024-09-27 PROCEDURE — 2500000003 HC RX 250 WO HCPCS

## 2024-09-27 DEVICE — SHELL ACET SZ C DIA46MM 3 CLUS H TRITANIUM PRESSFIT PRI: Type: IMPLANTABLE DEVICE | Site: HIP | Status: FUNCTIONAL

## 2024-09-27 DEVICE — IMPLANTABLE DEVICE: Type: IMPLANTABLE DEVICE | Site: HIP | Status: FUNCTIONAL

## 2024-09-27 DEVICE — HEAD FEM DIA32MM +0MM OFFSET HIP BIOLOX DELT CERAMIC TAPR: Type: IMPLANTABLE DEVICE | Site: HIP | Status: FUNCTIONAL

## 2024-09-27 DEVICE — LINER ACET SZ C OD48MM ID32MM THK4.9MM 0DEG HIP X3: Type: IMPLANTABLE DEVICE | Site: HIP | Status: FUNCTIONAL

## 2024-09-27 DEVICE — SCREW BNE L25MM DIA6.5MM HEX LO PROF TRIDENT II: Type: IMPLANTABLE DEVICE | Site: HIP | Status: FUNCTIONAL

## 2024-09-27 DEVICE — COMPONENT TOT HIP CAPPED LNR POLYETH H2STRYKER] STRYKER CORP]: Type: IMPLANTABLE DEVICE | Site: HIP | Status: FUNCTIONAL

## 2024-09-27 RX ORDER — CELECOXIB 200 MG/1
400 CAPSULE ORAL ONCE
Status: COMPLETED | OUTPATIENT
Start: 2024-09-27 | End: 2024-09-27

## 2024-09-27 RX ORDER — NALOXONE HYDROCHLORIDE 0.4 MG/ML
INJECTION, SOLUTION INTRAMUSCULAR; INTRAVENOUS; SUBCUTANEOUS PRN
Status: DISCONTINUED | OUTPATIENT
Start: 2024-09-27 | End: 2024-09-27 | Stop reason: HOSPADM

## 2024-09-27 RX ORDER — HYDROMORPHONE HYDROCHLORIDE 2 MG/ML
INJECTION, SOLUTION INTRAMUSCULAR; INTRAVENOUS; SUBCUTANEOUS
Status: DISCONTINUED | OUTPATIENT
Start: 2024-09-27 | End: 2024-09-27 | Stop reason: SDUPTHER

## 2024-09-27 RX ORDER — MIDAZOLAM HYDROCHLORIDE 1 MG/ML
INJECTION INTRAMUSCULAR; INTRAVENOUS
Status: COMPLETED
Start: 2024-09-27 | End: 2024-09-27

## 2024-09-27 RX ORDER — LABETALOL HYDROCHLORIDE 5 MG/ML
10 INJECTION, SOLUTION INTRAVENOUS
Status: DISCONTINUED | OUTPATIENT
Start: 2024-09-27 | End: 2024-09-27 | Stop reason: HOSPADM

## 2024-09-27 RX ORDER — SODIUM CHLORIDE, SODIUM LACTATE, POTASSIUM CHLORIDE, CALCIUM CHLORIDE 600; 310; 30; 20 MG/100ML; MG/100ML; MG/100ML; MG/100ML
INJECTION, SOLUTION INTRAVENOUS CONTINUOUS
Status: DISCONTINUED | OUTPATIENT
Start: 2024-09-27 | End: 2024-09-27 | Stop reason: HOSPADM

## 2024-09-27 RX ORDER — IPRATROPIUM BROMIDE AND ALBUTEROL SULFATE 2.5; .5 MG/3ML; MG/3ML
1 SOLUTION RESPIRATORY (INHALATION)
Status: DISCONTINUED | OUTPATIENT
Start: 2024-09-27 | End: 2024-09-27 | Stop reason: HOSPADM

## 2024-09-27 RX ORDER — LORAZEPAM 2 MG/ML
0.5 INJECTION INTRAMUSCULAR
Status: DISCONTINUED | OUTPATIENT
Start: 2024-09-27 | End: 2024-09-27 | Stop reason: HOSPADM

## 2024-09-27 RX ORDER — MIDAZOLAM HYDROCHLORIDE 1 MG/ML
INJECTION INTRAMUSCULAR; INTRAVENOUS
Status: DISCONTINUED | OUTPATIENT
Start: 2024-09-27 | End: 2024-09-27 | Stop reason: SDUPTHER

## 2024-09-27 RX ORDER — DIPHENHYDRAMINE HYDROCHLORIDE 50 MG/ML
12.5 INJECTION INTRAMUSCULAR; INTRAVENOUS
Status: COMPLETED | OUTPATIENT
Start: 2024-09-27 | End: 2024-09-27

## 2024-09-27 RX ORDER — METOPROLOL TARTRATE 1 MG/ML
2.5 INJECTION, SOLUTION INTRAVENOUS ONCE
Status: COMPLETED | OUTPATIENT
Start: 2024-09-27 | End: 2024-09-27

## 2024-09-27 RX ORDER — SODIUM CHLORIDE 0.9 % (FLUSH) 0.9 %
5-40 SYRINGE (ML) INJECTION EVERY 12 HOURS SCHEDULED
Status: DISCONTINUED | OUTPATIENT
Start: 2024-09-27 | End: 2024-09-27 | Stop reason: HOSPADM

## 2024-09-27 RX ORDER — FENTANYL CITRATE 50 UG/ML
25 INJECTION, SOLUTION INTRAMUSCULAR; INTRAVENOUS EVERY 5 MIN PRN
Status: DISCONTINUED | OUTPATIENT
Start: 2024-09-27 | End: 2024-09-27 | Stop reason: HOSPADM

## 2024-09-27 RX ORDER — EPHEDRINE SULFATE 50 MG/ML
INJECTION INTRAVENOUS
Status: DISCONTINUED | OUTPATIENT
Start: 2024-09-27 | End: 2024-09-27 | Stop reason: SDUPTHER

## 2024-09-27 RX ORDER — LIDOCAINE HYDROCHLORIDE 20 MG/ML
INJECTION, SOLUTION INTRAVENOUS
Status: DISCONTINUED | OUTPATIENT
Start: 2024-09-27 | End: 2024-09-27 | Stop reason: SDUPTHER

## 2024-09-27 RX ORDER — OXYCODONE HYDROCHLORIDE 5 MG/1
5 TABLET ORAL
Status: DISCONTINUED | OUTPATIENT
Start: 2024-09-27 | End: 2024-09-27 | Stop reason: HOSPADM

## 2024-09-27 RX ORDER — SODIUM CHLORIDE 0.9 % (FLUSH) 0.9 %
5-40 SYRINGE (ML) INJECTION PRN
Status: DISCONTINUED | OUTPATIENT
Start: 2024-09-27 | End: 2024-09-27 | Stop reason: HOSPADM

## 2024-09-27 RX ORDER — PROCHLORPERAZINE EDISYLATE 5 MG/ML
5 INJECTION INTRAMUSCULAR; INTRAVENOUS
Status: DISCONTINUED | OUTPATIENT
Start: 2024-09-27 | End: 2024-09-27 | Stop reason: HOSPADM

## 2024-09-27 RX ORDER — DEXMEDETOMIDINE HYDROCHLORIDE 100 UG/ML
INJECTION, SOLUTION INTRAVENOUS
Status: DISCONTINUED | OUTPATIENT
Start: 2024-09-27 | End: 2024-09-27 | Stop reason: SDUPTHER

## 2024-09-27 RX ORDER — ACETAMINOPHEN 325 MG/1
1000 TABLET ORAL ONCE
Status: COMPLETED | OUTPATIENT
Start: 2024-09-27 | End: 2024-09-27

## 2024-09-27 RX ORDER — MAGNESIUM HYDROXIDE 1200 MG/15ML
LIQUID ORAL CONTINUOUS PRN
Status: DISCONTINUED | OUTPATIENT
Start: 2024-09-27 | End: 2024-09-27 | Stop reason: HOSPADM

## 2024-09-27 RX ORDER — OXYCODONE HCL 10 MG/1
10 TABLET, FILM COATED, EXTENDED RELEASE ORAL ONCE
Status: COMPLETED | OUTPATIENT
Start: 2024-09-27 | End: 2024-09-27

## 2024-09-27 RX ORDER — HYDROMORPHONE HYDROCHLORIDE 1 MG/ML
0.5 INJECTION, SOLUTION INTRAMUSCULAR; INTRAVENOUS; SUBCUTANEOUS EVERY 5 MIN PRN
Status: DISCONTINUED | OUTPATIENT
Start: 2024-09-27 | End: 2024-09-27 | Stop reason: HOSPADM

## 2024-09-27 RX ORDER — BUPIVACAINE HYDROCHLORIDE 5 MG/ML
INJECTION, SOLUTION EPIDURAL; INTRACAUDAL
Status: COMPLETED | OUTPATIENT
Start: 2024-09-27 | End: 2024-09-27

## 2024-09-27 RX ORDER — PHENYLEPHRINE HYDROCHLORIDE 10 MG/ML
INJECTION INTRAVENOUS
Status: DISCONTINUED | OUTPATIENT
Start: 2024-09-27 | End: 2024-09-27 | Stop reason: SDUPTHER

## 2024-09-27 RX ORDER — BUPIVACAINE HYDROCHLORIDE 5 MG/ML
INJECTION, SOLUTION EPIDURAL; INTRACAUDAL
Status: COMPLETED
Start: 2024-09-27 | End: 2024-09-27

## 2024-09-27 RX ORDER — DEXAMETHASONE SODIUM PHOSPHATE 10 MG/ML
10 INJECTION, SOLUTION INTRAMUSCULAR; INTRAVENOUS ONCE
Status: COMPLETED | OUTPATIENT
Start: 2024-09-27 | End: 2024-09-27

## 2024-09-27 RX ORDER — FENTANYL CITRATE 50 UG/ML
INJECTION, SOLUTION INTRAMUSCULAR; INTRAVENOUS
Status: COMPLETED
Start: 2024-09-27 | End: 2024-09-27

## 2024-09-27 RX ORDER — GABAPENTIN 300 MG/1
300 CAPSULE ORAL ONCE
Status: COMPLETED | OUTPATIENT
Start: 2024-09-27 | End: 2024-09-27

## 2024-09-27 RX ORDER — FENTANYL CITRATE 50 UG/ML
INJECTION, SOLUTION INTRAMUSCULAR; INTRAVENOUS
Status: DISCONTINUED | OUTPATIENT
Start: 2024-09-27 | End: 2024-09-27 | Stop reason: SDUPTHER

## 2024-09-27 RX ORDER — ROCURONIUM BROMIDE 10 MG/ML
INJECTION, SOLUTION INTRAVENOUS
Status: DISCONTINUED | OUTPATIENT
Start: 2024-09-27 | End: 2024-09-27 | Stop reason: SDUPTHER

## 2024-09-27 RX ORDER — SODIUM CHLORIDE, SODIUM LACTATE, POTASSIUM CHLORIDE, AND CALCIUM CHLORIDE .6; .31; .03; .02 G/100ML; G/100ML; G/100ML; G/100ML
500 INJECTION, SOLUTION INTRAVENOUS ONCE
Status: COMPLETED | OUTPATIENT
Start: 2024-09-27 | End: 2024-09-27

## 2024-09-27 RX ORDER — SODIUM CHLORIDE 9 MG/ML
INJECTION, SOLUTION INTRAVENOUS PRN
Status: DISCONTINUED | OUTPATIENT
Start: 2024-09-27 | End: 2024-09-27 | Stop reason: HOSPADM

## 2024-09-27 RX ORDER — ONDANSETRON 2 MG/ML
4 INJECTION INTRAMUSCULAR; INTRAVENOUS
Status: DISCONTINUED | OUTPATIENT
Start: 2024-09-27 | End: 2024-09-27 | Stop reason: HOSPADM

## 2024-09-27 RX ORDER — ONDANSETRON 2 MG/ML
INJECTION INTRAMUSCULAR; INTRAVENOUS
Status: DISCONTINUED | OUTPATIENT
Start: 2024-09-27 | End: 2024-09-27 | Stop reason: SDUPTHER

## 2024-09-27 RX ORDER — METHOCARBAMOL 100 MG/ML
INJECTION, SOLUTION INTRAMUSCULAR; INTRAVENOUS
Status: DISCONTINUED | OUTPATIENT
Start: 2024-09-27 | End: 2024-09-27 | Stop reason: SDUPTHER

## 2024-09-27 RX ORDER — PROPOFOL 10 MG/ML
INJECTION, EMULSION INTRAVENOUS
Status: DISCONTINUED | OUTPATIENT
Start: 2024-09-27 | End: 2024-09-27 | Stop reason: SDUPTHER

## 2024-09-27 RX ADMIN — SODIUM CHLORIDE, SODIUM LACTATE, POTASSIUM CHLORIDE, AND CALCIUM CHLORIDE: .6; .31; .03; .02 INJECTION, SOLUTION INTRAVENOUS at 08:53

## 2024-09-27 RX ADMIN — WATER 2000 MG: 1 INJECTION INTRAMUSCULAR; INTRAVENOUS; SUBCUTANEOUS at 07:55

## 2024-09-27 RX ADMIN — GABAPENTIN 300 MG: 300 CAPSULE ORAL at 07:00

## 2024-09-27 RX ADMIN — HYDROMORPHONE HYDROCHLORIDE 0.5 MG: 2 INJECTION, SOLUTION INTRAMUSCULAR; INTRAVENOUS; SUBCUTANEOUS at 09:41

## 2024-09-27 RX ADMIN — EPHEDRINE SULFATE 5 MG: 50 INJECTION INTRAVENOUS at 08:25

## 2024-09-27 RX ADMIN — DEXMEDETOMIDINE HYDROCHLORIDE 2 MCG: 100 INJECTION, SOLUTION INTRAVENOUS at 09:28

## 2024-09-27 RX ADMIN — ROCURONIUM BROMIDE 30 MG: 10 INJECTION, SOLUTION INTRAVENOUS at 08:15

## 2024-09-27 RX ADMIN — DIPHENHYDRAMINE HYDROCHLORIDE 12.5 MG: 50 INJECTION INTRAMUSCULAR; INTRAVENOUS at 10:42

## 2024-09-27 RX ADMIN — DEXMEDETOMIDINE HYDROCHLORIDE 2 MCG: 100 INJECTION, SOLUTION INTRAVENOUS at 09:10

## 2024-09-27 RX ADMIN — ACETAMINOPHEN 975 MG: 325 TABLET ORAL at 07:01

## 2024-09-27 RX ADMIN — FENTANYL CITRATE 50 MCG: 50 INJECTION, SOLUTION INTRAMUSCULAR; INTRAVENOUS at 07:38

## 2024-09-27 RX ADMIN — METOPROLOL TARTRATE 2.5 MG: 1 INJECTION, SOLUTION INTRAVENOUS at 14:59

## 2024-09-27 RX ADMIN — CELECOXIB 400 MG: 200 CAPSULE ORAL at 07:01

## 2024-09-27 RX ADMIN — PROPOFOL 30 MG: 10 INJECTION, EMULSION INTRAVENOUS at 07:16

## 2024-09-27 RX ADMIN — TRANEXAMIC ACID 1000 MG: 100 INJECTION, SOLUTION INTRAVENOUS at 07:50

## 2024-09-27 RX ADMIN — ONDANSETRON 4 MG: 2 INJECTION INTRAMUSCULAR; INTRAVENOUS at 09:33

## 2024-09-27 RX ADMIN — ROCURONIUM BROMIDE 20 MG: 10 INJECTION, SOLUTION INTRAVENOUS at 08:52

## 2024-09-27 RX ADMIN — DEXAMETHASONE SODIUM PHOSPHATE 10 MG: 10 INJECTION, SOLUTION INTRAMUSCULAR; INTRAVENOUS at 07:08

## 2024-09-27 RX ADMIN — BUPIVACAINE HYDROCHLORIDE 20 ML: 5 INJECTION, SOLUTION EPIDURAL; INTRACAUDAL; PERINEURAL at 07:15

## 2024-09-27 RX ADMIN — PHENYLEPHRINE HYDROCHLORIDE 200 MCG: 10 INJECTION, SOLUTION INTRAVENOUS at 08:22

## 2024-09-27 RX ADMIN — SODIUM CHLORIDE, SODIUM LACTATE, POTASSIUM CHLORIDE, AND CALCIUM CHLORIDE: .6; .31; .03; .02 INJECTION, SOLUTION INTRAVENOUS at 07:28

## 2024-09-27 RX ADMIN — DEXMEDETOMIDINE HYDROCHLORIDE 2 MCG: 100 INJECTION, SOLUTION INTRAVENOUS at 08:59

## 2024-09-27 RX ADMIN — HYDROMORPHONE HYDROCHLORIDE 0.5 MG: 2 INJECTION, SOLUTION INTRAMUSCULAR; INTRAVENOUS; SUBCUTANEOUS at 09:51

## 2024-09-27 RX ADMIN — SUGAMMADEX 200 MG: 100 INJECTION, SOLUTION INTRAVENOUS at 09:46

## 2024-09-27 RX ADMIN — PROPOFOL 70 MG: 10 INJECTION, EMULSION INTRAVENOUS at 07:38

## 2024-09-27 RX ADMIN — SODIUM CHLORIDE, POTASSIUM CHLORIDE, SODIUM LACTATE AND CALCIUM CHLORIDE 500 ML: 600; 310; 30; 20 INJECTION, SOLUTION INTRAVENOUS at 13:53

## 2024-09-27 RX ADMIN — PHENYLEPHRINE HYDROCHLORIDE 100 MCG: 10 INJECTION, SOLUTION INTRAVENOUS at 08:17

## 2024-09-27 RX ADMIN — PHENYLEPHRINE HYDROCHLORIDE 100 MCG: 10 INJECTION, SOLUTION INTRAVENOUS at 08:20

## 2024-09-27 RX ADMIN — HYDROMORPHONE HYDROCHLORIDE 0.5 MG: 2 INJECTION, SOLUTION INTRAMUSCULAR; INTRAVENOUS; SUBCUTANEOUS at 09:23

## 2024-09-27 RX ADMIN — OXYCODONE HYDROCHLORIDE 10 MG: 10 TABLET, FILM COATED, EXTENDED RELEASE ORAL at 07:01

## 2024-09-27 RX ADMIN — METHOCARBAMOL 500 MG: 100 INJECTION, SOLUTION INTRAMUSCULAR; INTRAVENOUS at 08:30

## 2024-09-27 RX ADMIN — SODIUM CHLORIDE, POTASSIUM CHLORIDE, SODIUM LACTATE AND CALCIUM CHLORIDE: 600; 310; 30; 20 INJECTION, SOLUTION INTRAVENOUS at 07:09

## 2024-09-27 RX ADMIN — MIDAZOLAM HYDROCHLORIDE 2 MG: 2 INJECTION, SOLUTION INTRAMUSCULAR; INTRAVENOUS at 07:28

## 2024-09-27 RX ADMIN — FENTANYL CITRATE 50 MCG: 50 INJECTION, SOLUTION INTRAMUSCULAR; INTRAVENOUS at 08:48

## 2024-09-27 RX ADMIN — ROCURONIUM BROMIDE 70 MG: 10 INJECTION, SOLUTION INTRAVENOUS at 07:39

## 2024-09-27 RX ADMIN — ROCURONIUM BROMIDE 10 MG: 10 INJECTION, SOLUTION INTRAVENOUS at 09:27

## 2024-09-27 RX ADMIN — LIDOCAINE HYDROCHLORIDE 100 MG: 20 INJECTION, SOLUTION INTRAVENOUS at 07:38

## 2024-09-27 ASSESSMENT — PAIN DESCRIPTION - ONSET
ONSET: ON-GOING
ONSET: ON-GOING

## 2024-09-27 ASSESSMENT — PAIN DESCRIPTION - DESCRIPTORS
DESCRIPTORS: ACHING
DESCRIPTORS: STABBING

## 2024-09-27 ASSESSMENT — ENCOUNTER SYMPTOMS: SHORTNESS OF BREATH: 1

## 2024-09-27 ASSESSMENT — PAIN DESCRIPTION - FREQUENCY
FREQUENCY: CONTINUOUS
FREQUENCY: CONTINUOUS

## 2024-09-27 ASSESSMENT — PAIN DESCRIPTION - PAIN TYPE
TYPE: SURGICAL PAIN
TYPE: CHRONIC PAIN
TYPE: ACUTE PAIN;SURGICAL PAIN

## 2024-09-27 ASSESSMENT — PAIN SCALES - GENERAL
PAINLEVEL_OUTOF10: 5
PAINLEVEL_OUTOF10: 7
PAINLEVEL_OUTOF10: 5
PAINLEVEL_OUTOF10: 6

## 2024-09-27 ASSESSMENT — PAIN DESCRIPTION - ORIENTATION
ORIENTATION: LEFT

## 2024-09-27 ASSESSMENT — PAIN - FUNCTIONAL ASSESSMENT
PAIN_FUNCTIONAL_ASSESSMENT: ACTIVITIES ARE NOT PREVENTED
PAIN_FUNCTIONAL_ASSESSMENT: PREVENTS OR INTERFERES SOME ACTIVE ACTIVITIES AND ADLS

## 2024-09-27 ASSESSMENT — PAIN DESCRIPTION - LOCATION
LOCATION: HIP

## 2024-09-27 NOTE — PROGRESS NOTES
Pt drowsy, oriented x 4, VSS on 2L O2.  Pt with strong dorsi & pedal flexion in left foot and can lift left leg off the bed without difficulty.      Pt tolerating po fluids and denies nausea.      Pt's mother updated via phone call on POC.  Pt given time to speak to mother on phone.

## 2024-09-27 NOTE — OP NOTE
Operative Note      Patient: Petrona Guzman  YOB: 1980  MRN: [id number]    Date of Procedure: 9/27/2024    Pre-Op Diagnosis Codes:      * Primary osteoarthritis of left hip [M16.12]    Post-Op Diagnosis: Same       Procedure(s):  LEFT TOTAL HIP REPLACEMENT DIRECT ANTERIOR APPROACH, SHANIQUA LACHELLE ROBOTIC ASSISTED HIP REPLACEMENT, Conversion from Prior Surgery    Surgeon(s):  Kevin Benitez MD    Assistant:   Surgical Assistant: Jayleen Hutton Mann, Dustin     Anesthesia: General + PENG block    Estimated Blood Loss (mL): 150cc    Complications: None    Specimens:   * No specimens in log *    Implants:  * No implants in log *      Drains:   [REMOVED] Negative Pressure Wound Therapy Hip Right;Anterior (Removed)       Findings:  No infection, scar tissue planes    Detailed Description of Procedure:     Implant Record:  Trident II Tritanium Clusterhole Acetabular Shell: 46mm  Trident X3 0deg Polyethylene Insert: 32mm  [employer] Hip Stem -  High Offset: size 1    Biolox delta Ceramic V40 Femoral Head: 32mm +  0mm     Acetabular Low Profile Hex Screw : 6.5mm +  25 mm       Operative Report:  Indications: The patient is a 43 y.o. female with persistent left hip pain that interferes with daily activity despite a prior hip arthroscopy procedure.  The she has failed conservative treatment and after reviewing the risks, benefits and alternatives, she has elected to undergo a total hip arthroplasty.  I have reviewed the benefits and draw backs of an anterior approach and she is prepared to proceed, consent was obtained and all questions were answered to her satisfaction.    Description:   The patient was identified in the preoperative holding area and the correct site of the left hip was marked.  She was then brought to the operating room and kept on her hospital bed in the supine position and general anesthesia was administered.  Well-padded ski boots were placed on both feet to secure them into the [place]      Sincerely,           Kevin Benitez MD [facility]  Orthopaedic Surgeon - Hip Preservation & Sports Medicine   [facility] [address], [address], [address]  Email: [email or web address]  Office: [phone]      09/27/24  10:05 AM        Electronically signed by Kevin Benitez MD on 9/27/2024 at 10:05 AM

## 2024-09-27 NOTE — PROGRESS NOTES
Occupational Therapy  Facility/Department: [facility] GENERAL SURGERY  Occupational Therapy Initial Assessment/Treatment    Name: Petrona Guzman  : 1980  MRN: [id number]  Date of Service: 2024    Discharge Recommendations:  Home with assist PRN- mom to assist initially then spouse  Equipment- pt sent home w/ crutches for stair negotiation; pt reports already having 2WW; recommended possible shower chair, however, pt plans to sponge bathe initially     Patient Diagnosis(es):Primary osteoarthritis of L Hip  Past Medical History:  has a past medical history of Anesthesia complication, Anxiety, Arthritis, Asthma, Bipolar 1 disorder (HCC), Bipolar disorder (HCC), Chronic back pain, COPD (chronic obstructive pulmonary disease) ([facility]), Depression, Diabetes mellitus (HCC), Femoroacetabular impingement of right hip, Fibromyalgia, 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  Past Surgical History:  has a past surgical history that includes Ankle Fusion (Bilateral); Hip arthroscopy (Bilateral); hernia repair; Tonsillectomy and adenoidectomy; Elbow surgery (Bilateral); Wrist ganglion excision (Bilateral); Carpal tunnel release (Bilateral); arthrodesis (Left, 2019); hip surgery (Left, 2022); hip surgery (Right, 2022); Upper gastrointestinal endoscopy (N/A, 2023); Foot surgery (Bilateral); arthrodesis (Left, 2023); Sleeve Gastrectomy (N/A, 2023); ablation of dysrhythmic focus; hip surgery (Right, 2024); hip surgery (Left, 2024); and Total hip arthroplasty (Right, 2024).    Treatment Diagnosis: decreased strength and fxl mobility    Assessment  Performance deficits / Impairments: Decreased functional mobility ;Decreased strength  Assessment: Pt is a 42 y/o female who had a  required VC's to not take large steps to prevent L hip extension. Pt ambulated from bathroom to stairs w/ CGA. Pt negotiated 3 steps w/ min A attempting to use 2WW based on how she previously did it after her first sx. However, due to safety concerns, pt was educated on crutches. Pt able to demonstrate stair negotiation (3) w/ crutches only requiring CGA and cues as needed for sequencing. Pt verbalized understanding of using crutches. Pt then able to ambulate from practice stairs back to PACU bed w/ CGA using crutches throughout to improve familiarity.      Activity Tolerance  Activity Tolerance: Patient tolerated treatment well;Patient tolerated evaluation without incident  Bed mobility  Supine to Sit: Contact guard assistance  Sit to Supine: Contact guard assistance  Scooting: Stand by assistance  Transfers  Sit to stand: Contact guard assistance  Stand to sit: Contact guard assistance  Transfer Comments: 2WW for support, VCs to push from surface and not walker  Vision  Vision: Within Functional Limits  Hearing  Hearing: Within functional limits  Cognition  Overall Cognitive Status: WFL  Orientation  Overall Orientation Status: Within Functional Limits  Orientation Level: Oriented X4     Education Given To: Patient  Education Provided: Role of Therapy;Plan of Care;Precautions;ADL Adaptive Strategies;Transfer Training;Equipment  Education Method: Demonstration;Verbal  Barriers to Learning: None  Education Outcome: Verbalized understanding;Demonstrated understanding    Plan  5-7    Goals  By Discharge  Short Term Goal 1: Pt will perform LB dressing w/ mod I  Short Term Goal 2: Pt will perform fx mobility to<>from bathroom w/ SPVN    AM-PAC - ADL  AM-PAC Daily Activity - Inpatient   How much help is needed for putting on and taking off regular lower body clothing?: A Little  How much help is needed for bathing (which includes washing, rinsing, drying)?: A Little  How much help is needed for toileting (which includes

## 2024-09-27 NOTE — PROGRESS NOTES
Pt arrive to [facility] with HR in 110s. Pt states that she is in 5/10 pain and does feel her heart beating faster. Pt sleepy and snoring.     RN spoke with Dr. Cali and Dr. Cali at bedside. Orders given for 500ml LR fluid bolus.

## 2024-09-27 NOTE — PROGRESS NOTES
Ambulatory Surgery/Procedure Discharge Note    Vitals:    09/27/24 1620   BP:    Pulse: 88   Resp: 14   Temp:    SpO2: 94%   HR now in acceptable range.     In: 2290 [P.O.:240; I.V.:2050]  Out: 400     Restroom use offered before discharge.  Yes    Pain assessment:  level of pain (1-10, 10 severe), 5  Pain Level: 5    Pt states readiness to discharge home. Pt's mom concerned about o2 dropping with bipap on at home. Bipap placed on patient and o2 remained above 95%. Pt and mom educated on importance of wearing bipap at home. Both verbalized understanding.     Patient discharged to home/self care. Patient discharged via wheel chair by transporter to waiting family/S.O.       9/27/2024 4:36 PM

## 2024-09-27 NOTE — PROGRESS NOTES
Patient admitted to PACU # 18 from OR at 1013 post LEFT TOTAL HIP REPLACEMENT DIRECT ANTERIOR APPROACH, SHANIQUA LACHELLE ROBOTIC ASSISTED HIP REPLACEMENT, Conversion from Prior Surgery - Left  per Kevin Benitez MD .  Attached to PACU monitoring system and report received from anesthesia provider.  Patient was reported to be hemodynamically stable during procedure.      Patient drowsy on admission.  Pt has gauze & medipore tape to left hip surgical site.

## 2024-09-27 NOTE — PROGRESS NOTES
Pt remains sleepy and snoring. Pt o2 drops in low 80s intermittently while sleeping. Mom is at bedside and is voicing concerns.

## 2024-09-27 NOTE — DISCHARGE INSTRUCTIONS
Dr. Kevin Benitez MD [facility]  Hip Preservation & Sports Medicine Surgeon   [facility]          POST-OPERATIVE INSTRUCTIONS:     Apply ice (over your dressing) for 4-6 weeks after surgery to help reduce swelling.    This can be applied to the affected area 20 minutes out of every hour while you are awake.     The bulky dressing will be removed in 2-3 days, at your office visit.   Leave your glued mesh in place until removed by physicians office.     Please take all of your post-operative medications as prescribed.  Please do not alter how you take these medications without contacting the physician.  If you have any questions and/or concerns about your post-operative medications, please call our office.    Please do not take any additional Tylenol while you are taking the Norco.  This medication already contains Tylenol and taking additional Tylenol may cause liver damage.    It is okay to use Tylenol once you are no longer taking the Norco.    It is common to feel drowsy while taking pain medication.  Do not drink alcohol or drive while taking pain medication.    Nausea is also a common side effect of using pain medication.  If this occurs, try taking your medication with food.  Also drink plenty of water while taking the pain medication. You may use an over the counter anti-nausea as needed.  If nausea becomes severe, please contact the office and a prescription for Zofran may be prescribed.    To optimize your pain control, you can take your pain medication as prescribed for 2 days, then gradually taper off over the next day as tolerable.  If you feel your pain is not well controlled or begins to worsen following your first post-operative visit, please contact our office.   You will also need take a daily aspirin.  This will help prevent blood clots.       Please keep your dressings/wounds dry at all times.  Do not swim in pools, lakes, etc. until instructed to do so by your physician that it is  sent with you.  Use as directed.  When taking pain medications, you may experience the side effect of dizziness or drowsiness.  Do not drink alcohol or drive when taking these medication    [x]Give the list of your medications to your primary care physician on your next visit. Keep your med list updated and carry it with in case of emergencies.    [x] Narcotic pain medications can cause the side effect of significant constipation.  You may want to add a stool softener to your postoperative medication schedule or speak to your surgeon on how best to manage this side effect.    NARCOTIC SAFETY:  Your pain medicine is only for you to take.  Safely store your medicines.  Store pills up high and out of reach of children and pets.  Ensure safety caps are snapped tightly  Keep track of how many pills you have left    Unused medication can be disposed of by taking them to a drop-off box or take-back program that is authorized by the [facility].  Access to a site near you can be found on the [employer] website (deadiversion.[place].gov).    If you have a CPAP machine, it is very important that you use it daily during all periods of sleep and daytime rest during your recovery at home.  Surgery and Anesthesia place a significant amount of stress on your body.  Using your CPAP will help keep you safe and lessen the negative effects of that stress.    FOLLOW-UP RECOVERY CARE:  [x]Call the office  for follow-up appointment and problems    Watch for these possible complications, symptoms, or side effects of anesthesia.  Call physician if they or any other problems occur:  Signs of INFECTION   > Fever over 101°     > Redness, swelling, hardness or warmth at the operative site   >Foul smelling or cloudy drainage at the operative site   Unrelieved PAIN  Unrelieved NAUSEA  Blood soaked dressing.  (Some oozing may be normal)  Inability to urinate      Numb, pale, blue, cold or tingling extremity      Physician:

## 2024-09-27 NOTE — PROGRESS NOTES
PACU Transfer to [facility]       09/27/24 1230   Vital Signs   Temp 98.2 °F (36.8 °C)   Temp Source Temporal   Pulse (!) 114   Heart Rate Source Monitor   Respirations 16   /80   MAP (Calculated) 92   MAP (mmHg) 92   Pain Assessment   Pain Assessment 0-10   Pain Level 5   Pain Location Hip   Pain Orientation Left   Opioid-Induced Sedation   POSS Score S   Oxygen Therapy   SpO2 90 %   O2 Device None (Room air)         Intake/Output Summary (Last 24 hours) at 9/27/2024 1328  Last data filed at 9/27/2024 1215  Gross per 24 hour   Intake 2290 ml   Output 400 ml   Net 1890 ml       Pain assessment:  present - adequately treated  Pain Level: 5    Patient transferred to care of [facility] RN.    9/27/2024 1:28 PM

## 2024-09-27 NOTE — H&P
H&P Update     An electronic and hard copy history and physical was reviewed in the patient's chart.  Date of Surgery Update: September 27, 2024  Petrona Guzman was seen and examined.  Patient identified by surgeon; surgical site was confirmed by patient and surgeon.  ?  Signed By: Sincerely,    Kevin Benitez MD [facility]  Orthopaedic Surgeon - Hip Preservation & Sports Medicine   [facility] [address], [address], [address]  Email: [email or web address]  Office: [phone]    09/27/24  7:30 AM     ?    ?  ?

## 2024-09-27 NOTE — PROGRESS NOTES
Pt arrived to [facility] for LEFT TOTAL HIP REPLACEMENT DIRECT ANTERIOR APPROACH, SHANIQUA LACHELLE ROBOTIC ASSISTED HIP REPLACEMENT, Conversion from Prior Surgery - Left  with Dr. Benitez. Patient is resting in bed with call light. Antibiotic on hold to OR. x2 Pt belongings bag, CPAP machine, Mother keeping purse & cell phone. 20g IV to right wrist patent w/ IV fluids running. Labs sent. CHG wipes- bilat ANTONETTE hose. Patient is resting in bed with call light. All preprocedure tasks completed.  Procedure: peripheral block  MD:   Timeout performed.  Pt monitored closely on heart monitor, 2L NC, continuous pulse oximetry, EtCO2, and frequent BPs.   Pt remained alert and oriented x4. pt tolerated procedure well.

## 2024-09-27 NOTE — PROGRESS NOTES
Physical Therapy  Facility/Department: [facility] GENERAL SURGERY  Physical Therapy Initial Assessment    Name: Petrona Guzman  : 1980  MRN: [id number]  Date of Service: 2024    Discharge Recommendations:  Outpatient PT, Home with assist PRN    DME - no needs noted      Patient Diagnosis(es): Left DAYAMI, anterior  Past Medical History:  has a past medical history of Anesthesia complication, Anxiety, Arthritis, Asthma, Bipolar 1 disorder (HCC), Bipolar disorder (HCC), Chronic back pain, COPD (chronic obstructive pulmonary disease) (HCC), Depression, Diabetes mellitus (HCC), Femoroacetabular impingement of right hip, Fibromyalgia, Hoffa's syndrome (HCC), Hyperlipidemia, Obesity, PTSD (post-traumatic stress disorder), Schizophrenia (HCC), Sleep apnea, Substance abuse (HCC), SVT (supraventricular tachycardia) ([facility]), Tachycardia, and Tear of right gluteus medius tendon.  Past Surgical History:  has a past surgical history that includes Ankle Fusion (Bilateral); Hip arthroscopy (Bilateral); hernia repair; Tonsillectomy and adenoidectomy; Elbow surgery (Bilateral); Wrist ganglion excision (Bilateral); Carpal tunnel release (Bilateral); arthrodesis (Left, 2019); hip surgery (Left, 2022); hip surgery (Right, 2022); Upper gastrointestinal endoscopy (N/A, 2023); Foot surgery (Bilateral); arthrodesis (Left, 2023); Sleeve Gastrectomy (N/A, 2023); ablation of dysrhythmic focus; hip surgery (Right, 2024); hip surgery (Left, 2024); and Total hip arthroplasty (Right, 2024).    Assessment  Assessment: Pt lives with spouse and is normally independent with functional mobility, gait and ADL.  Just had right DAYAMI 8 weeks ago. Plan is for her to return home with assist of mother, then spouse as needed.  Will continue with OPPT as indicated to maximize mobility, safety and independence  Treatment Diagnosis: Decreased functional mobility  Barriers to Learning: none noted  Requires  a flat bed without using bedrails?: A Little  How much help is needed moving to and from a bed to a chair?: A Little  How much help is needed standing up from a chair using your arms?: A Little  How much help is needed walking in hospital room?: A Little  How much help is needed climbing 3-5 steps with a railing?: A Little  AM-PAC Inpatient Mobility Raw Score : 18  AM-PAC Inpatient T-Scale Score : 43.63  Mobility Inpatient CMS 0-100% Score: 46.58  Mobility Inpatient CMS G-Code Modifier : CK           Education  Patient Education  Education Given To: Patient  Education Provided: Role of Therapy;Plan of Care;Transfer Training  Education Provided Comments: gait training on level and steps  Education Method: Verbal;Demonstration  Barriers to Learning: None  Education Outcome: Verbalized understanding;Demonstrated understanding      Goals  Bed mobility with SBA  Transfers with SBA  Ambulate 150 ft with RW and SBA   Up and down 4 steps with mike crutches and CCG            Therapy Time   Individual Concurrent Group Co-treatment   Time In 1230         Time Out 1310         Minutes 40             Timed Code Treatment Minutes:   25    Total Treatment Minutes:  40      Laura Nieves, [id number]

## 2024-10-01 ENCOUNTER — TELEMEDICINE (OUTPATIENT)
Dept: PULMONOLOGY | Age: 44
End: 2024-10-01
Payer: COMMERCIAL

## 2024-10-01 VITALS — BODY MASS INDEX: 36.32 KG/M2 | HEIGHT: 65 IN | WEIGHT: 218 LBS

## 2024-10-01 DIAGNOSIS — I10 ESSENTIAL HYPERTENSION: ICD-10-CM

## 2024-10-01 DIAGNOSIS — E66.01 CLASS 2 SEVERE OBESITY DUE TO EXCESS CALORIES WITH SERIOUS COMORBIDITY AND BODY MASS INDEX (BMI) OF 36.0 TO 36.9 IN ADULT: ICD-10-CM

## 2024-10-01 DIAGNOSIS — I47.10 PAROXYSMAL SUPRAVENTRICULAR TACHYCARDIA (HCC): ICD-10-CM

## 2024-10-01 DIAGNOSIS — G47.33 OSA (OBSTRUCTIVE SLEEP APNEA): Primary | ICD-10-CM

## 2024-10-01 DIAGNOSIS — E66.812 CLASS 2 SEVERE OBESITY DUE TO EXCESS CALORIES WITH SERIOUS COMORBIDITY AND BODY MASS INDEX (BMI) OF 36.0 TO 36.9 IN ADULT: ICD-10-CM

## 2024-10-01 PROCEDURE — 99214 OFFICE O/P EST MOD 30 MIN: CPT | Performed by: NURSE PRACTITIONER

## 2024-10-01 PROCEDURE — G2211 COMPLEX E/M VISIT ADD ON: HCPCS | Performed by: NURSE PRACTITIONER

## 2024-10-01 PROCEDURE — G8427 DOCREV CUR MEDS BY ELIG CLIN: HCPCS | Performed by: NURSE PRACTITIONER

## 2024-10-01 ASSESSMENT — SLEEP AND FATIGUE QUESTIONNAIRES
HOW LIKELY ARE YOU TO NOD OFF OR FALL ASLEEP WHILE LYING DOWN TO REST IN THE AFTERNOON WHEN CIRCUMSTANCES PERMIT: HIGH CHANCE OF DOZING
HOW LIKELY ARE YOU TO NOD OFF OR FALL ASLEEP WHILE SITTING QUIETLY AFTER LUNCH WITHOUT ALCOHOL: MODERATE CHANCE OF DOZING
HOW LIKELY ARE YOU TO NOD OFF OR FALL ASLEEP WHILE SITTING INACTIVE IN A PUBLIC PLACE: HIGH CHANCE OF DOZING
HOW LIKELY ARE YOU TO NOD OFF OR FALL ASLEEP IN A CAR, WHILE STOPPED FOR A FEW MINUTES IN TRAFFIC: WOULD NEVER DOZE
HOW LIKELY ARE YOU TO NOD OFF OR FALL ASLEEP WHILE SITTING AND READING: HIGH CHANCE OF DOZING
HOW LIKELY ARE YOU TO NOD OFF OR FALL ASLEEP WHEN YOU ARE A PASSENGER IN A CAR FOR AN HOUR WITHOUT A BREAK: HIGH CHANCE OF DOZING
HOW LIKELY ARE YOU TO NOD OFF OR FALL ASLEEP WHILE SITTING AND TALKING TO SOMEONE: SLIGHT CHANCE OF DOZING
HOW LIKELY ARE YOU TO NOD OFF OR FALL ASLEEP WHILE WATCHING TV: MODERATE CHANCE OF DOZING
ESS TOTAL SCORE: 17

## 2024-10-01 NOTE — ASSESSMENT & PLAN NOTE
Chronic-with progression/exacerbation: Reviewed and analyzed results of physiologic download from patient's machine and reviewed with patient.  Supplies and parts as needed for her machine.  These are medically necessary.  Limit caffeine use after 3pm. Based on the analyzed data will continue with current settings. Encouraged her to use her machine as much as possible. Discussed contacting her DME if she would like to try a different mask. Will see her back in 3 months. Encouraged her to contact the office with any questions or concerns.    Encouraged consistent use of her machine each night, all night.  Discussed the importance of treating AMOS from a physiological standpoint.  Instructed not to drive unless had 4 hrs of effective therapy for her AMOS the night before.  No driving when sleepy.  Did review the risks of under or untreated AMOS including, but not limited to, higher risks of motor vehicle accidents, stroke, heart attacks, and death.  She understands and accepts all these risks.

## 2024-10-01 NOTE — PROGRESS NOTES
Diagnosis: [x] AMOS (G47.33) [] CSA (G47.31) [] Apnea (G47.30)   Length of Need: [x] 15 Months [] 99 Months [] Other:   Machine (PRAKASH!): [] Respironics Dream Station      Auto [] ResMed AirSense     Auto [] Other:     []  CPAP () [] Bilevel ()   Mode: [] Auto [] Spontaneous    Mode: [] Auto [] Spontaneous            Comfort Settings:      Humidifier: [] Heated ()        [x] Water chamber replacement ()/ 1 per 6 months        Mask:   [x] Nasal () /1 per 3 months [] Full Face () /1 per 3 months   [x] Patient choice -Size and fit mask [] Patient Choice - Size and fit mask   [] Dispense: [] Dispense:   [x] Headgear () / 1 per 3 months [] Headgear () / 1 per 3 months   [x] Replacement Nasal Cushion ()/2 per month [] Interface Replacement ()/1 per month   [x] Replacement Nasal Pillows ()/2 per month         Tubing: [x] Heated ()/1 per 3 months    [] Standard ()/1 per 3 months [] Other:           Filters: [x] Non-disposable ()/1 per 6 months     [x] Ultra-Fine, Disposable ()/2 per month        Miscellaneous: [] Chin Strap ()/ 1 per 6 months [] O2 bleed-in:        LPM   [] Oxymetry on CPAP/Bilevel []  Other:         Start Order Date: 10/01/24    MEDICAL JUSTIFICATION:  I, the undersigned, certify that the above prescribed supplies are medically necessary for this patient’s wellbeing.  In my opinion, the supplies are both reasonable and necessary in reference to accepted standards of medicalpractice in treatment of this patient’s condition.    ISHA LOVETT NP    NPI: [id number]       Order Signed Date: 10/01/24  [facility]  Pulmonary, Sleep, and Critical Care    Pulmonary, Sleep, and Critical Care  [facility] [address]                          [address] [place], [place] [address], [place] [address]                                    [place] [address]  Phone: [phone]    Fax:

## 2024-10-02 ENCOUNTER — OFFICE VISIT (OUTPATIENT)
Dept: ORTHOPEDIC SURGERY | Age: 44
End: 2024-10-02

## 2024-10-02 DIAGNOSIS — Z96.642 STATUS POST HIP REPLACEMENT, LEFT: Primary | ICD-10-CM

## 2024-10-02 DIAGNOSIS — M16.12 PRIMARY OSTEOARTHRITIS OF LEFT HIP: ICD-10-CM

## 2024-10-02 PROCEDURE — 99024 POSTOP FOLLOW-UP VISIT: CPT

## 2024-10-02 RX ORDER — HYDROCODONE BITARTRATE AND ACETAMINOPHEN 5; 325 MG/1; MG/1
1 TABLET ORAL EVERY 4 HOURS PRN
Qty: 18 TABLET | Refills: 0 | Status: SHIPPED | OUTPATIENT
Start: 2024-10-02 | End: 2024-10-05

## 2024-10-02 RX ORDER — RIVAROXABAN 10 MG/1
10 TABLET, FILM COATED ORAL
Qty: 10 TABLET | Refills: 0 | Status: SHIPPED | OUTPATIENT
Start: 2024-10-02

## 2024-10-02 NOTE — PROGRESS NOTES
changes from the left hip arthroplasty. No evidence of acute fracture or complications.    Impression: Stable postop x-ray.          Assessment :  Ms. Petrona Guzman is a pleasant 43 y.o. patient who is 5 days sp left rosaline, blaire gooden. No concerns at today's visit        Impression:  Encounter Diagnoses   Name Primary?    Status post hip replacement, left Yes    Primary osteoarthritis of left hip        Office Procedures:  Orders Placed This Encounter   Procedures    XR HIP 2-3 VW W PELVIS LEFT     ROOM 9     Standing Status:   Future     Number of Occurrences:   1     Standing Expiration Date:   9/30/2025       Treatment Plan:    Overall Petrona Guzman is doing well. The pain is well-controlled.     We recommend that She commence with physical therapy for timeline based progression of motion, weight bearing, and and strengthening.      Patient is WBAT, and is to maintain anterior hip precautions for 6 weeks.    The patient was told that she is restricted from driving for at least 1 weeks postop. They were advised to continue their NSAIDs, and Avni-Hose compression stockings for 14 days post surgery. We refilled her Norco today and added xarelto for 10 days as she has an IUD. She will finish ASA following for a total of 28 days post surgery.    All of her questions were fully answered today. We would like to see Petrona Guzman back in 2 weeks for follow-up visit and wound check.    Sincerely,  DENNISE Persaud      10/02/24  12:09 PM

## 2024-10-03 ENCOUNTER — TELEPHONE (OUTPATIENT)
Dept: ORTHOPEDIC SURGERY | Age: 44
End: 2024-10-03

## 2024-10-14 ENCOUNTER — HOSPITAL ENCOUNTER (OUTPATIENT)
Dept: PHYSICAL THERAPY | Age: 44
Setting detail: THERAPIES SERIES
Discharge: HOME | End: 2024-10-14
Payer: COMMERCIAL

## 2024-10-14 ENCOUNTER — OFFICE VISIT (OUTPATIENT)
Dept: ORTHOPEDIC SURGERY | Age: 44
End: 2024-10-14

## 2024-10-14 VITALS — HEIGHT: 65 IN | BODY MASS INDEX: 36.32 KG/M2 | WEIGHT: 218 LBS

## 2024-10-14 DIAGNOSIS — Z96.642 STATUS POST HIP REPLACEMENT, LEFT: Primary | ICD-10-CM

## 2024-10-14 DIAGNOSIS — M16.12 PRIMARY OSTEOARTHRITIS OF LEFT HIP: ICD-10-CM

## 2024-10-14 PROCEDURE — 97110 THERAPEUTIC EXERCISES: CPT

## 2024-10-14 PROCEDURE — 97164 PT RE-EVAL EST PLAN CARE: CPT

## 2024-10-14 PROCEDURE — 99024 POSTOP FOLLOW-UP VISIT: CPT | Performed by: ORTHOPAEDIC SURGERY

## 2024-10-14 NOTE — PROGRESS NOTES
History of Present Illness:  Petrona Guzman is a pleasant 43 y.o. female who presents for a post operative visit. She is 2 weeks out following a left DAYAMI, conversion from prio. Overall She is doing okay and feels that their pain is well controlled with current pain medications. She has been compliant with the  weight bearing instructions and anterior precautions. She has been in physical therapy at our   office.     She denies fevers, chills, numbness, tingling, and shortness of breath.    Medical History:  Patient's medications, allergies, past medical, surgical, social and family histories were reviewed and updated as appropriate.    No notes on file    Review of Systems  A 14 point review of systems was completed by the patient and is available in the media section of the scanned medical record and was reviewed on 10/14/2024.      Vital Signs:  There were no vitals filed for this visit.    General/Appearance: Alert and oriented and in no apparent distress.    Skin:  There are no skin lesions, cellulitis, or extreme edema. The patient has warm and well-perfused Bilateral lower  extremities with brisk capillary refill.      Hip  Exam: left    Inspection: Hip incision(s)   is  dry and intact and well healed.  . There is no erythema, drainage or other signs of infection    Palpation:  No crepitus to gentle motion / circumduction of the hip    Active Range of Motion: Deferred    Passive Range of Motion: 0 to 90 deg ir 15 er 15     Strength:  Deferred    Special Tests:  Deferred.    Neurovascular: Sensation to light touch is intact, no motor deficits, palpable radial pulses 2+    Radiology:     Plain radiographs of the left hip comprising 2 views (AP Pelvis, Brunson View and False Profile view of the left hip) were obtained and reviewed in the office: Shows postsurgical changes from the left hip arthroplasty. No evidence of acute fracture or complications.    Impression: Stable postop x-ray.           Assessment :

## 2024-10-14 NOTE — PLAN OF CARE
[facility] - Outpatient Rehabilitation and Therapy [address], [place] [address] office: [phone] fax: [phone]    Physical Therapy Re-Certification Plan of Care    Dear Kevin Benitez MD  ,    We had the pleasure of treating the following patient for physical therapy services at [facility]. A summary of our findings can be found in the updated assessment below.  This includes our plan of care.  If you have any questions or concerns regarding these findings, please do not hesitate to contact me at the office phone number checked above.  Thank you for the referral.     Physician Signature:________________________________Date:__________________  By signing above (or electronic signature), therapist's plan is approved by physician      Functional Outcome: WOMAC = 8/96 = 8% dys    LE MMT:   Right:  Left:  Hip Flexion:   4  NT  Hip aBduction:  4  4-/5 (in sitting)  Hip IR:   4+  NT  Hip ER:   4+   NT  Knee Extension:  5  4+  Knee Flexion:   5  4+      SURGERY: R DAYAMI / L DAYAMI   Scheduled Sx Date: 8/9/23; 9/27   Hospital D/C recommendations home health   Additional Comments:       Functional Testing Prehab     Date: 9/24/24 Post-op Re-Eval    Date: 10/14/24 6 weeks from surgery   Date : D/C      Date:    Current AD use None      TUG (sec) 7 8     30 second sit to stand w/out UE (reps)   15   18     Gait speed (m/s)  4x10m fast walk  Result = 40m/total time 1.53 1.25     Supine Hip/Knee AROM L R L R L R L R   Hip Flexion 98 116 100 95       Knee Flexion 120 120 120 120       Knee Extension 0 0 0 0       WOMAC (raw) 8 37         Overall Response to Treatment:  Pt currently is doing quite well. ROM with mild limitations as noted as well as strength as anticipated with post op status. HEP was reviewed and updated with pt and f/u visits scheduled as pt will benefit from PT guided post operative recovery to maximize strength, functional mobility and

## 2024-10-16 ENCOUNTER — APPOINTMENT (OUTPATIENT)
Dept: PHYSICAL THERAPY | Age: 44
End: 2024-10-16
Payer: COMMERCIAL

## 2024-10-21 ENCOUNTER — HOSPITAL ENCOUNTER (OUTPATIENT)
Dept: PHYSICAL THERAPY | Age: 44
Setting detail: THERAPIES SERIES
Discharge: HOME | End: 2024-10-21
Payer: COMMERCIAL

## 2024-10-21 PROCEDURE — 97530 THERAPEUTIC ACTIVITIES: CPT

## 2024-10-21 PROCEDURE — 97110 THERAPEUTIC EXERCISES: CPT

## 2024-10-21 NOTE — PROGRESS NOTES
SUBJECTIVE:    Patient ID:  Petrona Guzman is a 44 y.o. female      Patient is here for a medication check for hyperlipidemia, diabetes, chronic tachycardia, GERD, AMOS, COPD/asthma, vitamin D deficiency, fibromyalgia and chronic pain.  Tachycardia managed with metoprolol twice daily. GERD symptoms are managed with omeprazole.  Denies indigestion/heartburn, difficulty swallowing, epigastric pain, nausea, vomiting, diarrhea, constipation or blood in stool.  She has a history of anxiety, depression, bipolar and PTSD.  She is followed a psychiatrist every 2 to 3 months and sees a counselor twice a week.  She is currently taking Celexa, Depakote and hydroxyzine as needed.  Currently denies thoughts of self-harm, suicidal or homicidal ideations.  States she has been evaluated by cardiology and will be followed annually.  She was told to follow-up with pulmonology annually for COPD/asthma and AMOS.  AMOS is managed with nightly CPAP usage.  She also has a history or alcoholism for 24 years, she has been sober for 4 years.       She is followed by rheumatology for fibromyalgia and Sojourner syndrome, most recent progress note reviewed from 1/4/2024.     She needs bilateral hip replacements, has has multiple foot.ankle surgeries.  She is followed by ortho/podiatry on a fairly regular basis.  She continues to do physical therapy exercises at home.       She has also had a gastric bypass and is unable to take NSAIDs for chronic pain syndrome/fibromyalgia.  Patient is currently taking 300 mg of gabapentin twice a day with adequate relief.  She is requesting an increase of her gabapentin to 300 mg twice daily.       She is also follow by psychiatry for anxiety, depression, bipolar and PTSD.  Denies thoughts of self-harm, suicidal or homicidal ideations or risk-taking behaviors.  She is currently on citalopram 40 mg once daily, Depakote 500 mg daily and hydroxyzine.       Labs reviewed for 9/27/2024.      Encourage lifestyle

## 2024-10-21 NOTE — FLOWSHEET NOTE
[] Adjusted    Therapist goals for Patient:   Short Term Goals: To be achieved in: 2 weeks  1. Independent and compliant (minimum of 3 days per week) with HEP and progressions to progress PT plan of care and promote restoration of ROM/strength.  [] Progressing: [] Met: [] Not Met: [] Adjusted  2. Patient will have a decrease in pain to <2/10 to tolerate 45 minutes of continuous standing.  [] Progressing: [] Met: [] Not Met: [] Adjusted    Long Term Goals: To be achieved in: 4 weeks (goals re established on 10/14)  1. Pt will improve Womac by 9 points to promote clinically significant improvement in LE function and reduce level of disability.  [] Progressing: [] Met: [] Not Met: [] Adjusted  2. Patient will demonstrate increased AROM in R hip to 120 flex, extension >10deg to allow for proper ROM required for normal gait sequence and hip mobility.  [] Progressing: [] Met: [] Not Met: [] Adjusted  3. Patient will demonstrate an increase R hip flex/abd/ext/ER/IR strength to at least 5/5 for the patient to ascend and descend 15 stairs.  [] Progressing: [] Met: [] Not Met: [] Adjusted  4. Patient will be able to walk consecutively for 20 minutes without an increase in symptoms above 2/10  [] Progressing: [] Met: [] Not Met: [] Adjusted  5. Patient will be able to complete 15x squats w/ 15# weight in order to lift.  [] Progressing: [] Met: [] Not Met: [] Adjusted      TREATMENT PLAN     Plan: continue with B hip ROM and strength progressions.     Electronically Signed by John Carrington PTA, ATC  Date: 10/21/2024     Note: Portions of this note have been templated and/or copied from initial evaluation, reassessments and prior notes for documentation efficiency.

## 2024-10-22 ENCOUNTER — OFFICE VISIT (OUTPATIENT)
Dept: FAMILY MEDICINE CLINIC | Age: 44
End: 2024-10-22

## 2024-10-22 VITALS
DIASTOLIC BLOOD PRESSURE: 80 MMHG | TEMPERATURE: 97 F | BODY MASS INDEX: 36.61 KG/M2 | HEART RATE: 60 BPM | SYSTOLIC BLOOD PRESSURE: 120 MMHG | OXYGEN SATURATION: 97 % | WEIGHT: 220 LBS

## 2024-10-22 DIAGNOSIS — E55.9 VITAMIN D DEFICIENCY: ICD-10-CM

## 2024-10-22 DIAGNOSIS — M79.7 FIBROMYALGIA: ICD-10-CM

## 2024-10-22 DIAGNOSIS — M35.01 SJOGREN SYNDROME WITH KERATOCONJUNCTIVITIS (HCC): ICD-10-CM

## 2024-10-22 DIAGNOSIS — Z23 NEED FOR INFLUENZA VACCINATION: ICD-10-CM

## 2024-10-22 DIAGNOSIS — Z98.84 S/P BARIATRIC SURGERY: ICD-10-CM

## 2024-10-22 DIAGNOSIS — E78.2 MIXED HYPERLIPIDEMIA: Primary | ICD-10-CM

## 2024-10-22 DIAGNOSIS — G47.33 OSA (OBSTRUCTIVE SLEEP APNEA): ICD-10-CM

## 2024-10-22 DIAGNOSIS — G89.29 OTHER CHRONIC PAIN: ICD-10-CM

## 2024-10-22 DIAGNOSIS — K21.9 GASTROESOPHAGEAL REFLUX DISEASE, UNSPECIFIED WHETHER ESOPHAGITIS PRESENT: ICD-10-CM

## 2024-10-22 DIAGNOSIS — G43.909 MIGRAINE WITHOUT STATUS MIGRAINOSUS, NOT INTRACTABLE, UNSPECIFIED MIGRAINE TYPE: ICD-10-CM

## 2024-10-22 DIAGNOSIS — R00.0 CHRONIC TACHYCARDIA: ICD-10-CM

## 2024-10-22 DIAGNOSIS — F20.89 OTHER SCHIZOPHRENIA (HCC): ICD-10-CM

## 2024-10-22 DIAGNOSIS — E11.9 TYPE 2 DIABETES MELLITUS WITHOUT COMPLICATION, WITHOUT LONG-TERM CURRENT USE OF INSULIN (HCC): ICD-10-CM

## 2024-10-22 DIAGNOSIS — F43.10 PTSD (POST-TRAUMATIC STRESS DISORDER): ICD-10-CM

## 2024-10-22 RX ORDER — TIRZEPATIDE 2.5 MG/.5ML
2.5 INJECTION, SOLUTION SUBCUTANEOUS WEEKLY
Qty: 2 ML | Refills: 2 | Status: SHIPPED | OUTPATIENT
Start: 2024-10-22

## 2024-10-22 RX ORDER — GABAPENTIN 300 MG/1
300 CAPSULE ORAL 2 TIMES DAILY
Qty: 180 CAPSULE | Refills: 0 | Status: SHIPPED | OUTPATIENT
Start: 2024-10-22 | End: 2025-01-20

## 2024-10-22 RX ORDER — METFORMIN HYDROCHLORIDE 500 MG/1
1000 TABLET, EXTENDED RELEASE ORAL
Qty: 180 TABLET | Refills: 1 | Status: SHIPPED | OUTPATIENT
Start: 2024-10-22

## 2024-10-22 NOTE — PATIENT INSTRUCTIONS
Medications refilled and fasting labs ordered     Reviewed low-cholesterol diet     Continue ezetimibe (Zetia) 10 mg daily and Crestor     Reviewed diabetic diet     Continue gabapentin 300 mg twice daily     Mounjaro/Zepbound 2.5 mg once weekly (awaiting prior authorization)     Follow-up with specialist as needed/directed (psych, Ortho, podiatry pulmonology, cardiology)      Encourage continued lifestyle modifications (better food choices, portion control and increasing activity)     Follow up with cardiology as needed/directed (6 months)     Follow up with pulmonology as needed/directed (as needed)      Follow up with rheumatology as needed/directed (re-establishing)     Follow up with psychiatrist as needed/directed (3 months)      Follow up in 6 months, sooner if symptoms worsen or persist

## 2024-10-23 ENCOUNTER — APPOINTMENT (OUTPATIENT)
Dept: PHYSICAL THERAPY | Age: 44
End: 2024-10-23
Payer: COMMERCIAL

## 2024-10-27 ASSESSMENT — PATIENT HEALTH QUESTIONNAIRE - PHQ9
SUM OF ALL RESPONSES TO PHQ9 QUESTIONS 1 & 2: 0
SUM OF ALL RESPONSES TO PHQ QUESTIONS 1-9: 0
1. LITTLE INTEREST OR PLEASURE IN DOING THINGS: NOT AT ALL
2. FEELING DOWN, DEPRESSED OR HOPELESS: NOT AT ALL

## 2024-10-28 ENCOUNTER — HOSPITAL ENCOUNTER (OUTPATIENT)
Dept: PHYSICAL THERAPY | Age: 44
Setting detail: THERAPIES SERIES
Discharge: HOME | End: 2024-10-28
Payer: COMMERCIAL

## 2024-10-28 PROCEDURE — 97110 THERAPEUTIC EXERCISES: CPT

## 2024-10-28 PROCEDURE — 97530 THERAPEUTIC ACTIVITIES: CPT

## 2024-10-28 NOTE — FLOWSHEET NOTE
ICD-10 code that has a direct and significant impact on the need for therapy.  (Significantly impacts the rate of recovery and is associated with a primary condition.)   The patient has generalized musculoskeletal conditions or a condition affecting multiple sites that will have a direct impact on the rate of recovery      Return to Play: NA    Prognosis for POC: [x] Good [] Fair  [] Poor    Patient requires continued skilled intervention: [x] Yes - post-operatively [] No      CHARGE CAPTURE     PT CHARGE GRID   CPT Code (TIMED) minutes # CPT Code (UNTIMED) #     Therex (05391)  18 2  EVAL:LOW (64802 - Typically 20 minutes face-to-face)     Neuromusc. Re-ed (92905) 5   Re-Eval (65945)     Manual (02371)    Estim Unattended (27498)     Ther. Act (06851) 18 1  Mech. Traction (27685)     Gait (88581)    Dry Needle 1-2 muscle (20560)     Aquatic Therex (42712)    Dry Needle 3+ muscle (20561)     Iontophoresis (34081)    VASO (81620)     Ultrasound (63154)    Group Therapy (54397)     Estim Attended (97364)    Canalith Repositioning (48780)     Other:    Other:    Total Timed Code Tx Minutes 41 2       Total Treatment Minutes 41      Charge Justification:  (56127) THERAPEUTIC EXERCISE - Provided verbal/tactile cueing for activities related to strengthening, flexibility, endurance, ROM performed to prevent loss of range of motion, maintain or improve muscular strength or increase flexibility, following either an injury or surgery.   (08047) THERAPEUTIC ACTIVITY - use of dynamic activities to improve functional performance. (Ex include squatting, ascending/descending stairs, walking, bending, lifting, catching, throwing, pushing, pulling, jumping.)  Direct, one on one contact, billed in 15-minute increments.    GOALS     GOALS:  Patient stated goal: cooking/standing for prolonged periods, walking long distances  [] Progressing: [] Met: [] Not Met: [] Adjusted    Therapist goals for Patient:   Short Term Goals: To be

## 2024-10-30 ENCOUNTER — APPOINTMENT (OUTPATIENT)
Dept: PHYSICAL THERAPY | Age: 44
End: 2024-10-30
Payer: COMMERCIAL

## 2024-11-04 ENCOUNTER — HOSPITAL ENCOUNTER (OUTPATIENT)
Dept: PHYSICAL THERAPY | Age: 44
Setting detail: THERAPIES SERIES
Discharge: HOME | End: 2024-11-04
Payer: COMMERCIAL

## 2024-11-04 PROCEDURE — 97530 THERAPEUTIC ACTIVITIES: CPT

## 2024-11-04 PROCEDURE — 97112 NEUROMUSCULAR REEDUCATION: CPT

## 2024-11-04 PROCEDURE — 97110 THERAPEUTIC EXERCISES: CPT

## 2024-11-04 NOTE — FLOWSHEET NOTE
colitis    Endocrine conditions   [] Hypothyroid (E03.9)  [] Hyperthyroid [] Hx of COVID    Musculoskeletal conditions  [] Disc pathology   [] Congenital spine pathologies   [] Osteoporosis (M81.8)  [] Osteopenia (M85.8)  [] Scoliosis    Cardio/Pulmonary conditions  [] Asthma (J45)  [] Coughing   [] COPD (J44.9)  [] CHF  [] A-fib          Rheumatological conditions  [x] Fibromyalgia (M79.7)  [] Lupus  [] Sjogrens  [] Ankylosing spondylitis  [] Other autoimmune       Metabolic conditions  [] Morbid obesity (E66.01)  [] Diabetes type 1(E10.65) or 2 (E11.65)   [] Neuropathy (G60.9)        Cardiovascular conditions  [] Hypertension (I10)  [x] Hyperlipidemia (E78.5)  [] Angina pectoris (I20)  [] Atherosclerosis (I70)  [] Pacemaker/Defib  [] Hx of CABG/stent/cardiac surgeries        Developmental Disorders  [] Autism (F84.0)  [] Cerebral Palsy (G80)  [] Down Syndrome (Q90.9)  [] Developmental delay     Psychological Disorders  [x] Anxiety (F41.9)  [x] Depression (F32.9)   [x] Bipolar  [x] Other:Schizophrenia       Prior surgeries  [x] Involved limb  [] Previous spinal surgery  []  section birth  [] Hysterectomy  [] Bowel / bladder surgery  [x] Other relevant surgeries: B ankle fusions        Other conditions  [] Vertigo  [] Syncope  [] Kidney Failure  [] Cancer  [] Pregnancy  [] Incontinence   Other Co-morbidities not listed:     OBJECTIVE EXAMINATION     :     SURGERY: R DAYAMI / L DAYAMI   Scheduled Sx Date: 23;    Hospital D/C recommendations home health   Additional Comments:       Functional Testing Prehab     Date: 24 Post-op Re-Eval    Date: 10/14/24 6 weeks from surgery   Date : D/C      Date:    Current AD use None      TUG (sec) 7 8     30 second sit to stand w/out UE (reps)   15   18     Gait speed (m/s)  4x10m fast walk  Result = 40m/total time 1.53 1.25     Supine Hip/Knee AROM L R L R L R L R   Hip Flexion 98 116 100 95       Knee Flexion 120 120 120 120       Knee Extension 0 0 0 0

## 2024-11-06 ENCOUNTER — HOSPITAL ENCOUNTER (OUTPATIENT)
Dept: PHYSICAL THERAPY | Age: 44
Setting detail: THERAPIES SERIES
End: 2024-11-06
Payer: COMMERCIAL

## 2024-11-07 DIAGNOSIS — E66.01 CLASS 2 SEVERE OBESITY DUE TO EXCESS CALORIES WITH SERIOUS COMORBIDITY AND BODY MASS INDEX (BMI) OF 36.0 TO 36.9 IN ADULT: Primary | ICD-10-CM

## 2024-11-07 DIAGNOSIS — E66.812 CLASS 2 SEVERE OBESITY DUE TO EXCESS CALORIES WITH SERIOUS COMORBIDITY AND BODY MASS INDEX (BMI) OF 36.0 TO 36.9 IN ADULT: Primary | ICD-10-CM

## 2024-11-07 RX ORDER — SEMAGLUTIDE 0.25 MG/.5ML
0.25 INJECTION, SOLUTION SUBCUTANEOUS
Qty: 6 ML | Refills: 0 | Status: SHIPPED | OUTPATIENT
Start: 2024-11-07

## 2024-11-10 ENCOUNTER — APPOINTMENT (OUTPATIENT)
Dept: GENERAL RADIOLOGY | Age: 44
End: 2024-11-10
Payer: COMMERCIAL

## 2024-11-10 ENCOUNTER — HOSPITAL ENCOUNTER (EMERGENCY)
Age: 44
Discharge: HOME | End: 2024-11-10
Payer: COMMERCIAL

## 2024-11-10 ENCOUNTER — APPOINTMENT (OUTPATIENT)
Dept: CT IMAGING | Age: 44
End: 2024-11-10
Payer: COMMERCIAL

## 2024-11-10 VITALS
HEIGHT: 65 IN | SYSTOLIC BLOOD PRESSURE: 131 MMHG | WEIGHT: 222 LBS | HEART RATE: 83 BPM | TEMPERATURE: 98.5 F | DIASTOLIC BLOOD PRESSURE: 83 MMHG | BODY MASS INDEX: 36.99 KG/M2 | OXYGEN SATURATION: 94 % | RESPIRATION RATE: 16 BRPM

## 2024-11-10 DIAGNOSIS — S70.02XA CONTUSION OF LEFT HIP, INITIAL ENCOUNTER: ICD-10-CM

## 2024-11-10 DIAGNOSIS — S69.92XA FINGER INJURY, LEFT, INITIAL ENCOUNTER: ICD-10-CM
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
## 2024-11-10 DIAGNOSIS — S16.1XXA STRAIN OF NECK MUSCLE, INITIAL ENCOUNTER: ICD-10-CM

## 2024-11-10 DIAGNOSIS — S99.911A ANKLE INJURY, RIGHT, INITIAL ENCOUNTER: ICD-10-CM

## 2024-11-10 PROCEDURE — 99284 EMERGENCY DEPT VISIT MOD MDM: CPT

## 2024-11-10 PROCEDURE — 73610 X-RAY EXAM OF ANKLE: CPT

## 2024-11-10 PROCEDURE — 73501 X-RAY EXAM HIP UNI 1 VIEW: CPT

## 2024-11-10 PROCEDURE — 72125 CT NECK SPINE W/O DYE: CPT

## 2024-11-10 PROCEDURE — 73130 X-RAY EXAM OF HAND: CPT

## 2024-11-10 PROCEDURE — 6370000000 HC RX 637 (ALT 250 FOR IP): Performed by: PHYSICIAN ASSISTANT

## 2024-11-10 RX ORDER — ONDANSETRON 4 MG/1
4 TABLET, ORALLY DISINTEGRATING ORAL ONCE
Status: COMPLETED | OUTPATIENT
Start: 2024-11-10 | End: 2024-11-10

## 2024-11-10 RX ORDER — METHOCARBAMOL 500 MG/1
750 TABLET, FILM COATED ORAL ONCE
Status: COMPLETED | OUTPATIENT
Start: 2024-11-10 | End: 2024-11-10

## 2024-11-10 RX ORDER — NAPROXEN 250 MG/1
500 TABLET ORAL ONCE
Status: DISCONTINUED | OUTPATIENT
Start: 2024-11-10 | End: 2024-11-10

## 2024-11-10 RX ORDER — METHOCARBAMOL 750 MG/1
750 TABLET, FILM COATED ORAL 4 TIMES DAILY
Qty: 40 TABLET | Refills: 0 | Status: SHIPPED | OUTPATIENT
Start: 2024-11-10 | End: 2024-11-20

## 2024-11-10 RX ORDER — LIDOCAINE 50 MG/G
1 PATCH TOPICAL DAILY
Qty: 30 PATCH | Refills: 0 | Status: SHIPPED | OUTPATIENT
Start: 2024-11-10

## 2024-11-10 RX ORDER — HYDROCODONE BITARTRATE AND ACETAMINOPHEN 5; 325 MG/1; MG/1
1 TABLET ORAL ONCE
Status: COMPLETED | OUTPATIENT
Start: 2024-11-10 | End: 2024-11-10

## 2024-11-10 RX ADMIN — HYDROCODONE BITARTRATE AND ACETAMINOPHEN 1 TABLET: 5; 325 TABLET ORAL at 08:30

## 2024-11-10 RX ADMIN — ONDANSETRON 4 MG: 4 TABLET, ORALLY DISINTEGRATING ORAL at 08:30

## 2024-11-10 RX ADMIN — METHOCARBAMOL 750 MG: 500 TABLET ORAL at 08:31

## 2024-11-10 ASSESSMENT — PAIN - FUNCTIONAL ASSESSMENT: PAIN_FUNCTIONAL_ASSESSMENT: 0-10

## 2024-11-10 ASSESSMENT — PAIN SCALES - GENERAL: PAINLEVEL_OUTOF10: 8

## 2024-11-10 NOTE — ED PROVIDER NOTES
were within normal range or not returned as of this dictation.    EKG: When ordered, EKG's are interpreted by the Emergency Department Physician in the absence of a cardiologist.  Please see their note for interpretation of EKG.    RADIOLOGY:   Non-plain film images such as CT, Ultrasound and MRI are read by the radiologist. Plain radiographic images are visualized and preliminarily interpreted by the ED Provider with the below findings:      Interpretation per the Radiologist below, if available at the time of this note:    CT CERVICAL SPINE WO CONTRAST   Final Result   No acute abnormality of the cervical spine.         XR ANKLE RIGHT (MIN 3 VIEWS)   Final Result   No acute finding of the right ankle. Prior surgical changes are noted.         XR HAND LEFT (MIN 3 VIEWS)   Final Result   No acute finding of the left hand.         XR HIP 1 VW W PELVIS LEFT   Final Result   1. No acute finding of the pelvis or left hip.   2. Bilateral total hip arthroplasty.           No results found.    No results found.    PROCEDURES   Unless otherwise noted below, none     Procedures    CRITICAL CARE TIME (.cctime)       PAST MEDICAL HISTORY      has a past medical history of Anesthesia complication, Anxiety, Arthritis, Asthma, Bipolar 1 disorder ([facility]), Bipolar disorder ([facility]), Chronic back pain, COPD (chronic obstructive pulmonary disease) ([facility]), Depression, Diabetes mellitus (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), Sleep apnea, Substance abuse ([facility]) (2004), SVT (supraventricular tachycardia) ([facility]), Tachycardia, and Tear of right gluteus medius tendon (08/09/2022).     EMERGENCY DEPARTMENT COURSE and DIFFERENTIAL DIAGNOSIS/MDM:   Vitals:    Vitals:    11/10/24 0806   BP: 131/83   Pulse: 83   Resp: 16   Temp: 98.5 °F (36.9 °C)   TempSrc: Oral   SpO2: 94%   Weight: 100.7 kg

## 2024-11-11 ENCOUNTER — HOSPITAL ENCOUNTER (OUTPATIENT)
Dept: PHYSICAL THERAPY | Age: 44
Setting detail: THERAPIES SERIES
Discharge: HOME | End: 2024-11-11
Payer: COMMERCIAL

## 2024-11-11 ENCOUNTER — OFFICE VISIT (OUTPATIENT)
Dept: ORTHOPEDIC SURGERY | Age: 44
End: 2024-11-11

## 2024-11-11 VITALS — HEIGHT: 65 IN | BODY MASS INDEX: 37.65 KG/M2 | WEIGHT: 226 LBS

## 2024-11-11 DIAGNOSIS — M16.12 PRIMARY OSTEOARTHRITIS OF LEFT HIP: ICD-10-CM

## 2024-11-11 DIAGNOSIS — Z96.642 STATUS POST HIP REPLACEMENT, LEFT: Primary | ICD-10-CM

## 2024-11-11 PROCEDURE — 99024 POSTOP FOLLOW-UP VISIT: CPT | Performed by: ORTHOPAEDIC SURGERY

## 2024-11-11 PROCEDURE — 97530 THERAPEUTIC ACTIVITIES: CPT

## 2024-11-11 PROCEDURE — 97110 THERAPEUTIC EXERCISES: CPT
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
## 2024-11-11 NOTE — FLOWSHEET NOTE
goals  The patient has a musculoskeletal condition(s) with a corresponding ICD-10 code that is of complexity and severity that require skilled therapeutic intervention. This has a direct and significant impact on the need for therapy and significantly impacts the rate of recovery.   The patient has a complexity identified by an ICD-10 code that has a direct and significant impact on the need for therapy.  (Significantly impacts the rate of recovery and is associated with a primary condition.)   The patient has generalized musculoskeletal conditions or a condition affecting multiple sites that will have a direct impact on the rate of recovery      Return to Play: NA    Prognosis for POC: [x] Good [] Fair  [] Poor    Patient requires continued skilled intervention: [x] Yes - post-operatively [] No      CHARGE CAPTURE     PT CHARGE GRID   CPT Code (TIMED) minutes # CPT Code (UNTIMED) #     Therex (79255)  20 2  EVAL:LOW (04230 - Typically 20 minutes face-to-face)     Neuromusc. Re-ed (68055) 5   Re-Eval (23002)     Manual (56717)    Estim Unattended (74586)     Ther. Act (10491) 13 1  Mech. Traction (66332)     Gait (04044)    Dry Needle 1-2 muscle (29143)     Aquatic Therex (70347)    Dry Needle 3+ muscle (20561)     Iontophoresis (82904)    VASO (85096)     Ultrasound (35526)    Group Therapy (26215)     Estim Attended (86605)    Canalith Repositioning (24805)     Other:    Other:    Total Timed Code Tx Minutes 38 3       Total Treatment Minutes 38      Charge Justification:  (63297) THERAPEUTIC EXERCISE - Provided verbal/tactile cueing for activities related to strengthening, flexibility, endurance, ROM performed to prevent loss of range of motion, maintain or improve muscular strength or increase flexibility, following either an injury or surgery.   (93987) THERAPEUTIC ACTIVITY - use of dynamic activities to improve functional performance. (Ex include squatting, ascending/descending stairs, walking, bending,

## 2024-11-13 ENCOUNTER — HOSPITAL ENCOUNTER (OUTPATIENT)
Dept: PHYSICAL THERAPY | Age: 44
Setting detail: THERAPIES SERIES
End: 2024-11-13
Payer: COMMERCIAL

## 2024-11-18 NOTE — TELEPHONE ENCOUNTER
Phone still rings busy. Pt states he chipped a tooth about 2 years ago and hasn't had any issues with it until this morning. States he woke up and thinks that it is infected. Pt does not have a dentist and is calling around trying to get into see one. Pt asking if you can send in an antibiotic until he can find a dentist. Pt uses [facility] as his pharmacy. Please advise.

## 2024-11-20 ENCOUNTER — CLINICAL DOCUMENTATION (OUTPATIENT)
Dept: BARIATRICS/WEIGHT MGMT | Age: 44
End: 2024-11-20

## 2024-11-20 NOTE — PROGRESS NOTES
Dietary Assessment Note    This eval was conducted via phone on 11/20/24 in preparation for their visit on 11/26/24 with Dr. Lechuga.     Vitals: There were no vitals filed for this visit. Patient gained 2.0 lbs over 4 months per self reported weight of 220#.    Total Weight Loss: 15 lbs    Labs reviewed: no lab studies available for review at time of visit    Protein intake: 60-80 grams/day     Fluid intake: 48-64 oz/day    Multivitamin/mineral intake: yes fusion with fe capsule    Calcium intake: yes 2 soft chews    Other: biotin    Exercise: yes stationary bike for 6 days/ week, 30-45 minutes    Nutrition Assessment: 1 year 2 months post-op visit.     Breakfast: cottage cheese and fruit  Snack: none  Lunch: jose nuts and fruit OR small chicken breast and veggies and fruit  Snack: PB crackers  Dinner: ramen noodles with veggies/eggs   Snack: none % fruit popsicles    Amount able to eat per sitting: no more than 1 cup    Following 30/30/30 rule: yes    Food Intolerances/issues: none    Client Concerns: none    Goals:   - continue aiming for 60-80g protein and 48-64 fl oz/d  - continue current diet  - continue current exercise    Plan: Follow up at 18 months post op and as needed    Patt Manley RD, LD

## 2024-11-26 ENCOUNTER — OFFICE VISIT (OUTPATIENT)
Dept: BARIATRICS/WEIGHT MGMT | Age: 44
End: 2024-11-26
Payer: COMMERCIAL

## 2024-11-26 VITALS
HEIGHT: 64 IN | WEIGHT: 223 LBS | RESPIRATION RATE: 18 BRPM | OXYGEN SATURATION: 98 % | SYSTOLIC BLOOD PRESSURE: 100 MMHG | DIASTOLIC BLOOD PRESSURE: 64 MMHG | BODY MASS INDEX: 38.07 KG/M2 | HEART RATE: 86 BPM

## 2024-11-26 DIAGNOSIS — E66.01 SEVERE OBESITY (BMI 35.0-39.9) WITH COMORBIDITY: ICD-10-CM

## 2024-11-26 DIAGNOSIS — E11.69 TYPE 2 DIABETES MELLITUS WITH OBESITY (HCC): ICD-10-CM

## 2024-11-26 DIAGNOSIS — I10 ESSENTIAL HYPERTENSION: ICD-10-CM

## 2024-11-26 DIAGNOSIS — E66.9 TYPE 2 DIABETES MELLITUS WITH OBESITY (HCC): ICD-10-CM

## 2024-11-26 DIAGNOSIS — E78.2 MIXED HYPERLIPIDEMIA: ICD-10-CM

## 2024-11-26 DIAGNOSIS — Z98.84 S/P LAPAROSCOPIC SLEEVE GASTRECTOMY: Primary | ICD-10-CM

## 2024-11-26 PROCEDURE — 99214 OFFICE O/P EST MOD 30 MIN: CPT | Performed by: SURGERY

## 2024-11-26 PROCEDURE — 1036F TOBACCO NON-USER: CPT | Performed by: SURGERY

## 2024-11-26 PROCEDURE — G8484 FLU IMMUNIZE NO ADMIN: HCPCS | Performed by: SURGERY

## 2024-11-26 PROCEDURE — 3044F HG A1C LEVEL LT 7.0%: CPT | Performed by: SURGERY

## 2024-11-26 PROCEDURE — 3078F DIAST BP <80 MM HG: CPT | Performed by: SURGERY

## 2024-11-26 PROCEDURE — G8427 DOCREV CUR MEDS BY ELIG CLIN: HCPCS | Performed by: SURGERY

## 2024-11-26 PROCEDURE — 2022F DILAT RTA XM EVC RTNOPTHY: CPT | Performed by: SURGERY

## 2024-11-26 PROCEDURE — G8417 CALC BMI ABV UP PARAM F/U: HCPCS | Performed by: SURGERY

## 2024-11-26 PROCEDURE — G2211 COMPLEX E/M VISIT ADD ON: HCPCS | Performed by: SURGERY

## 2024-11-26 PROCEDURE — 3074F SYST BP LT 130 MM HG: CPT | Performed by: SURGERY

## 2024-11-26 NOTE — PROGRESS NOTES
Disp: 6.7 each, Rfl: 1    citalopram (CELEXA) 40 MG tablet, TAKE 1 TABLET BY MOUTH EVERY DAY IN THE MORNING, Disp: , Rfl:     ammonium lactate (LAC-HYDRIN) 12 % lotion, Apply topically daily, Disp: 225 mL, Rfl: 2      Review of Systems - History obtained from the patient  General ROS: negative  Psychological ROS: negative  Ophthalmic ROS: negative  Neurological ROS: negative  ENT ROS: negative  Allergy and Immunology ROS: negative  Hematological and Lymphatic ROS: negative  Endocrine ROS: negative  Breast ROS: negative  Respiratory ROS: negative  Cardiovascular ROS: negative  Gastrointestinal ROS:negative  Genito-Urinary ROS: negative  Musculoskeletal ROS: negative   Skin ROS: negative    Physical Exam     Constitutional: Patient is oriented to person, place, and time. Patient appears well-developed and well-nourished. Patient is active and cooperative.  Non-toxic appearance. No distress.   HENT:   Head: Normocephalic and atraumatic. Head is without abrasion and without laceration. Hair is normal.   Right Ear: External ear normal. No lacerations. No drainage, swelling .   Left Ear: External ear normal. No lacerations. No drainage, swelling.   Nose/Mouth: no abrasions nor lesions.  Eyes: Conjunctivae, EOM and lids are normal. Right eye exhibits no discharge. No foreign body present in the right eye. Left eye exhibits no discharge. No foreign body present in the left eye. No scleral icterus.   Neck:No JVD present.   Pulmonary/Chest: Effort normal. No accessory muscle usage or stridor. No apnea. No respiratory distress.   Cardiovascular: Normal rate and no JVD.   Abdominal: Normal appearance. Patient exhibits no distension.    Musculoskeletal: Normal range of motion. Patient exhibits no edema.   Neurological: Patient is alert and oriented to person, place, and time.  Skin: Skin is warm and dry. No abrasion and no rash noted. Patient is not diaphoretic. No cyanosis or erythema.   Psychiatric: Patient has a normal mood

## 2024-12-23 ENCOUNTER — OFFICE VISIT (OUTPATIENT)
Dept: ORTHOPEDIC SURGERY | Age: 44
End: 2024-12-23

## 2024-12-23 VITALS — RESPIRATION RATE: 12 BRPM | WEIGHT: 223 LBS | HEIGHT: 64 IN | BODY MASS INDEX: 38.07 KG/M2

## 2024-12-23 DIAGNOSIS — E78.2 MIXED HYPERLIPIDEMIA: ICD-10-CM

## 2024-12-23 DIAGNOSIS — M16.12 PRIMARY OSTEOARTHRITIS OF LEFT HIP: ICD-10-CM

## 2024-12-23 DIAGNOSIS — Z96.642 STATUS POST HIP REPLACEMENT, LEFT: Primary | ICD-10-CM

## 2024-12-23 DIAGNOSIS — G89.29 OTHER CHRONIC PAIN: ICD-10-CM

## 2024-12-23 DIAGNOSIS — M79.7 FIBROMYALGIA: ICD-10-CM

## 2024-12-23 PROCEDURE — 99024 POSTOP FOLLOW-UP VISIT: CPT

## 2025-01-01 DIAGNOSIS — G89.29 OTHER CHRONIC PAIN: ICD-10-CM

## 2025-01-01 DIAGNOSIS — M79.7 FIBROMYALGIA: ICD-10-CM

## 2025-01-02 RX ORDER — GABAPENTIN 300 MG/1
CAPSULE ORAL
Refills: 0 | OUTPATIENT
Start: 2025-01-02

## 2025-01-05 ASSESSMENT — SLEEP AND FATIGUE QUESTIONNAIRES
HOW LIKELY ARE YOU TO NOD OFF OR FALL ASLEEP WHILE SITTING AND READING: HIGH CHANCE OF DOZING
HOW LIKELY ARE YOU TO NOD OFF OR FALL ASLEEP WHILE SITTING INACTIVE IN A PUBLIC PLACE: MODERATE CHANCE OF DOZING
HOW LIKELY ARE YOU TO NOD OFF OR FALL ASLEEP WHILE LYING DOWN TO REST IN THE AFTERNOON WHEN CIRCUMSTANCES PERMIT: HIGH CHANCE OF DOZING
HOW LIKELY ARE YOU TO NOD OFF OR FALL ASLEEP WHILE SITTING AND TALKING TO SOMEONE: MODERATE CHANCE OF DOZING
HOW LIKELY ARE YOU TO NOD OFF OR FALL ASLEEP WHILE SITTING QUIETLY AFTER LUNCH WITHOUT ALCOHOL: HIGH CHANCE OF DOZING
HOW LIKELY ARE YOU TO NOD OFF OR FALL ASLEEP WHILE SITTING QUIETLY AFTER LUNCH WITHOUT ALCOHOL: HIGH CHANCE OF DOZING
HOW LIKELY ARE YOU TO NOD OFF OR FALL ASLEEP WHILE SITTING INACTIVE IN A PUBLIC PLACE: MODERATE CHANCE OF DOZING
HOW LIKELY ARE YOU TO NOD OFF OR FALL ASLEEP IN A CAR, WHILE STOPPED FOR A FEW MINUTES IN TRAFFIC: WOULD NEVER DOZE
ESS TOTAL SCORE: 18
HOW LIKELY ARE YOU TO NOD OFF OR FALL ASLEEP IN A CAR, WHILE STOPPED FOR A FEW MINUTES IN TRAFFIC: WOULD NEVER DOZE
HOW LIKELY ARE YOU TO NOD OFF OR FALL ASLEEP WHEN YOU ARE A PASSENGER IN A CAR FOR AN HOUR WITHOUT A BREAK: HIGH CHANCE OF DOZING
HOW LIKELY ARE YOU TO NOD OFF OR FALL ASLEEP WHILE SITTING AND TALKING TO SOMEONE: MODERATE CHANCE OF DOZING
HOW LIKELY ARE YOU TO NOD OFF OR FALL ASLEEP WHILE SITTING AND READING: HIGH CHANCE OF DOZING
HOW LIKELY ARE YOU TO NOD OFF OR FALL ASLEEP WHILE LYING DOWN TO REST IN THE AFTERNOON WHEN CIRCUMSTANCES PERMIT: HIGH CHANCE OF DOZING
HOW LIKELY ARE YOU TO NOD OFF OR FALL ASLEEP WHILE WATCHING TV: MODERATE CHANCE OF DOZING
HOW LIKELY ARE YOU TO NOD OFF OR FALL ASLEEP WHEN YOU ARE A PASSENGER IN A CAR FOR AN HOUR WITHOUT A BREAK: HIGH CHANCE OF DOZING
HOW LIKELY ARE YOU TO NOD OFF OR FALL ASLEEP WHILE WATCHING TV: MODERATE CHANCE OF DOZING

## 2025-01-07 ENCOUNTER — TELEMEDICINE (OUTPATIENT)
Dept: PULMONOLOGY | Age: 45
End: 2025-01-07

## 2025-01-07 VITALS — WEIGHT: 220 LBS | BODY MASS INDEX: 36.65 KG/M2 | HEIGHT: 65 IN

## 2025-01-07 DIAGNOSIS — E66.01 SEVERE OBESITY (BMI 35.0-39.9) WITH COMORBIDITY: ICD-10-CM

## 2025-01-07 DIAGNOSIS — I10 ESSENTIAL HYPERTENSION: ICD-10-CM

## 2025-01-07 DIAGNOSIS — G47.33 OSA (OBSTRUCTIVE SLEEP APNEA): Primary | ICD-10-CM

## 2025-01-07 DIAGNOSIS — E11.9 TYPE 2 DIABETES MELLITUS WITHOUT COMPLICATION, WITHOUT LONG-TERM CURRENT USE OF INSULIN (HCC): Chronic | ICD-10-CM

## 2025-01-07 NOTE — PROGRESS NOTES
Diagnosis: [x] AMOS (G47.33) [] CSA (G47.31) [] Apnea (G47.30)   Length of Need: [x] 15 Months [] 99 Months [] Other:   Machine (PRAKASH!): [] Respironics Dream Station      Auto [] ResMed AirSense     Auto [] Other:     []  CPAP () [] Bilevel ()   Mode: [] Auto [] Spontaneous    Mode: [] Auto [] Spontaneous              Comfort Settings:      Humidifier: [] Heated ()        [x] Water chamber replacement ()/ 1 per 6 months        Mask:   [x] Nasal () /1 per 3 months [] Full Face () /1 per 3 months   [x] Patient choice -Size and fit mask [] Patient Choice - Size and fit mask   [] Dispense: [] Dispense:   [x] Headgear () / 1 per 3 months [] Headgear () / 1 per 3 months   [] Replacement Nasal Cushion ()/2 per month [] Interface Replacement ()/1 per month   [x] Replacement Nasal Pillows ()/2 per month         Tubing: [x] Heated ()/1 per 3 months    [] Standard ()/1 per 3 months [] Other:           Filters: [x] Non-disposable ()/1 per 6 months     [x] Ultra-Fine, Disposable ()/2 per month        Miscellaneous: [] Chin Strap ()/ 1 per 6 months [] O2 bleed-in:        LPM   [] Oxymetry on CPAP/Bilevel []  Other:         Start Order Date: 01/07/25    MEDICAL JUSTIFICATION:  I, the undersigned, certify that the above prescribed supplies are medically necessary for this patient’s wellbeing.  In my opinion, the supplies are both reasonable and necessary in reference to accepted standards of medicalpractice in treatment of this patient’s condition.    ISHA LOVETT NP    NPI: [id number]       Order Signed Date: 01/07/25  [facility]  Pulmonary, Sleep, and Critical Care    Pulmonary, Sleep, and Critical Care  [facility] [address]                          [address] [place] [address] [address]  [place] [address]                                    [place] [address]  Phone: [phone]    Fax:

## 2025-01-07 NOTE — PROGRESS NOTES
and getting sufficient sleep to assist with weight control.  Discussed weight gain and/or weight loss may require adjustments to machine settings. Encouraged her to work on weight loss through diet and exercise.         Reviewed, analyzed, and documented physiologic data from patient's PAP machine.    This information was analyzed to assess complexity and medical decision making in regards to further testing and management.    The primary encounter diagnosis was AMOS (obstructive sleep apnea). Diagnoses of Essential hypertension, Type 2 diabetes mellitus without complication, without long-term current use of insulin ([facility]), and Severe obesity (BMI 35.0-39.9) with comorbidity were also pertinent to this visit. The chronic medical conditions listed are directly related to the primary diagnosis listed above.  The management of the primary diagnosis affects the secondary diagnosis and vice versa.       Subjective:     Patient ID: Petrona Guzman is a 44 y.o. female.    Chief Complaint   Patient presents with    Sleep Apnea     Subjective   HPI:    Machine Modem/Download Info:  Compliance (hours/night): 7.4 hrs/night  % of nights >= 4 hrs: 22 %  Download AHI (/hour): 2.1 /HR   Average IPAP Pressure: 17.3 cmH2O  Average EPAP Pressure: 14.3 cmH2O               AUTO BILEVEL - Settings (ResMed)  IPAP Max: 25 cmH2O  EPAP Min: 10 cmH2O  Pressure Support: 3         PAP Mask  Mask Type: Nasal pillows     Petrona Guzman presents today for follow-up for sleep apnea.She has not been using her machine recently as she had condensation coming through her tubing. When she uses her machine, she feels she sleeps better and is waking more rested. The pressure on her machine is comfortable. she denies headaches, congestion, nosebleeds, dryness, aerophagia, or drowsiness while driving. her mask is comfortable and is fitting well.    She is wanting Inspire and is working with [facility] and is planning for the sleep endoscopy soon.

## 2025-01-07 NOTE — ASSESSMENT & PLAN NOTE
Chronic-with progression/exacerbation: Reviewed and analyzed results of physiologic download from patient's machine and reviewed with patient.  Supplies and parts as needed for her machine.  These are medically necessary.  Limit caffeine use after 3pm. Based on the analyzed data will continue with current settings. Encouraged her to use her machine as much as possible. Discussed increasing the tube temperature and/or decreasing the humidity on her machine for rain out. Will see her back in 3 months. Encouraged her to contact the office with any questions or concerns.     Encouraged consistent use of her machine each night, all night.  Discussed the importance of treating AMOS from a physiological standpoint.  Instructed not to drive unless had 4 hrs of effective therapy for her AMOS the night before.  No driving when sleepy.  Did review the risks of under or untreated AMOS including, but not limited to, higher risks of motor vehicle accidents, stroke, heart attacks, and death.  She understands and accepts all these risks.

## 2025-01-10 ASSESSMENT — PATIENT HEALTH QUESTIONNAIRE - PHQ9
SUM OF ALL RESPONSES TO PHQ QUESTIONS 1-9: 11
4. FEELING TIRED OR HAVING LITTLE ENERGY: NEARLY EVERY DAY
1. LITTLE INTEREST OR PLEASURE IN DOING THINGS: NOT AT ALL
10. IF YOU CHECKED OFF ANY PROBLEMS, HOW DIFFICULT HAVE THESE PROBLEMS MADE IT FOR YOU TO DO YOUR WORK, TAKE CARE OF THINGS AT HOME, OR GET ALONG WITH OTHER PEOPLE: SOMEWHAT DIFFICULT
3. TROUBLE FALLING OR STAYING ASLEEP: NEARLY EVERY DAY
7. TROUBLE CONCENTRATING ON THINGS, SUCH AS READING THE NEWSPAPER OR WATCHING TELEVISION: NEARLY EVERY DAY
3. TROUBLE FALLING OR STAYING ASLEEP: NEARLY EVERY DAY
8. MOVING OR SPEAKING SO SLOWLY THAT OTHER PEOPLE COULD HAVE NOTICED. OR THE OPPOSITE - BEING SO FIDGETY OR RESTLESS THAT YOU HAVE BEEN MOVING AROUND A LOT MORE THAN USUAL: NOT AT ALL
4. FEELING TIRED OR HAVING LITTLE ENERGY: NEARLY EVERY DAY
SUM OF ALL RESPONSES TO PHQ QUESTIONS 1-9: 11
1. LITTLE INTEREST OR PLEASURE IN DOING THINGS: NOT AT ALL
5. POOR APPETITE OR OVEREATING: NOT AT ALL
2. FEELING DOWN, DEPRESSED OR HOPELESS: MORE THAN HALF THE DAYS
9. THOUGHTS THAT YOU WOULD BE BETTER OFF DEAD, OR OF HURTING YOURSELF: NOT AT ALL
SUM OF ALL RESPONSES TO PHQ QUESTIONS 1-9: 11
SUM OF ALL RESPONSES TO PHQ9 QUESTIONS 1 & 2: 2
SUM OF ALL RESPONSES TO PHQ QUESTIONS 1-9: 11
6. FEELING BAD ABOUT YOURSELF - OR THAT YOU ARE A FAILURE OR HAVE LET YOURSELF OR YOUR FAMILY DOWN: NOT AT ALL
5. POOR APPETITE OR OVEREATING: NOT AT ALL
8. MOVING OR SPEAKING SO SLOWLY THAT OTHER PEOPLE COULD HAVE NOTICED. OR THE OPPOSITE, BEING SO FIGETY OR RESTLESS THAT YOU HAVE BEEN MOVING AROUND A LOT MORE THAN USUAL: NOT AT ALL
9. THOUGHTS THAT YOU WOULD BE BETTER OFF DEAD, OR OF HURTING YOURSELF: NOT AT ALL
6. FEELING BAD ABOUT YOURSELF - OR THAT YOU ARE A FAILURE OR HAVE LET YOURSELF OR YOUR FAMILY DOWN: NOT AT ALL
10. IF YOU CHECKED OFF ANY PROBLEMS, HOW DIFFICULT HAVE THESE PROBLEMS MADE IT FOR YOU TO DO YOUR WORK, TAKE CARE OF THINGS AT HOME, OR GET ALONG WITH OTHER PEOPLE: SOMEWHAT DIFFICULT
7. TROUBLE CONCENTRATING ON THINGS, SUCH AS READING THE NEWSPAPER OR WATCHING TELEVISION: NEARLY EVERY DAY
SUM OF ALL RESPONSES TO PHQ QUESTIONS 1-9: 11
2. FEELING DOWN, DEPRESSED OR HOPELESS: MORE THAN HALF THE DAYS

## 2025-01-12 RX ORDER — GABAPENTIN 300 MG/1
CAPSULE ORAL
Refills: 0 | OUTPATIENT
Start: 2025-01-12

## 2025-01-13 ENCOUNTER — OFFICE VISIT (OUTPATIENT)
Dept: FAMILY MEDICINE CLINIC | Age: 45
End: 2025-01-13

## 2025-01-13 VITALS
WEIGHT: 224 LBS | BODY MASS INDEX: 37.32 KG/M2 | HEART RATE: 92 BPM | SYSTOLIC BLOOD PRESSURE: 112 MMHG | DIASTOLIC BLOOD PRESSURE: 70 MMHG | HEIGHT: 65 IN | OXYGEN SATURATION: 98 %

## 2025-01-13 DIAGNOSIS — G47.33 OSA (OBSTRUCTIVE SLEEP APNEA): ICD-10-CM

## 2025-01-13 DIAGNOSIS — R00.0 CHRONIC TACHYCARDIA: ICD-10-CM

## 2025-01-13 DIAGNOSIS — G89.29 OTHER CHRONIC PAIN: ICD-10-CM

## 2025-01-13 DIAGNOSIS — F31.9 BIPOLAR 1 DISORDER (HCC): ICD-10-CM

## 2025-01-13 DIAGNOSIS — M35.01 SJOGREN SYNDROME WITH KERATOCONJUNCTIVITIS (HCC): ICD-10-CM

## 2025-01-13 DIAGNOSIS — E78.2 MIXED HYPERLIPIDEMIA: Primary | ICD-10-CM

## 2025-01-13 DIAGNOSIS — E55.9 VITAMIN D DEFICIENCY: ICD-10-CM

## 2025-01-13 DIAGNOSIS — G43.909 MIGRAINE WITHOUT STATUS MIGRAINOSUS, NOT INTRACTABLE, UNSPECIFIED MIGRAINE TYPE: ICD-10-CM

## 2025-01-13 DIAGNOSIS — E11.9 TYPE 2 DIABETES MELLITUS WITHOUT COMPLICATION, WITHOUT LONG-TERM CURRENT USE OF INSULIN (HCC): ICD-10-CM

## 2025-01-13 DIAGNOSIS — F43.10 PTSD (POST-TRAUMATIC STRESS DISORDER): ICD-10-CM

## 2025-01-13 DIAGNOSIS — Z98.84 S/P BARIATRIC SURGERY: ICD-10-CM

## 2025-01-13 DIAGNOSIS — M79.7 FIBROMYALGIA: ICD-10-CM

## 2025-01-13 DIAGNOSIS — F20.89 OTHER SCHIZOPHRENIA (HCC): ICD-10-CM

## 2025-01-13 DIAGNOSIS — K21.9 GASTROESOPHAGEAL REFLUX DISEASE, UNSPECIFIED WHETHER ESOPHAGITIS PRESENT: ICD-10-CM

## 2025-01-13 RX ORDER — METFORMIN HYDROCHLORIDE 500 MG/1
1000 TABLET, EXTENDED RELEASE ORAL
Qty: 180 TABLET | Refills: 0 | Status: SHIPPED | OUTPATIENT
Start: 2025-01-13

## 2025-01-13 RX ORDER — ACETAMINOPHEN 160 MG
2000 TABLET,DISINTEGRATING ORAL DAILY
Qty: 90 CAPSULE | Refills: 0 | Status: SHIPPED | OUTPATIENT
Start: 2025-01-13

## 2025-01-13 RX ORDER — ROSUVASTATIN CALCIUM 40 MG/1
TABLET, COATED ORAL
Qty: 18 TABLET | Refills: 0 | OUTPATIENT
Start: 2025-01-13

## 2025-01-13 RX ORDER — METFORMIN HYDROCHLORIDE 500 MG/1
TABLET, FILM COATED, EXTENDED RELEASE ORAL
Refills: 0 | OUTPATIENT
Start: 2025-01-13

## 2025-01-13 RX ORDER — GABAPENTIN 300 MG/1
300 CAPSULE ORAL 2 TIMES DAILY
Qty: 180 CAPSULE | Refills: 0 | Status: SHIPPED | OUTPATIENT
Start: 2025-01-13 | End: 2025-04-13

## 2025-01-13 RX ORDER — CHOLECALCIFEROL (VITAMIN D3) 50 MCG
TABLET ORAL
Refills: 0 | OUTPATIENT
Start: 2025-01-13

## 2025-01-13 NOTE — PROGRESS NOTES
choices, portion control and increasing activity).       [facility] she has been following the diet post gastric sleeve and has not been losing weight.  [facility] she has a appointment with nutritionist and they reviewed the 30/30/30 diet which she has been following.      [facility] bariatric specialist are unable to order Wegovy for Zepbound and advised to have PCP order medication.  I have ordered both Zepbound was bit covered all and Wegovy which were over $1500 a month.  She is wanting to try Trulicity.  Explained that I am unsure if it will be covered by insurance.        [facility] she recently fell on the ice and has spoken with ortho after bilateral hip replacements.      Hyperlipidemia  This is a chronic problem. The current episode started more than 1 year ago. The problem is controlled. Exacerbating diseases include obesity. Pertinent negatives include no chest pain, focal sensory loss, focal weakness, leg pain, myalgias or shortness of breath. Current antihyperlipidemic treatment includes statins. The current treatment provides significant improvement of lipids. Risk factors for coronary artery disease include dyslipidemia and diabetes mellitus.   Diabetes  She presents for her follow-up diabetic visit. She has type 2 diabetes mellitus. Her disease course has been stable. Pertinent negatives for hypoglycemia include no headaches or nervousness/anxiousness. Pertinent negatives for diabetes include no chest pain, no foot paresthesias, no polydipsia, no polyphagia and no polyuria. Symptoms are stable. Risk factors for coronary artery disease include diabetes mellitus, dyslipidemia and obesity. Current diabetic treatment includes oral agent (monotherapy). She is following a generally healthy diet.       Current Outpatient Medications on File Prior to Visit   Medication Sig Dispense Refill    lidocaine (LIDODERM) 5 % Place 1 patch onto the skin daily 12 hours on, 12 hours off. 30 patch 0    ezetimibe (ZETIA) 10 MG tablet

## 2025-01-13 NOTE — PATIENT INSTRUCTIONS
Medications refilled and fasting labs ordered     Reviewed low-cholesterol diet     Reviewed diabetic diet     Continue gabapentin 300 mg twice daily     Trulicity 0.75 mg once weekly     Encourage lifestyle modifications (better food choices, portion control and increasing activity)     Follow-up with specialist as needed/directed (psych, Ortho, podiatry pulmonology, cardiology)      Encourage continued lifestyle modifications (better food choices, portion control and increasing activity)     Follow up with cardiology as needed/directed (6 months)     Follow up with pulmonology as needed/directed (as needed)      Follow up with rheumatology as needed/directed (re-establishing)     Follow up with psychiatrist as needed/directed (3 months)      Follow up in 3 months, sooner if symptoms worsen or persist

## 2025-01-16 ENCOUNTER — HOSPITAL ENCOUNTER (OUTPATIENT)
Age: 45
Discharge: HOME | End: 2025-01-16
Payer: COMMERCIAL

## 2025-01-16 DIAGNOSIS — E78.2 MIXED HYPERLIPIDEMIA: ICD-10-CM

## 2025-01-16 DIAGNOSIS — E55.9 VITAMIN D DEFICIENCY: ICD-10-CM

## 2025-01-16 DIAGNOSIS — E66.01 SEVERE OBESITY (BMI 35.0-39.9) WITH COMORBIDITY: ICD-10-CM

## 2025-01-16 DIAGNOSIS — I10 ESSENTIAL HYPERTENSION: ICD-10-CM

## 2025-01-16 DIAGNOSIS — E66.9 TYPE 2 DIABETES MELLITUS WITH OBESITY (HCC): ICD-10-CM

## 2025-01-16 DIAGNOSIS — E11.9 TYPE 2 DIABETES MELLITUS WITHOUT COMPLICATION, WITHOUT LONG-TERM CURRENT USE OF INSULIN (HCC): ICD-10-CM

## 2025-01-16 DIAGNOSIS — Z98.84 S/P LAPAROSCOPIC SLEEVE GASTRECTOMY: ICD-10-CM

## 2025-01-16 DIAGNOSIS — E11.69 TYPE 2 DIABETES MELLITUS WITH OBESITY (HCC): ICD-10-CM

## 2025-01-16 LAB
25(OH)D3 SERPL-MCNC: 42.4 NG/ML
ALBUMIN SERPL-MCNC: 4.5 G/DL (ref 3.4–5)
ALBUMIN/GLOB SERPL: 1.5 {RATIO} (ref 1.1–2.2)
ALP SERPL-CCNC: 72 U/L (ref 40–129)
ALT SERPL-CCNC: 19 U/L (ref 10–40)
ANION GAP SERPL CALCULATED.3IONS-SCNC: 10 MMOL/L (ref 3–16)
AST SERPL-CCNC: 20 U/L (ref 15–37)
BASOPHILS # BLD: 0 K/UL (ref 0–0.2)
BASOPHILS NFR BLD: 0.6 %
BILIRUB SERPL-MCNC: <0.2 MG/DL (ref 0–1)
BUN SERPL-MCNC: 11 MG/DL (ref 7–20)
CALCIUM SERPL-MCNC: 10 MG/DL (ref 8.3–10.6)
CHLORIDE SERPL-SCNC: 101 MMOL/L (ref 99–110)
CHOLEST SERPL-MCNC: 199 MG/DL (ref 0–199)
CK SERPL-CCNC: 124 U/L (ref 26–192)
CO2 SERPL-SCNC: 29 MMOL/L (ref 21–32)
CREAT SERPL-MCNC: 0.8 MG/DL (ref 0.6–1.1)
DEPRECATED RDW RBC AUTO: 14.3 % (ref 12.4–15.4)
EOSINOPHIL # BLD: 0.1 K/UL (ref 0–0.6)
EOSINOPHIL NFR BLD: 1 %
EST. AVERAGE GLUCOSE BLD GHB EST-MCNC: 122.6 MG/DL
FOLATE SERPL-MCNC: 25.9 NG/ML (ref 4.78–24.2)
GFR SERPLBLD CREATININE-BSD FMLA CKD-EPI: >90 ML/MIN/{1.73_M2}
GLUCOSE SERPL-MCNC: 104 MG/DL (ref 70–99)
HBA1C MFR BLD: 5.9 %
HCT VFR BLD AUTO: 41.3 % (ref 36–48)
HDLC SERPL-MCNC: 45 MG/DL (ref 40–60)
HGB BLD-MCNC: 13.5 G/DL (ref 12–16)
INR PPP: 0.93 (ref 0.85–1.15)
IRON SATN MFR SERPL: 34 % (ref 15–50)
IRON SERPL-MCNC: 106 UG/DL (ref 37–145)
LDLC SERPL CALC-MCNC: 119 MG/DL
LYMPHOCYTES # BLD: 2.6 K/UL (ref 1–5.1)
LYMPHOCYTES NFR BLD: 42.8 %
MCH RBC QN AUTO: 27.3 PG (ref 26–34)
MCHC RBC AUTO-ENTMCNC: 32.7 G/DL (ref 31–36)
MCV RBC AUTO: 83.5 FL (ref 80–100)
MONOCYTES # BLD: 0.4 K/UL (ref 0–1.3)
MONOCYTES NFR BLD: 7.2 %
NEUTROPHILS # BLD: 3 K/UL (ref 1.7–7.7)
NEUTROPHILS NFR BLD: 48.4 %
PLATELET # BLD AUTO: 294 K/UL (ref 135–450)
PMV BLD AUTO: 7.2 FL (ref 5–10.5)
POTASSIUM SERPL-SCNC: 4.3 MMOL/L (ref 3.5–5.1)
PROT SERPL-MCNC: 7.5 G/DL (ref 6.4–8.2)
PROTHROMBIN TIME: 12.7 SEC (ref 11.9–14.9)
RBC # BLD AUTO: 4.95 M/UL (ref 4–5.2)
SODIUM SERPL-SCNC: 140 MMOL/L (ref 136–145)
TIBC SERPL-MCNC: 316 UG/DL (ref 260–445)
TRIGL SERPL-MCNC: 177 MG/DL (ref 0–150)
TSH SERPL DL<=0.005 MIU/L-ACNC: 1.22 UIU/ML (ref 0.27–4.2)
VIT B12 SERPL-MCNC: 1623 PG/ML (ref 211–911)
VLDLC SERPL CALC-MCNC: 35 MG/DL
WBC # BLD AUTO: 6.1 K/UL (ref 4–11)

## 2025-01-16 PROCEDURE — 85610 PROTHROMBIN TIME: CPT

## 2025-01-16 PROCEDURE — 83550 IRON BINDING TEST: CPT

## 2025-01-16 PROCEDURE — 36415 COLL VENOUS BLD VENIPUNCTURE: CPT

## 2025-01-16 PROCEDURE — 83540 ASSAY OF IRON: CPT

## 2025-01-16 PROCEDURE — 84446 ASSAY OF VITAMIN E: CPT

## 2025-01-16 PROCEDURE — 84443 ASSAY THYROID STIM HORMONE: CPT

## 2025-01-16 PROCEDURE — 84425 ASSAY OF VITAMIN B-1: CPT

## 2025-01-16 PROCEDURE — 80053 COMPREHEN METABOLIC PANEL: CPT

## 2025-01-16 PROCEDURE — 82746 ASSAY OF FOLIC ACID SERUM: CPT

## 2025-01-16 PROCEDURE — 84590 ASSAY OF VITAMIN A: CPT

## 2025-01-16 PROCEDURE — 80061 LIPID PANEL: CPT

## 2025-01-16 PROCEDURE — 85025 COMPLETE CBC W/AUTO DIFF WBC: CPT

## 2025-01-16 PROCEDURE — 83036 HEMOGLOBIN GLYCOSYLATED A1C: CPT

## 2025-01-16 PROCEDURE — 82550 ASSAY OF CK (CPK): CPT

## 2025-01-16 PROCEDURE — 82607 VITAMIN B-12: CPT

## 2025-01-16 PROCEDURE — 82306 VITAMIN D 25 HYDROXY: CPT

## 2025-01-19 DIAGNOSIS — E11.9 TYPE 2 DIABETES MELLITUS WITHOUT COMPLICATION, WITHOUT LONG-TERM CURRENT USE OF INSULIN: ICD-10-CM

## 2025-01-19 LAB
A-TOCOPHEROL VIT E SERPL-MCNC: 12 MG/L (ref 5.5–18)
ANNOTATION COMMENT IMP: NORMAL
BETA+GAMMA TOCOPHEROL SERPL-MCNC: 0.8 MG/L (ref 0–6)
RETINYL PALMITATE SERPL-MCNC: 0.02 MG/L (ref 0–0.1)
VIT A SERPL-MCNC: 0.64 MG/L (ref 0.3–1.2)

## 2025-01-20 LAB — VIT B1 BLD-MCNC: 168 NMOL/L (ref 70–180)

## 2025-01-20 RX ORDER — METFORMIN HYDROCHLORIDE 500 MG/1
1000 TABLET, EXTENDED RELEASE ORAL
Qty: 180 TABLET | Refills: 0 | OUTPATIENT
Start: 2025-01-20

## 2025-01-30 ENCOUNTER — TELEPHONE (OUTPATIENT)
Dept: ADMINISTRATIVE | Age: 45
End: 2025-01-30

## 2025-01-30 NOTE — TELEPHONE ENCOUNTER
Submitted PA for Trulicity 0.75MG/0.5ML auto-injectors Via [employer], EOC ID: [id number]  STATUS: PENDING.    Follow up done daily; if no decision with in three days we will refax.  If another three days goes by with no decision will call the insurance for status.

## 2025-01-31 NOTE — TELEPHONE ENCOUNTER
APPROVAL for Trulicity 0.75MG/0.5ML auto-injectors 01/30/25-01/29/26; letter attached.    If this requires a response please respond to the pool ( P [facility] MEDICATION PRE-AUTH).      Thank you please advise patient.

## 2025-02-28 DIAGNOSIS — E78.2 MIXED HYPERLIPIDEMIA: ICD-10-CM

## 2025-02-28 NOTE — TELEPHONE ENCOUNTER
Received refill request for Ezetimibe (ZETIA) 10 Mg from [facility] [address] [facility]  [id number]  pharmacy.     Last OV: 08/05/24    Next OV: None     Last Labs: Lipids 01/16/25    Last Filled: 0805/24

## 2025-03-03 RX ORDER — EZETIMIBE 10 MG/1
10 TABLET ORAL DAILY
Qty: 90 TABLET | Refills: 3 | Status: SHIPPED | OUTPATIENT
Start: 2025-03-03

## 2025-03-09 DIAGNOSIS — E78.2 MIXED HYPERLIPIDEMIA: ICD-10-CM

## 2025-03-10 RX ORDER — ROSUVASTATIN CALCIUM 40 MG/1
40 TABLET, COATED ORAL DAILY
Qty: 90 TABLET | Refills: 3 | Status: SHIPPED | OUTPATIENT
Start: 2025-03-10

## 2025-03-10 NOTE — TELEPHONE ENCOUNTER
Received refill request for Rosuvastatin from [facility] pharmacy.    Last ov:08/05/2024 DKW    Last labs:01/16/2025 Lipid    Last Refill:08/24/2024     Next appointment:On recall list for 08/05/2025 MARYCARMEN

## 2025-04-16 DIAGNOSIS — G89.29 OTHER CHRONIC PAIN: ICD-10-CM

## 2025-04-16 DIAGNOSIS — M79.7 FIBROMYALGIA: ICD-10-CM

## 2025-04-16 DIAGNOSIS — E78.2 MIXED HYPERLIPIDEMIA: ICD-10-CM

## 2025-04-16 DIAGNOSIS — E55.9 VITAMIN D DEFICIENCY: ICD-10-CM

## 2025-04-16 DIAGNOSIS — E11.9 TYPE 2 DIABETES MELLITUS WITHOUT COMPLICATION, WITHOUT LONG-TERM CURRENT USE OF INSULIN (HCC): ICD-10-CM

## 2025-04-16 RX ORDER — EZETIMIBE 10 MG/1
10 TABLET ORAL DAILY
Qty: 90 TABLET | Refills: 3 | Status: SHIPPED | OUTPATIENT
Start: 2025-04-16

## 2025-04-16 NOTE — TELEPHONE ENCOUNTER
Received refill request for Zetia from [facility].    Last ov:08/05/2024 DKW    Last labs:01/16/2025 Lipid    Last Refill:03/03/2025    Next appointment:On recall list for 08/05/2025 MARYCARMEN

## 2025-04-17 DIAGNOSIS — E78.2 MIXED HYPERLIPIDEMIA: ICD-10-CM

## 2025-04-17 RX ORDER — EZETIMIBE 10 MG/1
10 TABLET ORAL DAILY
Qty: 90 TABLET | Refills: 3 | OUTPATIENT
Start: 2025-04-17

## 2025-04-17 RX ORDER — METFORMIN HYDROCHLORIDE 500 MG/1
TABLET, EXTENDED RELEASE ORAL
Qty: 180 TABLET | Refills: 3 | OUTPATIENT
Start: 2025-04-17

## 2025-04-17 RX ORDER — ACETAMINOPHEN 160 MG
2000 TABLET,DISINTEGRATING ORAL DAILY
Qty: 90 CAPSULE | Refills: 3 | OUTPATIENT
Start: 2025-04-17

## 2025-04-17 RX ORDER — GABAPENTIN 300 MG/1
300 CAPSULE ORAL 2 TIMES DAILY
Qty: 180 CAPSULE | Refills: 3 | OUTPATIENT
Start: 2025-04-17

## 2025-04-17 NOTE — TELEPHONE ENCOUNTER
Received refill request for ezetimibe (ZETIA) 10 MG tablet  from [facility] pharmacy.     Last OV: 8/5/2024 DKW    Next OV: None    Last Labs: 1/16/2025 Lipid    Last Filled:

## 2025-04-20 NOTE — PROGRESS NOTES
SUBJECTIVE:    Patient ID:  Petrona Guzman is a 44 y.o. female      Patient is here for a medication check for hyperlipidemia, diabetes, chronic tachycardia, GERD, AMOS, COPD/asthma, vitamin D deficiency, fibromyalgia and chronic pain.  Tachycardia managed with metoprolol twice daily. GERD symptoms are managed with omeprazole.  Denies indigestion/heartburn, difficulty swallowing, epigastric pain, nausea, vomiting, diarrhea, constipation or blood in stool.  She has a history of anxiety, depression, bipolar and PTSD.  She is followed a psychiatrist every 2 to 3 months and sees a counselor twice a week.  She is currently taking Celexa, Depakote and hydroxyzine as needed.  Currently denies thoughts of self-harm, suicidal or homicidal ideations.  States she has been evaluated by cardiology and will be followed annually.  She was told to follow-up with pulmonology annually for COPD/asthma and AMOS.  AMOS is managed with nightly CPAP usage.  She also has a history or alcoholism for 24 years, she has been sober for 4 years.       She is followed by rheumatology for fibromyalgia and Sojourner syndrome, most recent progress note reviewed from 1/4/2024.     She has had bilateral hip replacements, has has multiple foot.ankle surgeries.  She is followed by ortho/podiatry on a fairly regular basis.  She continues to do physical therapy exercises at home.       She has also had a gastric bypass and is unable to take NSAIDs for chronic pain syndrome/fibromyalgia.  Patient is currently taking 300 mg of gabapentin twice a day with adequate relief.  She is requesting an increase of her gabapentin to 300 mg twice daily.       She is also follow by psychiatry for anxiety, depression, bipolar and PTSD.  Denies thoughts of self-harm, suicidal or homicidal ideations or risk-taking behaviors.  She is currently on aripiprazole 15 mg once daily, bupropion 75 mg twice daily, citalopram 40 mg once daily, Depakote 750 mg daily and hydroxyzine

## 2025-04-21 ENCOUNTER — OFFICE VISIT (OUTPATIENT)
Dept: FAMILY MEDICINE CLINIC | Age: 45
End: 2025-04-21
Payer: COMMERCIAL

## 2025-04-21 VITALS
BODY MASS INDEX: 37.28 KG/M2 | DIASTOLIC BLOOD PRESSURE: 70 MMHG | HEART RATE: 77 BPM | OXYGEN SATURATION: 97 % | SYSTOLIC BLOOD PRESSURE: 122 MMHG | WEIGHT: 224 LBS

## 2025-04-21 DIAGNOSIS — F20.89 OTHER SCHIZOPHRENIA (HCC): ICD-10-CM

## 2025-04-21 DIAGNOSIS — Z98.84 S/P BARIATRIC SURGERY: ICD-10-CM

## 2025-04-21 DIAGNOSIS — M35.01 SJOGREN SYNDROME WITH KERATOCONJUNCTIVITIS: ICD-10-CM

## 2025-04-21 DIAGNOSIS — K21.9 GASTROESOPHAGEAL REFLUX DISEASE, UNSPECIFIED WHETHER ESOPHAGITIS PRESENT: ICD-10-CM

## 2025-04-21 DIAGNOSIS — M79.7 FIBROMYALGIA: ICD-10-CM

## 2025-04-21 DIAGNOSIS — E11.9 TYPE 2 DIABETES MELLITUS WITHOUT COMPLICATION, WITHOUT LONG-TERM CURRENT USE OF INSULIN (HCC): ICD-10-CM

## 2025-04-21 DIAGNOSIS — F31.9 BIPOLAR 1 DISORDER (HCC): ICD-10-CM

## 2025-04-21 DIAGNOSIS — E55.9 VITAMIN D DEFICIENCY: ICD-10-CM

## 2025-04-21 DIAGNOSIS — R00.0 CHRONIC TACHYCARDIA: Primary | ICD-10-CM

## 2025-04-21 DIAGNOSIS — G43.909 MIGRAINE WITHOUT STATUS MIGRAINOSUS, NOT INTRACTABLE, UNSPECIFIED MIGRAINE TYPE: ICD-10-CM

## 2025-04-21 DIAGNOSIS — G89.29 OTHER CHRONIC PAIN: ICD-10-CM

## 2025-04-21 DIAGNOSIS — E78.2 MIXED HYPERLIPIDEMIA: ICD-10-CM

## 2025-04-21 DIAGNOSIS — F43.10 PTSD (POST-TRAUMATIC STRESS DISORDER): ICD-10-CM

## 2025-04-21 DIAGNOSIS — G47.33 OSA (OBSTRUCTIVE SLEEP APNEA): ICD-10-CM

## 2025-04-21 LAB
BILIRUBIN, POC: NORMAL
BLOOD URINE, POC: NORMAL
CLARITY, POC: CLEAR
COLOR, POC: YELLOW
GLUCOSE URINE, POC: NORMAL MG/DL
HBA1C MFR BLD: 5.3 %
KETONES, POC: NORMAL MG/DL
LEUKOCYTE EST, POC: NORMAL
NITRITE, POC: NORMAL
PH, POC: 6
PROTEIN, POC: NORMAL MG/DL
SPECIFIC GRAVITY, POC: 1.02
UROBILINOGEN, POC: 0.2 MG/DL

## 2025-04-21 PROCEDURE — 3078F DIAST BP <80 MM HG: CPT | Performed by: CLINICAL NURSE SPECIALIST

## 2025-04-21 PROCEDURE — 3074F SYST BP LT 130 MM HG: CPT | Performed by: CLINICAL NURSE SPECIALIST

## 2025-04-21 PROCEDURE — 83036 HEMOGLOBIN GLYCOSYLATED A1C: CPT | Performed by: CLINICAL NURSE SPECIALIST

## 2025-04-21 PROCEDURE — 81002 URINALYSIS NONAUTO W/O SCOPE: CPT | Performed by: CLINICAL NURSE SPECIALIST

## 2025-04-21 PROCEDURE — 99214 OFFICE O/P EST MOD 30 MIN: CPT | Performed by: CLINICAL NURSE SPECIALIST

## 2025-04-21 PROCEDURE — 3044F HG A1C LEVEL LT 7.0%: CPT | Performed by: CLINICAL NURSE SPECIALIST

## 2025-04-21 RX ORDER — ROSUVASTATIN CALCIUM 40 MG/1
40 TABLET, COATED ORAL DAILY
Qty: 90 TABLET | Refills: 3 | Status: CANCELLED | OUTPATIENT
Start: 2025-04-21

## 2025-04-21 RX ORDER — METFORMIN HYDROCHLORIDE 500 MG/1
1000 TABLET, EXTENDED RELEASE ORAL
Qty: 180 TABLET | Refills: 0 | OUTPATIENT
Start: 2025-04-21

## 2025-04-21 RX ORDER — GABAPENTIN 300 MG/1
300 CAPSULE ORAL 2 TIMES DAILY
Qty: 180 CAPSULE | Refills: 0 | Status: SHIPPED | OUTPATIENT
Start: 2025-04-21 | End: 2025-07-20

## 2025-04-21 RX ORDER — EZETIMIBE 10 MG/1
10 TABLET ORAL DAILY
Qty: 90 TABLET | Refills: 3 | Status: CANCELLED | OUTPATIENT
Start: 2025-04-21

## 2025-04-21 RX ORDER — ACETAMINOPHEN 160 MG
2000 TABLET,DISINTEGRATING ORAL DAILY
Qty: 90 CAPSULE | Refills: 0 | Status: SHIPPED | OUTPATIENT
Start: 2025-04-21

## 2025-04-21 RX ORDER — METFORMIN HYDROCHLORIDE 500 MG/1
500 TABLET, EXTENDED RELEASE ORAL
Qty: 90 TABLET | Refills: 0 | Status: SHIPPED | OUTPATIENT
Start: 2025-04-21

## 2025-04-21 RX ORDER — METFORMIN HYDROCHLORIDE 500 MG/1
1000 TABLET, EXTENDED RELEASE ORAL
Qty: 90 TABLET | Refills: 0 | Status: SHIPPED | OUTPATIENT
Start: 2025-04-21 | End: 2025-04-21

## 2025-04-21 ASSESSMENT — PATIENT HEALTH QUESTIONNAIRE - PHQ9
SUM OF ALL RESPONSES TO PHQ QUESTIONS 1-9: 2
7. TROUBLE CONCENTRATING ON THINGS, SUCH AS READING THE NEWSPAPER OR WATCHING TELEVISION: NOT AT ALL
SUM OF ALL RESPONSES TO PHQ QUESTIONS 1-9: 2
8. MOVING OR SPEAKING SO SLOWLY THAT OTHER PEOPLE COULD HAVE NOTICED. OR THE OPPOSITE, BEING SO FIGETY OR RESTLESS THAT YOU HAVE BEEN MOVING AROUND A LOT MORE THAN USUAL: NOT AT ALL
SUM OF ALL RESPONSES TO PHQ QUESTIONS 1-9: 2
2. FEELING DOWN, DEPRESSED OR HOPELESS: SEVERAL DAYS
10. IF YOU CHECKED OFF ANY PROBLEMS, HOW DIFFICULT HAVE THESE PROBLEMS MADE IT FOR YOU TO DO YOUR WORK, TAKE CARE OF THINGS AT HOME, OR GET ALONG WITH OTHER PEOPLE: NOT DIFFICULT AT ALL
SUM OF ALL RESPONSES TO PHQ QUESTIONS 1-9: 2
3. TROUBLE FALLING OR STAYING ASLEEP: NOT AT ALL
5. POOR APPETITE OR OVEREATING: NOT AT ALL
6. FEELING BAD ABOUT YOURSELF - OR THAT YOU ARE A FAILURE OR HAVE LET YOURSELF OR YOUR FAMILY DOWN: NOT AT ALL
4. FEELING TIRED OR HAVING LITTLE ENERGY: NOT AT ALL
9. THOUGHTS THAT YOU WOULD BE BETTER OFF DEAD, OR OF HURTING YOURSELF: NOT AT ALL
1. LITTLE INTEREST OR PLEASURE IN DOING THINGS: SEVERAL DAYS

## 2025-04-21 NOTE — PATIENT INSTRUCTIONS
Medications refilled and fasting labs ordered     Reviewed low-cholesterol diet     Reviewed diabetic diet    Decrease metformin for 1000 mg (2 tabs) to 500 mg (1 tab daily) with food     Continue gabapentin 300 mg twice daily      Encourage lifestyle modifications (better food choices, portion control and increasing activity)     Follow-up with specialist as needed/directed (psych, Ortho, podiatry pulmonology, cardiology)      Encourage continued lifestyle modifications (better food choices, portion control and increasing activity)     Follow up with cardiology as needed/directed (6 months)     Follow up with pulmonology as needed/directed (as needed)      Follow up with rheumatology as needed/directed (re-establishing)     Follow up with psychiatrist as needed/directed (3 months)      Follow up in 3 months, sooner if symptoms worsen or persist

## 2025-04-22 LAB
CREAT UR-MCNC: 218 MG/DL (ref 28–259)
MICROALBUMIN UR DL<=1MG/L-MCNC: <1.2 MG/DL
MICROALBUMIN/CREAT UR: NORMAL MG/G (ref 0–30)

## 2025-04-26 DIAGNOSIS — E78.2 MIXED HYPERLIPIDEMIA: ICD-10-CM

## 2025-04-28 DIAGNOSIS — E11.9 TYPE 2 DIABETES MELLITUS WITHOUT COMPLICATION, WITHOUT LONG-TERM CURRENT USE OF INSULIN (HCC): ICD-10-CM

## 2025-04-28 NOTE — TELEPHONE ENCOUNTER
Attempted to contact pt to verify they would like to change pharmacy from [facility] to [facility], and to schedule her yearly ov.  Received fast busy, could not get through at this time.

## 2025-04-29 RX ORDER — METFORMIN HYDROCHLORIDE 500 MG/1
TABLET, EXTENDED RELEASE ORAL
Refills: 0 | OUTPATIENT
Start: 2025-04-29

## 2025-05-14 DIAGNOSIS — E78.2 MIXED HYPERLIPIDEMIA: ICD-10-CM

## 2025-05-14 RX ORDER — EZETIMIBE 10 MG/1
10 TABLET ORAL DAILY
Qty: 90 TABLET | Refills: 3 | Status: SHIPPED | OUTPATIENT
Start: 2025-05-14 | End: 2025-05-18 | Stop reason: SDUPTHER

## 2025-05-14 RX ORDER — ROSUVASTATIN CALCIUM 40 MG/1
40 TABLET, COATED ORAL DAILY
Qty: 90 TABLET | Refills: 3 | Status: SHIPPED | OUTPATIENT
Start: 2025-05-14 | End: 2025-05-18 | Stop reason: SDUPTHER

## 2025-05-14 RX ORDER — ROSUVASTATIN CALCIUM 40 MG/1
40 TABLET, COATED ORAL DAILY
Qty: 90 TABLET | Refills: 3 | OUTPATIENT
Start: 2025-05-14

## 2025-05-15 NOTE — TELEPHONE ENCOUNTER
Per DKW since we can are unable to reach patient DKW sent refill to the [facility] patients PCP last sent refills. Call Complete

## 2025-05-18 DIAGNOSIS — E78.2 MIXED HYPERLIPIDEMIA: ICD-10-CM

## 2025-05-18 RX ORDER — EZETIMIBE 10 MG/1
10 TABLET ORAL DAILY
Qty: 90 TABLET | Refills: 3 | Status: SHIPPED | OUTPATIENT
Start: 2025-05-18

## 2025-05-18 RX ORDER — ROSUVASTATIN CALCIUM 40 MG/1
40 TABLET, COATED ORAL DAILY
Qty: 90 TABLET | Refills: 3 | Status: SHIPPED | OUTPATIENT
Start: 2025-05-18

## 2025-05-28 DIAGNOSIS — E78.01 FAMILIAL HYPERCHOLESTEROLEMIA: ICD-10-CM

## 2025-05-28 DIAGNOSIS — Z82.49 FAMILY HISTORY OF HEART DISEASE: ICD-10-CM

## 2025-05-28 RX ORDER — EVOLOCUMAB 140 MG/ML
INJECTION, SOLUTION SUBCUTANEOUS
Qty: 2 ML | Refills: 5 | Status: SHIPPED | OUTPATIENT
Start: 2025-05-28

## 2025-05-28 NOTE — TELEPHONE ENCOUNTER
Requested Prescriptions     Pending Prescriptions Disp Refills    REPATHA SURECLICK SOAJ pen [Pharmacy Med Name: REPATHA SURCLK 140MG/ML INJ LATEX] 2 mL 5     Sig: ADMINISTER 1 ML UNDER THE SKIN EVERY 14 DAYS      LAST OV: 8/5/2024   NEXT OV: Visit date not found   LAST LABS: 01/16/2025

## 2025-07-10 NOTE — TELEPHONE ENCOUNTER
Please call patient and tell her dkw will see her tomorrow at 1 pm in [place] thank you 10-Jul-2025 15:58

## 2025-07-19 ENCOUNTER — HOSPITAL ENCOUNTER (OUTPATIENT)
Age: 45
Discharge: HOME | End: 2025-07-19
Payer: COMMERCIAL

## 2025-07-19 DIAGNOSIS — E11.9 TYPE 2 DIABETES MELLITUS WITHOUT COMPLICATION, WITHOUT LONG-TERM CURRENT USE OF INSULIN (HCC): ICD-10-CM

## 2025-07-19 DIAGNOSIS — E55.9 VITAMIN D DEFICIENCY: ICD-10-CM

## 2025-07-19 DIAGNOSIS — E78.2 MIXED HYPERLIPIDEMIA: ICD-10-CM

## 2025-07-19 LAB
25(OH)D3 SERPL-MCNC: 36 NG/ML
ALBUMIN SERPL-MCNC: 4.4 G/DL (ref 3.4–5)
ALBUMIN/GLOB SERPL: 1.5 {RATIO} (ref 1.1–2.2)
ALP SERPL-CCNC: 53 U/L (ref 40–129)
ALT SERPL-CCNC: 13 U/L (ref 10–40)
ANION GAP SERPL CALCULATED.3IONS-SCNC: 10 MMOL/L (ref 3–16)
AST SERPL-CCNC: 18 U/L (ref 15–37)
BASOPHILS # BLD: 0 K/UL (ref 0–0.2)
BASOPHILS NFR BLD: 0.9 %
BILIRUB SERPL-MCNC: 0.5 MG/DL (ref 0–1)
BUN SERPL-MCNC: 13 MG/DL (ref 7–20)
CALCIUM SERPL-MCNC: 9.7 MG/DL (ref 8.3–10.6)
CHLORIDE SERPL-SCNC: 103 MMOL/L (ref 99–110)
CHOLEST SERPL-MCNC: 231 MG/DL (ref 0–199)
CK SERPL-CCNC: 152 U/L (ref 26–192)
CO2 SERPL-SCNC: 26 MMOL/L (ref 21–32)
CREAT SERPL-MCNC: 0.7 MG/DL (ref 0.6–1.1)
CREAT UR-MCNC: 256 MG/DL (ref 28–259)
DEPRECATED RDW RBC AUTO: 14 % (ref 12.4–15.4)
EOSINOPHIL # BLD: 0 K/UL (ref 0–0.6)
EOSINOPHIL NFR BLD: 0.3 %
GFR SERPLBLD CREATININE-BSD FMLA CKD-EPI: >90 ML/MIN/{1.73_M2}
GLUCOSE SERPL-MCNC: 90 MG/DL (ref 70–99)
HCT VFR BLD AUTO: 39.3 % (ref 36–48)
HDLC SERPL-MCNC: 40 MG/DL (ref 40–60)
HGB BLD-MCNC: 13.4 G/DL (ref 12–16)
LDLC SERPL CALC-MCNC: 171 MG/DL
LYMPHOCYTES # BLD: 2.6 K/UL (ref 1–5.1)
LYMPHOCYTES NFR BLD: 45.4 %
MCH RBC QN AUTO: 29.2 PG (ref 26–34)
MCHC RBC AUTO-ENTMCNC: 34.1 G/DL (ref 31–36)
MCV RBC AUTO: 85.6 FL (ref 80–100)
MICROALBUMIN UR DL<=1MG/L-MCNC: 3.77 MG/DL
MICROALBUMIN/CREAT UR: 14.7 MG/G (ref 0–30)
MONOCYTES # BLD: 0.5 K/UL (ref 0–1.3)
MONOCYTES NFR BLD: 8.8 %
NEUTROPHILS # BLD: 2.6 K/UL (ref 1.7–7.7)
NEUTROPHILS NFR BLD: 44.6 %
PLATELET # BLD AUTO: 301 K/UL (ref 135–450)
PMV BLD AUTO: 7.4 FL (ref 5–10.5)
POTASSIUM SERPL-SCNC: 4.4 MMOL/L (ref 3.5–5.1)
PROT SERPL-MCNC: 7.4 G/DL (ref 6.4–8.2)
RBC # BLD AUTO: 4.59 M/UL (ref 4–5.2)
SODIUM SERPL-SCNC: 139 MMOL/L (ref 136–145)
TRIGL SERPL-MCNC: 98 MG/DL (ref 0–150)
TSH SERPL DL<=0.005 MIU/L-ACNC: 1.04 UIU/ML (ref 0.27–4.2)
VLDLC SERPL CALC-MCNC: 20 MG/DL
WBC # BLD AUTO: 5.7 K/UL (ref 4–11)

## 2025-07-19 PROCEDURE — 84443 ASSAY THYROID STIM HORMONE: CPT

## 2025-07-19 PROCEDURE — 82306 VITAMIN D 25 HYDROXY: CPT

## 2025-07-19 PROCEDURE — 85025 COMPLETE CBC W/AUTO DIFF WBC: CPT

## 2025-07-19 PROCEDURE — 82570 ASSAY OF URINE CREATININE: CPT

## 2025-07-19 PROCEDURE — 82043 UR ALBUMIN QUANTITATIVE: CPT

## 2025-07-19 PROCEDURE — 80053 COMPREHEN METABOLIC PANEL: CPT

## 2025-07-19 PROCEDURE — 80061 LIPID PANEL: CPT

## 2025-07-19 PROCEDURE — 36415 COLL VENOUS BLD VENIPUNCTURE: CPT

## 2025-07-19 PROCEDURE — 82550 ASSAY OF CK (CPK): CPT

## 2025-07-21 ENCOUNTER — OFFICE VISIT (OUTPATIENT)
Dept: FAMILY MEDICINE CLINIC | Age: 45
End: 2025-07-21
Payer: COMMERCIAL

## 2025-07-21 VITALS
BODY MASS INDEX: 36.06 KG/M2 | SYSTOLIC BLOOD PRESSURE: 118 MMHG | DIASTOLIC BLOOD PRESSURE: 78 MMHG | OXYGEN SATURATION: 97 % | WEIGHT: 216.4 LBS | HEIGHT: 65 IN | HEART RATE: 65 BPM

## 2025-07-21 DIAGNOSIS — M35.01 SJOGREN SYNDROME WITH KERATOCONJUNCTIVITIS: ICD-10-CM

## 2025-07-21 DIAGNOSIS — J44.89 COPD WITH ASTHMA (HCC): ICD-10-CM

## 2025-07-21 DIAGNOSIS — Z98.84 S/P BARIATRIC SURGERY: ICD-10-CM

## 2025-07-21 DIAGNOSIS — F31.9 BIPOLAR 1 DISORDER (HCC): ICD-10-CM

## 2025-07-21 DIAGNOSIS — E55.9 VITAMIN D DEFICIENCY: ICD-10-CM

## 2025-07-21 DIAGNOSIS — F20.89 OTHER SCHIZOPHRENIA (HCC): ICD-10-CM

## 2025-07-21 DIAGNOSIS — K21.9 GASTROESOPHAGEAL REFLUX DISEASE, UNSPECIFIED WHETHER ESOPHAGITIS PRESENT: ICD-10-CM

## 2025-07-21 DIAGNOSIS — G89.29 OTHER CHRONIC PAIN: ICD-10-CM

## 2025-07-21 DIAGNOSIS — R00.0 CHRONIC TACHYCARDIA: ICD-10-CM

## 2025-07-21 DIAGNOSIS — F43.10 PTSD (POST-TRAUMATIC STRESS DISORDER): ICD-10-CM

## 2025-07-21 DIAGNOSIS — M79.7 FIBROMYALGIA: ICD-10-CM

## 2025-07-21 DIAGNOSIS — G47.33 OSA (OBSTRUCTIVE SLEEP APNEA): ICD-10-CM

## 2025-07-21 DIAGNOSIS — E11.9 TYPE 2 DIABETES MELLITUS WITHOUT COMPLICATION, WITHOUT LONG-TERM CURRENT USE OF INSULIN (HCC): ICD-10-CM

## 2025-07-21 DIAGNOSIS — E78.2 MIXED HYPERLIPIDEMIA: Primary | ICD-10-CM

## 2025-07-21 LAB — HBA1C MFR BLD: 5.5 %

## 2025-07-21 PROCEDURE — 83036 HEMOGLOBIN GLYCOSYLATED A1C: CPT | Performed by: CLINICAL NURSE SPECIALIST

## 2025-07-21 PROCEDURE — 99214 OFFICE O/P EST MOD 30 MIN: CPT | Performed by: CLINICAL NURSE SPECIALIST

## 2025-07-21 PROCEDURE — 3078F DIAST BP <80 MM HG: CPT | Performed by: CLINICAL NURSE SPECIALIST

## 2025-07-21 PROCEDURE — 3044F HG A1C LEVEL LT 7.0%: CPT | Performed by: CLINICAL NURSE SPECIALIST

## 2025-07-21 PROCEDURE — 3074F SYST BP LT 130 MM HG: CPT | Performed by: CLINICAL NURSE SPECIALIST

## 2025-07-21 RX ORDER — METFORMIN HYDROCHLORIDE 500 MG/1
500 TABLET, EXTENDED RELEASE ORAL
Qty: 90 TABLET | Refills: 0 | Status: CANCELLED | OUTPATIENT
Start: 2025-07-21

## 2025-07-21 RX ORDER — ACETAMINOPHEN 160 MG
2000 TABLET,DISINTEGRATING ORAL DAILY
Qty: 90 CAPSULE | Refills: 0 | Status: SHIPPED | OUTPATIENT
Start: 2025-07-21

## 2025-07-21 RX ORDER — GABAPENTIN 300 MG/1
300 CAPSULE ORAL 2 TIMES DAILY
Qty: 180 CAPSULE | Refills: 0 | Status: SHIPPED | OUTPATIENT
Start: 2025-07-21 | End: 2025-10-19

## 2025-07-21 ASSESSMENT — PATIENT HEALTH QUESTIONNAIRE - PHQ9
SUM OF ALL RESPONSES TO PHQ QUESTIONS 1-9: 0
1. LITTLE INTEREST OR PLEASURE IN DOING THINGS: NOT AT ALL
SUM OF ALL RESPONSES TO PHQ QUESTIONS 1-9: 0
SUM OF ALL RESPONSES TO PHQ QUESTIONS 1-9: 0
2. FEELING DOWN, DEPRESSED OR HOPELESS: NOT AT ALL
SUM OF ALL RESPONSES TO PHQ QUESTIONS 1-9: 0

## 2025-07-21 NOTE — PROGRESS NOTES
No Transportation Needs (9/3/2024)    PRAPARE - Transportation     Lack of Transportation (Medical): No     Lack of Transportation (Non-Medical): No   Physical Activity: Sufficiently Active (3/2/2023)    Exercise Vital Sign     Days of Exercise per Week: 5 days     Minutes of Exercise per Session: 30 min   Stress: Not on file   Social Connections: Not on file   Intimate Partner Violence: Unknown (1/21/2024)    Received from [facility], [facility]    Interpersonal Safety     Feel physically or emotionally unsafe where currently live: Not on file     Harm by anyone: Not on file     Emotionally Harmed: Not on file   Housing Stability: Low Risk  (9/3/2024)    Housing Stability Vital Sign     Unable to Pay for Housing in the Last Year: No     Number of Times Moved in the Last Year: 1     Homeless in the Last Year: No       Review of Systems   Constitutional:  Negative for chills and fever.   Eyes:  Negative for visual disturbance.   Respiratory:  Negative for cough, chest tightness, shortness of breath and wheezing.    Cardiovascular:  Negative for chest pain, palpitations and leg swelling.   Gastrointestinal:  Negative for abdominal pain, constipation, diarrhea, nausea and vomiting.   Endocrine: Negative for polydipsia, polyphagia and polyuria.   Musculoskeletal:  Negative for arthralgias and myalgias.   Skin:  Negative for rash.   Neurological:  Negative for focal weakness and headaches.   Psychiatric/Behavioral:  Negative for dysphoric mood and sleep disturbance. The patient is not nervous/anxious.        OBJECTIVE:    Physical Exam  Vitals and nursing note reviewed.   Constitutional:       General: She is not in acute distress.     Appearance: She is well-developed. She is not ill-appearing.   HENT:      Head: Normocephalic and atraumatic.      Right Ear: External ear normal.      Left Ear: External ear normal.      Nose: Nose normal.      Mouth/Throat:

## 2025-07-21 NOTE — PATIENT INSTRUCTIONS
Medications refilled and fasting labs ordered     Reviewed low-cholesterol diet     Reviewed diabetic diet     Hold metformin for now due to A1c of 5.5.     Continue gabapentin 300 mg twice daily      Encourage lifestyle modifications (better food choices, portion control and increasing activity)     Follow-up with specialist as needed/directed (psych, Ortho, podiatry pulmonology, cardiology)      Encourage continued lifestyle modifications (better food choices, portion control and increasing activity)     Follow up with cardiology as needed/directed (6 months)     Follow up with pulmonology as needed/directed (as needed)      Follow up with rheumatology as needed/directed (re-establishing)     Follow up with psychiatrist as needed/directed (3 months)      Follow up in 3-6 months, sooner if symptoms worsen or persist

## (undated) DEVICE — RELOAD STPL L60MM H1.5-3.6MM REG TISS BLU GRIPPING SURF B

## (undated) DEVICE — LAPAROSCOPIC SCISSORS: Brand: EPIX LAPAROSCOPIC SCISSORS

## (undated) DEVICE — Device

## (undated) DEVICE — 1010 S-DRAPE TOWEL DRAPE 10/BX: Brand: STERI-DRAPE™

## (undated) DEVICE — GOWN,AURORA,NONREINF,RAGLAN,XXL,STERILE: Brand: MEDLINE

## (undated) DEVICE — SYRINGE MED 10ML TRNSLUC BRL PLUNG BLK MRK POLYPR CTRL

## (undated) DEVICE — 3 ML SYRINGE LUER-LOCK TIP: Brand: MONOJECT

## (undated) DEVICE — SUTURE VCRL + SZ 3-0 L27IN ABSRB UD L26MM SH 1/2 CIR VCP416H

## (undated) DEVICE — BIPOLAR SEALER 23-112-1 AQM 6.0: Brand: AQUAMANTYS ®

## (undated) DEVICE — GLOVE SURG SZ 55 THK91MIL ORANGE  LTX FREE SYN POLYISOPRENE

## (undated) DEVICE — BANDAGE,GAUZE,CONFORMING,3"X75",STRL,LF: Brand: MEDLINE

## (undated) DEVICE — 3M™ TEGADERM™ TRANSPARENT FILM DRESSING FRAME STYLE, 1626W, 4 IN X 4-3/4 IN (10 CM X 12 CM), 50/CT 4CT/CASE: Brand: 3M™ TEGADERM™

## (undated) DEVICE — GOWN,SIRUS,POLYRNF,SETINSLV,L,20/CS: Brand: MEDLINE

## (undated) DEVICE — SHEET,DRAPE,53X77,STERILE: Brand: MEDLINE

## (undated) DEVICE — GUIDE WIRE, BALL-TIPPED, STERILE

## (undated) DEVICE — PROBE ABLATN RF XL 180 MM 50-S W/ INTEGR CABLE HND SERFAS

## (undated) DEVICE — C-ARM: Brand: UNBRANDED

## (undated) DEVICE — DRAPE,SPLIT ,77X120: Brand: MEDLINE

## (undated) DEVICE — GOWN,SIRUS,POLYRNF,BRTHSLV,XL,30/CS: Brand: MEDLINE

## (undated) DEVICE — TOWEL,STOP FLAG GOLD N-W: Brand: MEDLINE

## (undated) DEVICE — SYRINGE, LUER LOCK, 10ML: Brand: MEDLINE

## (undated) DEVICE — AIR SHEET,LAT,COMFORT GLIDE, BLEND 40X80: Brand: MEDLINE

## (undated) DEVICE — DRAPE SURG UTIL 26X15 IN W/ TAPE N INVASIVE MULT LAYR DISP

## (undated) DEVICE — SOLUTION IRRIG 1000ML STRL H2O USP PLAS POUR BTL

## (undated) DEVICE — LAPAROSCOPY PACK: Brand: MEDLINE INDUSTRIES, INC.

## (undated) DEVICE — TRANSPORT CANNULA 8MM LENGTH 7, 8, 9: Brand: TRANSPORT

## (undated) DEVICE — SOLUTION IRRIG 3000ML 0.9% SOD CHL ARTHROMATIC PLAS CONT

## (undated) DEVICE — NEEDLE HYPO 18GA L1.5IN PNK POLYPR HUB S STL THN WALL FILL

## (undated) DEVICE — GLOVE SURG SZ 6 L12IN FNGR THK79MIL GRN LTX FREE

## (undated) DEVICE — 30978 SEE SHARP - ENHANCED INTRAOPERATIVE LAPAROSCOPE CLEANING & DEFOGGING: Brand: 30978 SEE SHARP - ENHANCED INTRAOPERATIVE LAPAROSCOPE CLEANING & DEFOGGING

## (undated) DEVICE — SUTURE NONABSORBABLE MONOFILAMENT 3-0 PS-1 18 IN BLK ETHILON 1663H

## (undated) DEVICE — KIT DRP FOR RIO ROBOTIC ARM ASST SYS

## (undated) DEVICE — NEURO SPONGES: Brand: DEROYAL

## (undated) DEVICE — MAT FLR ABS DISP

## (undated) DEVICE — STOCKINETTE,IMPERVIOUS,12X48,STERILE: Brand: MEDLINE

## (undated) DEVICE — XL TOMCAT, ANGLED HIP CUTTER. ARTHROSCOPIC SHAVER BLADE. FOR USE WITH: REF 0375-701-500, 0375-704-500, 0375-708-500. DO NOT USE IF PACKAGE IS DAMAGED, KEEP DRY, KEEP AWAY FROM SUNLIGHT: Brand: FORMULA

## (undated) DEVICE — DRAPE,LAP,CHOLE,W/TROUGHS,STERILE: Brand: MEDLINE

## (undated) DEVICE — COVER,MAYO STAND,STERILE: Brand: MEDLINE

## (undated) DEVICE — MASC TURNOVER KIT: Brand: MEDLINE INDUSTRIES, INC.

## (undated) DEVICE — GLOVE SURG SZ 7 L12IN FNGR THK79MIL GRN LTX FREE

## (undated) DEVICE — GOWN,SIRUS,NON REINFRCD,LARGE,SET IN SL: Brand: MEDLINE

## (undated) DEVICE — TROCAR: Brand: KII FIOS FIRST ENTRY

## (undated) DEVICE — 6619 2 PTNT ISO SYS INCISE AREA&LT;(&GT;&&LT;)&GT;P: Brand: STERI-DRAPE™ IOBAN™ 2

## (undated) DEVICE — BW-412T DISP COMBO CLEANING BRUSH: Brand: SINGLE USE COMBINATION CLEANING BRUSH

## (undated) DEVICE — SLINGSHOT 70 UP: Brand: SLINGSHOT

## (undated) DEVICE — HYPODERMIC SAFETY NEEDLE: Brand: MAGELLAN

## (undated) DEVICE — STAPLER 60MM POWERED ECHELON 3000 LONG 440MM

## (undated) DEVICE — CRADLE ANK AND FT ELEV FLAT END POLY FOAM W/ VENT H NEUT

## (undated) DEVICE — NEEDLE,22GX1.5",REG,BEVEL: Brand: MEDLINE

## (undated) DEVICE — SUTURE VCRL SZ 2 L27IN ABSRB VLT L65MM TP-1 1/2 CIR J649G

## (undated) DEVICE — PADDING CAST W4INXL4YD NONSTERILE COT RAYON MICROPLEATED

## (undated) DEVICE — 1200CC HIFLOW SUCTION CANISTER WITH AEROSTAT FILTER, FLOAT VALVE SHUTOFF WITH GREEN LID: Brand: BEMIS

## (undated) DEVICE — SUTURE VCRL + SZ 2-0 L27IN ABSRB WHT SH 1/2 CIR TAPERCUT VCP417H

## (undated) DEVICE — SHEET,DRAPE,40X58,STERILE: Brand: MEDLINE

## (undated) DEVICE — ZIP 8I SURGICAL SKIN CLOSURE DEVICE: Brand: ZIP 8I SURGICAL SKIN CLOSURE DEVICE

## (undated) DEVICE — GLOVE ORANGE PI 7 1/2   MSG9075

## (undated) DEVICE — 6 ML SYRINGE LUER-LOCK TIP: Brand: MONOJECT

## (undated) DEVICE — GOWN,SURGICAL,AURORA,SLEEVE: Brand: MEDLINE

## (undated) DEVICE — SUTURE VCRL PLUS SZ 0 54IN TIE VCP608H

## (undated) DEVICE — BLADE,CARBON-STEEL,11,STRL,DISPOSABLE,TB: Brand: MEDLINE

## (undated) DEVICE — SPLINT QUICK STEP SCOTCHCAST 4 X 30

## (undated) DEVICE — SOLUTION IV 1000ML LAC RINGERS PH 6.5 INJ USP VIAFLX PLAS

## (undated) DEVICE — DRESSING THERABOND 3D ANTIMIC CNTCT SYS 15INCHX10INCH

## (undated) DEVICE — NEEDLE HYPO 25GA L1.5IN BLU POLYPR HUB S STL REG BVL STR

## (undated) DEVICE — SOLUTION IV IRRIG 500ML 0.9% SODIUM CHL 2F7123

## (undated) DEVICE — APPLICATOR MEDICATED 26 CC SOLUTION HI LT ORNG CHLORAPREP

## (undated) DEVICE — PORTAL ENTRY KIT

## (undated) DEVICE — COTTON UNDERCAST PADDING,CRIMPED FINISH: Brand: WEBRIL

## (undated) DEVICE — SYRINGE MED 10ML LUERLOCK TIP W/O SFTY DISP

## (undated) DEVICE — BLANKET WRM W29.9XL79.1IN UP BODY FORC AIR MISTRAL-AIR

## (undated) DEVICE — MAT FLR W32XL58IN

## (undated) DEVICE — SYRINGE MED 30ML STD CLR PLAS LUERLOCK TIP N CTRL DISP

## (undated) DEVICE — 450 ML BOTTLE OF 0.05% CHLORHEXIDINE GLUCONATE IN 99.95% STERILE WATER FOR IRRIGATION, USP AND APPLICATOR.: Brand: IRRISEPT ANTIMICROBIAL WOUND LAVAGE

## (undated) DEVICE — PAD ABSRB W8XL10IN ABD HYDROPHOBIC NONWOVEN THCK LAYR CELOS

## (undated) DEVICE — DRESSING WND ISLAND 4X8 IN ANTIMICROBIAL BARR THERABOND 3D

## (undated) DEVICE — IMPLANTABLE DEVICE
Type: IMPLANTABLE DEVICE | Site: FOOT | Status: NON-FUNCTIONAL
Removed: 2023-05-26

## (undated) DEVICE — SOLUTION IV 250ML 0.9% SOD CHL PH 5 INJ USP VIAFLX PLAS

## (undated) DEVICE — MEDIA CONTRAST RX ISOVUE-300 61% 30ML VIALS

## (undated) DEVICE — 3M™ STERI-STRIP™ COMPOUND BENZOIN TINCTURE 40 BAGS/CARTON 4 CARTONS/CASE C1544: Brand: 3M™ STERI-STRIP™

## (undated) DEVICE — POSITIONER ORTHO BOOT LINER PAIR DERMAPROX

## (undated) DEVICE — BONE FENESTRATION PERFORATOR: Brand: BABY GORILLA®/GORILLA® PLATING SYSTEM

## (undated) DEVICE — MICRO SAGITTAL BLADE, COARSE, 9.5 X 25.5 X 0.4 MM: Brand: CONMED

## (undated) DEVICE — 3M™ IOBAN™ 2 ANTIMICROBIAL INCISE DRAPE 6640EZ: Brand: IOBAN™ 2

## (undated) DEVICE — SOLUTION IRRIG 1000ML 0.9% SOD CHL USP POUR PLAS BTL

## (undated) DEVICE — SUTURE MONOCRYL STRATAFIX SPRL SZ 3-0 L12IN ABSRB UD FS-1 L30X30CM SXMP2B410

## (undated) DEVICE — XL, ARTHROSCOPIC SHAVER BLADES, DIAMOND ROUND BUR.  DO NOT RESTERILIZE, DO NOT USE IF PACKAGE IS DAMAGED, KEEP DRY, KEEP AWAY FROM SUNLIGHT: Brand: CROSSBLADE

## (undated) DEVICE — COVER LT HNDL BLU PLAS

## (undated) DEVICE — THE DYNAFORCE™  MOTOBAND™ MAX IMPLANT SYSTEM IS A BONE PLATING SYSTEM INDICATED FOR STABILIZATION AND FIXATION OF FRESH FRACTURES, REVISION PROCEDURES, JOINT FUSION AND RECONSTRUCTION OF SMALL BONES OF THE HAND, FEET, WRIST, ANKLES, FINGERS AND TOES.  IT CONSIST OF PLATES AND SCREWS. CERTAIN PLATES IN THE SYSTEM ARE COMPATIBLE WITH THE MOTOCLIP/DYNAFORCE SUPERELASTIC FIXATION SYSTEM.
Type: IMPLANTABLE DEVICE | Site: FOOT | Status: NON-FUNCTIONAL
Brand: DYNAFORCE MOTOBAND MAX

## (undated) DEVICE — SUTURE ETHLN SZ 3-0 L18IN NONABSORBABLE BLK L24MM PS-1 3/8 1663G

## (undated) DEVICE — STERILE POLYISOPRENE POWDER-FREE SURGICAL GLOVES: Brand: PROTEXIS

## (undated) DEVICE — GLOVE ORTHO 7   MSG9470

## (undated) DEVICE — TUBING SUCTION/IRRIGATION CROSSFLOW DAY USE

## (undated) DEVICE — DEVICE SUT SHFT L34CM DIA 10MM 2 JAW LD UNIT ENDOSTCH

## (undated) DEVICE — FORCEPS BX L240CM WRK CHN 2.8MM STD CAP W/ NDL MIC MESH

## (undated) DEVICE — 3M™ TEGADERM™ TRANSPARENT FILM DRESSING FRAME STYLE, 1624W, 2-3/8 IN X 2-3/4 IN (6 CM X 7 CM), 100/CT 4CT/CASE: Brand: 3M™ TEGADERM™

## (undated) DEVICE — 4.75MM ALPHAVENT AND OMEGA AWL: Brand: ALPHAVENT, OMEGA

## (undated) DEVICE — SET GRAV VENT NVENT CK VLV 3 NDL FREE PRT 10 GTT

## (undated) DEVICE — INTENDED FOR TISSUE SEPARATION, AND OTHER PROCEDURES THAT REQUIRE A SHARP SURGICAL BLADE TO PUNCTURE OR CUT.: Brand: BARD-PARKER ® STAINLESS STEEL BLADES

## (undated) DEVICE — SAMURAI CURVED BLADE: Brand: SAMURAI

## (undated) DEVICE — HOLDER SCALP PLAS G STD

## (undated) DEVICE — SOLUTION IV IRRIG WATER 500ML POUR BRL ST 2F7113

## (undated) DEVICE — K-WIRE, SINGLE ENDED TROCAR TIP, SMOOTH, 2.0 X 150MM
Type: IMPLANTABLE DEVICE | Site: FOOT | Status: NON-FUNCTIONAL
Brand: MULTI SYSTEM
Removed: 2019-12-06

## (undated) DEVICE — SUTURE ETHIBOND EXCEL SZ 2 L30IN NONABSORBABLE GRN L40MM V-37 MX69G

## (undated) DEVICE — GLOVE ORANGE PI 7   MSG9070

## (undated) DEVICE — 3M™ STERI-DRAPE™ U-DRAPE 1015: Brand: STERI-DRAPE™

## (undated) DEVICE — RELOAD STPL L60MM H1-2.6MM MESENTERY THN TISS WHT 6 ROW

## (undated) DEVICE — SUTURE ETHLN SZ 3-0 L18IN NONABSORBABLE BLK PS-2 L19MM 3/8 1669H

## (undated) DEVICE — NANOPASS REACH CRESCENT: Brand: NANOPASS

## (undated) DEVICE — TRANSPORT CANNULA 8MM LENGTH 4, 5, 6: Brand: TRANSPORT

## (undated) DEVICE — ARM CRADLE: Brand: DEVON

## (undated) DEVICE — SUTURE VCRL SZ 3-0 L27IN ABSRB UD L26MM SH 1/2 CIR J416H

## (undated) DEVICE — SOLUTION WND IRRIGATION 450 ML 0.5 PVP-I 0.9 NACL

## (undated) DEVICE — POSITIONER HD W8XH4XL8.5IN RASPBERRY FOAM SLT

## (undated) DEVICE — NEEDLE SPNL 22GA L3.5IN BLK HUB S STL REG WALL FIT STYL W/

## (undated) DEVICE — TROCARS: Brand: KII® OPTICAL ACCESS SYSTEM

## (undated) DEVICE — GLOVE SURG SZ 75 L12IN FNGR THK94MIL STD WHT LTX FREE

## (undated) DEVICE — OUTFLOW CASSETTE TUBING, DO NOT USE IF PACKAGE IS DAMAGED: Brand: CROSSFLOW

## (undated) DEVICE — LIQUIBAND RAPID ADHESIVE 36/CS 0.8ML: Brand: MEDLINE

## (undated) DEVICE — SUTURE ABSORBABLE MONOFILAMENT 1 CTX 36 CM 48 MM VIO PDS +

## (undated) DEVICE — SOLUTION IV 1000ML 0.9% SOD CHL

## (undated) DEVICE — SHEET, ORTHO, SPLIT, STERILE: Brand: MEDLINE

## (undated) DEVICE — TRUE CONTENT TO BE POPULATED AS PART OF REBRANDING: Brand: ARGYLE

## (undated) DEVICE — OVAL BUR, MEDIUM, 4 X 8 MM: Brand: CONMED

## (undated) DEVICE — STRIP,CLOSURE,WOUND,MEDI-STRIP,1/2X4: Brand: MEDLINE

## (undated) DEVICE — AIR/WATER CLEANING ADAPTER FOR OLYMPUS® GI ENDOSCOPE: Brand: BULLDOG®

## (undated) DEVICE — LOTION PREP REMV 5OZ IODO CLR TINC OF BENZ DURAPREP

## (undated) DEVICE — TROCAR: Brand: KII OPTICAL ACCESS SYSTEM

## (undated) DEVICE — SUTURE VCRL + SZ 4-0 L18IN ABSRB UD L19MM PS-2 3/8 CIR PRIM VCP496H

## (undated) DEVICE — NEEDLE INSUF L150MM DIA2MM DISP FOR PNEUMOPERI ENDOPATH

## (undated) DEVICE — PASSIVE LAPSCP FLTR W/ 1/4INX24 TBNG AND M LUER LCK FIT - U

## (undated) DEVICE — GOWN SIRUS NONREIN XL W/TWL: Brand: MEDLINE INDUSTRIES, INC.

## (undated) DEVICE — TELFA NON-ADHERENT ABSORBENT DRESSING: Brand: TELFA

## (undated) DEVICE — MAJOR SET UP PK

## (undated) DEVICE — DRAPE EQUIP C ARM MINI 10000100] TIDI PRODUCTS INC]

## (undated) DEVICE — MERCY FAIRFIELD TURNOVER KIT: Brand: MEDLINE INDUSTRIES, INC.

## (undated) DEVICE — TUBING, SUCTION, 1/4" X 10', STRAIGHT: Brand: MEDLINE

## (undated) DEVICE — ENDOSCOPIC KIT 6X3/16 FT COLON W/ 1.1 OZ 2 GWN W/O BRSH

## (undated) DEVICE — SUTURE ETHLN SZ 4-0 L18IN NONABSORBABLE BLK L19MM PS-2 3/8 1667H

## (undated) DEVICE — SHEARS ENDOSCP HARM 36CM ULTRASONIC CRV TIP UPGRD

## (undated) DEVICE — 3M™ TEGADERM™ TRANSPARENT FILM DRESSING FRAME STYLE, 1627, 4 IN X 10 IN (10 CM X 25 CM), 20/CT 4CT/CASE: Brand: 3M™ TEGADERM™

## (undated) DEVICE — SET ADMIN PRIMING 7ML L30IN 7.35LB 20 GTT 2ND RLER CLMP

## (undated) DEVICE — MARKER SURG SKIN UTIL BLK REG TIP NONSMEARING W/ 6IN RUL

## (undated) DEVICE — DRAPE,UTILTY,TAPE,15X26, 4EA/PK: Brand: MEDLINE

## (undated) DEVICE — NANOTACK FLEX DRILL BIT: Brand: NANOTACK FLEX

## (undated) DEVICE — DEVICE SUT W/ SZ 0 L48IN VLT POLYSRB SUT DISP ES-9 ENDO

## (undated) DEVICE — ELECTRODE PT RET AD L9FT HI MOIST COND ADH HYDRGEL CORDED

## (undated) DEVICE — TRAP FLUID

## (undated) DEVICE — SOLUTION SURG PREP 26 CC PURPREP

## (undated) DEVICE — ANTERIOR TOTAL HIP: Brand: MEDLINE INDUSTRIES, INC.

## (undated) DEVICE — POSITIONER ORTHO HIP KIT POSTLESS PNK

## (undated) DEVICE — OLIVE WIRE, SMOOTH, 1.4MM: Brand: BABY GORILLA/GORILLA PLATING SYSTEM

## (undated) DEVICE — ZIMMER® STERILE DISPOSABLE TOURNIQUET CUFF WITH PLC, DUAL PORT, SINGLE BLADDER, 18 IN. (46 CM)

## (undated) DEVICE — NEEDLE SPNL L3.5IN PNK HUB S STL REG WALL FIT STYL W/ QNCKE

## (undated) DEVICE — KIT INT FIX FEM TIB CKPT MAKOPLASTY

## (undated) DEVICE — GOWN SIRUS NONREIN LG W/TWL: Brand: MEDLINE INDUSTRIES, INC.

## (undated) DEVICE — GLOVE SURG SZ 8 L12IN FNGR THK94MIL STD WHT LTX FREE

## (undated) DEVICE — COVER,TABLE,44X90,STERILE: Brand: MEDLINE

## (undated) DEVICE — DUAL CUT SAGITTAL BLADE

## (undated) DEVICE — GLOVE ORANGE PI 8   MSG9080

## (undated) DEVICE — DEVON TUBE HOLDER REMOVABLE TOUCH FASTEN STRAP: Brand: DEVON

## (undated) DEVICE — VALVE SUCTION AIR H2O SET ORCA POD + DISP

## (undated) DEVICE — 3M™ STERI-STRIP™ REINFORCED ADHESIVE SKIN CLOSURES, R1547, 1/2 IN X 4 IN (12 MM X 100 MM), 6 STRIPS/ENVELOPE: Brand: 3M™ STERI-STRIP™

## (undated) DEVICE — 3M™ IOBAN™ 2 ANTIMICROBIAL INCISE DRAPE 6650EZ: Brand: IOBAN™ 2

## (undated) DEVICE — SPLINT ORTH W4XL30IN LAYERED FBRGLS FOAM PD BRTH BK MOLD

## (undated) DEVICE — SUTURE ETHIBOND EXCEL SZ 5 L30IN NONABSORBABLE GRN L40MM V-37 MB66G

## (undated) DEVICE — WAX SURG 2.5GM HEMSTAT BNE BEESWAX PARAFFIN ISO PALMITATE

## (undated) DEVICE — APPLICATOR PREP 26ML 0.7% IOD POVACRYLEX 74% ISO ALC ST

## (undated) DEVICE — MOUTHPIECE ENDOSCP L CTRL OPN AND SIDE PORTS DISP

## (undated) DEVICE — PADDING CAST W6INXL4YD NONSTERILE COT RAYON MICROPLEATED

## (undated) DEVICE — DRESSING W4XL8IN ISLANDTHERABOND 3D

## (undated) DEVICE — CATHETER IV 20GA L1.25IN PNK FEP SFTY STR HUB RADPQ DISP

## (undated) DEVICE — PACK EXTREMITY XR

## (undated) DEVICE — UNDERPANTS INCONT XL 45-70IN KNIT SEAMLESS DSGN COLOR-CODED

## (undated) DEVICE — SPONGE GZ W4XL8IN COT WVN 12 PLY

## (undated) DEVICE — SUTURE N ABSRB BRAIDED 2-0 MO-6 39 IN 26 MM 1/2 CIR BLU BLK 3910900023

## (undated) DEVICE — BLADE,CARBON-STEEL,10,STRL,DISPOSABLE,TB: Brand: MEDLINE

## (undated) DEVICE — KIT TRK HIP PROC VIZADISC

## (undated) DEVICE — GLOVE ORANGE PI 8 1/2   MSG9085

## (undated) DEVICE — 3M™ IOBAN™ 2 ANTIMICROBIAL INCISE DRAPE 6648EZ: Brand: IOBAN™ 2

## (undated) DEVICE — BANDAGE COMPR W6INXL12FT SMOOTH FOR LIMB EXSANG ESMARCH

## (undated) DEVICE — COVER,TABLE,77X90,STERILE: Brand: MEDLINE

## (undated) DEVICE — DRILL,  2.4 X 140MM, SOLID, MEASURING, LONG, AO: Brand: BABY GORILLA®/GORILLA® PLATING SYSTEM

## (undated) DEVICE — ICONIX SPEED ANCHOR 2.3MM 2 STRANDS 2.0MM XBRAID TT SUTURE TAPE
Type: IMPLANTABLE DEVICE | Site: HIP | Status: NON-FUNCTIONAL
Brand: ICONIX
Removed: 2022-06-28

## (undated) DEVICE — SOLUTION IV IRRIG LACTATED RINGERS 3000ML 2B7487

## (undated) DEVICE — PIN BNE FIX TEMP L140MM DIA4MM MAKO

## (undated) DEVICE — SPONGE,LAP,4"X18",XR,ST,5/PK,40PK/CS: Brand: MEDLINE INDUSTRIES, INC.

## (undated) DEVICE — GLOVE SURG SZ 85 L12IN THK75MIL DK GRN LTX FREE